# Patient Record
Sex: MALE | Race: WHITE | NOT HISPANIC OR LATINO | Employment: OTHER | URBAN - METROPOLITAN AREA
[De-identification: names, ages, dates, MRNs, and addresses within clinical notes are randomized per-mention and may not be internally consistent; named-entity substitution may affect disease eponyms.]

---

## 2017-01-03 ENCOUNTER — TELEPHONE (OUTPATIENT)
Dept: FAMILY MEDICINE | Facility: CLINIC | Age: 82
End: 2017-01-03

## 2017-01-03 NOTE — TELEPHONE ENCOUNTER
Reason for Call:  Other call back    Detailed comments: pt would like to know how much physical activity they can have after having a mini stroke on 12/22    Phone Number Patient can be reached at: Home number on file [phone] (home)    Best Time:     Can we leave a detailed message on this number? YES    Call taken on 1/3/2017 at 3:14 PM by Fly Chahal

## 2017-01-06 NOTE — TELEPHONE ENCOUNTER
Called and relayed the below message to the Pt. Pt agrees with recommendations below.     Chrissy Araujo RN

## 2017-01-06 NOTE — TELEPHONE ENCOUNTER
Ok to return to swimming, do routine chores, would not do heavy lifting, snow shoveling    Zurdo Kendrick M.D.

## 2017-01-06 NOTE — TELEPHONE ENCOUNTER
Routing to PCP. This was never routed to triage or PCP at start of the encounter on 1/3.     Chrissy Araujo RN

## 2017-01-14 DIAGNOSIS — E78.5 HYPERLIPIDEMIA LDL GOAL <100: Primary | ICD-10-CM

## 2017-01-16 RX ORDER — SIMVASTATIN 20 MG
TABLET ORAL
Qty: 90 TABLET | Refills: 3 | Status: SHIPPED | OUTPATIENT
Start: 2017-01-16 | End: 2017-12-31

## 2017-01-16 NOTE — TELEPHONE ENCOUNTER
simvastatin (ZOCOR) 20 MG tablet 90 tablet 3 12/4/2015  No      Sig: Take 1 tablet (20 mg) by mouth At Bedtime          Last Written Prescription Date: 12/04/2015  Last Fill Quantity: 90, # refills: 3  Last Office Visit with [facility], [facility] or [facility] prescribing provider: Dr. Rodriguez 12-   Next 5 appointments (look out 90 days)     Jan 19, 2017 11:00 AM   Pre-Op physical with Zurdo Kendrick MD   [facility]    [address] [facility] [address]   [phone]                   CHOL       93   12/23/2016  HDL       38   12/23/2016  LDL       41   12/23/2016  TRIG       72   12/23/2016  CHOLHDLRATIO      3.2   5/27/2015

## 2017-01-16 NOTE — PROGRESS NOTES
[facility]  [address] [facility] [address]  [phone]  Dept: [phone]    PRE-OP EVALUATION:  Today's date: 2017    Zack Santiago (: 1931) presents for pre-operative evaluation assessment as requested by Dr. Marc Jenkins  He requires evaluation and anesthesia risk assessment prior to undergoing surgery/procedure for treatment of left ing hernia .  Proposed procedure: hernia repair    Date of Surgery/ Procedure: 17  Time of Surgery/ Procedure:   Hospital/Surgical Facility: [facility]  Fax number for surgical facility: [phone]  Primary Physician: Zurdo Kendrick  Type of Anesthesia Anticipated:     Patient has a Health Care Directive or Living Will:      This is a complicated patient with ing hernia now scheduled for surgery.  I have been asked to clear him for the procedure.  He has mmp as noted and I reviewed his notes, office notes and hosp dc note.  As noted hosp recently for tia and changed from asa to plavix, since has been fine.  Before that had episode of amaurosis fugax and eval noted and ziopatch done with svt but no afib.  No recurrence of that.  He notes the hernia is not significantly painful or bothersome but noticeable.  Since the hosp dc he has slight hand writing diff but no speech diff or other neuro changes.  No new c/o.  No chest pain or shortness of breath, no fever.  No amaurosis.  He is taking his meds reg.                  Past Medical History:      Past Medical History   Diagnosis Date     GERD (gastroesophageal reflux disease)      OCD (obsessive compulsive disorder)      sees psyche     AAA (abdominal aortic aneurysm) without rupture (H)      Dr. Walters, endovascular repair done 5/15     Cough      pulm eval, felt due to reflux     ASCVD (arteriosclerotic cardiovascular disease) 2010     cabg, post op afib     A-fib (H) 2010     post op     Panic disorder      Dr. Luna     Hypothyroid      Ulcerative colitis (H)      not  active for years     Idiopathic neuropathy      HTN (hypertension) 2002     Dizzy 2001     mri small vessel dz     BPH (benign prostatic hyperplasia) 2003     turp     Hx of colonoscopy 2003     tics     Anemia 2004     Hypovitaminosis D      Spinal headache      CKD (chronic kidney disease) stage 3, GFR 30-59 ml/min      Sudden hearing loss 6/13     Dr. Garcia, mri brain no acoustic neuroma     Aortic insufficiency 6/14     1+ on echo     Thoracic ascending aortic aneurysm (H) 6/14     4.4cm on echo, aortic root 3.9, fu 5/15 4.8cm; fu done 12/15 and slightly enlarged, fu 6/16 no change     Bradycardia 2016     stopped toprol     Amaurosis fugax of right eye 10/16     carotid us nl stenosis, echo no change and no clots; ziopatch no afib, svt present     SVT (supraventricular tachycardia) (H) 11/16     seen on ziopatch done for amaurosis, longest 15 beats     Stroke (H) 12/16     expressive aphasia, hosp, seen by neuro, mri pos, carotids min dz, changed from asa to plavix             Past Surgical History:      Past Surgical History   Procedure Laterality Date     Cataract iol, rt/lt  2011     C total knee arthroplasty  2011     left     C total knee arthroplasty  2009     right     Coronary artery bypass  2010     Hernia repair  2005     Turp  2003     Knee surgery  1997     Back surgery  1998     Bladder surgery  1985     Hernia repair  1998     Repair hammer toe  12/28/2012     Procedure: REPAIR HAMMER TOE;  LEFT SECOND AND THIRD CLAW TOE RECONSTRUCTION;  Surgeon: Gray Haynes MD;  Location:  OR     Endovascular repair aneurysm abdominal aorta N/A 5/27/2015     Procedure: ENDOVASCULAR REPAIR ANEURYSM ABDOMINAL AORTA;  Surgeon: Darin Walters MD;  Location:  OR             Social History:     Social History   Substance Use Topics     Smoking status: Former Smoker -- 1.00 packs/day for 5 years     Types: Cigarettes     Quit date: 06/13/1965     Smokeless tobacco: Never Used     Alcohol Use: 0.0  "oz/week     0 Standard drinks or equivalent per week      Comment: maybe one glass of wine a week             Family History:   No family history of bleeding difficulty, anesthesia problems or blood clots.         Allergies:     Allergies   Allergen Reactions     No Known Allergies              Medications:     Current Outpatient Prescriptions   Medication Sig Dispense Refill     ASPIRIN NOT PRESCRIBED (INTENTIONAL) Please choose reason not prescribed, below 0 each 0     simvastatin (ZOCOR) 20 MG tablet TAKE ONE TABLET BY MOUTH AT BEDTIME 90 tablet 3     MIRTAZAPINE PO Take 30 mg by mouth At Bedtime       LORAZEPAM PO Take 0.5 mg by mouth daily as needed for anxiety       clopidogrel (PLAVIX) 75 MG tablet Take 1 tablet (75 mg) by mouth daily 30 tablet 3     levothyroxine (SYNTHROID/LEVOTHROID) 100 MCG tablet TAKE ONE TABLET BY MOUTH ONE TIME DAILY 90 tablet 2     ClomiPRAMINE HCl (ANAFRANIL PO) Take 25 mg by mouth daily       chlorthalidone (HYGROTON) 25 MG tablet Take 1 tablet (25 mg) by mouth daily 90 tablet 3     Multiple Vitamins-Minerals (ICAPS AREDS FORMULA) TABS Take 1 capsule by mouth 2 times daily.       gabapentin (NEURONTIN) 400 MG capsule Take 1 capsule by mouth 2 times daily        ARIPiprazole (ABILIFY) 2 MG tablet Take 1 tablet by mouth At Bedtime.                 Review of Systems:   No history of bleeding difficulty, anesthesia problems or blood clots.  The 10 point Review of Systems is negative other than noted in the HPI           Physical Exam:   Blood pressure 132/69, pulse 64, temperature 97.8  F (36.6  C), temperature source Oral, height 6' 1\" (1.854 m), weight 190 lb (86.183 kg), SpO2 96 %.    Constitutional: healthy appearing, alert and in no distress  Heent: Normocephalic. Head without obvious masses or lesions. PERRLDC, EOMI. Mouth exam within normal limits: tongue, mucous membranes, posterior pharynx all normal, no lesions or abnormalities seen.  Tm's and canals within normal limits " bilaterally. Neck supple, no nuchal rigidity or masses. No supraclavicular, or cervical adenopathy. Thyroid symmetric, no masses.  Cardiovascular: Regular rate and rhythm, no murmer, rub or gallops.  JVP not elevated, no edema.  Carotids within normal limits bilaterally, no bruits.  Respiratory: Normal respiratory effort.  Lungs clear, normal flow, no wheezing or crackles.  Gastrointestinal: Normal active bowel sounds.   Soft, not tender, no masses, guarding or rebound.  No hepatosplenomegaly.   Musculoskeletal: extremities normal, no gross deformities noted.  Skin: no suspicious lesions or rashes   Neurologic: Mental status within normal limits.  Speech fluent.  No gross motor abnormalities and gait intact.  Psychiatric: mentation appears normal and affect normal.         Data:   Labs noted        Assessment:   1. Normal pre op exam  2. Recent tia, changed from asa to plavix  3. Recent episode of amaurosis, stable  4. Recent ziopatch without afib but svt noted  5. Prior post op afib, no signs of recurrence  6. Recent change from asa to plavix due to tia  7. Ascvd, stable  8. aaa and thor aneurysm, stable  9. Ocd, stable         Plan:   I d/w patient that given this is elective and he would have to go off the plavix and given recent tia I would suggest he hold off on the procedure.  I believe if he can wait until at least 3 months out from the tia then going off the plavix would present less of a risk to the patient.  Given this is elective and low risk of strangulation I encouraged patient to cancel the procedure and he agrees.  If he were to have pain call me right away.  He is leaving in March for [place] and will re schedule it for after that.    Patient to follow up with me as noted, call if pain      Zurdo Kendrick M.D.

## 2017-01-19 ENCOUNTER — OFFICE VISIT (OUTPATIENT)
Dept: FAMILY MEDICINE | Facility: CLINIC | Age: 82
End: 2017-01-19
Payer: COMMERCIAL

## 2017-01-19 VITALS
HEIGHT: 73 IN | OXYGEN SATURATION: 96 % | WEIGHT: 190 LBS | SYSTOLIC BLOOD PRESSURE: 132 MMHG | TEMPERATURE: 97.8 F | HEART RATE: 64 BPM | BODY MASS INDEX: 25.18 KG/M2 | DIASTOLIC BLOOD PRESSURE: 69 MMHG

## 2017-01-19 DIAGNOSIS — I71.21 THORACIC ASCENDING AORTIC ANEURYSM (H): ICD-10-CM

## 2017-01-19 DIAGNOSIS — I47.10 SVT (SUPRAVENTRICULAR TACHYCARDIA) (H): ICD-10-CM

## 2017-01-19 DIAGNOSIS — N18.30 CKD (CHRONIC KIDNEY DISEASE) STAGE 3, GFR 30-59 ML/MIN (H): ICD-10-CM

## 2017-01-19 DIAGNOSIS — I63.9 CEREBROVASCULAR ACCIDENT (CVA), UNSPECIFIED MECHANISM (H): ICD-10-CM

## 2017-01-19 DIAGNOSIS — I48.91 ATRIAL FIBRILLATION, UNSPECIFIED TYPE (H): ICD-10-CM

## 2017-01-19 DIAGNOSIS — G45.3 AMAUROSIS FUGAX OF RIGHT EYE: ICD-10-CM

## 2017-01-19 DIAGNOSIS — I25.10 ASCVD (ARTERIOSCLEROTIC CARDIOVASCULAR DISEASE): ICD-10-CM

## 2017-01-19 DIAGNOSIS — Z01.818 PREOP GENERAL PHYSICAL EXAM: Primary | ICD-10-CM

## 2017-01-19 PROCEDURE — 99215 OFFICE O/P EST HI 40 MIN: CPT | Performed by: INTERNAL MEDICINE

## 2017-01-19 NOTE — NURSING NOTE
"Chief Complaint   Patient presents with     Pre-Op Exam       Initial /69 mmHg  Pulse 64  Temp(Src) 97.8  F (36.6  C) (Oral)  Ht 6' 1\" (1.854 m)  Wt 190 lb (86.183 kg)  BMI 25.07 kg/m2  SpO2 96% Estimated body mass index is 25.07 kg/(m^2) as calculated from the following:    Height as of this encounter: 6' 1\" (1.854 m).    Weight as of this encounter: 190 lb (86.183 kg).  BP completed using cuff size: misael MA CMA      "

## 2017-02-08 ENCOUNTER — TELEPHONE (OUTPATIENT)
Dept: FAMILY MEDICINE | Facility: CLINIC | Age: 82
End: 2017-02-08

## 2017-02-08 NOTE — TELEPHONE ENCOUNTER
That was from the June echo, but since he had another echo Nov so none needed for at least 6 to 12 months    Zurdo Kendrick M.D.

## 2017-02-08 NOTE — TELEPHONE ENCOUNTER
Reason for Call:  Other call back    Detailed comments: Pt states dr. brown wanted him to get a ECHO every 6 mos. No order in chart    Phone Number Patient can be reached at: Home number on file [phone] (home)    Best Time: anytime    Can we leave a detailed message on this number? YES    Call taken on 2/8/2017 at 12:55 PM by Devi Denis

## 2017-02-13 DIAGNOSIS — I10 ESSENTIAL HYPERTENSION: ICD-10-CM

## 2017-02-14 RX ORDER — CHLORTHALIDONE 25 MG/1
TABLET ORAL
Qty: 90 TABLET | Refills: 1 | Status: SHIPPED | OUTPATIENT
Start: 2017-02-14 | End: 2017-08-13

## 2017-02-14 NOTE — TELEPHONE ENCOUNTER
chlorthalidone (HYGROTON) 25 MG tablet 90 tablet 3 12/4/2015        Last Written Prescription Date: 12/04/2015  Last Fill Quantity: 90, # refills: 3  Last Office Visit with [facility], [facility] or [facility] prescribing provider: 01/19/2017  Next 5 appointments (look out 90 days)     Apr 07, 2017 11:00 AM CDT   [facility]    [address] [facility] [address] [address]   [phone]                   Potassium   Date Value Ref Range Status   12/22/2016 3.4 3.4 - 5.3 mmol/L Final     Creatinine   Date Value Ref Range Status   12/22/2016 1.26 (H) 0.66 - 1.25 mg/dL Final     BP Readings from Last 3 Encounters:   01/19/17 132/69   12/29/16 125/63   12/23/16 139/58

## 2017-02-14 NOTE — TELEPHONE ENCOUNTER
Pres[place] per [facility].  Ally Lima RN

## 2017-02-15 ENCOUNTER — TRANSFERRED RECORDS (OUTPATIENT)
Dept: HEALTH INFORMATION MANAGEMENT | Facility: CLINIC | Age: 82
End: 2017-02-15

## 2017-02-20 ENCOUNTER — OFFICE VISIT (OUTPATIENT)
Dept: FAMILY MEDICINE | Facility: CLINIC | Age: 82
End: 2017-02-20
Payer: COMMERCIAL

## 2017-02-20 VITALS
DIASTOLIC BLOOD PRESSURE: 50 MMHG | HEIGHT: 73 IN | TEMPERATURE: 96.8 F | OXYGEN SATURATION: 97 % | WEIGHT: 195.2 LBS | HEART RATE: 38 BPM | BODY MASS INDEX: 25.87 KG/M2 | SYSTOLIC BLOOD PRESSURE: 120 MMHG

## 2017-02-20 DIAGNOSIS — R35.1 NOCTURIA: Primary | ICD-10-CM

## 2017-02-20 LAB
ALBUMIN UR-MCNC: NEGATIVE MG/DL
APPEARANCE UR: CLEAR
BILIRUB UR QL STRIP: NEGATIVE
COLOR UR AUTO: YELLOW
GLUCOSE UR STRIP-MCNC: NEGATIVE MG/DL
HGB UR QL STRIP: NEGATIVE
KETONES UR STRIP-MCNC: NEGATIVE MG/DL
LEUKOCYTE ESTERASE UR QL STRIP: NEGATIVE
NITRATE UR QL: NEGATIVE
PH UR STRIP: 7 PH (ref 5–7)
SP GR UR STRIP: 1.01 (ref 1–1.03)
URN SPEC COLLECT METH UR: NORMAL
UROBILINOGEN UR STRIP-ACNC: 0.2 EU/DL (ref 0.2–1)

## 2017-02-20 PROCEDURE — 81003 URINALYSIS AUTO W/O SCOPE: CPT | Performed by: INTERNAL MEDICINE

## 2017-02-20 PROCEDURE — 99213 OFFICE O/P EST LOW 20 MIN: CPT | Performed by: INTERNAL MEDICINE

## 2017-02-20 PROCEDURE — 87086 URINE CULTURE/COLONY COUNT: CPT | Performed by: INTERNAL MEDICINE

## 2017-02-20 NOTE — PROGRESS NOTES
Zack Santiago is a 85 year old male who presents for urine issues, has been for 4 to 5 days.  He notes feeling irritation fairly constant in penis with noct more then l and diff with stream at night.  During the day mostly fine, no b/b dc, no abdomen pain or n/v, no bm changes, no f,c,s.  No recent urine issues.  NO med changes.  He feels fine, not ill, no n/v.    Past Medical History   Diagnosis Date     A-fib (H) 2010     post op     AAA (abdominal aortic aneurysm) without rupture (H)      Dr. 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/16     carotid us nl stenosis, echo no change and no clots; ziopatch no afib, svt present     Anemia 2004     Aortic insufficiency 6/14     1+ on echo     ASCVD (arteriosclerotic cardiovascular disease) 2010     cabg, post op afib     BPH (benign prostatic hyperplasia) 2003     turp     Bradycardia 2016     stopped toprol     CKD (chronic kidney disease) stage 3, GFR 30-59 ml/min      Cough 2010     pulm eval, felt due to reflux     Dizzy 2001     mri small vessel dz     GERD (gastroesophageal reflux disease)      HTN (hypertension) 2002     Hx of colonoscopy 2003     tics     Hypothyroid      Hypovitaminosis D      Idiopathic neuropathy      OCD (obsessive compulsive disorder)      sees psyche     Panic disorder      Dr. Luna     Spinal headache      Stroke (H) 12/16     expressive aphasia, hosp, seen by neuro, mri pos, carotids min dz, changed from asa to plavix     Sudden hearing loss 6/13     Dr. Garcia, mri brain no acoustic neuroma     SVT (supraventricular tachycardia) (H) 11/16     seen on ziopatch done for amaurosis, longest 15 beats     Thoracic ascending aortic aneurysm (H) 6/14     4.4cm on echo, aortic root 3.9, fu 5/15 4.8cm; fu done 12/15 and slightly enlarged, fu 6/16 no change     Ulcerative colitis (H)      not active for years     Past Surgical History   Procedure Laterality Date     Cataract iol, rt/lt  2011     C total knee  arthroplasty  2011     left     C total knee arthroplasty  2009     right     Coronary artery bypass  2010     Hernia repair  2005     Turp  2003     Knee surgery  1997     Back surgery  1998     Bladder surgery  1985     Hernia repair  1998     Repair hammer toe  12/28/2012     Procedure: REPAIR HAMMER TOE;  LEFT SECOND AND THIRD CLAW TOE RECONSTRUCTION;  Surgeon: Gray Haynes MD;  Location: [facility] OR     Endovascular repair aneurysm abdominal aorta N/A 5/27/2015     Procedure: ENDOVASCULAR REPAIR ANEURYSM ABDOMINAL AORTA;  Surgeon: Darin Walters MD;  Location:  OR     Social History     Social History     Marital status:      Spouse name: N/A     Number of children: 2     Years of education: N/A     Occupational History     retail Retired     Social History Main Topics     Smoking status: Former Smoker     Packs/day: 1.00     Years: 5.00     Types: Cigarettes     Quit date: 6/13/1965     Smokeless tobacco: Never Used     Alcohol use 0.0 oz/week     0 Standard drinks or equivalent per week      Comment: maybe one glass of wine a week     Drug use: No     Sexual activity: Not Currently     Other Topics Concern     Not on file     Social History Narrative     Current Outpatient Prescriptions   Medication Sig Dispense Refill     chlorthalidone (HYGROTON) 25 MG tablet TAKE ONE TABLET BY MOUTH ONE TIME DAILY 90 tablet 1     ASPIRIN NOT PRESCRIBED (INTENTIONAL) Please choose reason not prescribed, below 0 each 0     simvastatin (ZOCOR) 20 MG tablet TAKE ONE TABLET BY MOUTH AT BEDTIME 90 tablet 3     MIRTAZAPINE PO Take 30 mg by mouth At Bedtime       LORAZEPAM PO Take 0.5 mg by mouth daily as needed for anxiety       clopidogrel (PLAVIX) 75 MG tablet Take 1 tablet (75 mg) by mouth daily 30 tablet 3     levothyroxine (SYNTHROID/LEVOTHROID) 100 MCG tablet TAKE ONE TABLET BY MOUTH ONE TIME DAILY 90 tablet 2     ClomiPRAMINE HCl (ANAFRANIL PO) Take 25 mg by mouth daily       Multiple Vitamins-Minerals  "(ICAPS AREDS FORMULA) TABS Take 1 capsule by mouth 2 times daily.       gabapentin (NEURONTIN) 400 MG capsule Take 1 capsule by mouth 2 times daily        ARIPiprazole (ABILIFY) 2 MG tablet Take 1 tablet by mouth At Bedtime.       Allergies   Allergen Reactions     No Known Allergies      FAMILY HISTORY NOTED AND REVIEWED    REVIEW OF SYSTEMS: above    PHYSICAL EXAM    /50 (BP Location: Left arm, Patient Position: Chair, Cuff Size: Adult Regular)  Pulse (!) 38  Temp 96.8  F (36  C) (Oral)  Ht 6' 1\" (1.854 m)  Wt 195 lb 3.2 oz (88.5 kg)  SpO2 97%  BMI 25.75 kg/m2    Patient appears non toxic  Abdomen normal active bowel sounds, soft,  Non-tender, no mgr, no hepatosplenomegaly, I could not feel enlarged bladder  Penis within normal limits  Testicles within normal limits  Digital mod bph, no masses, not tender    ua neg as noted    ASSESSMENT:  Urine freq, irritation, ?cause, does not appear to be uti, ?rtention, ?stone, but no blood    PLAN:  To urol, tomorrow 3pm    Zurdo Kendrick M.D.        "

## 2017-02-20 NOTE — NURSING NOTE
"Chief Complaint   Patient presents with     Urinary Problem       Initial /50 (BP Location: Left arm, Patient Position: Chair, Cuff Size: Adult Regular)  Pulse (!) 38  Temp 96.8  F (36  C) (Oral)  Ht 6' 1\" (1.854 m)  Wt 195 lb 3.2 oz (88.5 kg)  SpO2 97%  BMI 25.75 kg/m2 Estimated body mass index is 25.75 kg/(m^2) as calculated from the following:    Height as of this encounter: 6' 1\" (1.854 m).    Weight as of this encounter: 195 lb 3.2 oz (88.5 kg).  Medication Reconciliation: complete.  Radha Love CMA    "

## 2017-02-20 NOTE — PROGRESS NOTES
Zack Santiago is a 85 year old male who presents for urin issues, 4 to 5 days.  He notes nocturia up to 4, an irritation in the penis area, dec stream.  During the day fine, but has the irritation, but urine fine in the day.  NO urine b/b dc, no fever, no abdomen pain or n/v, no bm changes.  ?prior uti years ago.  Has otherwise felt fine, noit ill.    Past Medical History   Diagnosis Date     A-fib (H) 2010     post op     AAA (abdominal aortic aneurysm) without rupture (H)      Dr. 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/16     carotid us nl stenosis, echo no change and no clots; ziopatch no afib, svt present     Anemia 2004     Aortic insufficiency 6/14     1+ on echo     ASCVD (arteriosclerotic cardiovascular disease) 2010     cabg, post op afib     BPH (benign prostatic hyperplasia) 2003     turp     Bradycardia 2016     stopped toprol     CKD (chronic kidney disease) stage 3, GFR 30-59 ml/min      Cough 2010     pulm eval, felt due to reflux     Dizzy 2001     mri small vessel dz     GERD (gastroesophageal reflux disease)      HTN (hypertension) 2002     Hx of colonoscopy 2003     tics     Hypothyroid      Hypovitaminosis D      Idiopathic neuropathy      OCD (obsessive compulsive disorder)      sees psyche     Panic disorder      Dr. Luna     Spinal headache      Stroke (H) 12/16     expressive aphasia, hosp, seen by neuro, mri pos, carotids min dz, changed from asa to plavix     Sudden hearing loss 6/13     Dr. Garcia, mri brain no acoustic neuroma     SVT (supraventricular tachycardia) (H) 11/16     seen on ziopatch done for amaurosis, longest 15 beats     Thoracic ascending aortic aneurysm (H) 6/14     4.4cm on echo, aortic root 3.9, fu 5/15 4.8cm; fu done 12/15 and slightly enlarged, fu 6/16 no change     Ulcerative colitis (H)      not active for years     Past Surgical History   Procedure Laterality Date     Cataract iol, rt/lt  2011     C total knee arthroplasty   2011     left     C total knee arthroplasty  2009     right     Coronary artery bypass  2010     Hernia repair  2005     Turp  2003     Knee surgery  1997     Back surgery  1998     Bladder surgery  1985     Hernia repair  1998     Repair hammer toe  12/28/2012     Procedure: REPAIR HAMMER TOE;  LEFT SECOND AND THIRD CLAW TOE RECONSTRUCTION;  Surgeon: Gray Haynes MD;  Location: [facility] OR     Endovascular repair aneurysm abdominal aorta N/A 5/27/2015     Procedure: ENDOVASCULAR REPAIR ANEURYSM ABDOMINAL AORTA;  Surgeon: Darin Walters MD;  Location:  OR     Social History     Social History     Marital status:      Spouse name: N/A     Number of children: 2     Years of education: N/A     Occupational History     retail Retired     Social History Main Topics     Smoking status: Former Smoker     Packs/day: 1.00     Years: 5.00     Types: Cigarettes     Quit date: 6/13/1965     Smokeless tobacco: Never Used     Alcohol use 0.0 oz/week     0 Standard drinks or equivalent per week      Comment: maybe one glass of wine a week     Drug use: No     Sexual activity: Not Currently     Other Topics Concern     Not on file     Social History Narrative     Current Outpatient Prescriptions   Medication Sig Dispense Refill     chlorthalidone (HYGROTON) 25 MG tablet TAKE ONE TABLET BY MOUTH ONE TIME DAILY 90 tablet 1     ASPIRIN NOT PRESCRIBED (INTENTIONAL) Please choose reason not prescribed, below 0 each 0     simvastatin (ZOCOR) 20 MG tablet TAKE ONE TABLET BY MOUTH AT BEDTIME 90 tablet 3     MIRTAZAPINE PO Take 30 mg by mouth At Bedtime       LORAZEPAM PO Take 0.5 mg by mouth daily as needed for anxiety       clopidogrel (PLAVIX) 75 MG tablet Take 1 tablet (75 mg) by mouth daily 30 tablet 3     levothyroxine (SYNTHROID/LEVOTHROID) 100 MCG tablet TAKE ONE TABLET BY MOUTH ONE TIME DAILY 90 tablet 2     ClomiPRAMINE HCl (ANAFRANIL PO) Take 25 mg by mouth daily       Multiple Vitamins-Minerals (ICAPS AREDS  "FORMULA) TABS Take 1 capsule by mouth 2 times daily.       gabapentin (NEURONTIN) 400 MG capsule Take 1 capsule by mouth 2 times daily        ARIPiprazole (ABILIFY) 2 MG tablet Take 1 tablet by mouth At Bedtime.       Allergies   Allergen Reactions     No Known Allergies      FAMILY HISTORY NOTED AND REVIEWED    REVIEW OF SYSTEMS: above    PHYSICAL EXAM    /50 (BP Location: Left arm, Patient Position: Chair, Cuff Size: Adult Regular)  Pulse (!) 38  Temp 96.8  F (36  C) (Oral)  Ht 6' 1\" (1.854 m)  Wt 195 lb 3.2 oz (88.5 kg)  SpO2 97%  BMI 25.75 kg/m2    Patient appears non toxic  Abdomen, normal active bowel sounds, soft, non-tender, no mgr, no hepatosplenomegaly, I did not feel enlarged bladder  Penis within normal limits  Testicles within normal limits bilat  Digital mod bph    ua noted    above  "

## 2017-02-20 NOTE — MR AVS SNAPSHOT
After Visit Summary   2/20/2017    Zack Santiago    MRN: [id number]           Patient Information     Date Of Birth          5/29/1931        Visit Information        Provider Department      2/20/2017 10:00 AM Zurdo Kendrick MD [facility]        Today's Diagnoses     Nocturia    -  1       Follow-ups after your visit        Additional Services     UROLOGY ADULT REFERRAL       Your provider has referred you to: [facility]: [facility] [phone]   [email or web address]    Please be aware that coverage of these services is subject to the terms and limitations of your health insurance plan.  Call member services at your health plan with any benefit or coverage questions.      Please bring the following with you to your appointment:    (1) Any X-Rays, CTs or MRIs which have been performed.  Contact the facility where they were done to arrange for  prior to your scheduled appointment.    (2) List of current medications  (3) This referral request   (4) Any documents/labs given to you for this referral                  Your next 10 appointments already scheduled     Feb 21, 2017  3:00 PM CST   New Patient Visit with Chrissy Avelar PA-C   [facility] [place] ([facility] [place])    [address]  [address]  [facility] [address] [phone]            Apr 07, 2017 11:00 AM CDT   Pre-Op physical with Zurdo Kendrick MD   [facility]    [address] [facility] [address] [phone]            Apr 11, 2017 10:15 AM CDT   [facility] Same Day Surgery with Marc Jenkins MD   [facility] ([facility])    [facility] ([facility])   [phone]            Apr 11, 2017   Procedure with Marc Jenkins MD   [facility] (--)    [address]  [place] [address] [phone]              Who to contact     If you have questions or need follow up information about today's clinic visit or your schedule please contact [facility] directly at [phone].  Normal or non-critical lab and imaging results will be communicated to you by MyChart, letter or phone within 4 business days after the clinic has received the results. If you do not hear from us within 7 days, please contact the clinic through MyChart or phone. If you have a critical or abnormal lab result, we will notify you by phone as soon as possible.  Submit refill requests through [employer] or call your pharmacy and they will forward the refill request to us. Please allow 3 business days for your refill to be completed.          Additional Information About Your Visit        Care EveryWhere ID     This is your Care EveryWhere ID. This could be used by other organizations to access your [place] medical records  [id number]        Your Vitals Were     Pulse Temperature Height Pulse Oximetry BMI (Body Mass Index)       38 96.8  F (36  C) (Oral) 6' 1\" (1.854 m) 97% 25.75 kg/m2        Blood Pressure from Last 3 Encounters:   02/20/17 120/50   01/19/17 132/69   12/29/16 125/63    Weight from Last 3 Encounters:   02/20/17 195 lb 3.2 oz (88.5 kg)   01/19/17 190 lb (86.2 kg)   12/29/16 190 lb (86.2 kg)              We Performed the Following     *UA reflex to Microscopic     Urine Culture Aerobic Bacterial     UROLOGY ADULT REFERRAL        Primary Care Provider Office Phone # Fax #    Zurdo Kendrick [phone]       [facility] [address]  [facility] [address]        Thank you!     Thank you for choosing [facility]  for your care. Our goal is always to provide you with excellent care. Hearing back from our patients is one way we can continue to improve our services. Please take a few minutes to complete the " written survey that you may receive in the mail after your visit with us. Thank you!             Your Updated Medication List - Protect others around you: Learn how to safely use, store and throw away your medicines at www.disposemymeds.org.          This list is accurate as of: 2/20/17 10:43 AM.  Always use your most recent med list.                   Brand Name Dispense Instructions for use    ANAFRANIL PO      Take 25 mg by mouth daily       ARIPiprazole 2 MG tablet    ABILIFY     Take 1 tablet by mouth At Bedtime.       ASPIRIN NOT PRESCRIBED    INTENTIONAL    0 each    Please choose reason not prescribed, below       chlorthalidone 25 MG tablet    HYGROTON    90 tablet    TAKE ONE TABLET BY MOUTH ONE TIME DAILY       clopidogrel 75 MG tablet    PLAVIX    30 tablet    Take 1 tablet (75 mg) by mouth daily       gabapentin 400 MG capsule    NEURONTIN     Take 1 capsule by mouth 2 times daily       ICAPS AREDS FORMULA Tabs      Take 1 capsule by mouth 2 times daily.       levothyroxine 100 MCG tablet    SYNTHROID/LEVOTHROID    90 tablet    TAKE ONE TABLET BY MOUTH ONE TIME DAILY       LORAZEPAM PO      Take 0.5 mg by mouth daily as needed for anxiety       MIRTAZAPINE PO      Take 30 mg by mouth At Bedtime       simvastatin 20 MG tablet    ZOCOR    90 tablet    TAKE ONE TABLET BY MOUTH AT BEDTIME

## 2017-02-21 ENCOUNTER — OFFICE VISIT (OUTPATIENT)
Dept: UROLOGY | Facility: CLINIC | Age: 82
End: 2017-02-21
Payer: COMMERCIAL

## 2017-02-21 VITALS
HEIGHT: 72 IN | BODY MASS INDEX: 25.73 KG/M2 | WEIGHT: 190 LBS | SYSTOLIC BLOOD PRESSURE: 124 MMHG | DIASTOLIC BLOOD PRESSURE: 70 MMHG | HEART RATE: 60 BPM

## 2017-02-21 DIAGNOSIS — R35.1 NOCTURIA: Primary | ICD-10-CM

## 2017-02-21 DIAGNOSIS — R39.11 URINARY HESITANCY: ICD-10-CM

## 2017-02-21 DIAGNOSIS — N40.1 BENIGN NON-NODULAR PROSTATIC HYPERPLASIA WITH LOWER URINARY TRACT SYMPTOMS: ICD-10-CM

## 2017-02-21 DIAGNOSIS — Z90.79 S/P TURP: ICD-10-CM

## 2017-02-21 LAB
ALBUMIN UR-MCNC: NEGATIVE MG/DL
APPEARANCE UR: CLEAR
BACTERIA SPEC CULT: NORMAL
BILIRUB UR QL STRIP: NEGATIVE
COLOR UR AUTO: YELLOW
GLUCOSE UR STRIP-MCNC: NEGATIVE MG/DL
HGB UR QL STRIP: NEGATIVE
KETONES UR STRIP-MCNC: NEGATIVE MG/DL
LEUKOCYTE ESTERASE UR QL STRIP: NEGATIVE
MICRO REPORT STATUS: NORMAL
NITRATE UR QL: NEGATIVE
PH UR STRIP: 6 PH (ref 5–7)
SP GR UR STRIP: 1.02 (ref 1–1.03)
SPECIMEN SOURCE: NORMAL
URN SPEC COLLECT METH UR: NORMAL
UROBILINOGEN UR STRIP-ACNC: 1 EU/DL (ref 0.2–1)

## 2017-02-21 PROCEDURE — 81003 URINALYSIS AUTO W/O SCOPE: CPT | Performed by: PHYSICIAN ASSISTANT

## 2017-02-21 PROCEDURE — 51798 US URINE CAPACITY MEASURE: CPT | Performed by: PHYSICIAN ASSISTANT

## 2017-02-21 PROCEDURE — 99203 OFFICE O/P NEW LOW 30 MIN: CPT | Mod: 25 | Performed by: PHYSICIAN ASSISTANT

## 2017-02-21 RX ORDER — TAMSULOSIN HYDROCHLORIDE 0.4 MG/1
0.4 CAPSULE ORAL DAILY
Qty: 90 CAPSULE | Refills: 1 | Status: SHIPPED | OUTPATIENT
Start: 2017-02-21 | End: 2017-04-07

## 2017-02-21 ASSESSMENT — PAIN SCALES - GENERAL: PAINLEVEL: NO PAIN (0)

## 2017-02-21 NOTE — PATIENT INSTRUCTIONS
- Elevate your legs for 1-2 hours before bed each night.  - Start taking tamsulosin (Flomax) once a night.     See me back in early April to see how things are going. Always feel free to call or return to clinic sooner with any questions or concerns.    Thank you,  Chrissy Avelar PA-C  Urology

## 2017-02-21 NOTE — PROGRESS NOTES
CC: nocturia    HPI: It is a pleasure to see Mr. Zack Santiago, a very pleasant 85 year old male with a history of BPH seen today in the urology clinic in consultation from Dr. Kendrick for evaluation of the above. Symptoms have been ongoing for a few weeks. The patient voids 3-4 times per night. Reports significant hesitancy, intermittency, and sensation of incomplete emptying. Interestingly, these symptoms are not present during the day. Voids q3-4 hours during the day. He denies dysuria, gross hematuria, pyuria, incontinence, needing to strain to void, history of UTI's or kidney stones.     He did have a TURP in 2003 - possibly with Dr. Larry Zelaya. Has not had voiding issues since this procedure until more recently. Drinks 2 cups of coffee in the morning, 4 glasses of water throughout the day. Does have some swelling in bilateral lower extremities. Denies issues with sleep apnea.    AUA Symptom Score: 21. Quality of Life Score: 5 (unhappy).    Past Medical History   Diagnosis Date     A-fib (H) 2010     post op     AAA (abdominal aortic aneurysm) without rupture (H)      Dr. 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/16     carotid us nl stenosis, echo no change and no clots; ziopatch no afib, svt present     Anemia 2004     Aortic insufficiency 6/14     1+ on echo     ASCVD (arteriosclerotic cardiovascular disease) 2010     cabg, post op afib     BPH (benign prostatic hyperplasia) 2003     turp     Bradycardia 2016     stopped toprol     CKD (chronic kidney disease) stage 3, GFR 30-59 ml/min      Cough 2010     pulm eval, felt due to reflux     Dizzy 2001     mri small vessel dz     GERD (gastroesophageal reflux disease)      HTN (hypertension) 2002     Hx of colonoscopy 2003     tics     Hypothyroid      Hypovitaminosis D      Idiopathic neuropathy      OCD (obsessive compulsive disorder)      sees psyche     Panic disorder      Dr. Luna     Spinal headache      Stroke (H)  12/16     expressive aphasia, hosp, seen by neuro, mri pos, carotids min dz, changed from asa to plavix     Sudden hearing loss 6/13     Dr. Garcia, mri brain no acoustic neuroma     SVT (supraventricular tachycardia) (H) 11/16     seen on ziopatch done for amaurosis, longest 15 beats     Thoracic ascending aortic aneurysm (H) 6/14     4.4cm on echo, aortic root 3.9, fu 5/15 4.8cm; fu done 12/15 and slightly enlarged, fu 6/16 no change     Ulcerative colitis (H)      not active for years     Past Surgical History   Procedure Laterality Date     Cataract iol, rt/lt  2011     C total knee arthroplasty  2011     left     C total knee arthroplasty  2009     right     Coronary artery bypass  2010     Hernia repair  2005     Turp  2003     Knee surgery  1997     Back surgery  1998     Bladder surgery  1985     Hernia repair  1998     Repair hammer toe  12/28/2012     Procedure: REPAIR HAMMER TOE;  LEFT SECOND AND THIRD CLAW TOE RECONSTRUCTION;  Surgeon: Gray Haynes MD;  Location:  OR     Endovascular repair aneurysm abdominal aorta N/A 5/27/2015     Procedure: ENDOVASCULAR REPAIR ANEURYSM ABDOMINAL AORTA;  Surgeon: Darin Walters MD;  Location:  OR     Current Outpatient Prescriptions   Medication Sig Dispense Refill     chlorthalidone (HYGROTON) 25 MG tablet TAKE ONE TABLET BY MOUTH ONE TIME DAILY 90 tablet 1     simvastatin (ZOCOR) 20 MG tablet TAKE ONE TABLET BY MOUTH AT BEDTIME 90 tablet 3     MIRTAZAPINE PO Take 30 mg by mouth At Bedtime       LORAZEPAM PO Take 0.5 mg by mouth daily as needed for anxiety       clopidogrel (PLAVIX) 75 MG tablet Take 1 tablet (75 mg) by mouth daily 30 tablet 3     levothyroxine (SYNTHROID/LEVOTHROID) 100 MCG tablet TAKE ONE TABLET BY MOUTH ONE TIME DAILY 90 tablet 2     ClomiPRAMINE HCl (ANAFRANIL PO) Take 25 mg by mouth daily       gabapentin (NEURONTIN) 400 MG capsule Take 1 capsule by mouth 2 times daily        ARIPiprazole (ABILIFY) 2 MG tablet Take 1 tablet by  mouth At Bedtime.       Allergies   Allergen Reactions     No Known Allergies      FAMILY HISTORY: The patient denies history of genitourinary carcinoma.  There is no history of urolithiasis.    SOCIAL HISTORY: The patient formerly smoked cigarettes, minimal EtOH and no illicit drug use.    ROS: A comprehensive 14 point ROS was obtained and was positive for HTN, CKD, h/o a. Fib, SVT, and CVA, GERD, AAA, UC and otherwise negative except for that outlined above in the HPI.    PHYSICAL EXAM:   Vitals:    02/21/17 1507   BP: 124/70   BP Location: Left arm   Patient Position: Chair   Cuff Size: Adult Regular   Pulse: 60   Weight: 86.2 kg (190 lb)   Height: 1.829 m (6')     GENERAL: Well groomed/well developed/well nourished male in NAD.  HEENT: EOMI, AT, NC.  SKIN: Warm to touch, dry.  No visible rashes or lesions.  RESP: No increased respiratory effort.  LYMPH: 1+ pitting LE edema.  MS: Full ROM in extremities.  : Deferred  BRIANA:     Good sphincter tone, smooth rectal mucosa. Moderate amount of stool in the rectal vault.     Mild to moderately enlarged prostate that is smooth to palpation without nodules, mass, asymmetry, tenderness or bogginess.  NEURO: Alert and oriented x 3.  PSYCH: Normal mood and affect, pleasant and agreeable during interview and exam.    Residual urine by ultrasound was 15 ml (although bladder scanner not fully charged so unsure if this reading is accurate)    Urine dip completely negative.    ASSESSMENT/PLAN:  Mr. Zack Santiago is a very pleasant 85 year old male with a h/o BPH s/p TURP in 2003 now with nocturia and obstructive voiding symptoms at night. BRIANA does reveal a mild to moderately enlarged prostate without evidence for acute prostatitis. Suspect he has some regrowth of obstructing prostate tissue.     Discussed potential management options with the patient including: double voiding, timed voiding, avoiding known bladder irritants, alpha blocker medication, 5 alpha reductase  inhibitor medication, and possible outlet reductive surgery, such as a repeat TURP, down the road.  The patient has elected to proceed with the following:    - Start tamsulosin 0.4 mg daily. Rx sent to patient's pharmacy. Side effects discussed.  - Elevate feet for 1-2 hours before bed to mobilize peripheral fluids  - Cut back/cut out bladder irritants such as coffee, caffeine, alcohol, spicy foods, etc.  - Work on timed voiding and double voiding to promote complete bladder emptying.    Follow up in 4-6 weeks for uroflowmetry studies, an AUA symptom score and reassessment.  Call or RTC sooner with any issues.     Video urodynamics and/or cystoscopic evaluation would be the next appropriate diagnostic steps should Mr. Santiago fail conservative therapy.      I have enjoyed participating in the medical care of this very pleasant patient.  Please don't hesitate to contact me with any questions or concerns.     Chrissy Avelar PA-C  Department of Urology

## 2017-02-21 NOTE — LETTER
2/21/2017       RE: Zack Santiago  [address]  [place] [address]     Dear Colleague,    Thank you for referring your patient, Zack Santiago, to the [facility] at [facility]. Please see a copy of my visit note below.    CC: nocturia    HPI: It is a pleasure to see . Zack Santiago, a very pleasant 85 year old male with a history of BPH seen today in the urology clinic in consultation from Dr. Kendrick for evaluation of the above. Symptoms have been ongoing for a few weeks. The patient voids 3-4 times per night. Reports significant hesitancy, intermittency, and sensation of incomplete emptying. Interestingly, these symptoms are not present during the day. Voids q3-4 hours during the day. He denies dysuria, gross hematuria, pyuria, incontinence, needing to strain to void, history of UTI's or kidney stones.     He did have a TURP in 2003 - possibly with Dr. Larry Zelaya. Has not had voiding issues since this procedure until more recently. Drinks 2 cups of coffee in the morning, 4 glasses of water throughout the day. Does have some swelling in bilateral lower extremities. Denies issues with sleep apnea.    AUA Symptom Score: 21. Quality of Life Score: 5 (unhappy).    Past Medical History   Diagnosis Date     A-fib (H) 2010     post op     AAA (abdominal aortic aneurysm) without rupture (H)      Dr. 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/16     carotid us nl stenosis, echo no change and no clots; ziopatch no afib, svt present     Anemia 2004     Aortic insufficiency 6/14     1+ on echo     ASCVD (arteriosclerotic cardiovascular disease) 2010     cabg, post op afib     BPH (benign prostatic hyperplasia) 2003     turp     Bradycardia 2016     stopped toprol     CKD (chronic kidney disease) stage 3, GFR 30-59 ml/min      Cough 2010     pulm eval, felt due to reflux     Dizzy 2001     mri  small vessel dz     GERD (gastroesophageal reflux disease)      HTN (hypertension) 2002     Hx of colonoscopy 2003     tics     Hypothyroid      Hypovitaminosis D      Idiopathic neuropathy      OCD (obsessive compulsive disorder)      sees psyche     Panic disorder      Dr. Luna     Spinal headache      Stroke (H) 12/16     expressive aphasia, hosp, seen by neuro, mri pos, carotids min dz, changed from asa to plavix     Sudden hearing loss 6/13     Dr. Garcia, mri brain no acoustic neuroma     SVT (supraventricular tachycardia) (H) 11/16     seen on ziopatch done for amaurosis, longest 15 beats     Thoracic ascending aortic aneurysm (H) 6/14     4.4cm on echo, aortic root 3.9, fu 5/15 4.8cm; fu done 12/15 and slightly enlarged, fu 6/16 no change     Ulcerative colitis (H)      not active for years     Past Surgical History   Procedure Laterality Date     Cataract iol, rt/lt  2011     C total knee arthroplasty  2011     left     C total knee arthroplasty  2009     right     Coronary artery bypass  2010     Hernia repair  2005     Turp  2003     Knee surgery  1997     Back surgery  1998     Bladder surgery  1985     Hernia repair  1998     Repair hammer toe  12/28/2012     Procedure: REPAIR HAMMER TOE;  LEFT SECOND AND THIRD CLAW TOE RECONSTRUCTION;  Surgeon: Gray Haynes MD;  Location:  OR     Endovascular repair aneurysm abdominal aorta N/A 5/27/2015     Procedure: ENDOVASCULAR REPAIR ANEURYSM ABDOMINAL AORTA;  Surgeon: Darin Walters MD;  Location:  OR     Current Outpatient Prescriptions   Medication Sig Dispense Refill     chlorthalidone (HYGROTON) 25 MG tablet TAKE ONE TABLET BY MOUTH ONE TIME DAILY 90 tablet 1     simvastatin (ZOCOR) 20 MG tablet TAKE ONE TABLET BY MOUTH AT BEDTIME 90 tablet 3     MIRTAZAPINE PO Take 30 mg by mouth At Bedtime       LORAZEPAM PO Take 0.5 mg by mouth daily as needed for anxiety       clopidogrel (PLAVIX) 75 MG tablet Take 1 tablet (75 mg) by mouth daily 30  tablet 3     levothyroxine (SYNTHROID/LEVOTHROID) 100 MCG tablet TAKE ONE TABLET BY MOUTH ONE TIME DAILY 90 tablet 2     ClomiPRAMINE HCl (ANAFRANIL PO) Take 25 mg by mouth daily       gabapentin (NEURONTIN) 400 MG capsule Take 1 capsule by mouth 2 times daily        ARIPiprazole (ABILIFY) 2 MG tablet Take 1 tablet by mouth At Bedtime.       Allergies   Allergen Reactions     No Known Allergies      FAMILY HISTORY: The patient denies history of genitourinary carcinoma.  There is no history of urolithiasis.    SOCIAL HISTORY: The patient formerly smoked cigarettes, minimal EtOH and no illicit drug use.    ROS: A comprehensive 14 point ROS was obtained and was positive for HTN, CKD, h/o a. Fib, SVT, and CVA, GERD, AAA, UC and otherwise negative except for that outlined above in the HPI.    PHYSICAL EXAM:   Vitals:    02/21/17 1507   BP: 124/70   BP Location: Left arm   Patient Position: Chair   Cuff Size: Adult Regular   Pulse: 60   Weight: 86.2 kg (190 lb)   Height: 1.829 m (6')     GENERAL: Well groomed/well developed/well nourished male in NAD.  HEENT: EOMI, AT, NC.  SKIN: Warm to touch, dry.  No visible rashes or lesions.  RESP: No increased respiratory effort.  LYMPH: 1+ pitting LE edema.  MS: Full ROM in extremities.  : Deferred  BRIANA:     Good sphincter tone, smooth rectal mucosa. Moderate amount of stool in the rectal vault.     Mild to moderately enlarged prostate that is smooth to palpation without nodules, mass, asymmetry, tenderness or bogginess.  NEURO: Alert and oriented x 3.  PSYCH: Normal mood and affect, pleasant and agreeable during interview and exam.    Residual urine by ultrasound was 15 ml (although bladder scanner not fully charged so unsure if this reading is accurate)    Urine dip completely negative.    ASSESSMENT/PLAN:  Mr. Zack Santiago is a very pleasant 85 year old male with a h/o BPH s/p TURP in 2003 now with nocturia and obstructive voiding symptoms at night. BRIANA does reveal a mild  to moderately enlarged prostate without evidence for acute prostatitis. Suspect he has some regrowth of obstructing prostate tissue.     Discussed potential management options with the patient including: double voiding, timed voiding, avoiding known bladder irritants, alpha blocker medication, 5 alpha reductase inhibitor medication, and possible outlet reductive surgery, such as a repeat TURP, down the road.  The patient has elected to proceed with the following:    - Start tamsulosin 0.4 mg daily. Rx sent to patient's pharmacy. Side effects discussed.  - Elevate feet for 1-2 hours before bed to mobilize peripheral fluids  - Cut back/cut out bladder irritants such as coffee, caffeine, alcohol, spicy foods, etc.  - Work on timed voiding and double voiding to promote complete bladder emptying.    Follow up in 4-6 weeks for uroflowmetry studies, an AUA symptom score and reassessment.  Call or RTC sooner with any issues.     Video urodynamics and/or cystoscopic evaluation would be the next appropriate diagnostic steps should Mr. Santiago fail conservative therapy.      I have enjoyed participating in the medical care of this very pleasant patient.  Please don't hesitate to contact me with any questions or concerns.     Chrissy Avelar PA-C  Department of Urology

## 2017-02-21 NOTE — MR AVS SNAPSHOT
After Visit Summary   2/21/2017    Zack Santiago    MRN: [id number]           Patient Information     Date Of Birth          5/29/1931        Visit Information        Provider Department      2/21/2017 3:00 PM Chrissy Avelar PA-C [facility] [place]        Today's Diagnoses     Benign non-nodular prostatic hyperplasia with lower urinary tract symptoms    -  1    Nocturia          Care Instructions    - Elevate your legs for 1-2 hours before bed each night.  - Start taking tamsulosin (Flomax) once a night.     See me back in early April to see how things are going. Always feel free to call or return to clinic sooner with any questions or concerns.    Thank you,  Chrissy Avelar PA-C  Urology        Follow-ups after your visit        Your next 10 appointments already scheduled     Apr 04, 2017 10:30 AM CDT   Return Visit with Chrissy Avelar PA-C   [facility] ([facility] [place])    [address]  [address]  [facility] [id number] [phone]            Apr 07, 2017 11:00 AM CDT   [facility] with [facility]    [address] [facility] [id number] [phone]            Apr 11, 2017 10:15 AM CDT   [facility] Same Day Surgery with Marc Jenkins MD   [facility] ([facility])    [facility] ([facility])   [phone]            Apr 11, 2017   Procedure with Marc Jenkins MD   [facility] (--)    [address]  [facility] [id number] [phone]              Who to contact     If you have questions or need follow up information about today's clinic visit or your schedule please contact [facility] directly at [phone].  Normal or non-critical lab and  imaging results will be communicated to you by MyChart, letter or phone within 4 business days after the clinic has received the results. If you do not hear from us within 7 days, please contact the clinic through MyChart or phone. If you have a critical or abnormal lab result, we will notify you by phone as soon as possible.  Submit refill requests through [employer] or call your pharmacy and they will forward the refill request to us. Please allow 3 business days for your refill to be completed.          Additional Information About Your Visit        Care EveryWhere ID     This is your Care EveryWhere ID. This could be used by other organizations to access your [place] medical records  [id number]        Your Vitals Were     Pulse Height BMI (Body Mass Index)             60 1.829 m (6') 25.77 kg/m2          Blood Pressure from Last 3 Encounters:   02/21/17 124/70   02/20/17 120/50   01/19/17 132/69    Weight from Last 3 Encounters:   02/21/17 86.2 kg (190 lb)   02/20/17 88.5 kg (195 lb 3.2 oz)   01/19/17 86.2 kg (190 lb)              We Performed the Following     MEASURE POST-VOID RESIDUAL URINE/BLADDER CAPACITY, US NON-IMAGING (44988)     UA without Microscopic [[id number]]          Today's Medication Changes          These changes are accurate as of: 2/21/17  4:06 PM.  If you have any questions, ask your nurse or doctor.               Start taking these medicines.        Dose/Directions    tamsulosin 0.4 MG capsule   Commonly known as:  FLOMAX   Used for:  Benign non-nodular prostatic hyperplasia with lower urinary tract symptoms   Started by:  Chrissy Avelar PA-C        Dose:  0.4 mg   Take 1 capsule (0.4 mg) by mouth daily   Quantity:  90 capsule   Refills:  1            Where to get your medicines      These medications were sent to [facility] [address] [facility] [employer] [facility] [place] - [address]  [address] [place] [address]     Phone:  [phone]     tamsulosin 0.4 MG capsule                 Primary Care Provider Office Phone # Fax #    Zurdo Kendrick [phone]       [facility] [address] [facility] [address]  [facility] [address]        Thank you!     Thank you for choosing [facility] [place]  for your care. Our goal is always to provide you with excellent care. Hearing back from our patients is one way we can continue to improve our services. Please take a few minutes to complete the written survey that you may receive in the mail after your visit with us. Thank you!             Your Updated Medication List - Protect others around you: Learn how to safely use, store and throw away your medicines at www.disposemymeds.org.          This list is accurate as of: 2/21/17  4:06 PM.  Always use your most recent med list.                   Brand Name Dispense Instructions for use    ANAFRANIL PO      Take 25 mg by mouth daily       ARIPiprazole 2 MG tablet    ABILIFY     Take 1 tablet by mouth At Bedtime.       chlorthalidone 25 MG tablet    HYGROTON    90 tablet    TAKE ONE TABLET BY MOUTH ONE TIME DAILY       clopidogrel 75 MG tablet    PLAVIX    30 tablet    Take 1 tablet (75 mg) by mouth daily       gabapentin 400 MG capsule    NEURONTIN     Take 1 capsule by mouth 2 times daily       levothyroxine 100 MCG tablet    SYNTHROID/LEVOTHROID    90 tablet    TAKE ONE TABLET BY MOUTH ONE TIME DAILY       LORAZEPAM PO      Take 0.5 mg by mouth daily as needed for anxiety       MIRTAZAPINE PO      Take 30 mg by mouth At Bedtime       simvastatin 20 MG tablet    ZOCOR    90 tablet    TAKE ONE TABLET BY MOUTH AT BEDTIME       tamsulosin 0.4 MG capsule    FLOMAX    90 capsule    Take 1 capsule (0.4 mg) by mouth daily

## 2017-02-24 ENCOUNTER — TELEPHONE (OUTPATIENT)
Dept: UROLOGY | Facility: CLINIC | Age: 82
End: 2017-02-24

## 2017-02-24 NOTE — TELEPHONE ENCOUNTER
Pt calling and asking when he needs to take his flomax.  RPA said take in the evening and the bottle said to take a half hour after dinner.  i told him to take the pill half hour after dinner.  Pt then said he bladder felt funny/irritated.  He was wondering if it was all made up in his head or if that is a common side effect.  i told him dizziness is the most common side effect but everyone is different.  i suggested he try one more pill tonight and if he gets that same feeling to stop taking the med and to call us Monday.  i told RPA all this and she agreed.  Pt will call us Monday if needed.  ROSALEE Downey CMA.

## 2017-03-02 ENCOUNTER — TELEPHONE (OUTPATIENT)
Dept: FAMILY MEDICINE | Facility: CLINIC | Age: 82
End: 2017-03-02

## 2017-03-02 NOTE — TELEPHONE ENCOUNTER
Reason for call:  Patient reporting a symptom    Symptom or request: Blood in underwear last night, in underwear thinks it came from penis denies blood in urine     Duration (how long have symptoms been present): 1 day     Have you been treated for this before? No    Additional comments: patient would like same day appointment if possible     Phone Number patient can be reached at:  Home number on file [phone] (home)    Best Time:  Anytime     Can we leave a detailed message on this number:  YES    Call taken on 3/2/2017 at 7:05 AM by Scott Nguyen

## 2017-03-02 NOTE — TELEPHONE ENCOUNTER
"States he started Flomax per urology, but stopped after a few days at direction of , because of increased \"pressure\" feeling in the low abdomen.  That subsided since stopping Flomax.    His only \"concern\" is that he noted 2 very small \"pink/andrey\" colored spots on his white underwear last night while getting ready for bed.  NO bright red blood.  Denies skin irritation, itching, scratching of skin, other. States he has absolutely NO other symptoms/concerns.     He had recent OV with PCP and consult with , urology. Booked with PCP at 9:30 am today at his request, but understands that this appt may not be needed.  Will call him asap with  advice.  Sofi Martin RN    "

## 2017-03-02 NOTE — TELEPHONE ENCOUNTER
Patient was instructed to call urology per PCP recommendation.  He agrees. Call back if further concerns.  Sofi Martin RN

## 2017-03-02 NOTE — TELEPHONE ENCOUNTER
No appt needed with me, but he should call urology and see what they think about this    Thanks    Zurdo Kendrick M.D.

## 2017-03-03 ENCOUNTER — TELEPHONE (OUTPATIENT)
Dept: UROLOGY | Facility: CLINIC | Age: 82
End: 2017-03-03

## 2017-03-03 NOTE — TELEPHONE ENCOUNTER
Called patient per PA, patient was concerned about an episode of blood in his underwear. Per PA patient should contact us if he has another episode, otherwise we we check a UA at his next appt. Kenzie Worthy LPN

## 2017-04-07 ENCOUNTER — OFFICE VISIT (OUTPATIENT)
Dept: FAMILY MEDICINE | Facility: CLINIC | Age: 82
End: 2017-04-07
Payer: COMMERCIAL

## 2017-04-07 VITALS
WEIGHT: 193.4 LBS | TEMPERATURE: 97.6 F | DIASTOLIC BLOOD PRESSURE: 80 MMHG | OXYGEN SATURATION: 100 % | HEIGHT: 72 IN | SYSTOLIC BLOOD PRESSURE: 138 MMHG | BODY MASS INDEX: 26.19 KG/M2 | HEART RATE: 51 BPM

## 2017-04-07 DIAGNOSIS — I47.10 SVT (SUPRAVENTRICULAR TACHYCARDIA) (H): ICD-10-CM

## 2017-04-07 DIAGNOSIS — N18.30 CKD (CHRONIC KIDNEY DISEASE) STAGE 3, GFR 30-59 ML/MIN (H): ICD-10-CM

## 2017-04-07 DIAGNOSIS — Z01.818 PREOP GENERAL PHYSICAL EXAM: Primary | ICD-10-CM

## 2017-04-07 DIAGNOSIS — I25.10 ASCVD (ARTERIOSCLEROTIC CARDIOVASCULAR DISEASE): ICD-10-CM

## 2017-04-07 DIAGNOSIS — I63.9 CEREBROVASCULAR ACCIDENT (CVA), UNSPECIFIED MECHANISM (H): ICD-10-CM

## 2017-04-07 DIAGNOSIS — I71.40 AAA (ABDOMINAL AORTIC ANEURYSM) WITHOUT RUPTURE (H): ICD-10-CM

## 2017-04-07 DIAGNOSIS — K40.90 LEFT INGUINAL HERNIA: ICD-10-CM

## 2017-04-07 DIAGNOSIS — I71.21 THORACIC ASCENDING AORTIC ANEURYSM (H): ICD-10-CM

## 2017-04-07 DIAGNOSIS — M21.611 BUNION, RIGHT: ICD-10-CM

## 2017-04-07 DIAGNOSIS — E03.9 ACQUIRED HYPOTHYROIDISM: ICD-10-CM

## 2017-04-07 DIAGNOSIS — G60.9 IDIOPATHIC NEUROPATHY: ICD-10-CM

## 2017-04-07 DIAGNOSIS — I10 BENIGN ESSENTIAL HYPERTENSION: ICD-10-CM

## 2017-04-07 DIAGNOSIS — E78.5 HYPERLIPIDEMIA LDL GOAL <100: ICD-10-CM

## 2017-04-07 LAB
ERYTHROCYTE [DISTWIDTH] IN BLOOD BY AUTOMATED COUNT: 14.9 % (ref 10–15)
HCT VFR BLD AUTO: 39 % (ref 40–53)
HGB BLD-MCNC: 12.7 G/DL (ref 13.3–17.7)
MCH RBC QN AUTO: 32 PG (ref 26.5–33)
MCHC RBC AUTO-ENTMCNC: 32.6 G/DL (ref 31.5–36.5)
MCV RBC AUTO: 98 FL (ref 78–100)
PLATELET # BLD AUTO: 139 10E9/L (ref 150–450)
RBC # BLD AUTO: 3.97 10E12/L (ref 4.4–5.9)
WBC # BLD AUTO: 4.5 10E9/L (ref 4–11)

## 2017-04-07 PROCEDURE — 80061 LIPID PANEL: CPT | Performed by: INTERNAL MEDICINE

## 2017-04-07 PROCEDURE — 85027 COMPLETE CBC AUTOMATED: CPT | Performed by: INTERNAL MEDICINE

## 2017-04-07 PROCEDURE — 36415 COLL VENOUS BLD VENIPUNCTURE: CPT | Performed by: INTERNAL MEDICINE

## 2017-04-07 PROCEDURE — 80048 BASIC METABOLIC PNL TOTAL CA: CPT | Performed by: INTERNAL MEDICINE

## 2017-04-07 PROCEDURE — 84443 ASSAY THYROID STIM HORMONE: CPT | Performed by: INTERNAL MEDICINE

## 2017-04-07 PROCEDURE — 99215 OFFICE O/P EST HI 40 MIN: CPT | Performed by: INTERNAL MEDICINE

## 2017-04-07 NOTE — MR AVS SNAPSHOT
After Visit Summary   4/7/2017    Zack Santiago    MRN: [id number]           Patient Information     Date Of Birth          5/29/1931        Visit Information        Provider Department      4/7/2017 11:00 AM Zurdo Kendrick MD [facility]        Today's Diagnoses     Preop general physical exam    -  1    Left inguinal hernia        Bunion, right        ASCVD (arteriosclerotic cardiovascular disease)        Idiopathic neuropathy        CKD (chronic kidney disease) stage 3, GFR 30-59 ml/min        Benign essential hypertension          Care Instructions    Stop the plavix 5 days prior to surgery    On the day of surgery do not take the chlorthalidone    Zurdo Kendrick M.D.          Before Your Surgery      Call your surgeon if there is any change in your health. This includes signs of a cold or flu (such as a sore throat, runny nose, cough, rash or fever).    Do not smoke, drink alcohol or take over the counter medicine (unless your surgeon or primary care doctor tells you to) for the 24 hours before and after surgery.    If you take prescribed drugs: Follow your doctor s orders about which medicines to take and which to stop until after surgery.    Eating and drinking prior to surgery: follow the instructions from your surgeon    Take a shower or bath the night before surgery. Use the soap your surgeon gave you to gently clean your skin. If you do not have soap from your surgeon, use your regular soap. Do not shave or scrub the surgery site.  Wear clean pajamas and have clean sheets on your bed.         Follow-ups after your visit        Your next 10 appointments already scheduled     Apr 11, 2017   Procedure with Marc Jenkins MD   [facility] (--)    [address]  [facility] [address]   [phone]            Apr 11, 2017 10:15 AM CDT   [facility] Same Day Surgery with Marc Jenkins MD   [facility]  "Surgery Scheduling ([facility])    [facility] Surgery [facility] ([facility])   [phone]              Who to contact     If you have questions or need follow up information about today's clinic visit or your schedule please contact [facility] directly at [phone].  Normal or non-critical lab and imaging results will be communicated to you by MyChart, letter or phone within 4 business days after the clinic has received the results. If you do not hear from us within 7 days, please contact the clinic through [employer] or phone. If you have a critical or abnormal lab result, we will notify you by phone as soon as possible.  Submit refill requests through [employer] or call your pharmacy and they will forward the refill request to us. Please allow 3 business days for your refill to be completed.          Additional Information About Your Visit        MyChart Information     [employer] lets you send messages to your doctor, view your test results, renew your prescriptions, schedule appointments and more. To sign up, go to [email or web address] . Click on \"Log in\" on the left side of the screen, which will take you to the Welcome page. Then click on \"Sign up Now\" on the right side of the page.     You will be asked to enter the access code listed below, as well as some personal information. Please follow the directions to create your username and password.     Your access code is: [email or web address]  Expires: 2017 11:23 AM     Your access code will  in 90 days. If you need help or a new code, please call your [place] clinic or [phone].        Care EveryWhere ID     This is your Care EveryWhere ID. This could be used by other organizations to access your [place] medical records  [id number]        Your Vitals Were     Pulse Temperature Height Pulse Oximetry BMI (Body Mass Index)       51 97.6  F (36.4  C) (Oral) 6' (1.829 m) 100% 26.23 kg/m2        Blood Pressure from " Last 3 Encounters:   04/07/17 138/80   02/21/17 124/70   02/20/17 120/50    Weight from Last 3 Encounters:   04/07/17 193 lb 6.4 oz (87.7 kg)   02/21/17 190 lb (86.2 kg)   02/20/17 195 lb 3.2 oz (88.5 kg)              We Performed the Following     Basic metabolic panel     CBC with platelets          Today's Medication Changes          These changes are accurate as of: 4/7/17 11:23 AM.  If you have any questions, ask your nurse or doctor.               Start taking these medicines.        Dose/Directions    ASPIRIN NOT PRESCRIBED   Commonly known as:  INTENTIONAL   Used for:  ASCVD (arteriosclerotic cardiovascular disease)   Started by:  Zurdo Kendrick MD        Please choose reason not prescribed, below   Quantity:  0 each   Refills:  0         Stop taking these medicines if you haven't already. Please contact your care team if you have questions.     tamsulosin 0.4 MG capsule   Commonly known as:  FLOMAX   Stopped by:  Zurdo Kendrick MD                Where to get your medicines      Some of these will need a paper prescription and others can be bought over the counter.  Ask your nurse if you have questions.     You don't need a prescription for these medications     ASPIRIN NOT PRESCRIBED                Primary Care Provider Office Phone # Fax #    Zurdo Kendrick [phone]       [facility] [address] [place] [address] [place] [address] [address]        Thank you!     Thank you for choosing [facility]  for your care. Our goal is always to provide you with excellent care. Hearing back from our patients is one way we can continue to improve our services. Please take a few minutes to complete the written survey that you may receive in the mail after your visit with us. Thank you!             Your Updated Medication List - Protect others around you: Learn how to safely use, store and throw away your medicines at www.disposemymeds.org.          This list is accurate  as of: 4/7/17 11:23 AM.  Always use your most recent med list.                   Brand Name Dispense Instructions for use    ANAFRANIL PO      Take 25 mg by mouth daily       ARIPiprazole 2 MG tablet    ABILIFY     Take 1 tablet by mouth At Bedtime.       ASPIRIN NOT PRESCRIBED    INTENTIONAL    0 each    Please choose reason not prescribed, below       chlorthalidone 25 MG tablet    HYGROTON    90 tablet    TAKE ONE TABLET BY MOUTH ONE TIME DAILY       clopidogrel 75 MG tablet    PLAVIX    30 tablet    Take 1 tablet (75 mg) by mouth daily       gabapentin 400 MG capsule    NEURONTIN     Take 1 capsule by mouth 2 times daily       levothyroxine 100 MCG tablet    SYNTHROID/LEVOTHROID    90 tablet    TAKE ONE TABLET BY MOUTH ONE TIME DAILY       LORAZEPAM PO      Take 0.5 mg by mouth daily as needed for anxiety       MIRTAZAPINE PO      Take 30 mg by mouth At Bedtime       simvastatin 20 MG tablet    ZOCOR    90 tablet    TAKE ONE TABLET BY MOUTH AT BEDTIME

## 2017-04-07 NOTE — PROGRESS NOTES
[address] [address]  [phone]  Dept: [phone]    PRE-OP EVALUATION:  Today's date: 2017    Zack Santiago (: 1931) presents for pre-operative evaluation assessment as requested by Ortho doctor at [facility]  He requires evaluation and anesthesia risk assessment prior to undergoing surgery/procedure for treatment of a hernia .  Proposed procedure:bunion repair  Date of Surgery/ Procedure: 17  Time of Surgery/ Procedure: 1015  Hospital/Surgical Facility: [facility] surgery center  Fax number for surgical facility:   Primary Physician: Zurdo Kendrick  Type of Anesthesia Anticipated: monitor anesthesia care    Patient has a Health Care Directive or Living Will:  YES       The patient is having surgery as noted. Was considering hernia repair, left inguinal, but not having pain or growing so wants to wait.  D/w patient risk of strangulation.    He otherwise feels fine.  Has significant pmh noted below.  NO recent issues, no chest pain or shortness of breath, no neuro c/o.  Can walks 20 min without diff.  He has not had recent cvi issues.  No chest pain or shortness of breath.  Min edema.                Past Medical History:      Past Medical History:   Diagnosis Date     A-fib (H)     post op     AAA (abdominal aortic aneurysm) without rupture (H)     Dr. 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/16    carotid us nl stenosis, echo no change and no clots; ziopatch no afib, svt present     Anemia      Aortic insufficiency     1+ on echo     Aortic insufficiency 2016    mild to mod     ASCVD (arteriosclerotic cardiovascular disease) 2010    cabg, post op afib     BPH (benign prostatic hyperplasia)     turp, flomax added by urol      Bradycardia     stopped toprol     CKD (chronic kidney disease) stage 3, GFR 30-59 ml/min      Cough 2010    pulm eval, felt due to reflux     Dizzy     mri small  vessel dz     GERD (gastroesophageal reflux disease)      HTN (hypertension) 2002     Hx of colonoscopy 2003    tics     Hypothyroid      Hypovitaminosis D      Idiopathic neuropathy      OCD (obsessive compulsive disorder)     sees psyche     Panic disorder     Dr. Luna     Spinal headache      Stroke (H) 12/16    expressive aphasia, hosp, seen by neuro, mri pos, carotids min dz, changed from asa to plavix     Sudden hearing loss 6/13    Dr. Garcia, mri brain no acoustic neuroma     SVT (supraventricular tachycardia) (H) 11/16    seen on ziopatch done for amaurosis, longest 15 beats     Thoracic ascending aortic aneurysm (H) 06/2014    4.4cm on echo, aortic root 3.9, fu 5/15 4.8cm; fu done 12/15 and slightly enlarged, fu 6/16 no change, fu 11/16 no change     Ulcerative colitis (H)     not active for years             Past Surgical History:      Past Surgical History:   Procedure Laterality Date     BACK SURGERY  1998     BLADDER SURGERY  1985     C TOTAL KNEE ARTHROPLASTY  2011    left     C TOTAL KNEE ARTHROPLASTY  2009    right     CATARACT IOL, RT/LT  2011     CORONARY ARTERY BYPASS  2010     ENDOVASCULAR REPAIR ANEURYSM ABDOMINAL AORTA N/A 5/27/2015    Procedure: ENDOVASCULAR REPAIR ANEURYSM ABDOMINAL AORTA;  Surgeon: Darin Walters MD;  Location:  OR     HERNIA REPAIR  2005     HERNIA REPAIR  1998     KNEE SURGERY  1997     REPAIR HAMMER TOE  12/28/2012    Procedure: REPAIR HAMMER TOE;  LEFT SECOND AND THIRD CLAW TOE RECONSTRUCTION;  Surgeon: Gray Haynes MD;  Location:  OR     TURP  2003             Social History:     Social History   Substance Use Topics     Smoking status: Former Smoker     Packs/day: 1.00     Years: 5.00     Types: Cigarettes     Quit date: 6/13/1965     Smokeless tobacco: Never Used     Alcohol use 0.0 oz/week     0 Standard drinks or equivalent per week      Comment: maybe one glass of wine a week             Family History:   No family history of bleeding  difficulty, anesthesia problems or blood clots.         Allergies:     Allergies   Allergen Reactions     No Known Allergies              Medications:     Current Outpatient Prescriptions   Medication Sig Dispense Refill     ASPIRIN NOT PRESCRIBED (INTENTIONAL) Please choose reason not prescribed, below 0 each 0     chlorthalidone (HYGROTON) 25 MG tablet TAKE ONE TABLET BY MOUTH ONE TIME DAILY 90 tablet 1     simvastatin (ZOCOR) 20 MG tablet TAKE ONE TABLET BY MOUTH AT BEDTIME 90 tablet 3     MIRTAZAPINE PO Take 30 mg by mouth At Bedtime       LORAZEPAM PO Take 0.5 mg by mouth daily as needed for anxiety       clopidogrel (PLAVIX) 75 MG tablet Take 1 tablet (75 mg) by mouth daily 30 tablet 3     levothyroxine (SYNTHROID/LEVOTHROID) 100 MCG tablet TAKE ONE TABLET BY MOUTH ONE TIME DAILY 90 tablet 2     ClomiPRAMINE HCl (ANAFRANIL PO) Take 25 mg by mouth daily       gabapentin (NEURONTIN) 400 MG capsule Take 1 capsule by mouth 2 times daily        ARIPiprazole (ABILIFY) 2 MG tablet Take 1 tablet by mouth At Bedtime.                 Review of Systems:   No history of bleeding difficulty, anesthesia problems or blood clots.  The 10 point Review of Systems is negative other than noted in the HPI           Physical Exam:   Blood pressure 138/80, pulse 51, temperature 97.6  F (36.4  C), temperature source Oral, height 6' (1.829 m), weight 193 lb 6.4 oz (87.7 kg), SpO2 100 %.    Constitutional: healthy appearing, alert and in no distress  Heent: Normocephalic. Head without obvious masses or lesions. PERRLDC, EOMI. Mouth exam within normal limits: tongue, mucous membranes, posterior pharynx all normal, no lesions or abnormalities seen.  Tm's and canals within normal limits bilaterally. Neck supple, no nuchal rigidity or masses. No supraclavicular, or cervical adenopathy. Thyroid symmetric, no masses.  Cardiovascular: Regular rate and rhythm, no murmer, rub or gallops.  JVP not elevated, no edema.  Carotids within normal  limits bilaterally, no bruits.  Respiratory: Normal respiratory effort.  Lungs clear, normal flow, no wheezing or crackles.  Gastrointestinal: Normal active bowel sounds.   Soft, not tender, no masses, guarding or rebound.  No hepatosplenomegaly. Does have very small left ing hernia, not tender, easily reducible  Musculoskeletal: extremities normal, no gross deformities noted.  Skin: no suspicious lesions or rashes   Neurologic: Mental status within normal limits.  Speech fluent.  No gross motor abnormalities and gait intact.  Psychiatric: mentation appears normal and affect normal.         Data:   Labs sent        Assessment:   1. Normal pre op exam for bunion, the patient should be low risk for this procedure, no recent cvi c/o, chest pain or shortness of breath  2. Cvi, stable, changed by neuro from plavix to asa  3. Ascvd, mod ai, stable  4. Prior svt, no recent issues  5. Aaa, repaired  6. Thor aneurysm, stable by echo done Nov  7. Hernia, he has decided to wait on repair, this is reasonable, understands risk of strangulation  8. Neuropathy  9. Elevated cholesterol, follow up labs  10. Hypertension, controlled  11. Hypothyroidism, follow up labs         Plan:   The patient is ok for the procedure  To stop plavix 5 days before, resume post op asap  Hold chlorthalidone on day of surgery  Labs today  Call if problems      Zurdo Kendrick M.D.

## 2017-04-07 NOTE — NURSING NOTE
Chief Complaint   Patient presents with     Pre-Op Exam       Initial /66 (BP Location: Right arm, Patient Position: Chair, Cuff Size: Adult Regular)  Pulse 51  Temp 97.6  F (36.4  C) (Oral)  Ht 6' (1.829 m)  Wt 193 lb 6.4 oz (87.7 kg)  SpO2 100%  BMI 26.23 kg/m2 Estimated body mass index is 26.23 kg/(m^2) as calculated from the following:    Height as of this encounter: 6' (1.829 m).    Weight as of this encounter: 193 lb 6.4 oz (87.7 kg).  Medication Reconciliation: complete.  Radha Love, CMA

## 2017-04-07 NOTE — PATIENT INSTRUCTIONS
Stop the plavix 5 days prior to surgery    On the day of surgery do not take the chlorthalidone    Zurdo Kendrick M.D.          Before Your Surgery      Call your surgeon if there is any change in your health. This includes signs of a cold or flu (such as a sore throat, runny nose, cough, rash or fever).    Do not smoke, drink alcohol or take over the counter medicine (unless your surgeon or primary care doctor tells you to) for the 24 hours before and after surgery.    If you take prescribed drugs: Follow your doctor s orders about which medicines to take and which to stop until after surgery.    Eating and drinking prior to surgery: follow the instructions from your surgeon    Take a shower or bath the night before surgery. Use the soap your surgeon gave you to gently clean your skin. If you do not have soap from your surgeon, use your regular soap. Do not shave or scrub the surgery site.  Wear clean pajamas and have clean sheets on your bed.

## 2017-04-07 NOTE — LETTER
[place] [id number] [facility] [address]  [place] [address]  [phone]                                                                                               Date: 4/10/2017    Zack Santiago                                                                               [address] [facility] [address]              Dear Zack,    Your labs look very good including your thyroid test, blood salts, kidney tests and test for diabetes.  Your cholesterol is also super.  Your hemoglobin and platelet count are both slightly low, not a worry but let's repeat them in 3 months.    Enclosed is a copy of your results.      It was a pleasure to see you at your last appointment. If you have any questions, please feel free to call myself or my nurse at [phone].          Sincerely,    Zurdo Kendrick MD/ Luci MA, CMA  Results for orders placed or performed in visit on 04/07/17   CBC with platelets   Result Value Ref Range    WBC 4.5 4.0 - 11.0 10e9/L    RBC Count 3.97 (L) 4.4 - 5.9 10e12/L    Hemoglobin 12.7 (L) 13.3 - 17.7 g/dL    Hematocrit 39.0 (L) 40.0 - 53.0 %    MCV 98 78 - 100 fl    MCH 32.0 26.5 - 33.0 pg    MCHC 32.6 31.5 - 36.5 g/dL    RDW 14.9 10.0 - 15.0 %    Platelet Count 139 (L) 150 - 450 10e9/L   Basic metabolic panel   Result Value Ref Range    Sodium 142 133 - 144 mmol/L    Potassium 3.9 3.4 - 5.3 mmol/L    Chloride 106 94 - 109 mmol/L    Carbon Dioxide 31 20 - 32 mmol/L    Anion Gap 5 3 - 14 mmol/L    Glucose 84 70 - 99 mg/dL    Urea Nitrogen 26 7 - 30 mg/dL    Creatinine 1.14 0.66 - 1.25 mg/dL    GFR Estimate 61 >60 mL/min/1.7m2    GFR Estimate If Black 74 >60 mL/min/1.7m2    Calcium 9.0 8.5 - 10.1 mg/dL   Lipid panel reflex to direct LDL   Result Value Ref Range    Cholesterol 92 <200 mg/dL    Triglycerides 63 <150 mg/dL    HDL Cholesterol 42 >39 mg/dL    LDL Cholesterol Calculated 37 <100 mg/dL    Non HDL Cholesterol 50 <130 mg/dL   TSH with free T4 reflex   Result Value Ref  Range    TSH 1.34 0.40 - 4.00 mU/L

## 2017-04-08 LAB
ANION GAP SERPL CALCULATED.3IONS-SCNC: 5 MMOL/L (ref 3–14)
BUN SERPL-MCNC: 26 MG/DL (ref 7–30)
CALCIUM SERPL-MCNC: 9 MG/DL (ref 8.5–10.1)
CHLORIDE SERPL-SCNC: 106 MMOL/L (ref 94–109)
CHOLEST SERPL-MCNC: 92 MG/DL
CO2 SERPL-SCNC: 31 MMOL/L (ref 20–32)
CREAT SERPL-MCNC: 1.14 MG/DL (ref 0.66–1.25)
GFR SERPL CREATININE-BSD FRML MDRD: 61 ML/MIN/1.7M2
GLUCOSE SERPL-MCNC: 84 MG/DL (ref 70–99)
HDLC SERPL-MCNC: 42 MG/DL
LDLC SERPL CALC-MCNC: 37 MG/DL
NONHDLC SERPL-MCNC: 50 MG/DL
POTASSIUM SERPL-SCNC: 3.9 MMOL/L (ref 3.4–5.3)
SODIUM SERPL-SCNC: 142 MMOL/L (ref 133–144)
TRIGL SERPL-MCNC: 63 MG/DL
TSH SERPL DL<=0.005 MIU/L-ACNC: 1.34 MU/L (ref 0.4–4)

## 2017-04-08 NOTE — PROGRESS NOTES
It was a pleasure seeing you.  I wanted to get back to you with your test results.  I have enclosed a copy for your records.    Your labs look very good including your thyroid test, blood salts, kidney tests and test for diabetes.  Your cholesterol is also super.  Your hemoglobin and platelet count are both slightly low, not a worry but let's repeat them in 3 months.    If you have any questions please call me.  Please come back in 3 months and let's repeat that test.

## 2017-04-14 DIAGNOSIS — I63.9 CEREBROVASCULAR ACCIDENT (CVA), UNSPECIFIED MECHANISM (H): ICD-10-CM

## 2017-04-14 RX ORDER — CLOPIDOGREL BISULFATE 75 MG/1
TABLET ORAL
Qty: 90 TABLET | Refills: 3 | Status: ON HOLD | OUTPATIENT
Start: 2017-04-14 | End: 2018-01-06

## 2017-04-14 NOTE — TELEPHONE ENCOUNTER
Prescription approved per [facility] Refill Protocol.  Dahiana Fuller, RN      clopidogrel (PLAVIX) 75 MG tablet           Last Written Prescription Date: 12/23/16  Last Fill Quantity: 30, # refills: 3    Last Office Visit with [facility], [facility] or [facility] prescribing provider:  4/7/17   Future Office Visit:       Lab Results   Component Value Date    WBC 4.5 04/07/2017     Lab Results   Component Value Date    RBC 3.97 04/07/2017     Lab Results   Component Value Date    HGB 12.7 04/07/2017     Lab Results   Component Value Date    HCT 39.0 04/07/2017     No components found for: MCT  Lab Results   Component Value Date    MCV 98 04/07/2017     Lab Results   Component Value Date    MCH 32.0 04/07/2017     Lab Results   Component Value Date    MCHC 32.6 04/07/2017     Lab Results   Component Value Date    RDW 14.9 04/07/2017     Lab Results   Component Value Date     04/07/2017     Lab Results   Component Value Date    AST 8 11/27/2015     Lab Results   Component Value Date    ALT 23 11/27/2015     Creatinine   Date Value Ref Range Status   04/07/2017 1.14 0.66 - 1.25 mg/dL Final   ]

## 2017-04-17 ENCOUNTER — TELEPHONE (OUTPATIENT)
Dept: NURSING | Facility: CLINIC | Age: 82
End: 2017-04-17

## 2017-04-18 NOTE — TELEPHONE ENCOUNTER
"Call Type: Triage Call    Presenting Problem: \"The past couple of days I have had loose fecal  incontinence. I have a history of incontinence but it usually is  pellet consistency.\"  He denies recent antibiotic use and a fever.  He thinks he lost control of his bowels, states he cannot tell when  he has to go.  Rectal Symptoms guideline used, patient advised to  see a provider within 24 hours, transferred to scheduling.  Triage Note:  Guideline Title: Rectal Symptoms  Recommended Disposition: See Provider within 24 hours  Original Inclination: Wanted to speak with a nurse  Override Disposition:  Intended Action: See /Sean Appt  Physician Contacted: No  New onset of uncontrolled leaking of bowel movement (loss of sphincter control)  not related to constipation ?  YES  Unable to remove rectal foreign body ? NO  Passing red, black or tarry material from rectum AND onset of new signs and  symptoms of hypovolemia ? NO  Primary concern is constipation ? NO  Reports physical or sexual abuse AND feels they are in immediate danger ? NO  Severe, unrelenting rectal pain ? NO  Constant pain in rectum or rectal area AND swelling, boil or abscess around  rectum, with or without foul-smelling, pus-like drainage ? NO  Constant pain in rectum or rectal area AND any temperature elevation in an  immunocompromised individual or frail elderly ? NO  Constant pain in rectum or rectal area AND any temperature elevation ? NO  Associated with abdominal pain or discomfort ? NO  Excessive or unusually odorous flatulence ? NO  Rectal bleeding (blood in or on the stool, more than scant; black tarry stools) ?  NO  Trauma within last 6 hours AND severe pain OR bright red rectal bleeding ? NO  Rectal tissue (more than hemorrhoids) protruding through anus ? NO  Physician Instructions:  Care Advice: Consider the use of nonprescription antidiarrheal medication  (Imodium or Lomotil) until evaluated by your healthcare provider.  "

## 2017-04-19 ENCOUNTER — OFFICE VISIT (OUTPATIENT)
Dept: FAMILY MEDICINE | Facility: CLINIC | Age: 82
End: 2017-04-19
Payer: COMMERCIAL

## 2017-04-19 VITALS
HEART RATE: 53 BPM | TEMPERATURE: 97.9 F | SYSTOLIC BLOOD PRESSURE: 126 MMHG | WEIGHT: 194 LBS | BODY MASS INDEX: 26.28 KG/M2 | HEIGHT: 72 IN | DIASTOLIC BLOOD PRESSURE: 57 MMHG | OXYGEN SATURATION: 98 %

## 2017-04-19 DIAGNOSIS — N18.30 CKD (CHRONIC KIDNEY DISEASE) STAGE 3, GFR 30-59 ML/MIN (H): ICD-10-CM

## 2017-04-19 DIAGNOSIS — I25.10 ASCVD (ARTERIOSCLEROTIC CARDIOVASCULAR DISEASE): ICD-10-CM

## 2017-04-19 DIAGNOSIS — G45.3 AMAUROSIS FUGAX OF RIGHT EYE: ICD-10-CM

## 2017-04-19 DIAGNOSIS — I71.21 THORACIC ASCENDING AORTIC ANEURYSM (H): ICD-10-CM

## 2017-04-19 DIAGNOSIS — E78.5 HYPERLIPIDEMIA LDL GOAL <100: ICD-10-CM

## 2017-04-19 DIAGNOSIS — I71.40 AAA (ABDOMINAL AORTIC ANEURYSM) WITHOUT RUPTURE (H): ICD-10-CM

## 2017-04-19 DIAGNOSIS — F42.9 OCD (OBSESSIVE COMPULSIVE DISORDER): Primary | Chronic | ICD-10-CM

## 2017-04-19 DIAGNOSIS — K51.90 ULCERATIVE COLITIS WITHOUT COMPLICATIONS, UNSPECIFIED LOCATION (H): ICD-10-CM

## 2017-04-19 DIAGNOSIS — R15.9 RECTAL INCONTINENCE: ICD-10-CM

## 2017-04-19 DIAGNOSIS — I10 BENIGN ESSENTIAL HYPERTENSION: ICD-10-CM

## 2017-04-19 DIAGNOSIS — K21.9 GASTROESOPHAGEAL REFLUX DISEASE WITHOUT ESOPHAGITIS: ICD-10-CM

## 2017-04-19 LAB
ERYTHROCYTE [DISTWIDTH] IN BLOOD BY AUTOMATED COUNT: 14.5 % (ref 10–15)
HCT VFR BLD AUTO: 38.9 % (ref 40–53)
HGB BLD-MCNC: 12.6 G/DL (ref 13.3–17.7)
MCH RBC QN AUTO: 31.7 PG (ref 26.5–33)
MCHC RBC AUTO-ENTMCNC: 32.4 G/DL (ref 31.5–36.5)
MCV RBC AUTO: 98 FL (ref 78–100)
PLATELET # BLD AUTO: 161 10E9/L (ref 150–450)
RBC # BLD AUTO: 3.97 10E12/L (ref 4.4–5.9)
WBC # BLD AUTO: 6.7 10E9/L (ref 4–11)

## 2017-04-19 PROCEDURE — 36415 COLL VENOUS BLD VENIPUNCTURE: CPT | Performed by: INTERNAL MEDICINE

## 2017-04-19 PROCEDURE — 85027 COMPLETE CBC AUTOMATED: CPT | Performed by: INTERNAL MEDICINE

## 2017-04-19 PROCEDURE — 99214 OFFICE O/P EST MOD 30 MIN: CPT | Performed by: INTERNAL MEDICINE

## 2017-04-19 NOTE — PROGRESS NOTES
SUBJECTIVE:                                                    Zack Santiago is a 85 year old male who presents to clinic today for the following health issues:    Constipation/Loose stools following      Duration: x 3-4 days    Description:       Frequency of bowel movements: 2-3 days       Consistency of stool:constipated then loose    Intensity:  mild, moderate    Accompanying signs and symptoms: none       Abdominal pain: no        Rectal pain: no        Blood in stool: no        Nausea/vomitting: no     History:        Similar problems in past: YES- HX of constipation.    Precipitating or alleviating factors: Metamucil        Medications worsening symptoms: no     Therapies tried and outcome: Metamucil- Resolved constipation SX's but now patient is stating he has loose stools for past 3-4 days.       Chronic laxative use: no ---only when gets constipated.PRN     Patient has a history of ulcerative colitis and presents to the clinic for evaluation of fecal incontinence. He notes having years fecal incontinence with hard, pellet-like stools. He notes stools have became more loose than baseline over the last 3-4 days. He reports this developed shortly after his return form a 3 week vacation to [place]. He makes the differentiation between this illness and what he considers diarrhea. Denies change in medication regimen or doses of medications other than starting to hold Plavix as of this morning, as advised with impending bunion surgery.    Problem list and histories reviewed & adjusted, as indicated.  Additional history: as documented    Current Outpatient Prescriptions   Medication Sig Dispense Refill     clopidogrel (PLAVIX) 75 MG tablet TAKE 1 TABLET BY MOUTH DAILY 90 tablet 3     ASPIRIN NOT PRESCRIBED (INTENTIONAL) Please choose reason not prescribed, below 0 each 0     chlorthalidone (HYGROTON) 25 MG tablet TAKE ONE TABLET BY MOUTH ONE TIME DAILY 90 tablet 1     simvastatin (ZOCOR) 20 MG tablet TAKE ONE  TABLET BY MOUTH AT BEDTIME 90 tablet 3     MIRTAZAPINE PO Take 30 mg by mouth At Bedtime       LORAZEPAM PO Take 0.5 mg by mouth daily as needed for anxiety       levothyroxine (SYNTHROID/LEVOTHROID) 100 MCG tablet TAKE ONE TABLET BY MOUTH ONE TIME DAILY 90 tablet 2     ClomiPRAMINE HCl (ANAFRANIL PO) Take 25 mg by mouth daily       gabapentin (NEURONTIN) 400 MG capsule Take 1 capsule by mouth 2 times daily        ARIPiprazole (ABILIFY) 2 MG tablet Take 1 tablet by mouth At Bedtime.       Allergies   Allergen Reactions     No Known Allergies        Reviewed and updated as needed this visit by clinical staff  Tobacco  Allergies  Meds  Soc Hx      Reviewed and updated as needed this visit by Provider       ROS:  Constitutional, HEENT, cardiovascular, pulmonary, gi and gu systems are negative, except as otherwise noted.    This document serves as a record of the services and decisions personally performed and made by Hayder Wyatt MD. It was created on his/her behalf by Celeste Camacho, a trained medical scribe. The creation of this document is based the provider's statements to the medical scribe.  Scribe Celeste Camacho 2:59 PM, April 19, 2017     OBJECTIVE:                                                    /57 (BP Location: Left arm, Patient Position: Chair, Cuff Size: Adult Large)  Pulse 53  Temp 97.9  F (36.6  C) (Tympanic)  Ht 1.829 m (6')  Wt 88 kg (194 lb)  SpO2 98%  BMI 26.31 kg/m2  Body mass index is 26.31 kg/(m^2).  Neck was supple without adenopathy or thyromegaly his carotids were normal without bruits  Chest clear to auscultation and percussion  Cardiovascular S1 and S2 are physiologic without murmurs or gallops  Abdomen bowel sounds were normal.  There is no palpable mass or organomegaly  Extremities nontender with trace pretibial edema, slight venous stasis changes of the lower legs.  Anus normal and his sphincter has some muscle activity, prostate 2+ and smooth without palpable  masses. Stool is brown and there is no palpable stool.     ASSESSMENT/PLAN:                                                    1. OCD (obsessive compulsive disorder)      2. Hyperlipidemia LDL goal <100      3. Ulcerative colitis without complications, unspecified location (H)  - CBC with platelets  Patient reports that this is currently inactive.    4. Gastroesophageal reflux disease without esophagitis      5. AAA (abdominal aortic aneurysm) without rupture (H)      6. Thoracic ascending aortic aneurysm (H)      7. ASCVD (arteriosclerotic cardiovascular disease)      8. Benign essential hypertension      9. CKD (chronic kidney disease) stage 3, GFR 30-59 ml/min      10. Amaurosis fugax of right eye      11. Rectal incontinence  Increase Metamucil with less liquid, or add Pepto-bismol. Discussed possible need to adjust with use of narcotic analgesics after impending surgery.    Hayder Wyatt MD  [facility]    The information in this document, created by the medical scribe for me, accurately reflects the services I personally performed and the decisions made by me. I have reviewed and approved this document for accuracy prior to leaving the patient care area.  Hayder Wyatt MD  2:38 PM, 04/19/17

## 2017-04-19 NOTE — NURSING NOTE
Chief Complaint   Patient presents with     Fecal Impaction       Initial /57 (BP Location: Left arm, Patient Position: Chair, Cuff Size: Adult Large)  Pulse 53  Temp 97.9  F (36.6  C) (Tympanic)  Ht 6' (1.829 m)  Wt 194 lb (88 kg)  SpO2 98%  BMI 26.31 kg/m2 Estimated body mass index is 26.31 kg/(m^2) as calculated from the following:    Height as of this encounter: 6' (1.829 m).    Weight as of this encounter: 194 lb (88 kg).  Medication Reconciliation: complete   Marielena Chicago- CMA

## 2017-04-19 NOTE — MR AVS SNAPSHOT
"              After Visit Summary   4/19/2017    Zack Santiago    MRN: [id number]           Patient Information     Date Of Birth          5/29/1931        Visit Information        Provider Department      4/19/2017 2:00 PM Hayder Wyatt MD [facility]        Today's Diagnoses     OCD (obsessive compulsive disorder)    -  1    Hyperlipidemia LDL goal <100        Ulcerative colitis without complications, unspecified location (H)        Gastroesophageal reflux disease without esophagitis        AAA (abdominal aortic aneurysm) without rupture (H)        Thoracic ascending aortic aneurysm (H)        ASCVD (arteriosclerotic cardiovascular disease)        Benign essential hypertension        CKD (chronic kidney disease) stage 3, GFR 30-59 ml/min        Amaurosis fugax of right eye        Rectal incontinence           Follow-ups after your visit        Who to contact     If you have questions or need follow up information about today's clinic visit or your schedule please contact [facility] directly at [phone].  Normal or non-critical lab and imaging results will be communicated to you by MyChart, letter or phone within 4 business days after the clinic has received the results. If you do not hear from us within 7 days, please contact the clinic through [employer] or phone. If you have a critical or abnormal lab result, we will notify you by phone as soon as possible.  Submit refill requests through [employer] or call your pharmacy and they will forward the refill request to us. Please allow 3 business days for your refill to be completed.          Additional Information About Your Visit        MyChart Information     [employer] lets you send messages to your doctor, view your test results, renew your prescriptions, schedule appointments and more. To sign up, go to [email or web address] . Click on \"Log in\" on the left side of the screen, which will take you to the Welcome page. Then click on \"Sign " "up Now\" on the right side of the page.     You will be asked to enter the access code listed below, as well as some personal information. Please follow the directions to create your username and password.     Your access code is: [email or web address]  Expires: 2017 11:23 AM     Your access code will  in 90 days. If you need help or a new code, please call your [facility] or [phone].        Care EveryWhere ID     This is your Care EveryWhere ID. This could be used by other organizations to access your [facility] medical records  [id number]        Your Vitals Were     Pulse Temperature Height Pulse Oximetry BMI (Body Mass Index)       53 97.9  F (36.6  C) (Tympanic) 6' (1.829 m) 98% 26.31 kg/m2        Blood Pressure from Last 3 Encounters:   17 126/57   17 138/80   17 124/70    Weight from Last 3 Encounters:   17 194 lb (88 kg)   17 193 lb 6.4 oz (87.7 kg)   17 190 lb (86.2 kg)              We Performed the Following     CBC with platelets        Primary Care Provider Office Phone # Fax #    Zurdo Kendrick [phone]       [facility] [address] [facility] [address] [facility] [address]  [facility] [address]        Thank you!     Thank you for choosing [facility]  for your care. Our goal is always to provide you with excellent care. Hearing back from our patients is one way we can continue to improve our services. Please take a few minutes to complete the written survey that you may receive in the mail after your visit with us. Thank you!             Your Updated Medication List - Protect others around you: Learn how to safely use, store and throw away your medicines at www.disposemymeds.org.          This list is accurate as of: 17 11:59 PM.  Always use your most recent med list.                   Brand Name Dispense Instructions for use    ANAFRANIL PO      Take 25 mg by mouth daily       ARIPiprazole 2 MG tablet    ABILIFY     Take 1 tablet by " mouth At Bedtime.       ASPIRIN NOT PRESCRIBED    INTENTIONAL    0 each    Please choose reason not prescribed, below       chlorthalidone 25 MG tablet    HYGROTON    90 tablet    TAKE ONE TABLET BY MOUTH ONE TIME DAILY       clopidogrel 75 MG tablet    PLAVIX    90 tablet    TAKE 1 TABLET BY MOUTH DAILY       gabapentin 400 MG capsule    NEURONTIN     Take 1 capsule by mouth 2 times daily       levothyroxine 100 MCG tablet    SYNTHROID/LEVOTHROID    90 tablet    TAKE ONE TABLET BY MOUTH ONE TIME DAILY       LORAZEPAM PO      Take 0.5 mg by mouth daily as needed for anxiety       MIRTAZAPINE PO      Take 30 mg by mouth At Bedtime       simvastatin 20 MG tablet    ZOCOR    90 tablet    TAKE ONE TABLET BY MOUTH AT BEDTIME

## 2017-04-20 ENCOUNTER — TELEPHONE (OUTPATIENT)
Dept: FAMILY MEDICINE | Facility: CLINIC | Age: 82
End: 2017-04-20

## 2017-04-20 NOTE — TELEPHONE ENCOUNTER
Reason for Call:  Pre op     Detailed comments: Pt stated that [facility] has not received a copy of the pre op  Pt has surgery on 4/24 please send paper work  Dr. Sotelo  Fax: [phone]    Phone Number Patient can be reached at: Home number on file [phone] (home)    Best Time: anytime    Can we leave a detailed message on this number? YES    Call taken on 4/20/2017 at 12:54 PM by Becky Ma

## 2017-05-09 ENCOUNTER — TRANSFERRED RECORDS (OUTPATIENT)
Dept: HEALTH INFORMATION MANAGEMENT | Facility: CLINIC | Age: 82
End: 2017-05-09

## 2017-05-31 DIAGNOSIS — F42.2 MIXED OBSESSIONAL THOUGHTS AND ACTS: Primary | ICD-10-CM

## 2017-05-31 PROCEDURE — 99000 SPECIMEN HANDLING OFFICE-LAB: CPT

## 2017-05-31 PROCEDURE — 80335 ANTIDEPRESSANT TRICYCLIC 1/2: CPT | Mod: 90

## 2017-05-31 PROCEDURE — 36415 COLL VENOUS BLD VENIPUNCTURE: CPT

## 2017-06-06 ENCOUNTER — TRANSFERRED RECORDS (OUTPATIENT)
Dept: HEALTH INFORMATION MANAGEMENT | Facility: CLINIC | Age: 82
End: 2017-06-06

## 2017-06-08 LAB
CLOMIPRAMINE SERPL-MCNC: 18 NG/ML
CLOMIPRAMINE+NOR SERPL-MCNC: 18 NG/ML
NORCLOMIPRAMINE SERPL-MCNC: ABNORMAL NG/ML

## 2017-06-15 ENCOUNTER — TELEPHONE (OUTPATIENT)
Dept: FAMILY MEDICINE | Facility: CLINIC | Age: 82
End: 2017-06-15

## 2017-06-15 NOTE — TELEPHONE ENCOUNTER
Reason for Call: Request for an order    Order or referral being requested: Patient calling saying   Dr Deng Ferro wants him to have an EKG be done here    Date needed: at your convenience    Has the patient been seen by the PCP for this problem? YES    Additional comments: He has completed without orders before he says    Phone number Patient can be reached at:  Home number on file [phone] (home)    Best Time:  anytime    Can we leave a detailed message on this number?  YES    Call taken on 6/15/2017 at 10:35 AM by Edel Wesley

## 2017-06-16 ENCOUNTER — ALLIED HEALTH/NURSE VISIT (OUTPATIENT)
Dept: NURSING | Facility: CLINIC | Age: 82
End: 2017-06-16
Payer: COMMERCIAL

## 2017-06-16 DIAGNOSIS — F42.2 MIXED OBSESSIONAL THOUGHTS AND ACTS: Primary | ICD-10-CM

## 2017-06-16 PROCEDURE — 93000 ELECTROCARDIOGRAM COMPLETE: CPT

## 2017-06-16 PROCEDURE — 99207 ZZC NO CHARGE NURSE ONLY: CPT

## 2017-06-16 NOTE — PROGRESS NOTES
EKG requested by patient's psych MD, Dr Deng Gaston. I completed this and showed Dr Kendrick results. Also faxed the EKG and lab result to Dr Gaston.    Herb Grigsby, [facility]

## 2017-06-16 NOTE — MR AVS SNAPSHOT
"              After Visit Summary   2017    Zack Santiago    MRN: [id number]           Patient Information     Date Of Birth          1931        Visit Information        Provider Department      2017 11:45 AM CS NURSE [facility]        Today's Diagnoses     Mixed obsessional thoughts and acts    -  1       Follow-ups after your visit        Who to contact     If you have questions or need follow up information about today's clinic visit or your schedule please contact [facility] directly at [phone].  Normal or non-critical lab and imaging results will be communicated to you by [employer], letter or phone within 4 business days after the clinic has received the results. If you do not hear from us within 7 days, please contact the clinic through [employer] or phone. If you have a critical or abnormal lab result, we will notify you by phone as soon as possible.  Submit refill requests through [employer] or call your pharmacy and they will forward the refill request to us. Please allow 3 business days for your refill to be completed.          Additional Information About Your Visit        MyChart Information     [employer] lets you send messages to your doctor, view your test results, renew your prescriptions, schedule appointments and more. To sign up, go to [email or web address] . Click on \"Log in\" on the left side of the screen, which will take you to the Welcome page. Then click on \"Sign up Now\" on the right side of the page.     You will be asked to enter the access code listed below, as well as some personal information. Please follow the directions to create your username and password.     Your access code is: [email or web address]  Expires: 2017 11:23 AM     Your access code will  in 90 days. If you need help or a new code, please call your [facility] or [phone].        Care EveryWhere ID     This is your Care EveryWhere ID. This could be used by other " organizations to access your [facility] medical records  [id number]         Blood Pressure from Last 3 Encounters:   04/19/17 126/57   04/07/17 138/80   02/21/17 124/70    Weight from Last 3 Encounters:   04/19/17 194 lb (88 kg)   04/07/17 193 lb 6.4 oz (87.7 kg)   02/21/17 190 lb (86.2 kg)              We Performed the Following     EKG 12-lead complete w/read - Clinics        Primary Care Provider Office Phone # Fax #    Zurdo Kendrick [phone]       [facility] [address] [facility] [address]  [facility] [address]        Thank you!     Thank you for choosing [facility]  for your care. Our goal is always to provide you with excellent care. Hearing back from our patients is one way we can continue to improve our services. Please take a few minutes to complete the written survey that you may receive in the mail after your visit with us. Thank you!             Your Updated Medication List - Protect others around you: Learn how to safely use, store and throw away your medicines at www.disposemymeds.org.          This list is accurate as of: 6/16/17 12:28 PM.  Always use your most recent med list.                   Brand Name Dispense Instructions for use    ANAFRANIL PO      Take 25 mg by mouth daily       ARIPiprazole 2 MG tablet    ABILIFY     Take 1 tablet by mouth At Bedtime.       ASPIRIN NOT PRESCRIBED    INTENTIONAL    0 each    Please choose reason not prescribed, below       chlorthalidone 25 MG tablet    HYGROTON    90 tablet    TAKE ONE TABLET BY MOUTH ONE TIME DAILY       clopidogrel 75 MG tablet    PLAVIX    90 tablet    TAKE 1 TABLET BY MOUTH DAILY       gabapentin 400 MG capsule    NEURONTIN     Take 1 capsule by mouth 2 times daily       levothyroxine 100 MCG tablet    SYNTHROID/LEVOTHROID    90 tablet    TAKE ONE TABLET BY MOUTH ONE TIME DAILY       LORAZEPAM PO      Take 0.5 mg by mouth daily as needed for anxiety       MIRTAZAPINE PO      Take 30 mg by mouth  At Bedtime       simvastatin 20 MG tablet    ZOCOR    90 tablet    TAKE ONE TABLET BY MOUTH AT BEDTIME

## 2017-06-21 ENCOUNTER — HOSPITAL ENCOUNTER (EMERGENCY)
Facility: CLINIC | Age: 82
Discharge: HOME | End: 2017-06-21
Attending: EMERGENCY MEDICINE | Admitting: EMERGENCY MEDICINE
Payer: MEDICARE

## 2017-06-21 VITALS
SYSTOLIC BLOOD PRESSURE: 122 MMHG | OXYGEN SATURATION: 98 % | DIASTOLIC BLOOD PRESSURE: 60 MMHG | WEIGHT: 187 LBS | HEIGHT: 73 IN | RESPIRATION RATE: 16 BRPM | BODY MASS INDEX: 24.78 KG/M2 | TEMPERATURE: 97.6 F

## 2017-06-21 DIAGNOSIS — Z51.89 VISIT FOR WOUND CHECK: ICD-10-CM

## 2017-06-21 PROCEDURE — 99283 EMERGENCY DEPT VISIT LOW MDM: CPT

## 2017-06-21 RX ORDER — DOCUSATE SODIUM 50MG AND SENNOSIDES 8.6MG 8.6; 5 MG/1; MG/1
8.6-5 TABLET, FILM COATED ORAL
Refills: 0 | COMMUNITY
Start: 2017-04-24 | End: 2017-10-26

## 2017-06-21 RX ORDER — TAMSULOSIN HYDROCHLORIDE 0.4 MG/1
0.4 CAPSULE ORAL
Refills: 1 | COMMUNITY
Start: 2017-02-21 | End: 2017-10-26

## 2017-06-21 ASSESSMENT — ENCOUNTER SYMPTOMS
WOUND: 1
DIZZINESS: 0
LIGHT-HEADEDNESS: 0

## 2017-06-21 NOTE — ED NOTES
Bed: ED04  Expected date: 6/21/17  Expected time:   Means of arrival:   Comments:  533 86 m/wound check

## 2017-06-21 NOTE — ED PROVIDER NOTES
History     Chief Complaint:  Wound Check     HPI:  Zack Santiago is a 86 year old male with an extensive past medical history and is on Plavix who presents for a wound check. 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 Tonight as he was about to fall asleep, he was feeling the wound to check how it was healing. Shortly after, the wound spontaneously began bleeding and was not controllable, prompting his ED visit. He denies lightheadedness, dizziness, or hearing problems. He is not on any anticoagulation.    Allergies:  No known drug allergies      Medications:    Fosamax  Plavix  Senexon  Aspirin   Chlorthalidone  Simvastatin  Mirtazapine  Lorazepam  Levothyroxine  Anafranil  Gabapentin  Abilify     Past Medical History:    Atrial fibrillation  Aortic insufficiency  Ascending aortic aneurysm  Chronic kidney disease  OCD  GERD  AAA   Hyperlipidemia  Panic disorder  Hypertension   Anemia  Arteriosclerotic cardiovascular disease  Benign prostatic hyperplasia  Bradycardia  Hypothyroidism  Idiopathic neuropathy  Spinal headache  Stroke  Ulcerative colitis    Past Surgical History:    Back surgery  Bladder surgery  Total knee arthroplasty, right  Total knee arthroplasty, left  Cataract surgery, bilateral  CABG  AAA repair  Hernia repair x2  Knee surgery  Hammertoe repair  TURP    Family History:    CAD-Father    Social History:  Smoking status: Former Smoker -- 1.0 ppd for 5 years  Alcohol use: No   Marital Status:      Presents with wife and family at bedside.     Review of Systems   HENT: Positive for ear pain. Negative for hearing loss.    Skin: Positive for wound.   Neurological: Negative for dizziness and light-headedness.   All other systems reviewed and are negative.      Physical Exam     Patient Vitals for the past 24 hrs:   BP Temp Temp src Heart Rate Resp SpO2 Height Weight   06/21/17 0032 - 97.6  F (36.4  C) Oral - - - - -   06/21/17 0019 142/68 - - 49  "16 96 % 1.854 m (6' 1\") 84.8 kg (187 lb)      Physical Exam:  Physical Exam   Constitutional:  Patient is oriented to person, place, and time. They appear well-developed and well-nourished. Mild distress secondary to ear pain and bleeding.    HENT:    Patient has a mohs site on the oracle of the right ear approximately nickel sized. There is a small amount of oozing at approximately the 1900 position, but no hematoma.   Mouth/Throat:   Oropharynx is clear and moist.   Eyes:    Conjunctivae normal and EOM are normal. Pupils are equal, round, and reactive to light.   Neck:    Normal range of motion.   Cardiovascular: Normal rate, regular rhythm and normal heart sounds.  Exam reveals no gallop and no friction rub.  No murmur heard.  Pulmonary/Chest:  Effort normal and breath sounds normal. Patient has no wheezes. Patient has no rales.   Musculoskeletal:  Normal range of motion. Patient exhibits no edema.   Neurological:   Patient is alert and oriented to person, place, and time. Patient has normal strength. No cranial nerve deficit or sensory deficit. GCS 15  Skin:   Skin is warm and dry. No rash noted. No erythema.   Psychiatric:   Patient has a normal mood and affect. Patient's behavior is normal. Judgment and thought content normal.     Emergency Department Course     Procedures:  Wound Care:  The patient came in with a gauze dressing on his right ear with a nasal clip holding it in place. I removed the dressing. This did not appear to be actively bleeding, but after this was irrigated with saline. There was then a pin-point area of oozing. I applied surgifoam to the mohs site and placed a nasal clamp over this. This was held for 10 minutes. On recheck, the bleeding was controlled and he was placed a bulky gauze dressing.     Emergency Department Course:  Past medical records, nursing notes, and vitals reviewed.  0039: I performed an exam of the patient and obtained history, as documented above. GCS 15.     A wound " care procedure was performed.     0105: I rechecked the patient. Findings and plan explained to the Patient and spouse. Patient discharged home with instructions regarding supportive care, medications, and reasons to return. The importance of close follow-up was reviewed.      Impression & Plan      Medical Decision Making:  Zack Santiago is an 86 year old male who presents to the emergency department for a wound check. He is currently on Plavix. I suspect that his bleeding was caused by laying on that ear as he was going to bed. After the procedure above, the bleeding was controlled. He has an appointment tomorrow with his dermatologist at 0730, so he is safe for discharge.     Diagnosis:    ICD-10-CM   1. Visit for wound check Z51.89     Disposition:  Discharged to home.    Julita Overton  6/21/2017    EMERGENCY DEPARTMENT    I, Julita Overton, am serving as a scribe at 12:39 AM on 6/21/2017 to document services personally performed by Catherine Jiménez MD based on my observations and the provider's statements to me.       Catherine Jiménez MD  06/21/17 0200

## 2017-06-21 NOTE — ED AVS SNAPSHOT
Emergency Department    [address] [place] [address]    Phone:  [phone]    Fax:  [phone]                                       Zack Santiago   MRN: [id number]    Department:   Emergency Department   Date of Visit:  6/21/2017           After Visit Summary Signature Page     I have received my discharge instructions, and my questions have been answered. I have discussed any challenges I see with this plan with the nurse or doctor.    ..........................................................................................................................................  Patient/Patient Representative Signature      ..........................................................................................................................................  Patient Representative Print Name and Relationship to Patient    ..................................................               ................................................  Date                                            Time    ..........................................................................................................................................  Reviewed by Signature/Title    ...................................................              ..............................................  Date                                                            Time

## 2017-06-21 NOTE — ED AVS SNAPSHOT
Emergency Department    [address] [place] [address]    Phone:  [phone]    Fax:  [phone]                                       Zack Santiago   MRN: [id number]    Department:   Emergency Department   Date of Visit:  6/21/2017           Patient Information     Date Of Birth          5/29/1931        Your diagnoses for this visit were:     Visit for wound check        You were seen by Catherine Jiménez MD.      Follow-up Information     Please follow up.    Why:  Follow up with your doctor as scheduled in the morning. Keep dressing on.      Discharge References/Attachments     WOUND CARE (ENGLISH)      24 Hour Appointment Hotline       To make an appointment at any [facility], call [phone] ([phone]). If you don't have a family doctor or clinic, we will help you find one. [facility] clinics are conveniently located to serve the needs of you and your family.             Review of your medicines      Our records show that you are taking the medicines listed below. If these are incorrect, please call your family doctor or clinic.        Dose / Directions Last dose taken    ANAFRANIL PO   Dose:  25 mg        Take 25 mg by mouth daily   Refills:  0        ARIPiprazole 2 MG tablet   Commonly known as:  ABILIFY   Dose:  2 mg        Take 1 tablet by mouth At Bedtime.   Refills:  0        ASPIRIN NOT PRESCRIBED   Commonly known as:  INTENTIONAL   Quantity:  0 each        Please choose reason not prescribed, below   Refills:  0        chlorthalidone 25 MG tablet   Commonly known as:  HYGROTON   Quantity:  90 tablet        TAKE ONE TABLET BY MOUTH ONE TIME DAILY   Refills:  1        clopidogrel 75 MG tablet   Commonly known as:  PLAVIX   Quantity:  90 tablet        TAKE 1 TABLET BY MOUTH DAILY   Refills:  3        gabapentin 400 MG capsule   Commonly known as:  NEURONTIN   Dose:  1 capsule        Take 1 capsule by mouth 2 times daily   Refills:  0        levothyroxine 100  MCG tablet   Commonly known as:  SYNTHROID/LEVOTHROID   Quantity:  90 tablet        TAKE ONE TABLET BY MOUTH ONE TIME DAILY   Refills:  2        LORAZEPAM PO   Dose:  0.5 mg        Take 0.5 mg by mouth daily as needed for anxiety   Refills:  0        MIRTAZAPINE PO   Dose:  30 mg        Take 30 mg by mouth At Bedtime   Refills:  0        SENEXON-S 8.6-50 MG per tablet   Dose:  8.6-50 mg   Generic drug:  senna-docusate        Take 8.6-50 mg by mouth   Refills:  0        simvastatin 20 MG tablet   Commonly known as:  ZOCOR   Quantity:  90 tablet        TAKE ONE TABLET BY MOUTH AT BEDTIME   Refills:  3        tamsulosin 0.4 MG capsule   Commonly known as:  FLOMAX   Dose:  0.4 mg        Take 0.4 mg by mouth   Refills:  1                Orders Needing Specimen Collection     None      Pending Results     No orders found from 6/19/2017 to 6/22/2017.            Pending Culture Results     No orders found from 6/19/2017 to 6/22/2017.            Pending Results Instructions     If you had any lab results that were not finalized at the time of your Discharge, you can call the ED Lab Result RN at [phone]. You will be contacted by this team for any positive Lab results or changes in treatment. The nurses are available 7 days a week from 10A to 6:30P.  You can leave a message 24 hours per day and they will return your call.        Test Results From Your Hospital Stay               Clinical Quality Measure: Blood Pressure Screening     Your blood pressure was checked while you were in the emergency department today. The last reading we obtained was  BP: 142/68 . Please read the guidelines below about what these numbers mean and what you should do about them.  If your systolic blood pressure (the top number) is less than 120 and your diastolic blood pressure (the bottom number) is less than 80, then your blood pressure is normal. There is nothing more that you need to do about it.  If your systolic blood pressure (the top  "number) is 120-139 or your diastolic blood pressure (the bottom number) is 80-89, your blood pressure may be higher than it should be. You should have your blood pressure rechecked within a year by a primary care provider.  If your systolic blood pressure (the top number) is 140 or greater or your diastolic blood pressure (the bottom number) is 90 or greater, you may have high blood pressure. High blood pressure is treatable, but if left untreated over time it can put you at risk for heart attack, stroke, or kidney failure. You should have your blood pressure rechecked by a primary care provider within the next 4 weeks.  If your provider in the emergency department today gave you specific instructions to follow-up with your doctor or provider even sooner than that, you should follow that instruction and not wait for up to 4 weeks for your follow-up visit.        Thank you for choosing [facility]       Thank you for choosing [facility] for your care. Our goal is always to provide you with excellent care. Hearing back from our patients is one way we can continue to improve our services. Please take a few minutes to complete the written survey that you may receive in the mail after you visit with us. Thank you!        [email or web address] Information     [employer] lets you send messages to your doctor, view your test results, renew your prescriptions, schedule appointments and more. To sign up, go to [email or web address] . Click on \"Log in\" on the left side of the screen, which will take you to the Welcome page. Then click on \"Sign up Now\" on the right side of the page.     You will be asked to enter the access code listed below, as well as some personal information. Please follow the directions to create your username and password.     Your access code is: [email or web address]  Expires: 2017 11:23 AM     Your access code will  in 90 days. If you need help or a new code, please call your [facility] clinic or [phone].        Care " EveryWhere ID     This is your Care EveryWhere ID. This could be used by other organizations to access your [facility] medical records  [id number]        After Visit Summary       This is your record. Keep this with you and show to your community pharmacist(s) and doctor(s) at your next visit.

## 2017-06-21 NOTE — ED NOTES
MD at bedside.  Herminia Watson RN,.......................................... 6/21/2017   12:43 AM

## 2017-08-01 ENCOUNTER — TELEPHONE (OUTPATIENT)
Dept: FAMILY MEDICINE | Facility: CLINIC | Age: 82
End: 2017-08-01

## 2017-08-01 DIAGNOSIS — R79.89 HIGH SERUM CREATININE: Primary | ICD-10-CM

## 2017-08-01 NOTE — TELEPHONE ENCOUNTER
Left message for pt to call back, please schedule him a lab appointment to retest creatinine.  Read him message from Dr. Kendrick below.   Becky Dee MA

## 2017-08-01 NOTE — TELEPHONE ENCOUNTER
Patient returned call and I read him Dr. Kendrick's message below and he was very relieved. Patient is also scheduled for a lab appt on 8/3 at 11 am.

## 2017-08-01 NOTE — TELEPHONE ENCOUNTER
Please see message below and advise.     Clinic Action Needed:Yes  Reason for Call: Pt had blood work for [facility] and his creatinine was 1.30. He wants to know what Dr Kendrick thinks about this. Please call patient.   Routed to: Dr Kendrick's care team     Sarah Bruno RN  [facility] RN  Lung Nodule and ED Lab Result F/u RN

## 2017-08-03 DIAGNOSIS — R79.89 HIGH SERUM CREATININE: ICD-10-CM

## 2017-08-03 PROCEDURE — 82565 ASSAY OF CREATININE: CPT | Performed by: INTERNAL MEDICINE

## 2017-08-03 PROCEDURE — 36415 COLL VENOUS BLD VENIPUNCTURE: CPT | Performed by: INTERNAL MEDICINE

## 2017-08-03 NOTE — LETTER
[place] [address] [facility]  [address]  [place]  [address]  Tel: [phone]    August 4, 2017    Zack MYNOR Santiago  [address] [facility] [address]        Dear Mr. Santiago,    Your creatine is normal at 1.13    If you have any further questions or problems, please contact our office.      Sincerely,    Zurdo Kendrick MD/ Luci MA CMA  Results for orders placed or performed in visit on 08/03/17   Creatinine   Result Value Ref Range    Creatinine 1.13 0.66 - 1.25 mg/dL    GFR Estimate 62 >60 mL/min/1.7m2    GFR Estimate If Black 74 >60 mL/min/1.7m2               Enclosure: Lab Results

## 2017-08-04 ENCOUNTER — TELEPHONE (OUTPATIENT)
Dept: FAMILY MEDICINE | Facility: CLINIC | Age: 82
End: 2017-08-04

## 2017-08-04 LAB
CREAT SERPL-MCNC: 1.13 MG/DL (ref 0.66–1.25)
GFR SERPL CREATININE-BSD FRML MDRD: 62 ML/MIN/1.7M2

## 2017-08-04 NOTE — TELEPHONE ENCOUNTER
"I called patient and left message to return our call at [phone]. I sent a letter to patient with results.  uLci Whaley CMA    Message to give to patient \"Your creatine is normal at 1.13\"      "

## 2017-08-13 DIAGNOSIS — I10 ESSENTIAL HYPERTENSION: ICD-10-CM

## 2017-08-14 NOTE — TELEPHONE ENCOUNTER
chlorthalidone (HYGROTON) 25 MG tablet        Last Written Prescription Date: 2/14/2017  Last Fill Quantity: 90, # refills: 1  Last Office Visit with G, [facility] or [facility] prescribing provider: 4/7/2017       Potassium   Date Value Ref Range Status   04/07/2017 3.9 3.4 - 5.3 mmol/L Final     Creatinine   Date Value Ref Range Status   08/03/2017 1.13 0.66 - 1.25 mg/dL Final     BP Readings from Last 3 Encounters:   06/21/17 122/60   04/19/17 126/57   04/07/17 138/80

## 2017-08-15 RX ORDER — CHLORTHALIDONE 25 MG/1
TABLET ORAL
Qty: 90 TABLET | Refills: 1 | Status: SHIPPED | OUTPATIENT
Start: 2017-08-15 | End: 2018-02-04

## 2017-08-15 NOTE — TELEPHONE ENCOUNTER
Prescription approved per [facility] Refill Protocol.    Chrissy Araujo RN

## 2017-08-29 ENCOUNTER — TRANSFERRED RECORDS (OUTPATIENT)
Dept: HEALTH INFORMATION MANAGEMENT | Facility: CLINIC | Age: 82
End: 2017-08-29

## 2017-09-06 ENCOUNTER — OFFICE VISIT (OUTPATIENT)
Dept: FAMILY MEDICINE | Facility: CLINIC | Age: 82
End: 2017-09-06
Payer: COMMERCIAL

## 2017-09-06 ENCOUNTER — APPOINTMENT (OUTPATIENT)
Dept: GENERAL RADIOLOGY | Facility: CLINIC | Age: 82
End: 2017-09-06
Attending: EMERGENCY MEDICINE
Payer: MEDICARE

## 2017-09-06 ENCOUNTER — HOSPITAL ENCOUNTER (EMERGENCY)
Facility: CLINIC | Age: 82
Discharge: HOME | End: 2017-09-06
Attending: EMERGENCY MEDICINE | Admitting: EMERGENCY MEDICINE
Payer: MEDICARE

## 2017-09-06 VITALS
HEIGHT: 73 IN | TEMPERATURE: 97.6 F | HEART RATE: 57 BPM | DIASTOLIC BLOOD PRESSURE: 57 MMHG | OXYGEN SATURATION: 97 % | WEIGHT: 193 LBS | BODY MASS INDEX: 25.58 KG/M2 | SYSTOLIC BLOOD PRESSURE: 126 MMHG

## 2017-09-06 VITALS
TEMPERATURE: 97.7 F | RESPIRATION RATE: 18 BRPM | OXYGEN SATURATION: 99 % | BODY MASS INDEX: 24.78 KG/M2 | HEART RATE: 50 BPM | WEIGHT: 187 LBS | HEIGHT: 73 IN | SYSTOLIC BLOOD PRESSURE: 138 MMHG | DIASTOLIC BLOOD PRESSURE: 62 MMHG

## 2017-09-06 DIAGNOSIS — L03.115 CELLULITIS OF RIGHT LOWER EXTREMITY: ICD-10-CM

## 2017-09-06 DIAGNOSIS — L03.119 CELLULITIS AND ABSCESS OF FOOT EXCLUDING TOE: Primary | ICD-10-CM

## 2017-09-06 DIAGNOSIS — L02.619 CELLULITIS AND ABSCESS OF FOOT EXCLUDING TOE: Primary | ICD-10-CM

## 2017-09-06 DIAGNOSIS — L97.511 ULCER OF FOOT, RIGHT, LIMITED TO BREAKDOWN OF SKIN (H): ICD-10-CM

## 2017-09-06 LAB
ANION GAP SERPL CALCULATED.3IONS-SCNC: 10 MMOL/L (ref 3–14)
BASOPHILS # BLD AUTO: 0 10E9/L (ref 0–0.2)
BASOPHILS NFR BLD AUTO: 0.4 %
BUN SERPL-MCNC: 27 MG/DL (ref 7–30)
CALCIUM SERPL-MCNC: 8.7 MG/DL (ref 8.5–10.1)
CHLORIDE SERPL-SCNC: 103 MMOL/L (ref 94–109)
CO2 SERPL-SCNC: 25 MMOL/L (ref 20–32)
CREAT SERPL-MCNC: 1.05 MG/DL (ref 0.66–1.25)
CRP SERPL-MCNC: 13.3 MG/L (ref 0–8)
DIFFERENTIAL METHOD BLD: ABNORMAL
EOSINOPHIL # BLD AUTO: 0.1 10E9/L (ref 0–0.7)
EOSINOPHIL NFR BLD AUTO: 2.1 %
ERYTHROCYTE [DISTWIDTH] IN BLOOD BY AUTOMATED COUNT: 14.7 % (ref 10–15)
GFR SERPL CREATININE-BSD FRML MDRD: 67 ML/MIN/1.7M2
GLUCOSE SERPL-MCNC: 93 MG/DL (ref 70–99)
HCT VFR BLD AUTO: 36.1 % (ref 40–53)
HGB BLD-MCNC: 12.3 G/DL (ref 13.3–17.7)
IMM GRANULOCYTES # BLD: 0 10E9/L (ref 0–0.4)
IMM GRANULOCYTES NFR BLD: 0.2 %
LYMPHOCYTES # BLD AUTO: 1 10E9/L (ref 0.8–5.3)
LYMPHOCYTES NFR BLD AUTO: 18.1 %
MCH RBC QN AUTO: 32.4 PG (ref 26.5–33)
MCHC RBC AUTO-ENTMCNC: 34.1 G/DL (ref 31.5–36.5)
MCV RBC AUTO: 95 FL (ref 78–100)
MONOCYTES # BLD AUTO: 0.6 10E9/L (ref 0–1.3)
MONOCYTES NFR BLD AUTO: 10.7 %
NEUTROPHILS # BLD AUTO: 3.9 10E9/L (ref 1.6–8.3)
NEUTROPHILS NFR BLD AUTO: 68.5 %
NRBC # BLD AUTO: 0 10*3/UL
NRBC BLD AUTO-RTO: 0 /100
PLATELET # BLD AUTO: 177 10E9/L (ref 150–450)
POTASSIUM SERPL-SCNC: 3.7 MMOL/L (ref 3.4–5.3)
RBC # BLD AUTO: 3.8 10E12/L (ref 4.4–5.9)
SODIUM SERPL-SCNC: 138 MMOL/L (ref 133–144)
WBC # BLD AUTO: 5.6 10E9/L (ref 4–11)

## 2017-09-06 PROCEDURE — 80048 BASIC METABOLIC PNL TOTAL CA: CPT | Performed by: EMERGENCY MEDICINE

## 2017-09-06 PROCEDURE — 73630 X-RAY EXAM OF FOOT: CPT | Mod: RT

## 2017-09-06 PROCEDURE — 99284 EMERGENCY DEPT VISIT MOD MDM: CPT | Mod: 25

## 2017-09-06 PROCEDURE — 86140 C-REACTIVE PROTEIN: CPT | Performed by: EMERGENCY MEDICINE

## 2017-09-06 PROCEDURE — A9270 NON-COVERED ITEM OR SERVICE: HCPCS | Mod: GY | Performed by: EMERGENCY MEDICINE

## 2017-09-06 PROCEDURE — 25000132 ZZH RX MED GY IP 250 OP 250 PS 637: Mod: GY | Performed by: EMERGENCY MEDICINE

## 2017-09-06 PROCEDURE — 85025 COMPLETE CBC W/AUTO DIFF WBC: CPT | Performed by: EMERGENCY MEDICINE

## 2017-09-06 PROCEDURE — 99213 OFFICE O/P EST LOW 20 MIN: CPT | Performed by: INTERNAL MEDICINE

## 2017-09-06 RX ORDER — CEPHALEXIN 500 MG/1
500 CAPSULE ORAL 3 TIMES DAILY
Qty: 27 CAPSULE | Refills: 0 | Status: SHIPPED | OUTPATIENT
Start: 2017-09-07 | End: 2017-09-16

## 2017-09-06 RX ORDER — SULFAMETHOXAZOLE/TRIMETHOPRIM 800-160 MG
1 TABLET ORAL 2 TIMES DAILY
Qty: 18 TABLET | Refills: 0 | Status: SHIPPED | OUTPATIENT
Start: 2017-09-07 | End: 2017-09-16

## 2017-09-06 RX ORDER — SULFAMETHOXAZOLE/TRIMETHOPRIM 800-160 MG
1 TABLET ORAL ONCE
Status: COMPLETED | OUTPATIENT
Start: 2017-09-06 | End: 2017-09-06

## 2017-09-06 RX ORDER — CEPHALEXIN 500 MG/1
500 CAPSULE ORAL ONCE
Status: COMPLETED | OUTPATIENT
Start: 2017-09-06 | End: 2017-09-06

## 2017-09-06 RX ADMIN — CEPHALEXIN 500 MG: 500 CAPSULE ORAL at 19:02

## 2017-09-06 RX ADMIN — SULFAMETHOXAZOLE AND TRIMETHOPRIM 1 TABLET: 800; 160 TABLET ORAL at 19:02

## 2017-09-06 ASSESSMENT — ENCOUNTER SYMPTOMS
WEAKNESS: 0
JOINT SWELLING: 1
SHORTNESS OF BREATH: 0
NUMBNESS: 0

## 2017-09-06 NOTE — ED AVS SNAPSHOT
Emergency Department    [address] [facility] [address]    Phone:  [phone]    Fax:  [phone]                                       Zack Santiago   MRN: [id number]    Department:   Emergency Department   Date of Visit:  9/6/2017           Patient Information     Date Of Birth          5/29/1931        Your diagnoses for this visit were:     Cellulitis of right lower extremity     Ulcer of foot, right, limited to breakdown of skin (H)        You were seen by Josiah Charles DO.      Follow-up Information     Follow up with Eamon Voss DPM In 2 days.    Specialty:  Podiatry    Why:  call tomorrow for appointment    Contact information:    [address]  [facility] [address]  [phone]          Follow up with  Emergency Department.    Specialty:  EMERGENCY MEDICINE    Why:  If symptoms worsen    Contact information:    [address] [facility] [address] [phone]        Discharge Instructions         Discharge Instructions for Cellulitis  You have been diagnosed with cellulitis. This is an infection in the deepest layer of the skin. In some cases, the infection also affects the muscle. Cellulitis is caused by bacteria. The bacteria can enter the body through broken skin. This can happen with a cut, scratch, animal bite, or an insect bite that has been scratched. You may have been treated in the hospital with antibiotics and fluids. You will likely be given a prescription for antibiotics to take at home. This sheet will help you take care of yourself at home.  Home care  When you are home:    Take the prescribed antibiotic medicine you are given as directed until it is gone. Take it even if you feel better. It treats the infection and stops it from returning. Not taking all the medicine can make future infections hard to treat.    Keep the infected area clean.    When possible, raise the infected area above the level of your heart. This  helps keep swelling down.    Talk with your healthcare provider if you are in pain. Ask what kind of over-the-counter medicine you can take for pain.    Apply clean bandages as advised.    Take your temperature once a day for a week.    Wash your hands often to prevent spreading the infection.  In the future, wash your hands before and after you touch cuts, scratches, or bandages. This will help prevent infection.   When to call your healthcare provider  Call your healthcare provider immediately if you have any of the following:    Difficulty or pain when moving the joints above or below the infected area    Discharge or pus draining from the area    Fever of 100.4 F (38 C) or higher, or as directed by your healthcare provider    Pain that gets worse in or around the infected     Redness that gets worse in or around the infected area, particularly if the area of redness expands to a wider area    Shaking chills    Swelling of the infected area    Vomiting   Date Last Reviewed: 8/1/2016 2000-2017 [employer]. [address] [place] [address]. All rights reserved. This information is not intended as a substitute for professional medical care. Always follow your healthcare professional's instructions.          Simple Skin Ulcer  A skin ulcer is a sore on the skin. Skin ulcers often form when blood circulation is impaired. Being bed- or wheelchair-bound can cause pressure that may lead to skin ulcers. Ulcers are generally round areas of red, swollen, thickened skin around a crater-like depression. They are often very slow to heal. If a skin ulcer isn't properly treated, it may become infected. If the infection spreads, it can cause serious health issues.    Symptoms of a skin ulcer include:    Reddish area on the skin    Skin color and texture changes    Swelling    Wound that isn't healing    Crater in the skin    Pain    Drainage or pus  Causes  There are many causes of skin ulcers. Some of  these include:    Decreased blood flow to a part of the skin, vascular insufficiency    Trauma    Lack of movement of a part of the body for long periods of time    Infection    Poor hygiene    Varicose veins    Vitamin deficiency  Pressure ulcers  Pressure ulcers are a type of ulcer most commonly seen in people who are confined to bed or a wheelchair. They are caused by prolonged pressure to a spot on the skin. Pressure ulcers usually occur on the back, buttocks, or backs or sides of the legs, arms, or feet (especially the heels).  Home care  You may be prescribed antibiotics to prevent infection. If this is the case, be sure to take all of the medicine, even if your symptoms get better. You may also be given medicines to help relieve pain. Follow the healthcare provider s instructions when using these medicines.  General care    Care for the skin ulcer as instructed. Always wash your hands with soap and warm water before and after caring for your wound.    Cover the ulcer with a clean, dry bandage. Remove and change the bandage as instructed. If the bandage becomes wet or dirty, change it as soon as possible.    Follow the doctor s instructions about washing. You can shower, but do not soak the healing ulcer until the doctor says it s OK.    Do not scratch, rub, or pick at the healing skin.    Check the area every day for signs of infection, such as increasing pain, redness, warmth, red streaking, swelling, or pus draining from the ulcer.    When resting, raise the area where the ulcer is above the level of the heart.    Avoid smoking or drinking alcohol, as these can delay wound healing.    If you are able, try to walk regularly. This can help with circulation.    Avoid prolonged standing or sitting in one position.  The following tips can help prevent future skin ulcers:    Know your risks for skin ulcers.    Keep the skin clean and dry.    Reposition frequently.    Use protective devices such as pillows, foam  wedges, and heel protectors for the knees, ankles, and heels.    Avoid immobilization.  Follow-up care  Follow up with your healthcare provider, or as advised.  When to seek medical advice  Call your healthcare provider right away if any of these occur:    Fever of 100.4 F (38 C) or higher, or as advised by your healthcare provider    Signs of infection. These include increasing pain, warmth, redness, or pus draining from the skin ulcer.    Bleeding from the skin ulcer    Pain in or around the ulcer that doesn't get better even with medicines    Increased swelling    Changes in skin color  Date Last Reviewed: 9/1/2016 2000-2017 [employer]. [address] [place] [address], [place] [id number]. All rights reserved. This information is not intended as a substitute for professional medical care. Always follow your healthcare professional's instructions.          24 Hour Appointment Hotline       To make an appointment at any [facility], call [phone] ([phone]). If you don't have a family doctor or clinic, we will help you find one. [facility] clinics are conveniently located to serve the needs of you and your family.             Review of your medicines      START taking        Dose / Directions Last dose taken    cephALEXin 500 MG capsule   Commonly known as:  KEFLEX   Dose:  500 mg   Quantity:  27 capsule   Start taking on:  9/7/2017        Take 1 capsule (500 mg) by mouth 3 times daily for 9 days   Refills:  0        sulfamethoxazole-trimethoprim 800-160 MG per tablet   Commonly known as:  BACTRIM DS   Dose:  1 tablet   Quantity:  18 tablet   Start taking on:  9/7/2017        Take 1 tablet by mouth 2 times daily for 9 days   Refills:  0          Our records show that you are taking the medicines listed below. If these are incorrect, please call your family doctor or clinic.        Dose / Directions Last dose taken    ANAFRANIL PO   Dose:  25 mg        Take 25 mg by mouth daily   Refills:  0         ARIPiprazole 2 MG tablet   Commonly known as:  ABILIFY   Dose:  2 mg        Take 1 tablet by mouth At Bedtime.   Refills:  0        ASPIRIN NOT PRESCRIBED   Commonly known as:  INTENTIONAL   Quantity:  0 each        Please choose reason not prescribed, below   Refills:  0        chlorthalidone 25 MG tablet   Commonly known as:  HYGROTON   Quantity:  90 tablet        TAKE ONE TABLET BY MOUTH ONE TIME DAILY   Refills:  1        clopidogrel 75 MG tablet   Commonly known as:  PLAVIX   Quantity:  90 tablet        TAKE 1 TABLET BY MOUTH DAILY   Refills:  3        gabapentin 400 MG capsule   Commonly known as:  NEURONTIN   Dose:  1 capsule        Take 1 capsule by mouth 2 times daily   Refills:  0        levothyroxine 100 MCG tablet   Commonly known as:  SYNTHROID/LEVOTHROID   Quantity:  90 tablet        TAKE ONE TABLET BY MOUTH ONE TIME DAILY   Refills:  2        LORAZEPAM PO   Dose:  0.5 mg        Take 0.5 mg by mouth daily as needed for anxiety   Refills:  0        MIRTAZAPINE PO   Dose:  30 mg        Take 30 mg by mouth At Bedtime   Refills:  0        SENEXON-S 8.6-50 MG per tablet   Dose:  8.6-50 mg   Generic drug:  senna-docusate        Take 8.6-50 mg by mouth   Refills:  0        simvastatin 20 MG tablet   Commonly known as:  ZOCOR   Quantity:  90 tablet        TAKE ONE TABLET BY MOUTH AT BEDTIME   Refills:  3        tamsulosin 0.4 MG capsule   Commonly known as:  FLOMAX   Dose:  0.4 mg        Take 0.4 mg by mouth   Refills:  1                Prescriptions were sent or printed at these locations (2 Prescriptions)                   Other Prescriptions                Printed at Department/Unit printer (2 of 2)         sulfamethoxazole-trimethoprim (BACTRIM DS) 800-160 MG per tablet               cephALEXin (KEFLEX) 500 MG capsule                Procedures and tests performed during your visit     Basic metabolic panel    CBC with platelets + differential    CRP inflammation    Foot  XR, G/E 3 views, right       Orders Needing Specimen Collection     None      Pending Results     Date and Time Order Name Status Description    9/6/2017 1615 Foot  XR, G/E 3 views, right Preliminary             Pending Culture Results     No orders found from 9/4/2017 to 9/7/2017.            Pending Results Instructions     If you had any lab results that were not finalized at the time of your Discharge, you can call the ED Lab Result RN at [phone]. You will be contacted by this team for any positive Lab results or changes in treatment. The nurses are available 7 days a week from 10A to 6:30P.  You can leave a message 24 hours per day and they will return your call.        Test Results From Your Hospital Stay        9/6/2017  5:17 PM      Component Results     Component Value Ref Range & Units Status    CRP Inflammation 13.3 (H) 0.0 - 8.0 mg/L Final         9/6/2017  5:05 PM      Component Results     Component Value Ref Range & Units Status    WBC 5.6 4.0 - 11.0 10e9/L Final    RBC Count 3.80 (L) 4.4 - 5.9 10e12/L Final    Hemoglobin 12.3 (L) 13.3 - 17.7 g/dL Final    Hematocrit 36.1 (L) 40.0 - 53.0 % Final    MCV 95 78 - 100 fl Final    MCH 32.4 26.5 - 33.0 pg Final    MCHC 34.1 31.5 - 36.5 g/dL Final    RDW 14.7 10.0 - 15.0 % Final    Platelet Count 177 150 - 450 10e9/L Final    Diff Method Automated Method  Final    % Neutrophils 68.5 % Final    % Lymphocytes 18.1 % Final    % Monocytes 10.7 % Final    % Eosinophils 2.1 % Final    % Basophils 0.4 % Final    % Immature Granulocytes 0.2 % Final    Nucleated RBCs 0 0 /100 Final    Absolute Neutrophil 3.9 1.6 - 8.3 10e9/L Final    Absolute Lymphocytes 1.0 0.8 - 5.3 10e9/L Final    Absolute Monocytes 0.6 0.0 - 1.3 10e9/L Final    Absolute Eosinophils 0.1 0.0 - 0.7 10e9/L Final    Absolute Basophils 0.0 0.0 - 0.2 10e9/L Final    Abs Immature Granulocytes 0.0 0 - 0.4 10e9/L Final    Absolute Nucleated RBC 0.0  Final         9/6/2017  5:17 PM      Component Results     Component Value Ref  Range & Units Status    Sodium 138 133 - 144 mmol/L Final    Potassium 3.7 3.4 - 5.3 mmol/L Final    Chloride 103 94 - 109 mmol/L Final    Carbon Dioxide 25 20 - 32 mmol/L Final    Anion Gap 10 3 - 14 mmol/L Final    Glucose 93 70 - 99 mg/dL Final    Urea Nitrogen 27 7 - 30 mg/dL Final    Creatinine 1.05 0.66 - 1.25 mg/dL Final    GFR Estimate 67 >60 mL/min/1.7m2 Final    Non  GFR Calc    GFR Estimate If Black 81 >60 mL/min/1.7m2 Final    African American GFR Calc    Calcium 8.7 8.5 - 10.1 mg/dL Final         9/6/2017  5:46 PM      Narrative     FOOT RIGHT THREE OR MORE VIEWS   9/6/2017 5:41 PM     HISTORY: Distal fifth metatarsal wound; rule out evidence of bone  involvement.    COMPARISON: None.    FINDINGS: Soft tissue swelling surrounding the right fifth  metatarsophalangeal joint. There is medial subluxation of the base of  the proximal phalanx of the right fifth toe at the MTP joint. There  has either been an osteotomy of the lateral aspect of the head of the  fifth metatarsal or there are erosive changes present that could  represent osteomyelitis. Remainder of the right foot is negative.        Impression     IMPRESSION:   1. Soft tissue swelling and subluxation at the right fifth  metatarsophalangeal joint as described.  2. If there has not been a prior osteotomy of the head of the fifth  metatarsal then there are some lytic-appearing changes that likely  represent osteomyelitis.                Clinical Quality Measure: Blood Pressure Screening     Your blood pressure was checked while you were in the emergency department today. The last reading we obtained was  BP: 138/62 . Please read the guidelines below about what these numbers mean and what you should do about them.  If your systolic blood pressure (the top number) is less than 120 and your diastolic blood pressure (the bottom number) is less than 80, then your blood pressure is normal. There is nothing more that you need to do about  "it.  If your systolic blood pressure (the top number) is 120-139 or your diastolic blood pressure (the bottom number) is 80-89, your blood pressure may be higher than it should be. You should have your blood pressure rechecked within a year by a primary care provider.  If your systolic blood pressure (the top number) is 140 or greater or your diastolic blood pressure (the bottom number) is 90 or greater, you may have high blood pressure. High blood pressure is treatable, but if left untreated over time it can put you at risk for heart attack, stroke, or kidney failure. You should have your blood pressure rechecked by a primary care provider within the next 4 weeks.  If your provider in the emergency department today gave you specific instructions to follow-up with your doctor or provider even sooner than that, you should follow that instruction and not wait for up to 4 weeks for your follow-up visit.        Thank you for choosing [facility]       Thank you for choosing [facility] for your care. Our goal is always to provide you with excellent care. Hearing back from our patients is one way we can continue to improve our services. Please take a few minutes to complete the written survey that you may receive in the mail after you visit with us. Thank you!        [employer] Information     [employer] lets you send messages to your doctor, view your test results, renew your prescriptions, schedule appointments and more. To sign up, go to [email or web address] . Click on \"Log in\" on the left side of the screen, which will take you to the Welcome page. Then click on \"Sign up Now\" on the right side of the page.     You will be asked to enter the access code listed below, as well as some personal information. Please follow the directions to create your username and password.     Your access code is: [email or web address]  Expires: 2017  3:52 PM     Your access code will  in 90 days. If you need help or a new code, please call " your [facility] clinic or [phone].        Care EveryWhere ID     This is your Care EveryWhere ID. This could be used by other organizations to access your [facility] medical records  [id number]        Equal Access to Services     JEREMIAH GROVE : Lulu Milian, waaxda luqadaha, qaybta kaalmada mukesh, sita franklin. So [facility] [phone].    ATENCIÓN: Si habla español, tiene a khan disposición servicios gratuitos de asistencia lingüística. Llame al [phone].    We comply with applicable federal civil rights laws and [place] laws. We do not discriminate on the basis of race, color, national origin, age, disability sex, sexual orientation or gender identity.            After Visit Summary       This is your record. Keep this with you and show to your community pharmacist(s) and doctor(s) at your next visit.

## 2017-09-06 NOTE — PROGRESS NOTES
Chief Complaint:     Worsening right foot infection for the past 1 week    HPI:   Patient Zack Santiago is a very pleasant 86 year old male with history of CAD, CVA, previous right foot surgery 4 months ago with a bunionectomy who presents to Internal Medicine clinic today for evaluation of worsening right foot infection symptoms. Regarding the patient's right foot infection, the patient has been walking in a ortho boot for several weeks which likely caused injury with skin breakdown of the right lateral edge of the foot close to the 5th toe. The patient reports that the foot infection started about 1 week ago and rapidly progressed over the past several days. The right foot is now red and swollen with purulent drainage from the wound on the right lateral edge of the right foot close to the 5th toe.         Current Medications:     Current Outpatient Prescriptions   Medication Sig Dispense Refill     chlorthalidone (HYGROTON) 25 MG tablet TAKE ONE TABLET BY MOUTH ONE TIME DAILY 90 tablet 1     tamsulosin (FLOMAX) 0.4 MG capsule Take 0.4 mg by mouth  1     SENEXON-S 8.6-50 MG per tablet Take 8.6-50 mg by mouth  0     clopidogrel (PLAVIX) 75 MG tablet TAKE 1 TABLET BY MOUTH DAILY 90 tablet 3     ASPIRIN NOT PRESCRIBED (INTENTIONAL) Please choose reason not prescribed, below 0 each 0     simvastatin (ZOCOR) 20 MG tablet TAKE ONE TABLET BY MOUTH AT BEDTIME 90 tablet 3     MIRTAZAPINE PO Take 30 mg by mouth At Bedtime       LORAZEPAM PO Take 0.5 mg by mouth daily as needed for anxiety       levothyroxine (SYNTHROID/LEVOTHROID) 100 MCG tablet TAKE ONE TABLET BY MOUTH ONE TIME DAILY 90 tablet 2     ClomiPRAMINE HCl (ANAFRANIL PO) Take 25 mg by mouth daily       gabapentin (NEURONTIN) 400 MG capsule Take 1 capsule by mouth 2 times daily        ARIPiprazole (ABILIFY) 2 MG tablet Take 1 tablet by mouth At Bedtime.           Allergies:      Allergies   Allergen Reactions     No Known Allergies             Past Medical  History:     Past Medical History:   Diagnosis Date     A-fib (H) 2010    post op     AAA (abdominal aortic aneurysm) without rupture (H)     Dr. 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/16    carotid us nl stenosis, echo no change and no clots; ziopatch no afib, svt present     Anemia 2004     Aortic insufficiency 6/14    1+ on echo     Aortic insufficiency 11/2016    mild to mod     ASCVD (arteriosclerotic cardiovascular disease) 2010    cabg, post op afib     BPH (benign prostatic hyperplasia) 2003    turp, flomax added by urol 2/17     Bradycardia 2016    stopped toprol     CKD (chronic kidney disease) stage 3, GFR 30-59 ml/min      Cough 2010    pulm eval, felt due to reflux     Dizzy 2001    mri small vessel dz     GERD (gastroesophageal reflux disease)      HTN (hypertension) 2002     Hx of colonoscopy 2003    tics     Hypothyroid      Hypovitaminosis D      Idiopathic neuropathy      OCD (obsessive compulsive disorder)     sees psyche     Panic disorder     Dr. Luna     Spinal headache      Stroke (H) 12/16    expressive aphasia, hosp, seen by neuro, mri pos, carotids min dz, changed from asa to plavix     Sudden hearing loss 6/13    Dr. Garcia, mri brain no acoustic neuroma     SVT (supraventricular tachycardia) (H) 11/16    seen on ziopatch done for amaurosis, longest 15 beats     Thoracic ascending aortic aneurysm (H) 06/2014    4.4cm on echo, aortic root 3.9, fu 5/15 4.8cm; fu done 12/15 and slightly enlarged, fu 6/16 no change, fu 11/16 no change     Ulcerative colitis (H)     not active for years         Past Surgical History:     Past Surgical History:   Procedure Laterality Date     BACK SURGERY  1998     BLADDER SURGERY  1985     C TOTAL KNEE ARTHROPLASTY  2011    left     C TOTAL KNEE ARTHROPLASTY  2009    right     CATARACT IOL, RT/LT  2011     CORONARY ARTERY BYPASS  2010     ENDOVASCULAR REPAIR ANEURYSM ABDOMINAL AORTA N/A 5/27/2015    Procedure: ENDOVASCULAR  "REPAIR ANEURYSM ABDOMINAL AORTA;  Surgeon: Darin Walters MD;  Location: [facility] OR     HERNIA REPAIR  2005     HERNIA REPAIR  1998     KNEE SURGERY  1997     REPAIR HAMMER TOE  12/28/2012    Procedure: REPAIR HAMMER TOE;  LEFT SECOND AND THIRD CLAW TOE RECONSTRUCTION;  Surgeon: Gray Haynes MD;  Location: [facility] OR     TURP  2003         Family Medical History:     Family History   Problem Relation Age of Onset     C.A.D. Father          Social History:     Social History     Social History     Marital status:      Spouse name: N/A     Number of children: 2     Years of education: N/A     Occupational History     retail Retired     Social History Main Topics     Smoking status: Former Smoker     Packs/day: 1.00     Years: 5.00     Types: Cigarettes     Quit date: 6/13/1965     Smokeless tobacco: Never Used     Alcohol use 0.0 oz/week     0 Standard drinks or equivalent per week      Comment: maybe one glass of wine a week     Drug use: No     Sexual activity: Not Currently     Other Topics Concern     Not on file     Social History Narrative           Review of System:     Constitutional: Negative for fever or chills  Skin: Positive for right foot skin breakdown with infection  Ears/Nose/Throat: Negative for nasal congestion, sore throat  Respiratory: No shortness of breath, dyspnea on exertion, cough, or hemoptysis  Cardiovascular: Negative for chest pain  Gastrointestinal: Negative for nausea, vomiting  Genitourinary: Negative for dysuria, hematuria  Musculoskeletal:Positive for right foot pain and swelling  Neurologic: Negative for headaches  Psychiatric: Negative for depression, anxiety  Hematologic/Lymphatic/Immunologic: Negative  Endocrine: Negative  Behavioral: Negative for tobacco use       Physical Exam:   /57 (BP Location: Left arm, Cuff Size: Adult Large)  Pulse 57  Temp 97.6  F (36.4  C) (Tympanic)  Ht 6' 1\" (1.854 m)  Wt 193 lb (87.5 kg)  SpO2 97%  BMI 25.46 kg/m2    GENERAL: " chronically-ill appearing elderly gentleman, alert and no acute distress  EYES: eyes grossly normal to inspection, and conjunctivae and sclerae normal  HENT: Normocephalic atraumatic. Nose and mouth without ulcers or lesions  NECK: supple  RESP: lungs clear to auscultation   CV: regular rate and rhythm, normal S1 S2  LYMPH: no peripheral edema   ABDOMEN: nondistended  MS: right foot pain with weight bearing on ambulation is present  SKIN: The right foot is diffusely red and swollen with purulent drainage present from the wound on the right lateral edge of the right foot close to the 5th toe.   NEURO: Alert & Oriented x 3.   PSYCH: mentation appears normal, affect normal        ASSESSMENT/PLAN:       (L03.119,  L02.619) Cellulitis and abscess of foot excluding toe  (primary encounter diagnosis)  Comment: Patient presents with cellulitis of the right foot   Plan: I have decided to transfer the patient to the [facility] ER for further evaluation and management of his foot infection symptoms today.        Follow Up Plan:     Patient is transferred to the [facility] ER by nursing staff from clinic for further evaluation and management of his foot infection symptoms today.        Lainna Nicole MD  Internal Medicine  [facility]

## 2017-09-06 NOTE — MR AVS SNAPSHOT
After Visit Summary   9/6/2017    Zack Santiago    MRN: [id number]           Patient Information     Date Of Birth          5/29/1931        Visit Information        Provider Department      9/6/2017 3:00 PM Lianna Nicole MD [facility]        Today's Diagnoses     Cellulitis and abscess of foot excluding toe    -  1      Care Instructions      Before Your Surgery      Call your surgeon if there is any change in your health. This includes signs of a cold or flu (such as a sore throat, runny nose, cough, rash or fever).    Do not smoke, drink alcohol or take over the counter medicine (unless your surgeon or primary care doctor tells you to) for the 24 hours before and after surgery.    If you take prescribed drugs: Follow your doctor s orders about which medicines to take and which to stop until after surgery.    Eating and drinking prior to surgery: follow the instructions from your surgeon    Take a shower or bath the night before surgery. Use the soap your surgeon gave you to gently clean your skin. If you do not have soap from your surgeon, use your regular soap. Do not shave or scrub the surgery site.  Wear clean pajamas and have clean sheets on your bed.           Follow-ups after your visit        Who to contact     If you have questions or need follow up information about today's clinic visit or your schedule please contact [facility] directly at [phone].  Normal or non-critical lab and imaging results will be communicated to you by MyChart, letter or phone within 4 business days after the clinic has received the results. If you do not hear from us within 7 days, please contact the clinic through MyChart or phone. If you have a critical or abnormal lab result, we will notify you by phone as soon as possible.  Submit refill requests through [employer] or call your pharmacy and they will forward the refill request to us. Please allow 3 business days for your refill  "to be completed.          Additional Information About Your Visit        [employer] Information     [employer] lets you send messages to your doctor, view your test results, renew your prescriptions, schedule appointments and more. To sign up, go to [email or web address] . Click on \"Log in\" on the left side of the screen, which will take you to the Welcome page. Then click on \"Sign up Now\" on the right side of the page.     You will be asked to enter the access code listed below, as well as some personal information. Please follow the directions to create your username and password.     Your access code is: [email or web address]  Expires: 2017  3:52 PM     Your access code will  in 90 days. If you need help or a new code, please call your [place] clinic or [phone].        Care EveryWhere ID     This is your Care EveryWhere ID. This could be used by other organizations to access your [place] medical records  [id number]        Your Vitals Were     Pulse Temperature Height Pulse Oximetry BMI (Body Mass Index)       57 97.6  F (36.4  C) (Tympanic) 6' 1\" (1.854 m) 97% 25.46 kg/m2        Blood Pressure from Last 3 Encounters:   17 144/55   17 126/57   17 122/60    Weight from Last 3 Encounters:   17 187 lb (84.8 kg)   17 193 lb (87.5 kg)   17 187 lb (84.8 kg)              Today, you had the following     No orders found for display       Primary Care Provider Office Phone # Fax #    Zurdo Kendrick [phone] [address]  [place] [address]        Equal Access to Services     JEREMIAH GROVE : Lulu Milian, elsa rosas, elizabeth mayorga, sit[employer]. So [facility] [phone].    ATENCIÓN: Si habla español, tiene a khan disposición servicios gratuitos de asistencia lingüística. Llame al [phone].    We comply with applicable federal civil rights laws and [place] laws. We do not " discriminate on the basis of race, color, national origin, age, disability sex, sexual orientation or gender identity.            Thank you!     Thank you for choosing [facility]  for your care. Our goal is always to provide you with excellent care. Hearing back from our patients is one way we can continue to improve our services. Please take a few minutes to complete the written survey that you may receive in the mail after your visit with us. Thank you!             Your Updated Medication List - Protect others around you: Learn how to safely use, store and throw away your medicines at www.disposemymeds.org.          This list is accurate as of: 9/6/17  3:52 PM.  Always use your most recent med list.                   Brand Name Dispense Instructions for use Diagnosis    ANAFRANIL PO      Take 25 mg by mouth daily    OCD (obsessive compulsive disorder)       ARIPiprazole 2 MG tablet    ABILIFY     Take 1 tablet by mouth At Bedtime.        ASPIRIN NOT PRESCRIBED    INTENTIONAL    0 each    Please choose reason not prescribed, below    ASCVD (arteriosclerotic cardiovascular disease)       chlorthalidone 25 MG tablet    HYGROTON    90 tablet    TAKE ONE TABLET BY MOUTH ONE TIME DAILY    Essential hypertension       clopidogrel 75 MG tablet    PLAVIX    90 tablet    TAKE 1 TABLET BY MOUTH DAILY    Cerebrovascular accident (CVA), unspecified mechanism (H)       gabapentin 400 MG capsule    NEURONTIN     Take 1 capsule by mouth 2 times daily        levothyroxine 100 MCG tablet    SYNTHROID/LEVOTHROID    90 tablet    TAKE ONE TABLET BY MOUTH ONE TIME DAILY    Acquired hypothyroidism       LORAZEPAM PO      Take 0.5 mg by mouth daily as needed for anxiety        MIRTAZAPINE PO      Take 30 mg by mouth At Bedtime        SENEXON-S 8.6-50 MG per tablet   Generic drug:  senna-docusate      Take 8.6-50 mg by mouth        simvastatin 20 MG tablet    ZOCOR    90 tablet    TAKE ONE TABLET BY MOUTH AT BEDTIME     Hyperlipidemia LDL goal <100       tamsulosin 0.4 MG capsule    FLOMAX     Take 0.4 mg by mouth

## 2017-09-06 NOTE — ED AVS SNAPSHOT
Emergency Department    [address] [place] [id number]    Phone:  [phone]    Fax:  [phone]                                       Zack Santiago   MRN: [id number]    Department:   Emergency Department   Date of Visit:  9/6/2017           After Visit Summary Signature Page     I have received my discharge instructions, and my questions have been answered. I have discussed any challenges I see with this plan with the nurse or doctor.    ..........................................................................................................................................  Patient/Patient Representative Signature      ..........................................................................................................................................  Patient Representative Print Name and Relationship to Patient    ..................................................               ................................................  Date                                            Time    ..........................................................................................................................................  Reviewed by Signature/Title    ...................................................              ..............................................  Date                                                            Time

## 2017-09-06 NOTE — DISCHARGE INSTRUCTIONS
Discharge Instructions for Cellulitis  You have been diagnosed with cellulitis. This is an infection in the deepest layer of the skin. In some cases, the infection also affects the muscle. Cellulitis is caused by bacteria. The bacteria can enter the body through broken skin. This can happen with a cut, scratch, animal bite, or an insect bite that has been scratched. You may have been treated in the hospital with antibiotics and fluids. You will likely be given a prescription for antibiotics to take at home. This sheet will help you take care of yourself at home.  Home care  When you are home:    Take the prescribed antibiotic medicine you are given as directed until it is gone. Take it even if you feel better. It treats the infection and stops it from returning. Not taking all the medicine can make future infections hard to treat.    Keep the infected area clean.    When possible, raise the infected area above the level of your heart. This helps keep swelling down.    Talk with your healthcare provider if you are in pain. Ask what kind of over-the-counter medicine you can take for pain.    Apply clean bandages as advised.    Take your temperature once a day for a week.    Wash your hands often to prevent spreading the infection.  In the future, wash your hands before and after you touch cuts, scratches, or bandages. This will help prevent infection.   When to call your healthcare provider  Call your healthcare provider immediately if you have any of the following:    Difficulty or pain when moving the joints above or below the infected area    Discharge or pus draining from the area    Fever of 100.4 F (38 C) or higher, or as directed by your healthcare provider    Pain that gets worse in or around the infected     Redness that gets worse in or around the infected area, particularly if the area of redness expands to a wider area    Shaking chills    Swelling of the infected area    Vomiting   Date Last Reviewed:  8/1/2016 2000-2017 [employer]. [address] [place] [address], [place] [address]. All rights reserved. This information is not intended as a substitute for professional medical care. Always follow your healthcare professional's instructions.          Simple Skin Ulcer  A skin ulcer is a sore on the skin. Skin ulcers often form when blood circulation is impaired. Being bed- or wheelchair-bound can cause pressure that may lead to skin ulcers. Ulcers are generally round areas of red, swollen, thickened skin around a crater-like depression. They are often very slow to heal. If a skin ulcer isn't properly treated, it may become infected. If the infection spreads, it can cause serious health issues.    Symptoms of a skin ulcer include:    Reddish area on the skin    Skin color and texture changes    Swelling    Wound that isn't healing    Crater in the skin    Pain    Drainage or pus  Causes  There are many causes of skin ulcers. Some of these include:    Decreased blood flow to a part of the skin, vascular insufficiency    Trauma    Lack of movement of a part of the body for long periods of time    Infection    Poor hygiene    Varicose veins    Vitamin deficiency  Pressure ulcers  Pressure ulcers are a type of ulcer most commonly seen in people who are confined to bed or a wheelchair. They are caused by prolonged pressure to a spot on the skin. Pressure ulcers usually occur on the back, buttocks, or backs or sides of the legs, arms, or feet (especially the heels).  Home care  You may be prescribed antibiotics to prevent infection. If this is the case, be sure to take all of the medicine, even if your symptoms get better. You may also be given medicines to help relieve pain. Follow the healthcare provider s instructions when using these medicines.  General care    Care for the skin ulcer as instructed. Always wash your hands with soap and warm water before and after caring for your wound.    Cover the ulcer with  a clean, dry bandage. Remove and change the bandage as instructed. If the bandage becomes wet or dirty, change it as soon as possible.    Follow the doctor s instructions about washing. You can shower, but do not soak the healing ulcer until the doctor says it s OK.    Do not scratch, rub, or pick at the healing skin.    Check the area every day for signs of infection, such as increasing pain, redness, warmth, red streaking, swelling, or pus draining from the ulcer.    When resting, raise the area where the ulcer is above the level of the heart.    Avoid smoking or drinking alcohol, as these can delay wound healing.    If you are able, try to walk regularly. This can help with circulation.    Avoid prolonged standing or sitting in one position.  The following tips can help prevent future skin ulcers:    Know your risks for skin ulcers.    Keep the skin clean and dry.    Reposition frequently.    Use protective devices such as pillows, foam wedges, and heel protectors for the knees, ankles, and heels.    Avoid immobilization.  Follow-up care  Follow up with your healthcare provider, or as advised.  When to seek medical advice  Call your healthcare provider right away if any of these occur:    Fever of 100.4 F (38 C) or higher, or as advised by your healthcare provider    Signs of infection. These include increasing pain, warmth, redness, or pus draining from the skin ulcer.    Bleeding from the skin ulcer    Pain in or around the ulcer that doesn't get better even with medicines    Increased swelling    Changes in skin color  Date Last Reviewed: 9/1/2016 2000-2017 [employer]. [address] [place] [address], [place] [address]. All rights reserved. This information is not intended as a substitute for professional medical care. Always follow your healthcare professional's instructions.

## 2017-09-06 NOTE — NURSING NOTE
"Chief Complaint   Patient presents with     Pre-Op Exam       Initial /57 (BP Location: Left arm, Cuff Size: Adult Large)  Pulse 57  Temp 97.6  F (36.4  C) (Tympanic)  Ht 6' 1\" (1.854 m)  Wt 193 lb (87.5 kg)  SpO2 97%  BMI 25.46 kg/m2 Estimated body mass index is 25.46 kg/(m^2) as calculated from the following:    Height as of this encounter: 6' 1\" (1.854 m).    Weight as of this encounter: 193 lb (87.5 kg).  Medication Reconciliation: complete     Becky Dee MA    "

## 2017-09-07 DIAGNOSIS — E03.9 ACQUIRED HYPOTHYROIDISM: ICD-10-CM

## 2017-09-07 RX ORDER — LEVOTHYROXINE SODIUM 100 UG/1
TABLET ORAL
Qty: 90 TABLET | Refills: 1 | Status: SHIPPED | OUTPATIENT
Start: 2017-09-07 | End: 2018-03-03

## 2017-09-07 NOTE — TELEPHONE ENCOUNTER
levothyroxine (SYNTHROID/LEVOTHROID) 100 MCG tablet     Last Written Prescription Date: 12/15/16  Last Quantity: 90, # refills: 2  Last Office Visit with [facility], [facility] or [facility] prescribing provider: 9/6/17        TSH   Date Value Ref Range Status   04/07/2017 1.34 0.40 - 4.00 mU/L Final           Francie DEL CID(R)

## 2017-10-03 ENCOUNTER — TRANSFERRED RECORDS (OUTPATIENT)
Dept: HEALTH INFORMATION MANAGEMENT | Facility: CLINIC | Age: 82
End: 2017-10-03

## 2017-10-03 LAB — PHQ9 SCORE: 7

## 2017-10-23 ENCOUNTER — TRANSFERRED RECORDS (OUTPATIENT)
Dept: HEALTH INFORMATION MANAGEMENT | Facility: CLINIC | Age: 82
End: 2017-10-23

## 2017-10-26 ENCOUNTER — OFFICE VISIT (OUTPATIENT)
Dept: FAMILY MEDICINE | Facility: CLINIC | Age: 82
End: 2017-10-26
Payer: COMMERCIAL

## 2017-10-26 VITALS
HEART RATE: 60 BPM | HEIGHT: 73 IN | WEIGHT: 188 LBS | BODY MASS INDEX: 24.92 KG/M2 | TEMPERATURE: 97.4 F | OXYGEN SATURATION: 96 % | SYSTOLIC BLOOD PRESSURE: 147 MMHG | DIASTOLIC BLOOD PRESSURE: 69 MMHG

## 2017-10-26 DIAGNOSIS — R35.0 URINARY FREQUENCY: Primary | ICD-10-CM

## 2017-10-26 LAB
ALBUMIN UR-MCNC: NEGATIVE MG/DL
APPEARANCE UR: CLEAR
BILIRUB UR QL STRIP: NEGATIVE
COLOR UR AUTO: YELLOW
GLUCOSE UR STRIP-MCNC: NEGATIVE MG/DL
HGB UR QL STRIP: NEGATIVE
KETONES UR STRIP-MCNC: NEGATIVE MG/DL
LEUKOCYTE ESTERASE UR QL STRIP: NEGATIVE
NITRATE UR QL: NEGATIVE
PH UR STRIP: 6.5 PH (ref 5–7)
SOURCE: ABNORMAL
SP GR UR STRIP: 1.01 (ref 1–1.03)
UROBILINOGEN UR STRIP-ACNC: 2 EU/DL (ref 0.2–1)

## 2017-10-26 PROCEDURE — 81003 URINALYSIS AUTO W/O SCOPE: CPT | Performed by: INTERNAL MEDICINE

## 2017-10-26 PROCEDURE — 99212 OFFICE O/P EST SF 10 MIN: CPT | Performed by: INTERNAL MEDICINE

## 2017-10-26 NOTE — PROGRESS NOTES
Zack Santiago is a 86 year old male who presents for urine issues.  Started last night with frequency and noct and soreness but no dysuria or pyuria or hematuria.  Today is much better.  Feels fine, no abdomen pain or nausea or vomiting, no bowel movement changes, no fevers, chills or night sweats.      He is otherwise stable, wife with alzheimers so some issues there.  They have 2 kids     Past Medical History:   Diagnosis Date     A-fib (H) 2010    post op     AAA (abdominal aortic aneurysm) without rupture (H)     Dr. 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/16    carotid us nl stenosis, echo no change and no clots; ziopatch no afib, svt present     Anemia 2004     Aortic insufficiency 6/14    1+ on echo     Aortic insufficiency 11/2016    mild to mod     ASCVD (arteriosclerotic cardiovascular disease) 2010    cabg, post op afib     BPH (benign prostatic hyperplasia) 2003    turp, flomax added by urol 2/17     Bradycardia 2016    stopped toprol     CKD (chronic kidney disease) stage 3, GFR 30-59 ml/min      Cough 2010    pulm eval, felt due to reflux     Dizzy 2001    mri small vessel dz     GERD (gastroesophageal reflux disease)      HTN (hypertension) 2002     Hx of colonoscopy 2003    tics     Hypothyroid      Hypovitaminosis D      Idiopathic neuropathy      OCD (obsessive compulsive disorder)     sees psyche     Panic disorder     Dr. Luna     Spinal headache      Stroke (H) 12/16    expressive aphasia, hosp, seen by neuro, mri pos, carotids min dz, changed from asa to plavix     Sudden hearing loss 6/13    Dr. Garcia, mri brain no acoustic neuroma     SVT (supraventricular tachycardia) (H) 11/16    seen on ziopatch done for amaurosis, longest 15 beats     Thoracic ascending aortic aneurysm (H) 06/2014    4.4cm on echo, aortic root 3.9, fu 5/15 4.8cm; fu done 12/15 and slightly enlarged, fu 6/16 no change, fu 11/16 no change     Ulcerative colitis (H)     not active for  years     Past Surgical History:   Procedure Laterality Date     BACK SURGERY  1998     BLADDER SURGERY  1985     C TOTAL KNEE ARTHROPLASTY  2011    left     C TOTAL KNEE ARTHROPLASTY  2009    right     CATARACT IOL, RT/LT  2011     CORONARY ARTERY BYPASS  2010     ENDOVASCULAR REPAIR ANEURYSM ABDOMINAL AORTA N/A 5/27/2015    Procedure: ENDOVASCULAR REPAIR ANEURYSM ABDOMINAL AORTA;  Surgeon: Darin Walters MD;  Location: [facility] OR     HERNIA REPAIR  2005     HERNIA REPAIR  1998     KNEE SURGERY  1997     REPAIR HAMMER TOE  12/28/2012    Procedure: REPAIR HAMMER TOE;  LEFT SECOND AND THIRD CLAW TOE RECONSTRUCTION;  Surgeon: Gray Haynes MD;  Location: [facility] OR     TURP  2003     Social History     Social History     Marital status:      Spouse name: N/A     Number of children: 2     Years of education: N/A     Occupational History     retail Retired     Social History Main Topics     Smoking status: Former Smoker     Packs/day: 1.00     Years: 5.00     Types: Cigarettes     Quit date: 6/13/1965     Smokeless tobacco: Never Used     Alcohol use 0.0 oz/week     0 Standard drinks or equivalent per week      Comment: maybe one glass of wine a week     Drug use: No     Sexual activity: Not Currently     Other Topics Concern     Not on file     Social History Narrative     Current Outpatient Prescriptions   Medication Sig Dispense Refill     levothyroxine (SYNTHROID/LEVOTHROID) 100 MCG tablet TAKE ONE TABLET BY MOUTH ONE TIME DAILY 90 tablet 1     chlorthalidone (HYGROTON) 25 MG tablet TAKE ONE TABLET BY MOUTH ONE TIME DAILY 90 tablet 1     clopidogrel (PLAVIX) 75 MG tablet TAKE 1 TABLET BY MOUTH DAILY 90 tablet 3     ASPIRIN NOT PRESCRIBED (INTENTIONAL) Please choose reason not prescribed, below 0 each 0     simvastatin (ZOCOR) 20 MG tablet TAKE ONE TABLET BY MOUTH AT BEDTIME 90 tablet 3     MIRTAZAPINE PO Take 30 mg by mouth At Bedtime       LORAZEPAM PO Take 0.5 mg by mouth daily as needed for anxiety    "    ClomiPRAMINE HCl (ANAFRANIL PO) Take 25 mg by mouth daily       gabapentin (NEURONTIN) 400 MG capsule Take 1 capsule by mouth 2 times daily        ARIPiprazole (ABILIFY) 2 MG tablet Take 1 tablet by mouth At Bedtime.       Allergies   Allergen Reactions     No Known Allergies      FAMILY HISTORY NOTED AND REVIEWED    REVIEW OF SYSTEMS: above    PHYSICAL EXAM    /69  Pulse 60  Temp 97.4  F (36.3  C) (Oral)  Ht 6' 1\" (1.854 m)  Wt 188 lb (85.3 kg)  SpO2 96%  BMI 24.8 kg/m2    Patient appears non toxic  Abdomen normal active bowel sounds, soft, non-tender, no mgr, no hepatosplenomegaly    Urine noted    ASSESSMENT:  Urine freq with neg ua, now improved, doubt uti, prostatitis, gi process    PLAN:  Call if ongoing issues    Zurdo Kendrick M.D.        "

## 2017-10-26 NOTE — MR AVS SNAPSHOT
"              After Visit Summary   10/26/2017    Zack Santiago    MRN: [id number]           Patient Information     Date Of Birth          1931        Visit Information        Provider Department      10/26/2017 2:30 PM Zurdo Kendrick MD [facility]        Today's Diagnoses     Urinary frequency    -  1       Follow-ups after your visit        Who to contact     If you have questions or need follow up information about today's clinic visit or your schedule please contact [facility] directly at [phone].  Normal or non-critical lab and imaging results will be communicated to you by Anne Fogartyhart, letter or phone within 4 business days after the clinic has received the results. If you do not hear from us within 7 days, please contact the clinic through Anne Fogartyhart or phone. If you have a critical or abnormal lab result, we will notify you by phone as soon as possible.  Submit refill requests through MobileSnack or call your pharmacy and they will forward the refill request to us. Please allow 3 business days for your refill to be completed.          Additional Information About Your Visit        MyChart Information     MobileSnack lets you send messages to your doctor, view your test results, renew your prescriptions, schedule appointments and more. To sign up, go to [email or web address] . Click on \"Log in\" on the left side of the screen, which will take you to the Welcome page. Then click on \"Sign up Now\" on the right side of the page.     You will be asked to enter the access code listed below, as well as some personal information. Please follow the directions to create your username and password.     Your access code is: [email or web address]  Expires: 2017  3:52 PM     Your access code will  in 90 days. If you need help or a new code, please call your [facility] or [phone].        Care EveryWhere ID     This is your Care EveryWhere ID. This could be used by other " "organizations to access your [place] medical records  [id number]        Your Vitals Were     Pulse Temperature Height Pulse Oximetry BMI (Body Mass Index)       60 97.4  F (36.3  C) (Oral) 6' 1\" (1.854 m) 96% 24.8 kg/m2        Blood Pressure from Last 3 Encounters:   10/26/17 147/69   09/06/17 138/62   09/06/17 126/57    Weight from Last 3 Encounters:   10/26/17 188 lb (85.3 kg)   09/06/17 187 lb (84.8 kg)   09/06/17 193 lb (87.5 kg)              We Performed the Following     *UA reflex to Microscopic and Culture ([place] and [facility] (except [place] and [place])          Today's Medication Changes          These changes are accurate as of: 10/26/17  2:57 PM.  If you have any questions, ask your nurse or doctor.               Stop taking these medicines if you haven't already. Please contact your care team if you have questions.     SENEXON-S 8.6-50 MG per tablet   Generic drug:  senna-docusate   Stopped by:  Zurdo Kendrick MD           tamsulosin 0.4 MG capsule   Commonly known as:  FLOMAX   Stopped by:  Zurdo Kendrick MD                    Primary Care Provider Office Phone # Fax #    Zurdo Kendrick [phone] [address] [facility] [address]  [facility] [address]        Equal Access to Services     [facility]: Hadii aad ku hadasho Soomaali, waaxda luqadaha, qaybta kaalmada adeegyada, waxay mariein hayashlyn ellis . So [facility] [phone].    ATENCIÓN: Si habla español, tiene a khan disposición servicios gratuitos de asistencia lingüística. Lldionisio al [phone].    We comply with applicable federal civil rights laws and [place] laws. We do not discriminate on the basis of race, color, national origin, age, disability, sex, sexual orientation, or gender identity.            Thank you!     Thank you for choosing [facility]  for your care. Our goal is always to provide you with excellent care. Hearing back from our patients is one way we can " continue to improve our services. Please take a few minutes to complete the written survey that you may receive in the mail after your visit with us. Thank you!             Your Updated Medication List - Protect others around you: Learn how to safely use, store and throw away your medicines at www.disposemymeds.org.          This list is accurate as of: 10/26/17  2:57 PM.  Always use your most recent med list.                   Brand Name Dispense Instructions for use Diagnosis    ANAFRANIL PO      Take 25 mg by mouth daily    OCD (obsessive compulsive disorder)       ARIPiprazole 2 MG tablet    ABILIFY     Take 1 tablet by mouth At Bedtime.        ASPIRIN NOT PRESCRIBED    INTENTIONAL    0 each    Please choose reason not prescribed, below    ASCVD (arteriosclerotic cardiovascular disease)       chlorthalidone 25 MG tablet    HYGROTON    90 tablet    TAKE ONE TABLET BY MOUTH ONE TIME DAILY    Essential hypertension       clopidogrel 75 MG tablet    PLAVIX    90 tablet    TAKE 1 TABLET BY MOUTH DAILY    Cerebrovascular accident (CVA), unspecified mechanism (H)       gabapentin 400 MG capsule    NEURONTIN     Take 1 capsule by mouth 2 times daily        levothyroxine 100 MCG tablet    SYNTHROID/LEVOTHROID    90 tablet    TAKE ONE TABLET BY MOUTH ONE TIME DAILY    Acquired hypothyroidism       LORAZEPAM PO      Take 0.5 mg by mouth daily as needed for anxiety        MIRTAZAPINE PO      Take 30 mg by mouth At Bedtime        simvastatin 20 MG tablet    ZOCOR    90 tablet    TAKE ONE TABLET BY MOUTH AT BEDTIME    Hyperlipidemia LDL goal <100

## 2017-10-26 NOTE — NURSING NOTE
"Chief Complaint   Patient presents with     UTI       Initial /69  Pulse 60  Temp 97.4  F (36.3  C) (Oral)  Ht 6' 1\" (1.854 m)  Wt 188 lb (85.3 kg)  SpO2 96%  BMI 24.8 kg/m2 Estimated body mass index is 24.8 kg/(m^2) as calculated from the following:    Height as of this encounter: 6' 1\" (1.854 m).    Weight as of this encounter: 188 lb (85.3 kg).  Medication Reconciliation: oleg MA CMA      "

## 2017-10-27 ENCOUNTER — TRANSFERRED RECORDS (OUTPATIENT)
Dept: HEALTH INFORMATION MANAGEMENT | Facility: CLINIC | Age: 82
End: 2017-10-27

## 2017-10-31 ENCOUNTER — TRANSFERRED RECORDS (OUTPATIENT)
Dept: HEALTH INFORMATION MANAGEMENT | Facility: CLINIC | Age: 82
End: 2017-10-31

## 2017-11-14 ENCOUNTER — TELEPHONE (OUTPATIENT)
Dept: FAMILY MEDICINE | Facility: CLINIC | Age: 82
End: 2017-11-14

## 2017-11-14 DIAGNOSIS — E03.9 ACQUIRED HYPOTHYROIDISM: ICD-10-CM

## 2017-11-14 DIAGNOSIS — N18.30 CKD (CHRONIC KIDNEY DISEASE) STAGE 3, GFR 30-59 ML/MIN (H): ICD-10-CM

## 2017-11-14 DIAGNOSIS — I25.10 ASCVD (ARTERIOSCLEROTIC CARDIOVASCULAR DISEASE): ICD-10-CM

## 2017-11-14 DIAGNOSIS — Z00.00 ROUTINE GENERAL MEDICAL EXAMINATION AT A HEALTH CARE FACILITY: Primary | ICD-10-CM

## 2017-11-14 DIAGNOSIS — E55.9 HYPOVITAMINOSIS D: ICD-10-CM

## 2017-11-14 DIAGNOSIS — E78.5 HYPERLIPIDEMIA LDL GOAL <100: ICD-10-CM

## 2017-11-14 NOTE — TELEPHONE ENCOUNTER
Reason for Call: Request for an order or referral:    Order or referral being requested: fasting labs prior to physical     Date needed: as soon as possible    Has the patient been seen by the PCP for this problem? YES    Additional comments:     Phone number Patient can be reached at:  Home number on file [phone] (home)    Best Time:  Anytime     Can we leave a detailed message on this number?  YES    Call taken on 11/14/2017 at 3:08 PM by Annemarie Horner

## 2017-11-15 ENCOUNTER — ALLIED HEALTH/NURSE VISIT (OUTPATIENT)
Dept: NURSING | Facility: CLINIC | Age: 82
End: 2017-11-15
Payer: COMMERCIAL

## 2017-11-15 DIAGNOSIS — I48.20 CHRONIC ATRIAL FIBRILLATION (H): Primary | ICD-10-CM

## 2017-11-15 PROCEDURE — 93000 ELECTROCARDIOGRAM COMPLETE: CPT

## 2017-11-15 NOTE — NURSING NOTE
Patient on nurse schedule for EKG ordered by Dr. Keshia Bowman, [facility]. Reviewed results with Kath Lloyd NP.  Results faxed to #[phone].    Jannette Zacarias MA

## 2017-11-15 NOTE — TELEPHONE ENCOUNTER
Spoke with patient:   Noted future labs have been ordered per below request from patient.   Scheduled visit for repeat EKG for 11/21/17     Pt requesting orders be placed for EKG recheck     Tanya MA RN

## 2017-11-15 NOTE — MR AVS SNAPSHOT
After Visit Summary   11/15/2017    Zack Santiago    MRN: [id number]           Patient Information     Date Of Birth          5/29/1931        Visit Information        Provider Department      11/15/2017 11:15 AM CS NURSE [facility]        Today's Diagnoses     Chronic atrial fibrillation (H)    -  1       Follow-ups after your visit        Your next 10 appointments already scheduled     Dec 13, 2017 11:30 AM CST   CTA ANGIOGRAM ABDOMEN PELVIS with SHCT1   [facility] ([facility])    [address] [place] [id number] [phone]           Please bring any scans or X-rays taken at other hospitals, if similar tests were done. Also bring a list of your medicines, including vitamins, minerals and over-the-counter drugs. It is safest to leave personal items at home.  Be sure to tell your doctor:   If you have any allergies.   If there s any chance you are pregnant.   If you are breastfeeding.   If you have any special needs.  You will have contrast for this exam. To prepare:   Do not eat or drink for 2 hours before your exam. If you need to take medicine, you may take it with small sips of water. (We may ask you to take liquid medicine as well.)   The day before your exam, drink extra fluids at least six 8-ounce glasses (unless your doctor tells you to restrict your fluids).  Patients over 70 or patients with diabetes or kidney problems:   If you haven t had a blood test (creatinine test) within the last 30 days, go to your clinic or Diagnostic Imaging Department for this test.  If you have diabetes:   If your kidney function is normal, continue taking your metformin (Avandamet, Glucophage, Glucovance, Metaglip) on the day of your exam.   If your kidney function is abnormal, wait 48 hours before restarting this medicine.  Please wear loose clothing, such as a sweat suit or jogging clothes. Avoid snaps, zippers and other metal. We may ask you to undress  "and put on a hospital gown.  If you have any questions, please call the Imaging Department where you will have your exam.            Jan 19, 2018 11:00 AM CST   PHYSICAL with Zurdo Kendrick MD   [facility] ([facility])    [address]  [facility] [address]   [phone]              Future tests that were ordered for you today     Open Future Orders        Priority Expected Expires Ordered    CBC with platelets Routine  5/13/2018 11/14/2017    Comprehensive metabolic panel Routine  5/13/2018 11/14/2017    Lipid panel reflex to direct LDL Non-fasting Routine  5/13/2018 11/14/2017    Vitamin D Deficiency Routine  2/21/2018 11/14/2017    TSH with free T4 reflex Routine  2/22/2018 11/14/2017            Who to contact     If you have questions or need follow up information about today's clinic visit or your schedule please contact [facility] directly at [phone].  Normal or non-critical lab and imaging results will be communicated to you by [employer], letter or phone within 4 business days after the clinic has received the results. If you do not hear from us within 7 days, please contact the clinic through Viewstert or phone. If you have a critical or abnormal lab result, we will notify you by phone as soon as possible.  Submit refill requests through [employer] or call your pharmacy and they will forward the refill request to us. Please allow 3 business days for your refill to be completed.          Additional Information About Your Visit        [employer] Information     [employer] lets you send messages to your doctor, view your test results, renew your prescriptions, schedule appointments and more. To sign up, go to [email or web address] . Click on \"Log in\" on the left side of the screen, which will take you to the Welcome page. Then click on \"Sign up Now\" on the right side of the page.     You will be asked to enter the access code listed below, as well as some personal " information. Please follow the directions to create your username and password.     Your access code is: [email or web address]  Expires: 2017  2:52 PM     Your access code will  in 90 days. If you need help or a new code, please call your [place] clinic or [phone].        Care EveryWhere ID     This is your Care EveryWhere ID. This could be used by other organizations to access your [place] medical records  [id number]         Blood Pressure from Last 3 Encounters:   10/26/17 147/69   17 138/62   17 126/57    Weight from Last 3 Encounters:   10/26/17 188 lb (85.3 kg)   17 187 lb (84.8 kg)   17 193 lb (87.5 kg)              We Performed the Following     EKG 12-lead complete w/read - Clinics        Primary Care Provider Office Phone # Fax #    Zurdo Raul Kendrick [phone] [address] [facility] [address]  [facility] [address]        Equal Access to Services     [facility]: Hadii aad ku hadasho Soomaali, waaxda luqadaha, qaybta kaalmada adeegyada, waxisabela ellis . So [facility] [phone].    ATENCIÓN: Si habla español, tiene a khan disposición servicios gratuitos de asistencia lingüística. Naeem al [phone].    We comply with applicable federal civil rights laws and [place] laws. We do not discriminate on the basis of race, color, national origin, age, disability, sex, sexual orientation, or gender identity.            Thank you!     Thank you for choosing [facility]  for your care. Our goal is always to provide you with excellent care. Hearing back from our patients is one way we can continue to improve our services. Please take a few minutes to complete the written survey that you may receive in the mail after your visit with us. Thank you!             Your Updated Medication List - Protect others around you: Learn how to safely use, store and throw away your medicines at www.disposemymeds.org.          This list is accurate as  of: 11/15/17 11:43 AM.  Always use your most recent med list.                   Brand Name Dispense Instructions for use Diagnosis    ANAFRANIL PO      Take 25 mg by mouth daily    OCD (obsessive compulsive disorder)       ARIPiprazole 2 MG tablet    ABILIFY     Take 1 tablet by mouth At Bedtime.        ASPIRIN NOT PRESCRIBED    INTENTIONAL    0 each    Please choose reason not prescribed, below    ASCVD (arteriosclerotic cardiovascular disease)       chlorthalidone 25 MG tablet    HYGROTON    90 tablet    TAKE ONE TABLET BY MOUTH ONE TIME DAILY    Essential hypertension       clopidogrel 75 MG tablet    PLAVIX    90 tablet    TAKE 1 TABLET BY MOUTH DAILY    Cerebrovascular accident (CVA), unspecified mechanism (H)       gabapentin 400 MG capsule    NEURONTIN     Take 1 capsule by mouth 2 times daily        levothyroxine 100 MCG tablet    SYNTHROID/LEVOTHROID    90 tablet    TAKE ONE TABLET BY MOUTH ONE TIME DAILY    Acquired hypothyroidism       LORAZEPAM PO      Take 0.5 mg by mouth daily as needed for anxiety        MIRTAZAPINE PO      Take 30 mg by mouth At Bedtime        simvastatin 20 MG tablet    ZOCOR    90 tablet    TAKE ONE TABLET BY MOUTH AT BEDTIME    Hyperlipidemia LDL goal <100

## 2017-11-15 NOTE — TELEPHONE ENCOUNTER
Please call patient re ekg done today.  Looks fine, qt interval just slightly longer so please have patient come back for repeat ekg in 1 week    Thanks    Zurdo Kendrick M.D.

## 2017-11-21 ENCOUNTER — ALLIED HEALTH/NURSE VISIT (OUTPATIENT)
Dept: NURSING | Facility: CLINIC | Age: 82
End: 2017-11-21
Payer: COMMERCIAL

## 2017-11-21 DIAGNOSIS — I25.10 ASCVD (ARTERIOSCLEROTIC CARDIOVASCULAR DISEASE): ICD-10-CM

## 2017-11-21 PROCEDURE — 93000 ELECTROCARDIOGRAM COMPLETE: CPT

## 2017-11-21 PROCEDURE — 99207 ZZC NO CHARGE NURSE ONLY: CPT

## 2017-11-21 NOTE — PROGRESS NOTES
Please call and tell patient his ekg looks fine, no issues.  Can repeat it in 2 to 3 months or if has med adjustments.    Thanks    Zurdo Kendrick M.D.

## 2017-11-21 NOTE — MR AVS SNAPSHOT
After Visit Summary   11/21/2017    Zack Santiago    MRN: [id number]           Patient Information     Date Of Birth          5/29/1931        Visit Information        Provider Department      11/21/2017 11:00 AM CS NURSE [facility]        Today's Diagnoses     ASCVD (arteriosclerotic cardiovascular disease)           Follow-ups after your visit        Your next 10 appointments already scheduled     Dec 13, 2017 11:30 AM CST   CTA ANGIOGRAM ABDOMEN PELVIS with SHCT1   [facility] CT ([facility])    [address] [place] [id number] [phone]           Please bring any scans or X-rays taken at other hospitals, if similar tests were done. Also bring a list of your medicines, including vitamins, minerals and over-the-counter drugs. It is safest to leave personal items at home.  Be sure to tell your doctor:   If you have any allergies.   If there s any chance you are pregnant.   If you are breastfeeding.   If you have any special needs.  You will have contrast for this exam. To prepare:   Do not eat or drink for 2 hours before your exam. If you need to take medicine, you may take it with small sips of water. (We may ask you to take liquid medicine as well.)   The day before your exam, drink extra fluids at least six 8-ounce glasses (unless your doctor tells you to restrict your fluids).  Patients over 70 or patients with diabetes or kidney problems:   If you haven t had a blood test (creatinine test) within the last 30 days, go to your clinic or Diagnostic Imaging Department for this test.  If you have diabetes:   If your kidney function is normal, continue taking your metformin (Avandamet, Glucophage, Glucovance, Metaglip) on the day of your exam.   If your kidney function is abnormal, wait 48 hours before restarting this medicine.  Please wear loose clothing, such as a sweat suit or jogging clothes. Avoid snaps, zippers and other metal. We may ask  "you to undress and put on a hospital gown.  If you have any questions, please call the Imaging Department where you will have your exam.            2018 11:00 AM CST   PHYSICAL with Zurdo Kendrick MD   [facility]    [address]  [facility] [id number] [phone]              Who to contact     If you have questions or need follow up information about today's clinic visit or your schedule please contact [facility] directly at [phone].  Normal or non-critical lab and imaging results will be communicated to you by DealCloudhart, letter or phone within 4 business days after the clinic has received the results. If you do not hear from us within 7 days, please contact the clinic through DealCloud[employer] or phone. If you have a critical or abnormal lab result, we will notify you by phone as soon as possible.  Submit refill requests through [employer] or call your pharmacy and they will forward the refill request to us. Please allow 3 business days for your refill to be completed.          Additional Information About Your Visit        MyChart Information     [employer] lets you send messages to your doctor, view your test results, renew your prescriptions, schedule appointments and more. To sign up, go to [email or web address] . Click on \"Log in\" on the left side of the screen, which will take you to the Welcome page. Then click on \"Sign up Now\" on the right side of the page.     You will be asked to enter the access code listed below, as well as some personal information. Please follow the directions to create your username and password.     Your access code is: [email or web address]  Expires: 2017  2:52 PM     Your access code will  in 90 days. If you need help or a new code, please call your [facility] or [phone].        Care EveryWhere ID     This is your Care EveryWhere ID. This could be used by other organizations to access your [place] medical " records  [id number]         Blood Pressure from Last 3 Encounters:   10/26/17 147/69   09/06/17 138/62   09/06/17 126/57    Weight from Last 3 Encounters:   10/26/17 188 lb (85.3 kg)   09/06/17 187 lb (84.8 kg)   09/06/17 193 lb (87.5 kg)              We Performed the Following     EKG 12-lead complete w/read - Clinics        Primary Care Provider Office Phone # Fax #    Zurdo Raul Kendrick [phone] [address] [facility] [address]  [place] [address]        Equal Access to Services     [facility]: Hadii aad ku hadasho Soyasminali, waaxda luqadaha, qaybta kaalmada adeezekielyada, sita ellis . So [facility] [phone].    ATENCIÓN: Si habla español, tiene a khan disposición servicios gratuitos de asistencia lingüística. [facility] [phone].    We comply with applicable federal civil rights laws and [place] laws. We do not discriminate on the basis of race, color, national origin, age, disability, sex, sexual orientation, or gender identity.            Thank you!     Thank you for choosing [facility]  for your care. Our goal is always to provide you with excellent care. Hearing back from our patients is one way we can continue to improve our services. Please take a few minutes to complete the written survey that you may receive in the mail after your visit with us. Thank you!             Your Updated Medication List - Protect others around you: Learn how to safely use, store and throw away your medicines at www.disposemymeds.org.          This list is accurate as of: 11/21/17 11:28 AM.  Always use your most recent med list.                   Brand Name Dispense Instructions for use Diagnosis    ANAFRANIL PO      Take 25 mg by mouth daily    OCD (obsessive compulsive disorder)       ARIPiprazole 2 MG tablet    ABILIFY     Take 1 tablet by mouth At Bedtime.        ASPIRIN NOT PRESCRIBED    INTENTIONAL    0 each    Please choose reason not prescribed, below    ASCVD  (arteriosclerotic cardiovascular disease)       chlorthalidone 25 MG tablet    HYGROTON    90 tablet    TAKE ONE TABLET BY MOUTH ONE TIME DAILY    Essential hypertension       clopidogrel 75 MG tablet    PLAVIX    90 tablet    TAKE 1 TABLET BY MOUTH DAILY    Cerebrovascular accident (CVA), unspecified mechanism (H)       gabapentin 400 MG capsule    NEURONTIN     Take 1 capsule by mouth 2 times daily        levothyroxine 100 MCG tablet    SYNTHROID/LEVOTHROID    90 tablet    TAKE ONE TABLET BY MOUTH ONE TIME DAILY    Acquired hypothyroidism       LORAZEPAM PO      Take 0.5 mg by mouth daily as needed for anxiety        MIRTAZAPINE PO      Take 30 mg by mouth At Bedtime        simvastatin 20 MG tablet    ZOCOR    90 tablet    TAKE ONE TABLET BY MOUTH AT BEDTIME    Hyperlipidemia LDL goal <100

## 2017-12-13 ENCOUNTER — HOSPITAL ENCOUNTER (OUTPATIENT)
Dept: CT IMAGING | Facility: CLINIC | Age: 82
Discharge: HOME | End: 2017-12-13
Attending: RADIOLOGY | Admitting: RADIOLOGY
Payer: MEDICARE

## 2017-12-13 DIAGNOSIS — I71.40 ABDOMINAL AORTIC ANEURYSM (H): ICD-10-CM

## 2017-12-13 LAB
CREAT BLD-MCNC: 1.2 MG/DL (ref 0.66–1.25)
GFR SERPL CREATININE-BSD FRML MDRD: 57 ML/MIN/1.7M2

## 2017-12-13 PROCEDURE — 82565 ASSAY OF CREATININE: CPT

## 2017-12-13 PROCEDURE — 25000128 H RX IP 250 OP 636: Performed by: RADIOLOGY

## 2017-12-13 PROCEDURE — 74174 CTA ABD&PLVS W/CONTRAST: CPT

## 2017-12-13 PROCEDURE — 25000125 ZZHC RX 250: Performed by: RADIOLOGY

## 2017-12-13 RX ORDER — IOPAMIDOL 755 MG/ML
80 INJECTION, SOLUTION INTRAVASCULAR ONCE
Status: COMPLETED | OUTPATIENT
Start: 2017-12-13 | End: 2017-12-13

## 2017-12-13 RX ADMIN — SODIUM CHLORIDE 80 ML: 9 INJECTION, SOLUTION INTRAVENOUS at 12:03

## 2017-12-13 RX ADMIN — IOPAMIDOL 80 ML: 755 INJECTION, SOLUTION INTRAVENOUS at 12:03

## 2017-12-15 ENCOUNTER — TELEPHONE (OUTPATIENT)
Dept: OTHER | Facility: CLINIC | Age: 82
End: 2017-12-15

## 2017-12-15 DIAGNOSIS — I71.40 ABDOMINAL AORTIC ANEURYSM (H): Primary | ICD-10-CM

## 2017-12-15 NOTE — TELEPHONE ENCOUNTER
Called patient with results.  CTA done on 12/13/2017 does show Type II endoleak, however aneurysm sac size measurements have not change. No intervention needed unless sac size increases  Recommend annual survelliance.  Pt denies questions or concerns. [facility] will contact patient to schedule.  Trini Gonzalez RN  IR nurse clinician  [phone]  Order   CTA Angiogram Abdomen/Pelvis [[id number]] (Order [id number])   Exam Information   Exam Date Exam Time Accession # Performing Department Results    12/13/17 12:19 PM [id number] [facility] CT    [employer] Interactive Report and InfoRx   View the interactive report   PACS Images   Show images for CTA Angiogram Abdomen/Pelvis   Study Result   CTA ANGIOGRAM ABDOMEN AND PELVIS 12/13/2017 12:19 PM     HISTORY: 86-year-old patient with endovascular repair of abdominal  aortic aneurysm performed May 27, 2015. Routine annual follow-up.     COMPARISON: December 6, 2016.     TECHNIQUE: Multiplanar and multiformatted CTA images obtained from the  lung bases through the abdomen and pelvis before and after the  uneventful administration of Isovue contrast given for a total of 80  mL. Radiation dose for this scan was reduced using automated exposure  control, adjustment of the mA and/or kV according to patient size, or  iterative reconstruction technique. 3-D reformatted images created at  a separate workstation.     FINDINGS: Minimal scattered bibasilar opacities indicative of  atelectasis. Heart size is normal. Hypodensity in the caudate lobe is  1.5 cm and unchanged, thought to be a cyst. The gallbladder, spleen,  adrenal glands, and pancreas are unremarkable. Both kidneys are  normally perfused. Exophytic hypodensity arises posteriorly from the  left kidney measuring 5 Hounsfield units and up to 1.8 cm, previously  1.7 cm. No hydronephrosis. No intraperitoneal fluid or air.  Fat-containing left inguinal hernia is unchanged. Bladder is mostly  decompressed. Sigmoid  diverticulosis. Moderate amount of stool noted  throughout the colon. No dilated loops of bowel. Moderate osteophyte  formation in the lower thoracic and lumbar portions of the spine.  Intervertebral disc space narrowing at L5-S1, as well as other lumbar  spine levels.     The celiac axis, SMA, and bilateral renal arteries are patent. Bilobed  infrarenal abdominal aortic aneurysm is again identified. Aortobiiliac  stent graft is well-positioned and patent. The abdominal aortic  aneurysm is 5.9 cm AP x 4.9 cm transverse, previously 6 cm AP x 4.8 cm  transverse. An endoleak is identified in the right posterolateral  distal aneurysm sac, likely contributed by lumbar arterial branches.  The more proximal of the bilobed aneurysm is approximately 4.3 cm AP x  3.9 cm transverse, unchanged. Also suspect an endoleak in this  aneurysmal sac on the left lateral aspect of the sac. These endoleaks  were not definitively identified on previous exam. Both internal  iliac, external iliac, and common femoral arteries are patent.         IMPRESSION:  1. Patent aortobiiliac stent graft.  2. Bilobed infrarenal abdominal aortic aneurysm with inferior lobe  measuring up to 5.9 cm AP x 4.9 cm transverse, unchanged. Endoleak  visible at both proximal and distal aneurysmal sac segments, likely  type II. Not definitively identified on previous exam, though likely  due to slight difference in phase of contrast.  3. Fat-containing left inguinal hernia.  4. Hepatic and left renal hypodensities again noted and thought to be  simple cysts.     BRITTANEY DACOSTA MD

## 2017-12-31 DIAGNOSIS — E78.5 HYPERLIPIDEMIA LDL GOAL <100: ICD-10-CM

## 2018-01-02 RX ORDER — SIMVASTATIN 20 MG
TABLET ORAL
Qty: 90 TABLET | Refills: 0 | Status: SHIPPED | OUTPATIENT
Start: 2018-01-02 | End: 2018-04-03

## 2018-01-02 NOTE — TELEPHONE ENCOUNTER
Prescription approved per [facility] Refill Protocol.    Brie SILVA RN    Requested Prescriptions   Signed Prescriptions Disp Refills     simvastatin (ZOCOR) 20 MG tablet 90 tablet 0     Sig: TAKE ONE TABLET BY MOUTH AT BEDTIME    Statins Protocol Passed    12/31/2017  1:40 AM       Passed - LDL on file in past 12 months    Recent Labs   Lab Test  04/07/17   1131   LDL  37            Passed - No abnormal creatine kinase in past 12 months    No lab results found.         Passed - Recent or future visit with authorizing provider    Patient had office visit in the last year or has a visit in the next 30 days with authorizing provider.  See chart review.              Passed - Patient is age 18 or older

## 2018-01-04 DIAGNOSIS — E78.5 HYPERLIPIDEMIA LDL GOAL <100: ICD-10-CM

## 2018-01-05 ENCOUNTER — APPOINTMENT (OUTPATIENT)
Dept: CT IMAGING | Facility: CLINIC | Age: 83
End: 2018-01-05
Attending: EMERGENCY MEDICINE
Payer: MEDICARE

## 2018-01-05 ENCOUNTER — ANESTHESIA (OUTPATIENT)
Dept: SURGERY | Facility: CLINIC | Age: 83
End: 2018-01-05
Payer: MEDICARE

## 2018-01-05 ENCOUNTER — ANESTHESIA EVENT (OUTPATIENT)
Dept: SURGERY | Facility: CLINIC | Age: 83
End: 2018-01-05
Payer: MEDICARE

## 2018-01-05 ENCOUNTER — HOSPITAL ENCOUNTER (OUTPATIENT)
Facility: CLINIC | Age: 83
Setting detail: OBSERVATION
Discharge: HOME | End: 2018-01-06
Attending: EMERGENCY MEDICINE | Admitting: SURGERY
Payer: MEDICARE

## 2018-01-05 ENCOUNTER — OFFICE VISIT (OUTPATIENT)
Dept: URGENT CARE | Facility: URGENT CARE | Age: 83
End: 2018-01-05
Payer: COMMERCIAL

## 2018-01-05 ENCOUNTER — NURSE TRIAGE (OUTPATIENT)
Dept: NURSING | Facility: CLINIC | Age: 83
End: 2018-01-05

## 2018-01-05 VITALS
HEART RATE: 56 BPM | SYSTOLIC BLOOD PRESSURE: 134 MMHG | OXYGEN SATURATION: 98 % | RESPIRATION RATE: 18 BRPM | WEIGHT: 196.2 LBS | TEMPERATURE: 96.8 F | BODY MASS INDEX: 25.89 KG/M2 | DIASTOLIC BLOOD PRESSURE: 65 MMHG

## 2018-01-05 DIAGNOSIS — I63.9 CEREBROVASCULAR ACCIDENT (CVA), UNSPECIFIED MECHANISM (H): ICD-10-CM

## 2018-01-05 DIAGNOSIS — R10.9 CONTINUOUS SEVERE ABDOMINAL PAIN: Primary | ICD-10-CM

## 2018-01-05 DIAGNOSIS — K40.30 INCARCERATED LEFT INGUINAL HERNIA: ICD-10-CM

## 2018-01-05 LAB
ABO + RH BLD: NORMAL
ABO + RH BLD: NORMAL
ANION GAP SERPL CALCULATED.3IONS-SCNC: 3 MMOL/L (ref 3–14)
BASOPHILS # BLD AUTO: 0 10E9/L (ref 0–0.2)
BASOPHILS NFR BLD AUTO: 0.1 %
BLD GP AB SCN SERPL QL: NORMAL
BLOOD BANK CMNT PATIENT-IMP: NORMAL
BUN SERPL-MCNC: 27 MG/DL (ref 7–30)
CALCIUM SERPL-MCNC: 9 MG/DL (ref 8.5–10.1)
CHLORIDE SERPL-SCNC: 103 MMOL/L (ref 94–109)
CO2 SERPL-SCNC: 33 MMOL/L (ref 20–32)
CREAT SERPL-MCNC: 1.14 MG/DL (ref 0.66–1.25)
DIFFERENTIAL METHOD BLD: ABNORMAL
EOSINOPHIL # BLD AUTO: 0.2 10E9/L (ref 0–0.7)
EOSINOPHIL NFR BLD AUTO: 1.9 %
ERYTHROCYTE [DISTWIDTH] IN BLOOD BY AUTOMATED COUNT: 15 % (ref 10–15)
GFR SERPL CREATININE-BSD FRML MDRD: 61 ML/MIN/1.7M2
GLUCOSE SERPL-MCNC: 91 MG/DL (ref 70–99)
HCT VFR BLD AUTO: 39.5 % (ref 40–53)
HGB BLD-MCNC: 13.1 G/DL (ref 13.3–17.7)
IMM GRANULOCYTES # BLD: 0 10E9/L (ref 0–0.4)
IMM GRANULOCYTES NFR BLD: 0.1 %
LACTATE BLD-SCNC: 0.9 MMOL/L (ref 0.7–2)
LYMPHOCYTES # BLD AUTO: 1.1 10E9/L (ref 0.8–5.3)
LYMPHOCYTES NFR BLD AUTO: 13.4 %
MCH RBC QN AUTO: 32.2 PG (ref 26.5–33)
MCHC RBC AUTO-ENTMCNC: 33.2 G/DL (ref 31.5–36.5)
MCV RBC AUTO: 97 FL (ref 78–100)
MONOCYTES # BLD AUTO: 0.6 10E9/L (ref 0–1.3)
MONOCYTES NFR BLD AUTO: 7.7 %
NEUTROPHILS # BLD AUTO: 6.4 10E9/L (ref 1.6–8.3)
NEUTROPHILS NFR BLD AUTO: 76.8 %
PLATELET # BLD AUTO: 161 10E9/L (ref 150–450)
POTASSIUM SERPL-SCNC: 3.5 MMOL/L (ref 3.4–5.3)
RBC # BLD AUTO: 4.07 10E12/L (ref 4.4–5.9)
SODIUM SERPL-SCNC: 139 MMOL/L (ref 133–144)
SPECIMEN EXP DATE BLD: NORMAL
WBC # BLD AUTO: 8.4 10E9/L (ref 4–11)

## 2018-01-05 PROCEDURE — 86900 BLOOD TYPING SEROLOGIC ABO: CPT | Performed by: EMERGENCY MEDICINE

## 2018-01-05 PROCEDURE — 25000128 H RX IP 250 OP 636: Performed by: NURSE ANESTHETIST, CERTIFIED REGISTERED

## 2018-01-05 PROCEDURE — 37000008 ZZH ANESTHESIA TECHNICAL FEE, 1ST 30 MIN: Performed by: SURGERY

## 2018-01-05 PROCEDURE — 99213 OFFICE O/P EST LOW 20 MIN: CPT | Performed by: FAMILY MEDICINE

## 2018-01-05 PROCEDURE — 96361 HYDRATE IV INFUSION ADD-ON: CPT

## 2018-01-05 PROCEDURE — 36000052 ZZH SURGERY LEVEL 2 EA 15 ADDTL MIN: Performed by: SURGERY

## 2018-01-05 PROCEDURE — 36000050 ZZH SURGERY LEVEL 2 1ST 30 MIN: Performed by: SURGERY

## 2018-01-05 PROCEDURE — 25000128 H RX IP 250 OP 636: Performed by: EMERGENCY MEDICINE

## 2018-01-05 PROCEDURE — 74177 CT ABD & PELVIS W/CONTRAST: CPT

## 2018-01-05 PROCEDURE — 86901 BLOOD TYPING SEROLOGIC RH(D): CPT | Performed by: EMERGENCY MEDICINE

## 2018-01-05 PROCEDURE — 40000169 ZZH STATISTIC PRE-PROCEDURE ASSESSMENT I: Performed by: SURGERY

## 2018-01-05 PROCEDURE — 27210995 ZZH RX 272: Performed by: SURGERY

## 2018-01-05 PROCEDURE — 25000125 ZZHC RX 250: Performed by: EMERGENCY MEDICINE

## 2018-01-05 PROCEDURE — 83605 ASSAY OF LACTIC ACID: CPT | Performed by: EMERGENCY MEDICINE

## 2018-01-05 PROCEDURE — 99285 EMERGENCY DEPT VISIT HI MDM: CPT | Mod: 25

## 2018-01-05 PROCEDURE — 86850 RBC ANTIBODY SCREEN: CPT | Performed by: EMERGENCY MEDICINE

## 2018-01-05 PROCEDURE — 80048 BASIC METABOLIC PNL TOTAL CA: CPT | Performed by: EMERGENCY MEDICINE

## 2018-01-05 PROCEDURE — 37000009 ZZH ANESTHESIA TECHNICAL FEE, EACH ADDTL 15 MIN: Performed by: SURGERY

## 2018-01-05 PROCEDURE — 85025 COMPLETE CBC W/AUTO DIFF WBC: CPT | Performed by: EMERGENCY MEDICINE

## 2018-01-05 PROCEDURE — 25000566 ZZH SEVOFLURANE, EA 15 MIN: Performed by: SURGERY

## 2018-01-05 PROCEDURE — 49521 REREPAIR ING HERNIA BLOCKED: CPT | Performed by: SURGERY

## 2018-01-05 PROCEDURE — 96374 THER/PROPH/DIAG INJ IV PUSH: CPT | Mod: 59

## 2018-01-05 PROCEDURE — 99204 OFFICE O/P NEW MOD 45 MIN: CPT | Mod: 57 | Performed by: SURGERY

## 2018-01-05 PROCEDURE — 27210794 ZZH OR GENERAL SUPPLY STERILE: Performed by: SURGERY

## 2018-01-05 PROCEDURE — 25000125 ZZHC RX 250: Performed by: NURSE ANESTHETIST, CERTIFIED REGISTERED

## 2018-01-05 PROCEDURE — 71000012 ZZH RECOVERY PHASE 1 LEVEL 1 FIRST HR: Performed by: SURGERY

## 2018-01-05 RX ORDER — LIDOCAINE HYDROCHLORIDE 20 MG/ML
INJECTION, SOLUTION INFILTRATION; PERINEURAL PRN
Status: DISCONTINUED | OUTPATIENT
Start: 2018-01-05 | End: 2018-01-06

## 2018-01-05 RX ORDER — CEFAZOLIN SODIUM 1 G
1 VIAL (EA) INJECTION SEE ADMIN INSTRUCTIONS
Status: DISCONTINUED | OUTPATIENT
Start: 2018-01-05 | End: 2018-01-06 | Stop reason: HOSPADM

## 2018-01-05 RX ORDER — GLYCOPYRROLATE 0.2 MG/ML
INJECTION, SOLUTION INTRAMUSCULAR; INTRAVENOUS PRN
Status: DISCONTINUED | OUTPATIENT
Start: 2018-01-05 | End: 2018-01-06

## 2018-01-05 RX ORDER — CEFAZOLIN SODIUM 2 G/100ML
2 INJECTION, SOLUTION INTRAVENOUS
Status: COMPLETED | OUTPATIENT
Start: 2018-01-05 | End: 2018-01-05

## 2018-01-05 RX ORDER — IOPAMIDOL 755 MG/ML
98 INJECTION, SOLUTION INTRAVASCULAR ONCE
Status: COMPLETED | OUTPATIENT
Start: 2018-01-05 | End: 2018-01-05

## 2018-01-05 RX ORDER — PROPOFOL 10 MG/ML
INJECTION, EMULSION INTRAVENOUS PRN
Status: DISCONTINUED | OUTPATIENT
Start: 2018-01-05 | End: 2018-01-06

## 2018-01-05 RX ORDER — MAGNESIUM HYDROXIDE 1200 MG/15ML
LIQUID ORAL PRN
Status: DISCONTINUED | OUTPATIENT
Start: 2018-01-05 | End: 2018-01-06 | Stop reason: HOSPADM

## 2018-01-05 RX ORDER — FENTANYL CITRATE 50 UG/ML
INJECTION, SOLUTION INTRAMUSCULAR; INTRAVENOUS PRN
Status: DISCONTINUED | OUTPATIENT
Start: 2018-01-05 | End: 2018-01-06

## 2018-01-05 RX ORDER — SODIUM CHLORIDE 9 MG/ML
1000 INJECTION, SOLUTION INTRAVENOUS CONTINUOUS
Status: DISCONTINUED | OUTPATIENT
Start: 2018-01-05 | End: 2018-01-06

## 2018-01-05 RX ORDER — ONDANSETRON 2 MG/ML
4 INJECTION INTRAMUSCULAR; INTRAVENOUS EVERY 30 MIN PRN
Status: DISCONTINUED | OUTPATIENT
Start: 2018-01-05 | End: 2018-01-06

## 2018-01-05 RX ORDER — KETOROLAC TROMETHAMINE 15 MG/ML
15 INJECTION, SOLUTION INTRAMUSCULAR; INTRAVENOUS ONCE
Status: COMPLETED | OUTPATIENT
Start: 2018-01-05 | End: 2018-01-05

## 2018-01-05 RX ORDER — EPHEDRINE SULFATE 50 MG/ML
INJECTION, SOLUTION INTRAMUSCULAR; INTRAVENOUS; SUBCUTANEOUS PRN
Status: DISCONTINUED | OUTPATIENT
Start: 2018-01-05 | End: 2018-01-06

## 2018-01-05 RX ORDER — MORPHINE SULFATE 4 MG/ML
4 INJECTION, SOLUTION INTRAMUSCULAR; INTRAVENOUS
Status: DISCONTINUED | OUTPATIENT
Start: 2018-01-05 | End: 2018-01-06

## 2018-01-05 RX ORDER — SIMVASTATIN 20 MG
TABLET ORAL
Start: 2018-01-05

## 2018-01-05 RX ORDER — SODIUM CHLORIDE, SODIUM LACTATE, POTASSIUM CHLORIDE, CALCIUM CHLORIDE 600; 310; 30; 20 MG/100ML; MG/100ML; MG/100ML; MG/100ML
INJECTION, SOLUTION INTRAVENOUS CONTINUOUS PRN
Status: DISCONTINUED | OUTPATIENT
Start: 2018-01-05 | End: 2018-01-06

## 2018-01-05 RX ADMIN — Medication 10 MG: at 23:58

## 2018-01-05 RX ADMIN — SODIUM CHLORIDE 1000 ML: 9 INJECTION, SOLUTION INTRAVENOUS at 21:16

## 2018-01-05 RX ADMIN — SUCCINYLCHOLINE CHLORIDE 80 MG: 20 INJECTION, SOLUTION INTRAMUSCULAR; INTRAVENOUS at 23:31

## 2018-01-05 RX ADMIN — FENTANYL CITRATE 50 MCG: 50 INJECTION, SOLUTION INTRAMUSCULAR; INTRAVENOUS at 23:38

## 2018-01-05 RX ADMIN — PROPOFOL 30 MG: 10 INJECTION, EMULSION INTRAVENOUS at 23:38

## 2018-01-05 RX ADMIN — GLYCOPYRROLATE 0.2 MG: 0.2 INJECTION, SOLUTION INTRAMUSCULAR; INTRAVENOUS at 23:30

## 2018-01-05 RX ADMIN — SODIUM CHLORIDE 70 ML: 9 INJECTION, SOLUTION INTRAVENOUS at 21:37

## 2018-01-05 RX ADMIN — ROCURONIUM BROMIDE 30 MG: 10 INJECTION INTRAVENOUS at 23:38

## 2018-01-05 RX ADMIN — PROPOFOL 150 MG: 10 INJECTION, EMULSION INTRAVENOUS at 23:31

## 2018-01-05 RX ADMIN — Medication 5 MG: at 23:31

## 2018-01-05 RX ADMIN — IOPAMIDOL 98 ML: 755 INJECTION, SOLUTION INTRAVENOUS at 21:36

## 2018-01-05 RX ADMIN — SODIUM CHLORIDE, POTASSIUM CHLORIDE, SODIUM LACTATE AND CALCIUM CHLORIDE: 600; 310; 30; 20 INJECTION, SOLUTION INTRAVENOUS at 23:25

## 2018-01-05 RX ADMIN — LIDOCAINE HYDROCHLORIDE 40 MG: 20 INJECTION, SOLUTION INFILTRATION; PERINEURAL at 23:31

## 2018-01-05 RX ADMIN — FENTANYL CITRATE 50 MCG: 50 INJECTION, SOLUTION INTRAMUSCULAR; INTRAVENOUS at 23:35

## 2018-01-05 RX ADMIN — PHENYLEPHRINE HYDROCHLORIDE 100 MCG: 10 INJECTION INTRAVENOUS at 23:55

## 2018-01-05 RX ADMIN — ONDANSETRON 4 MG: 2 INJECTION INTRAMUSCULAR; INTRAVENOUS at 23:56

## 2018-01-05 RX ADMIN — KETOROLAC TROMETHAMINE 15 MG: 15 INJECTION, SOLUTION INTRAMUSCULAR; INTRAVENOUS at 21:16

## 2018-01-05 RX ADMIN — CEFAZOLIN SODIUM 2 G: 2 INJECTION, SOLUTION INTRAVENOUS at 23:38

## 2018-01-05 RX ADMIN — Medication 5 MG: at 23:55

## 2018-01-05 ASSESSMENT — ENCOUNTER SYMPTOMS
DIARRHEA: 0
DYSRHYTHMIAS: 1
HEMATURIA: 0
FREQUENCY: 0
DYSURIA: 0
ABDOMINAL PAIN: 1
FEVER: 0
CONSTIPATION: 0

## 2018-01-05 NOTE — IP AVS SNAPSHOT
[facility]    [address] [place] [address]    Phone:  [phone]                                       After Visit Summary   1/5/2018    Zack Santiago    MRN: [id number]           After Visit Summary Signature Page     I have received my discharge instructions, and my questions have been answered. I have discussed any challenges I see with this plan with the nurse or doctor.    ..........................................................................................................................................  Patient/Patient Representative Signature      ..........................................................................................................................................  Patient Representative Print Name and Relationship to Patient    ..................................................               ................................................  Date                                            Time    ..........................................................................................................................................  Reviewed by Signature/Title    ...................................................              ..............................................  Date                                                            Time

## 2018-01-05 NOTE — TELEPHONE ENCOUNTER
Denied  Duplicate; Rx sent 1/2/2018  Elysia DURAN, RN    Requested Prescriptions   Pending Prescriptions Disp Refills     simvastatin (ZOCOR) 20 MG tablet [Pharmacy Med Name: SIMVASTATIN 20 MG TABLET] 90 tablet 0     Sig: TAKE ONE TABLET BY MOUTH AT BEDTIME    Statins Protocol Passed    1/4/2018 10:02 AM       Passed - LDL on file in past 12 months    Recent Labs   Lab Test  04/07/17   1131   LDL  37            Passed - No abnormal creatine kinase in past 12 months    No lab results found.         Passed - Recent or future visit with authorizing provider    Patient had office visit in the last year or has a visit in the next 30 days with authorizing provider.  See chart review.              Passed - Patient is age 18 or older        Next 5 appointments (look out 90 days)     Jan 09, 2018  9:30 AM CST   PHYSICAL with Zurdo Kendrick MD   [facility]    [address] [facility] [id number] [phone]

## 2018-01-05 NOTE — IP AVS SNAPSHOT
MRN:[id number]                      After Visit Summary   1/5/2018    Zack Santiago    MRN: [id number]           Thank you!     Thank you for choosing [facility] for your care. Our goal is always to provide you with excellent care. Hearing back from our patients is one way we can continue to improve our services. Please take a few minutes to complete the written survey that you may receive in the mail after you visit with us. Thank you!        Patient Information     Date Of Birth          5/29/1931        About your hospital stay     You were admitted on:  January 6, 2018 [facility]    You were discharged on:  January 6, 2018       Who to Call     For medical emergencies, please call 911.  For non-urgent questions about your medical care, please call your primary care provider or clinic, [phone]  For questions related to your surgery, please call your surgery clinic        Attending Provider     Provider Specialty    Marcelino Soler MD Emergency Medicine    Harsh Walker MD Surgery       Primary Care Provider Office Phone # Fax #    Zurdo Kendrick [phone]      After Care Instructions     Discharge Instructions       You can restart your Plavix 1/7/18, do not take it day of discharge.    If swelling/bruising to your groin becomes significantly larger you should be evaluated            Discharge Instructions - diet       Resume pre procedure diet.            Dressing       Keep dressing clean and dry.  Dressing / incision care as instructed by Provider and/or Nurse.            Ice to affected area       Ice to operative site, as needed.            NO driving or operating machinery        until the day after the procedure.                  Follow-up Appointments     Follow-up and recommended labs and tests        Follow up with your PCP on Tuesday 1/9/18 as planned                  Your next 10  appointments already scheduled     Jan 09, 2018  9:30 AM CST   [facility]    [address] [facility] [address] [phone]              Further instructions from your care team       [facility]  Discharge Instructions: Post-Operative Open Inguinal Hernia    ACTIVITY    Increase your activity gradually.  Avoid strenuous physical activity or heavy lifting greater than 15 lbs. for 3-4 weeks.  You may climb stairs.    You may drive without restrictions when you are not using any prescription pain medication and comfortable in a car.    You may return to work/school when you are comfortable without any prescription pain medication.    WOUND CARE    You may remove your bandage and shower 48 hours after the surgery.  Pat your incisions dry and leave open to air.  Re-apply dressing (Band-aid or gauze/tape) as needed for drainage.    You may have steri-strips (looks like tape) or Dermabond (looks like glue) on your incision.  Leave it alone, it will peel up and fall off on its own.     Do not soak your incisions in a tub or pool for 2 weeks.     DIET    Return to the diet you were on before surgery.    Pain medications can cause constipation.  Limit use when possible.  Take over the counter stool softener/stimulant, such as Colace or Senna, with plenty of water.      PAIN    Expect some tenderness and discomfort at the incision site(s).  Use the prescribed pain medication at your discretion.  Expect gradual resolution of your pain over several days.    You may take ibuprofen with food (unless you have been told not to) instead of or in addition to your prescribed pain medication.  If you are taking Norco or Percocet, do not take any additional acetaminophen/APAP/Tylenol.    Do not drink alcohol or drive while you are taking pain medications.    You may apply ice to your incisions in 20 minute  "intervals as needed for the next 48 hours.  After that time, consider switching to heat if you prefer.    EXPECTATIONS    A lump or ridge under the incision is normal and will gradually resolve.    ***You can expect some swelling, bruising possibly involving the testicles and penis, and/or some numbness.  These symptoms are expected.  Use ice to help with the swelling.  Try placing a rolled hand towel below your scrotum to help alleviate your scrotal discomfort.     RETURN APPOINTMENT    Follow up with your surgeon or a PA in 2 weeks.  Please call the office at [phone] to schedule your appointment.    CALL OUR OFFICE IF YOU HAVE:     Chills or fever above 101.5 F.    Increased redness or drainage at your incisions.    Significant bleeding.    Pain not relieved by your pain medication or rest.    Increasing pain after the first 48 hours.    Any other concerns or questions.    Revised August 2017    Pending Results     No orders found for last 3 day(s).            Admission Information     Date & Time Provider Department Dept. Phone    1/5/2018 Harsh Walker MD [facility] [phone]      Your Vitals Were     Blood Pressure Pulse Temperature Respirations Height Weight    122/55 (BP Location: Left arm) 51 95.7  F (35.4  C) (Oral) 16 1.854 m (6' 1\") 88.9 kg (196 lb)    Pulse Oximetry BMI (Body Mass Index)                97% 25.86 kg/m2          [employer] Information     [employer] lets you send messages to your doctor, view your test results, renew your prescriptions, schedule appointments and more. To sign up, go to [email or web address] . Click on \"Log in\" on the left side of the screen, which will take you to the Welcome page. Then click on \"Sign up Now\" on the right side of the page.     You will be asked to enter the access code listed below, as well as some personal information. Please follow the directions to create your username and password.     Your access code is: " [email or web address]  Expires: 2018  7:35 PM     Your access code will  in 90 days. If you need help or a new code, please call your [place] clinic or [phone].        Care EveryWhere ID     This is your Care EveryWhere ID. This could be used by other organizations to access your [place] medical records  [id number]        Equal Access to Services     [facility] : Hadii aad ku hadasho Soomaali, waaxda luqadaha, qaybta kaalmada adeegyada, waxay idiin hayaan adeezekiel raygoza laGianaashlyn . So [facility] [phone].    ATENCIÓN: Si habla espsergo, tiene a khan disposición servicios gratuitos de asistencia lingüística. Naeem al [phone].    We comply with applicable federal civil rights laws and [place] laws. We do not discriminate on the basis of race, color, national origin, age, disability, sex, sexual orientation, or gender identity.               Review of your medicines      CONTINUE these medicines which may have CHANGED, or have new prescriptions. If we are uncertain of the size of tablets/capsules you have at home, strength may be listed as something that might have changed.        Dose / Directions    clopidogrel 75 MG tablet   Commonly known as:  PLAVIX   This may have changed:  See the new instructions.   Used for:  Cerebrovascular accident (CVA), unspecified mechanism (H)        Dose:  75 mg   Start taking on:  2018   Take 1 tablet (75 mg) by mouth daily   Quantity:  90 tablet   Refills:  3         CONTINUE these medicines which have NOT CHANGED        Dose / Directions    ANAFRANIL PO   Used for:  OCD (obsessive compulsive disorder)        Dose:  25 mg   Take 25 mg by mouth every evening   Refills:  0       ARIPiprazole 2 MG tablet   Commonly known as:  ABILIFY        Dose:  2 mg   Take 1 tablet by mouth At Bedtime.   Refills:  0       ASPIRIN NOT PRESCRIBED   Commonly known as:  INTENTIONAL   Used for:  ASCVD (arteriosclerotic cardiovascular disease)        Please choose reason not prescribed, below    Quantity:  0 each   Refills:  0       chlorthalidone 25 MG tablet   Commonly known as:  HYGROTON   Used for:  Essential hypertension        TAKE ONE TABLET BY MOUTH ONE TIME DAILY   Quantity:  90 tablet   Refills:  1       gabapentin 400 MG capsule   Commonly known as:  NEURONTIN        Dose:  1 capsule   Take 1 capsule by mouth 2 times daily   Refills:  0       levothyroxine 100 MCG tablet   Commonly known as:  SYNTHROID/LEVOTHROID   Used for:  Acquired hypothyroidism        TAKE ONE TABLET BY MOUTH ONE TIME DAILY   Quantity:  90 tablet   Refills:  1       LORAZEPAM PO        Dose:  0.5 mg   Take 0.5 mg by mouth daily as needed for anxiety   Refills:  0       MIRTAZAPINE PO        Dose:  45 mg   Take 45 mg by mouth At Bedtime   Refills:  0       simvastatin 20 MG tablet   Commonly known as:  ZOCOR   Used for:  Hyperlipidemia LDL goal <100        TAKE ONE TABLET BY MOUTH AT BEDTIME   Quantity:  90 tablet   Refills:  0                Protect others around you: Learn how to safely use, store and throw away your medicines at www.disposemymeds.org.             Medication List: This is a list of all your medications and when to take them. Check marks below indicate your daily home schedule. Keep this list as a reference.      Medications           Morning Afternoon Evening Bedtime As Needed    ANAFRANIL PO   Take 25 mg by mouth every evening                                   ARIPiprazole 2 MG tablet   Commonly known as:  ABILIFY   Take 1 tablet by mouth At Bedtime.                                   ASPIRIN NOT PRESCRIBED   Commonly known as:  INTENTIONAL   Please choose reason not prescribed, below                                chlorthalidone 25 MG tablet   Commonly known as:  HYGROTON   TAKE ONE TABLET BY MOUTH ONE TIME DAILY                                   clopidogrel 75 MG tablet   Commonly known as:  PLAVIX   Take 1 tablet (75 mg) by mouth daily   Start taking on:  1/7/2018   Next Dose Due:  1/7/2018                                    gabapentin 400 MG capsule   Commonly known as:  NEURONTIN   Take 1 capsule by mouth 2 times daily                                      levothyroxine 100 MCG tablet   Commonly known as:  SYNTHROID/LEVOTHROID   TAKE ONE TABLET BY MOUTH ONE TIME DAILY                                   LORAZEPAM PO   Take 0.5 mg by mouth daily as needed for anxiety                                   MIRTAZAPINE PO   Take 45 mg by mouth At Bedtime                                   simvastatin 20 MG tablet   Commonly known as:  ZOCOR   TAKE ONE TABLET BY MOUTH AT BEDTIME

## 2018-01-05 NOTE — MR AVS SNAPSHOT
"              After Visit Summary   1/5/2018    Zack Santiago    MRN: [id number]           Patient Information     Date Of Birth          5/29/1931        Visit Information        Provider Department      1/5/2018 7:15 PM Pelon Quintanilla DO [facility]        Today's Diagnoses     Continuous severe abdominal pain    -  1       Follow-ups after your visit        Your next 10 appointments already scheduled     Jan 09, 2018  9:30 AM CST   PHYSICAL with Zurdo Kendrick MD   [facility]    [address] [facility] [address] [phone]              Who to contact     If you have questions or need follow up information about today's clinic visit or your schedule please contact [facility] directly at [phone].  Normal or non-critical lab and imaging results will be communicated to you by SprinkleBithart, letter or phone within 4 business days after the clinic has received the results. If you do not hear from us within 7 days, please contact the clinic through MyChart or phone. If you have a critical or abnormal lab result, we will notify you by phone as soon as possible.  Submit refill requests through [employer] or call your pharmacy and they will forward the refill request to us. Please allow 3 business days for your refill to be completed.          Additional Information About Your Visit        MyChart Information     [employer] lets you send messages to your doctor, view your test results, renew your prescriptions, schedule appointments and more. To sign up, go to [email or web address] . Click on \"Log in\" on the left side of the screen, which will take you to the Welcome page. Then click on \"Sign up Now\" on the right side of the page.     You will be asked to enter the access code listed below, as well as some personal information. Please follow the directions to create your username and password.     Your access " code is: [email or web address]  Expires: 2018  7:35 PM     Your access code will  in 90 days. If you need help or a new code, please call your [place] clinic or [phone].        Care EveryWhere ID     This is your Care EveryWhere ID. This could be used by other organizations to access your [place] medical records  [id number]        Your Vitals Were     Pulse Temperature Respirations Pulse Oximetry BMI (Body Mass Index)       56 96.8  F (36  C) (Oral) 18 98% 25.89 kg/m2        Blood Pressure from Last 3 Encounters:   18 134/65   10/26/17 147/69   17 138/62    Weight from Last 3 Encounters:   18 196 lb 3.2 oz (89 kg)   10/26/17 188 lb (85.3 kg)   17 187 lb (84.8 kg)              Today, you had the following     No orders found for display       Primary Care Provider Office Phone # Fax #    Zurdo Raul Kendrick [phone] [address] [facility] [address]  [place] [address]        Equal Access to Services     [facility]: Hadii aad ku hadasho Soomaali, waaxda luqadaha, qaybta kaalmada adeegyada, sita olsen haykeerthin lina ellis . So [facility] [phone].    ATENCIÓN: Si habla español, tiene a khan disposición servicios gratuitos de asistencia lingüística. Llame al [phone].    We comply with applicable federal civil rights laws and [place] laws. We do not discriminate on the basis of race, color, national origin, age, disability, sex, sexual orientation, or gender identity.            Thank you!     Thank you for choosing [facility]  for your care. Our goal is always to provide you with excellent care. Hearing back from our patients is one way we can continue to improve our services. Please take a few minutes to complete the written survey that you may receive in the mail after your visit with us. Thank you!             Your Updated Medication List - Protect others around you: Learn how to safely use, store and throw away your  medicines at www.disposemymeds.org.          This list is accurate as of: 1/5/18  7:35 PM.  Always use your most recent med list.                   Brand Name Dispense Instructions for use Diagnosis    ANAFRANIL PO      Take 25 mg by mouth daily    OCD (obsessive compulsive disorder)       ARIPiprazole 2 MG tablet    ABILIFY     Take 1 tablet by mouth At Bedtime.        ASPIRIN NOT PRESCRIBED    INTENTIONAL    0 each    Please choose reason not prescribed, below    ASCVD (arteriosclerotic cardiovascular disease)       chlorthalidone 25 MG tablet    HYGROTON    90 tablet    TAKE ONE TABLET BY MOUTH ONE TIME DAILY    Essential hypertension       clopidogrel 75 MG tablet    PLAVIX    90 tablet    TAKE 1 TABLET BY MOUTH DAILY    Cerebrovascular accident (CVA), unspecified mechanism (H)       gabapentin 400 MG capsule    NEURONTIN     Take 1 capsule by mouth 2 times daily        levothyroxine 100 MCG tablet    SYNTHROID/LEVOTHROID    90 tablet    TAKE ONE TABLET BY MOUTH ONE TIME DAILY    Acquired hypothyroidism       LORAZEPAM PO      Take 0.5 mg by mouth daily as needed for anxiety        MIRTAZAPINE PO      Take 30 mg by mouth At Bedtime        simvastatin 20 MG tablet    ZOCOR    90 tablet    TAKE ONE TABLET BY MOUTH AT BEDTIME    Hyperlipidemia LDL goal <100

## 2018-01-06 VITALS
RESPIRATION RATE: 16 BRPM | OXYGEN SATURATION: 97 % | TEMPERATURE: 95.7 F | SYSTOLIC BLOOD PRESSURE: 122 MMHG | BODY MASS INDEX: 25.98 KG/M2 | WEIGHT: 196 LBS | HEART RATE: 51 BPM | DIASTOLIC BLOOD PRESSURE: 55 MMHG | HEIGHT: 73 IN

## 2018-01-06 PROBLEM — K40.30 INCARCERATED INGUINAL HERNIA: Status: ACTIVE | Noted: 2018-01-06

## 2018-01-06 LAB
CREAT SERPL-MCNC: 1 MG/DL (ref 0.66–1.25)
GFR SERPL CREATININE-BSD FRML MDRD: 71 ML/MIN/1.7M2
GLUCOSE BLDC GLUCOMTR-MCNC: 113 MG/DL (ref 70–99)

## 2018-01-06 PROCEDURE — 25000128 H RX IP 250 OP 636: Performed by: ANESTHESIOLOGY

## 2018-01-06 PROCEDURE — 25000125 ZZHC RX 250: Performed by: SURGERY

## 2018-01-06 PROCEDURE — 25800025 ZZH RX 258: Performed by: SURGERY

## 2018-01-06 PROCEDURE — 36415 COLL VENOUS BLD VENIPUNCTURE: CPT | Performed by: PHYSICIAN ASSISTANT

## 2018-01-06 PROCEDURE — 99236 HOSP IP/OBS SAME DATE HI 85: CPT | Performed by: INTERNAL MEDICINE

## 2018-01-06 PROCEDURE — 25000125 ZZHC RX 250: Performed by: NURSE ANESTHETIST, CERTIFIED REGISTERED

## 2018-01-06 PROCEDURE — 25000128 H RX IP 250 OP 636: Performed by: NURSE ANESTHETIST, CERTIFIED REGISTERED

## 2018-01-06 PROCEDURE — 99207 ZZC CONSULT E&M CHANGED TO INITIAL LEVEL: CPT | Performed by: INTERNAL MEDICINE

## 2018-01-06 PROCEDURE — 00000146 ZZHCL STATISTIC GLUCOSE BY METER IP

## 2018-01-06 PROCEDURE — G0378 HOSPITAL OBSERVATION PER HR: HCPCS

## 2018-01-06 PROCEDURE — 99207 ZZC APP CREDIT; MD BILLING SHARED VISIT: CPT | Performed by: PHYSICIAN ASSISTANT

## 2018-01-06 PROCEDURE — 82565 ASSAY OF CREATININE: CPT | Performed by: PHYSICIAN ASSISTANT

## 2018-01-06 PROCEDURE — 25000132 ZZH RX MED GY IP 250 OP 250 PS 637: Mod: GY | Performed by: SURGERY

## 2018-01-06 PROCEDURE — 99207 ZZC NO CHARGE VISIT/PATIENT NOT SEEN: CPT | Performed by: PHYSICIAN ASSISTANT

## 2018-01-06 RX ORDER — ONDANSETRON 2 MG/ML
INJECTION INTRAMUSCULAR; INTRAVENOUS PRN
Status: DISCONTINUED | OUTPATIENT
Start: 2018-01-05 | End: 2018-01-06

## 2018-01-06 RX ORDER — HYDROCODONE BITARTRATE AND ACETAMINOPHEN 5; 325 MG/1; MG/1
1-2 TABLET ORAL EVERY 4 HOURS PRN
Status: DISCONTINUED | OUTPATIENT
Start: 2018-01-06 | End: 2018-01-06 | Stop reason: HOSPADM

## 2018-01-06 RX ORDER — LIDOCAINE 40 MG/G
CREAM TOPICAL
Status: DISCONTINUED | OUTPATIENT
Start: 2018-01-06 | End: 2018-01-06 | Stop reason: HOSPADM

## 2018-01-06 RX ORDER — NALOXONE HYDROCHLORIDE 0.4 MG/ML
.1-.4 INJECTION, SOLUTION INTRAMUSCULAR; INTRAVENOUS; SUBCUTANEOUS
Status: DISCONTINUED | OUTPATIENT
Start: 2018-01-06 | End: 2018-01-06 | Stop reason: HOSPADM

## 2018-01-06 RX ORDER — MEPERIDINE HYDROCHLORIDE 25 MG/ML
12.5 INJECTION INTRAMUSCULAR; INTRAVENOUS; SUBCUTANEOUS EVERY 5 MIN PRN
Status: DISCONTINUED | OUTPATIENT
Start: 2018-01-06 | End: 2018-01-06 | Stop reason: HOSPADM

## 2018-01-06 RX ORDER — HYDROMORPHONE HYDROCHLORIDE 1 MG/ML
0.2 INJECTION, SOLUTION INTRAMUSCULAR; INTRAVENOUS; SUBCUTANEOUS
Status: DISCONTINUED | OUTPATIENT
Start: 2018-01-06 | End: 2018-01-06 | Stop reason: HOSPADM

## 2018-01-06 RX ORDER — BUPIVACAINE HYDROCHLORIDE AND EPINEPHRINE 5; 5 MG/ML; UG/ML
INJECTION, SOLUTION PERINEURAL PRN
Status: DISCONTINUED | OUTPATIENT
Start: 2018-01-06 | End: 2018-01-06 | Stop reason: HOSPADM

## 2018-01-06 RX ORDER — ONDANSETRON 4 MG/1
4 TABLET, ORALLY DISINTEGRATING ORAL EVERY 6 HOURS PRN
Status: DISCONTINUED | OUTPATIENT
Start: 2018-01-06 | End: 2018-01-06 | Stop reason: HOSPADM

## 2018-01-06 RX ORDER — CLOPIDOGREL BISULFATE 75 MG/1
75 TABLET ORAL DAILY
Qty: 90 TABLET | Refills: 3 | COMMUNITY
Start: 2018-01-07 | End: 2018-04-03

## 2018-01-06 RX ORDER — DEXTROSE MONOHYDRATE, SODIUM CHLORIDE, AND POTASSIUM CHLORIDE 50; 1.49; 4.5 G/1000ML; G/1000ML; G/1000ML
INJECTION, SOLUTION INTRAVENOUS CONTINUOUS
Status: DISCONTINUED | OUTPATIENT
Start: 2018-01-06 | End: 2018-01-06 | Stop reason: HOSPADM

## 2018-01-06 RX ORDER — ONDANSETRON 2 MG/ML
4 INJECTION INTRAMUSCULAR; INTRAVENOUS EVERY 6 HOURS PRN
Status: DISCONTINUED | OUTPATIENT
Start: 2018-01-06 | End: 2018-01-06 | Stop reason: HOSPADM

## 2018-01-06 RX ORDER — NALOXONE HYDROCHLORIDE 0.4 MG/ML
.1-.4 INJECTION, SOLUTION INTRAMUSCULAR; INTRAVENOUS; SUBCUTANEOUS
Status: DISCONTINUED | OUTPATIENT
Start: 2018-01-06 | End: 2018-01-06

## 2018-01-06 RX ORDER — HYDROMORPHONE HYDROCHLORIDE 1 MG/ML
.3-.5 INJECTION, SOLUTION INTRAMUSCULAR; INTRAVENOUS; SUBCUTANEOUS EVERY 5 MIN PRN
Status: DISCONTINUED | OUTPATIENT
Start: 2018-01-06 | End: 2018-01-06 | Stop reason: HOSPADM

## 2018-01-06 RX ORDER — FENTANYL CITRATE 0.05 MG/ML
25-50 INJECTION, SOLUTION INTRAMUSCULAR; INTRAVENOUS
Status: DISCONTINUED | OUTPATIENT
Start: 2018-01-06 | End: 2018-01-06 | Stop reason: HOSPADM

## 2018-01-06 RX ORDER — NEOSTIGMINE METHYLSULFATE 1 MG/ML
VIAL (ML) INJECTION PRN
Status: DISCONTINUED | OUTPATIENT
Start: 2018-01-06 | End: 2018-01-06

## 2018-01-06 RX ORDER — ONDANSETRON 2 MG/ML
4 INJECTION INTRAMUSCULAR; INTRAVENOUS EVERY 30 MIN PRN
Status: DISCONTINUED | OUTPATIENT
Start: 2018-01-06 | End: 2018-01-06 | Stop reason: HOSPADM

## 2018-01-06 RX ORDER — ONDANSETRON 4 MG/1
4 TABLET, ORALLY DISINTEGRATING ORAL EVERY 30 MIN PRN
Status: DISCONTINUED | OUTPATIENT
Start: 2018-01-06 | End: 2018-01-06 | Stop reason: HOSPADM

## 2018-01-06 RX ADMIN — PHENYLEPHRINE HYDROCHLORIDE 100 MCG: 10 INJECTION INTRAVENOUS at 00:20

## 2018-01-06 RX ADMIN — GLYCOPYRROLATE 0.6 MG: 0.2 INJECTION, SOLUTION INTRAMUSCULAR; INTRAVENOUS at 00:34

## 2018-01-06 RX ADMIN — PHENYLEPHRINE HYDROCHLORIDE 100 MCG: 10 INJECTION INTRAVENOUS at 00:10

## 2018-01-06 RX ADMIN — Medication 1 LOZENGE: at 03:23

## 2018-01-06 RX ADMIN — PROPOFOL 40 MG: 10 INJECTION, EMULSION INTRAVENOUS at 00:29

## 2018-01-06 RX ADMIN — PHENYLEPHRINE HYDROCHLORIDE 100 MCG: 10 INJECTION INTRAVENOUS at 00:06

## 2018-01-06 RX ADMIN — PHENYLEPHRINE HYDROCHLORIDE 100 MCG: 10 INJECTION INTRAVENOUS at 00:14

## 2018-01-06 RX ADMIN — PHENYLEPHRINE HYDROCHLORIDE 100 MCG: 10 INJECTION INTRAVENOUS at 00:27

## 2018-01-06 RX ADMIN — PHENYLEPHRINE HYDROCHLORIDE 100 MCG: 10 INJECTION INTRAVENOUS at 00:30

## 2018-01-06 RX ADMIN — ONDANSETRON 4 MG: 2 INJECTION INTRAMUSCULAR; INTRAVENOUS at 01:12

## 2018-01-06 RX ADMIN — Medication 5 MG: at 00:09

## 2018-01-06 RX ADMIN — NEOSTIGMINE METHYLSULFATE 4.5 MG: 1 INJECTION INTRAMUSCULAR; INTRAVENOUS; SUBCUTANEOUS at 00:34

## 2018-01-06 RX ADMIN — POTASSIUM CHLORIDE, DEXTROSE MONOHYDRATE AND SODIUM CHLORIDE: 150; 5; 450 INJECTION, SOLUTION INTRAVENOUS at 02:49

## 2018-01-06 RX ADMIN — SODIUM CHLORIDE, POTASSIUM CHLORIDE, SODIUM LACTATE AND CALCIUM CHLORIDE: 600; 310; 30; 20 INJECTION, SOLUTION INTRAVENOUS at 00:35

## 2018-01-06 NOTE — TELEPHONE ENCOUNTER
"  Reason for Disposition    [1] MILD-MODERATE pain AND [2] constant AND [3] present > 2 hours    Additional Information    Negative: Shock suspected (e.g., cold/pale/clammy skin, too weak to stand, low BP, rapid pulse)    Negative: Difficult to awaken or acting confused  (e.g., disoriented, slurred speech)    Negative: Passed out (i.e., lost consciousness, collapsed and was not responding)    Negative: Sounds like a life-threatening emergency to the triager    Negative: Chest pain    Negative: Pain is mainly in upper abdomen  (if needed ask: \"is it mainly above the belly button?\")    Negative: Followed an abdomen (stomach) injury    Negative: [1] SEVERE pain (e.g., excruciating) AND [2] present > 1 hour     Pain at 1.5    Negative: [1] SEVERE pain AND [2] age > 60    Negative: [1] Vomiting AND [2] contains red blood or black (\"coffee ground\") material  (Exception: few red streaks in vomit that only happened once)    Negative: Blood in bowel movements  (Exception: Blood on surface of BM with constipation)    Negative: Black or tarry bowel movements  (Exception: chronic-unchanged  black-grey bowel movements AND is taking iron pills or Pepto-bismol)    Negative: [1] Unable to urinate (or only a few drops) > 4 hours AND     [2] bladder feels very full (e.g., palpable bladder or strong urge to urinate)    Negative: [1] Pain in the scrotum or testicle AND [2] present > 1 hour    Negative: Patient sounds very sick or weak to the triager    Protocols used: ABDOMINAL PAIN - MALE-ADULT-AH    Patient was given information for the [facility] urgent care for him to go there tonight, before they close. Otherwise, advised to go to the emergency room as the urgent cares will be closing.  Shaenlle Tijerina RN-[facility]    "

## 2018-01-06 NOTE — PROGRESS NOTES
Patient seen in consultation earlier this morning. Hospitalist asked to follow for post-op medical co-management in light of multiple medical problems.     Patient is s/p inguinal herniorrhaphy for incarcerated left inguinal hernia causing obstruction. Medical history includes UC, AAA, OCD, peripheral neuropathy, hypertension, hx CVA on plavix, CKD, GERD, hx SVT, CAD s/p bypass.     Patient is seen this afternoon in the Obs unit. He reports he is feeling well. He is passing flatus and urinating without difficulty. He denies any CP, SOB, dizziness, nausea, vomiting. He is tolerating liquids. He is ambulating independently.     Vital signs are stable, BP controlled. He has been weaned off oxygen. Creatinine obtained this am and stable.     Discussed with Dr. Walker, patient is to resume his Plavix 1/7/18. Discussed with patient and advised him to keep an eye on his incision area and be evaluated if bruising/swelling significantly worsens.  BP and Cr stable, he can resume diuretic 1/7/18. He has a follow up appointment with his PCP 1/8/18. He should follow up with General Surgery as an outpatient per their recommendations.     He is stable to discharge from medicine standpoint.     Exam:  Cardiovascular: RRR no murmur appreciated  Pulm: lungs are clear to auscultation bilaterally, no increased work of breathing  GI: hypoactive bowel sounds, abdomen in soft. Groin ncision is C/D/I, mild edema and bruising around incision, marked by nursing for monitoring   Neuro: CN 2-12 grossly intact. No focal deficits    Jo Dowling PA-C

## 2018-01-06 NOTE — ANESTHESIA CARE TRANSFER NOTE
Patient: Zack Santiago    Procedure(s):  Incarcerated Left Inguinal Hernia - Wound Class: I-Clean    Diagnosis: N/A  Diagnosis Additional Information: No value filed.    Anesthesia Type:   General, RSI, ETT     Note:  Airway :Face Mask  Patient transferred to:PACU  Comments: Awake, VSS, report to RN, monitors and alarms on, IV patent.  [id number] Report: Identifed the Patient, Identified the Reponsible Provider, Reviewed the pertinent medical history, Discussed the surgical course, Reviewed Intra-OP anesthesia mangement and issues during anesthesia, Set expectations for post-procedure period and Allowed opportunity for questions and acknowledgement of understanding      Vitals: (Last set prior to Anesthesia Care Transfer)    CRNA VITALS  1/6/2018 0022 - 1/6/2018 0058      1/6/2018             Pulse: 71    SpO2: 98 %    Resp Rate (observed): (!)  3    Resp Rate (set): 10                Electronically Signed By: DOMENIC Montoya CRNA  January 6, 2018  12:58 AM

## 2018-01-06 NOTE — OP NOTE
General Surgery Operative Note    PREOPERATIVE DIAGNOSIS: Incarcerated left inguinal hernia    POSTOPERATIVE DIAGNOSIS: Same    PROCEDURE:  HERNIORRHAPHY INGUINAL (Left)    ANESTHESIA: General    PREOPERATIVE MEDICATIONS:  Ancef IV.    SURGEON:  Harsh Walker MD, MD    ASSISTANT:  NONE    INDICATIONS:  Zack Santiago is a 86 year old male with a long-standing history of left inguinal hernia.  He has deferred repair of this for some time.  He presents tonight with increased pain nausea and bulging in the left groin.  He was sent to the emergency department from an urgent care and physical exam and CT scan confirmed incarcerated left inguinal hernia.  This was shown on the CT scan to have caused a bowel obstruction.  Patient now presents for open left inguinal hernia repair, possible bowel resection, possible laparotomy.    PROCEDURE:  The patient was anesthetized and the left groin was prepped and draped.  The wound was injected with local anesthetic in a field block position and then a transverse incision was made along the external oblique fibers.  We were immediately able to recognize a prominent hernia bulge in the direct position.  There were anatomic changes and scarring consistent with previous open hernia repair.  We were able to identify the spermatic cord and this was looped with a Penrose and controlled.  Medial to this we encountered some well incorporated hernia mesh and a very small hernia defect medial to this.  This contained primarily preperitoneal fat and omentum.  We were eventually able to get this completely reduced and I opened up the preperitoneal space with a sponge.  As the borders of this very small defect were comprised of viable scar tissue and mesh, I elected to repair it primarily.  This was done with several interrupted 0 Ethibond sutures.  No other hernia defects were appreciated and I elected not to place any additional mesh.  No indirect hernia sac was encountered.  The  wound was irrigated.  The wound was then closed, closing the external oblique with a running 2-0, the subq with 3-0 Vicryl and the skin with a 4-0 Vicryl subcuticular suture and Steri-Strips.  Additional Marcaine was instilled during closure for postop pain relief.  Dressings were applied and the patient went to recovery in good condition.  Sponge and needle counts were correct x2.    ESTIMATED BLOOD LOSS: 5 mL    INTRAOPERATIVE FINDINGS: Recurrent small direct hernia containing incarcerated fat.  Closed primarily.    Harsh Walker MD, MD

## 2018-01-06 NOTE — PLAN OF CARE
"Problem: Patient Care Overview  Goal: Plan of Care/Patient Progress Review  Outcome: Improving  PRIMARY DIAGNOSIS: \"GENERIC\" NURSING  OUTPATIENT/OBSERVATION GOALS TO BE MET BEFORE DISCHARGE:  1. ADLs back to baseline: Yes    2. Activity and level of assistance: Ambulating independently.    3. Pain status: Improved with use of alternative comfort measures i.e.: positioning    4. Return to near baseline physical activity: Yes    5.  Surgery: Yes     Discharge Planner Nurse   Safe discharge environment identified: Yes  Barriers to discharge: No       Entered by: Radha Garibay 01/06/2018 8:38 AM     Please review provider order for any additional goals.   Nurse to notify provider when observation goals have been met and patient is ready for discharge.      "

## 2018-01-06 NOTE — DISCHARGE INSTRUCTIONS
[facility] - [facility]  Discharge Instructions: Post-Operative Open Inguinal Hernia    ACTIVITY    Increase your activity gradually.  Avoid strenuous physical activity or heavy lifting greater than 15 lbs. for 3-4 weeks.  You may climb stairs.    You may drive without restrictions when you are not using any prescription pain medication and comfortable in a car.    You may return to work/school when you are comfortable without any prescription pain medication.    WOUND CARE    You may remove your bandage and shower 48 hours after the surgery.  Pat your incisions dry and leave open to air.  Re-apply dressing (Band-aid or gauze/tape) as needed for drainage.    You may have steri-strips (looks like tape) or Dermabond (looks like glue) on your incision.  Leave it alone, it will peel up and fall off on its own.     Do not soak your incisions in a tub or pool for 2 weeks.     DIET    Return to the diet you were on before surgery.    Pain medications can cause constipation.  Limit use when possible.  Take over the counter stool softener/stimulant, such as Colace or Senna, with plenty of water.      PAIN    Expect some tenderness and discomfort at the incision site(s).  Use the prescribed pain medication at your discretion.  Expect gradual resolution of your pain over several days.    You may take ibuprofen with food (unless you have been told not to) instead of or in addition to your prescribed pain medication.  If you are taking Norco or Percocet, do not take any additional acetaminophen/APAP/Tylenol.    Do not drink alcohol or drive while you are taking pain medications.    You may apply ice to your incisions in 20 minute intervals as needed for the next 48 hours.  After that time, consider switching to heat if you prefer.    EXPECTATIONS    A lump or ridge under the incision is normal and will gradually resolve.    ***You can expect some swelling, bruising possibly involving the testicles and  penis, and/or some numbness.  These symptoms are expected.  Use ice to help with the swelling.  Try placing a rolled hand towel below your scrotum to help alleviate your scrotal discomfort.     RETURN APPOINTMENT    Follow up with your surgeon or a PA in 2 weeks.  Please call the office at [phone] to schedule your appointment.    CALL OUR OFFICE IF YOU HAVE:     Chills or fever above 101.5 F.    Increased redness or drainage at your incisions.    Significant bleeding.    Pain not relieved by your pain medication or rest.    Increasing pain after the first 48 hours.    Any other concerns or questions.    Revised August 2017

## 2018-01-06 NOTE — ANESTHESIA PREPROCEDURE EVALUATION
Anesthesia Evaluation     . Pt has had prior anesthetic.     History of anesthetic complications   - spinal headache        ROS/MED HX    ENT/Pulmonary:      (-) sleep apnea   Neurologic:     (+)CVA TIA     Cardiovascular: Comment: 2016 ef 55-60%  Mild-mod AI  asc ao aneurysm 4.6 cm    (+) hypertension-Peripheral Vascular Disease-CAD, --. : . . . :. dysrhythmias a-fib, valvular problems/murmurs type: AI .       METS/Exercise Tolerance:     Hematologic:         Musculoskeletal:         GI/Hepatic:     (+) GERD Asymptomatic on medication,       Renal/Genitourinary:     (+) chronic renal disease, type: CRI, Pt does not require dialysis,       Endo:     (+) thyroid problem hypothyroidism, .      Psychiatric:     (+) psychiatric history anxiety (OCD)      Infectious Disease:         Malignancy:         Other:                     Physical Exam  Normal systems: dental    Airway   Mallampati: III  TM distance: >3 FB  Neck ROM: full    Dental     Cardiovascular   Rhythm and rate: regular and normal      Pulmonary    breath sounds clear to auscultation                    Anesthesia Plan      History & Physical Review  History and physical reviewed and following examination; no interval change.    ASA Status:  3 emergent.        Plan for General, RSI and ETT with Intravenous induction. Maintenance will be Balanced.    PONV prophylaxis:  Ondansetron (or other 5HT-3)  Additional equipment: Videolaryngoscope No versed  OGT      Postoperative Care      Consents  Anesthetic plan, risks, benefits and alternatives discussed with:  Patient..                          .

## 2018-01-06 NOTE — ED PROVIDER NOTES
History     Chief Complaint:  Abdominal Pain    HPI:   Zack Santiago is a 86 year old male with a history of ulcerative colitis, abdominal hernia, AAA, among others, who presents with abdominal pain. The patient reports that he woke up this morning feeling normal. However, around 1500, the patient developed a constant pain in his left lower quadrant, in the location of his known hernia. After being present for a couple of hours, which is atypical for his hernia, he decided to visit Urgent Care. He was subsequently referred to the ED for rule out of diverticulitis, which the patient has experienced a number of years ago. He denies diarrhea, constipation, fever, or urinary symptoms.     Allergies:  No known drug allergies      Medications:    Simvastatin  Levothyroxine  Chlorthalidone  Plavix  Aspirin  Mirtazapine  Lorazepam  Anafranil  Gabapentin  Abilify     Past Medical History:    Ulcerative colitis  AAA  SVT  Stroke  Spinal headache  Panic disorder  OCD  Idiopathic neuropathy  Vitamin D deficiency  GERD  Chronic kidney disease stage III  Arteriosclerotic cardiovascular disease  Aortic insufficiency  Anemia  Atrial fibrillation    Past Surgical History:    Back surgery  Bladder surgery  Arthroscopy knee, right  Arthroscopy knee, left  Hernia repair x 2  Hammer toe repair  TURP    Family History:    CAD- Father    Social History:  Smoking status: Former Smoker -- 1.0 ppd for 5 years, quit 1965  Alcohol use: Yes -- 1 drink per week   Marital Status:     Presents with wife at bedside.     Review of Systems   Constitutional: Negative for fever.   Gastrointestinal: Positive for abdominal pain. Negative for constipation and diarrhea.   Genitourinary: Negative for dysuria, frequency, hematuria and urgency.   All other systems reviewed and are negative.      Physical Exam     Patient Vitals for the past 24 hrs:   BP Temp Pulse Heart Rate Resp SpO2 Height Weight   01/05/18 2312 140/68 - 51 51 18 99 % - -  "  01/05/18 2236 - - - 49 - 100 % - -   01/05/18 2235 134/69 - - - - - - -   01/05/18 2032 139/59 97.3  F (36.3  C) - 62 16 97 % 1.854 m (6' 1\") 88.9 kg (196 lb)      Physical Exam   Constitutional: He is oriented to person, place, and time. He appears well-developed.   HENT:   Head: Normocephalic and atraumatic.   Right Ear: External ear normal.   Mouth/Throat: Oropharynx is clear and moist.   Eyes: Conjunctivae and EOM are normal. Pupils are equal, round, and reactive to light.   Neck: Normal range of motion. Neck supple. No JVD present.   Cardiovascular: Normal rate, regular rhythm and normal heart sounds.    Pulmonary/Chest: Effort normal and breath sounds normal.   Abdominal: Soft. Bowel sounds are normal. He exhibits no distension. There is no tenderness. There is no rebound.       Musculoskeletal: Normal range of motion.   Lymphadenopathy:     He has no cervical adenopathy.   Neurological: He is alert and oriented to person, place, and time. He displays normal reflexes. No cranial nerve deficit. He exhibits normal muscle tone. Coordination normal.   Skin: Skin is warm and dry.   Psychiatric: He has a normal mood and affect. His behavior is normal. Judgment normal.   Nursing note and vitals reviewed.      Emergency Department Course     Imaging:  Radiographic findings were communicated with the patient and family who voiced understanding of the findings.    CT-scan Abdomen/Pelvis w/ contrast:  IMPRESSION:  1. Small bowel obstruction secondary to a left inguinal hernia  containing a loop of the small bowel.   2. No change in an aortobiiliac stent graft or of the corresponding  infrarenal abdominal aortic aneurysm. A previously described endoleak  is likely unchanged.  Preliminary result per radiology.      Laboratory:  Lactic Acid: 0.9  CBC: RBC 4.07 (L), HGB 13.1 (L), HCT 39.5 (L), o/w WNL (WBC 8.4, )    Basic Metabolic Panel: Carbon Dioxode 33 (H), o/w WNL (Creatinine 1.14)     Interventions:  2116- " Toradol 15 mg IV  2116- NS 1L IV Bolus     Emergency Department Course:  Past medical records, nursing notes, and vitals reviewed.  2050: I performed an exam of the patient and obtained history, as documented above. GCS 15.     IV inserted and blood drawn.     The above interventions were administered.    The patient was sent for a CT of the abdomen and pelvis while in the emergency department, findings above.     2211: I rechecked the patient. Explained findings to the patient.     2223: I discussed the case with Dr. Walker of general surgery regarding the patient.     2246: I rechecked the patient. CT and laboratory findings and plan explained to the Patient and spouse who consents to admission.     Discussed the patient with Dr. Walker, who will admit the patient to the OR for hernia reduction and repair.    Impression & Plan      Medical Decision Making:  Zack Santiago is a 86 year old male who presents to the emergency department for evaluation of left lower quadrant abdominal pain. The patient was seen at Urgent Care for abdominal pain and was sent to the ED with question about diverticulitis. On examination, the patient has a clearly incarcerated left inguinal hernia. A CT was performed that confirms this. I was unable to reduce this manually and he was therefore recommended surgical repair. This patient was discussed with Dr. Walker of general surgery and will be admitted to the OR.     Diagnosis:    ICD-10-CM    1. Incarcerated left inguinal hernia K40.30 ABO/Rh type and screen     Disposition:  Admitted to OR under the care of Dr. Aaron Overton  1/5/2018    EMERGENCY DEPARTMENT    I, Julita Overton, am serving as a scribe at 8:50 PM on 1/5/2018 to document services personally performed by Marcelino Soler MD based on my observations and the provider's statements to me.         Marcelino Soler MD  01/07/18 3574

## 2018-01-06 NOTE — H&P
[facility]  History and Physical   General Surgery  Harsh Walker MD       Zack Santiago MRN# [id number]   YOB: 1931 Age: 86 year old      Date of Admission:  1/5/2018         Assessment and Plan:   Pleasant 86-year-old man with a long-standing left inguinal hernia now presents with small bowel obstruction related to incarcerated left inguinal hernia.  No evidence for bowel necrosis or perforation.  We will proceed to the operating room for emergent left groin exploration and hernia repair.  Patient and his family were informed of the possible risks of more extensive surgery including laparotomy and bowel resection.  Comorbidities include cardiovascular disease and ulcerative colitis.         Code Status:   Full Code         Primary Care Physician:   Zurdo Kendrick [phone]         Chief Complaint:   Abdominal and left groin pain    History is obtained from the patient         History of Present Illness:   Zack Santiago is a 86 year old male who presents with with left lower quadrant pain.  He was seen at an outside urgent care with complaints of left lower quadrant pain.  He has a history of diverticulosis and this clinical presentation was thought to be consistent with diverticulitis.  He was then referred on to the emergency department where physical exam revealed a probable incarcerated left inguinal hernia.  Labs were fairly unremarkable.  In particular, patient was not found to have a leukocytosis or lactic acidosis.  CT scan was performed which showed evidence of a low-grade or early small bowel obstruction with probable transition point in the left inguinal hernia.  No bowel wall thickening, free fluid, or free air.  We are asked to consult and provide surgical services.           Past Medical History:   Zack Santiago  has a past medical history of A-fib (H) (2010); AAA (abdominal aortic aneurysm) without rupture (H); Amaurosis fugax of right eye  (10/16); Anemia (2004); Aortic insufficiency (6/14); Aortic insufficiency (11/2016); ASCVD (arteriosclerotic cardiovascular disease) (2010); BPH (benign prostatic hyperplasia) (2003); Bradycardia (2016); CKD (chronic kidney disease) stage 3, GFR 30-59 ml/min; Cough (2010); Dizzy (2001); GERD (gastroesophageal reflux disease); HTN (hypertension) (2002); colonoscopy (2003); Hypothyroid; Hypovitaminosis D; Idiopathic neuropathy; OCD (obsessive compulsive disorder); Panic disorder; Spinal headache; Stroke (H) (12/16); Sudden hearing loss (6/13); SVT (supraventricular tachycardia) (H) (11/16); Thoracic ascending aortic aneurysm (H) (06/2014); and Ulcerative colitis (H).             Past Surgical History:   Zack Santiago  has a past surgical history that includes cataract iol, rt/lt (2011); TOTAL KNEE ARTHROPLASTY (2011); TOTAL KNEE ARTHROPLASTY (2009); coronary artery bypass (2010); hernia repair (2005); turp (2003); knee surgery (1997); back surgery (1998); Bladder surgery (1985); hernia repair (1998); Repair hammer toe (12/28/2012); and Endovascular repair aneurysm abdominal aorta (N/A, 5/27/2015).         Home Medications:     Prior to Admission medications    Medication Sig Last Dose Taking? Auth Provider   simvastatin (ZOCOR) 20 MG tablet TAKE ONE TABLET BY MOUTH AT BEDTIME   Zurdo Kendrick MD   levothyroxine (SYNTHROID/LEVOTHROID) 100 MCG tablet TAKE ONE TABLET BY MOUTH ONE TIME DAILY   Zurdo Kendrick MD   chlorthalidone (HYGROTON) 25 MG tablet TAKE ONE TABLET BY MOUTH ONE TIME DAILY   Zurdo Kendrick MD   clopidogrel (PLAVIX) 75 MG tablet TAKE 1 TABLET BY MOUTH DAILY   Zurdo Kendrick MD   ASPIRIN NOT PRESCRIBED (INTENTIONAL) Please choose reason not prescribed, below  Patient not taking: Reported on 1/5/2018   Zurdo Kendrick MD   MIRTAZAPINE PO Take 30 mg by mouth At Bedtime   Unknown, Entered By History   LORAZEPAM PO Take 0.5 mg by mouth daily as needed for anxiety   Unknown,  "Entered By History   ClomiPRAMINE HCl (ANAFRANIL PO) Take 25 mg by mouth daily   Reported, Patient   gabapentin (NEURONTIN) 400 MG capsule Take 1 capsule by mouth 2 times daily    Reported, Patient   ARIPiprazole (ABILIFY) 2 MG tablet Take 1 tablet by mouth At Bedtime.   Zurdo Kendrick MD            Allergies:     Allergies   Allergen Reactions     No Known Allergies             Social History:   Zack Santiago  reports that he quit smoking about 52 years ago. His smoking use included Cigarettes. He has a 5.00 pack-year smoking history. He has never used smokeless tobacco. He reports that he drinks alcohol. He reports that he does not use illicit drugs.            Family History:   Zack Santiago family history includes C.A.D. in his father.           Review of Systems:   The 10 point Review of Systems is negative other than noted in the HPI.           Physical Exam:   Blood pressure 139/59, temperature 97.3  F (36.3  C), resp. rate 16, height 1.854 m (6' 1\"), weight 88.9 kg (196 lb), SpO2 97 %.  196 lbs 0 oz    Pleasant elderly gentleman in no distress.  Patient has a pleasant affect and communicates well.   Pupils equal round and reactive to light.   No cervical lymphadenopathy or thyromegaly.   Lung fields clear, breathing comfortably.   Heart normal sinus rhythm.  No murmurs rubs or gallops.  Abdomen soft, mildly distended.  No peritoneal signs or rebound.  Prominent left inguinal bulge, tender to palpation and firm.  Not reducible.  Skin warm, dry.  No obvious rashes or lesions.           Data:   All new lab and imaging data was reviewed including emergency department notes, personal review of laboratory evaluation, and personal review of CT scan images.  Recent Labs   Lab Test  01/05/18   2112  09/06/17   1630   12/22/16   2328   09/22/11   1230   WBC  8.4  5.6   < >  6.6   < >   --    HGB  13.1*  12.3*   < >  12.2*   < >  13.4   MCV  97  95   < >  97   < >   --    PLT  161  177   < >  150   < >   --  "   INR   --    --    --   1.09   --   1.07    < > = values in this interval not displayed.      Recent Labs   Lab Test  01/05/18   2112  09/06/17   1630   NA  139  138   POTASSIUM  3.5  3.7   CHLORIDE  103  103   CO2  33*  25   BUN  27  27   CR  1.14  1.05   ANIONGAP  3  10   YOUNG  9.0  8.7   GLC  91  93

## 2018-01-06 NOTE — PROGRESS NOTES
"SUBJECTIVE  HPI: Zack Santiago is a 86 year old male  who presents with the CC of abdominal/pelvic pain.   Pain is located in the LLQ area, with radiation to None    The pain is characterized as \"pain\".    Pain has been present for 3pm and is slowly progressive.     EXACERBATING FACTORS: NEGATIVE.   RELIEVING FACTORS: NEGATIVE.    ASSOCIATED SX: none.     Past Medical History:   Diagnosis Date     A-fib (H) 2010    post op     AAA (abdominal aortic aneurysm) without rupture (H)     Dr. 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/16    carotid us nl stenosis, echo no change and no clots; ziopatch no afib, svt present     Anemia 2004     Aortic insufficiency 6/14    1+ on echo     Aortic insufficiency 11/2016    mild to mod     ASCVD (arteriosclerotic cardiovascular disease) 2010    cabg, post op afib     BPH (benign prostatic hyperplasia) 2003    turp, flomax added by urol 2/17     Bradycardia 2016    stopped toprol     CKD (chronic kidney disease) stage 3, GFR 30-59 ml/min      Cough 2010    pulm eval, felt due to reflux     Dizzy 2001    mri small vessel dz     GERD (gastroesophageal reflux disease)      HTN (hypertension) 2002     Hx of colonoscopy 2003    tics     Hypothyroid      Hypovitaminosis D      Idiopathic neuropathy      OCD (obsessive compulsive disorder)     sees psyche     Panic disorder     Dr. Luna     Spinal headache      Stroke (H) 12/16    expressive aphasia, hosp, seen by neuro, mri pos, carotids min dz, changed from asa to plavix     Sudden hearing loss 6/13    Dr. Garcia, mri brain no acoustic neuroma     SVT (supraventricular tachycardia) (H) 11/16    seen on ziopatch done for amaurosis, longest 15 beats     Thoracic ascending aortic aneurysm (H) 06/2014    4.4cm on echo, aortic root 3.9, fu 5/15 4.8cm; fu done 12/15 and slightly enlarged, fu 6/16 no change, fu 11/16 no change     Ulcerative colitis (H)     not active for years     Allergies   Allergen " Reactions     No Known Allergies      Social History   Substance Use Topics     Smoking status: Former Smoker     Packs/day: 1.00     Years: 5.00     Types: Cigarettes     Quit date: 6/13/1965     Smokeless tobacco: Never Used     Alcohol use 0.0 oz/week     0 Standard drinks or equivalent per week      Comment: maybe one glass of wine a week       ROS:CONSTITUTIONAL:NEGATIVE for fever, chills, change in weight    EXAMINATION:  /65  Pulse 56  Temp 96.8  F (36  C) (Oral)  Resp 18  Wt 196 lb 3.2 oz (89 kg)  SpO2 98%  BMI 25.89 kg/[id number] APPEARANCE: healthy, alert and no distress  ABDOMEN: tenderness moderate LLQ  GU_male: testicles normal without  masses or  tenderness, no hernias and penis normal without urethral discharge      ICD-10-CM    1. Continuous severe abdominal pain R10.9      Will go through ED for w/u to r/o Diverticulitis vs other

## 2018-01-06 NOTE — CONSULTS
HOSPITALIST CONSULT NOTE      PRIMARY CARE PROVIDER:  Zurdo Kendrick MD      CHIEF COMPLAINT:  Abdominal pain.      HISTORY OF PRESENT ILLNESS:  Zack Santiago is a pleasant 86-year-old male with past medical history of ulcerative colitis, abdominal hernia, AAA, status post endograft repair, coronary artery disease, status post 4-vessel bypass grafting in 2010, hypertension, history of CVA without residual deficits, peripheral arterial disease, who presents due to abdominal pain earlier this evening.  The patient states that he was fine early in the morning; however, he developed discomfort localized to his left lower quadrant where he does have a known hernia.  The pain persisted, and he visited an urgent care where at that point he was sent to the ER for further evaluation.  He had a CT scan showing small bowel obstruction secondary to a left inguinal hernia containing a loop of small bowel, and was thus taken emergently by Dr. Walker for surgery.  He underwent an uncomplicated herniorrhaphy of the left inguinal region without any immediate complications.  He is now in the observation unit, and the Hospitalist Service was consulted for medical co-management.      The patient denies any symptoms other than groin discomfort prior to surgery.  Right now he does have a little bit of discomfort in that area, but otherwise states he feels fine now.  He denies any recent fevers, chills, night sweats, nor any chest pain or shortness of breath.  He has been taking all of his medications as prescribed.  He notes that he is currently no longer on any cardiac medications for his history of bypass grafting.  He notes that in 12/2016, he was diagnosed with small strokes, and has been on Plavix since that time.      PAST MEDICAL HISTORY:   1.  Ulcerative colitis.   2.  Abdominal aortic aneurysm.   3.  Supraventricular tachycardia.   4.  Four-vessel bypass grafting.   5.  Obsessive compulsive disorder.   6.  Idiopathic peripheral  neuropathy.   7.  Gastroesophageal reflux disease.   8.  Hyperlipidemia.   9.  Chronic kidney disease, stage III.   10.  History of cerebrovascular accident.   11.  Benign essential hypertension.   12.  Atrial fibrillation.   13.  Hypothyroidism.      MEDICATIONS:    No prescriptions prior to admission.         ALLERGIES:  No known drug allergies.      SOCIAL HISTORY:  The patient denies tobacco use.  He states rare alcohol use on social occasions.  Presently lives in the [place] area.      FAMILY HISTORY:  Reviewed and noncontributory.  Father with coronary artery disease.      REVIEW OF SYSTEMS:  A 10-point review of systems was performed and negative except as per HPI.      PHYSICAL EXAMINATION:   VITAL SIGNS:  Temperature 95.5 degrees Fahrenheit, heart rate 54, blood pressure 109/48, respirations in mid-teens, with oxygen saturation of 94% on 1 liter nasal cannula.   GENERAL:  No acute distress, pleasant.  No family or friends present at bedside.  Appears stated age.  Comfortably lying flat in the observation unit bed.   HEENT:  Normocephalic, atraumatic.  Extraocular motions intact.  Moist mucous membranes.  Anicteric sclerae.  Uvula midline.   NECK:  No lymphadenopathy.  Trachea midline.   CARDIOVASCULAR:  Regular rate and rhythm.  No additional heart sounds.   PULMONARY:  Clear to auscultation bilaterally, with no wheezes, rhonchi or rales.   GASTROINTESTINAL:  Bowel sounds are absent.  Soft, nondistended, nontender.   GROIN:  Left groin dressing without drainage.   EXTREMITIES:  No edema noted.  Warm.  DP pulses equal bilaterally.   NEURO:  Moves all four extremities.  Cranial nerves II-XII grossly intact.   PSYCH:  Appropriate mood and affect, oriented x3.   SKIN:  No rashes or bruising noted.      LAB STUDIES:  BMP with bicarbonate of 33.  Lactic acid was normal.  Glucose 91.  CBC with a hemoglobin of 13.1, hematocrit of 39.5.      CT abdomen and pelvis, from prior surgery, shows a small bowel  obstruction secondary to a left inguinal hernia containing a loop of small bowel, along with no change in the aortoiliac stent graft in the infrarenal abdominal aortic aneurysm.      ASSESSMENT:  Zack Santiago is an 86-year-old male with past medical history notable for CVA, on Plavix, without any residual deficits, 4-vessel bypass grafting, CKD stage III, AAA, who presented for left lower quadrant abdominal pain, and found to have incarcerated small bowel in inguinal hernia, and was taken emergently to the OR for hernia repair.  Hospitalist Service was consulted for medical co-management post-surgery.      1.  Small bowel obstruction due to a left inguinal area hernia, status post herniorrhaphy repair by Dr. Walker on 01/05/2018.  No immediate postoperative complications were noted.   - Defer postoperative cares, including pain control, IV fluids, and DVT prophylaxis to Surgery.   - Lab studies including CBC and BMP were unremarkable prior to surgery.   - Diet per primary service.      2.  History of CVA in 12/2016, without any residual deficits.   - Presently holding prior to admission Plavix; defer to Surgery when okay to resume.      3.  History of hypothyroidism.   - Resume Synthroid once verified by pharmacy.      4.  History of hypertension, stable.   - Holding prior to admission chlorthalidone as the patient is on IV fluids, and pressures are controlled.      5.  History of idiopathic peripheral neuropathy.   - Resume prior to admission gabapentin once ambulating and tolerating adequate oral intake.      6.  History of OCD and panic disorder.   -  Resume prior to admission Abilify, clomipramine, Ativan, and mirtazapine, once verified by pharmacy.      7.  History of hyperlipidemia.   - Resume prior to admission Zocor once verified by pharmacy.      8.  History of coronary bypass grafting x4 vessels in 2010.  The patient denies any exertional symptoms such as chest pain or shortness of breath prior to surgery.   He tolerated the surgery without any immediate complications, and vital signs are presently stable.   - Continue to monitor.   - Pharmacy consult for medication reconciliation.      9.  DVT prophylaxis.   - Per primary service.      CODE STATUS:  The patient is a FULL CODE.         VALENTINE DUQUE MD             D: 2018 03:13   T: 2018 05:44   MT: [id number]      Name:     JOSSELINE PEARSON   MRN:      [id number]        Account:       [id number]   :      1931           Consult Date:  2018      Document: [id number]       cc: Harsh Kendrick MD

## 2018-01-06 NOTE — ANESTHESIA POSTPROCEDURE EVALUATION
Patient: Zack Santiago    Procedure(s):  Incarcerated Left Inguinal Hernia - Wound Class: I-Clean    Diagnosis:N/A  Diagnosis Additional Information: No value filed.    Anesthesia Type:  General, RSI, ETT    Note:  Anesthesia Post Evaluation    Patient location during evaluation: PACU  Patient participation: Able to fully participate in evaluation  Level of consciousness: awake  Pain management: adequate  Airway patency: patent  Cardiovascular status: acceptable  Respiratory status: acceptable  Hydration status: acceptable  PONV: none     Anesthetic complications: None          Last vitals:  Vitals:    01/05/18 2312 01/06/18 0053 01/06/18 0100   BP: 140/68 (!) 146/95 136/60   Pulse: 51     Resp: 18 18 18   Temp:  35.7  C (96.3  F) 35.8  C (96.4  F)   SpO2: 99%  100%         Electronically Signed By: Serina Rojas MD  January 6, 2018  1:17 AM

## 2018-01-06 NOTE — PLAN OF CARE
"Problem: Patient Care Overview  Goal: Plan of Care/Patient Progress Review  Outcome: Improving  PRIMARY DIAGNOSIS: \"GENERIC\" NURSING  OUTPATIENT/OBSERVATION GOALS TO BE MET BEFORE DISCHARGE:  1. ADLs back to baseline: Yes    2. Activity and level of assistance: Ambulating independently.    3. Pain status: Improved with use of alternative comfort measures i.e.: positioning    4. Return to near baseline physical activity: Yes    5.  Surgery: Yes     Discharge Planner Nurse   Safe discharge environment identified: Yes  Barriers to discharge: No       Entered by: Radha Garibay 01/06/2018 12:16 PM     MD at bedside      "

## 2018-01-06 NOTE — PLAN OF CARE
Problem: Patient Care Overview  Goal: Plan of Care/Patient Progress Review  Outcome: Improving  A&O. VsS,ex 2l nasal cannula. CPAP frequently beeping. Changed the tubing and got better. Denied pain. Not passing gas yet. Tolerating clear liquid. IV infusing. Hospitalist consult.Continue to monitor.

## 2018-01-06 NOTE — PHARMACY-ADMISSION MEDICATION HISTORY
Admission medication history interview status for the 1/5/2018  admission is complete. See EPIC admission navigator for prior to admission medications     Medication history source reliability:Good    Actions taken by pharmacist (provider contacted, etc):Verified all meds with patient's retail pharmacy records through [facility]     Additional medication history information not noted on PTA med list :None    Medication reconciliation/reorder completed by provider prior to medication history? No    Time spent in this activity: 15 minutes    Prior to Admission medications    Medication Sig Last Dose Taking? Auth Provider   simvastatin (ZOCOR) 20 MG tablet TAKE ONE TABLET BY MOUTH AT BEDTIME 1/4/2018 at pm Yes Zurdo Kendrick MD   levothyroxine (SYNTHROID/LEVOTHROID) 100 MCG tablet TAKE ONE TABLET BY MOUTH ONE TIME DAILY 1/5/2018 at am Yes Zurdo Kendrick MD   chlorthalidone (HYGROTON) 25 MG tablet TAKE ONE TABLET BY MOUTH ONE TIME DAILY 1/5/2018 at am Yes Zurdo Kendrick MD   clopidogrel (PLAVIX) 75 MG tablet TAKE 1 TABLET BY MOUTH DAILY 1/5/2018 at am Yes Zurdo Kendrick MD   MIRTAZAPINE PO Take 45 mg by mouth At Bedtime  1/4/2018 at pm Yes Unknown, Entered By History   LORAZEPAM PO Take 0.5 mg by mouth daily as needed for anxiety prn med Yes Unknown, Entered By History   ClomiPRAMINE HCl (ANAFRANIL PO) Take 25 mg by mouth every evening  1/4/2018 at pm Yes Reported, Patient   gabapentin (NEURONTIN) 400 MG capsule Take 1 capsule by mouth 2 times daily  1/5/2018 at Unknown time Yes Reported, Patient   ARIPiprazole (ABILIFY) 2 MG tablet Take 1 tablet by mouth At Bedtime. 1/4/2018 at pm Yes Zurdo Kendrick MD   ASPIRIN NOT PRESCRIBED (INTENTIONAL) Please choose reason not prescribed, below  Patient not taking: Reported on 1/5/2018   Zurdo Kendrick MD

## 2018-01-06 NOTE — NURSING NOTE
"Chief Complaint   Patient presents with     Urgent Care     Pt states pain in area of hernia which has not been operated on, 3x hrs        Initial /65  Pulse 56  Temp 96.8  F (36  C) (Oral)  Resp 18  Wt 196 lb 3.2 oz (89 kg)  SpO2 98%  BMI 25.89 kg/m2 Estimated body mass index is 25.89 kg/(m^2) as calculated from the following:    Height as of 10/26/17: 6' 1\" (1.854 m).    Weight as of this encounter: 196 lb 3.2 oz (89 kg).  Medication Reconciliation: complete      "

## 2018-01-06 NOTE — PLAN OF CARE
Problem: Patient Care Overview  Goal: Plan of Care/Patient Progress Review  Outcome: Adequate for Discharge Date Met: 01/06/18  VSS. A/O but forgetful at times. Bruising around incision (outlined). IS 2250. Dressing CDI. Tolerating diet. Up independently. Refusing pain medication. Instructed when to restart plavix and diuretic. All questions answered. Patient and family verbalize understanding of DC care.

## 2018-01-08 ENCOUNTER — TELEPHONE (OUTPATIENT)
Dept: FAMILY MEDICINE | Facility: CLINIC | Age: 83
End: 2018-01-08

## 2018-01-08 NOTE — PROGRESS NOTES
SUBJECTIVE:   Zack Santiago is a 86 year old male who presents for Preventive Visit.    The patient just had bowel obstruction surgery due to ing hernia and doing very well.  Minimal pain, has had one bowel movement, no n/v, no fevers, chills or night sweats.    He otherwise has been fine, works out reg, swims, depression under control with reg psyche follow up on that.  Has had aaa follow up and stable.  Needs echo for follow up thor aneurysm.  No chest pain or shortness of breath.  No neuro issues    Wife with progressive alzheimers so stress related to that.               Past Medical History:      Past Medical History:   Diagnosis Date     A-fib (H) 2010    post op     AAA (abdominal aortic aneurysm) without rupture (H)     Dr. 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    carotid us no stenosis, echo no change and no clots; ziopatch no afib, svt present     Anemia 2004     Aortic insufficiency 6/14    1+ on echo     Aortic insufficiency 11/2016    mild to mod     ASCVD (arteriosclerotic cardiovascular disease) 2010    cabg, post op afib     BPH (benign prostatic hyperplasia) 2003    turp, flomax added by urol 2/17     Bradycardia 2016    stopped toprol     CKD (chronic kidney disease) stage 3, GFR 30-59 ml/min      Cough 2010    pulm eval, felt due to reflux     Dizzy 2001    mri small vessel dz     GERD (gastroesophageal reflux disease)      HTN (hypertension) 2002     Hx of colonoscopy 2003    tics     Hypothyroid      Hypovitaminosis D      Idiopathic neuropathy      OCD (obsessive compulsive disorder)     sees psyche     Panic disorder     Dr. Luna     Spinal headache      Stroke (H) 12/16    expressive aphasia, hosp, seen by neuro, mri pos, carotids min dz, changed from asa to plavix     Sudden hearing loss 6/13    Dr. Garcia, mri brain no acoustic neuroma     SVT (supraventricular tachycardia) (H) 11/16    seen on ziopatch done for amaurosis, longest 15 beats      Thoracic ascending aortic aneurysm (H) 06/2014    4.4cm on echo, aortic root 3.9, fu 5/15 4.8cm; fu done 12/15 and slightly enlarged, fu 6/16 no change, fu 11/16 no change     Ulcerative colitis (H)     not active for years             Past Surgical History:      Past Surgical History:   Procedure Laterality Date     BACK SURGERY  1998     BLADDER SURGERY  1985     C TOTAL KNEE ARTHROPLASTY  2011    left     C TOTAL KNEE ARTHROPLASTY  2009    right     CATARACT IOL, RT/LT  2011     CORONARY ARTERY BYPASS  2010     ENDOVASCULAR REPAIR ANEURYSM ABDOMINAL AORTA N/A 5/27/2015    Procedure: ENDOVASCULAR REPAIR ANEURYSM ABDOMINAL AORTA;  Surgeon: Darin Walters MD;  Location: [facility] OR     HERNIA REPAIR  2005     HERNIA REPAIR  1998     HERNIORRHAPHY INGUINAL Left 1/5/2018    Procedure: HERNIORRHAPHY INGUINAL;  Incarcerated Left Inguinal Hernia;  Surgeon: Harsh Walker MD;  Location: [facility] OR     KNEE SURGERY  1997     REPAIR HAMMER TOE  12/28/2012    Procedure: REPAIR HAMMER TOE;  LEFT SECOND AND THIRD CLAW TOE RECONSTRUCTION;  Surgeon: Gray Haynes MD;  Location:  OR     TURP  2003             Social History:     Social History     Social History     Marital status:      Spouse name: N/A     Number of children: 2     Years of education: N/A     Occupational History     retail Retired     Social History Main Topics     Smoking status: Former Smoker     Packs/day: 1.00     Years: 5.00     Types: Cigarettes     Quit date: 6/13/1965     Smokeless tobacco: Never Used     Alcohol use 0.0 oz/week     0 Standard drinks or equivalent per week      Comment: maybe one glass of wine a week     Drug use: No     Sexual activity: Not Currently     Other Topics Concern     Not on file     Social History Narrative             Family History:   reviewed         Allergies:     Allergies   Allergen Reactions     No Known Allergies              Medications:     Current Outpatient Prescriptions   Medication Sig  "Dispense Refill     clopidogrel (PLAVIX) 75 MG tablet Take 1 tablet (75 mg) by mouth daily 90 tablet 3     simvastatin (ZOCOR) 20 MG tablet TAKE ONE TABLET BY MOUTH AT BEDTIME 90 tablet 0     levothyroxine (SYNTHROID/LEVOTHROID) 100 MCG tablet TAKE ONE TABLET BY MOUTH ONE TIME DAILY 90 tablet 1     chlorthalidone (HYGROTON) 25 MG tablet TAKE ONE TABLET BY MOUTH ONE TIME DAILY 90 tablet 1     MIRTAZAPINE PO Take 45 mg by mouth At Bedtime        LORAZEPAM PO Take 0.5 mg by mouth daily as needed for anxiety       ClomiPRAMINE HCl (ANAFRANIL PO) Take 25 mg by mouth every evening        gabapentin (NEURONTIN) 400 MG capsule Take 1 capsule by mouth 2 times daily        ARIPiprazole (ABILIFY) 2 MG tablet Take 1 tablet by mouth At Bedtime.       ASPIRIN NOT PRESCRIBED (INTENTIONAL) Please choose reason not prescribed, below (Patient not taking: Reported on 1/5/2018) 0 each 0               Review of Systems:   The 10 point Review of Systems is negative other than noted in the HPI           Physical Exam:   Blood pressure 128/66, pulse 58, temperature 98.4  F (36.9  C), temperature source Oral, height 6' 1\" (1.854 m), weight 194 lb (88 kg), SpO2 98 %.    Exam:  Constitutional: healthy appearing, alert and in no distress  Heent: Normocephalic. Head without obvious masses or lesions. PERRLDC, EOMI. Mouth exam within normal limits: tongue, mucous membranes, posterior pharynx all normal, no lesions or abnormalities seen.  Tm's and canals within normal limits bilaterally. Neck supple, no nuchal rigidity or masses. No supraclavicular, or cervical adenopathy. Thyroid symmetric, no masses.  Cardiovascular: Regular rate and rhythm, no murmer, rub or gallops.  JVP not elevated, no edema.  Carotids within normal limits bilaterally, no bruits.  Respiratory: Normal respiratory effort.  Lungs clear, normal flow, no wheezing or crackles.  Breasts: Normal bilaterally.  No masses or lesions.  Nipples within normal limites.  No axillary " lesions or nodes.  Gastrointestinal: Normal active bowel sounds.   Bruising over lower abdomen, soft mass llq, firm but not tender, no other lesions, no hepatosplenomegaly.  Genitourinary: Rectal not done  Musculoskeletal: extremities normal, no gross deformities noted.  Skin: no suspicious lesions or rashes   Neurologic: Mental status within normal limits.  Speech fluent.  No gross motor abnormalities and gait intact.  Psychiatric: mentation appears normal and affect normal.         Data:   Labs noted from hosp, others sent today; ekg - sinus lucio, rbbb, non specific t wave changes, qt about the same as old, similar to prior ekg        Assessment:   1. Normal complete physical exam  2. Post op sbo due to hernia, doing well, has small hematoma, no signs infection  3. Ascvd, stable  4. Aaa, stable  5. Thor aneurysm, ai, follow up echo  6. Ocd, depression, stable, has psyche follow up  7. Elevated cholesterol on statin  8. Hypertension, controlled  9. Gerd, no issues  10. Afib, no issues  11. Cvi, no issues, med mgmt  12. Svt, no issues  13. hcm         Plan:   Up to date immunizations  Letter with labs  Echo  Follow up psyche  Follow up surgery  Call if problems      Zurdo Kendrick M.D.            Are you in the first 12 months of your Medicare Part B coverage?  No    Healthy Habits:    Do you get at least three servings of calcium containing foods daily (dairy, green leafy vegetables, etc.)? yes    Amount of exercise or daily activities, outside of work: 3 day(s) per week    Problems taking medications regularly No    Medication side effects: No    Have you had an eye exam in the past two years? yes    Do you see a dentist twice per year? yes    Do you have sleep apnea, excessive snoring or daytime drowsiness?no      Ability to successfully perform activities of daily living: Yes, no assistance needed    Home safety:  none identified     Hearing impairment: No    Fall risk:             COGNITIVE SCREEN  1) Repeat 3  items (Banana, Sunrise, Chair)    2) Clock draw:   3) 3 item recall: Recalls 3 objects  Results: 3 items recalled: COGNITIVE IMPAIRMENT LESS LIKELY    Mini-CogTM Copyright S Gladys. Licensed by the author for use in [facility]; reprinted with permission (jg@[email or web address]). All rights reserved.                    Hospital Follow-up Visit:    Hospital/Nursing Home/IP Rehab Facility: [facility]  Date of Admission: 1/5/18  Date of Discharge: 1/6/18  Reason(s) for Admission: incarcerated inguinal hernia            Problems taking medications regularly:  None       Medication changes since discharge: None       Problems adhering to non-medication therapy:  None    Summary of hospitalization:  Discharge summary unavailable  Diagnostic Tests/Treatments reviewed.  Follow up needed:   Other Healthcare Providers Involved in Patient s Care:         Specialist appointment - see surgery 10 days  Update since discharge: improved.     Post Discharge Medication Reconciliation: discharge medications reconciled, continue medications without change.  Plan of care communicated with patient     Coding guidelines for this visit:  Type of Medical   Decision Making Face-to-Face Visit       within 7 Days of discharge Face-to-Face Visit        within 14 days of discharge   Moderate Complexity [id number]   High Complexity [id number]              Reviewed and updated as needed this visit by clinical staff         Reviewed and updated as needed this visit by Provider      Social History   Substance Use Topics     Smoking status: Former Smoker     Packs/day: 1.00     Years: 5.00     Types: Cigarettes     Quit date: 6/13/1965     Smokeless tobacco: Never Used     Alcohol use 0.0 oz/week     0 Standard drinks or equivalent per week      Comment: maybe one glass of wine a week       If you drink alcohol do you typically have >3 drinks per day or >7 drinks per week? No                        Today's PHQ-2 Score:   PHQ-2  "( 1999 Pfizer) 10/26/2017 9/6/2017   Q1: Little interest or pleasure in doing things 0 0   Q2: Feeling down, depressed or hopeless 0 0   PHQ-2 Score 0 0         Do you feel safe in your environment - Yes    Do you have a Health Care Directive?: Yes: Patient states has Advance Directive and will bring in a copy to clinic.    Current providers sharing in care for this patient include: Patient Care Team:  Zurdo Kendrick MD as PCP - General (Internal Medicine)    The following health maintenance items are reviewed in Epic and correct as of today:  Health Maintenance   Topic Date Due     ADVANCE DIRECTIVE PLANNING Q5 YRS  06/08/2017     FALL RISK ASSESSMENT  09/06/2018     TETANUS IMMUNIZATION (SYSTEM ASSIGNED)  06/15/2022     INFLUENZA VACCINE (SYSTEM ASSIGNED)  Completed     PNEUMOCOCCAL  Completed         End of Life Planning:  Patient currently has an advanced directive: yes    COUNSELING:  Reviewed preventive health counseling, as reflected in patient instructions       Regular exercise       Healthy diet/nutrition        Estimated body mass index is 25.86 kg/(m^2) as calculated from the following:    Height as of 1/5/18: 6' 1\" (1.854 m).    Weight as of 1/5/18: 196 lb (88.9 kg).       reports that he quit smoking about 52 years ago. His smoking use included Cigarettes. He has a 5.00 pack-year smoking history. He has never used smokeless tobacco.        Appropriate preventive services were discussed with this patient, including applicable screening as appropriate for cardiovascular disease, diabetes, osteopenia/osteoporosis, and glaucoma.  As appropriate for age/gender, discussed screening for colorectal cancer, prostate cancer, breast cancer, and cervical cancer. Checklist reviewing preventive services available has been given to the patient.    Reviewed patients plan of care and provided an AVS. The Basic Care Plan (routine screening as documented in Health Maintenance) for Zack meets the Care Plan " requirement. This Care Plan has been established and reviewed with the Patient.    Counseling Resources:  ATP IV Guidelines  Pooled Cohorts Equation Calculator  Breast Cancer Risk Calculator  FRAX Risk Assessment  ICSI Preventive Guidelines  Dietary Guidelines for Americans, 2010  USDA's MyPlate  ASA Prophylaxis  Lung CA Screening    Zurdo Kendrick MD  [facility]

## 2018-01-09 ENCOUNTER — OFFICE VISIT (OUTPATIENT)
Dept: FAMILY MEDICINE | Facility: CLINIC | Age: 83
End: 2018-01-09
Payer: COMMERCIAL

## 2018-01-09 VITALS
TEMPERATURE: 98.4 F | BODY MASS INDEX: 25.71 KG/M2 | HEART RATE: 58 BPM | SYSTOLIC BLOOD PRESSURE: 128 MMHG | DIASTOLIC BLOOD PRESSURE: 66 MMHG | HEIGHT: 73 IN | WEIGHT: 194 LBS | OXYGEN SATURATION: 98 %

## 2018-01-09 DIAGNOSIS — I47.10 SVT (SUPRAVENTRICULAR TACHYCARDIA) (H): ICD-10-CM

## 2018-01-09 DIAGNOSIS — I63.9 CEREBROVASCULAR ACCIDENT (CVA), UNSPECIFIED MECHANISM (H): ICD-10-CM

## 2018-01-09 DIAGNOSIS — N18.30 CKD (CHRONIC KIDNEY DISEASE) STAGE 3, GFR 30-59 ML/MIN (H): ICD-10-CM

## 2018-01-09 DIAGNOSIS — I10 BENIGN ESSENTIAL HYPERTENSION: ICD-10-CM

## 2018-01-09 DIAGNOSIS — Z00.00 ROUTINE GENERAL MEDICAL EXAMINATION AT A HEALTH CARE FACILITY: Primary | ICD-10-CM

## 2018-01-09 DIAGNOSIS — K21.9 GASTROESOPHAGEAL REFLUX DISEASE WITHOUT ESOPHAGITIS: ICD-10-CM

## 2018-01-09 DIAGNOSIS — I71.40 AAA (ABDOMINAL AORTIC ANEURYSM) WITHOUT RUPTURE (H): ICD-10-CM

## 2018-01-09 DIAGNOSIS — I48.91 ATRIAL FIBRILLATION, UNSPECIFIED TYPE (H): ICD-10-CM

## 2018-01-09 DIAGNOSIS — F42.9 OBSESSIVE-COMPULSIVE DISORDER, UNSPECIFIED TYPE: Chronic | ICD-10-CM

## 2018-01-09 DIAGNOSIS — I71.21 THORACIC ASCENDING AORTIC ANEURYSM (H): ICD-10-CM

## 2018-01-09 DIAGNOSIS — I25.10 ASCVD (ARTERIOSCLEROTIC CARDIOVASCULAR DISEASE): ICD-10-CM

## 2018-01-09 DIAGNOSIS — I35.1 AORTIC VALVE INSUFFICIENCY, ETIOLOGY OF CARDIAC VALVE DISEASE UNSPECIFIED: ICD-10-CM

## 2018-01-09 DIAGNOSIS — E03.9 ACQUIRED HYPOTHYROIDISM: ICD-10-CM

## 2018-01-09 DIAGNOSIS — E78.5 HYPERLIPIDEMIA LDL GOAL <100: ICD-10-CM

## 2018-01-09 PROCEDURE — 84443 ASSAY THYROID STIM HORMONE: CPT | Performed by: INTERNAL MEDICINE

## 2018-01-09 PROCEDURE — 93000 ELECTROCARDIOGRAM COMPLETE: CPT | Performed by: INTERNAL MEDICINE

## 2018-01-09 PROCEDURE — 36415 COLL VENOUS BLD VENIPUNCTURE: CPT | Performed by: INTERNAL MEDICINE

## 2018-01-09 PROCEDURE — 99397 PER PM REEVAL EST PAT 65+ YR: CPT | Performed by: INTERNAL MEDICINE

## 2018-01-09 PROCEDURE — 80061 LIPID PANEL: CPT | Performed by: INTERNAL MEDICINE

## 2018-01-09 NOTE — TELEPHONE ENCOUNTER
Hospital/ED for chronic condition Discharge Protocol    Patient has since been seen in clinic (01/09/2018). No need for f/u at this time.     Concha Dinh, DAISYN, RN, PHN  Clinic Care Coordinator  [facility]  [phone]

## 2018-01-09 NOTE — MR AVS SNAPSHOT
After Visit Summary   1/9/2018    Zack Santiago    MRN: [id number]           Patient Information     Date Of Birth          5/29/1931        Visit Information        Provider Department      1/9/2018 9:30 AM Zurdo Kendrick MD [facility]        Today's Diagnoses     Routine general medical examination at a health care facility    -  1    AAA (abdominal aortic aneurysm) without rupture (H)        Cerebrovascular accident (CVA), unspecified mechanism (H)        SVT (supraventricular tachycardia) (H)        Atrial fibrillation, unspecified type (H)        Thoracic ascending aortic aneurysm (H)        Aortic valve insufficiency, etiology of cardiac valve disease unspecified        CKD (chronic kidney disease) stage 3, GFR 30-59 ml/min        Benign essential hypertension        Acquired hypothyroidism        ASCVD (arteriosclerotic cardiovascular disease)        Gastroesophageal reflux disease without esophagitis        Hyperlipidemia LDL goal <100        Obsessive-compulsive disorder, unspecified type          Care Instructions      Preventive Health Recommendations:       Male Ages 65 and over    Yearly exam:             See your health care provider every year in order to  o   Review health changes.   o   Discuss preventive care.    o   Review your medicines if your doctor has prescribed any.    Talk with your health care provider about whether you should have a test to screen for prostate cancer (PSA).    Every 3 years, have a diabetes test (fasting glucose). If you are at risk for diabetes, you should have this test more often.    Every 5 years, have a cholesterol test. Have this test more often if you are at risk for high cholesterol or heart disease.     Every 10 years, have a colonoscopy. Or, have a yearly FIT test (stool test). These exams will check for colon cancer.    Talk to with your health care provider about screening for Abdominal Aortic Aneurysm if you have a family  history of AAA or have a history of smoking.  Shots:     Get a flu shot each year.     Get a tetanus shot every 10 years.     Talk to your doctor about your pneumonia vaccines. There are now two you should receive - Pneumovax (PPSV 23) and Prevnar (PCV 13).    Talk to your doctor about a shingles vaccine.     Talk to your doctor about the hepatitis B vaccine.  Nutrition:     Eat at least 5 servings of fruits and vegetables each day.     Eat whole-grain bread, whole-wheat pasta and brown rice instead of white grains and rice.     Talk to your doctor about Calcium and Vitamin D.   Lifestyle    Exercise for at least 150 minutes a week (30 minutes a day, 5 days a week). This will help you control your weight and prevent disease.     Limit alcohol to one drink per day.     No smoking.     Wear sunscreen to prevent skin cancer.     See your dentist every six months for an exam and cleaning.     See your eye doctor every 1 to 2 years to screen for conditions such as glaucoma, macular degeneration and cataracts.          Follow-ups after your visit        Future tests that were ordered for you today     Open Future Orders        Priority Expected Expires Ordered    Echocardiogram Complete Routine  1/9/2019 1/9/2018            Who to contact     If you have questions or need follow up information about today's clinic visit or your schedule please contact [facility] directly at [phone].  Normal or non-critical lab and imaging results will be communicated to you by MyChart, letter or phone within 4 business days after the clinic has received the results. If you do not hear from us within 7 days, please contact the clinic through MyChart or phone. If you have a critical or abnormal lab result, we will notify you by phone as soon as possible.  Submit refill requests through [address] or call your pharmacy and they will forward the refill request to us. Please allow 3 business days for your refill to be completed.  "         Additional Information About Your Visit        MyChart Information     [employer] lets you send messages to your doctor, view your test results, renew your prescriptions, schedule appointments and more. To sign up, go to [email or web address] . Click on \"Log in\" on the left side of the screen, which will take you to the Welcome page. Then click on \"Sign up Now\" on the right side of the page.     You will be asked to enter the access code listed below, as well as some personal information. Please follow the directions to create your username and password.     Your access code is: [email or web address]  Expires: 2018  7:35 PM     Your access code will  in 90 days. If you need help or a new code, please call your [facility] clinic or [phone].        Care EveryWhere ID     This is your [facility] EveryWhere ID. This could be used by other organizations to access your [facility] medical records  [id number]        Your Vitals Were     Pulse Temperature Height Pulse Oximetry BMI (Body Mass Index)       58 98.4  F (36.9  C) (Oral) 6' 1\" (1.854 m) 98% 25.6 kg/m2        Blood Pressure from Last 3 Encounters:   18 128/66   18 122/55   18 134/65    Weight from Last 3 Encounters:   18 194 lb (88 kg)   18 196 lb (88.9 kg)   18 196 lb 3.2 oz (89 kg)              We Performed the Following     EKG 12-lead complete w/read - Clinics        Primary Care Provider Office Phone # Fax #    Zurdo Kendrick [phone] [address]  [place] [address]        Equal Access to Services     ABAD [place] : Lulu Milian, elsa rosas, elizabeth mayorga, [employer]. So [facility] [phone].    ATENCIÓN: Si habla español, tiene a khan disposición servicios gratuitos de asistencia lingüística. Llame al [phone].    We comply with applicable federal civil rights laws and [place] laws. We do not discriminate on the " basis of race, color, national origin, age, disability, sex, sexual orientation, or gender identity.            Thank you!     Thank you for choosing [facility]  for your care. Our goal is always to provide you with excellent care. Hearing back from our patients is one way we can continue to improve our services. Please take a few minutes to complete the written survey that you may receive in the mail after your visit with us. Thank you!             Your Updated Medication List - Protect others around you: Learn how to safely use, store and throw away your medicines at www.disposemymeds.org.          This list is accurate as of: 1/9/18  9:40 AM.  Always use your most recent med list.                   Brand Name Dispense Instructions for use Diagnosis    ANAFRANIL PO      Take 25 mg by mouth every evening    OCD (obsessive compulsive disorder)       ARIPiprazole 2 MG tablet    ABILIFY     Take 1 tablet by mouth At Bedtime.        ASPIRIN NOT PRESCRIBED    INTENTIONAL    0 each    Please choose reason not prescribed, below    ASCVD (arteriosclerotic cardiovascular disease)       chlorthalidone 25 MG tablet    HYGROTON    90 tablet    TAKE ONE TABLET BY MOUTH ONE TIME DAILY    Essential hypertension       clopidogrel 75 MG tablet    PLAVIX    90 tablet    Take 1 tablet (75 mg) by mouth daily    Cerebrovascular accident (CVA), unspecified mechanism (H)       gabapentin 400 MG capsule    NEURONTIN     Take 1 capsule by mouth 2 times daily        levothyroxine 100 MCG tablet    SYNTHROID/LEVOTHROID    90 tablet    TAKE ONE TABLET BY MOUTH ONE TIME DAILY    Acquired hypothyroidism       LORAZEPAM PO      Take 0.5 mg by mouth daily as needed for anxiety        MIRTAZAPINE PO      Take 45 mg by mouth At Bedtime        simvastatin 20 MG tablet    ZOCOR    90 tablet    TAKE ONE TABLET BY MOUTH AT BEDTIME    Hyperlipidemia LDL goal <100

## 2018-01-09 NOTE — NURSING NOTE
"Chief Complaint   Patient presents with     Medicare Visit       Initial /66  Pulse 58  Temp 98.4  F (36.9  C) (Oral)  Ht 6' 1\" (1.854 m)  Wt 194 lb (88 kg)  SpO2 98%  BMI 25.6 kg/m2 Estimated body mass index is 25.6 kg/(m^2) as calculated from the following:    Height as of this encounter: 6' 1\" (1.854 m).    Weight as of this encounter: 194 lb (88 kg).  Medication Reconciliation: complete   Connie Black CMA      "

## 2018-01-09 NOTE — LETTER
[place] [address] [facility]  [address]  [place]  [address]  Tel: [phone]    January 10, 2018    Zack Santiago  [address] [facility] [address]        Dear Mr. Santiago,    It was a pleasure seeing you.  I wanted to get back to you with your test results.  I have enclosed a copy for your records.    Your labs look super including your thyroid test, ekg and cholesterol.  If you have any questions please call me.        Sincerely,    Hayder Wyatt MD / brendon          Enclosure: Lab Results    Resulted Orders   TSH with free T4 reflex   Result Value Ref Range    TSH 0.79 0.40 - 4.00 mU/L   Lipid panel reflex to direct LDL Non-fasting   Result Value Ref Range    Cholesterol 103 <200 mg/dL    Triglycerides 55 <150 mg/dL    HDL Cholesterol 52 >39 mg/dL    LDL Cholesterol Calculated 40 <100 mg/dL      Comment:      Desirable:       <100 mg/dl    Non HDL Cholesterol 51 <130 mg/dL

## 2018-01-10 LAB
CHOLEST SERPL-MCNC: 103 MG/DL
HDLC SERPL-MCNC: 52 MG/DL
LDLC SERPL CALC-MCNC: 40 MG/DL
NONHDLC SERPL-MCNC: 51 MG/DL
TRIGL SERPL-MCNC: 55 MG/DL
TSH SERPL DL<=0.005 MIU/L-ACNC: 0.79 MU/L (ref 0.4–4)

## 2018-01-10 NOTE — DISCHARGE SUMMARY
[facility] Discharge Summary    Zack Santiago MRN# [id number]   Age: 86 year old YOB: 1931     Date of Admission:  1/5/2018  Date of Discharge::  1/6/2018  2:16 PM  Admitting Physician:  Harsh Walker MD  Discharge Physician:  Harsh Walker MD, MD     Home clinic: [facility]          Admission Diagnoses:   Incarcerated left inguinal hernia [K40.30]         Discharge Diagnosis:   Inguinal hernia          Procedures:   Procedure(s): Inguinal hernia repair       No other procedures performed during this admission           Medications Prior to Admission:     No prescriptions prior to admission.             Discharge Medications:     Discharge Medication List as of 1/6/2018  1:26 PM      CONTINUE these medications which have CHANGED    Details   clopidogrel (PLAVIX) 75 MG tablet Take 1 tablet (75 mg) by mouth daily, Disp-90 tablet, R-3, Historical         CONTINUE these medications which have NOT CHANGED    Details   simvastatin (ZOCOR) 20 MG tablet TAKE ONE TABLET BY MOUTH AT BEDTIME, Disp-90 tablet, R-0, E-Prescribe      levothyroxine (SYNTHROID/LEVOTHROID) 100 MCG tablet TAKE ONE TABLET BY MOUTH ONE TIME DAILY, Disp-90 tablet, R-1, E-Prescribe      chlorthalidone (HYGROTON) 25 MG tablet TAKE ONE TABLET BY MOUTH ONE TIME DAILY, Disp-90 tablet, R-1, E-Prescribe      ASPIRIN NOT PRESCRIBED (INTENTIONAL) Reason ASA was Not Prescribed: Current anticoagulant therapy (warfarin/enoxaparin)      MIRTAZAPINE PO Take 45 mg by mouth At Bedtime , Historical      LORAZEPAM PO Take 0.5 mg by mouth daily as needed for anxiety, Historical      ClomiPRAMINE HCl (ANAFRANIL PO) Take 25 mg by mouth every evening , Historical      gabapentin (NEURONTIN) 400 MG capsule Take 1 capsule by mouth 2 times daily , Historical      ARIPiprazole (ABILIFY) 2 MG tablet Take 1 tablet by mouth At Bedtime., Historical                   Consultations:   Consultation during this admission received  from internal medicine          Brief History of Illness:   Patient presented with a incarcerated left inguinal hernia.  Underwent left inguinal hernia exploration and repair of incarcerated recurrent left inguinal hernia.           Hospital Course:   The patient's hospital course was unremarkable.  He recovered as anticipated and experienced no post-operative complications.  Was able to tolerate food postop day 1 and required very little for pain.  Was discharged to home.          Discharge Instructions and Follow-Up:   Discharge diet: Regular   Discharge activity: Activity as tolerated   Discharge follow-up: Follow up with me in 1-2 weeks   Wound care: Ice to area for comfort  Keep wound clean and dry           Discharge Disposition:   Discharged to home      Attestation:  I have reviewed today's vital signs, notes, medications, labs and imaging.    Harsh Walker MD, MD

## 2018-01-10 NOTE — PROGRESS NOTES
It was a pleasure seeing you.  I wanted to get back to you with your test results.  I have enclosed a copy for your records.    Your labs look super including your thyroid test, ekg and cholesterol.  If you have any questions please call me.

## 2018-01-16 ENCOUNTER — OFFICE VISIT (OUTPATIENT)
Dept: SURGERY | Facility: CLINIC | Age: 83
End: 2018-01-16
Payer: COMMERCIAL

## 2018-01-16 DIAGNOSIS — Z09 SURGERY FOLLOW-UP: Primary | ICD-10-CM

## 2018-01-16 PROCEDURE — 99024 POSTOP FOLLOW-UP VISIT: CPT | Performed by: SURGERY

## 2018-01-16 NOTE — LETTER
2018    Re: Zack Santiago, : 1931    Zack Santiago presents today for surgical followup.  he is doing well following open recurrent left inguinal hernia repair.  This was repaired primarily.  Incisions look fine with no signs of wound infection, although there is some bruising.  Hopefully, his likelihood of further recurrence is low.  I expect him to make a complete recovery.  Thank you for the opportunity to help in his care.     Johnathon Walker M.D.  [facility]  [phone]

## 2018-01-16 NOTE — MR AVS SNAPSHOT
"              After Visit Summary   2018    Zack Santiago    MRN: [id number]           Patient Information     Date Of Birth          1931        Visit Information        Provider Department      2018 10:00 AM Harsh Walker MD [facility] Hernia      Today's Diagnoses     Surgery follow-up    -  1       Follow-ups after your visit        Who to contact     If you have questions or need follow up information about today's clinic visit or your schedule please contact [facility] directly at [phone].  Normal or non-critical lab and imaging results will be communicated to you by [employer], letter or phone within 4 business days after the clinic has received the results. If you do not hear from us within 7 days, please contact the clinic through [employer] or phone. If you have a critical or abnormal lab result, we will notify you by phone as soon as possible.  Submit refill requests through [employer] or call your pharmacy and they will forward the refill request to us. Please allow 3 business days for your refill to be completed.          Additional Information About Your Visit        MyChart Information     [employer] lets you send messages to your doctor, view your test results, renew your prescriptions, schedule appointments and more. To sign up, go to [email or web address] . Click on \"Log in\" on the left side of the screen, which will take you to the Welcome page. Then click on \"Sign up Now\" on the right side of the page.     You will be asked to enter the access code listed below, as well as some personal information. Please follow the directions to create your username and password.     Your access code is: [email or web address]  Expires: 2018  7:35 PM     Your access code will  in 90 days. If you need help or a new code, please call your [place] clinic or [phone].        Care EveryWhere ID     This is your Care " EveryWhere ID. This could be used by other organizations to access your [place] medical records  [id number]         Blood Pressure from Last 3 Encounters:   01/09/18 128/66   01/06/18 122/55   01/05/18 134/65    Weight from Last 3 Encounters:   01/09/18 194 lb (88 kg)   01/05/18 196 lb (88.9 kg)   01/05/18 196 lb 3.2 oz (89 kg)              Today, you had the following     No orders found for display       Primary Care Provider Office Phone # Fax #    Zurdo Kendrick [phone] [address] [facility] [address]  [facility] [address]        Equal Access to Services     ABAD GROVE : Hadii gustavo moss Solino, waaxda carolinaadaha, qaybta kaalmada adeezekielyada, sita ellis . So [facility] [phone].    ATENCIÓN: Si habla español, tiene a khan disposición servicios gratuitos de asistencia lingüística. Llame al [phone].    We comply with applicable federal civil rights laws and [place] laws. We do not discriminate on the basis of race, color, national origin, age, disability, sex, sexual orientation, or gender identity.            Thank you!     Thank you for choosing [facility]  for your care. Our goal is always to provide you with excellent care. Hearing back from our patients is one way we can continue to improve our services. Please take a few minutes to complete the written survey that you may receive in the mail after your visit with us. Thank you!             Your Updated Medication List - Protect others around you: Learn how to safely use, store and throw away your medicines at www.disposemymeds.org.          This list is accurate as of: 1/16/18 10:16 AM.  Always use your most recent med list.                   Brand Name Dispense Instructions for use Diagnosis    ANAFRANIL PO      Take 25 mg by mouth every evening    OCD (obsessive compulsive disorder)       ARIPiprazole 2 MG tablet    ABILIFY     Take 1 tablet by mouth At Bedtime.        ASPIRIN NOT  PRESCRIBED    INTENTIONAL    0 each    Please choose reason not prescribed, below    ASCVD (arteriosclerotic cardiovascular disease)       chlorthalidone 25 MG tablet    HYGROTON    90 tablet    TAKE ONE TABLET BY MOUTH ONE TIME DAILY    Essential hypertension       clopidogrel 75 MG tablet    PLAVIX    90 tablet    Take 1 tablet (75 mg) by mouth daily    Cerebrovascular accident (CVA), unspecified mechanism (H)       gabapentin 400 MG capsule    NEURONTIN     Take 1 capsule by mouth 2 times daily        levothyroxine 100 MCG tablet    SYNTHROID/LEVOTHROID    90 tablet    TAKE ONE TABLET BY MOUTH ONE TIME DAILY    Acquired hypothyroidism       LORAZEPAM PO      Take 0.5 mg by mouth daily as needed for anxiety        MIRTAZAPINE PO      Take 45 mg by mouth At Bedtime        simvastatin 20 MG tablet    ZOCOR    90 tablet    TAKE ONE TABLET BY MOUTH AT BEDTIME    Hyperlipidemia LDL goal <100

## 2018-01-18 NOTE — PROGRESS NOTES
Surgery Postop Note    Zack Santiago presents today for surgical followup.  he is doing well following open recurrent left inguinal hernia repair.  This was repaired primarily.  Incisions look fine with no signs of wound infection, although there is some bruising.  Hopefully, his likelihood of further recurrence is low.  I expect him to make a complete recovery.  Thank you for the opportunity to help in his care.    Johnathon Walker M.D.  [facility]  [phone]    Please route or send letter to:  Primary Care Provider (PCP) and Referring Provider

## 2018-01-26 ENCOUNTER — HOSPITAL ENCOUNTER (OUTPATIENT)
Dept: CARDIOLOGY | Facility: CLINIC | Age: 83
Discharge: HOME | End: 2018-01-26
Attending: INTERNAL MEDICINE | Admitting: INTERNAL MEDICINE
Payer: MEDICARE

## 2018-01-26 DIAGNOSIS — I71.21 THORACIC ASCENDING AORTIC ANEURYSM (H): ICD-10-CM

## 2018-01-26 PROCEDURE — 93306 TTE W/DOPPLER COMPLETE: CPT | Mod: 26 | Performed by: INTERNAL MEDICINE

## 2018-01-26 PROCEDURE — 93306 TTE W/DOPPLER COMPLETE: CPT

## 2018-01-28 ENCOUNTER — TELEPHONE (OUTPATIENT)
Dept: FAMILY MEDICINE | Facility: CLINIC | Age: 83
End: 2018-01-28

## 2018-01-28 DIAGNOSIS — I71.21 THORACIC ASCENDING AORTIC ANEURYSM (H): Primary | ICD-10-CM

## 2018-01-29 ENCOUNTER — TELEPHONE (OUTPATIENT)
Dept: SURGERY | Facility: CLINIC | Age: 83
End: 2018-01-29

## 2018-01-29 NOTE — TELEPHONE ENCOUNTER
Patient had HERNIORRHAPHY INGUINAL (Left) on 01/05/2018 with Dr. Aaron Jacob message for triage wondering if it is ok to resume lifting more than 15 pounds at this point.    Informed him that it is ok to resume lifting greater than 15 pounds, but that he should ease back into it as not to get too sore.    Patient agreed and will call with any additional questions or concerns.    Renita Jean Baptiste RN

## 2018-02-02 ENCOUNTER — HOSPITAL ENCOUNTER (OUTPATIENT)
Dept: CARDIOLOGY | Facility: CLINIC | Age: 83
Discharge: HOME | End: 2018-02-02
Attending: INTERNAL MEDICINE | Admitting: INTERNAL MEDICINE
Payer: MEDICARE

## 2018-02-02 ENCOUNTER — TELEPHONE (OUTPATIENT)
Dept: FAMILY MEDICINE | Facility: CLINIC | Age: 83
End: 2018-02-02

## 2018-02-02 VITALS — HEART RATE: 56 BPM | SYSTOLIC BLOOD PRESSURE: 144 MMHG | DIASTOLIC BLOOD PRESSURE: 68 MMHG

## 2018-02-02 DIAGNOSIS — I71.21 THORACIC ASCENDING AORTIC ANEURYSM (H): ICD-10-CM

## 2018-02-02 PROBLEM — R91.1 LUNG NODULE: Status: ACTIVE | Noted: 2018-02-01

## 2018-02-02 PROCEDURE — 71275 CT ANGIOGRAPHY CHEST: CPT

## 2018-02-02 PROCEDURE — 25000128 H RX IP 250 OP 636: Performed by: INTERNAL MEDICINE

## 2018-02-02 RX ORDER — ACYCLOVIR 200 MG/1
0-1 CAPSULE ORAL
Status: DISCONTINUED | OUTPATIENT
Start: 2018-02-02 | End: 2018-02-03 | Stop reason: HOSPADM

## 2018-02-02 RX ORDER — IOPAMIDOL 755 MG/ML
50-150 INJECTION, SOLUTION INTRAVASCULAR ONCE
Status: COMPLETED | OUTPATIENT
Start: 2018-02-02 | End: 2018-02-02

## 2018-02-02 RX ADMIN — IOPAMIDOL 100 ML: 755 INJECTION, SOLUTION INTRAVENOUS at 10:08

## 2018-02-03 NOTE — TELEPHONE ENCOUNTER
I discussed with patient his cta, lung nodule but thor aorta not as large as on echo    Will get follow up echo and ct for nodule in 6 months, patient to call then    Zurdo Kendrick M.D.

## 2018-02-04 DIAGNOSIS — I10 ESSENTIAL HYPERTENSION: ICD-10-CM

## 2018-02-05 NOTE — TELEPHONE ENCOUNTER
"Last Written Prescription Date:  8/15/2017  Last Fill Quantity: 90,  # refills: 1   Last Office Visit with [facility] provider:  1/9/2018   Future Office Visit:       Requested Prescriptions   Pending Prescriptions Disp Refills     chlorthalidone (HYGROTON) 25 MG tablet [Pharmacy Med Name: CHLORTHALIDONE 25 MG TABLET] 90 tablet 1     Sig: TAKE ONE TABLET BY MOUTH ONE TIME DAILY    Diuretics (Including Combos) Protocol Failed    2/4/2018  1:09 AM       Failed - Blood pressure under 140/90    BP Readings from Last 3 Encounters:   02/02/18 144/68   01/09/18 128/66   01/06/18 122/55                Passed - Recent or future visit with authorizing provider's specialty    Patient had office visit in the last year or has a visit in the next 30 days with authorizing provider.  See \"Patient Info\" tab in inbasket, or \"Choose Columns\" in Meds & Orders section of the refill encounter.            Passed - Patient is age 18 or older       Passed - Normal serum creatinine on file in past 12 months    Recent Labs   Lab Test  01/06/18   1130   CR  1.00             Passed - Normal serum potassium on file in past 12 months    Recent Labs   Lab Test  01/05/18   2112   POTASSIUM  3.5                   Passed - Normal serum sodium on file in past 12 months    Recent Labs   Lab Test  01/05/18   2112   NA  139                "

## 2018-02-06 RX ORDER — CHLORTHALIDONE 25 MG/1
TABLET ORAL
Qty: 90 TABLET | Refills: 1 | Status: SHIPPED | OUTPATIENT
Start: 2018-02-06 | End: 2018-04-03

## 2018-02-06 NOTE — TELEPHONE ENCOUNTER
Prescription approved per [facility] Refill Protocol.  Irma Gloria RN

## 2018-03-03 DIAGNOSIS — E03.9 ACQUIRED HYPOTHYROIDISM: ICD-10-CM

## 2018-03-03 NOTE — TELEPHONE ENCOUNTER
"Last Written Prescription Date:  9/07/17  Last Fill Quantity: 90 tablet,  # refills: 1   Last office visit: 1/9/2018 with prescribing provider:  Aroldo   Future Office Visit:      Requested Prescriptions   Pending Prescriptions Disp Refills     levothyroxine (SYNTHROID/LEVOTHROID) 100 MCG tablet [Pharmacy Med Name: LEVOTHYROXINE 100 MCG TABLET] 90 tablet 1     Sig: TAKE ONE TABLET BY MOUTH ONE TIME DAILY    Thyroid Protocol Passed    3/3/2018  1:44 AM       Passed - Patient is 12 years or older       Passed - Recent (12 mo) or future (30 days) visit within the authorizing provider's specialty    Patient had office visit in the last year or has a visit in the next 30 days with authorizing provider.  See \"Patient Info\" tab in inbasket, or \"Choose Columns\" in Meds & Orders section of the refill encounter.            Passed - Normal TSH on file in past 12 months    Recent Labs   Lab Test  01/09/18   1005   TSH  0.79                "

## 2018-03-05 RX ORDER — LEVOTHYROXINE SODIUM 100 UG/1
TABLET ORAL
Qty: 90 TABLET | Refills: 1 | Status: SHIPPED | OUTPATIENT
Start: 2018-03-05 | End: 2018-04-03

## 2018-03-05 NOTE — TELEPHONE ENCOUNTER
Prescription approved per [facility] Refill Protocol.  Sofi Martin RN

## 2018-03-06 ENCOUNTER — RADIANT APPOINTMENT (OUTPATIENT)
Dept: GENERAL RADIOLOGY | Facility: CLINIC | Age: 83
End: 2018-03-06
Attending: PODIATRIST
Payer: COMMERCIAL

## 2018-03-06 ENCOUNTER — OFFICE VISIT (OUTPATIENT)
Dept: PODIATRY | Facility: CLINIC | Age: 83
End: 2018-03-06
Payer: COMMERCIAL

## 2018-03-06 VITALS
HEIGHT: 73 IN | HEART RATE: 60 BPM | WEIGHT: 193.7 LBS | SYSTOLIC BLOOD PRESSURE: 129 MMHG | BODY MASS INDEX: 25.67 KG/M2 | DIASTOLIC BLOOD PRESSURE: 64 MMHG

## 2018-03-06 DIAGNOSIS — L84 CALLUS: ICD-10-CM

## 2018-03-06 DIAGNOSIS — M20.42 HAMMER TOE OF LEFT FOOT: Primary | ICD-10-CM

## 2018-03-06 PROCEDURE — 73630 X-RAY EXAM OF FOOT: CPT | Mod: LT

## 2018-03-06 PROCEDURE — 11055 PARING/CUTG B9 HYPRKER LES 1: CPT | Performed by: PODIATRIST

## 2018-03-06 PROCEDURE — 99204 OFFICE O/P NEW MOD 45 MIN: CPT | Mod: 25 | Performed by: PODIATRIST

## 2018-03-06 NOTE — LETTER
3/6/2018         RE: Zack Santiago  [address] [facility] [id number]        Dear Colleague,    Thank you for referring your patient, Zack Santiago, to the [facility]. Please see a copy of my visit note below.    PATIENT HISTORY:  Zack Santiago is a 86 year old male who presents to clinic for left 2nd hammertoe pain.  6 month duration.  No injury.  Pt had surgery on 2nd and 3rd toes on the left for hammertoes 6 years ago.  5-8/10 pain.  Worse with walking.  He has tried pads.  Not helping.  Pt states he saw another podiatrist who recommended partial toe amputation.        Review of Systems:  Patient denies fever, chills, rash, wound, stiffness, numbness, weakness, heart burn, blood in stool, chest pain with activity, calf pain when walking, shortness of breath with activity, chronic cough, easy bleeding/bruising, swelling of ankles, excessive thirst, fatigue, depression.  Patient admits to limping, anxiety.     PAST MEDICAL HISTORY:   Past Medical History:   Diagnosis Date     A-fib (H) 2010    post op     AAA (abdominal aortic aneurysm) without rupture (H)     Dr. 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    carotid us no stenosis, echo no change and no clots; ziopatch no afib, svt present     Anemia 2004     Aortic insufficiency 6/14    1+ on echo     Aortic insufficiency 11/2016    mild to mod     ASCVD (arteriosclerotic cardiovascular disease) 2010    cabg, post op afib     BPH (benign prostatic hyperplasia) 2003    turp, flomax added by urol 2/17     Bradycardia 2016    stopped toprol     CKD (chronic kidney disease) stage 3, GFR 30-59 ml/min      Cough 2010    pulm eval, felt due to reflux     Dizzy 2001    mri small vessel dz     GERD (gastroesophageal reflux disease)      HTN (hypertension) 2002     Hx of colonoscopy 2003    tics     Hypothyroid      Hypovitaminosis D      Idiopathic neuropathy      Lung nodule 02/2018    6mm, found  during eval of ascending aorta     OCD (obsessive compulsive disorder)     sees psyche     Panic disorder     Dr. Luna     Spinal headache      Stroke (H) 12/16    expressive aphasia, hosp, seen by neuro, mri pos, carotids min dz, changed from asa to plavix     Sudden hearing loss 6/13    Dr. Garcia, mri brain no acoustic neuroma     SVT (supraventricular tachycardia) (H) 11/16    seen on ziopatch done for amaurosis, longest 15 beats     Thoracic ascending aortic aneurysm (H) 06/2014    4.4cm on echo, aortic root 3.9, fu 5/15 4.8cm; fu done 12/15 and slightly enlarged, fu 6/16 no change, fu 11/16 no change     Ulcerative colitis (H)     not active for years        PAST SURGICAL HISTORY:   Past Surgical History:   Procedure Laterality Date     BACK SURGERY  1998     BLADDER SURGERY  1985     C TOTAL KNEE ARTHROPLASTY  2011    left     C TOTAL KNEE ARTHROPLASTY  2009    right     CATARACT IOL, RT/LT  2011     CORONARY ARTERY BYPASS  2010     ENDOVASCULAR REPAIR ANEURYSM ABDOMINAL AORTA N/A 5/27/2015    Procedure: ENDOVASCULAR REPAIR ANEURYSM ABDOMINAL AORTA;  Surgeon: Darin Walters MD;  Location: [facility] OR     HERNIA REPAIR  2005     HERNIA REPAIR  1998     HERNIORRHAPHY INGUINAL Left 1/5/2018    Procedure: HERNIORRHAPHY INGUINAL;  Incarcerated Left Inguinal Hernia;  Surgeon: Harsh Walker MD;  Location: [facility] OR     KNEE SURGERY  1997     REPAIR HAMMER TOE  12/28/2012    Procedure: REPAIR HAMMER TOE;  LEFT SECOND AND THIRD CLAW TOE RECONSTRUCTION;  Surgeon: Gray Haynes MD;  Location:  OR     TURP  2003        MEDICATIONS:   Current Outpatient Prescriptions:      levothyroxine (SYNTHROID/LEVOTHROID) 100 MCG tablet, TAKE ONE TABLET BY MOUTH ONE TIME DAILY, Disp: 90 tablet, Rfl: 1     chlorthalidone (HYGROTON) 25 MG tablet, TAKE ONE TABLET BY MOUTH ONE TIME DAILY, Disp: 90 tablet, Rfl: 1     clopidogrel (PLAVIX) 75 MG tablet, Take 1 tablet (75 mg) by mouth daily, Disp: 90 tablet, Rfl: 3      "simvastatin (ZOCOR) 20 MG tablet, TAKE ONE TABLET BY MOUTH AT BEDTIME, Disp: 90 tablet, Rfl: 0     MIRTAZAPINE PO, Take 45 mg by mouth At Bedtime , Disp: , Rfl:      LORAZEPAM PO, Take 0.5 mg by mouth daily as needed for anxiety, Disp: , Rfl:      ClomiPRAMINE HCl (ANAFRANIL PO), Take 25 mg by mouth every evening , Disp: , Rfl:      gabapentin (NEURONTIN) 400 MG capsule, Take 1 capsule by mouth 2 times daily , Disp: , Rfl:      ARIPiprazole (ABILIFY) 2 MG tablet, Take 1 tablet by mouth At Bedtime., Disp: , Rfl:      ASPIRIN NOT PRESCRIBED (INTENTIONAL), Please choose reason not prescribed, below (Patient not taking: Reported on 3/6/2018), Disp: 0 each, Rfl: 0     ALLERGIES:    Allergies   Allergen Reactions     No Known Allergies         SOCIAL HISTORY:   Social History     Social History     Marital status:      Spouse name: N/A     Number of children: 2     Years of education: N/A     Occupational History     retail Retired     Social History Main Topics     Smoking status: Former Smoker     Packs/day: 1.00     Years: 5.00     Types: Cigarettes     Quit date: 6/13/1965     Smokeless tobacco: Never Used     Alcohol use 0.0 oz/week     0 Standard drinks or equivalent per week      Comment: maybe one glass of wine a week     Drug use: No     Sexual activity: Not Currently     Other Topics Concern     Not on file     Social History Narrative        FAMILY HISTORY:   Family History   Problem Relation Age of Onset     C.A.D. Father         EXAM:Vitals: /64 (BP Location: Left arm, Patient Position: Chair, Cuff Size: Adult Large)  Pulse 60  Ht 6' 1\" (1.854 m)  Wt 193 lb 11.2 oz (87.9 kg)  BMI 25.56 kg/m2  BMI= Body mass index is 25.56 kg/(m^2).    General appearance: Patient is alert and fully cooperative with history & exam.  No sign of distress is noted during the visit.     Psychiatric: Affect is pleasant & appropriate.  Patient appears motivated to improve health.     Respiratory: Breathing is regular " & unlabored while sitting.     HEENT: Hearing is intact to spoken word.  Speech is clear.  No gross evidence of visual impairment that would impact ambulation.     Dermatologic: Skin is intact to left foot.  Painful hyperkeratotic lesion to tip of left 2nd toe.  No paronychia or evidence of soft tissue infection is noted.     Vascular: DP & PT pulses are intact & regular on the left.  No significant edema or varicosities noted.  CFT and skin temperature are normal.     Neurologic: Lower extremity sensation is intact to light touch.  No evidence of weakness or contracture in the lower extremities.  No evidence of neuropathy.     Musculoskeletal: Left 2nd hammertoe noted with PIPJ fusion.  Not reducible.  PIPJ appears fused in a plantarflexed position.  Patient is ambulatory without assistive device or brace.  No gross ankle deformity noted.  No foot or ankle joint effusion is noted.    XRs of left foot reviewed with pt.  Prior 2nd and 3rd toe PIPJ fusions noted.  2nd toe with plantarflexed fusion.     ASSESSMENT: L 2nd hammertoe, callus, pain     PLAN:  Reviewed patient's chart in epic.  Discussed condition and treatment options including pros and cons.    Hammertoe treatment options were discussed.  This included both surgical and non-surgical treatment alternatives.  Non-surgical options include wide fitting shoes, deep toe box, crest pad, debridement of any callous as necessary.      Surgical treatments were explained in detail.  Given his prior fusion and current position of the bone, soft tissue procedure alone such as tenotomy would likely fail.  Partial amputation of the toe would likely be the best option if he wanted surgical intervention.  Discussed risk of transfer lesions, wound healing problems, infection.    Pt requested callus debridement.  ABN signed.  I debrided the callus with a 15 blade.  List of routine foot care resources given.  Crest pad dispensed.    All questions answered.  F/u  addie.      Antelmo Whitney DPM, FACFAS      BMI is normal.  FRANCESCA Briggs MA March 6, 2018 10:20 AM      Again, thank you for allowing me to participate in the care of your patient.        Sincerely,        Antelmo Whitney DPM

## 2018-03-06 NOTE — PATIENT INSTRUCTIONS
Calluses, Corns, IPKs, Porokeratosis    When there is excessive friction or pressure on the skin, the body responds by making the skin thicker to protect the deeper structures from becoming exposed.  While this works well to protect the deeper structures, the thickened skin can increase pressure and pain.    Flat, diffuse thickening are simple calluses and they are usually caused by friction.  Often these are the result of rubbing on a shoe or going barefoot.    Calluses with a central core between the toes are called corns.  These result from prominent joints on adjacent toes rubbing together.  Theses are a symptom of bone malalignment and will always recur unless the underlying bones are addressed surgically.    Calluses with a central core on the ball of the foot are usually IPKs (intractable plantar keratosis).  These are caused by excessive pressure from the metatarsals, the bones that make up the ball of the foot.  Often one of these bones is too long or too prominent.  Again, these will always recur unless the underlying bone issue is addressed.  There is no cure for these.  They will either go away by themselves, recur, or more could develop.    Regardless of the diagnosis, most of these lesions can be kept comfortable with routine maintenance.   1.This consists of filing them with a pumice stone or callus file a couple of times per week.    2. Lotion can be applied to soften the callus. A urea based cream such as Kerasal or Vanicream or thicker cream with shea butter are good options.  3. Toe spacers or toe covers can be used for corns, gel pads can be used for other lesions on the bottom of the foot.   If there is a surgical pathology noted, such as a prominent bone, often this needs to be addressed surgically to minimize recurrence. However, sometimes the lesion simply migrates to another spot after surgery, so it is not a guaranteed cure.     Please call with any additional questions.     FOOT CARE  NURSES  If you are interested in having a foot care nurse come out to your   home, please call one of these contacts for more information:  [facility]  [phone] [facility]  [phone]   [facility]  [phone]  [facility] [phone]  [place]   [facility]  [phone]  At [facility] [phone]     .Hammertoe           What Is Hammertoe?  Hammertoe is a contracture (bending) of one or both joints of the second, third, fourth, or fifth (little) toes. This abnormal bending can put pressure on the toe when wearing shoes, causing problems to develop.  Hammertoes usually start out as mild deformities and get progressively worse over time. In the earlier stages, hammertoes are flexible and the symptoms can often be managed with noninvasive measures. But if left untreated, hammertoes can become more rigid and will not respond to non-surgical treatment.  Because of the progressive nature of hammertoes, they should receive early attention. Hammertoes never get better without some kind of intervention.  Causes  The most common cause of hammertoe is a muscle/tendon imbalance. This imbalance, which leads to a bending of the toe, results from mechanical (structural) changes in the foot that occur over time in some people.  Hammertoes may be aggravated by shoes that don t fit properly. A hammertoe may result if a toe is too long and is forced into a cramped position when a tight shoe is worn.  Occasionally, hammertoe is the result of an earlier trauma to the toe. In some people, hammertoes are inherited.  Symptoms  Common symptoms of hammertoes include:  Pain or irritation of the affected toe when wearing shoes.   Corns and calluses (a buildup of skin) on the toe, between two toes, or on the ball of the foot. Corns are caused by constant friction against the shoe. They may be soft or hard, depending upon their location.    Inflammation, redness, or a burning sensation   Contracture of the toe   In more severe cases of hammertoe, open sores may form.   Diagnosis  Although hammertoes are readily apparent, to arrive at a diagnosis the foot and ankle surgeon will obtain a thorough history of your symptoms and examine your foot. During the physical examination, the doctor may attempt to reproduce your symptoms by manipulating your foot and will study the contractures of the toes. In addition, the foot and ankle surgeon may take x-rays to determine the degree of the deformities and assess any changes that may have occurred.   Hammertoes are progressive - they don t go away by themselves and usually they will get worse over time. However, not all cases are alike - some hammertoes progress more rapidly than others. Once your foot and ankle surgeon has evaluated your hammertoes, a treatment plan can be developed that is suited to your needs.  Non-surgical Treatment  There is a variety of treatment options for hammertoe. The treatment your foot and ankle surgeon selects will depend upon the severity of your hammertoe and other factors.  A number of non-surgical measures can be undertaken:  Padding corns and calluses. Your foot and ankle surgeon can provide or prescribe pads designed to shield corns from irritation. If you want to try over-the-counter pads, avoid the medicated types. Medicated pads are generally not recommended because they may contain a small amount of acid that can be harmful. Consult your surgeon about this option.   Changes in shoewear. Avoid shoes with pointed toes, shoes that are too short, or shoes with high heels - conditions that can force your toe against the front of the shoe. Instead, choose comfortable shoes with a deep, roomy toe box and heels no higher than two inches.   Orthotic devices. A custom orthotic device placed in your shoe may help control the muscle/tendon imbalance.   Injection therapy.  Corticosteroid injections are sometimes used to ease pain and inflammation caused by hammertoe.   Medications. Oral nonsteroidal anti-inflammatory drugs (NSAIDs), such as ibuprofen, may be recommended to reduce pain and inflammation.   Splinting/strapping. Splints or small straps may be applied by the surgeon to realign the bent toe.       HAMMERTOE SURGERY   Hammertoe surgery is complex. The surgical procedure is an attempt to help the toe lay in a better position. Nearly every structure in the toe will be cut including the tendons, ligaments, skin and bone. Hammertoes are a complex deformity and final toe position is difficult to predict. The only sure way to position a toe is to fuse all three digital joints. That will not happen as some degree of toe motion is needed for walking. The toe may not be completely reduced as the surrounding skin and other structures may not allow the toe to return to a normal position. The tendons on adjacent toes may need to be cut at the time of the original or subsequent surgeries, as interconnections exist between the toes. The toe may drift after surgery. Stiffness may develop leading to new areas of pressure.   Future shoe choices will be critical in allowing the surgery to provide comfort. The toes will still hurt if shoes rub. The original pain may also persist as other foot problems may be contributing to the current pain. The toe may or may not be pinned in place. External pins would require complete avoidance of water on the foot for six weeks. The pin would be removed about six weeks after the surgery. Strict attention to protection is critical. The pin could get bumped or loosen resulting in early removal. Removal might be necessary before the bone heals which would negatively affect the final surgical outcome and toe alignment.

## 2018-03-06 NOTE — PROGRESS NOTES
PATIENT HISTORY:  Zack Santiago is a 86 year old male who presents to clinic for left 2nd hammertoe pain.  6 month duration.  No injury.  Pt had surgery on 2nd and 3rd toes on the left for hammertoes 6 years ago.  5-8/10 pain.  Worse with walking.  He has tried pads.  Not helping.  Pt states he saw another podiatrist who recommended partial toe amputation.        Review of Systems:  Patient denies fever, chills, rash, wound, stiffness, numbness, weakness, heart burn, blood in stool, chest pain with activity, calf pain when walking, shortness of breath with activity, chronic cough, easy bleeding/bruising, swelling of ankles, excessive thirst, fatigue, depression.  Patient admits to limping, anxiety.     PAST MEDICAL HISTORY:   Past Medical History:   Diagnosis Date     A-fib (H) 2010    post op     AAA (abdominal aortic aneurysm) without rupture (H)     Dr. 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    carotid us no stenosis, echo no change and no clots; ziopatch no afib, svt present     Anemia 2004     Aortic insufficiency 6/14    1+ on echo     Aortic insufficiency 11/2016    mild to mod     ASCVD (arteriosclerotic cardiovascular disease) 2010    cabg, post op afib     BPH (benign prostatic hyperplasia) 2003    turp, flomax added by urol 2/17     Bradycardia 2016    stopped toprol     CKD (chronic kidney disease) stage 3, GFR 30-59 ml/min      Cough 2010    pulm eval, felt due to reflux     Dizzy 2001    mri small vessel dz     GERD (gastroesophageal reflux disease)      HTN (hypertension) 2002     Hx of colonoscopy 2003    tics     Hypothyroid      Hypovitaminosis D      Idiopathic neuropathy      Lung nodule 02/2018    6mm, found during eval of ascending aorta     OCD (obsessive compulsive disorder)     sees psyche     Panic disorder     Dr. Luna     Spinal headache      Stroke (H) 12/16    expressive aphasia, hosp, seen by neuro, mri pos, carotids min dz, changed from asa  to plavix     Sudden hearing loss 6/13    Dr. Garcia, mri brain no acoustic neuroma     SVT (supraventricular tachycardia) (H) 11/16    seen on ziopatch done for amaurosis, longest 15 beats     Thoracic ascending aortic aneurysm (H) 06/2014    4.4cm on echo, aortic root 3.9, fu 5/15 4.8cm; fu done 12/15 and slightly enlarged, fu 6/16 no change, fu 11/16 no change     Ulcerative colitis (H)     not active for years        PAST SURGICAL HISTORY:   Past Surgical History:   Procedure Laterality Date     BACK SURGERY  1998     BLADDER SURGERY  1985     C TOTAL KNEE ARTHROPLASTY  2011    left     C TOTAL KNEE ARTHROPLASTY  2009    right     CATARACT IOL, RT/LT  2011     CORONARY ARTERY BYPASS  2010     ENDOVASCULAR REPAIR ANEURYSM ABDOMINAL AORTA N/A 5/27/2015    Procedure: ENDOVASCULAR REPAIR ANEURYSM ABDOMINAL AORTA;  Surgeon: Darin Walters MD;  Location: [facility] OR     HERNIA REPAIR  2005     HERNIA REPAIR  1998     HERNIORRHAPHY INGUINAL Left 1/5/2018    Procedure: HERNIORRHAPHY INGUINAL;  Incarcerated Left Inguinal Hernia;  Surgeon: Harsh Walker MD;  Location: [facility] OR     KNEE SURGERY  1997     REPAIR HAMMER TOE  12/28/2012    Procedure: REPAIR HAMMER TOE;  LEFT SECOND AND THIRD CLAW TOE RECONSTRUCTION;  Surgeon: Gray Haynes MD;  Location: [facility] OR     TURP  2003        MEDICATIONS:   Current Outpatient Prescriptions:      levothyroxine (SYNTHROID/LEVOTHROID) 100 MCG tablet, TAKE ONE TABLET BY MOUTH ONE TIME DAILY, Disp: 90 tablet, Rfl: 1     chlorthalidone (HYGROTON) 25 MG tablet, TAKE ONE TABLET BY MOUTH ONE TIME DAILY, Disp: 90 tablet, Rfl: 1     clopidogrel (PLAVIX) 75 MG tablet, Take 1 tablet (75 mg) by mouth daily, Disp: 90 tablet, Rfl: 3     simvastatin (ZOCOR) 20 MG tablet, TAKE ONE TABLET BY MOUTH AT BEDTIME, Disp: 90 tablet, Rfl: 0     MIRTAZAPINE PO, Take 45 mg by mouth At Bedtime , Disp: , Rfl:      LORAZEPAM PO, Take 0.5 mg by mouth daily as needed for anxiety, Disp: , Rfl:       "ClomiPRAMINE HCl (ANAFRANIL PO), Take 25 mg by mouth every evening , Disp: , Rfl:      gabapentin (NEURONTIN) 400 MG capsule, Take 1 capsule by mouth 2 times daily , Disp: , Rfl:      ARIPiprazole (ABILIFY) 2 MG tablet, Take 1 tablet by mouth At Bedtime., Disp: , Rfl:      ASPIRIN NOT PRESCRIBED (INTENTIONAL), Please choose reason not prescribed, below (Patient not taking: Reported on 3/6/2018), Disp: 0 each, Rfl: 0     ALLERGIES:    Allergies   Allergen Reactions     No Known Allergies         SOCIAL HISTORY:   Social History     Social History     Marital status:      Spouse name: N/A     Number of children: 2     Years of education: N/A     Occupational History     retail Retired     Social History Main Topics     Smoking status: Former Smoker     Packs/day: 1.00     Years: 5.00     Types: Cigarettes     Quit date: 6/13/1965     Smokeless tobacco: Never Used     Alcohol use 0.0 oz/week     0 Standard drinks or equivalent per week      Comment: maybe one glass of wine a week     Drug use: No     Sexual activity: Not Currently     Other Topics Concern     Not on file     Social History Narrative        FAMILY HISTORY:   Family History   Problem Relation Age of Onset     C.A.D. Father         EXAM:Vitals: /64 (BP Location: Left arm, Patient Position: Chair, Cuff Size: Adult Large)  Pulse 60  Ht 6' 1\" (1.854 m)  Wt 193 lb 11.2 oz (87.9 kg)  BMI 25.56 kg/m2  BMI= Body mass index is 25.56 kg/(m^2).    General appearance: Patient is alert and fully cooperative with history & exam.  No sign of distress is noted during the visit.     Psychiatric: Affect is pleasant & appropriate.  Patient appears motivated to improve health.     Respiratory: Breathing is regular & unlabored while sitting.     HEENT: Hearing is intact to spoken word.  Speech is clear.  No gross evidence of visual impairment that would impact ambulation.     Dermatologic: Skin is intact to left foot.  Painful hyperkeratotic lesion to tip of " left 2nd toe.  No paronychia or evidence of soft tissue infection is noted.     Vascular: DP & PT pulses are intact & regular on the left.  No significant edema or varicosities noted.  CFT and skin temperature are normal.     Neurologic: Lower extremity sensation is intact to light touch.  No evidence of weakness or contracture in the lower extremities.  No evidence of neuropathy.     Musculoskeletal: Left 2nd hammertoe noted with PIPJ fusion.  Not reducible.  PIPJ appears fused in a plantarflexed position.  Patient is ambulatory without assistive device or brace.  No gross ankle deformity noted.  No foot or ankle joint effusion is noted.    XRs of left foot reviewed with pt.  Prior 2nd and 3rd toe PIPJ fusions noted.  2nd toe with plantarflexed fusion.     ASSESSMENT: L 2nd hammertoe, callus, pain     PLAN:  Reviewed patient's chart in epic.  Discussed condition and treatment options including pros and cons.    Hammertoe treatment options were discussed.  This included both surgical and non-surgical treatment alternatives.  Non-surgical options include wide fitting shoes, deep toe box, crest pad, debridement of any callous as necessary.      Surgical treatments were explained in detail.  Given his prior fusion and current position of the bone, soft tissue procedure alone such as tenotomy would likely fail.  Partial amputation of the toe would likely be the best option if he wanted surgical intervention.  Discussed risk of transfer lesions, wound healing problems, infection.    Pt requested callus debridement.  ABN signed.  I debrided the callus with a 15 blade.  List of routine foot care resources given.  Crest pad dispensed.    All questions answered.  F/u prn.      Antelmo Whitney, BRITTNEY, FACFAS

## 2018-03-06 NOTE — NURSING NOTE
"Chief Complaint   Patient presents with     Hammer Toe     L 2nd toe        Initial /64 (BP Location: Left arm, Patient Position: Chair, Cuff Size: Adult Large)  Pulse 60  Ht 6' 1\" (1.854 m)  Wt 193 lb 11.2 oz (87.9 kg)  BMI 25.56 kg/m2 Estimated body mass index is 25.56 kg/(m^2) as calculated from the following:    Height as of this encounter: 6' 1\" (1.854 m).    Weight as of this encounter: 193 lb 11.2 oz (87.9 kg).  Medication Reconciliation: oleg Briggs MA March 6, 2018 10:19 AM  "

## 2018-03-06 NOTE — MR AVS SNAPSHOT
After Visit Summary   3/6/2018    Zack Santiago    MRN: [id number]           Patient Information     Date Of Birth          5/29/1931        Visit Information        Provider Department      3/6/2018 10:30 AM Antelmo Whitney DPM [facility]        Today's Diagnoses     Hammer toe of left foot    -  1    Callus          Care Instructions    Calluses, Corns, IPKs, Porokeratosis    When there is excessive friction or pressure on the skin, the body responds by making the skin thicker to protect the deeper structures from becoming exposed.  While this works well to protect the deeper structures, the thickened skin can increase pressure and pain.    Flat, diffuse thickening are simple calluses and they are usually caused by friction.  Often these are the result of rubbing on a shoe or going barefoot.    Calluses with a central core between the toes are called corns.  These result from prominent joints on adjacent toes rubbing together.  Theses are a symptom of bone malalignment and will always recur unless the underlying bones are addressed surgically.    Calluses with a central core on the ball of the foot are usually IPKs (intractable plantar keratosis).  These are caused by excessive pressure from the metatarsals, the bones that make up the ball of the foot.  Often one of these bones is too long or too prominent.  Again, these will always recur unless the underlying bone issue is addressed.  There is no cure for these.  They will either go away by themselves, recur, or more could develop.    Regardless of the diagnosis, most of these lesions can be kept comfortable with routine maintenance.   1.This consists of filing them with a pumice stone or callus file a couple of times per week.    2. Lotion can be applied to soften the callus. A urea based cream such as Kerasal or Vanicream or thicker cream with shea butter are good options.  3. Toe spacers or toe covers can be used for corns, gel  pads can be used for other lesions on the bottom of the foot.   If there is a surgical pathology noted, such as a prominent bone, often this needs to be addressed surgically to minimize recurrence. However, sometimes the lesion simply migrates to another spot after surgery, so it is not a guaranteed cure.     Please call with any additional questions.     FOOT CARE NURSES  If you are interested in having a foot care nurse come out to your   home, please call one of these contacts for more information:  [facility]  [phone] [facility]  [phone]   [facility]  [phone]  [facility] [phone]  [facility]  [phone]  At [facility] [phone]     .Hammertoe           What Is Hammertoe?  Hammertoe is a contracture (bending) of one or both joints of the second, third, fourth, or fifth (little) toes. This abnormal bending can put pressure on the toe when wearing shoes, causing problems to develop.  Hammertoes usually start out as mild deformities and get progressively worse over time. In the earlier stages, hammertoes are flexible and the symptoms can often be managed with noninvasive measures. But if left untreated, hammertoes can become more rigid and will not respond to non-surgical treatment.  Because of the progressive nature of hammertoes, they should receive early attention. Hammertoes never get better without some kind of intervention.  Causes  The most common cause of hammertoe is a muscle/tendon imbalance. This imbalance, which leads to a bending of the toe, results from mechanical (structural) changes in the foot that occur over time in some people.  Hammertoes may be aggravated by shoes that don t fit properly. A hammertoe may result if a toe is too long and is forced into a cramped position when a tight shoe is worn.  Occasionally, hammertoe is the result of an earlier trauma to the toe. In  some people, hammertoes are inherited.  Symptoms  Common symptoms of hammertoes include:  Pain or irritation of the affected toe when wearing shoes.   Corns and calluses (a buildup of skin) on the toe, between two toes, or on the ball of the foot. Corns are caused by constant friction against the shoe. They may be soft or hard, depending upon their location.   Inflammation, redness, or a burning sensation   Contracture of the toe   In more severe cases of hammertoe, open sores may form.   Diagnosis  Although hammertoes are readily apparent, to arrive at a diagnosis the foot and ankle surgeon will obtain a thorough history of your symptoms and examine your foot. During the physical examination, the doctor may attempt to reproduce your symptoms by manipulating your foot and will study the contractures of the toes. In addition, the foot and ankle surgeon may take x-rays to determine the degree of the deformities and assess any changes that may have occurred.   Hammertoes are progressive - they don t go away by themselves and usually they will get worse over time. However, not all cases are alike - some hammertoes progress more rapidly than others. Once your foot and ankle surgeon has evaluated your hammertoes, a treatment plan can be developed that is suited to your needs.  Non-surgical Treatment  There is a variety of treatment options for hammertoe. The treatment your foot and ankle surgeon selects will depend upon the severity of your hammertoe and other factors.  A number of non-surgical measures can be undertaken:  Padding corns and calluses. Your foot and ankle surgeon can provide or prescribe pads designed to shield corns from irritation. If you want to try over-the-counter pads, avoid the medicated types. Medicated pads are generally not recommended because they may contain a small amount of acid that can be harmful. Consult your surgeon about this option.   Changes in shoewear. Avoid shoes with pointed toes,  shoes that are too short, or shoes with high heels - conditions that can force your toe against the front of the shoe. Instead, choose comfortable shoes with a deep, roomy toe box and heels no higher than two inches.   Orthotic devices. A custom orthotic device placed in your shoe may help control the muscle/tendon imbalance.   Injection therapy. Corticosteroid injections are sometimes used to ease pain and inflammation caused by hammertoe.   Medications. Oral nonsteroidal anti-inflammatory drugs (NSAIDs), such as ibuprofen, may be recommended to reduce pain and inflammation.   Splinting/strapping. Splints or small straps may be applied by the surgeon to realign the bent toe.       HAMMERTOE SURGERY   Hammertoe surgery is complex. The surgical procedure is an attempt to help the toe lay in a better position. Nearly every structure in the toe will be cut including the tendons, ligaments, skin and bone. Hammertoes are a complex deformity and final toe position is difficult to predict. The only sure way to position a toe is to fuse all three digital joints. That will not happen as some degree of toe motion is needed for walking. The toe may not be completely reduced as the surrounding skin and other structures may not allow the toe to return to a normal position. The tendons on adjacent toes may need to be cut at the time of the original or subsequent surgeries, as interconnections exist between the toes. The toe may drift after surgery. Stiffness may develop leading to new areas of pressure.   Future shoe choices will be critical in allowing the surgery to provide comfort. The toes will still hurt if shoes rub. The original pain may also persist as other foot problems may be contributing to the current pain. The toe may or may not be pinned in place. External pins would require complete avoidance of water on the foot for six weeks. The pin would be removed about six weeks after the surgery. Strict attention to  "protection is critical. The pin could get bumped or loosen resulting in early removal. Removal might be necessary before the bone heals which would negatively affect the final surgical outcome and toe alignment.                     Follow-ups after your visit        Follow-up notes from your care team     Return if symptoms worsen or fail to improve.      Who to contact     If you have questions or need follow up information about today's clinic visit or your schedule please contact [facility] directly at [phone].  Normal or non-critical lab and imaging results will be communicated to you by MyChart, letter or phone within 4 business days after the clinic has received the results. If you do not hear from us within 7 days, please contact the clinic through [facility] or phone. If you have a critical or abnormal lab result, we will notify you by phone as soon as possible.  Submit refill requests through [employer] or call your pharmacy and they will forward the refill request to us. Please allow 3 business days for your refill to be completed.          Additional Information About Your Visit        [facility] Information     [employer] lets you send messages to your doctor, view your test results, renew your prescriptions, schedule appointments and more. To sign up, go to [email or web address] . Click on \"Log in\" on the left side of the screen, which will take you to the Welcome page. Then click on \"Sign up Now\" on the right side of the page.     You will be asked to enter the access code listed below, as well as some personal information. Please follow the directions to create your username and password.     Your access code is: [email or web address]  Expires: 2018  7:35 PM     Your access code will  in 90 days. If you need help or a new code, please call your [facility] or [phone].        Care EveryWhere ID     This is your Care EveryWhere ID. This could be used by other organizations to access " "your [place] medical records  [id number]        Your Vitals Were     Pulse Height BMI (Body Mass Index)             60 6' 1\" (1.854 m) 25.56 kg/m2          Blood Pressure from Last 3 Encounters:   03/06/18 129/64   02/02/18 144/68   01/09/18 128/66    Weight from Last 3 Encounters:   03/06/18 193 lb 11.2 oz (87.9 kg)   01/09/18 194 lb (88 kg)   01/05/18 196 lb (88.9 kg)              We Performed the Following     XR Foot Left G/E 3 Views        Primary Care Provider Office Phone # Fax #    Zurdo Raul Kendrick [phone] [address] [facility] [address]  [facility] [address]        Equal Access to Services     [facility]: Hadii gustavo simon hadasho Solino, waaxda luqadaha, qaybta kaalmada adeegyada, sita olsen hayashlyn ellis . So [facility] [phone].    ATENCIÓN: Si habla español, tiene a khan disposición servicios gratuitos de asistencia lingüística. Llame al [phone].    We comply with applicable federal civil rights laws and [place] laws. We do not discriminate on the basis of race, color, national origin, age, disability, sex, sexual orientation, or gender identity.            Thank you!     Thank you for choosing [facility]  for your care. Our goal is always to provide you with excellent care. Hearing back from our patients is one way we can continue to improve our services. Please take a few minutes to complete the written survey that you may receive in the mail after your visit with us. Thank you!             Your Updated Medication List - Protect others around you: Learn how to safely use, store and throw away your medicines at www.disposemymeds.org.          This list is accurate as of 3/6/18 11:11 AM.  Always use your most recent med list.                   Brand Name Dispense Instructions for use Diagnosis    ANAFRANIL PO      Take 25 mg by mouth every evening    OCD (obsessive compulsive disorder)       ARIPiprazole 2 MG tablet    ABILIFY     Take 1 tablet by " mouth At Bedtime.        ASPIRIN NOT PRESCRIBED    INTENTIONAL    0 each    Please choose reason not prescribed, below    ASCVD (arteriosclerotic cardiovascular disease)       chlorthalidone 25 MG tablet    HYGROTON    90 tablet    TAKE ONE TABLET BY MOUTH ONE TIME DAILY    Essential hypertension       clopidogrel 75 MG tablet    PLAVIX    90 tablet    Take 1 tablet (75 mg) by mouth daily    Cerebrovascular accident (CVA), unspecified mechanism (H)       gabapentin 400 MG capsule    NEURONTIN     Take 1 capsule by mouth 2 times daily        levothyroxine 100 MCG tablet    SYNTHROID/LEVOTHROID    90 tablet    TAKE ONE TABLET BY MOUTH ONE TIME DAILY    Acquired hypothyroidism       LORAZEPAM PO      Take 0.5 mg by mouth daily as needed for anxiety        MIRTAZAPINE PO      Take 45 mg by mouth At Bedtime        simvastatin 20 MG tablet    ZOCOR    90 tablet    TAKE ONE TABLET BY MOUTH AT BEDTIME    Hyperlipidemia LDL goal <100

## 2018-03-14 ENCOUNTER — TRANSFERRED RECORDS (OUTPATIENT)
Dept: HEALTH INFORMATION MANAGEMENT | Facility: CLINIC | Age: 83
End: 2018-03-14

## 2018-04-02 NOTE — PATIENT INSTRUCTIONS
Before Your Surgery      Call your surgeon if there is any change in your health. This includes signs of a cold or flu (such as a sore throat, runny nose, cough, rash or fever).    Do not smoke, drink alcohol or take over the counter medicine (unless your surgeon or primary care doctor tells you to) for the 24 hours before and after surgery.    If you take prescribed drugs: Follow your doctor s orders about which medicines to take and which to stop until after surgery.    Eating and drinking prior to surgery: follow the instructions from your surgeon    Take a shower or bath the night before surgery. Use the soap your surgeon gave you to gently clean your skin. If you do not have soap from your surgeon, use your regular soap. Do not shave or scrub the surgery site.  Wear clean pajamas and have clean sheets on your bed.       See the colorectal office for the incontinence.

## 2018-04-02 NOTE — PROGRESS NOTES
[address] [address]  [phone]  Dept: [phone]    PRE-OP EVALUATION:  Today's date: 4/3/2018    Zack Santiago (: 1931) presents for pre-operative evaluation assessment as requested by Dr. Edouard.  He requires evaluation and anesthesia risk assessment prior to undergoing surgery/procedure for treatment of left hammer toe .    Proposed Surgery/ Procedure:   Date of Surgery/ Procedure:18  Time of Surgery/ Procedure: 9  Hospital/Surgical Facility: [facility]  [phone]  Primary Physician: Zurdo Kendrick  Type of Anesthesia Anticipated: to be determined    Patient has a Health Care Directive or Living Will:  YES    The patient is having hammer toe surgery as noted.  He has significant pmh noted below and reviewed.  Most recently he had an echo for follow up on his thor aneurysm which suggested enlargment of the aneurysm, then cta done as noted that showed it to be just 4.8cm, so no intervention needed and to follow up in 6 months for this.  Also found lung nodule then and to follow up in 6 months.    The patient is active, works out reg and no diff.  He is on plavix over asa due to cvi , no issues.  No chest pain or shortness of breath, no dizziness, can do 4 mets                Past Medical History:      Past Medical History:   Diagnosis Date     A-fib (H)     post op     AAA (abdominal aortic aneurysm) without rupture (H)     Dr. 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    carotid us no stenosis, echo no change and no clots; ziopatch no afib, svt present     Anemia      Aortic insufficiency     1+ on echo     Aortic insufficiency 2016    mild to mod     ASCVD (arteriosclerotic cardiovascular disease) 2010    cabg, post op afib     BPH (benign prostatic hyperplasia)     turp, flomax added by urol      Bradycardia 2016    stopped toprol     CKD (chronic kidney disease) stage 3, GFR  30-59 ml/min      Cough 2010    pulm eval, felt due to reflux     Dizzy 2001    mri small vessel dz     GERD (gastroesophageal reflux disease)      HTN (hypertension) 2002     Hx of colonoscopy 2003    tics     Hypothyroid      Hypovitaminosis D      Idiopathic neuropathy      Lung nodule 02/2018    6mm, found during eval of ascending aorta     OCD (obsessive compulsive disorder)     sees psyche     Panic disorder     Dr. Luna     Spinal headache      Stroke (H) 12/16    expressive aphasia, hosp, seen by neuro, mri pos, carotids min dz, changed from asa to plavix     Sudden hearing loss 6/13    Dr. Garcia, mri brain no acoustic neuroma     SVT (supraventricular tachycardia) (H) 11/16    seen on ziopatch done for amaurosis, longest 15 beats     Thoracic ascending aortic aneurysm (H) 06/2014    4.4cm on echo, aortic root 3.9, fu 5/15 4.8cm; fu done 12/15 and slightly enlarged, fu 6/16 no change, fu 11/16 no change; fu 1/18 echo 5.1-5.3, enlarged, then cta done and only 4.8cm, t fu 6 months, lvh, nl ef, mild ai     Ulcerative colitis (H)     not active for years             Past Surgical History:      Past Surgical History:   Procedure Laterality Date     BACK SURGERY  1998     BLADDER SURGERY  1985     C TOTAL KNEE ARTHROPLASTY  2011    left     C TOTAL KNEE ARTHROPLASTY  2009    right     CATARACT IOL, RT/LT  2011     CORONARY ARTERY BYPASS  2010     ENDOVASCULAR REPAIR ANEURYSM ABDOMINAL AORTA N/A 5/27/2015    Procedure: ENDOVASCULAR REPAIR ANEURYSM ABDOMINAL AORTA;  Surgeon: Darin Walters MD;  Location:  OR     HERNIA REPAIR  2005     HERNIA REPAIR  1998     HERNIORRHAPHY INGUINAL Left 1/5/2018    Procedure: HERNIORRHAPHY INGUINAL;  Incarcerated Left Inguinal Hernia;  Surgeon: Harsh Walker MD;  Location:  OR     KNEE SURGERY  1997     REPAIR HAMMER TOE  12/28/2012    Procedure: REPAIR HAMMER TOE;  LEFT SECOND AND THIRD CLAW TOE RECONSTRUCTION;  Surgeon: Gray Haynes MD;  Location:  [facility]     DHEERAJ  2003             Social History:     Social History   Substance Use Topics     Smoking status: Former Smoker     Packs/day: 1.00     Years: 5.00     Types: Cigarettes     Quit date: 6/13/1965     Smokeless tobacco: Never Used     Alcohol use 0.0 oz/week     0 Standard drinks or equivalent per week      Comment: maybe one glass of wine a week             Family History:   No family history of bleeding difficulty, anesthesia problems or blood clots.         Allergies:     Allergies   Allergen Reactions     No Known Allergies              Medications:     Current Outpatient Prescriptions   Medication Sig Dispense Refill     levothyroxine (SYNTHROID/LEVOTHROID) 100 MCG tablet Take 1 tablet (100 mcg) by mouth daily 90 tablet 3     chlorthalidone (HYGROTON) 25 MG tablet Take 1 tablet (25 mg) by mouth daily 90 tablet 3     clopidogrel (PLAVIX) 75 MG tablet Take 1 tablet (75 mg) by mouth daily 90 tablet 3     simvastatin (ZOCOR) 20 MG tablet TAKE ONE TABLET BY MOUTH AT BEDTIME 90 tablet 3     MIRTAZAPINE PO Take 45 mg by mouth At Bedtime        LORAZEPAM PO Take 0.5 mg by mouth daily as needed for anxiety       ClomiPRAMINE HCl (ANAFRANIL PO) Take 25 mg by mouth every evening        gabapentin (NEURONTIN) 400 MG capsule Take 1 capsule by mouth 2 times daily        ARIPiprazole (ABILIFY) 2 MG tablet Take 1 tablet by mouth At Bedtime.       [DISCONTINUED] levothyroxine (SYNTHROID/LEVOTHROID) 100 MCG tablet TAKE ONE TABLET BY MOUTH ONE TIME DAILY 90 tablet 1     [DISCONTINUED] chlorthalidone (HYGROTON) 25 MG tablet TAKE ONE TABLET BY MOUTH ONE TIME DAILY 90 tablet 1     [DISCONTINUED] clopidogrel (PLAVIX) 75 MG tablet Take 1 tablet (75 mg) by mouth daily 90 tablet 3     [DISCONTINUED] simvastatin (ZOCOR) 20 MG tablet TAKE ONE TABLET BY MOUTH AT BEDTIME 90 tablet 0     ASPIRIN NOT PRESCRIBED (INTENTIONAL) Please choose reason not prescribed, below (Patient not taking: Reported on 4/3/2018) 0 each 0                "Review of Systems:   No history of bleeding difficulty, anesthesia problems or blood clots.  The 10 point Review of Systems is negative other than noted in the HPI           Physical Exam:   Blood pressure 117/60, pulse 55, temperature 97.9  F (36.6  C), temperature source Oral, height 6' 1\" (1.854 m), weight 194 lb (88 kg), SpO2 98 %.    Constitutional: healthy appearing, alert and in no distress  Heent: Normocephalic. Head without obvious masses or lesions. PERRLDC, EOMI. Mouth exam within normal limits: tongue, mucous membranes, posterior pharynx all normal, no lesions or abnormalities seen.  Tm's and canals within normal limits bilaterally. Neck supple, no nuchal rigidity or masses. No supraclavicular, or cervical adenopathy. Thyroid symmetric, no masses.  Cardiovascular: Regular rate and rhythm, no murmer, rub or gallops.  JVP not elevated, no edema.  Carotids within normal limits bilaterally, no bruits.  Respiratory: Normal respiratory effort.  Lungs clear, normal flow, no wheezing or crackles.  Gastrointestinal: Normal active bowel sounds.   Soft, not tender, no masses, guarding or rebound.  No hepatosplenomegaly.   Musculoskeletal: extremities normal, no gross deformities noted.  Skin: no suspicious lesions or rashes   Neurologic: Mental status within normal limits.  Speech fluent.  No gross motor abnormalities and gait intact.  Psychiatric: mentation appears normal and affect normal.         Data:   Labs sent; ekg done Enoch and not repeated, sinus lucio, rbbb        Assessment:   1. Normal pre op exam, he has mmp but given the stability of them and low risk surgery he should be ok for the procedure.  Regional anesthesia is recommended  2. Thor aneurysm, stable  3. Lung nodule, follow up July  4. Ascvd, cvi, no issues  5. Ckd, stable  6. Elevated cholesterol, on statin  7. Hypertension, controlled  8. Rectal incont, for years, will refer to cr surgery         Plan:   The patient is ok for the procedure  Hold " plavix a week before, resume post op when ok with surgery  Labs today  Can take other meds day of surgery with sip of water  Referred to cr surgery      Zurdo Kendrick M.D.

## 2018-04-03 ENCOUNTER — OFFICE VISIT (OUTPATIENT)
Dept: FAMILY MEDICINE | Facility: CLINIC | Age: 83
End: 2018-04-03
Payer: COMMERCIAL

## 2018-04-03 VITALS
HEART RATE: 55 BPM | OXYGEN SATURATION: 98 % | DIASTOLIC BLOOD PRESSURE: 60 MMHG | BODY MASS INDEX: 25.71 KG/M2 | SYSTOLIC BLOOD PRESSURE: 117 MMHG | WEIGHT: 194 LBS | TEMPERATURE: 97.9 F | HEIGHT: 73 IN

## 2018-04-03 DIAGNOSIS — I10 ESSENTIAL HYPERTENSION: ICD-10-CM

## 2018-04-03 DIAGNOSIS — M20.42 HAMMER TOE OF LEFT FOOT: ICD-10-CM

## 2018-04-03 DIAGNOSIS — I71.21 THORACIC ASCENDING AORTIC ANEURYSM (H): ICD-10-CM

## 2018-04-03 DIAGNOSIS — E78.5 HYPERLIPIDEMIA LDL GOAL <100: ICD-10-CM

## 2018-04-03 DIAGNOSIS — I63.9 CEREBROVASCULAR ACCIDENT (CVA), UNSPECIFIED MECHANISM (H): ICD-10-CM

## 2018-04-03 DIAGNOSIS — Z01.818 PREOP GENERAL PHYSICAL EXAM: Primary | ICD-10-CM

## 2018-04-03 DIAGNOSIS — E03.9 ACQUIRED HYPOTHYROIDISM: ICD-10-CM

## 2018-04-03 DIAGNOSIS — R91.1 LUNG NODULE: ICD-10-CM

## 2018-04-03 DIAGNOSIS — I25.10 ASCVD (ARTERIOSCLEROTIC CARDIOVASCULAR DISEASE): ICD-10-CM

## 2018-04-03 DIAGNOSIS — N18.30 CKD (CHRONIC KIDNEY DISEASE) STAGE 3, GFR 30-59 ML/MIN (H): ICD-10-CM

## 2018-04-03 DIAGNOSIS — R15.9 INCONTINENCE OF FECES, UNSPECIFIED FECAL INCONTINENCE TYPE: ICD-10-CM

## 2018-04-03 LAB
ANION GAP SERPL CALCULATED.3IONS-SCNC: 8 MMOL/L (ref 3–14)
BUN SERPL-MCNC: 27 MG/DL (ref 7–30)
CALCIUM SERPL-MCNC: 8.9 MG/DL (ref 8.5–10.1)
CHLORIDE SERPL-SCNC: 105 MMOL/L (ref 94–109)
CO2 SERPL-SCNC: 27 MMOL/L (ref 20–32)
CREAT SERPL-MCNC: 1.14 MG/DL (ref 0.66–1.25)
ERYTHROCYTE [DISTWIDTH] IN BLOOD BY AUTOMATED COUNT: 14.9 % (ref 10–15)
GFR SERPL CREATININE-BSD FRML MDRD: 61 ML/MIN/1.7M2
GLUCOSE SERPL-MCNC: 84 MG/DL (ref 70–99)
HCT VFR BLD AUTO: 40.3 % (ref 40–53)
HGB BLD-MCNC: 12.6 G/DL (ref 13.3–17.7)
MCH RBC QN AUTO: 31.7 PG (ref 26.5–33)
MCHC RBC AUTO-ENTMCNC: 31.3 G/DL (ref 31.5–36.5)
MCV RBC AUTO: 101 FL (ref 78–100)
PLATELET # BLD AUTO: 164 10E9/L (ref 150–450)
POTASSIUM SERPL-SCNC: 3.8 MMOL/L (ref 3.4–5.3)
RBC # BLD AUTO: 3.98 10E12/L (ref 4.4–5.9)
SODIUM SERPL-SCNC: 140 MMOL/L (ref 133–144)
WBC # BLD AUTO: 5.7 10E9/L (ref 4–11)

## 2018-04-03 PROCEDURE — 85027 COMPLETE CBC AUTOMATED: CPT | Performed by: INTERNAL MEDICINE

## 2018-04-03 PROCEDURE — 80048 BASIC METABOLIC PNL TOTAL CA: CPT | Performed by: INTERNAL MEDICINE

## 2018-04-03 PROCEDURE — 36415 COLL VENOUS BLD VENIPUNCTURE: CPT | Performed by: INTERNAL MEDICINE

## 2018-04-03 PROCEDURE — 99215 OFFICE O/P EST HI 40 MIN: CPT | Performed by: INTERNAL MEDICINE

## 2018-04-03 RX ORDER — CHLORTHALIDONE 25 MG/1
25 TABLET ORAL DAILY
Qty: 90 TABLET | Refills: 3 | Status: SHIPPED | OUTPATIENT
Start: 2018-04-03 | End: 2019-01-25

## 2018-04-03 RX ORDER — LEVOTHYROXINE SODIUM 100 UG/1
100 TABLET ORAL DAILY
Qty: 90 TABLET | Refills: 3 | Status: SHIPPED | OUTPATIENT
Start: 2018-04-03 | End: 2019-01-25

## 2018-04-03 RX ORDER — SIMVASTATIN 20 MG
TABLET ORAL
Qty: 90 TABLET | Refills: 3 | Status: SHIPPED | OUTPATIENT
Start: 2018-04-03 | End: 2019-01-25

## 2018-04-03 RX ORDER — CLOPIDOGREL BISULFATE 75 MG/1
75 TABLET ORAL DAILY
Qty: 90 TABLET | Refills: 3 | Status: SHIPPED | OUTPATIENT
Start: 2018-04-03 | End: 2019-01-25

## 2018-04-03 NOTE — MR AVS SNAPSHOT
After Visit Summary   4/3/2018    Zack Santiago    MRN: [id number]           Patient Information     Date Of Birth          5/29/1931        Visit Information        Provider Department      4/3/2018 10:30 AM Zurdo Kendrick MD [facility]        Today's Diagnoses     Preop general physical exam    -  1    Hammer toe of left foot        CKD (chronic kidney disease) stage 3, GFR 30-59 ml/min        Thoracic ascending aortic aneurysm (H)        Incontinence of feces, unspecified fecal incontinence type        ASCVD (arteriosclerotic cardiovascular disease)        Acquired hypothyroidism        Essential hypertension        Cerebrovascular accident (CVA), unspecified mechanism (H)        Hyperlipidemia LDL goal <100          Care Instructions      Before Your Surgery      Call your surgeon if there is any change in your health. This includes signs of a cold or flu (such as a sore throat, runny nose, cough, rash or fever).    Do not smoke, drink alcohol or take over the counter medicine (unless your surgeon or primary care doctor tells you to) for the 24 hours before and after surgery.    If you take prescribed drugs: Follow your doctor s orders about which medicines to take and which to stop until after surgery.    Eating and drinking prior to surgery: follow the instructions from your surgeon    Take a shower or bath the night before surgery. Use the soap your surgeon gave you to gently clean your skin. If you do not have soap from your surgeon, use your regular soap. Do not shave or scrub the surgery site.  Wear clean pajamas and have clean sheets on your bed.       See the colorectal office for the incontinence.              Follow-ups after your visit        Additional Services     COLORECTAL SURGERY REFERRAL       Your provider has referred you to: [facility]: [facility] [place] [phone]   [email or web address]    Referral Reason(s): incont  Special  "Concerns: None  This referral is: Elective (week +)  It is OK to leave a message on patient's voicemail.    Please be aware that coverage of these services is subject to the terms and limitations of your health insurance plan.  Call member services at your health plan with any benefit or coverage questions.      Please bring the following with you to your appointment:    (1) Any X-Rays, CTs or MRIs which have been performed.  Contact the facility where they were done to arrange for  prior to your scheduled appointment.    (2) List of current medications  (3) This referral request   (4) Any documents/labs given to you for this referral                  Who to contact     If you have questions or need follow up information about today's clinic visit or your schedule please contact [facility] directly at [phone].  Normal or non-critical lab and imaging results will be communicated to you by MyChart, letter or phone within 4 business days after the clinic has received the results. If you do not hear from us within 7 days, please contact the clinic through MyChart or phone. If you have a critical or abnormal lab result, we will notify you by phone as soon as possible.  Submit refill requests through [employer] or call your pharmacy and they will forward the refill request to us. Please allow 3 business days for your refill to be completed.          Additional Information About Your Visit        GridPoint Information     [employer]Point lets you send messages to your doctor, view your test results, renew your prescriptions, schedule appointments and more. To sign up, go to [email or web address] . Click on \"Log in\" on the left side of the screen, which will take you to the Welcome page. Then click on \"Sign up Now\" on the right side of the page.     You will be asked to enter the access code listed below, as well as some personal information. Please follow the directions to create your username and password.   " "  Your access code is: [email or web address]  Expires: 2018  8:35 PM     Your access code will  in 90 days. If you need help or a new code, please call your [facility] clinic or [phone].        Care EveryWhere ID     This is your Care EveryWhere ID. This could be used by other organizations to access your [facility] medical records  [id number]        Your Vitals Were     Pulse Temperature Height Pulse Oximetry BMI (Body Mass Index)       55 97.9  F (36.6  C) (Oral) 6' 1\" (1.854 m) 98% 25.6 kg/m2        Blood Pressure from Last 3 Encounters:   18 117/60   18 129/64   18 144/68    Weight from Last 3 Encounters:   18 194 lb (88 kg)   18 193 lb 11.2 oz (87.9 kg)   18 194 lb (88 kg)              We Performed the Following     Basic metabolic panel     CBC with platelets     COLORECTAL SURGERY REFERRAL          Today's Medication Changes          These changes are accurate as of 4/3/18 10:40 AM.  If you have any questions, ask your nurse or doctor.               These medicines have changed or have updated prescriptions.        Dose/Directions    chlorthalidone 25 MG tablet   Commonly known as:  HYGROTON   This may have changed:  See the new instructions.   Used for:  Essential hypertension   Changed by:  Zurdo Kendrick MD        Dose:  25 mg   Take 1 tablet (25 mg) by mouth daily   Quantity:  90 tablet   Refills:  3       levothyroxine 100 MCG tablet   Commonly known as:  SYNTHROID/LEVOTHROID   This may have changed:  See the new instructions.   Used for:  Acquired hypothyroidism   Changed by:  Zurdo Kendrick MD        Dose:  100 mcg   Take 1 tablet (100 mcg) by mouth daily   Quantity:  90 tablet   Refills:  3       simvastatin 20 MG tablet   Commonly known as:  ZOCOR   This may have changed:  See the new instructions.   Used for:  Hyperlipidemia LDL goal <100   Changed by:  Zurdo Kendrick MD        TAKE ONE TABLET BY MOUTH AT BEDTIME   Quantity:  90 tablet "   Refills:  3            Where to get your medicines      These medications were sent to [facility] [address] [facility] - [place] - [address]  [address], [place] [address]     Phone:  [phone]     chlorthalidone 25 MG tablet    clopidogrel 75 MG tablet    levothyroxine 100 MCG tablet    simvastatin 20 MG tablet                Primary Care Provider Office Phone # Fax #    Zurdo Kendrick [phone] [address] [facility] [address]  [facility] [address]        Equal Access to Services     [facility]: Hadii aad ku hadasho Soomaali, waaxda luqadaha, qaybta kaalmada adeegyada, waxay idiin hayaan adeezekiel ellis . So [facility] [phone].    ATENCIÓN: Si habla español, tiene a khan disposición servicios gratuitos de asistencia lingüística. [facility] [phone].    We comply with applicable federal civil rights laws and [place] laws. We do not discriminate on the basis of race, color, national origin, age, disability, sex, sexual orientation, or gender identity.            Thank you!     Thank you for choosing [facility]  for your care. Our goal is always to provide you with excellent care. Hearing back from our patients is one way we can continue to improve our services. Please take a few minutes to complete the written survey that you may receive in the mail after your visit with us. Thank you!             Your Updated Medication List - Protect others around you: Learn how to safely use, store and throw away your medicines at www.disposemymeds.org.          This list is accurate as of 4/3/18 10:40 AM.  Always use your most recent med list.                   Brand Name Dispense Instructions for use Diagnosis    ANAFRANIL PO      Take 25 mg by mouth every evening    OCD (obsessive compulsive disorder)       ARIPiprazole 2 MG tablet    ABILIFY     Take 1 tablet by mouth At Bedtime.        ASPIRIN NOT PRESCRIBED    INTENTIONAL    0 each    Please choose  reason not prescribed, below    ASCVD (arteriosclerotic cardiovascular disease)       chlorthalidone 25 MG tablet    HYGROTON    90 tablet    Take 1 tablet (25 mg) by mouth daily    Essential hypertension       clopidogrel 75 MG tablet    PLAVIX    90 tablet    Take 1 tablet (75 mg) by mouth daily    Cerebrovascular accident (CVA), unspecified mechanism (H)       gabapentin 400 MG capsule    NEURONTIN     Take 1 capsule by mouth 2 times daily        levothyroxine 100 MCG tablet    SYNTHROID/LEVOTHROID    90 tablet    Take 1 tablet (100 mcg) by mouth daily    Acquired hypothyroidism       LORAZEPAM PO      Take 0.5 mg by mouth daily as needed for anxiety        MIRTAZAPINE PO      Take 45 mg by mouth At Bedtime        simvastatin 20 MG tablet    ZOCOR    90 tablet    TAKE ONE TABLET BY MOUTH AT BEDTIME    Hyperlipidemia LDL goal <100

## 2018-04-03 NOTE — NURSING NOTE
"Chief Complaint   Patient presents with     Pre-Op Exam       Initial /60  Pulse 55  Temp 97.9  F (36.6  C) (Oral)  Ht 6' 1\" (1.854 m)  Wt 194 lb (88 kg)  SpO2 98%  BMI 25.6 kg/m2 Estimated body mass index is 25.6 kg/(m^2) as calculated from the following:    Height as of this encounter: 6' 1\" (1.854 m).    Weight as of this encounter: 194 lb (88 kg).  Medication Reconciliation: complete   Connie Black CMA      "

## 2018-04-16 ENCOUNTER — TRANSFERRED RECORDS (OUTPATIENT)
Dept: HEALTH INFORMATION MANAGEMENT | Facility: CLINIC | Age: 83
End: 2018-04-16

## 2018-04-20 ENCOUNTER — TELEPHONE (OUTPATIENT)
Dept: FAMILY MEDICINE | Facility: CLINIC | Age: 83
End: 2018-04-20

## 2018-04-20 NOTE — TELEPHONE ENCOUNTER
Reason for Call:  Other call back    Detailed comments:   Pt said he had dark stools  Called colon/rectal and they said if no bright blood then bleeding may be in   Digestive track. They suggested he all his primary    Would like a call back from matthew brown on what to do.     Phone Number Patient can be reached at:     Home Phone [phone]   Mobile [phone]     Best Time: anytime    Can we leave a detailed message on this number? YES    Call taken on 4/20/2018 at 2:38 PM by Debbie Mcclain

## 2018-04-20 NOTE — TELEPHONE ENCOUNTER
YENNY Terrell:  Patient is seeing you Monday.    Patient states he called colorectal first but they deferred him to PCP since no red blood noted.    States he is having Dark stools, soft x few weeks.  [facility]  Intermittent Incontinent of stool. Anoscopy performed at that appt.  Dx for weak sphincter muscle and advised dietary/fiber changes.  He increased metamucil BID.    Denies abdominal pain, or nausea, red blood in stool or urine, overall diet changes, no new OTC's including Pepto or Iron, lightheadedness or dizziness, weight loss, fatigue.    Recommended office visit for further evaluation.    Patient agreed and appt scheduled for Monday.  Discussed signs and symptoms to watch for and if noted to be seen sooner than Monday.    Received Office visit notes from [facility] and will place in Inbasket for Dr. Terrell.  PCP is out of the office x 1 week.  Ally Lima RN

## 2018-04-22 ENCOUNTER — TRANSFERRED RECORDS (OUTPATIENT)
Dept: HEALTH INFORMATION MANAGEMENT | Facility: CLINIC | Age: 83
End: 2018-04-22

## 2018-04-23 ENCOUNTER — OFFICE VISIT (OUTPATIENT)
Dept: FAMILY MEDICINE | Facility: CLINIC | Age: 83
End: 2018-04-23
Payer: COMMERCIAL

## 2018-04-23 VITALS
HEIGHT: 73 IN | TEMPERATURE: 97.2 F | WEIGHT: 192 LBS | DIASTOLIC BLOOD PRESSURE: 59 MMHG | OXYGEN SATURATION: 95 % | BODY MASS INDEX: 25.45 KG/M2 | SYSTOLIC BLOOD PRESSURE: 123 MMHG | HEART RATE: 57 BPM

## 2018-04-23 DIAGNOSIS — D64.9 ANEMIA, UNSPECIFIED TYPE: ICD-10-CM

## 2018-04-23 DIAGNOSIS — K51.90 ULCERATIVE COLITIS WITHOUT COMPLICATIONS, UNSPECIFIED LOCATION (H): ICD-10-CM

## 2018-04-23 DIAGNOSIS — R19.5 DARK STOOLS: Primary | ICD-10-CM

## 2018-04-23 DIAGNOSIS — Z12.11 SCREEN FOR COLON CANCER: ICD-10-CM

## 2018-04-23 LAB
ERYTHROCYTE [DISTWIDTH] IN BLOOD BY AUTOMATED COUNT: 14.9 % (ref 10–15)
HCT VFR BLD AUTO: 38.5 % (ref 40–53)
HGB BLD-MCNC: 12.3 G/DL (ref 13.3–17.7)
MCH RBC QN AUTO: 31.9 PG (ref 26.5–33)
MCHC RBC AUTO-ENTMCNC: 31.9 G/DL (ref 31.5–36.5)
MCV RBC AUTO: 100 FL (ref 78–100)
PLATELET # BLD AUTO: 177 10E9/L (ref 150–450)
RBC # BLD AUTO: 3.85 10E12/L (ref 4.4–5.9)
WBC # BLD AUTO: 8.6 10E9/L (ref 4–11)

## 2018-04-23 PROCEDURE — 82607 VITAMIN B-12: CPT | Performed by: INTERNAL MEDICINE

## 2018-04-23 PROCEDURE — 99214 OFFICE O/P EST MOD 30 MIN: CPT | Performed by: INTERNAL MEDICINE

## 2018-04-23 PROCEDURE — 36415 COLL VENOUS BLD VENIPUNCTURE: CPT | Performed by: INTERNAL MEDICINE

## 2018-04-23 PROCEDURE — 82728 ASSAY OF FERRITIN: CPT | Performed by: INTERNAL MEDICINE

## 2018-04-23 PROCEDURE — 85027 COMPLETE CBC AUTOMATED: CPT | Performed by: INTERNAL MEDICINE

## 2018-04-23 NOTE — LETTER
[address] [facility]  [address]  [place]  [address]  Tel: [phone]    April 25, 2018    Zack Santiago  [address] [address]        Dear  Jack,    Candy Dixon,    This is to inform you regarding your test result.    Vitamin B 12 level is normal.  Ferritin which is iron stores in the body is normal.  Hemoglobin is slightly low but stable.    If you have any further questions or problems, please contact our office.      Sincerely,    Adilene Terrell MD/ Connie Black CMA  Results for orders placed or performed in visit on 04/23/18   CBC with platelets   Result Value Ref Range    WBC 8.6 4.0 - 11.0 10e9/L    RBC Count 3.85 (L) 4.4 - 5.9 10e12/L    Hemoglobin 12.3 (L) 13.3 - 17.7 g/dL    Hematocrit 38.5 (L) 40.0 - 53.0 %     78 - 100 fl    MCH 31.9 26.5 - 33.0 pg    MCHC 31.9 31.5 - 36.5 g/dL    RDW 14.9 10.0 - 15.0 %    Platelet Count 177 150 - 450 10e9/L   Ferritin   Result Value Ref Range    Ferritin 116 26 - 388 ng/mL   Vitamin B12   Result Value Ref Range    Vitamin B12 443 193 - 986 pg/mL               Enclosure: Lab Results

## 2018-04-23 NOTE — MR AVS SNAPSHOT
After Visit Summary   4/23/2018    Zack Santiago    MRN: [id number]           Patient Information     Date Of Birth          5/29/1931        Visit Information        Provider Department      4/23/2018 2:00 PM Adilene Terrell MD [facility]        Today's Diagnoses     Dark stools    -  1    Anemia, unspecified type        Ulcerative colitis without complications, unspecified location (H)        Screen for colon cancer          Care Instructions    Labs today  Schedule an appointment for upper GI endoscopy at your earliest convenience  Do FIT test at your earliest convenience  Follow up with PCP in one month  Seek sooner medical attention if there is any worsening of symptoms or problems          Follow-ups after your visit        Additional Services     GASTROENTEROLOGY ADULT REF PROCEDURE ONLY Deidre Ramon [phone]; [facility]                 Future tests that were ordered for you today     Open Future Orders        Priority Expected Expires Ordered    Fecal colorectal cancer screen (FIT) Routine 5/14/2018 7/16/2018 4/23/2018            Who to contact     If you have questions or need follow up information about today's clinic visit or your schedule please contact [facility] directly at [phone].  Normal or non-critical lab and imaging results will be communicated to you by [employer], letter or phone within 4 business days after the clinic has received the results. If you do not hear from us within 7 days, please contact the clinic through [employer] or phone. If you have a critical or abnormal lab result, we will notify you by phone as soon as possible.  Submit refill requests through [employer]rdPlace or call your pharmacy and they will forward the refill request to us. Please allow 3 business days for your refill to be completed.          Additional Information About Your Visit        [employer] Information     [employer]rdPlace lets you send messages to your doctor,  "view your test results, renew your prescriptions, schedule appointments and more. To sign up, go to [email or web address] . Click on \"Log in\" on the left side of the screen, which will take you to the Welcome page. Then click on \"Sign up Now\" on the right side of the page.     You will be asked to enter the access code listed below, as well as some personal information. Please follow the directions to create your username and password.     Your access code is: [email or web address]  Expires: 2018  2:29 PM     Your access code will  in 90 days. If you need help or a new code, please call your [facility] clinic or [phone].        Care EveryWhere ID     This is your Care EveryWhere ID. This could be used by other organizations to access your [facility] medical records  [id number]        Your Vitals Were     Pulse Temperature Height Pulse Oximetry BMI (Body Mass Index)       57 97.2  F (36.2  C) (Oral) 6' 1\" (1.854 m) 95% 25.33 kg/m2        Blood Pressure from Last 3 Encounters:   18 123/59   18 117/60   18 129/64    Weight from Last 3 Encounters:   18 192 lb (87.1 kg)   18 194 lb (88 kg)   18 193 lb 11.2 oz (87.9 kg)              We Performed the Following     CBC with platelets     Ferritin     GASTROENTEROLOGY ADULT REF PROCEDURE ONLY Deidre Ramon [phone]; [facility]     Vitamin B12        Primary Care Provider Office Phone # Fax #    Zurdo Kendrick [phone] [address]  [place] [address]        Equal Access to Services     JEREMIAH GROVE : Lulu Milian, elsa rosas, elizabeth juarezalabigail mayorga, sita franklin. So [facility] [phone].    ATENCIÓN: Si habla español, tiene a khan disposición servicios gratuitos de asistencia lingüística. Llame al [phone].    We comply with applicable federal civil rights laws and [place] laws. We do not discriminate on the " basis of race, color, national origin, age, disability, sex, sexual orientation, or gender identity.            Thank you!     Thank you for choosing [facility]  for your care. Our goal is always to provide you with excellent care. Hearing back from our patients is one way we can continue to improve our services. Please take a few minutes to complete the written survey that you may receive in the mail after your visit with us. Thank you!             Your Updated Medication List - Protect others around you: Learn how to safely use, store and throw away your medicines at www.disposemymeds.org.          This list is accurate as of 4/23/18  2:31 PM.  Always use your most recent med list.                   Brand Name Dispense Instructions for use Diagnosis    ANAFRANIL PO      Take 25 mg by mouth every evening    OCD (obsessive compulsive disorder)       ARIPiprazole 2 MG tablet    ABILIFY     Take 1 tablet by mouth At Bedtime.        ASPIRIN NOT PRESCRIBED    INTENTIONAL    0 each    Please choose reason not prescribed, below    ASCVD (arteriosclerotic cardiovascular disease)       chlorthalidone 25 MG tablet    HYGROTON    90 tablet    Take 1 tablet (25 mg) by mouth daily    Essential hypertension       clopidogrel 75 MG tablet    PLAVIX    90 tablet    Take 1 tablet (75 mg) by mouth daily    Cerebrovascular accident (CVA), unspecified mechanism (H)       gabapentin 400 MG capsule    NEURONTIN     Take 1 capsule by mouth 2 times daily        levothyroxine 100 MCG tablet    SYNTHROID/LEVOTHROID    90 tablet    Take 1 tablet (100 mcg) by mouth daily    Acquired hypothyroidism       LORAZEPAM PO      Take 0.5 mg by mouth daily as needed for anxiety        MIRTAZAPINE PO      Take 45 mg by mouth At Bedtime        simvastatin 20 MG tablet    ZOCOR    90 tablet    TAKE ONE TABLET BY MOUTH AT BEDTIME    Hyperlipidemia LDL goal <100

## 2018-04-23 NOTE — PROGRESS NOTES
"  SUBJECTIVE:   Zack Santiago is a 86 year old male who presents to clinic today for the following health issues:    Dark stools      Duration: a couple months    Accompanying signs and symptoms: irregular bowels    History (similar episodes/previous evaluation): None    Precipitating or alleviating factors: None    Therapies tried and outcome: Has seen colo/rectal specialist     PCP is Dr. Kendrick  Patient is reporting stool is darker than normal  Has irregular bowel problems  However, no diarrhea  Has history of UC in the past  Denies abdominal pain, nausea, fresh blood in stool  Reports he's tolerating food well  Patient is on Plavix  Isn't on iron supplements or pepto bismol    Problem list and histories reviewed & adjusted, as indicated.  Additional history: as documented    Medications and Labs reviewed in EPIC    Reviewed and updated as needed this visit by clinical staff  Tobacco  Allergies  Meds       Reviewed and updated as needed this visit by Provider       ROS:  Constitutional, HEENT, cardiovascular, pulmonary, GI, , musculoskeletal, neuro, skin, endocrine and psych systems are negative, except as otherwise noted.    POSITIVE for dark colored stool, bowel irregularities    This document serves as a record of the services and decisions personally performed and made by Adilene Terrell MD. It was created on her behalf by Mónica Mares, a trained medical scribe. The creation of this document is based on the provider's statements to the medical scribe.  Mónica Mares 2:13 PM April 23, 2018    OBJECTIVE:     /59 (BP Location: Right arm, Patient Position: Sitting, Cuff Size: Adult Regular)  Pulse 57  Temp 97.2  F (36.2  C) (Oral)  Ht 1.854 m (6' 1\")  Wt 87.1 kg (192 lb)  SpO2 95%  BMI 25.33 kg/m2  Body mass index is 25.33 kg/(m^2).    GENERAL: overweight, uses cane to walk, alert and no distress    ABDOMEN: soft, nontender, no hepatosplenomegaly, no masses and bowel sounds " normal  PSYCH: mentation appears normal, affect normal/bright    Diagnostic Test Results:  No results found for this or any previous visit (from the past 24 hour(s)).    Reviewed and discussed progress notes from [facility] and anoscopy from 04/16/18    ASSESSMENT/PLAN:     Zack was seen today for rectal problem.    Diagnoses and all orders for this visit:    Dark stools  -     GASTROENTEROLOGY ADULT REF PROCEDURE ONLY Deidre Ramon [phone]; [facility]  PCP is Dr. Kendrick  Patient is reporting stool is darker than normal  Has irregular bowel problems  Has history of UC in the past  Denies diarrhea abdominal pain, nausea, fresh blood in stool  Reports he's tolerating food well  Patient is on Plavix  Isn't on iron supplements or pepto bismol  He has seen colorectal surgery on 04/16/18 and anoscopy was performed  Colon was normal  Due to his symptoms, ordered anemia workup  Additionally, referred for upper GI endoscopy  He will make appointment for EGD  Note from [facility] is reviewed.  Past colonoscopy years ago was normal.  Anemia, unspecified type  -     CBC with platelets  -     Ferritin  -     Vitamin B12  -     GASTROENTEROLOGY ADULT REF PROCEDURE ONLY Jaenadia Ramon [phone]; [facility]  See above    Ulcerative colitis without complications, unspecified location (H)  See above and in remission.    Screen for colon cancer  -     Fecal colorectal cancer screen (FIT); Future  See above  Ordered FIT test to test occult blood in stool    Patient Instructions   Labs today  Schedule an appointment for upper GI endoscopy at your earliest convenience  Do FIT test at your earliest convenience  Follow up with PCP in one month  Seek sooner medical attention if there is any worsening of symptoms or problems    The information in this document, created by the medical scribe for me, accurately reflects the services I personally performed and the decisions  made by me. I have reviewed and approved this document for accuracy prior to leaving the patient care area.  April 23, 2018 2:31 PM    Adilene Terrell MD  [facility]

## 2018-04-23 NOTE — NURSING NOTE
"Chief Complaint   Patient presents with     Rectal Problem       Initial /59 (BP Location: Right arm, Patient Position: Sitting, Cuff Size: Adult Regular)  Pulse 57  Temp 97.2  F (36.2  C) (Oral)  Ht 6' 1\" (1.854 m)  Wt 192 lb (87.1 kg)  SpO2 95%  BMI 25.33 kg/m2 Estimated body mass index is 25.33 kg/(m^2) as calculated from the following:    Height as of this encounter: 6' 1\" (1.854 m).    Weight as of this encounter: 192 lb (87.1 kg).  Medication Reconciliation: complete    Onel Broussard CMA on 4/23/2018 at 2:09 PM    "

## 2018-04-24 LAB
FERRITIN SERPL-MCNC: 116 NG/ML (ref 26–388)
VIT B12 SERPL-MCNC: 443 PG/ML (ref 193–986)

## 2018-04-24 PROCEDURE — 82274 ASSAY TEST FOR BLOOD FECAL: CPT | Performed by: INTERNAL MEDICINE

## 2018-04-25 NOTE — PROGRESS NOTES
Please notify patient by sending following letter with copy of test results      Candy Dixon,    This is to inform you regarding your test result.    Vitamin B 12 level is normal.  Ferritin which is iron stores in the body is normal.  Hemoglobin is slightly low but stable.    Sincerely,      Dr.Nasima Xander MD,FACP

## 2018-04-28 LAB — HEMOCCULT STL QL IA: NEGATIVE

## 2018-04-29 NOTE — PROGRESS NOTES
Please notify patient by sending following letter with copy of test results      Candy Dixon,    This is to inform you regarding your test result.    Fecal colorectal cancer screen (FIT) is negative.      Sincerely,      Dr.Nasima Xander MD,FACP

## 2018-04-30 DIAGNOSIS — Z12.11 SCREEN FOR COLON CANCER: ICD-10-CM

## 2018-04-30 NOTE — LETTER
[place] [id number] [facility]  [address]  [place]  [address]  Tel: [phone]    April 30, 2018    Zack MYNOR Santiago  [address] [address]        Dear Mr. Santiago,    Your Fit test was negative.  This colorectal cancer screening test is an alternative but does not replace colonoscopy as the gold standard test for colorectal cancer prevention.  The FIT test must be performed annually.      If you have any further questions or problems, please contact our office.      Sincerely,    Adilene Terrell MD/MURPHY          Enclosure: Lab Results  Results for orders placed or performed in visit on 04/30/18   Fecal colorectal cancer screen (FIT)   Result Value Ref Range    Occult Blood Scn FIT Negative NEG^Negative

## 2018-05-01 ENCOUNTER — TRANSFERRED RECORDS (OUTPATIENT)
Dept: HEALTH INFORMATION MANAGEMENT | Facility: CLINIC | Age: 83
End: 2018-05-01

## 2018-05-21 ENCOUNTER — TELEPHONE (OUTPATIENT)
Dept: FAMILY MEDICINE | Facility: CLINIC | Age: 83
End: 2018-05-21

## 2018-05-21 NOTE — TELEPHONE ENCOUNTER
Detailed VM left to inform patient of below response from PCP - asked him to call back if any further questions    Tanya MA RN

## 2018-05-21 NOTE — TELEPHONE ENCOUNTER
Pt inquiring when his next echocardiogram is due.  He had one earlier this year and thought Dr Kendrick recommended a 6 month f/u.  Zack may be reached at [phone]. Ok to leave a detailed message.

## 2018-05-21 NOTE — TELEPHONE ENCOUNTER
To PCP:     Please advise on recommendations for f/u on Echo/repeat echo      Thank you,   Brie SILVA RN

## 2018-05-21 NOTE — TELEPHONE ENCOUNTER
Yes, due for echo and also ct lung for follow up nodule first part of August    Zurdo Kendrick M.D.

## 2018-06-22 ENCOUNTER — OFFICE VISIT (OUTPATIENT)
Dept: FAMILY MEDICINE | Facility: CLINIC | Age: 83
End: 2018-06-22
Payer: COMMERCIAL

## 2018-06-22 VITALS
TEMPERATURE: 97.5 F | OXYGEN SATURATION: 99 % | HEIGHT: 73 IN | WEIGHT: 188.3 LBS | BODY MASS INDEX: 24.96 KG/M2 | DIASTOLIC BLOOD PRESSURE: 64 MMHG | HEART RATE: 56 BPM | SYSTOLIC BLOOD PRESSURE: 136 MMHG

## 2018-06-22 DIAGNOSIS — G89.29 CHRONIC LEFT SHOULDER PAIN: Primary | ICD-10-CM

## 2018-06-22 DIAGNOSIS — M25.512 CHRONIC LEFT SHOULDER PAIN: Primary | ICD-10-CM

## 2018-06-22 DIAGNOSIS — R63.4 LOSS OF WEIGHT: ICD-10-CM

## 2018-06-22 DIAGNOSIS — E03.9 ACQUIRED HYPOTHYROIDISM: ICD-10-CM

## 2018-06-22 PROCEDURE — 84443 ASSAY THYROID STIM HORMONE: CPT | Performed by: INTERNAL MEDICINE

## 2018-06-22 PROCEDURE — 36415 COLL VENOUS BLD VENIPUNCTURE: CPT | Performed by: INTERNAL MEDICINE

## 2018-06-22 PROCEDURE — 99214 OFFICE O/P EST MOD 30 MIN: CPT | Performed by: INTERNAL MEDICINE

## 2018-06-22 NOTE — MR AVS SNAPSHOT
After Visit Summary   6/22/2018    Zack Santiago    MRN: [id number]           Patient Information     Date Of Birth          5/29/1931        Visit Information        Provider Department      6/22/2018 2:20 PM Lianna Nicole MD [facility]        Today's Diagnoses     Chronic left shoulder pain    -  1    Loss of weight        Acquired hypothyroidism           Follow-ups after your visit        Additional Services     PEDRO PT, HAND, AND CHIROPRACTIC REFERRAL       **This order will print in the [facility] Scheduling Office**    Physical Therapy, Hand Therapy and Chiropractic Care are available through:    *[facility]  *[facility]  *[facility]    Call one number to schedule at any of the above locations: [phone].    Your provider has referred you to: Physical Therapy at [facility] or [facility]    Indication/Reason for Referral: Shoulder Pain  Onset of Illness: chronic  Therapy Orders: Evaluate and Treat  Special Programs: None  Special Request: None    Sandra Flores      Additional Comments for the Therapist or Chiropractor: none    Please be aware that coverage of these services is subject to the terms and limitations of your health insurance plan.  Call member services at your health plan with any benefit or coverage questions.      Please bring the following to your appointment:    *Your personal calendar for scheduling future appointments  *Comfortable clothing                  Your next 10 appointments already scheduled     Jun 28, 2018  9:20 AM CDT   (Arrive by 9:05 AM)   PEDRO Extremity with Diana Allred PT   [facility] Physical Therapy ([facility]  )    [address] [place] [address] [facility]  [facility] [address] [phone]            Jul 17, 2018  3:00 PM CDT   Office Visit with Zurdo Kendrick MD   [facility] ([facility])    [address] [place] [id number]  "  [phone]           Bring a current list of meds and any records pertaining to this visit. For Physicals, please bring immunization records and any forms needing to be filled out. Please arrive 10 minutes early to complete paperwork.              Who to contact     If you have questions or need follow up information about today's clinic visit or your schedule please contact [facility] directly at [phone].  Normal or non-critical lab and imaging results will be communicated to you by MyChart, letter or phone within 4 business days after the clinic has received the results. If you do not hear from us within 7 days, please contact the clinic through MyChart or phone. If you have a critical or abnormal lab result, we will notify you by phone as soon as possible.  Submit refill requests through [employer] or call your pharmacy and they will forward the refill request to us. Please allow 3 business days for your refill to be completed.          Additional Information About Your Visit        Care EveryWhere ID     This is your Care EveryWhere ID. This could be used by other organizations to access your [facility] medical records  [id number]        Your Vitals Were     Pulse Temperature Height Pulse Oximetry BMI (Body Mass Index)       56 97.5  F (36.4  C) (Oral) 6' 1\" (1.854 m) 99% 24.84 kg/m2        Blood Pressure from Last 3 Encounters:   06/22/18 136/64   04/23/18 123/59   04/03/18 117/60    Weight from Last 3 Encounters:   06/22/18 188 lb 4.8 oz (85.4 kg)   04/23/18 192 lb (87.1 kg)   04/03/18 194 lb (88 kg)              We Performed the Following     PEDRO PT, HAND, AND CHIROPRACTIC REFERRAL     TSH with free T4 reflex        Primary Care Provider Office Phone # Fax #    Zurdo Kendrick [phone] [address]  [facility] [address]        Equal Access to Services     JEREMIAH GROVE AH: Hadii gustavo Milian, waaxda luqadaha, qaybta sita power " raúl gomesezekiel piyushnahomy la'aan ah. So [facility] [phone].    ATENCIÓN: Si miriamla temi, tiene a khan disposición servicios gratuitos de asistencia lingüística. Naeem al [phone].    We comply with applicable federal civil rights laws and [place] laws. We do not discriminate on the basis of race, color, national origin, age, disability, sex, sexual orientation, or gender identity.            Thank you!     Thank you for choosing [facility]  for your care. Our goal is always to provide you with excellent care. Hearing back from our patients is one way we can continue to improve our services. Please take a few minutes to complete the written survey that you may receive in the mail after your visit with us. Thank you!             Your Updated Medication List - Protect others around you: Learn how to safely use, store and throw away your medicines at www.disposemymeds.org.          This list is accurate as of 6/22/18  2:58 PM.  Always use your most recent med list.                   Brand Name Dispense Instructions for use Diagnosis    ANAFRANIL PO      Take 25 mg by mouth every evening    OCD (obsessive compulsive disorder)       ARIPiprazole 2 MG tablet    ABILIFY     Take 1 tablet by mouth At Bedtime.        ASPIRIN NOT PRESCRIBED    INTENTIONAL    0 each    Please choose reason not prescribed, below    ASCVD (arteriosclerotic cardiovascular disease)       chlorthalidone 25 MG tablet    HYGROTON    90 tablet    Take 1 tablet (25 mg) by mouth daily    Essential hypertension       clopidogrel 75 MG tablet    PLAVIX    90 tablet    Take 1 tablet (75 mg) by mouth daily    Cerebrovascular accident (CVA), unspecified mechanism (H)       gabapentin 400 MG capsule    NEURONTIN     Take 1 capsule by mouth 2 times daily        levothyroxine 100 MCG tablet    SYNTHROID/LEVOTHROID    90 tablet    Take 1 tablet (100 mcg) by mouth daily    Acquired hypothyroidism       LORAZEPAM PO      Take 0.5 mg by mouth daily  as needed for anxiety        MIRTAZAPINE PO      Take 45 mg by mouth At Bedtime        simvastatin 20 MG tablet    ZOCOR    90 tablet    TAKE ONE TABLET BY MOUTH AT BEDTIME    Hyperlipidemia LDL goal <100

## 2018-06-22 NOTE — LETTER
[place] [address] [facility]  [address]  [place]  [address]  Tel: [phone]    June 25, 2018    Zack Santiago  [address] [facility] [address]        Dear Mr. Santiago,    The results of your recent TSH were ok.      If you have any further questions or problems, please contact our office.      Sincerely,    Marc Nicole MD/shane    Results for orders placed or performed in visit on 06/22/18   TSH with free T4 reflex   Result Value Ref Range    TSH 1.07 0.40 - 4.00 mU/L           Enclosure: Lab Results

## 2018-06-22 NOTE — PROGRESS NOTES
SUBJECTIVE:   Zack Santiago is a 87 year old male who presents to clinic today for the following health issues:      Unexplained Weight Loss      Duration: 2-3 months    Loss of 4 lbs according to patient's weight measurements    Patient is currently on levothyroxine medication for hypothyroidism treatment       Chronic left shoulder Pains    Duration:     Since: many years ago           Specific cause: none    Description:      Location of pain: left shoulder     Character of pain: sharp     Pain radiation: none    Intensity: mild    History:      Pain interferes with job: yes     History of similar pain problems: yes     Any previous MRI or X-rays: no     Therapies tried without relief: none    Alleviating factors:      Improved by: Physical therapy in the past    Precipitating factors:    Worsened by: range of motion movements at the left shoulder    Accompanying Signs & Symptoms: none            Current Medications:     Current Outpatient Prescriptions   Medication Sig Dispense Refill     ARIPiprazole (ABILIFY) 2 MG tablet Take 1 tablet by mouth At Bedtime.       ASPIRIN NOT PRESCRIBED (INTENTIONAL) Please choose reason not prescribed, below 0 each 0     chlorthalidone (HYGROTON) 25 MG tablet Take 1 tablet (25 mg) by mouth daily 90 tablet 3     ClomiPRAMINE HCl (ANAFRANIL PO) Take 25 mg by mouth every evening        clopidogrel (PLAVIX) 75 MG tablet Take 1 tablet (75 mg) by mouth daily 90 tablet 3     gabapentin (NEURONTIN) 400 MG capsule Take 1 capsule by mouth 2 times daily        levothyroxine (SYNTHROID/LEVOTHROID) 100 MCG tablet Take 1 tablet (100 mcg) by mouth daily 90 tablet 3     LORAZEPAM PO Take 0.5 mg by mouth daily as needed for anxiety       MIRTAZAPINE PO Take 45 mg by mouth At Bedtime        simvastatin (ZOCOR) 20 MG tablet TAKE ONE TABLET BY MOUTH AT BEDTIME 90 tablet 3         Allergies:      Allergies   Allergen Reactions     No Known Allergies             Past Medical History:     Past  Medical History:   Diagnosis Date     A-fib (H) 2010    post op     AAA (abdominal aortic aneurysm) without rupture (H)     Dr. 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    carotid us no stenosis, echo no change and no clots; ziopatch no afib, svt present     Anemia 2004     Aortic insufficiency 6/14    1+ on echo     Aortic insufficiency 11/2016    mild to mod     ASCVD (arteriosclerotic cardiovascular disease) 2010    cabg, post op afib     BPH (benign prostatic hyperplasia) 2003    turp, flomax added by urol 2/17     Bradycardia 2016    stopped toprol     CKD (chronic kidney disease) stage 3, GFR 30-59 ml/min      Cough 2010    pulm eval, felt due to reflux     Dizzy 2001    mri small vessel dz     GERD (gastroesophageal reflux disease)      HTN (hypertension) 2002     Hx of colonoscopy 2003    tics     Hypothyroid      Hypovitaminosis D      Idiopathic neuropathy      Lung nodule 02/2018    6mm, found during eval of ascending aorta     OCD (obsessive compulsive disorder)     sees psyche     Panic disorder     Dr. Luna     Spinal headache      Stroke (H) 12/16    expressive aphasia, hosp, seen by neuro, mri pos, carotids min dz, changed from asa to plavix     Sudden hearing loss 6/13    Dr. Garcia, mri brain no acoustic neuroma     SVT (supraventricular tachycardia) (H) 11/16    seen on ziopatch done for amaurosis, longest 15 beats     Thoracic ascending aortic aneurysm (H) 06/2014    4.4cm on echo, aortic root 3.9, fu 5/15 4.8cm; fu done 12/15 and slightly enlarged, fu 6/16 no change, fu 11/16 no change; fu 1/18 echo 5.1-5.3, enlarged, then cta done and only 4.8cm, t fu 6 months, lvh, nl ef, mild ai     Ulcerative colitis (H)     not active for years         Past Surgical History:     Past Surgical History:   Procedure Laterality Date     BACK SURGERY  1998     BLADDER SURGERY  1985     C TOTAL KNEE ARTHROPLASTY  2011    left     C TOTAL KNEE ARTHROPLASTY  2009    right      CATARACT IOL, RT/LT  2011     CORONARY ARTERY BYPASS  2010     ENDOVASCULAR REPAIR ANEURYSM ABDOMINAL AORTA N/A 5/27/2015    Procedure: ENDOVASCULAR REPAIR ANEURYSM ABDOMINAL AORTA;  Surgeon: Darin Walters MD;  Location: [facility] OR     HERNIA REPAIR  2005     HERNIA REPAIR  1998     HERNIORRHAPHY INGUINAL Left 1/5/2018    Procedure: HERNIORRHAPHY INGUINAL;  Incarcerated Left Inguinal Hernia;  Surgeon: Harsh Walker MD;  Location: [facility] OR     KNEE SURGERY  1997     REPAIR HAMMER TOE  12/28/2012    Procedure: REPAIR HAMMER TOE;  LEFT SECOND AND THIRD CLAW TOE RECONSTRUCTION;  Surgeon: Gray aHynes MD;  Location:  OR     TURP  2003         Family Medical History:     Family History   Problem Relation Age of Onset     C.A.D. Father          Social History:     Social History     Social History     Marital status:      Spouse name: N/A     Number of children: 2     Years of education: N/A     Occupational History     retail Retired     Social History Main Topics     Smoking status: Former Smoker     Packs/day: 1.00     Years: 5.00     Types: Cigarettes     Quit date: 6/13/1965     Smokeless tobacco: Never Used     Alcohol use 0.0 oz/week     0 Standard drinks or equivalent per week      Comment: maybe one glass of wine a week     Drug use: No     Sexual activity: Not Currently     Other Topics Concern     Not on file     Social History Narrative           Review of System:     Constitutional: Negative for fever or chills, positive for weight loss of 4 lbs from 2 months ago  Skin: Negative for rashes  Ears/Nose/Throat: Negative for nasal congestion, sore throat  Respiratory: No shortness of breath, dyspnea on exertion, cough, or hemoptysis  Cardiovascular: Negative for chest pain  Gastrointestinal: Negative for nausea, vomiting  Genitourinary: Negative for dysuria, hematuria  Musculoskeletal: Positive for chronic left shoulder pains  Neurologic: Negative for headaches  Psychiatric: Negative for  "depression, anxiety  Hematologic/Lymphatic/Immunologic: Negative  Endocrine: Positive for chronic hypothyroidism on levothyroxine medication.  Behavioral: Negative for tobacco use       Physical Exam:   /64 (BP Location: Left arm, Patient Position: Sitting, Cuff Size: Adult Regular)  Pulse 56  Temp 97.5  F (36.4  C) (Oral)  Ht 6' 1\" (1.854 m)  Wt 188 lb 4.8 oz (85.4 kg)  SpO2 99%  BMI 24.84 kg/m2    GENERAL: chronically ill appearing elderly male, alert and no acute distress  EYES: eyes grossly normal to inspection, and conjunctivae and sclerae normal  HENT: Normocephalic atraumatic. Nose and mouth without ulcers or lesions  NECK: supple  RESP: lungs clear to auscultation   CV: regular rate and rhythm, normal S1 S2  LYMPH: no peripheral edema   ABDOMEN: nondistended  MS: pain with range of motion movements noted at the left shoulder joint  SKIN: no suspicious lesions or rashes  NEURO: Alert & Oriented x 3.   PSYCH: mentation appears normal, affect normal        Diagnostic Test Results:     I have ordered lab for TSH with free T4 reflex to evaluate the patient's current thyroid function.    ASSESSMENT/PLAN:       (M25.512,  G89.29) Chronic left shoulder pain  (primary encounter diagnosis)  Comment: chronic left shoulder pains for many years duration, previously treated and benefited from physical therapy. Patient is requesting another physical therpay referral at this time for shoulder pain treatment.  Plan: I have ordered PEDRO PT, HAND, AND CHIROPRACTIC REFERRAL for Physical Therapy for further evaluation and management of chronic left shoulder pains going forward.    (R63.4) Loss of weight  (E03.9) Acquired hypothyroidism  Comment: recent new mild weight loss of 4 lbs over 2 months concerning for possible hyperthyroid state. Patient is currently on levothyroxine medication for hypothyroidism treatment.  Plan: I have ordered lab for TSH with free T4 reflex to evaluate the patient's current thyroid " function.    Follow Up Plan:     Patient is instructed to return to Internal Medicine clinic for follow-up visit in 1 month.        Lianna Nicole MD  Internal Medicine  [facility]

## 2018-06-23 LAB — TSH SERPL DL<=0.005 MIU/L-ACNC: 1.07 MU/L (ref 0.4–4)

## 2018-06-28 ENCOUNTER — THERAPY VISIT (OUTPATIENT)
Dept: PHYSICAL THERAPY | Facility: CLINIC | Age: 83
End: 2018-06-28
Payer: COMMERCIAL

## 2018-06-28 DIAGNOSIS — M25.512 SHOULDER PAIN, LEFT: Primary | ICD-10-CM

## 2018-06-28 PROCEDURE — 97161 PT EVAL LOW COMPLEX 20 MIN: CPT | Mod: GP | Performed by: PHYSICAL THERAPIST

## 2018-06-28 PROCEDURE — 97110 THERAPEUTIC EXERCISES: CPT | Mod: GP | Performed by: PHYSICAL THERAPIST

## 2018-06-28 NOTE — MR AVS SNAPSHOT
After Visit Summary   6/28/2018    Zack Santiago    MRN: [id number]           Patient Information     Date Of Birth          5/29/1931        Visit Information        Provider Department      6/28/2018 9:20 AM Diana Allred, PT [facility] Physical Therapy        Today's Diagnoses     Shoulder pain, left    -  1       Follow-ups after your visit        Your next 10 appointments already scheduled     Jul 06, 2018 11:20 AM CDT   PEDRO Extremity with Danielle Falk [facility] Physical Therapy ([facility]  )    [address] [place], [place] [id number]  [facility] [address]   [phone]            Jul 12, 2018  9:20 AM CDT   PEDRO Extremity with Diana Allred PT   [facility] Physical Therapy ([facility]  )    [address] [place], [place] [address]  [facility] [address] [phone]            Jul 17, 2018  3:00 PM CDT   [facility]    [address] [place] [address] [phone]           Bring a current list of meds and any records pertaining to this visit. For Physicals, please bring immunization records and any forms needing to be filled out. Please arrive 10 minutes early to complete paperwork.            Jul 20, 2018 10:00 AM CDT   PEDRO Extremity [facility]red [facility]   [facility] ([facility]  )    [address] [place], [place] [address]  [facility] [address] [phone]              Who to contact     If you have questions or need follow up information about today's clinic visit or your schedule please contact [facility]YSICAL THERAPY directly at [phone].  Normal or non-critical lab and imaging results will be communicated to you by MyChart, letter or phone within 4 business days after the clinic has received the results. If you do not hear from us within 7  days, please contact the clinic through [employer] or phone. If you have a critical or abnormal lab result, we will notify you by phone as soon as possible.  Submit refill requests through [employer] or call your pharmacy and they will forward the refill request to us. Please allow 3 business days for your refill to be completed.          Additional Information About Your Visit        Care EveryWhere ID     This is your Care EveryWhere ID. This could be used by other organizations to access your [facility] [place] medical records  [id number]         Blood Pressure from Last 3 Encounters:   06/22/18 136/64   04/23/18 123/59   04/03/18 117/60    Weight from Last 3 Encounters:   06/22/18 85.4 kg (188 lb 4.8 oz)   04/23/18 87.1 kg (192 lb)   04/03/18 88 kg (194 lb)              We Performed the Following     HC PT EVAL, LOW COMPLEXITY     PEDRO INITIAL EVAL REPORT     THERAPEUTIC EXERCISES        Primary Care Provider Office Phone # Fax #    Zurdo Raul Kendrick [phone] [address] [address]        Equal Access to Services     [facility]: Hadii aad ku hadasho Solino, waaxda luomkaradaha, qaybta kaalabigail mayorga, sita ellis . So [facility] [phone].    ATENCIÓN: Si habla español, tiene a khan disposición servicios gratUniversity of New Mexico Hospitalsos de asistencia lingüística. [facility] [phone].    We comply with applicable federal civil rights laws and [place] laws. We do not discriminate on the basis of race, color, national origin, age, disability, sex, sexual orientation, or gender identity.            Thank you!     Thank you for choosing [facility] PHYSICAL THERAPY  for your care. Our goal is always to provide you with excellent care. Hearing back from our patients is one way we can continue to improve our services. Please take a few minutes to complete the written survey that you may receive in the mail after your visit with us. Thank you!              Your Updated Medication List - Protect others around you: Learn how to safely use, store and throw away your medicines at www.disposemymeds.org.          This list is accurate as of 6/28/18  1:01 PM.  Always use your most recent med list.                   Brand Name Dispense Instructions for use Diagnosis    ANAFRANIL PO      Take 25 mg by mouth every evening    OCD (obsessive compulsive disorder)       ARIPiprazole 2 MG tablet    ABILIFY     Take 1 tablet by mouth At Bedtime.        ASPIRIN NOT PRESCRIBED    INTENTIONAL    0 each    Please choose reason not prescribed, below    ASCVD (arteriosclerotic cardiovascular disease)       chlorthalidone 25 MG tablet    HYGROTON    90 tablet    Take 1 tablet (25 mg) by mouth daily    Essential hypertension       clopidogrel 75 MG tablet    PLAVIX    90 tablet    Take 1 tablet (75 mg) by mouth daily    Cerebrovascular accident (CVA), unspecified mechanism (H)       gabapentin 400 MG capsule    NEURONTIN     Take 1 capsule by mouth 2 times daily        levothyroxine 100 MCG tablet    SYNTHROID/LEVOTHROID    90 tablet    Take 1 tablet (100 mcg) by mouth daily    Acquired hypothyroidism       LORAZEPAM PO      Take 0.5 mg by mouth daily as needed for anxiety        MIRTAZAPINE PO      Take 45 mg by mouth At Bedtime        simvastatin 20 MG tablet    ZOCOR    90 tablet    TAKE ONE TABLET BY MOUTH AT BEDTIME    Hyperlipidemia LDL goal <100

## 2018-06-28 NOTE — PROGRESS NOTES
[facility] Initial Evaluation  Subjective:  Patient is a 87 year old male presenting with rehab left shoulder hpi.                                      Pertinent medical history includes:  High blood pressure, heart problems, stroke and depression (Unexplained weight loss).    Other surgeries include:  Heart surgery (bypass, knee).  Current medications:  Anti-depressants.  Current occupation is Retired  .                                    Objective:  System    Physical Exam    General     ROS    Assessment/Plan:

## 2018-06-28 NOTE — PROGRESS NOTES
[facility] Initial Evaluation  Subjective:  HPI                 Zack Santiago is a 87 year old male referred to PT for evaluation and treatment of left shoulder pain. Primary symptom location is anterior/middle deltoid without radiation. The pain is described as aching and is intermittent. Patient denies any red flags including painful cough/sneeze, saddle anaesthesia, severe night pain, peripheral motor deficit, recent bowel/bladder change, recent vision change, ringing in the ears or pain with swallowing. Patient states that symptoms began one month ago, insidious onset while swimming freestyle. Since onset, pain has been getting worse. Aggravating activities include reaching, swimming, with pain at worse getting to a 6/10. Relieving activities include rest, with pain at best a 0/10. Current pain rating is 2/10.    Past Medical History: bypass - 2010, stroke - 2017, HTN, depression, unexplained weight loss  Patient reports that general health is good   Regular exercise routine: swimming 3x/week  Current Occupation: retired  Previous Functional Status: history of similar pain 1 year ago  Pt goals for PT: get back to swimming - free style, back stroke    Objective:  System    Physical Exam    General     ROS  SHOULDER:    Cervical Screen: non symptom provoking with limited rotation and sidebend equal bilaterally    Posture: Forward head, rounded shoulders; fixed deformity    Shoulder Motion Screen:  Full Chain shoulder elevation: limited by 10 degrees with firm end feel on the L  Active Flexion: equal bilat, but pt reverts to scaption pattern  GH abduction: limited to 70 degrees on the L, 80 on the R  Passive ER: L 60 w/ pain, R 80  Active Ext/IR: L L5 w/ pain, R L1    Shoulder MMT  Flexion: L 5*   R 5  Abduction: L 4   R 4+  Adduction: L 5   R 5  ER:  L 4*   R 4  IR:  L 4*   R 4  Biceps: L 5  R 5  Triceps: L 5  R 5;    Special tests:  Cristina: +L  Speed's: +L    Assessment/Plan:     Patient is a 87 year old male with left side shoulder complaints.    Patient has the following significant findings with corresponding treatment plan.                Diagnosis 1:  Primary shoulder impingement  Pain -  hot/cold therapy, manual therapy, self management, education and home program  Decreased ROM/flexibility - manual therapy, therapeutic exercise, therapeutic activity and home program  Decreased strength - therapeutic exercise, therapeutic activities, home program and neuromuscular re-education    Therapy Evaluation Codes:   1) History comprised of:   Personal factors that impact the plan of care:      None.    Comorbidity factors that impact the plan of care are:      Depression, Heart problems and Stroke.     Medications impacting care: Anti-depressant.  2) Examination of Body Systems comprised of:   Body structures and functions that impact the plan of care:      Cervical spine, Shoulder and Thoracic Spine.   Activity limitations that impact the plan of care are:      Sports and reaching.  3) Clinical presentation characteristics are:   Stable/Uncomplicated.  4) Decision-Making    Low complexity using standardized patient assessment instrument and/or measureable assessment of functional outcome.  Cumulative Therapy Evaluation is: Low complexity.    Previous and current functional limitations:  (See Goal Flow Sheet for this information)    Short term and Long term goals: (See Goal Flow Sheet for this information)     Communication ability:  Patient appears to be able to clearly communicate and understand verbal and written communication and follow directions correctly.  Treatment Explanation - The following has been discussed with the patient:   RX ordered/plan of care  Anticipated outcomes  Possible risks and side effects  This patient would benefit from PT intervention to resume normal activities.   Rehab potential is good.    Frequency:  1 X week, once daily  Duration:  for 6 weeks  Discharge  Plan:  Achieve all LTG.  Independent in home treatment program.  Reach maximal therapeutic benefit.    Please refer to the daily flowsheet for treatment today, total treatment time and time spent performing 1:1 timed codes.

## 2018-07-02 ENCOUNTER — THERAPY VISIT (OUTPATIENT)
Dept: PHYSICAL THERAPY | Facility: CLINIC | Age: 83
End: 2018-07-02
Payer: COMMERCIAL

## 2018-07-02 DIAGNOSIS — M25.512 ACUTE PAIN OF LEFT SHOULDER: ICD-10-CM

## 2018-07-02 PROCEDURE — 97140 MANUAL THERAPY 1/> REGIONS: CPT | Mod: GP

## 2018-07-02 PROCEDURE — 97110 THERAPEUTIC EXERCISES: CPT | Mod: GP

## 2018-07-02 PROCEDURE — 97112 NEUROMUSCULAR REEDUCATION: CPT | Mod: GP

## 2018-07-06 ENCOUNTER — THERAPY VISIT (OUTPATIENT)
Dept: PHYSICAL THERAPY | Facility: CLINIC | Age: 83
End: 2018-07-06
Payer: COMMERCIAL

## 2018-07-06 DIAGNOSIS — M25.512 SHOULDER PAIN, LEFT: ICD-10-CM

## 2018-07-06 PROCEDURE — 97140 MANUAL THERAPY 1/> REGIONS: CPT | Mod: GP

## 2018-07-06 PROCEDURE — 97112 NEUROMUSCULAR REEDUCATION: CPT | Mod: GP

## 2018-07-06 PROCEDURE — 97110 THERAPEUTIC EXERCISES: CPT | Mod: GP

## 2018-07-12 ENCOUNTER — THERAPY VISIT (OUTPATIENT)
Dept: PHYSICAL THERAPY | Facility: CLINIC | Age: 83
End: 2018-07-12
Payer: COMMERCIAL

## 2018-07-12 DIAGNOSIS — M25.512 SHOULDER PAIN, LEFT: ICD-10-CM

## 2018-07-12 PROCEDURE — 97140 MANUAL THERAPY 1/> REGIONS: CPT | Mod: GP | Performed by: PHYSICAL THERAPIST

## 2018-07-12 PROCEDURE — 97110 THERAPEUTIC EXERCISES: CPT | Mod: GP | Performed by: PHYSICAL THERAPIST

## 2018-07-12 PROCEDURE — 97112 NEUROMUSCULAR REEDUCATION: CPT | Mod: GP | Performed by: PHYSICAL THERAPIST

## 2018-07-17 ENCOUNTER — OFFICE VISIT (OUTPATIENT)
Dept: FAMILY MEDICINE | Facility: CLINIC | Age: 83
End: 2018-07-17
Payer: COMMERCIAL

## 2018-07-17 VITALS
DIASTOLIC BLOOD PRESSURE: 65 MMHG | SYSTOLIC BLOOD PRESSURE: 135 MMHG | HEIGHT: 73 IN | TEMPERATURE: 97.8 F | HEART RATE: 55 BPM | OXYGEN SATURATION: 98 % | WEIGHT: 189 LBS | BODY MASS INDEX: 25.05 KG/M2

## 2018-07-17 DIAGNOSIS — R60.0 PEDAL EDEMA: ICD-10-CM

## 2018-07-17 DIAGNOSIS — R91.1 LUNG NODULE: ICD-10-CM

## 2018-07-17 DIAGNOSIS — M70.62 TROCHANTERIC BURSITIS OF LEFT HIP: ICD-10-CM

## 2018-07-17 DIAGNOSIS — R63.4 LOSS OF WEIGHT: ICD-10-CM

## 2018-07-17 DIAGNOSIS — I71.21 THORACIC ASCENDING AORTIC ANEURYSM (H): Primary | ICD-10-CM

## 2018-07-17 PROCEDURE — 99214 OFFICE O/P EST MOD 30 MIN: CPT | Performed by: INTERNAL MEDICINE

## 2018-07-17 NOTE — PATIENT INSTRUCTIONS
Do the ct scan to follow up on the lung nodule and the thoracic aorta, but do it early August    I will have therapy call you to work on the left hip    Call if you more weight loss or swelling    Zurdo Kendrick M.D.

## 2018-07-17 NOTE — MR AVS SNAPSHOT
After Visit Summary   7/17/2018    Zack Santiago    MRN: [id number]           Patient Information     Date Of Birth          5/29/1931        Visit Information        Provider Department      7/17/2018 3:00 PM Zurdo Kendrick MD [facility]        Today's Diagnoses     Thoracic ascending aortic aneurysm (H)    -  1    Lung nodule        Trochanteric bursitis of left hip          Care Instructions    Do the ct scan to follow up on the lung nodule and the thoracic aorta, but do it early August    I will have therapy call you to work on the left hip    Call if you more weight loss or swelling    Zurdo Kendrick M.D.            Follow-ups after your visit        Additional Services     [facility] PT, HAND, AND CHIROPRACTIC REFERRAL       **This order will print in the [facility] Scheduling Office**    Physical Therapy, Hand Therapy and Chiropractic Care are available through:    *[facility]  *[facility]  *[facility]    Call one number to schedule at any of the above locations: [phone].    Your provider has referred you to: Physical Therapy at [facility] or [facility]    Indication/Reason for Referral: troch bursitis  Onset of Illness: months  Therapy Orders: Evaluate and Treat  Special Programs: None  Special Request: None    Sandra Flores      Additional Comments for the Therapist or Chiropractor:     Please be aware that coverage of these services is subject to the terms and limitations of your health insurance plan.  Call member services at your health plan with any benefit or coverage questions.      Please bring the following to your appointment:    *Your personal calendar for scheduling future appointments  *Comfortable clothing                  Your next 10 appointments already scheduled     Jul 20, 2018 10:00 AM CDT   [facility] Extremity with Diana Allred PT   [facility] Physical Therapy ([facility]  )    [address]  "[facility] [address]  [place] [place] [facility] [address]   [phone]            Jul 27, 2018 11:20 AM CDT   [facility] Extremity with Diana Allred, [facility] ([facility]  )    [address] [place] [address]  [place] [place] [facility] [address]   [phone]            Aug 02, 2018 10:40 AM CDT   PEDRO Extremity with Danielle Falk, [facility] Physical Therapy ([facility]  )    [address] [place] [address]  [facility] [place] [facility] [address]   [phone]              Future tests that were ordered for you today     Open Future Orders        Priority Expected Expires Ordered    CT Chest Angio w/o & w Contrast Routine 8/1/2018 7/17/2019 7/17/2018            Who to contact     If you have questions or need follow up information about today's clinic visit or your schedule please contact [facility] directly at [phone].  Normal or non-critical lab and imaging results will be communicated to you by MyChart, letter or phone within 4 business days after the clinic has received the results. If you do not hear from us within 7 days, please contact the clinic through MyChart or phone. If you have a critical or abnormal lab result, we will notify you by phone as soon as possible.  Submit refill requests through [employer] or call your pharmacy and they will forward the refill request to us. Please allow 3 business days for your refill to be completed.          Additional Information About Your Visit        Care EveryWhere ID     This is your Care EveryWhere ID. This could be used by other organizations to access your [facility] medical records  [id number]        Your Vitals Were     Pulse Temperature Height Pulse Oximetry BMI (Body Mass Index)       55 97.8  F (36.6  C) (Oral) 6' 1\" (1.854 m) 98% 24.94 kg/m2        Blood Pressure from Last 3 Encounters:   07/17/18 135/65   06/22/18 136/64   04/23/18 123/59    Weight from Last 3 Encounters:   07/17/18 189 lb (85.7 kg) "   06/22/18 188 lb 4.8 oz (85.4 kg)   04/23/18 192 lb (87.1 kg)              We Performed the Following     [facility] PT, HAND, AND CHIROPRACTIC REFERRAL        Primary Care Provider Office Phone # Fax #    Zurdo Kendrick [phone] [address] [facility] [address]  [facility] [address]        Equal Access to Services     [facility]: Hadii aad ku hadasho Soomaali, waaxda luqadaha, qaybta kaalmada adeegyada, waxay idiin hayaan adeeg kharash la'aan . So [facility] [phone].    ATENCIÓN: Si ihsan membreno, tiene a khan disposición servicios gratuitos de asistencia lingüística. Llame al [phone].    We comply with applicable federal civil rights laws and [place] laws. We do not discriminate on the basis of race, color, national origin, age, disability, sex, sexual orientation, or gender identity.            Thank you!     Thank you for choosing [facility]  for your care. Our goal is always to provide you with excellent care. Hearing back from our patients is one way we can continue to improve our services. Please take a few minutes to complete the written survey that you may receive in the mail after your visit with us. Thank you!             Your Updated Medication List - Protect others around you: Learn how to safely use, store and throw away your medicines at www.disposemymeds.org.          This list is accurate as of 7/17/18  3:11 PM.  Always use your most recent med list.                   Brand Name Dispense Instructions for use Diagnosis    ANAFRANIL PO      Take 25 mg by mouth every evening    OCD (obsessive compulsive disorder)       ARIPiprazole 2 MG tablet    ABILIFY     Take 1 tablet by mouth At Bedtime.        ASPIRIN NOT PRESCRIBED    INTENTIONAL    0 each    Please choose reason not prescribed, below    ASCVD (arteriosclerotic cardiovascular disease)       chlorthalidone 25 MG tablet    HYGROTON    90 tablet    Take 1 tablet (25 mg) by mouth daily    Essential hypertension        clopidogrel 75 MG tablet    PLAVIX    90 tablet    Take 1 tablet (75 mg) by mouth daily    Cerebrovascular accident (CVA), unspecified mechanism (H)       gabapentin 400 MG capsule    NEURONTIN     Take 1 capsule by mouth 2 times daily        levothyroxine 100 MCG tablet    SYNTHROID/LEVOTHROID    90 tablet    Take 1 tablet (100 mcg) by mouth daily    Acquired hypothyroidism       LORAZEPAM PO      Take 0.5 mg by mouth daily as needed for anxiety        MIRTAZAPINE PO      Take 45 mg by mouth At Bedtime        simvastatin 20 MG tablet    ZOCOR    90 tablet    TAKE ONE TABLET BY MOUTH AT BEDTIME    Hyperlipidemia LDL goal <100

## 2018-07-17 NOTE — PROGRESS NOTES
The patient is here for a few issues    The patient has been having weight loss although now it has resolved.  He is concerned about this and saw my partner as noted.  The TSH was normal.    The patient has a history of a thoracic ascending aortic aneurysm.  An echo was done in January that suggested it was larger but then a CT was done in February it was not.  He is due for a follow-up exam in early August.    The patient was also found to have a lung nodule during that examination and will require follow-up in early August as well.    The patient has had discomfort in the left lateral hip area.  It can be tender to the touch.  He feels it mostly in bed.    Patient has pedal edema bilaterally.  This is not new.  Is not having chest pain or shortness of breath.  No PND.    He otherwise feels well.  No fevers or night sweats.  No other cardiovascular, respiratory, GI or  symptoms.    Past Medical History:   Diagnosis Date     A-fib (H) 2010    post op     AAA (abdominal aortic aneurysm) without rupture (H)     Dr. 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    carotid us no stenosis, echo no change and no clots; ziopatch no afib, svt present     Anemia 2004     Aortic insufficiency 6/14    1+ on echo     Aortic insufficiency 11/2016    mild to mod     ASCVD (arteriosclerotic cardiovascular disease) 2010    cabg, post op afib     BPH (benign prostatic hyperplasia) 2003    turp, flomax added by urol 2/17     Bradycardia 2016    stopped toprol     CKD (chronic kidney disease) stage 3, GFR 30-59 ml/min      Cough 2010    pulm eval, felt due to reflux     Dizzy 2001    mri small vessel dz     GERD (gastroesophageal reflux disease)      HTN (hypertension) 2002     Hx of colonoscopy 2003    tics     Hypothyroid      Hypovitaminosis D      Idiopathic neuropathy      Lung nodule 02/2018    6mm, found during eval of ascending aorta     OCD (obsessive compulsive disorder)     sees psyche      Panic disorder     Dr. Luna     Spinal headache      Stroke (H) 12/16    expressive aphasia, hosp, seen by neuro, mri pos, carotids min dz, changed from asa to plavix     Sudden hearing loss 6/13    Dr. Garcia, mri brain no acoustic neuroma     SVT (supraventricular tachycardia) (H) 11/16    seen on ziopatch done for amaurosis, longest 15 beats     Thoracic ascending aortic aneurysm (H) 06/2014    4.4cm on echo, aortic root 3.9, fu 5/15 4.8cm; fu done 12/15 and slightly enlarged, fu 6/16 no change, fu 11/16 no change; fu 1/18 echo 5.1-5.3, enlarged, then cta done and only 4.8cm, t fu 6 months, lvh, nl ef, mild ai     Ulcerative colitis (H)     not active for years     Past Surgical History:   Procedure Laterality Date     BACK SURGERY  1998     BLADDER SURGERY  1985     C TOTAL KNEE ARTHROPLASTY  2011    left     C TOTAL KNEE ARTHROPLASTY  2009    right     CATARACT IOL, RT/LT  2011     CORONARY ARTERY BYPASS  2010     ENDOVASCULAR REPAIR ANEURYSM ABDOMINAL AORTA N/A 5/27/2015    Procedure: ENDOVASCULAR REPAIR ANEURYSM ABDOMINAL AORTA;  Surgeon: Darin Walters MD;  Location:  OR     HERNIA REPAIR  2005     HERNIA REPAIR  1998     HERNIORRHAPHY INGUINAL Left 1/5/2018    Procedure: HERNIORRHAPHY INGUINAL;  Incarcerated Left Inguinal Hernia;  Surgeon: Harsh Walker MD;  Location:  OR     KNEE SURGERY  1997     REPAIR HAMMER TOE  12/28/2012    Procedure: REPAIR HAMMER TOE;  LEFT SECOND AND THIRD CLAW TOE RECONSTRUCTION;  Surgeon: Gray Haynes MD;  Location:  OR     REPAIR HAMMER TOE  04/2018    tridevin ESQUEDA  2003     Social History     Social History     Marital status:      Spouse name: N/A     Number of children: 2     Years of education: N/A     Occupational History     retail Retired     Social History Main Topics     Smoking status: Former Smoker     Packs/day: 1.00     Years: 5.00     Types: Cigarettes     Quit date: 6/13/1965     Smokeless tobacco: Never Used     Alcohol  "use 0.0 oz/week     0 Standard drinks or equivalent per week      Comment: maybe one glass of wine a week     Drug use: No     Sexual activity: Not Currently     Other Topics Concern     Not on file     Social History Narrative     Current Outpatient Prescriptions   Medication Sig Dispense Refill     ARIPiprazole (ABILIFY) 2 MG tablet Take 1 tablet by mouth At Bedtime.       ASPIRIN NOT PRESCRIBED (INTENTIONAL) Please choose reason not prescribed, below 0 each 0     chlorthalidone (HYGROTON) 25 MG tablet Take 1 tablet (25 mg) by mouth daily 90 tablet 3     ClomiPRAMINE HCl (ANAFRANIL PO) Take 25 mg by mouth every evening        clopidogrel (PLAVIX) 75 MG tablet Take 1 tablet (75 mg) by mouth daily 90 tablet 3     gabapentin (NEURONTIN) 400 MG capsule Take 1 capsule by mouth 2 times daily        levothyroxine (SYNTHROID/LEVOTHROID) 100 MCG tablet Take 1 tablet (100 mcg) by mouth daily 90 tablet 3     LORAZEPAM PO Take 0.5 mg by mouth daily as needed for anxiety       MIRTAZAPINE PO Take 45 mg by mouth At Bedtime        simvastatin (ZOCOR) 20 MG tablet TAKE ONE TABLET BY MOUTH AT BEDTIME 90 tablet 3     Allergies   Allergen Reactions     No Known Allergies      FAMILY HISTORY NOTED AND REVIEWED    REVIEW OF SYSTEMS: above    PHYSICAL EXAM    /65  Pulse 55  Temp 97.8  F (36.6  C) (Oral)  Ht 6' 1\" (1.854 m)  Wt 189 lb (85.7 kg)  SpO2 98%  BMI 24.94 kg/m2    Patient appears non toxic  Mouth and eyes within normal limits  Neck within normal limits  No supraclavicular, cervical or axillary lymphadenopathy  Lungs clear  cv reglar rate and rhythm, no murmer, rub or gallop, no jvp, trace to 1+ bilat pedal and ankle edema  Abdomen non-tender, no mgr, no hepatosplenomegaly  Left lat hip over greater troch tender to touch    ASSESSMENT:  1. Thor asc aortic aneurysm, follow up ct in Aug  2. Lung nodule, follow up ct Aug  3. Weight loss, no signs malig cause and stable now  4. Troch bursitis, to get physical " therapy  5. Pedal edema, minimal, call if changes, doubt chf, echo fine Enoch    PLAN:  Ct chest  Call if weight loss  shana ron Kendrick M.D.

## 2018-07-20 ENCOUNTER — THERAPY VISIT (OUTPATIENT)
Dept: PHYSICAL THERAPY | Facility: CLINIC | Age: 83
End: 2018-07-20
Payer: COMMERCIAL

## 2018-07-20 DIAGNOSIS — M25.552 HIP PAIN, LEFT: Primary | ICD-10-CM

## 2018-07-20 DIAGNOSIS — M25.512 SHOULDER PAIN, LEFT: ICD-10-CM

## 2018-07-20 PROCEDURE — 97530 THERAPEUTIC ACTIVITIES: CPT | Mod: GP | Performed by: PHYSICAL THERAPIST

## 2018-07-20 PROCEDURE — 97161 PT EVAL LOW COMPLEX 20 MIN: CPT | Mod: GP | Performed by: PHYSICAL THERAPIST

## 2018-07-20 PROCEDURE — 97110 THERAPEUTIC EXERCISES: CPT | Mod: GP | Performed by: PHYSICAL THERAPIST

## 2018-07-20 NOTE — PROGRESS NOTES
[facility] Initial Evaluation  Subjective:  HPI   Zack Santiago is a 87 year old male referred to PT for evaluation and treatment of L hip pain. Primary symptom location is L lateral hip. The pain is described as achy and is intermittent. Patient denies any red flags including painful cough/sneeze, saddle anaesthesia, severe night pain, peripheral motor deficit, recent bowel/bladder change, recent vision change, ringing in the ears or pain with swallowing. Patient states that symptoms began  About two months ago, insidious onset. Since onset, pain has been getting worse. Aggravating activities include walking and sleeping, with pain at worse getting to a 5/10. Relieving activities include sitting to rest, with pain at best a 0/10. Current pain rating is 0/10.    Past Medical History: bypass - 2010, stroke - 2017, HTN, depression, unexplained weight loss  Patient reports that general health is good   Regular exercise routine: swimming 3x/week  Current Occupation: retired  Previous Functional Status: no restrictions  Pt goals for PT: walk without pain                 Objective:  System    Physical Exam    General     ROS  HIP:    Lumbar Screen:  Flexion: hands to mid shins, no pain  Extension: limited to 25%, no pain  Side glide hips L: sx reproduction, no motion loss  Side glide hips R: no pain, no motion loss    PROM:   L  R   Flexion wnl wnl   Extension 10 10   Abduction 40 Not assessed   IR (prone) 10 10   ER (prone) 40 40     Strength:   L R   HIP     Flex 5 5   Ext 4 4   Abd 3+ 3+   KNEE     Flex 4 4   Ext 5 5     Special tests:  GEOVANNA: + L  FADIR: - bilat  Dequan's: - bilat  Jorge A: + bilat    Palpation: TTP left greater trochanter    Gait: shuffling gait with bilateral hip drop indicative of glute med weakness.    Assessment/Plan:    Patient is a 87 year old male with left side hip complaints.    Patient has the following significant findings with corresponding treatment plan.                 Diagnosis 1:  Left lateral hip pain  Pain -  hot/cold therapy, US, manual therapy and self management, HEP, therapeutic exercises, and therapeutic activities  Decreased strength - therapeutic exercise, therapeutic activities, home program and neuromuscular re-education    Therapy Evaluation Codes:   1) History comprised of:   Personal factors that impact the plan of care:      None.    Comorbidity factors that impact the plan of care are:      Heart problems and Pain at night/rest.     Medications impacting care: Anti-inflammatory.  2) Examination of Body Systems comprised of:   Body structures and functions that impact the plan of care:      Hip and Lumbar spine.   Activity limitations that impact the plan of care are:      Stairs, Standing and Walking.  3) Clinical presentation characteristics are:   Stable/Uncomplicated.  4) Decision-Making    Low complexity using standardized patient assessment instrument and/or measureable assessment of functional outcome.  Cumulative Therapy Evaluation is: Low complexity.    Previous and current functional limitations:  (See Goal Flow Sheet for this information)    Short term and Long term goals: (See Goal Flow Sheet for this information)     Communication ability:  Patient appears to be able to clearly communicate and understand verbal and written communication and follow directions correctly.  Treatment Explanation - The following has been discussed with the patient:   RX ordered/plan of care  Anticipated outcomes  Possible risks and side effects  This patient would benefit from PT intervention to resume normal activities.   Rehab potential is good.    Frequency:  1 X week, once daily  Duration:  for 6 weeks  Discharge Plan:  Achieve all LTG.  Independent in home treatment program.  Reach maximal therapeutic benefit.    Please refer to the daily flowsheet for treatment today, total treatment time and time spent performing 1:1 timed codes.

## 2018-07-20 NOTE — MR AVS SNAPSHOT
After Visit Summary   7/20/2018    Zack Santiago    MRN: [id number]           Patient Information     Date Of Birth          5/29/1931        Visit Information        Provider Department      7/20/2018 10:00 AM Diana Allred, PT [place] [facility]        Today's Diagnoses     Hip pain, left    -  1    Shoulder pain, left           Follow-ups after your visit        Your next 10 appointments already scheduled     Jul 27, 2018 11:20 AM CDT   PEDRO Extremity with Diana Allred, PT   [place] [facility] ([facility] [place]  )    [address]  [facility] [address] [phone]            Aug 02, 2018 10:40 AM CDT   PEDRO Extremity with Danielle Falk ATC   [place] [facility] ([facility]  )    [address] [place] [address] [address]  [facility] [id number] [phone]              Who to contact     If you have questions or need follow up information about today's clinic visit or your schedule please contact [place] [facility]ERAP[facility] directly at [phone].  Normal or non-critical lab and imaging results will be communicated to you by MyChart, letter or phone within 4 business days after the clinic has received the results. If you do not hear from us within 7 days, please contact the clinic through MyChart or phone. If you have a critical or abnormal lab result, we will notify you by phone as soon as possible.  Submit refill requests through [employer] or call your pharmacy and they will forward the refill request to us. Please allow 3 business days for your refill to be completed.          Additional Information About Your Visit        Care EveryWhere ID     This is your Care EveryWhere ID. This could be used by other organizations to access your [facility] medical records  [id number]         Blood Pressure from Last 3 Encounters:   07/17/18 135/65   06/22/18  136/64   04/23/18 123/59    Weight from Last 3 Encounters:   07/17/18 85.7 kg (189 lb)   06/22/18 85.4 kg (188 lb 4.8 oz)   04/23/18 87.1 kg (192 lb)              We Performed the Following     HC PT EVAL, LOW COMPLEXITY     Therapeutic Activities     THERAPEUTIC EXERCISES        Primary Care Provider Office Phone # Fax #    Zurdo Raul Kendrick [phone] [address] [address]        Equal Access to Services     [facility]: Hadii aad ku hadasho Soomaali, waaxda luqadaha, qaybta kaalmada adeegyada, waxay mariein haykeerthin lina ellis . So [facility] [phone].    ATENCIÓN: Si habla español, tiene a khan disposición servicios gratuitos de asistencia lingüística. Llame al [phone].    We comply with applicable federal civil rights laws and [place] laws. We do not discriminate on the basis of race, color, national origin, age, disability, sex, sexual orientation, or gender identity.            Thank you!     Thank you for choosing [place] [facility] PH[facility] THERAP[facility]  for your care. Our goal is always to provide you with excellent care. Hearing back from our patients is one way we can continue to improve our services. Please take a few minutes to complete the written survey that you may receive in the mail after your visit with us. Thank you!             Your Updated Medication List - Protect others around you: Learn how to safely use, store and throw away your medicines at www.disposemymeds.org.          This list is accurate as of 7/20/18 12:53 PM.  Always use your most recent med list.                   Brand Name Dispense Instructions for use Diagnosis    ANAFRANIL PO      Take 25 mg by mouth every evening    OCD (obsessive compulsive disorder)       ARIPiprazole 2 MG tablet    ABILIFY     Take 1 tablet by mouth At Bedtime.        ASPIRIN NOT PRESCRIBED    INTENTIONAL    0 each    Please choose reason not prescribed, below    ASCVD  (arteriosclerotic cardiovascular disease)       chlorthalidone 25 MG tablet    HYGROTON    90 tablet    Take 1 tablet (25 mg) by mouth daily    Essential hypertension       clopidogrel 75 MG tablet    PLAVIX    90 tablet    Take 1 tablet (75 mg) by mouth daily    Cerebrovascular accident (CVA), unspecified mechanism (H)       gabapentin 400 MG capsule    NEURONTIN     Take 1 capsule by mouth 2 times daily        levothyroxine 100 MCG tablet    SYNTHROID/LEVOTHROID    90 tablet    Take 1 tablet (100 mcg) by mouth daily    Acquired hypothyroidism       LORAZEPAM PO      Take 0.5 mg by mouth daily as needed for anxiety        MIRTAZAPINE PO      Take 45 mg by mouth At Bedtime        simvastatin 20 MG tablet    ZOCOR    90 tablet    TAKE ONE TABLET BY MOUTH AT BEDTIME    Hyperlipidemia LDL goal <100

## 2018-07-27 ENCOUNTER — THERAPY VISIT (OUTPATIENT)
Dept: PHYSICAL THERAPY | Facility: CLINIC | Age: 83
End: 2018-07-27
Payer: COMMERCIAL

## 2018-07-27 DIAGNOSIS — M25.552 HIP PAIN, LEFT: ICD-10-CM

## 2018-07-27 DIAGNOSIS — M25.512 SHOULDER PAIN, LEFT: ICD-10-CM

## 2018-07-27 PROCEDURE — 97140 MANUAL THERAPY 1/> REGIONS: CPT | Mod: GP | Performed by: PHYSICAL THERAPIST

## 2018-07-27 PROCEDURE — 97530 THERAPEUTIC ACTIVITIES: CPT | Mod: GP | Performed by: PHYSICAL THERAPIST

## 2018-07-27 PROCEDURE — 97110 THERAPEUTIC EXERCISES: CPT | Mod: GP | Performed by: PHYSICAL THERAPIST

## 2018-08-02 ENCOUNTER — THERAPY VISIT (OUTPATIENT)
Dept: PHYSICAL THERAPY | Facility: CLINIC | Age: 83
End: 2018-08-02
Payer: COMMERCIAL

## 2018-08-02 DIAGNOSIS — M25.512 SHOULDER PAIN, LEFT: Primary | ICD-10-CM

## 2018-08-02 DIAGNOSIS — M25.552 HIP PAIN, LEFT: ICD-10-CM

## 2018-08-02 PROCEDURE — 97530 THERAPEUTIC ACTIVITIES: CPT | Mod: GP

## 2018-08-02 PROCEDURE — 97110 THERAPEUTIC EXERCISES: CPT | Mod: GP

## 2018-08-02 PROCEDURE — 97140 MANUAL THERAPY 1/> REGIONS: CPT | Mod: GP

## 2018-08-07 ENCOUNTER — TELEPHONE (OUTPATIENT)
Dept: FAMILY MEDICINE | Facility: CLINIC | Age: 83
End: 2018-08-07

## 2018-08-07 NOTE — TELEPHONE ENCOUNTER
Huddled with DOD:     Advised that if episode returns and pt is concerned, he should seek care in ED. Again, ER if SOB, lightheaded/dizzy/nausea/radiation. Relayed and reiterated all to patient, who understands and agrees with plan.    Of note, when I called him back at 1835, he has had no further episodes.     Brie SILVA RN

## 2018-08-07 NOTE — TELEPHONE ENCOUNTER
"Reason for call:  Patient reporting a symptom    Symptom or request:   \"twinging\" pain right above heart, twinging comes and goes    Duration (how long have symptoms been present): about half an hour     Have you been treated for this before? No    Additional comments: no other symptoms    Phone Number patient can be reached at:  Home number on file [phone] (home)    Best Time:  asap-patient is concerned about heart.    Can we leave a detailed message on this number:  YES    Call taken on 8/7/2018 at 5:49 PM by Shahrzad Phoenix.  "

## 2018-08-07 NOTE — TELEPHONE ENCOUNTER
"PCP/DOD: Please review below. Would you advise this pt to seek care in ED, especially if \"twinge\" returns?     Please advise,   Thank you,   Brie SILVA RN      S: Returned pt's call immediately as able ()    \"Twinge\" has not reoccurred since he hung up the phone 15 minutes ago with Pt Rep. (Last Episode around 5:45pm)     A:     Pt describes sensation as \"very short lived, only 2-3 seconds\" 3-4 episodes about 10 minutes apart.     Onset: first episode about 40 minutes ago   Description: Describes sensation as a twinge, not a crushing pain   Pain Ratin-2/10 \"Very minor\"   Location: 4-5 inches below collar bone on the left side        Denies shortness of breath  Denies dizziness   Denies nausea   Denies radiation    Denies changes in diet, or burping/belching.     PT states he may have had something like this in the past, but does not remember when or what it was.     R: Will huddle with Provider. Did advise to call 911 if symptoms return/worsen, especially with SOB, nausea/dizziness, radiation. Pt agrees with plan. Will await return call.     Brie SILVA RN    "

## 2018-08-09 ENCOUNTER — THERAPY VISIT (OUTPATIENT)
Dept: PHYSICAL THERAPY | Facility: CLINIC | Age: 83
End: 2018-08-09
Payer: COMMERCIAL

## 2018-08-09 DIAGNOSIS — M25.552 HIP PAIN, LEFT: ICD-10-CM

## 2018-08-09 PROCEDURE — 97140 MANUAL THERAPY 1/> REGIONS: CPT | Mod: GP

## 2018-08-09 PROCEDURE — 97110 THERAPEUTIC EXERCISES: CPT | Mod: GP

## 2018-08-15 ENCOUNTER — THERAPY VISIT (OUTPATIENT)
Dept: PHYSICAL THERAPY | Facility: CLINIC | Age: 83
End: 2018-08-15
Payer: COMMERCIAL

## 2018-08-15 DIAGNOSIS — M25.512 SHOULDER PAIN, LEFT: ICD-10-CM

## 2018-08-15 DIAGNOSIS — M25.552 HIP PAIN, LEFT: ICD-10-CM

## 2018-08-15 PROCEDURE — 97110 THERAPEUTIC EXERCISES: CPT | Mod: GP

## 2018-08-15 PROCEDURE — 97140 MANUAL THERAPY 1/> REGIONS: CPT | Mod: GP

## 2018-08-16 ENCOUNTER — HOSPITAL ENCOUNTER (OUTPATIENT)
Dept: CT IMAGING | Facility: CLINIC | Age: 83
Discharge: HOME | End: 2018-08-16
Attending: INTERNAL MEDICINE | Admitting: INTERNAL MEDICINE
Payer: MEDICARE

## 2018-08-16 DIAGNOSIS — R91.1 LUNG NODULE: ICD-10-CM

## 2018-08-16 DIAGNOSIS — I71.21 THORACIC ASCENDING AORTIC ANEURYSM (H): ICD-10-CM

## 2018-08-16 LAB
CREAT BLD-MCNC: 1.3 MG/DL (ref 0.66–1.25)
GFR SERPL CREATININE-BSD FRML MDRD: 52 ML/MIN/1.7M2

## 2018-08-16 PROCEDURE — 25000128 H RX IP 250 OP 636: Performed by: INTERNAL MEDICINE

## 2018-08-16 PROCEDURE — 71275 CT ANGIOGRAPHY CHEST: CPT

## 2018-08-16 PROCEDURE — 82565 ASSAY OF CREATININE: CPT

## 2018-08-16 PROCEDURE — 25000125 ZZHC RX 250: Performed by: INTERNAL MEDICINE

## 2018-08-16 RX ORDER — IOPAMIDOL 755 MG/ML
80 INJECTION, SOLUTION INTRAVASCULAR ONCE
Status: COMPLETED | OUTPATIENT
Start: 2018-08-16 | End: 2018-08-16

## 2018-08-16 RX ADMIN — IOPAMIDOL 80 ML: 755 INJECTION, SOLUTION INTRAVENOUS at 11:54

## 2018-08-16 RX ADMIN — SODIUM CHLORIDE, PRESERVATIVE FREE 80 ML: 5 INJECTION INTRAVENOUS at 11:53

## 2018-08-23 ENCOUNTER — THERAPY VISIT (OUTPATIENT)
Dept: PHYSICAL THERAPY | Facility: CLINIC | Age: 83
End: 2018-08-23
Payer: COMMERCIAL

## 2018-08-23 DIAGNOSIS — M25.552 HIP PAIN, LEFT: ICD-10-CM

## 2018-08-23 DIAGNOSIS — M25.512 SHOULDER PAIN, LEFT: ICD-10-CM

## 2018-08-23 PROCEDURE — 97035 APP MDLTY 1+ULTRASOUND EA 15: CPT | Mod: GP | Performed by: PHYSICAL THERAPIST

## 2018-08-23 PROCEDURE — 97110 THERAPEUTIC EXERCISES: CPT | Mod: GP | Performed by: PHYSICAL THERAPIST

## 2018-08-23 PROCEDURE — 97140 MANUAL THERAPY 1/> REGIONS: CPT | Mod: GP | Performed by: PHYSICAL THERAPIST

## 2018-08-31 ENCOUNTER — THERAPY VISIT (OUTPATIENT)
Dept: PHYSICAL THERAPY | Facility: CLINIC | Age: 83
End: 2018-08-31
Payer: COMMERCIAL

## 2018-08-31 DIAGNOSIS — M25.552 HIP PAIN, LEFT: ICD-10-CM

## 2018-08-31 PROCEDURE — 97110 THERAPEUTIC EXERCISES: CPT | Mod: GP

## 2018-08-31 PROCEDURE — 97140 MANUAL THERAPY 1/> REGIONS: CPT | Mod: GP

## 2018-08-31 PROCEDURE — 97035 APP MDLTY 1+ULTRASOUND EA 15: CPT | Mod: GP

## 2018-09-07 ENCOUNTER — THERAPY VISIT (OUTPATIENT)
Dept: PHYSICAL THERAPY | Facility: CLINIC | Age: 83
End: 2018-09-07
Payer: COMMERCIAL

## 2018-09-07 DIAGNOSIS — M25.512 SHOULDER PAIN, LEFT: Primary | ICD-10-CM

## 2018-09-07 DIAGNOSIS — M25.552 HIP PAIN, LEFT: ICD-10-CM

## 2018-09-07 PROCEDURE — 97140 MANUAL THERAPY 1/> REGIONS: CPT | Mod: GP

## 2018-09-07 PROCEDURE — 97035 APP MDLTY 1+ULTRASOUND EA 15: CPT | Mod: GP

## 2018-09-07 PROCEDURE — 97110 THERAPEUTIC EXERCISES: CPT | Mod: GP

## 2018-09-10 ENCOUNTER — THERAPY VISIT (OUTPATIENT)
Dept: PHYSICAL THERAPY | Facility: CLINIC | Age: 83
End: 2018-09-10
Payer: COMMERCIAL

## 2018-09-10 DIAGNOSIS — M25.512 SHOULDER PAIN, LEFT: Primary | ICD-10-CM

## 2018-09-10 DIAGNOSIS — M25.552 HIP PAIN, LEFT: ICD-10-CM

## 2018-09-10 PROCEDURE — 97110 THERAPEUTIC EXERCISES: CPT | Mod: GP | Performed by: PHYSICAL THERAPIST

## 2018-09-10 PROCEDURE — 97140 MANUAL THERAPY 1/> REGIONS: CPT | Mod: GP | Performed by: PHYSICAL THERAPIST

## 2018-09-10 PROCEDURE — 97035 APP MDLTY 1+ULTRASOUND EA 15: CPT | Mod: GP | Performed by: PHYSICAL THERAPIST

## 2018-09-10 NOTE — PROGRESS NOTES
Subjective:  HPI                    Objective:  System    Physical Exam    General     ROS    Assessment/Plan:    PROGRESS  REPORT    Progress reporting period is from 6/28/2018 to 9/10/2018. Zack has been seen in PT 9 times for treatment of lateral left hip pain and 13 times for left shoulder pain.  Treatment has included manual therapy, home exercises and US to left hip.  Shoulder pain is decreasing; left hip pain continues to interfere with sleep.      SUBJECTIVE    Subjective: left shoulder is improving.  Mild pain with reaching out to side.  Patient is tolerating swimming well.  Patient is frustrated with ongoing lateral left buttock/posterior hip pain. This occurs when patient goes back to bed at night after getting up to go to the bathroom.  It is largely not as issue during daytime.     l Pain level:  (1-2/10 at shoulder, 5/10 at hip at night).   Changes in function:  Yes (See Goal flowsheet attached for changes in current functional level)  Adverse reaction to treatment or activity: None    OBJECTIVE  Changes noted in objective findings:    Objective: mild left lateral hip c/o with trunk SB bilat,  lumbar flexion and extension are negative.  Single leg stance on left is symptom free.  Resisted glut med testing is not painful.  Mild tenderness over post greater trochantor and glut med.  Left shoulder elevation is symptom free in flexion, mild pain with aduction.  Resisted abduction produces mild shoulder pain.     ASSESSMENT/PLAN  Updated problem list and treatment plan: Diagnosis 1:  Left shoulder pain   Pain -  manual therapy, self management, education and home program  Decreased strength - therapeutic exercise and therapeutic activities  Decreased function - therapeutic activities  Impaired posture - neuro re-education  Diagnosis 2:  Left lateral hip pain   Pain -  US, manual therapy, self management, education and home program  Decreased strength - therapeutic exercise and therapeutic  activities  Decreased function - therapeutic activities  STG/LTGs have been met or progress has been made towards goals:  Yes (See Goal flow sheet completed today.)  Assessment of Progress: The patient's progress has plateaued.  Self Management Plans:  Patient has been instructed in a home treatment program.  I have re-evaluated this patient and find that the nature, scope, duration and intensity of the therapy is appropriate for the medical condition of the patient.  Zack continues to require the following intervention to meet STG and LTG's:  Zack has one PT visit remaining.  He plans to return to his primary MD due to ongoing lateral hip pain.    Recommendations:  This patient would benefit from further evaluation.    Please refer to the daily flowsheet for treatment today, total treatment time and time spent performing 1:1 timed codes.

## 2018-09-17 ENCOUNTER — OFFICE VISIT (OUTPATIENT)
Dept: FAMILY MEDICINE | Facility: CLINIC | Age: 83
End: 2018-09-17
Payer: COMMERCIAL

## 2018-09-17 VITALS
SYSTOLIC BLOOD PRESSURE: 133 MMHG | DIASTOLIC BLOOD PRESSURE: 65 MMHG | TEMPERATURE: 96.8 F | WEIGHT: 191 LBS | BODY MASS INDEX: 25.31 KG/M2 | HEART RATE: 64 BPM | HEIGHT: 73 IN | OXYGEN SATURATION: 98 %

## 2018-09-17 DIAGNOSIS — Z23 NEED FOR VACCINATION: ICD-10-CM

## 2018-09-17 DIAGNOSIS — M25.552 HIP PAIN, LEFT: ICD-10-CM

## 2018-09-17 DIAGNOSIS — S61.211A LACERATION OF LEFT INDEX FINGER WITHOUT DAMAGE TO NAIL, FOREIGN BODY PRESENCE UNSPECIFIED, INITIAL ENCOUNTER: ICD-10-CM
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
## 2018-09-17 PROCEDURE — 99213 OFFICE O/P EST LOW 20 MIN: CPT | Mod: 25 | Performed by: INTERNAL MEDICINE

## 2018-09-17 PROCEDURE — G0008 ADMIN INFLUENZA VIRUS VAC: HCPCS | Performed by: INTERNAL MEDICINE

## 2018-09-17 PROCEDURE — 90662 IIV NO PRSV INCREASED AG IM: CPT | Performed by: INTERNAL MEDICINE

## 2018-09-17 NOTE — PROGRESS NOTES
The patient presents for feet for a few issues.  The patient had a simple fall when he slipped a week ago.  He landed on his right hip.  He did not hit his head.  He does not have any pain in the hip but has a large bruise.  He can move around without difficulty.    He also cut his left second finger.  This is not hurting however.    The patient has ongoing discomfort in the left lateral hip area for which I saw him on July 17.  He has been to many sessions of physical therapy but it is not improving.    The patient also has some pain in the left shoulder with movements.  Physical therapy has not resulted.    Patient would like a flu shot.    Past Medical History:   Diagnosis Date     A-fib (H) 2010    post op     AAA (abdominal aortic aneurysm) without rupture (H)     Dr. 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    carotid us no stenosis, echo no change and no clots; ziopatch no afib, svt present     Anemia 2004     Aortic insufficiency 6/14    1+ on echo     Aortic insufficiency 11/2016    mild to mod     ASCVD (arteriosclerotic cardiovascular disease) 2010    cabg, post op afib     BPH (benign prostatic hyperplasia) 2003    turp, flomax added by urol 2/17     Bradycardia 2016    stopped toprol     CKD (chronic kidney disease) stage 3, GFR 30-59 ml/min      Cough 2010    pulm eval, felt due to reflux     Dizzy 2001    mri small vessel dz     GERD (gastroesophageal reflux disease)      HTN (hypertension) 2002     Hx of colonoscopy 2003    tics     Hypothyroid      Hypovitaminosis D      Idiopathic neuropathy      Lung nodule 02/2018    6mm, found during eval of ascending aorta, fu 8/18 no change     OCD (obsessive compulsive disorder)     sees psyche     Panic disorder     Dr. Luna     Spinal headache      Stroke (H) 12/16    expressive aphasia, hosp, seen by neuro, mri pos, carotids min dz, changed from asa to plavix     Sudden hearing loss 6/13    Dr. Garcia, mri brain no  acoustic neuroma     SVT (supraventricular tachycardia) (H) 11/16    seen on ziopatch done for amaurosis, longest 15 beats     Thoracic ascending aortic aneurysm (H) 06/2014    4.4cm on echo, aortic root 3.9, fu 5/15 4.8cm; fu done 12/15 and slightly enlarged, fu 6/16 no change, fu 11/16 no change; fu 1/18 echo 5.1-5.3, enlarged, then cta done and only 4.8cm, t fu 6 months, lvh, nl ef, mild ai; fu 8/18 slightly larger     Ulcerative colitis (H)     not active for years     Past Surgical History:   Procedure Laterality Date     BACK SURGERY  1998     BLADDER SURGERY  1985     C TOTAL KNEE ARTHROPLASTY  2011    left     C TOTAL KNEE ARTHROPLASTY  2009    right     CATARACT IOL, RT/LT  2011     CORONARY ARTERY BYPASS  2010     ENDOVASCULAR REPAIR ANEURYSM ABDOMINAL AORTA N/A 5/27/2015    Procedure: ENDOVASCULAR REPAIR ANEURYSM ABDOMINAL AORTA;  Surgeon: Darin Walters MD;  Location: [facility] OR     HERNIA REPAIR  2005     HERNIA REPAIR  1998     HERNIORRHAPHY INGUINAL Left 1/5/2018    Procedure: HERNIORRHAPHY INGUINAL;  Incarcerated Left Inguinal Hernia;  Surgeon: Harsh Walker MD;  Location:  OR     KNEE SURGERY  1997     REPAIR HAMMER TOE  12/28/2012    Procedure: REPAIR HAMMER TOE;  LEFT SECOND AND THIRD CLAW TOE RECONSTRUCTION;  Surgeon: Gray Haynes MD;  Location: [facility] OR     REPAIR HAMMER TOE  04/2018    reece ESQUEDA  2003     Social History     Social History     Marital status:      Spouse name: N/A     Number of children: 2     Years of education: N/A     Occupational History     retail Retired     Social History Main Topics     Smoking status: Former Smoker     Packs/day: 1.00     Years: 5.00     Types: Cigarettes     Quit date: 6/13/1965     Smokeless tobacco: Never Used     Alcohol use 0.0 oz/week     0 Standard drinks or equivalent per week      Comment: maybe one glass of wine a week     Drug use: No     Sexual activity: Not Currently     Other Topics Concern     Not on file  "    Social History Narrative     Current Outpatient Prescriptions   Medication Sig Dispense Refill     ARIPiprazole (ABILIFY) 2 MG tablet Take 1 tablet by mouth At Bedtime.       ASPIRIN NOT PRESCRIBED (INTENTIONAL) Please choose reason not prescribed, below 0 each 0     chlorthalidone (HYGROTON) 25 MG tablet Take 1 tablet (25 mg) by mouth daily 90 tablet 3     ClomiPRAMINE HCl (ANAFRANIL PO) Take 25 mg by mouth every evening        clopidogrel (PLAVIX) 75 MG tablet Take 1 tablet (75 mg) by mouth daily 90 tablet 3     gabapentin (NEURONTIN) 400 MG capsule Take 1 capsule by mouth 2 times daily        levothyroxine (SYNTHROID/LEVOTHROID) 100 MCG tablet Take 1 tablet (100 mcg) by mouth daily 90 tablet 3     LORAZEPAM PO Take 0.5 mg by mouth daily as needed for anxiety       MIRTAZAPINE PO Take 45 mg by mouth At Bedtime        simvastatin (ZOCOR) 20 MG tablet TAKE ONE TABLET BY MOUTH AT BEDTIME 90 tablet 3     Allergies   Allergen Reactions     No Known Allergies      FAMILY HISTORY NOTED AND REVIEWED    REVIEW OF SYSTEMS: above    PHYSICAL EXAM    /65 (BP Location: Left arm, Cuff Size: Adult Regular)  Pulse 64  Temp 96.8  F (36  C) (Tympanic)  Ht 6' 1\" (1.854 m)  Wt 191 lb (86.6 kg)  SpO2 98%  BMI 25.2 kg/m2    Patient appears non toxic  Has large ecchymosis right hip but not tender, one area that is raised consistent with hematoma, no signs infection.  Slight bruising left lat hip area  Very small superficial laceration left 2nd finger near mcp, not red, no pus    ASSESSMENT:  1. Hip pain, left lat, suspect troch bursitis, not responding to physical therapy, will refer to sports med  2. Right hip ecchymosis, due to fall, no signs fx, other injury  3. Minor hand lac  4. Need for flu shot    PLAN:  Flu shot  Follow up sports med    Zurdo Kendrick M.D.            "

## 2018-09-17 NOTE — MR AVS SNAPSHOT
After Visit Summary   9/17/2018    Zack Santiago    MRN: [id number]           Patient Information     Date Of Birth          5/29/1931        Visit Information        Provider Department      9/17/2018 9:00 AM Zurdo Kendrick MD [facility]        Today's Diagnoses     Fall, initial encounter    -  1    Hip pain, left        Laceration of left index finger without damage to nail, foreign body presence unspecified, initial encounter        Need for vaccination          Care Instructions    Please see Dr. Alexander Guerra for the left hip pain    Zurdo Kendrick M.D.            Follow-ups after your visit        Your next 10 appointments already scheduled     Sep 17, 2018  1:20 PM CDT   PEDRO Extremity with Xiomara Mendez PT   [place] [facility] ([facility]  )    [address] [place] [address] [address]  [facility] [address] [phone]            Sep 24, 2018  3:10 PM CDT   PEDRO Extremity with Xiomara Mendez PT   [facility] ([facility] [place]  )    [address] [place] [address] [address]  [facility] [id number] [phone]              Who to contact     If you have questions or need follow up information about today's clinic visit or your schedule please contact [facility] directly at [phone].  Normal or non-critical lab and imaging results will be communicated to you by MyChart, letter or phone within 4 business days after the clinic has received the results. If you do not hear from us within 7 days, please contact the clinic through MyChart or phone. If you have a critical or abnormal lab result, we will notify you by phone as soon as possible.  Submit refill requests through [employer] or call your pharmacy and they will forward the refill request to us. Please allow 3 business days for your refill to be completed.          Additional Information About Your Visit        Care EveryWhere ID     This is your Care  "EveryWhere ID. This could be used by other organizations to access your [facility] medical records  [id number]        Your Vitals Were     Pulse Temperature Height Pulse Oximetry BMI (Body Mass Index)       64 96.8  F (36  C) (Tympanic) 6' 1\" (1.854 m) 98% 25.2 kg/m2        Blood Pressure from Last 3 Encounters:   09/17/18 133/65   07/17/18 135/65   06/22/18 136/64    Weight from Last 3 Encounters:   09/17/18 191 lb (86.6 kg)   07/17/18 189 lb (85.7 kg)   06/22/18 188 lb 4.8 oz (85.4 kg)              We Performed the Following     HC FLU VACCINE, INCREASED ANTIGEN, PRESV FREE        Primary Care Provider Office Phone # Fax #    Zurdo Kendrick [phone] [address] [facility] [address]  [facility] [address]        Equal Access to Services     [facility] : Hadii aad ku hadasho Soomaali, waaxda luqadaha, qaybta kaalmada adeegyada, waxay idiin hayaan lina ellis . So [facility] [phone].    ATENCIÓN: Si ihsan membreno, tiene a khan disposición servicios gratuitos de asistencia lingüística. Naeem al [phone].    We comply with applicable federal civil rights laws and [place] laws. We do not discriminate on the basis of race, color, national origin, age, disability, sex, sexual orientation, or gender identity.            Thank you!     Thank you for choosing [facility]  for your care. Our goal is always to provide you with excellent care. Hearing back from our patients is one way we can continue to improve our services. Please take a few minutes to complete the written survey that you may receive in the mail after your visit with us. Thank you!             Your Updated Medication List - Protect others around you: Learn how to safely use, store and throw away your medicines at www.disposemymeds.org.          This list is accurate as of 9/17/18  9:08 AM.  Always use your most recent med list.                   Brand Name Dispense Instructions for use Diagnosis    ANAFRANIL PO      " Take 25 mg by mouth every evening    OCD (obsessive compulsive disorder)       ARIPiprazole 2 MG tablet    ABILIFY     Take 1 tablet by mouth At Bedtime.        ASPIRIN NOT PRESCRIBED    INTENTIONAL    0 each    Please choose reason not prescribed, below    ASCVD (arteriosclerotic cardiovascular disease)       chlorthalidone 25 MG tablet    HYGROTON    90 tablet    Take 1 tablet (25 mg) by mouth daily    Essential hypertension       clopidogrel 75 MG tablet    PLAVIX    90 tablet    Take 1 tablet (75 mg) by mouth daily    Cerebrovascular accident (CVA), unspecified mechanism (H)       gabapentin 400 MG capsule    NEURONTIN     Take 1 capsule by mouth 2 times daily        levothyroxine 100 MCG tablet    SYNTHROID/LEVOTHROID    90 tablet    Take 1 tablet (100 mcg) by mouth daily    Acquired hypothyroidism       LORAZEPAM PO      Take 0.5 mg by mouth daily as needed for anxiety        MIRTAZAPINE PO      Take 45 mg by mouth At Bedtime        simvastatin 20 MG tablet    ZOCOR    90 tablet    TAKE ONE TABLET BY MOUTH AT BEDTIME    Hyperlipidemia LDL goal <100

## 2018-09-21 ENCOUNTER — RADIANT APPOINTMENT (OUTPATIENT)
Dept: GENERAL RADIOLOGY | Facility: CLINIC | Age: 83
End: 2018-09-21
Attending: FAMILY MEDICINE
Payer: COMMERCIAL

## 2018-09-21 ENCOUNTER — OFFICE VISIT (OUTPATIENT)
Dept: ORTHOPEDICS | Facility: CLINIC | Age: 83
End: 2018-09-21
Payer: COMMERCIAL

## 2018-09-21 VITALS
DIASTOLIC BLOOD PRESSURE: 64 MMHG | HEIGHT: 73 IN | BODY MASS INDEX: 25.31 KG/M2 | WEIGHT: 191 LBS | SYSTOLIC BLOOD PRESSURE: 118 MMHG

## 2018-09-21 DIAGNOSIS — M25.552 PAIN IN JOINT INVOLVING LEFT PELVIC REGION AND THIGH: ICD-10-CM

## 2018-09-21 DIAGNOSIS — M25.512 PAIN IN JOINT OF LEFT SHOULDER: Primary | ICD-10-CM

## 2018-09-21 DIAGNOSIS — M51.369 DDD (DEGENERATIVE DISC DISEASE), LUMBAR: ICD-10-CM

## 2018-09-21 DIAGNOSIS — M19.012 PRIMARY OSTEOARTHRITIS OF LEFT SHOULDER: ICD-10-CM

## 2018-09-21 DIAGNOSIS — M25.512 PAIN IN JOINT OF LEFT SHOULDER: ICD-10-CM

## 2018-09-21 DIAGNOSIS — M16.12 PRIMARY OSTEOARTHRITIS OF LEFT HIP: ICD-10-CM

## 2018-09-21 DIAGNOSIS — M75.82 ROTATOR CUFF TENDINITIS, LEFT: ICD-10-CM

## 2018-09-21 PROCEDURE — 73502 X-RAY EXAM HIP UNI 2-3 VIEWS: CPT | Mod: FY

## 2018-09-21 PROCEDURE — 73030 X-RAY EXAM OF SHOULDER: CPT | Mod: LT

## 2018-09-21 PROCEDURE — 20610 DRAIN/INJ JOINT/BURSA W/O US: CPT | Mod: 59 | Performed by: FAMILY MEDICINE

## 2018-09-21 PROCEDURE — 99203 OFFICE O/P NEW LOW 30 MIN: CPT | Mod: 25 | Performed by: FAMILY MEDICINE

## 2018-09-21 RX ADMIN — METHYLPREDNISOLONE ACETATE 40 MG: 40 INJECTION, SUSPENSION INTRA-ARTICULAR; INTRALESIONAL; INTRAMUSCULAR; SOFT TISSUE at 11:16

## 2018-09-21 RX ADMIN — LIDOCAINE HYDROCHLORIDE 5 ML: 10 INJECTION, SOLUTION INFILTRATION; PERINEURAL at 11:16

## 2018-09-21 RX ADMIN — LIDOCAINE HYDROCHLORIDE 1 ML: 10 INJECTION, SOLUTION INFILTRATION; PERINEURAL at 11:16

## 2018-09-21 RX ADMIN — METHYLPREDNISOLONE ACETATE 40 MG: 40 INJECTION, SUSPENSION INTRA-ARTICULAR; INTRALESIONAL; INTRAMUSCULAR; SOFT TISSUE at 11:15

## 2018-09-21 RX ADMIN — LIDOCAINE HYDROCHLORIDE 5 ML: 10 INJECTION, SOLUTION INFILTRATION; PERINEURAL at 11:15

## 2018-09-21 RX ADMIN — LIDOCAINE HYDROCHLORIDE 1 ML: 10 INJECTION, SOLUTION INFILTRATION; PERINEURAL at 11:15

## 2018-09-21 NOTE — PROGRESS NOTES
[facility]   Clinic Visit s Sep 21, 2018    PCP: Zurdo Kendrick Raul      Zack is a 87 year old male who is seen as self referral for   Chief Complaint   Patient presents with     Left Shoulder - Pain     Left Hip - Pain       Injury: Reports insidious onset without acute precipitating event.     Location of Pain: left hip lateral, nonradiating   Duration of Pain: 1 year(s)  Rating of Pain at worst: 4/10  Rating of Pain Currently: 4/10  Pain is better with: nothing   Pain is worse with:  sleeping  Treatment so far consists of: physical therapy, bursa injection once in April at [facility]  Associated symptoms: no distal numbness or tingling; denies swelling or warmth  Recent imaging completed: No recent imaging completed.  Prior History of related problems: none      Location of Pain: left shoulder anterior , radiating to elbow   Duration of Pain: 1 year(s)  Rating of Pain at worst: 3/10  Rating of Pain Currently: 3/10  Pain is better with: activity avoidance   Pain is worse with: swimming   Treatment so far consists of: physical therapy  Associated symptoms: no distal numbness or tingling; denies swelling or warmth  Recent imaging completed: No recent imaging completed.  Prior History of related problems: none    Social History: retired     Past Medical History:   Diagnosis Date     A-fib (H) 2010    post op     AAA (abdominal aortic aneurysm) without rupture (H)     Dr. 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    carotid us no stenosis, echo no change and no clots; ziopatch no afib, svt present     Anemia 2004     Aortic insufficiency 6/14    1+ on echo     Aortic insufficiency 11/2016    mild to mod     ASCVD (arteriosclerotic cardiovascular disease) 2010    cabg, post op afib     BPH (benign prostatic hyperplasia) 2003    turp, flomax added by urol 2/17     Bradycardia 2016    stopped toprol     CKD (chronic kidney disease) stage 3, GFR 30-59 ml/min       Cough 2010    pulm eval, felt due to reflux     Dizzy 2001    mri small vessel dz     GERD (gastroesophageal reflux disease)      HTN (hypertension) 2002     Hx of colonoscopy 2003    tics     Hypothyroid      Hypovitaminosis D      Idiopathic neuropathy      Lung nodule 02/2018    6mm, found during eval of ascending aorta, fu 8/18 no change     OCD (obsessive compulsive disorder)     sees psyche     Panic disorder     Dr. Luna     Spinal headache      Stroke (H) 12/16    expressive aphasia, hosp, seen by neuro, mri pos, carotids min dz, changed from asa to plavix     Sudden hearing loss 6/13    Dr. Garcia, mri brain no acoustic neuroma     SVT (supraventricular tachycardia) (H) 11/16    seen on ziopatch done for amaurosis, longest 15 beats     Thoracic ascending aortic aneurysm (H) 06/2014    4.4cm on echo, aortic root 3.9, fu 5/15 4.8cm; fu done 12/15 and slightly enlarged, fu 6/16 no change, fu 11/16 no change; fu 1/18 echo 5.1-5.3, enlarged, then cta done and only 4.8cm, t fu 6 months, lvh, nl ef, mild ai; fu 8/18 slightly larger     Ulcerative colitis (H)     not active for years       Patient Active Problem List    Diagnosis Date Noted     Hip pain, left 07/20/2018     Priority: Medium     Shoulder pain, left 06/28/2018     Priority: Medium     Lung nodule 02/01/2018     Priority: Medium     6mm, found during eval of ascending aorta       Incarcerated inguinal hernia 01/06/2018     Priority: Medium     Rectal incontinence 04/19/2017     Priority: Medium     Cerebrovascular accident (CVA), unspecified mechanism (H) 01/19/2017     Priority: Medium     Atrial fibrillation, unspecified type (H) 01/19/2017     Priority: Medium     SVT (supraventricular tachycardia) (H)      Priority: Medium     seen on ziopatch done for amaurosis, longest 15 beats       Amaurosis fugax of right eye      Priority: Medium     Bradycardia      Priority: Medium     stopped toprol       Aortic insufficiency      Priority: Medium      1+ on echo       Thoracic ascending aortic aneurysm (H)      Priority: Medium     4.4cm on echo, aortic root 3.9       Sudden hearing loss      Priority: Medium     Dr. Garcia, mri brain no acoustic neuroma       CKD (chronic kidney disease) stage 3, GFR 30-59 ml/min      Priority: Medium     Claw toe, acquired 12/28/2012     Priority: Medium     Problem list name updated by automated process. Provider to review       Hyperlipidemia LDL goal <100 06/13/2012     Priority: Medium     OCD (obsessive compulsive disorder)      Priority: Medium     GERD (gastroesophageal reflux disease)      Priority: Medium     AAA (abdominal aortic aneurysm) without rupture (H)      Priority: Medium     Hypovitaminosis D      Priority: Medium     ASCVD (arteriosclerotic cardiovascular disease)      Priority: Medium     cabg, post op afib       Panic disorder      Priority: Medium     Acquired hypothyroidism      Priority: Medium     Ulcerative colitis (H)      Priority: Medium     not active for years       Idiopathic neuropathy      Priority: Medium     Benign essential hypertension      Priority: Medium     Cough      Priority: Medium     pulm eval, felt due to reflux       Advanced directives, counseling/discussion 06/08/2012     Priority: Medium     Patient states has Advance Directive and will bring in a copy to clinic. 6/8/2012          Family History   Problem Relation Age of Onset     C.A.D. Father        Social History     Social History     Marital status:      Spouse name: N/A     Number of children: 2     Years of education: N/A     Occupational History     retail Retired     Social History Main Topics     Smoking status: Former Smoker     Packs/day: 1.00     Years: 5.00     Types: Cigarettes     Quit date: 6/13/1965     Smokeless tobacco: Never Used     Alcohol use 0.0 oz/week     0 Standard drinks or equivalent per week      Comment: maybe one glass of wine a week     Drug use: No     Sexual activity: Not  "Currently     Other Topics Concern     Not on file     Social History Narrative       Past Surgical History:   Procedure Laterality Date     BACK SURGERY  1998     BLADDER SURGERY  1985     C TOTAL KNEE ARTHROPLASTY  2011    left     C TOTAL KNEE ARTHROPLASTY  2009    right     CATARACT IOL, RT/LT  2011     CORONARY ARTERY BYPASS  2010     ENDOVASCULAR REPAIR ANEURYSM ABDOMINAL AORTA N/A 5/27/2015    Procedure: ENDOVASCULAR REPAIR ANEURYSM ABDOMINAL AORTA;  Surgeon: Darin Walters MD;  Location: [facility] OR     HERNIA REPAIR  2005     HERNIA REPAIR  1998     HERNIORRHAPHY INGUINAL Left 1/5/2018    Procedure: HERNIORRHAPHY INGUINAL;  Incarcerated Left Inguinal Hernia;  Surgeon: Harsh Walker MD;  Location: [facility] OR     KNEE SURGERY  1997     REPAIR HAMMER TOE  12/28/2012    Procedure: REPAIR HAMMER TOE;  LEFT SECOND AND THIRD CLAW TOE RECONSTRUCTION;  Surgeon: Gray Haynes MD;  Location: [facility] OR     REPAIR HAMMER TOE  04/2018    tria     DHEERAJ  2003       Review of Systems   Musculoskeletal: Positive for joint pain.         Physical Exam  /64  Ht 6' 1\" (1.854 m)  Wt 191 lb (86.6 kg)  BMI 25.2 kg/m2  Constitutional:well-developed, well-nourished, and in no distress. slightly hard of hearing.  Cardiovascular: Intact distal pulses.    Neurological: alert. Gait Abnormal:   Gait with shuffling steps  Station wide  Skin: Skin is warm and dry.   Psychiatric: Mood and affect normal.   Respiratory: unlabored, speaks in full sentences  Lymph: no LAD, no lymphangitis          Left Hip Exam   Gait: Abnormal.    Tenderness   The patient is experiencing tenderness in the greater trochanter, posterior.    Range of Motion   Extension:            Normal  Flexion:                 140  Internal Rotation:  20  External Rotation: 40  Abduction:            Abnormal  Adduction:            Normal    Muscle Strength   Abduction:  5/5  Adduction:  5/5  Flexion:      5/5    Tests   Kaleb: Positive  Dequan:  Negative    Right " Hip Exam   Gait: Abnormal.    Left Shoulder Exam     Range of Motion   Active Abduction:                       150  Passive Abduction:                    150  Extension:                                  N/t  Forward Flexion:                        150  External Rotation:                      70  Internal Rotation 0 degrees:      Sacrum  Internal Rotation 90 degrees:    N/t    Muscle Strength   Abduction:            4/5  Internal Rotation:  5/5  External Rotation: 4/5  Supraspinatus:     4/5  Subscapularis:     5/5  Biceps:                 5/5    Tests   Impingement:   Positive  Seo:          Positive  Cross Arm:      Negative  Drop Arm:        Positive  Apprehension: Negative  Sulcus:            Negative            X-ray images Ordered and independently reviewed by me in the office today with the patient. X-ray shows:   Recent Results (from the past 48 hour(s))   XR Pelvis and Hip Left 1 View    Narrative    XR PELVIS AND HIP LEFT 1 VIEW 9/21/2018 10:47 AM    COMPARISON: None.    HISTORY: Pain.      Impression    IMPRESSION: Mild degenerative changes in the hips with preserved joint  space. No fractures are seen.    JAI ALARCON MD   XR Shoulder Left G/E 3 Views    Narrative    XR SHOULDER LT G/E 3 VW 9/21/2018 10:48 AM    COMPARISON: None.    HISTORY: LEFT shoulder pain.      Impression    IMPRESSION: No fracture or dislocation seen in the LEFT shoulder. Mild  glenohumeral degenerative change. No significant soft tissue swelling.    JAI ALARCON MD       ASSESSMENT/PLAN    ICD-10-CM    1. Pain in joint of left shoulder M25.512 XR Shoulder Left G/E 3 Views   2. Pain in joint involving left pelvic region and thigh M25.552 XR Pelvis and Hip Left 1 View   3. Rotator cuff tendinitis, left M75.82    4. Primary osteoarthritis of left shoulder M19.012    5. Primary osteoarthritis of left hip M16.12    6. DDD (degenerative disc disease), lumbar M51.36      Patient had left trochanteric bursa injection last spring but  he does not recall whether that improved symptoms or not.  Has had extensive physical therapy for the hip bursitis and shoulder pain since then and feels like this is not helping.  Objectively he has evidence of both subacromial impingement and arthritis of the shoulder and trochanteric bursitis and arthritis of the hip as well.  Additionally he has substantial degenerative changes of lumbar spine seen on the pelvic images.    Discussed options, not likely to be a surgical candidate, therefore will attempt ever to pain control for quality of life management.  Suggested initial subacromial bursa injection of the left shoulder today and trochanteric bursa injection of the left hip as well.  Continued home exercises encouraged though he can back off to every other day.  If these fail to improve symptoms over 1 or 2 months, then intra-articular cortisone injections would be the next option both of the shoulder and of the hip.  Elaborate on these plans in great detail with the patient, he agreed with the plan and the procedures below.  Follow-up in 1 month's time if not improving.    Large Joint Injection/Arthocentesis  Date/Time: 9/21/2018 11:15 AM  Performed by: SOFIA MARIANO  Authorized by: SOFIA MARIANO     Indications:  Pain and osteoarthritis  Needle Size:  25 G  Guidance: landmark guided    Approach:  Posterolateral  Location:  Shoulder  Site:  L subacromial bursa  Medications:  40 mg methylPREDNISolone acetate 40 MG/ML; 5 mL lidocaine 1 %; 1 mL lidocaine 1 %  Outcome:  Tolerated well, no immediate complications  Procedure discussed: discussed risks, benefits, and alternatives    Consent Given by:  Patient  Timeout: timeout called immediately prior to procedure    Prep: patient was prepped and draped in usual sterile fashion     LOT [id number] exp 11/2019    Large Joint Injection/Arthocentesis  Date/Time: 9/21/2018 11:16 AM  Performed by: SOFIA MARIANO  Authorized by: SOFIA MARIANO  ADDISON     Indications:  Pain and osteoarthritis  Needle Size:  22 G  Guidance: landmark guided    Approach:  Lateral  Location:  Hip  Site:  L greater trochanteric bursa  Medications:  1 mL lidocaine 1 %; 5 mL lidocaine 1 %; 40 mg methylPREDNISolone acetate 40 MG/ML  Outcome:  Tolerated well, no immediate complications  Procedure discussed: discussed risks, benefits, and alternatives    Consent Given by:  Patient  Timeout: timeout called immediately prior to procedure    Prep: patient was prepped and draped in usual sterile fashion     LOT [id number] exp 11/2019        Time spent in one-on-one evalution and discussion with patient regarding nature of problem, course, prior treatments, and therapeutic options, at least 50% of which was spent in counseling and coordination of care: 45 minutes, over and above time spent on injection.

## 2018-09-21 NOTE — LETTER
9/21/2018         RE: Zack Santiago  [address] [address]        Dear Colleague,    Thank you for referring your patient, Zack Santiago, to the [facility] [place]. Please see a copy of my visit note below.    HPI     [facility]   Clinic Visit s Sep 21, 2018    PCP: Zurdo Kendrick Raul Dixon is a 87 year old male who is seen as self referral for   Chief Complaint   Patient presents with     Left Shoulder - Pain     Left Hip - Pain       Injury: Reports insidious onset without acute precipitating event.     Location of Pain: left hip lateral, nonradiating   Duration of Pain: 1 year(s)  Rating of Pain at worst: 4/10  Rating of Pain Currently: 4/10  Pain is better with: nothing   Pain is worse with:  sleeping  Treatment so far consists of: physical therapy, bursa injection once in April at [facility]  Associated symptoms: no distal numbness or tingling; denies swelling or warmth  Recent imaging completed: No recent imaging completed.  Prior History of related problems: none      Location of Pain: left shoulder anterior , radiating to elbow   Duration of Pain: 1 year(s)  Rating of Pain at worst: 3/10  Rating of Pain Currently: 3/10  Pain is better with: activity avoidance   Pain is worse with: swimming   Treatment so far consists of: physical therapy  Associated symptoms: no distal numbness or tingling; denies swelling or warmth  Recent imaging completed: No recent imaging completed.  Prior History of related problems: none    Social History: retired     Past Medical History:   Diagnosis Date     A-fib (H) 2010    post op     AAA (abdominal aortic aneurysm) without rupture (H)     Dr. 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    carotid us no stenosis, echo no change and no clots; ziopatch no afib, svt present     Anemia 2004     Aortic insufficiency 6/14    1+ on echo     Aortic insufficiency 11/2016     mild to mod     ASCVD (arteriosclerotic cardiovascular disease) 2010    cabg, post op afib     BPH (benign prostatic hyperplasia) 2003    turp, flomax added by urol 2/17     Bradycardia 2016    stopped toprol     CKD (chronic kidney disease) stage 3, GFR 30-59 ml/min      Cough 2010    pulm eval, felt due to reflux     Dizzy 2001    mri small vessel dz     GERD (gastroesophageal reflux disease)      HTN (hypertension) 2002     Hx of colonoscopy 2003    tics     Hypothyroid      Hypovitaminosis D      Idiopathic neuropathy      Lung nodule 02/2018    6mm, found during eval of ascending aorta, fu 8/18 no change     OCD (obsessive compulsive disorder)     sees psyche     Panic disorder     Dr. Luna     Spinal headache      Stroke (H) 12/16    expressive aphasia, hosp, seen by neuro, mri pos, carotids min dz, changed from asa to plavix     Sudden hearing loss 6/13    Dr. Garcia, mri brain no acoustic neuroma     SVT (supraventricular tachycardia) (H) 11/16    seen on ziopatch done for amaurosis, longest 15 beats     Thoracic ascending aortic aneurysm (H) 06/2014    4.4cm on echo, aortic root 3.9, fu 5/15 4.8cm; fu done 12/15 and slightly enlarged, fu 6/16 no change, fu 11/16 no change; fu 1/18 echo 5.1-5.3, enlarged, then cta done and only 4.8cm, t fu 6 months, lvh, nl ef, mild ai; fu 8/18 slightly larger     Ulcerative colitis (H)     not active for years       Patient Active Problem List    Diagnosis Date Noted     Hip pain, left 07/20/2018     Priority: Medium     Shoulder pain, left 06/28/2018     Priority: Medium     Lung nodule 02/01/2018     Priority: Medium     6mm, found during eval of ascending aorta       Incarcerated inguinal hernia 01/06/2018     Priority: Medium     Rectal incontinence 04/19/2017     Priority: Medium     Cerebrovascular accident (CVA), unspecified mechanism (H) 01/19/2017     Priority: Medium     Atrial fibrillation, unspecified type (H) 01/19/2017     Priority: Medium     SVT  (supraventricular tachycardia) (H)      Priority: Medium     seen on ziopatch done for amaurosis, longest 15 beats       Amaurosis fugax of right eye      Priority: Medium     Bradycardia      Priority: Medium     stopped toprol       Aortic insufficiency      Priority: Medium     1+ on echo       Thoracic ascending aortic aneurysm (H)      Priority: Medium     4.4cm on echo, aortic root 3.9       Sudden hearing loss      Priority: Medium     Dr. Garcia, mri brain no acoustic neuroma       CKD (chronic kidney disease) stage 3, GFR 30-59 ml/min      Priority: Medium     Claw toe, acquired 12/28/2012     Priority: Medium     Problem list name updated by automated process. Provider to review       Hyperlipidemia LDL goal <100 06/13/2012     Priority: Medium     OCD (obsessive compulsive disorder)      Priority: Medium     GERD (gastroesophageal reflux disease)      Priority: Medium     AAA (abdominal aortic aneurysm) without rupture (H)      Priority: Medium     Hypovitaminosis D      Priority: Medium     ASCVD (arteriosclerotic cardiovascular disease)      Priority: Medium     cabg, post op afib       Panic disorder      Priority: Medium     Acquired hypothyroidism      Priority: Medium     Ulcerative colitis (H)      Priority: Medium     not active for years       Idiopathic neuropathy      Priority: Medium     Benign essential hypertension      Priority: Medium     Cough      Priority: Medium     pulm eval, felt due to reflux       Advanced directives, counseling/discussion 06/08/2012     Priority: Medium     Patient states has Advance Directive and will bring in a copy to clinic. 6/8/2012          Family History   Problem Relation Age of Onset     C.A.D. Father        Social History     Social History     Marital status:      Spouse name: N/A     Number of children: 2     Years of education: N/A     Occupational History     retail Retired     Social History Main Topics     Smoking status: Former Smoker      "Packs/day: 1.00     Years: 5.00     Types: Cigarettes     Quit date: 6/13/1965     Smokeless tobacco: Never Used     Alcohol use 0.0 oz/week     0 Standard drinks or equivalent per week      Comment: maybe one glass of wine a week     Drug use: No     Sexual activity: Not Currently     Other Topics Concern     Not on file     Social History Narrative       Past Surgical History:   Procedure Laterality Date     BACK SURGERY  1998     BLADDER SURGERY  1985     C TOTAL KNEE ARTHROPLASTY  2011    left     C TOTAL KNEE ARTHROPLASTY  2009    right     CATARACT IOL, RT/LT  2011     CORONARY ARTERY BYPASS  2010     ENDOVASCULAR REPAIR ANEURYSM ABDOMINAL AORTA N/A 5/27/2015    Procedure: ENDOVASCULAR REPAIR ANEURYSM ABDOMINAL AORTA;  Surgeon: Darin Walters MD;  Location: [facility] OR     HERNIA REPAIR  2005     HERNIA REPAIR  1998     HERNIORRHAPHY INGUINAL Left 1/5/2018    Procedure: HERNIORRHAPHY INGUINAL;  Incarcerated Left Inguinal Hernia;  Surgeon: Harsh Walker MD;  Location: [facility] OR     KNEE SURGERY  1997     REPAIR HAMMER TOE  12/28/2012    Procedure: REPAIR HAMMER TOE;  LEFT SECOND AND THIRD CLAW TOE RECONSTRUCTION;  Surgeon: Gray Haynes MD;  Location: [facility] OR     REPAIR HAMMER TOE  04/2018    ree[place] ESQUEDA  2003       Review of Systems   Musculoskeletal: Positive for joint pain.         Physical Exam  /64  Ht 6' 1\" (1.854 m)  Wt 191 lb (86.6 kg)  BMI 25.2 kg/m2  Constitutional:well-developed, well-nourished, and in no distress. slightly hard of hearing.  Cardiovascular: Intact distal pulses.    Neurological: alert. Gait Abnormal:   Gait with shuffling steps  Station wide  Skin: Skin is warm and dry.   Psychiatric: Mood and affect normal.   Respiratory: unlabored, speaks in full sentences  Lymph: no LAD, no lymphangitis          Left Hip Exam   Gait: Abnormal.    Tenderness   The patient is experiencing tenderness in the greater trochanter, posterior.    Range of Motion   Extension:           "  Normal  Flexion:                 140  Internal Rotation:  20  External Rotation: 40  Abduction:            Abnormal  Adduction:            Normal    Muscle Strength   Abduction:  5/5  Adduction:  5/5  Flexion:      5/5    Tests   Kaleb: Positive  Dequan:  Negative    Right Hip Exam   Gait: Abnormal.    Left Shoulder Exam     Range of Motion   Active Abduction:                       150  Passive Abduction:                    150  Extension:                                  N/t  Forward Flexion:                        150  External Rotation:                      70  Internal Rotation 0 degrees:      Sacrum  Internal Rotation 90 degrees:    N/t    Muscle Strength   Abduction:            4/5  Internal Rotation:  5/5  External Rotation: 4/5  Supraspinatus:     4/5  Subscapularis:     5/5  Biceps:                 5/5    Tests   Impingement:   Positive  Seo:          Positive  Cross Arm:      Negative  Drop Arm:        Positive  Apprehension: Negative  Sulcus:            Negative            X-ray images Ordered and independently reviewed by me in the office today with the patient. X-ray shows:   Recent Results (from the past 48 hour(s))   XR Pelvis and Hip Left 1 View    Narrative    XR PELVIS AND HIP LEFT 1 VIEW 9/21/2018 10:47 AM    COMPARISON: None.    HISTORY: Pain.      Impression    IMPRESSION: Mild degenerative changes in the hips with preserved joint  space. No fractures are seen.    JAI ALARCON MD   XR Shoulder Left G/E 3 Views    Narrative    XR SHOULDER LT G/E 3 VW 9/21/2018 10:48 AM    COMPARISON: None.    HISTORY: LEFT shoulder pain.      Impression    IMPRESSION: No fracture or dislocation seen in the LEFT shoulder. Mild  glenohumeral degenerative change. No significant soft tissue swelling.    JAI ALARCON MD       ASSESSMENT/PLAN    ICD-10-CM    1. Pain in joint of left shoulder M25.512 XR Shoulder Left G/E 3 Views   2. Pain in joint involving left pelvic region and thigh M25.552 XR Pelvis and Hip  Left 1 View   3. Rotator cuff tendinitis, left M75.82    4. Primary osteoarthritis of left shoulder M19.012    5. Primary osteoarthritis of left hip M16.12    6. DDD (degenerative disc disease), lumbar M51.36      Patient had left trochanteric bursa injection last spring but he does not recall whether that improved symptoms or not.  Has had extensive physical therapy for the hip bursitis and shoulder pain since then and feels like this is not helping.  Objectively he has evidence of both subacromial impingement and arthritis of the shoulder and trochanteric bursitis and arthritis of the hip as well.  Additionally he has substantial degenerative changes of lumbar spine seen on the pelvic images.    Discussed options, not likely to be a surgical candidate, therefore will attempt ever to pain control for quality of life management.  Suggested initial subacromial bursa injection of the left shoulder today and trochanteric bursa injection of the left hip as well.  Continued home exercises encouraged though he can back off to every other day.  If these fail to improve symptoms over 1 or 2 months, then intra-articular cortisone injections would be the next option both of the shoulder and of the hip.  Elaborate on these plans in great detail with the patient, he agreed with the plan and the procedures below.  Follow-up in 1 month's time if not improving.    Large Joint Injection/Arthocentesis  Date/Time: 9/21/2018 11:15 AM  Performed by: SOFIA MARIANO  Authorized by: SOFIA MARIANO     Indications:  Pain and osteoarthritis  Needle Size:  25 G  Guidance: landmark guided    Approach:  Posterolateral  Location:  Shoulder  Site:  L subacromial bursa  Medications:  40 mg methylPREDNISolone acetate 40 MG/ML; 5 mL lidocaine 1 %; 1 mL lidocaine 1 %  Outcome:  Tolerated well, no immediate complications  Procedure discussed: discussed risks, benefits, and alternatives    Consent Given by:  Patient  Timeout: timeout  called immediately prior to procedure    Prep: patient was prepped and draped in usual sterile fashion     LOT [id number] exp 11/2019    Large Joint Injection/Arthocentesis  Date/Time: 9/21/2018 11:16 AM  Performed by: ALEXANDER GUERRA  Authorized by: ALEXANDER GUERRA     Indications:  Pain and osteoarthritis  Needle Size:  22 G  Guidance: landmark guided    Approach:  Lateral  Location:  Hip  Site:  L greater trochanteric bursa  Medications:  1 mL lidocaine 1 %; 5 mL lidocaine 1 %; 40 mg methylPREDNISolone acetate 40 MG/ML  Outcome:  Tolerated well, no immediate complications  Procedure discussed: discussed risks, benefits, and alternatives    Consent Given by:  Patient  Timeout: timeout called immediately prior to procedure    Prep: patient was prepped and draped in usual sterile fashion     LOT [id number] exp 11/2019        Time spent in one-on-one evalution and discussion with patient regarding nature of problem, course, prior treatments, and therapeutic options, at least 50% of which was spent in counseling and coordination of care: 45 minutes, over and above time spent on injection.      Again, thank you for allowing me to participate in the care of your patient.        Sincerely,        Alexander Guerra MD

## 2018-09-21 NOTE — MR AVS SNAPSHOT
"              After Visit Summary   9/21/2018    Zack Santiago    MRN: [id number]           Patient Information     Date Of Birth          5/29/1931        Visit Information        Provider Department      9/21/2018 10:20 AM Alexander Guerra MD [facility] [place]        Today's Diagnoses     Pain in joint of left shoulder    -  1    Pain in joint involving left pelvic region and thigh        Rotator cuff tendinitis, left        Primary osteoarthritis of left shoulder        Primary osteoarthritis of left hip        DDD (degenerative disc disease), lumbar           Follow-ups after your visit        Who to contact     If you have questions or need follow up information about today's clinic visit or your schedule please contact [facility] [place] directly at [phone].  Normal or non-critical lab and imaging results will be communicated to you by mascotsecrethart, letter or phone within 4 business days after the clinic has received the results. If you do not hear from us within 7 days, please contact the clinic through MyChart or phone. If you have a critical or abnormal lab result, we will notify you by phone as soon as possible.  Submit refill requests through [employer] or call your pharmacy and they will forward the refill request to us. Please allow 3 business days for your refill to be completed.          Additional Information About Your Visit        [employer] For Good Information     [employer] lets you send messages to your doctor, view your test results, renew your prescriptions, schedule appointments and more. To sign up, go to [email or web address] . Click on \"Log in\" on the left side of the screen, which will take you to the Welcome page. Then click on \"Sign up Now\" on the right side of the page.     You will be asked to enter the access code listed below, as well as some personal information. Please follow the directions to create your username and " "password.     Your access code is: [email or web address]  Expires: 2018 11:28 AM     Your access code will  in 90 days. If you need help or a new code, please call your [facility] clinic or [phone].        Care EveryWhere ID     This is your Care EveryWhere ID. This could be used by other organizations to access your [facility] medical records  [id number]        Your Vitals Were     Height BMI (Body Mass Index)                6' 1\" (1.854 m) 25.2 kg/m2           Blood Pressure from Last 3 Encounters:   18 118/64   18 133/65   18 135/65    Weight from Last 3 Encounters:   18 191 lb (86.6 kg)   18 191 lb (86.6 kg)   18 189 lb (85.7 kg)              We Performed the Following     Large Joint Injection/Arthocentesis     Large Joint Injection/Arthocentesis        Primary Care Provider Office Phone # Fax #    Zurdo Raul Kendrick [phone] [address]  [place] [address]        Equal Access to Services     [facility] AH: Hadii aad ku hadasho Soomaali, waaxda luqadaha, qaybta kaalmada adeegyada, waxay mariein haykeerthin lina ellis ah. So [facility] [phone].    ATENCIÓN: Si habla español, tiene a khan disposición servicios gratuitos de asistencia lingüística. Llame al [phone].    We comply with applicable federal civil rights laws and [place] laws. We do not discriminate on the basis of race, color, national origin, age, disability, sex, sexual orientation, or gender identity.            Thank you!     Thank you for choosing [facility] [place]  for your care. Our goal is always to provide you with excellent care. Hearing back from our patients is one way we can continue to improve our services. Please take a few minutes to complete the written survey that you may receive in the mail after your visit with us. Thank you!             Your Updated Medication List - Protect others around you: Learn how to safely use, " store and throw away your medicines at www.disposemymeds.org.          This list is accurate as of 9/21/18 11:59 PM.  Always use your most recent med list.                   Brand Name Dispense Instructions for use Diagnosis    ANAFRANIL PO      Take 25 mg by mouth every evening    OCD (obsessive compulsive disorder)       ARIPiprazole 2 MG tablet    ABILIFY     Take 1 tablet by mouth At Bedtime.        ASPIRIN NOT PRESCRIBED    INTENTIONAL    0 each    Please choose reason not prescribed, below    ASCVD (arteriosclerotic cardiovascular disease)       chlorthalidone 25 MG tablet    HYGROTON    90 tablet    Take 1 tablet (25 mg) by mouth daily    Essential hypertension       clopidogrel 75 MG tablet    PLAVIX    90 tablet    Take 1 tablet (75 mg) by mouth daily    Cerebrovascular accident (CVA), unspecified mechanism (H)       gabapentin 400 MG capsule    NEURONTIN     Take 1 capsule by mouth 2 times daily        levothyroxine 100 MCG tablet    SYNTHROID/LEVOTHROID    90 tablet    Take 1 tablet (100 mcg) by mouth daily    Acquired hypothyroidism       LORAZEPAM PO      Take 0.5 mg by mouth daily as needed for anxiety        MIRTAZAPINE PO      Take 45 mg by mouth At Bedtime        simvastatin 20 MG tablet    ZOCOR    90 tablet    TAKE ONE TABLET BY MOUTH AT BEDTIME    Hyperlipidemia LDL goal <100

## 2018-09-22 RX ORDER — METHYLPREDNISOLONE ACETATE 40 MG/ML
40 INJECTION, SUSPENSION INTRA-ARTICULAR; INTRALESIONAL; INTRAMUSCULAR; SOFT TISSUE
Status: DISCONTINUED | OUTPATIENT
Start: 2018-09-21 | End: 2019-01-25

## 2018-09-22 RX ORDER — LIDOCAINE HYDROCHLORIDE 10 MG/ML
1 INJECTION, SOLUTION INFILTRATION; PERINEURAL
Status: DISCONTINUED | OUTPATIENT
Start: 2018-09-21 | End: 2020-06-08

## 2018-09-22 RX ORDER — LIDOCAINE HYDROCHLORIDE 10 MG/ML
5 INJECTION, SOLUTION INFILTRATION; PERINEURAL
Status: DISCONTINUED | OUTPATIENT
Start: 2018-09-21 | End: 2020-06-08

## 2018-10-01 ENCOUNTER — OFFICE VISIT (OUTPATIENT)
Dept: FAMILY MEDICINE | Facility: CLINIC | Age: 83
End: 2018-10-01
Payer: COMMERCIAL

## 2018-10-01 VITALS
DIASTOLIC BLOOD PRESSURE: 65 MMHG | SYSTOLIC BLOOD PRESSURE: 115 MMHG | WEIGHT: 189 LBS | HEIGHT: 73 IN | BODY MASS INDEX: 25.05 KG/M2 | TEMPERATURE: 97.3 F | HEART RATE: 59 BPM | OXYGEN SATURATION: 95 %

## 2018-10-01 DIAGNOSIS — E03.9 ACQUIRED HYPOTHYROIDISM: ICD-10-CM

## 2018-10-01 DIAGNOSIS — I10 BENIGN ESSENTIAL HYPERTENSION: Primary | ICD-10-CM

## 2018-10-01 DIAGNOSIS — E78.5 HYPERLIPIDEMIA LDL GOAL <100: ICD-10-CM

## 2018-10-01 PROCEDURE — 99213 OFFICE O/P EST LOW 20 MIN: CPT | Performed by: INTERNAL MEDICINE

## 2018-10-01 NOTE — PROGRESS NOTES
Chief Complaint:     Follow up on multiple concerns    HPI:   Patient Zack Santiago is a very pleasant 87 year old male with history of hypertension, hyperlipidemia, hypothyroidism who presents to Internal Medicine clinic today for follow up of multiple concerns. Regarding the patient's hyperlipidemia, the patient reports that he is compliant with his Simvastatin cholesterol medication for hyperlipidemia treatment. Regarding the patient's hypertension, the patient's BP is currently well controlled on current BP medication regimen. Regarding his chronic hypothyroidism, the patient's hypothyroidism symptoms are well controlled with Levothyroxine medication. He denies any chest pain, headaches, fever or chills.             Current Medications:     Current Outpatient Prescriptions   Medication Sig Dispense Refill     ARIPiprazole (ABILIFY) 2 MG tablet Take 1 tablet by mouth At Bedtime.       ASPIRIN NOT PRESCRIBED (INTENTIONAL) Please choose reason not prescribed, below 0 each 0     chlorthalidone (HYGROTON) 25 MG tablet Take 1 tablet (25 mg) by mouth daily 90 tablet 3     ClomiPRAMINE HCl (ANAFRANIL PO) Take 25 mg by mouth every evening        clopidogrel (PLAVIX) 75 MG tablet Take 1 tablet (75 mg) by mouth daily 90 tablet 3     gabapentin (NEURONTIN) 400 MG capsule Take 1 capsule by mouth 2 times daily        levothyroxine (SYNTHROID/LEVOTHROID) 100 MCG tablet Take 1 tablet (100 mcg) by mouth daily 90 tablet 3     LORAZEPAM PO Take 0.5 mg by mouth daily as needed for anxiety       MIRTAZAPINE PO Take 45 mg by mouth At Bedtime        simvastatin (ZOCOR) 20 MG tablet TAKE ONE TABLET BY MOUTH AT BEDTIME 90 tablet 3         Allergies:      Allergies   Allergen Reactions     No Known Allergies             Past Medical History:     Past Medical History:   Diagnosis Date     A-fib (H) 2010    post op     AAA (abdominal aortic aneurysm) without rupture (H)     Dr. Walters, endovascular repair done 5/15     Amaurosis fugax of  right eye 10/2016    carotid us no stenosis, echo no change and no clots; ziopatch no afib, svt present     Anemia 2004     Aortic insufficiency 6/14    1+ on echo     Aortic insufficiency 11/2016    mild to mod     ASCVD (arteriosclerotic cardiovascular disease) 2010    cabg, post op afib     BPH (benign prostatic hyperplasia) 2003    turp, flomax added by urol 2/17     Bradycardia 2016    stopped toprol     CKD (chronic kidney disease) stage 3, GFR 30-59 ml/min (H)      Cough 2010    pulm eval, felt due to reflux     Dizzy 2001    mri small vessel dz     GERD (gastroesophageal reflux disease)      HTN (hypertension) 2002     Hx of colonoscopy 2003    tics     Hypothyroid      Hypovitaminosis D      Idiopathic neuropathy      Lung nodule 02/2018    6mm, found during eval of ascending aorta, fu 8/18 no change     OCD (obsessive compulsive disorder)     sees psyche     Panic disorder     Dr. Luna     Spinal headache      Stroke (H) 12/16    expressive aphasia, hosp, seen by neuro, mri pos, carotids min dz, changed from asa to plavix     Sudden hearing loss 6/13    Dr. Garcia, mri brain no acoustic neuroma     SVT (supraventricular tachycardia) (H) 11/16    seen on ziopatch done for amaurosis, longest 15 beats     Thoracic ascending aortic aneurysm (H) 06/2014    4.4cm on echo, aortic root 3.9, fu 5/15 4.8cm; fu done 12/15 and slightly enlarged, fu 6/16 no change, fu 11/16 no change; fu 1/18 echo 5.1-5.3, enlarged, then cta done and only 4.8cm, t fu 6 months, lvh, nl ef, mild ai; fu 8/18 slightly larger     Ulcerative colitis (H)     not active for years         Past Surgical History:     Past Surgical History:   Procedure Laterality Date     BACK SURGERY  1998     BLADDER SURGERY  1985     C TOTAL KNEE ARTHROPLASTY  2011    left     C TOTAL KNEE ARTHROPLASTY  2009    right     CATARACT IOL, RT/LT  2011     CORONARY ARTERY BYPASS  2010     ENDOVASCULAR REPAIR ANEURYSM ABDOMINAL AORTA N/A 5/27/2015    Procedure:  ENDOVASCULAR REPAIR ANEURYSM ABDOMINAL AORTA;  Surgeon: Darin Walters MD;  Location: [facility] OR     HERNIA REPAIR  2005     HERNIA REPAIR  1998     HERNIORRHAPHY INGUINAL Left 1/5/2018    Procedure: HERNIORRHAPHY INGUINAL;  Incarcerated Left Inguinal Hernia;  Surgeon: Harsh Walker MD;  Location: [facility] OR     KNEE SURGERY  1997     REPAIR HAMMER TOE  12/28/2012    Procedure: REPAIR HAMMER TOE;  LEFT SECOND AND THIRD CLAW TOE RECONSTRUCTION;  Surgeon: Gray Haynes MD;  Location: [facility] OR     REPAIR HAMMER TOE  04/2018    tria     TURMEAGHAN  2003         Family Medical History:     Family History   Problem Relation Age of Onset     C.A.D. Father          Social History:     Social History     Social History     Marital status:      Spouse name: N/A     Number of children: 2     Years of education: N/A     Occupational History     retail Retired     Social History Main Topics     Smoking status: Former Smoker     Packs/day: 1.00     Years: 5.00     Types: Cigarettes     Quit date: 6/13/1965     Smokeless tobacco: Never Used     Alcohol use 0.0 oz/week     0 Standard drinks or equivalent per week      Comment: maybe one glass of wine a week     Drug use: No     Sexual activity: Not Currently     Other Topics Concern     Not on file     Social History Narrative           Review of System:     Constitutional: Negative for fever or chills  Skin: Negative for rashes  Ears/Nose/Throat: Negative for nasal congestion, sore throat  Respiratory: No shortness of breath, dyspnea on exertion, cough, or hemoptysis  Cardiovascular: Negative for chest pain  Gastrointestinal: Negative for nausea, vomiting  Genitourinary: Negative for dysuria, hematuria  Musculoskeletal: Negative for myalgias  Neurologic: Negative for headaches  Psychiatric: Negative for depression, anxiety  Hematologic/Lymphatic/Immunologic: Negative  Endocrine: positive for hypothyroidism  Behavioral: Negative for tobacco use       Physical Exam:   BP  "115/65  Pulse 59  Temp 97.3  F (36.3  C) (Oral)  Ht 6' 1\" (1.854 m)  Wt 189 lb (85.7 kg)  SpO2 95%  BMI 24.94 kg/m2    GENERAL: alert and no distress  EYES: eyes grossly normal to inspection, and conjunctivae and sclerae normal  HENT: Normocephalic atraumatic. Nose and mouth without ulcers or lesions  NECK: supple  RESP: lungs clear to auscultation   CV: regular rate and rhythm, normal S1 S2  LYMPH: no peripheral edema   ABDOMEN: nondistended  MS: no gross musculoskeletal defects noted  SKIN: no suspicious lesions or rashes  NEURO: Alert & Oriented x 3.   PSYCH: mentation appears normal, affect normal        Diagnostic Test Results:     Diagnostic Test Results:  Results for orders placed or performed in visit on 09/21/18   XR Pelvis and Hip Left 1 View    Narrative    XR PELVIS AND HIP LEFT 1 VIEW 9/21/2018 10:47 AM    COMPARISON: None.    HISTORY: Pain.      Impression    IMPRESSION: Mild degenerative changes in the hips with preserved joint  space. No fractures are seen.    JAI ALARCON MD       ASSESSMENT/PLAN:       (I10) Benign essential hypertension  (primary encounter diagnosis)  Comment: BP is currently well controlled  Plan: continue current BP medication regimen going forward.    (E78.5) Hyperlipidemia LDL goal <100  Comment: stable on current Zocor medication  Plan: continue simvastatin cholesterol medication for hyperlipidemia treatment going forward.    (E03.9) Acquired hypothyroidism  Comment: stable on levothyroxine.  Plan: continue current levothyroxine medication going forward.        Follow Up Plan:     Patient is instructed to return to Internal Medicine clinic for follow-up visit in 1 month.        Lianna Nicole MD  Internal Medicine  [facility]      "

## 2018-10-01 NOTE — MR AVS SNAPSHOT
"              After Visit Summary   10/1/2018    Zack Santiago    MRN: [id number]           Patient Information     Date Of Birth          5/29/1931        Visit Information        Provider Department      10/1/2018 12:40 PM Lianna Nicole MD [facility]        Today's Diagnoses     Benign essential hypertension    -  1    Hyperlipidemia LDL goal <100        Acquired hypothyroidism           Follow-ups after your visit        Your next 10 appointments already scheduled     Oct 30, 2018  2:00 PM CDT   Office Visit with Zurdo Kendrick MD   [facility]    [address] [facility] [address] [phone]           Bring a current list of meds and any records pertaining to this visit. For Physicals, please bring immunization records and any forms needing to be filled out. Please arrive 10 minutes early to complete paperwork.              Who to contact     If you have questions or need follow up information about today's clinic visit or your schedule please contact [facility] directly at [phone].  Normal or non-critical lab and imaging results will be communicated to you by [facility], letter or phone within 4 business days after the clinic has received the results. If you do not hear from us within 7 days, please contact the clinic through [employer] or phone. If you have a critical or abnormal lab result, we will notify you by phone as soon as possible.  Submit refill requests through [employer] or call your pharmacy and they will forward the refill request to us. Please allow 3 business days for your refill to be completed.          Additional Information About Your Visit        [facility] Information     [employer] lets you send messages to your doctor, view your test results, renew your prescriptions, schedule appointments and more. To sign up, go to [email or web address] . Click on \"Log in\" on the left side of the screen, which will take you " "to the Welcome page. Then click on \"Sign up Now\" on the right side of the page.     You will be asked to enter the access code listed below, as well as some personal information. Please follow the directions to create your username and password.     Your access code is: [email or web address]  Expires: 2018 11:28 AM     Your access code will  in 90 days. If you need help or a new code, please call your [facility] clinic or [phone].        Care EveryWhere ID     This is your Care EveryWhere ID. This could be used by other organizations to access your [facility] medical records  [id number]        Your Vitals Were     Pulse Temperature Height Pulse Oximetry BMI (Body Mass Index)       59 97.3  F (36.3  C) (Oral) 6' 1\" (1.854 m) 95% 24.94 kg/m2        Blood Pressure from Last 3 Encounters:   10/01/18 115/65   18 118/64   18 133/65    Weight from Last 3 Encounters:   10/01/18 189 lb (85.7 kg)   18 191 lb (86.6 kg)   18 191 lb (86.6 kg)              Today, you had the following     No orders found for display       Primary Care Provider Office Phone # Fax #    Zurdo Kendrick [phone] [address] [facility] [address]  [facility] [address]        Equal Access to Services     [facility]: Hadii gustavo ku hadmarianao Solino, waaxda luqadaha, qaybta kaalmada mukesh, sita ellis . So [facility] [phone].    ATENCIÓN: Si habla español, tiene a khan disposición servicios gratuitos de asistencia lingüística. Llame al [phone].    We comply with applicable federal civil rights laws and [place] laws. We do not discriminate on the basis of race, color, national origin, age, disability, sex, sexual orientation, or gender identity.            Thank you!     Thank you for choosing [facility]  for your care. Our goal is always to provide you with excellent care. Hearing back from our patients is one way we can continue to improve our services. " Please take a few minutes to complete the written survey that you may receive in the mail after your visit with us. Thank you!             Your Updated Medication List - Protect others around you: Learn how to safely use, store and throw away your medicines at www.disposemymeds.org.          This list is accurate as of 10/1/18  1:35 PM.  Always use your most recent med list.                   Brand Name Dispense Instructions for use Diagnosis    ANAFRANIL PO      Take 25 mg by mouth every evening    OCD (obsessive compulsive disorder)       ARIPiprazole 2 MG tablet    ABILIFY     Take 1 tablet by mouth At Bedtime.        ASPIRIN NOT PRESCRIBED    INTENTIONAL    0 each    Please choose reason not prescribed, below    ASCVD (arteriosclerotic cardiovascular disease)       chlorthalidone 25 MG tablet    HYGROTON    90 tablet    Take 1 tablet (25 mg) by mouth daily    Essential hypertension       clopidogrel 75 MG tablet    PLAVIX    90 tablet    Take 1 tablet (75 mg) by mouth daily    Cerebrovascular accident (CVA), unspecified mechanism (H)       gabapentin 400 MG capsule    NEURONTIN     Take 1 capsule by mouth 2 times daily        levothyroxine 100 MCG tablet    SYNTHROID/LEVOTHROID    90 tablet    Take 1 tablet (100 mcg) by mouth daily    Acquired hypothyroidism       LORAZEPAM PO      Take 0.5 mg by mouth daily as needed for anxiety        MIRTAZAPINE PO      Take 45 mg by mouth At Bedtime        simvastatin 20 MG tablet    ZOCOR    90 tablet    TAKE ONE TABLET BY MOUTH AT BEDTIME    Hyperlipidemia LDL goal <100

## 2018-10-08 DIAGNOSIS — I71.40 ABDOMINAL AORTIC ANEURYSM (H): Primary | ICD-10-CM

## 2018-10-21 ENCOUNTER — TRANSFERRED RECORDS (OUTPATIENT)
Dept: HEALTH INFORMATION MANAGEMENT | Facility: CLINIC | Age: 83
End: 2018-10-21

## 2018-10-21 ENCOUNTER — NURSE TRIAGE (OUTPATIENT)
Dept: NURSING | Facility: CLINIC | Age: 83
End: 2018-10-21

## 2018-10-21 NOTE — TELEPHONE ENCOUNTER
Zack is calling and states that in the last month has fallen two times and right  hip is swollen and painful that could not sleep.  Last fall was one week ago.    Pain and swelling has been present for two weeks now and Zack would like to see what is causing pain.  Zack is able to walk without limping.  FNA advised  To go to ED and Zack agreed.

## 2018-10-21 NOTE — TELEPHONE ENCOUNTER
Reason for Disposition    [1] After 2 weeks AND [2] still painful or swollen    Additional Information    Negative: Serious injury with multiple fractures    Negative: [1] Major bleeding (e.g., actively dripping or spurting) AND [2] can't be stopped    Negative: Bullet wound, stabbed by knife, or other serious penetrating wound    Negative: Looks like a dislocated joint (crooked or deformed)    Negative: Can't stand (bear weight) or walk    Negative: Sounds like a life-threatening emergency to the triager    Negative: Skin is split open or gaping  (or length > 1/2 inch or 12 mm)    Negative: [1] Bleeding AND [2] won't stop after 10 minutes of direct pressure (using correct technique)    Negative: [1] Dirt in the wound AND [2] not removed with 15 minutes of scrubbing    Negative: Sounds like a serious injury to the triager    Negative: [1] SEVERE pain AND [2] not improved 2 hours after pain medicine/ice packs    Negative: Suspicious history for the injury    Negative: [1] Large swelling or bruise (> 2 inches or 5 cm) AND [2] able to bear weight    Negative: [1] High-risk adult (e.g., age > 60, osteoporosis, chronic steroid use) AND [2] limping    Protocols used: HIP INJURY-ADULT-

## 2018-10-30 ENCOUNTER — TELEPHONE (OUTPATIENT)
Dept: FAMILY MEDICINE | Facility: CLINIC | Age: 83
End: 2018-10-30

## 2018-10-30 ENCOUNTER — OFFICE VISIT (OUTPATIENT)
Dept: FAMILY MEDICINE | Facility: CLINIC | Age: 83
End: 2018-10-30
Payer: COMMERCIAL

## 2018-10-30 VITALS
SYSTOLIC BLOOD PRESSURE: 123 MMHG | BODY MASS INDEX: 24.78 KG/M2 | OXYGEN SATURATION: 99 % | HEIGHT: 73 IN | HEART RATE: 62 BPM | WEIGHT: 187 LBS | DIASTOLIC BLOOD PRESSURE: 66 MMHG | TEMPERATURE: 98.3 F

## 2018-10-30 DIAGNOSIS — I10 BENIGN ESSENTIAL HYPERTENSION: Primary | ICD-10-CM

## 2018-10-30 DIAGNOSIS — I25.10 ASCVD (ARTERIOSCLEROTIC CARDIOVASCULAR DISEASE): ICD-10-CM

## 2018-10-30 DIAGNOSIS — M25.551 HIP PAIN, RIGHT: Primary | ICD-10-CM

## 2018-10-30 PROCEDURE — 99213 OFFICE O/P EST LOW 20 MIN: CPT | Performed by: INTERNAL MEDICINE

## 2018-10-30 RX ORDER — HYDROCHLOROTHIAZIDE 12.5 MG/1
12.5 TABLET ORAL DAILY
Qty: 90 TABLET | Refills: 3 | Status: SHIPPED | OUTPATIENT
Start: 2018-10-30 | End: 2019-01-25

## 2018-10-30 NOTE — MR AVS SNAPSHOT
After Visit Summary   10/30/2018    Zack Santiago    MRN: [id number]           Patient Information     Date Of Birth          5/29/1931        Visit Information        Provider Department      10/30/2018 2:00 PM Zurdo Kendrick MD [facility]        Today's Diagnoses     Hip pain, right    -  1    ASCVD (arteriosclerotic cardiovascular disease)           Follow-ups after your visit        Follow-up notes from your care team     Return in about 3 months (around 1/30/2019) for Physical Exam.      Your next 10 appointments already scheduled     Jan 25, 2019 10:00 AM CST   Office Visit with Zurdo Kendrick MD   [facility]    [address] [facility] [address] [phone]           Bring a current list of meds and any records pertaining to this visit. For Physicals, please bring immunization records and any forms needing to be filled out. Please arrive 10 minutes early to complete paperwork.              Who to contact     If you have questions or need follow up information about today's clinic visit or your schedule please contact [facility] directly at [phone].  Normal or non-critical lab and imaging results will be communicated to you by MyChart, letter or phone within 4 business days after the clinic has received the results. If you do not hear from us within 7 days, please contact the clinic through [email or web address] or phone. If you have a critical or abnormal lab result, we will notify you by phone as soon as possible.  Submit refill requests through [employer] or call your pharmacy and they will forward the refill request to us. Please allow 3 business days for your refill to be completed.          Additional Information About Your Visit        MyChart Information     [employer] lets you send messages to your doctor, view your test results, renew your prescriptions, schedule appointments and more. To sign up, go to  [email or web address] . Click on \"Log in\" on the left side of the screen, which will take you to the Welcome page. Then click on \"Sign up Now\" on the right side of the page.     You will be asked to enter the access code listed below, as well as some personal information. Please follow the directions to create your username and password.     Your access code is: [email or web address]  Expires: 2018 11:28 AM     Your access code will  in 90 days. If you need help or a new code, please call your [facility] clinic or [phone].        Care EveryWhere ID     This is your Care EveryWhere ID. This could be used by other organizations to access your [facility] medical records  [id number]        Your Vitals Were     Pulse Temperature Height Pulse Oximetry BMI (Body Mass Index)       62 98.3  F (36.8  C) (Oral) 6' 1\" (1.854 m) 99% 24.67 kg/m2        Blood Pressure from Last 3 Encounters:   10/30/18 123/66   10/01/18 115/65   18 118/64    Weight from Last 3 Encounters:   10/30/18 187 lb (84.8 kg)   10/01/18 189 lb (85.7 kg)   18 191 lb (86.6 kg)              Today, you had the following     No orders found for display       Primary Care Provider Office Phone # Fax #    Zurdo Raul Kendrick [phone] [address]  [facility] [address]        Equal Access to Services     [facility]: Hadii gustavo simon hadasho Soyasminali, waaxda luqadaha, qaybta kaalmada mukesh, sita ellis . So [facility] [phone].    ATENCIÓN: Si habla español, tiene a khan disposición servicios gratuitos de asistencia lingüística. Llame al [phone].    We comply with applicable federal civil rights laws and [place] laws. We do not discriminate on the basis of race, color, national origin, age, disability, sex, sexual orientation, or gender identity.            Thank you!     Thank you for choosing [facility]  for your care. Our goal is always to provide you with " excellent care. Hearing back from our patients is one way we can continue to improve our services. Please take a few minutes to complete the written survey that you may receive in the mail after your visit with us. Thank you!             Your Updated Medication List - Protect others around you: Learn how to safely use, store and throw away your medicines at www.disposemymeds.org.          This list is accurate as of 10/30/18  3:15 PM.  Always use your most recent med list.                   Brand Name Dispense Instructions for use Diagnosis    ANAFRANIL PO      Take 25 mg by mouth every evening    OCD (obsessive compulsive disorder)       ARIPiprazole 2 MG tablet    ABILIFY     Take 1 tablet by mouth At Bedtime.        ASPIRIN NOT PRESCRIBED    INTENTIONAL    0 each    Please choose reason not prescribed, below    ASCVD (arteriosclerotic cardiovascular disease)       chlorthalidone 25 MG tablet    HYGROTON    90 tablet    Take 1 tablet (25 mg) by mouth daily    Essential hypertension       clopidogrel 75 MG tablet    PLAVIX    90 tablet    Take 1 tablet (75 mg) by mouth daily    Cerebrovascular accident (CVA), unspecified mechanism (H)       gabapentin 400 MG capsule    NEURONTIN     Take 1 capsule by mouth 2 times daily        levothyroxine 100 MCG tablet    SYNTHROID/LEVOTHROID    90 tablet    Take 1 tablet (100 mcg) by mouth daily    Acquired hypothyroidism       LORAZEPAM PO      Take 0.5 mg by mouth daily as needed for anxiety        MIRTAZAPINE PO      Take 45 mg by mouth At Bedtime        simvastatin 20 MG tablet    ZOCOR    90 tablet    TAKE ONE TABLET BY MOUTH AT BEDTIME    Hyperlipidemia LDL goal <100

## 2018-10-30 NOTE — PROGRESS NOTES
The patient had a fall when he slipped going down to 7 on a bench and landed on his right side.  He subsequently was seen at [facility] on an x-ray was negative.  He has a lump there but there is no pain.    He otherwise is doing fairly well.  He is not having chest pain or breathing trouble.  No GI symptoms.  He was having late weight loss but that seems to have resolved.  He takes his medicines regularly.    Past Medical History:   Diagnosis Date     A-fib (H) 2010    post op     AAA (abdominal aortic aneurysm) without rupture (H)     Dr. 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    carotid us no stenosis, echo no change and no clots; ziopatch no afib, svt present     Anemia 2004     Aortic insufficiency 6/14    1+ on echo     Aortic insufficiency 11/2016    mild to mod     ASCVD (arteriosclerotic cardiovascular disease) 2010    cabg, post op afib     BPH (benign prostatic hyperplasia) 2003    turp, flomax added by urol 2/17     Bradycardia 2016    stopped toprol     CKD (chronic kidney disease) stage 3, GFR 30-59 ml/min (H)      Cough 2010    pulm eval, felt due to reflux     Dizzy 2001    mri small vessel dz     GERD (gastroesophageal reflux disease)      HTN (hypertension) 2002     Hx of colonoscopy 2003    tics     Hypothyroid      Hypovitaminosis D      Idiopathic neuropathy      Lung nodule 02/2018    6mm, found during eval of ascending aorta, fu 8/18 no change     OCD (obsessive compulsive disorder)     sees psyche     Panic disorder     Dr. Luna     Spinal headache      Stroke (H) 12/16    expressive aphasia, hosp, seen by neuro, mri pos, carotids min dz, changed from asa to plavix     Sudden hearing loss 6/13    Dr. Garcia, mri brain no acoustic neuroma     SVT (supraventricular tachycardia) (H) 11/16    seen on ziopatch done for amaurosis, longest 15 beats     Thoracic ascending aortic aneurysm (H) 06/2014    4.4cm on echo, aortic root 3.9, fu 5/15 4.8cm; fu  done 12/15 and slightly enlarged, fu 6/16 no change, fu 11/16 no change; fu 1/18 echo 5.1-5.3, enlarged, then cta done and only 4.8cm, t fu 6 months, lvh, nl ef, mild ai; fu 8/18 slightly larger     Ulcerative colitis (H)     not active for years     Past Surgical History:   Procedure Laterality Date     BACK SURGERY  1998     BLADDER SURGERY  1985     C TOTAL KNEE ARTHROPLASTY  2011    left     C TOTAL KNEE ARTHROPLASTY  2009    right     CATARACT IOL, RT/LT  2011     CORONARY ARTERY BYPASS  2010     ENDOVASCULAR REPAIR ANEURYSM ABDOMINAL AORTA N/A 5/27/2015    Procedure: ENDOVASCULAR REPAIR ANEURYSM ABDOMINAL AORTA;  Surgeon: Darin Walters MD;  Location: [facility] OR     HERNIA REPAIR  2005     HERNIA REPAIR  1998     HERNIORRHAPHY INGUINAL Left 1/5/2018    Procedure: HERNIORRHAPHY INGUINAL;  Incarcerated Left Inguinal Hernia;  Surgeon: Harsh Walker MD;  Location: [facility] OR     KNEE SURGERY  1997     REPAIR HAMMER TOE  12/28/2012    Procedure: REPAIR HAMMER TOE;  LEFT SECOND AND THIRD CLAW TOE RECONSTRUCTION;  Surgeon: Gray Haynes MD;  Location: [facility] OR     REPAIR HAMMER TOE  04/2018    tria     TURP  2003     Social History     Social History     Marital status:      Spouse name: N/A     Number of children: 2     Years of education: N/A     Occupational History     retail Retired     Social History Main Topics     Smoking status: Former Smoker     Packs/day: 1.00     Years: 5.00     Types: Cigarettes     Quit date: 6/13/1965     Smokeless tobacco: Never Used     Alcohol use 0.0 oz/week     0 Standard drinks or equivalent per week      Comment: maybe one glass of wine a week     Drug use: No     Sexual activity: Not Currently     Other Topics Concern     Not on file     Social History Narrative     Current Outpatient Prescriptions   Medication Sig Dispense Refill     ARIPiprazole (ABILIFY) 2 MG tablet Take 1 tablet by mouth At Bedtime.       ASPIRIN NOT PRESCRIBED (INTENTIONAL) Please choose  "reason not prescribed, below 0 each 0     chlorthalidone (HYGROTON) 25 MG tablet Take 1 tablet (25 mg) by mouth daily 90 tablet 3     ClomiPRAMINE HCl (ANAFRANIL PO) Take 25 mg by mouth every evening        clopidogrel (PLAVIX) 75 MG tablet Take 1 tablet (75 mg) by mouth daily 90 tablet 3     gabapentin (NEURONTIN) 400 MG capsule Take 1 capsule by mouth 2 times daily        levothyroxine (SYNTHROID/LEVOTHROID) 100 MCG tablet Take 1 tablet (100 mcg) by mouth daily 90 tablet 3     LORAZEPAM PO Take 0.5 mg by mouth daily as needed for anxiety       MIRTAZAPINE PO Take 45 mg by mouth At Bedtime        simvastatin (ZOCOR) 20 MG tablet TAKE ONE TABLET BY MOUTH AT BEDTIME 90 tablet 3     Allergies   Allergen Reactions     No Known Allergies      FAMILY HISTORY NOTED AND REVIEWED    REVIEW OF SYSTEMS: above    PHYSICAL EXAM    /66 (BP Location: Right arm, Patient Position: Chair, Cuff Size: Adult Large)  Pulse 62  Temp 98.3  F (36.8  C) (Oral)  Ht 6' 1\" (1.854 m)  Wt 187 lb (84.8 kg)  SpO2 99%  BMI 24.67 kg/m2    Patient appears non toxic  Lungs - clear, normal flow  Cardiovascular - regular rate and rhythm, no murmer, rub or gallop, no jvp or edema, carotids within normal limits, no bruits.  Abdomen - normal active bowel sounds, soft, non tender, no masses, guarding or rebound, no hepatosplenomegaly  Has soft, non tender, raised mass, right post leg superior thigh area, not red, warm., some bruising right thigh    Labs none    ASSESSMENT:  1. Hip pain, resolved, has probable hematoma  2. Ascvd, stable    PLAN:  Call if hip pain or lump enlarges  Follow up complete physical exam Enoch Kendrick M.D.        Zurdo Kendrick M.D.          "

## 2018-11-29 ENCOUNTER — TELEPHONE (OUTPATIENT)
Dept: FAMILY MEDICINE | Facility: CLINIC | Age: 83
End: 2018-11-29

## 2018-11-29 DIAGNOSIS — I71.21 THORACIC ASCENDING AORTIC ANEURYSM (H): Primary | ICD-10-CM

## 2018-11-29 NOTE — TELEPHONE ENCOUNTER
I discussed with patient his thor aneurysm after message from radiology rn.  They had recommended the patient see cardiology.  I explained this to the patient since it has grown that would be reasonable versus doing a follow-up scan in 6 months.  He would much prefer to do the latter and will call us then.    Zurdo Kendrick M.D.

## 2019-01-25 ENCOUNTER — OFFICE VISIT (OUTPATIENT)
Dept: FAMILY MEDICINE | Facility: CLINIC | Age: 84
End: 2019-01-25
Payer: COMMERCIAL

## 2019-01-25 VITALS — WEIGHT: 187 LBS | HEIGHT: 73 IN | BODY MASS INDEX: 24.78 KG/M2

## 2019-01-25 DIAGNOSIS — K21.9 GASTROESOPHAGEAL REFLUX DISEASE WITHOUT ESOPHAGITIS: ICD-10-CM

## 2019-01-25 DIAGNOSIS — E78.5 HYPERLIPIDEMIA LDL GOAL <100: ICD-10-CM

## 2019-01-25 DIAGNOSIS — R15.9 INCONTINENCE OF FECES, UNSPECIFIED FECAL INCONTINENCE TYPE: ICD-10-CM

## 2019-01-25 DIAGNOSIS — I63.9 CEREBROVASCULAR ACCIDENT (CVA), UNSPECIFIED MECHANISM (H): ICD-10-CM

## 2019-01-25 DIAGNOSIS — I10 BENIGN ESSENTIAL HYPERTENSION: ICD-10-CM

## 2019-01-25 DIAGNOSIS — G60.9 IDIOPATHIC NEUROPATHY: ICD-10-CM

## 2019-01-25 DIAGNOSIS — N18.30 CKD (CHRONIC KIDNEY DISEASE) STAGE 3, GFR 30-59 ML/MIN (H): ICD-10-CM

## 2019-01-25 DIAGNOSIS — K51.90 ULCERATIVE COLITIS WITHOUT COMPLICATIONS, UNSPECIFIED LOCATION (H): ICD-10-CM

## 2019-01-25 DIAGNOSIS — I35.1 AORTIC VALVE INSUFFICIENCY, ETIOLOGY OF CARDIAC VALVE DISEASE UNSPECIFIED: ICD-10-CM

## 2019-01-25 DIAGNOSIS — I71.21 THORACIC ASCENDING AORTIC ANEURYSM (H): ICD-10-CM

## 2019-01-25 DIAGNOSIS — F42.9 OBSESSIVE-COMPULSIVE DISORDER, UNSPECIFIED TYPE: Chronic | ICD-10-CM

## 2019-01-25 DIAGNOSIS — I10 ESSENTIAL HYPERTENSION: ICD-10-CM

## 2019-01-25 DIAGNOSIS — I25.10 ASCVD (ARTERIOSCLEROTIC CARDIOVASCULAR DISEASE): ICD-10-CM

## 2019-01-25 DIAGNOSIS — I48.91 ATRIAL FIBRILLATION, UNSPECIFIED TYPE (H): ICD-10-CM

## 2019-01-25 DIAGNOSIS — I47.10 SVT (SUPRAVENTRICULAR TACHYCARDIA) (H): ICD-10-CM

## 2019-01-25 DIAGNOSIS — Z00.00 ROUTINE GENERAL MEDICAL EXAMINATION AT A HEALTH CARE FACILITY: Primary | ICD-10-CM

## 2019-01-25 DIAGNOSIS — I71.40 AAA (ABDOMINAL AORTIC ANEURYSM) WITHOUT RUPTURE (H): ICD-10-CM

## 2019-01-25 DIAGNOSIS — E55.9 HYPOVITAMINOSIS D: ICD-10-CM

## 2019-01-25 DIAGNOSIS — R91.1 LUNG NODULE: ICD-10-CM

## 2019-01-25 DIAGNOSIS — M25.522 LEFT ELBOW PAIN: ICD-10-CM

## 2019-01-25 DIAGNOSIS — E03.9 ACQUIRED HYPOTHYROIDISM: ICD-10-CM

## 2019-01-25 LAB
ALBUMIN SERPL-MCNC: 4.1 G/DL (ref 3.4–5)
ALP SERPL-CCNC: 85 U/L (ref 40–150)
ALT SERPL W P-5'-P-CCNC: 19 U/L (ref 0–70)
ANION GAP SERPL CALCULATED.3IONS-SCNC: 7 MMOL/L (ref 3–14)
AST SERPL W P-5'-P-CCNC: 13 U/L (ref 0–45)
BILIRUB SERPL-MCNC: 0.9 MG/DL (ref 0.2–1.3)
BUN SERPL-MCNC: 23 MG/DL (ref 7–30)
CALCIUM SERPL-MCNC: 9.2 MG/DL (ref 8.5–10.1)
CHLORIDE SERPL-SCNC: 106 MMOL/L (ref 94–109)
CHOLEST SERPL-MCNC: 105 MG/DL
CO2 SERPL-SCNC: 25 MMOL/L (ref 20–32)
CREAT SERPL-MCNC: 1.08 MG/DL (ref 0.66–1.25)
ERYTHROCYTE [DISTWIDTH] IN BLOOD BY AUTOMATED COUNT: 15.1 % (ref 10–15)
GFR SERPL CREATININE-BSD FRML MDRD: 61 ML/MIN/{1.73_M2}
GLUCOSE SERPL-MCNC: 91 MG/DL (ref 70–99)
HCT VFR BLD AUTO: 40 % (ref 40–53)
HDLC SERPL-MCNC: 42 MG/DL
HGB BLD-MCNC: 12.9 G/DL (ref 13.3–17.7)
LDLC SERPL CALC-MCNC: 48 MG/DL
MCH RBC QN AUTO: 31.8 PG (ref 26.5–33)
MCHC RBC AUTO-ENTMCNC: 32.3 G/DL (ref 31.5–36.5)
MCV RBC AUTO: 99 FL (ref 78–100)
NONHDLC SERPL-MCNC: 63 MG/DL
PLATELET # BLD AUTO: 168 10E9/L (ref 150–450)
POTASSIUM SERPL-SCNC: 3.8 MMOL/L (ref 3.4–5.3)
PROT SERPL-MCNC: 7 G/DL (ref 6.8–8.8)
RBC # BLD AUTO: 4.06 10E12/L (ref 4.4–5.9)
SODIUM SERPL-SCNC: 138 MMOL/L (ref 133–144)
T4 FREE SERPL-MCNC: 1.38 NG/DL (ref 0.76–1.46)
TRIGL SERPL-MCNC: 73 MG/DL
TSH SERPL DL<=0.005 MIU/L-ACNC: 0.39 MU/L (ref 0.4–4)
WBC # BLD AUTO: 5.8 10E9/L (ref 4–11)

## 2019-01-25 PROCEDURE — 84443 ASSAY THYROID STIM HORMONE: CPT | Performed by: INTERNAL MEDICINE

## 2019-01-25 PROCEDURE — 36415 COLL VENOUS BLD VENIPUNCTURE: CPT | Performed by: INTERNAL MEDICINE

## 2019-01-25 PROCEDURE — 99213 OFFICE O/P EST LOW 20 MIN: CPT | Mod: 25 | Performed by: INTERNAL MEDICINE

## 2019-01-25 PROCEDURE — 80053 COMPREHEN METABOLIC PANEL: CPT | Performed by: INTERNAL MEDICINE

## 2019-01-25 PROCEDURE — 99397 PER PM REEVAL EST PAT 65+ YR: CPT | Performed by: INTERNAL MEDICINE

## 2019-01-25 PROCEDURE — 80061 LIPID PANEL: CPT | Performed by: INTERNAL MEDICINE

## 2019-01-25 PROCEDURE — 84439 ASSAY OF FREE THYROXINE: CPT | Performed by: INTERNAL MEDICINE

## 2019-01-25 PROCEDURE — 85027 COMPLETE CBC AUTOMATED: CPT | Performed by: INTERNAL MEDICINE

## 2019-01-25 RX ORDER — SIMVASTATIN 20 MG
TABLET ORAL
Qty: 90 TABLET | Refills: 3 | Status: SHIPPED | OUTPATIENT
Start: 2019-01-25 | End: 2020-01-27

## 2019-01-25 RX ORDER — CLOPIDOGREL BISULFATE 75 MG/1
75 TABLET ORAL DAILY
Qty: 90 TABLET | Refills: 3 | Status: SHIPPED | OUTPATIENT
Start: 2019-01-25 | End: 2020-01-27

## 2019-01-25 RX ORDER — CHLORTHALIDONE 25 MG/1
25 TABLET ORAL DAILY
Qty: 90 TABLET | Refills: 3 | Status: SHIPPED | OUTPATIENT
Start: 2019-01-25 | End: 2020-01-27

## 2019-01-25 RX ORDER — LEVOTHYROXINE SODIUM 100 UG/1
100 TABLET ORAL DAILY
Qty: 90 TABLET | Refills: 3 | Status: SHIPPED | OUTPATIENT
Start: 2019-01-25 | End: 2020-01-27

## 2019-01-25 ASSESSMENT — MIFFLIN-ST. JEOR: SCORE: 1577.11

## 2019-01-25 NOTE — LETTER
[facility]  [address] [facility]  [address]  [place]  [address]  Tel: [phone]    January 28, 2019    Zack Santiago  [address] [id number]        Dear Mr. Santiago,    It was a pleasure seeing you for your physical examination.  I wanted to get back to you with your test results.  I have enclosed a copy for your review.       I am happy to report that your cbc or complete blood count is normal with no signs of anemia, leukemia or platelet abnormalities.  Your chemistry panel shows no signs of diabetes.  Your blood salts, kidney tests, liver tests, thyroid test, and proteins are all fine.     Your total cholesterol is 105 with the normal range being below 200.  Your HDL or good cholesterol is 42 with the normal range being above 40.  Your LDL or bad cholesterol is 48 with the normal range being below 130.  These numbers are super.     I am happy to bring you this overall excellent report.  I believe your health is very good.  If you have any questions please call me.     Sincerely,    Zurdo Kendrick MD / poornima       Results for orders placed or performed in visit on 01/25/19   CBC with platelets   Result Value Ref Range    WBC 5.8 4.0 - 11.0 10e9/L    RBC Count 4.06 (L) 4.4 - 5.9 10e12/L    Hemoglobin 12.9 (L) 13.3 - 17.7 g/dL    Hematocrit 40.0 40.0 - 53.0 %    MCV 99 78 - 100 fl    MCH 31.8 26.5 - 33.0 pg    MCHC 32.3 31.5 - 36.5 g/dL    RDW 15.1 (H) 10.0 - 15.0 %    Platelet Count 168 150 - 450 10e9/L   Comprehensive metabolic panel   Result Value Ref Range    Sodium 138 133 - 144 mmol/L    Potassium 3.8 3.4 - 5.3 mmol/L    Chloride 106 94 - 109 mmol/L    Carbon Dioxide 25 20 - 32 mmol/L    Anion Gap 7 3 - 14 mmol/L    Glucose 91 70 - 99 mg/dL    Urea Nitrogen 23 7 - 30 mg/dL    Creatinine 1.08 0.66 - 1.25 mg/dL    GFR Estimate 61 >60 mL/min/[1.73_m2]    GFR Estimate If Black 71 >60 mL/min/[1.73_m2]    Calcium 9.2 8.5 - 10.1 mg/dL    Bilirubin Total 0.9 0.2 - 1.3 mg/dL     Albumin 4.1 3.4 - 5.0 g/dL    Protein Total 7.0 6.8 - 8.8 g/dL    Alkaline Phosphatase 85 40 - 150 U/L    ALT 19 0 - 70 U/L    AST 13 0 - 45 U/L   Lipid panel reflex to direct LDL Non-fasting   Result Value Ref Range    Cholesterol 105 <200 mg/dL    Triglycerides 73 <150 mg/dL    HDL Cholesterol 42 >39 mg/dL    LDL Cholesterol Calculated 48 <100 mg/dL    Non HDL Cholesterol 63 <130 mg/dL   TSH with free T4 reflex   Result Value Ref Range    TSH 0.39 (L) 0.40 - 4.00 mU/L   T4 free   Result Value Ref Range    T4 Free 1.38 0.76 - 1.46 ng/dL     ]

## 2019-01-25 NOTE — PATIENT INSTRUCTIONS
Try taking one imodium daily for the next 2 days and let me know if this does not resolve the incontinence.    Zurdo Kendrick M.D.            Preventive Health Recommendations:     See your health care provider every year to    Review health changes.     Discuss preventive care.      Review your medicines if your doctor has prescribed any.    Talk with your health care provider about whether you should have a test to screen for prostate cancer (PSA).    Every 3 years, have a diabetes test (fasting glucose). If you are at risk for diabetes, you should have this test more often.    Every 5 years, have a cholesterol test. Have this test more often if you are at risk for high cholesterol or heart disease.     Every 10 years, have a colonoscopy. Or, have a yearly FIT test (stool test). These exams will check for colon cancer.    Talk to with your health care provider about screening for Abdominal Aortic Aneurysm if you have a family history of AAA or have a history of smoking.  Shots:     Get a flu shot each year.     Get a tetanus shot every 10 years.     Talk to your doctor about your pneumonia vaccines. There are now two you should receive - Pneumovax (PPSV 23) and Prevnar (PCV 13).    Talk to your pharmacist about a shingles vaccine.     Talk to your doctor about the hepatitis B vaccine.  Nutrition:     Eat at least 5 servings of fruits and vegetables each day.     Eat whole-grain bread, whole-wheat pasta and brown rice instead of white grains and rice.     Get adequate Calcium and Vitamin D.   Lifestyle    Exercise for at least 150 minutes a week (30 minutes a day, 5 days a week). This will help you control your weight and prevent disease.     Limit alcohol to one drink per day.     No smoking.     Wear sunscreen to prevent skin cancer.     See your dentist every six months for an exam and cleaning.     See your eye doctor every 1 to 2 years to screen for conditions such as glaucoma, macular degeneration and  cataracts.    Personalized Prevention Plan  You are due for the preventive services outlined below.  Your care team is available to assist you in scheduling these services.  If you have already completed any of these items, please share that information with your care team to update in your medical record.    Health Maintenance Due   Topic Date Due     Zoster (Chicken Pox) Vaccine (2 of 3) 10/15/2007     Discuss Advance Directive Planning  06/08/2017

## 2019-01-25 NOTE — PROGRESS NOTES
SUBJECTIVE:   Zack Santiago is a 87 year old male who presents for Preventive Visit.    The patient is here for a physical but does have a few issues to go over.  He has a history of a thoracic ascending aortic aneurysm.  He will be due for a follow-up CT on this in February.  He also has a history of a lung nodule and will get the CT for that as well.    The patient has had some bowel incontinence last few days.  His bowels are soft but formed.  No abdominal pain or nausea or vomiting.  No low back pain or leg tingling numbness or weakness.  He has had this before.  He does not have bloody or black stools.    He had a pain in the left elbow recently but it is not present today.    He otherwise feels fine but has some stress related to his wife who is moving to a care center because of Alzheimer's.  He does have regular psych follow-up and this is been stable.  He has a history of ulcerative colitis without recent issues.  He otherwise feels quite well.  He has had a prior stroke but is doing well with current medications without neurologic symptoms.  He was having weight loss but this is resolved.               Past Medical History:      Past Medical History:   Diagnosis Date     A-fib (H) 2010    post op     AAA (abdominal aortic aneurysm) without rupture (H)     Dr. 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    carotid us no stenosis, echo no change and no clots; ziopatch no afib, svt present     Anemia 2004     Aortic insufficiency 6/14    1+ on echo     Aortic insufficiency 11/2016    mild to mod     ASCVD (arteriosclerotic cardiovascular disease) 2010    cabg, post op afib     BPH (benign prostatic hyperplasia) 2003    turp, flomax added by urol 2/17     Bradycardia 2016    stopped toprol     CKD (chronic kidney disease) stage 3, GFR 30-59 ml/min (H)      Cough 2010    pulm eval, felt due to reflux     Dizzy 2001    mri small vessel dz     GERD (gastroesophageal reflux  disease)      HTN (hypertension) 2002     Hx of colonoscopy 2003    tics     Hypothyroid      Hypovitaminosis D      Idiopathic neuropathy      Lung nodule 02/2018    6mm, found during eval of ascending aorta, fu 8/18 no change     OCD (obsessive compulsive disorder)     sees psyche     Panic disorder     Dr. Luna     Spinal headache      Stroke (H) 12/16    expressive aphasia, hosp, seen by neuro, mri pos, carotids min dz, changed from asa to plavix     Sudden hearing loss 6/13    Dr. Garcia, mri brain no acoustic neuroma     SVT (supraventricular tachycardia) (H) 11/16    seen on ziopatch done for amaurosis, longest 15 beats     Thoracic ascending aortic aneurysm (H) 06/2014    4.4cm on echo, aortic root 3.9, fu 5/15 4.8cm; fu done 12/15 and slightly enlarged, fu 6/16 no change, fu 11/16 no change; fu 1/18 echo 5.1-5.3, enlarged, then cta done and only 4.8cm, t fu 6 months, lvh, nl ef, mild ai; fu 8/18 slightly larger     Ulcerative colitis (H)     not active for years             Past Surgical History:      Past Surgical History:   Procedure Laterality Date     BACK SURGERY  1998     BLADDER SURGERY  1985     C TOTAL KNEE ARTHROPLASTY  2011    left     C TOTAL KNEE ARTHROPLASTY  2009    right     CATARACT IOL, RT/LT  2011     CORONARY ARTERY BYPASS  2010     ENDOVASCULAR REPAIR ANEURYSM ABDOMINAL AORTA N/A 5/27/2015    Procedure: ENDOVASCULAR REPAIR ANEURYSM ABDOMINAL AORTA;  Surgeon: Darin Walters MD;  Location:  OR     HERNIA REPAIR  2005     HERNIA REPAIR  1998     HERNIORRHAPHY INGUINAL Left 1/5/2018    Procedure: HERNIORRHAPHY INGUINAL;  Incarcerated Left Inguinal Hernia;  Surgeon: Harsh Walker MD;  Location:  OR     KNEE SURGERY  1997     REPAIR HAMMER TOE  12/28/2012    Procedure: REPAIR HAMMER TOE;  LEFT SECOND AND THIRD CLAW TOE RECONSTRUCTION;  Surgeon: Gray Haynes MD;  Location:  OR     REPAIR HAMMER TOE  04/2018    tria     TURP  2003             Social History:      Social History     Socioeconomic History     Marital status:      Spouse name: Not on file     Number of children: 2     Years of education: Not on file     Highest education level: Not on file   Social Needs     Financial resource strain: Not on file     Food insecurity - worry: Not on file     Food insecurity - inability: Not on file     Transportation needs - medical: Not on file     Transportation needs - non-medical: Not on file   Occupational History     Occupation: retail     Employer: RETIRED   Tobacco Use     Smoking status: Former Smoker     Packs/day: 1.00     Years: 5.00     Pack years: 5.00     Types: Cigarettes     Last attempt to quit: 1965     Years since quittin.6     Smokeless tobacco: Never Used   Substance and Sexual Activity     Alcohol use: Yes     Alcohol/week: 0.0 oz     Comment: maybe one glass of wine a week     Drug use: No     Sexual activity: Not Currently   Other Topics Concern     Parent/sibling w/ CABG, MI or angioplasty before 65F 55M? Not Asked   Social History Narrative     Not on file             Family History:   reviewed         Allergies:     Allergies   Allergen Reactions     No Known Allergies              Medications:     Current Outpatient Medications   Medication Sig Dispense Refill     ARIPiprazole (ABILIFY) 2 MG tablet Take 1 tablet by mouth At Bedtime.       ASPIRIN NOT PRESCRIBED (INTENTIONAL) Please choose reason not prescribed, below 0 each 0     chlorthalidone (HYGROTON) 25 MG tablet Take 1 tablet (25 mg) by mouth daily 90 tablet 3     ClomiPRAMINE HCl (ANAFRANIL PO) Take 25 mg by mouth every evening        clopidogrel (PLAVIX) 75 MG tablet Take 1 tablet (75 mg) by mouth daily 90 tablet 3     gabapentin (NEURONTIN) 400 MG capsule Take 1 capsule by mouth 2 times daily        levothyroxine (SYNTHROID/LEVOTHROID) 100 MCG tablet Take 1 tablet (100 mcg) by mouth daily 90 tablet 3     LORAZEPAM PO Take 0.5 mg by mouth daily as needed for anxiety        "MIRTAZAPINE PO Take 45 mg by mouth At Bedtime        simvastatin (ZOCOR) 20 MG tablet TAKE ONE TABLET BY MOUTH AT BEDTIME 90 tablet 3               Review of Systems:   The 10 point Review of Systems is negative other than noted in the HPI           Physical Exam:   Blood pressure (P) 125/60, pulse (P) 52, temperature (P) 97  F (36.1  C), temperature source (P) Oral, height 1.854 m (6' 1\"), weight 84.8 kg (187 lb), SpO2 (P) 94 %.    Exam:  Constitutional: healthy appearing, alert and in no distress  Heent: Normocephalic. Head without obvious masses or lesions. PERRLDC, EOMI. Mouth exam within normal limits: tongue, mucous membranes, posterior pharynx all normal, no lesions or abnormalities seen.  Tm's and canals within normal limits bilaterally. Neck supple, no nuchal rigidity or masses. No supraclavicular, or cervical adenopathy. Thyroid symmetric, no masses.  Cardiovascular: Regular rate and rhythm, no murmer, rub or gallops.  JVP not elevated, no edema.  Carotids within normal limits bilaterally, no bruits.  Respiratory: Normal respiratory effort.  Lungs clear, normal flow, no wheezing or crackles.  Breasts: Normal bilaterally.  No masses or lesions.  Nipples within normal limites.  No axillary lesions or nodes.  Gastrointestinal: Normal active bowel sounds.   Soft, not tender, no masses, guarding or rebound.  No hepatosplenomegaly.   Genitourinary: Rectal brown soft stool present, dec tone but present  Musculoskeletal: extremities normal, no gross deformities noted.  Skin: no suspicious lesions or rashes   Neurologic: Mental status within normal limits.  Speech fluent.  No gross motor abnormalities and gait intact.  Left elbow not red, warm or swollen, slightly tender over lat epic area  Psychiatric: mentation appears normal and affect normal.         Data:   Labs sent, ct to be done        Assessment:   1. Normal complete physical exam  2. Thor aneurysm, lung nodule, to get follow up ct  3. Bowel incont, doubt " "tumor or cord syndrome, suspect due to softer stools, to try imodium and metamucil, call if not gone soon  4. Gerd, controlled  5. Ascvd, no issues  6. Elevated cholesterol, follow up labs  7. Tennis elbow, to try heat  8. Depression, ocd, stable  9. Vit d defic, no issues  10. Hypothyroidism, follow up tsh  11. Hypertension, controlled  12. Ulcerative colitis, no issues  13. Cvi, med mgmt  14. Svt, no issues  15. Afib, no issues  16. Aaa, follow up radiology           Plan:   Heat for tennis elbow  Ct chest  Labs and letter  For bowel movement try imodium and fiber  Exercise, diet  Letter with labs  shingrix at pharm  Call if changes      Zurdo Kendrick M.D.            Are you in the first 12 months of your Medicare Part B coverage?  No    Physical Health:    In general, how would you rate your overall physical health? good    Outside of work, how many days during the week do you exercise? 2-3 days/week    Outside of work, approximately how many minutes a day do you exercise?15-30 minutes    If you drink alcohol do you typically have >3 drinks per day or >7 drinks per week? No    Do you usually eat at least 4 servings of fruit and vegetables a day, include whole grains & fiber and avoid regularly eating high fat or \"junk\" foods? Yes    Do you have any problems taking medications regularly?  No    Do you have any side effects from medications? none    Needs assistance for the following daily activities: no assistance needed    Which of the following safety concerns are present in your home?  none identified     Hearing impairment: No    In the past 6 months, have you been bothered by leaking of urine? no    Mental Health:    In general, how would you rate your overall mental or emotional health? fair  PHQ-2 Score:      Do you feel safe in your environment? Yes    Do you have a Health Care Directive? Yes: Patient states has Advance Directive and will bring in a copy to clinic.    Additional concerns to address?  "     Fall risk:  Fallen 2 or more times in the past year?: No  Any fall with injury in the past year?: No    Cognitive Screenin) Repeat 3 items (Leader, Season, Table)    2) Clock draw: NORMAL  3) 3 item recall: Recalls 3 objects  Results: 3 items recalled: COGNITIVE IMPAIRMENT LESS LIKELY    Mini-CogTM Copyright LANRE Graham. Licensed by the author for use in [facility]; reprinted with permission (jg@[facility]). All rights reserved.      Do you have sleep apnea, excessive snoring or daytime drowsiness?: no            Reviewed and updated as needed this visit by clinical staff         Reviewed and updated as needed this visit by Provider        Social History     Tobacco Use     Smoking status: Former Smoker     Packs/day: 1.00     Years: 5.00     Pack years: 5.00     Types: Cigarettes     Last attempt to quit: 1965     Years since quittin.6     Smokeless tobacco: Never Used   Substance Use Topics     Alcohol use: Yes     Alcohol/week: 0.0 oz     Comment: maybe one glass of wine a week                           Current providers sharing in care for this patient include:   Patient Care Team:  Zurdo Kendrick MD as PCP - General (Internal Medicine)  Zurdo Kendrick MD as PCP - Assigned PCP    The following health maintenance items are reviewed in Epic and correct as of today:  Health Maintenance   Topic Date Due     ZOSTER IMMUNIZATION (2 of 3) 10/15/2007     ADVANCE DIRECTIVE PLANNING Q5 YRS  2017     FALL RISK ASSESSMENT  10/30/2019     DTAP/TDAP/TD IMMUNIZATION (2 - Td) 06/15/2022     INFLUENZA VACCINE  Completed     IPV IMMUNIZATION  Aged Out     MENINGITIS IMMUNIZATION  Aged Out       End of Life Planning:  Patient currently has an advanced directive: yes    COUNSELING:  Reviewed preventive health counseling, as reflected in patient instructions       Regular exercise       Healthy diet/nutrition    BP Readings from Last 1 Encounters:   10/30/18 123/66     Estimated body  "mass index is 24.67 kg/m  as calculated from the following:    Height as of 10/30/18: 1.854 m (6' 1\").    Weight as of 10/30/18: 84.8 kg (187 lb).           reports that he quit smoking about 53 years ago. His smoking use included cigarettes. He has a 5.00 pack-year smoking history. he has never used smokeless tobacco.      Appropriate preventive services were discussed with this patient, including applicable screening as appropriate for cardiovascular disease, diabetes, osteopenia/osteoporosis, and glaucoma.  As appropriate for age/gender, discussed screening for colorectal cancer, prostate cancer, breast cancer, and cervical cancer. Checklist reviewing preventive services available has been given to the patient.    Reviewed patients plan of care and provided an AVS. The Basic Care Plan (routine screening as documented in Health Maintenance) for Zack meets the Care Plan requirement. This Care Plan has been established and reviewed with the Patient.    Counseling Resources:  ATP IV Guidelines  Pooled Cohorts Equation Calculator  Breast Cancer Risk Calculator  FRAX Risk Assessment  ICSI Preventive Guidelines  Dietary Guidelines for Americans, 2010  USDA's MyPlate  ASA Prophylaxis  Lung CA Screening    Zurdo Kendrick MD  [facility]  "

## 2019-01-26 NOTE — RESULT ENCOUNTER NOTE
It was a pleasure seeing you for your physical examination.  I wanted to get back to you with your test results.  I have enclosed a copy for your review.      I am happy to report that your cbc or complete blood count is normal with no signs of anemia, leukemia or platelet abnormalities.  Your chemistry panel shows no signs of diabetes.  Your blood salts, kidney tests, liver tests, thyroid test, and proteins are all fine.    Your total cholesterol is 105 with the normal range being below 200.  Your HDL or good cholesterol is 42 with the normal range being above 40.  Your LDL or bad cholesterol is 48 with the normal range being below 130.  These numbers are super.    I am happy to bring you this overall excellent report.  I believe your health is very good.  If you have any questions please call me.

## 2019-02-13 ENCOUNTER — HOSPITAL ENCOUNTER (OUTPATIENT)
Dept: CARDIOLOGY | Facility: CLINIC | Age: 84
Discharge: HOME | End: 2019-02-13
Attending: INTERNAL MEDICINE | Admitting: INTERNAL MEDICINE
Payer: COMMERCIAL

## 2019-02-13 DIAGNOSIS — I71.21 THORACIC ASCENDING AORTIC ANEURYSM (H): ICD-10-CM

## 2019-02-13 PROCEDURE — 71275 CT ANGIOGRAPHY CHEST: CPT | Mod: 26 | Performed by: INTERNAL MEDICINE

## 2019-02-13 PROCEDURE — 71275 CT ANGIOGRAPHY CHEST: CPT

## 2019-02-13 PROCEDURE — 25000128 H RX IP 250 OP 636: Performed by: INTERNAL MEDICINE

## 2019-02-13 RX ORDER — DIPHENHYDRAMINE HCL 25 MG
25 CAPSULE ORAL
Status: DISCONTINUED | OUTPATIENT
Start: 2019-02-13 | End: 2019-02-14 | Stop reason: HOSPADM

## 2019-02-13 RX ORDER — ACYCLOVIR 200 MG/1
0-1 CAPSULE ORAL
Status: DISCONTINUED | OUTPATIENT
Start: 2019-02-13 | End: 2019-02-14 | Stop reason: HOSPADM

## 2019-02-13 RX ORDER — METHYLPREDNISOLONE SODIUM SUCCINATE 125 MG/2ML
125 INJECTION, POWDER, LYOPHILIZED, FOR SOLUTION INTRAMUSCULAR; INTRAVENOUS
Status: DISCONTINUED | OUTPATIENT
Start: 2019-02-13 | End: 2019-02-14 | Stop reason: HOSPADM

## 2019-02-13 RX ORDER — IOPAMIDOL 755 MG/ML
150 INJECTION, SOLUTION INTRAVASCULAR ONCE
Status: COMPLETED | OUTPATIENT
Start: 2019-02-13 | End: 2019-02-13

## 2019-02-13 RX ORDER — DIPHENHYDRAMINE HYDROCHLORIDE 50 MG/ML
25-50 INJECTION INTRAMUSCULAR; INTRAVENOUS
Status: DISCONTINUED | OUTPATIENT
Start: 2019-02-13 | End: 2019-02-14 | Stop reason: HOSPADM

## 2019-02-13 RX ORDER — ONDANSETRON 2 MG/ML
4 INJECTION INTRAMUSCULAR; INTRAVENOUS
Status: DISCONTINUED | OUTPATIENT
Start: 2019-02-13 | End: 2019-02-14 | Stop reason: HOSPADM

## 2019-02-13 RX ADMIN — IOPAMIDOL 90 ML: 755 INJECTION, SOLUTION INTRAVENOUS at 12:35

## 2019-02-15 ENCOUNTER — TELEPHONE (OUTPATIENT)
Dept: FAMILY MEDICINE | Facility: CLINIC | Age: 84
End: 2019-02-15

## 2019-02-15 NOTE — TELEPHONE ENCOUNTER
"TO PCP     Relayed below message to patient     He responded \"6 months? He told me if it wasn't any bigger I could wait and get it rechecked in a year. Please double check with him!\"    Please advise     Tanya MA RN    "

## 2019-02-15 NOTE — TELEPHONE ENCOUNTER
Please call patient and let him know ct shows stable or even smaller size of the thoracic aneurysm so no problems, repeat it 6 months    Zurdo Kendrick M.D.

## 2019-02-15 NOTE — TELEPHONE ENCOUNTER
Placed call.  Information given to patient from PCP.  States understood and agreed with advise.  Halle López RN

## 2019-04-02 ENCOUNTER — HOSPITAL ENCOUNTER (OUTPATIENT)
Dept: CT IMAGING | Facility: CLINIC | Age: 84
Discharge: HOME | End: 2019-04-02
Attending: RADIOLOGY | Admitting: RADIOLOGY
Payer: COMMERCIAL

## 2019-04-02 DIAGNOSIS — I71.40 ABDOMINAL AORTIC ANEURYSM (H): ICD-10-CM

## 2019-04-02 LAB
CREAT BLD-MCNC: 1.2 MG/DL (ref 0.66–1.25)
GFR SERPL CREATININE-BSD FRML MDRD: 57 ML/MIN/{1.73_M2}

## 2019-04-02 PROCEDURE — 25000125 ZZHC RX 250: Performed by: RADIOLOGY

## 2019-04-02 PROCEDURE — 74174 CTA ABD&PLVS W/CONTRAST: CPT

## 2019-04-02 PROCEDURE — 25000128 H RX IP 250 OP 636: Performed by: RADIOLOGY

## 2019-04-02 PROCEDURE — 82565 ASSAY OF CREATININE: CPT

## 2019-04-02 RX ORDER — IOPAMIDOL 755 MG/ML
80 INJECTION, SOLUTION INTRAVASCULAR ONCE
Status: COMPLETED | OUTPATIENT
Start: 2019-04-02 | End: 2019-04-02

## 2019-04-02 RX ADMIN — SODIUM CHLORIDE 80 ML: 9 INJECTION, SOLUTION INTRAVENOUS at 11:13

## 2019-04-02 RX ADMIN — IOPAMIDOL 80 ML: 755 INJECTION, SOLUTION INTRAVENOUS at 11:13

## 2019-04-03 ENCOUNTER — TELEPHONE (OUTPATIENT)
Dept: OTHER | Facility: CLINIC | Age: 84
End: 2019-04-03

## 2019-04-03 NOTE — TELEPHONE ENCOUNTER
Attempted to call patient to discuss CTA results.  No VM. No answer.  Trini Gonzalez RN  IR nurse clinician  [phone]

## 2019-04-04 DIAGNOSIS — I71.20 THORACIC AORTIC ANEURYSM WITHOUT RUPTURE (H): Primary | ICD-10-CM

## 2019-04-04 NOTE — TELEPHONE ENCOUNTER
Called patient with results,  Reviewed with Dr. Cervantes, Small increase in sac size, however recommend 1 year f/u.  Patient also has a thoracic aneurysm that is being monitored.  Patient states recommendation for TAA follow up is also 1 year.  Will coordinate for patient to have 1 scan that will include chest, abdomen, pelvis.  Patient agreed to plan.  Trini Gonzalez RN  IR nurse clinician  [phone]  CTA ABDOMEN AND PELVIS WITH CONTRAST 4/2/2019 11:25 AM     HISTORY: 87-year-old patient with history of abdominal aortic aneurysm  repair with endograft on May 27, 2015.     COMPARISON: January 5, 2018.     TECHNIQUE: Multiplanar multiformatted CTA images were obtained from  the lung bases through the abdomen and pelvis, before and after the  uneventful administration of Isovue 370 intravenous contrast given for  a total of 80 mL. Radiation dose for this scan was reduced using  automated exposure control, adjustment of the mA and/or kV according  to patient size, or iterative reconstruction technique.     FINDINGS: Minimal bibasilar atelectasis and/or scar formation. Heart  size is normal without pericardial effusion. Hypodensity in the  hepatic caudate lobe is unchanged. The gallbladder, spleen, adrenal  glands, and pancreas are unremarkable. Both kidneys are normally  perfused. Exophytic hypodensity arises from the posterior aspect of  the left kidney measuring 9 Hounsfield units and 2 x 1.7 cm,  previously 1.8 x 1.6 cm. Both kidneys are normally perfused. No  hydronephrosis or nephrolithiasis. No intraperitoneal fluid or air.  Bladder is mostly decompressed. Fluid or density is noted in the left  inguinal canal, though only partially visible. No dilated loops of  bowel.     The celiac axis, SMA, and bilateral renal arteries are patent.  Aortobiiliac stent graft is patent. Both internal iliac arteries are  patent with slight aneurysmal dilatation of the right internal iliac  artery measuring 1.4 cm, previously  measuring 1.4 cm. The external  iliac and common femoral arteries are widely patent. Bilobed  infrarenal abdominal aortic aneurysm is noted. The more  proximal/cranial of the aneurysmal segments measures approximately 4.8  cm AP by 4.5 cm transverse, previously 4.4 cm AP by 3.9 cm transverse.  The more distal of the aneurysmal segments is 5.8 cm AP by 5.2 cm  transverse, previously 5.7 cm AP by 5 cm transverse. Endoleak is again  partially visible, though more obvious on previous exam. Endoleak is  especially noted in the proximal/cranial aneurysmal segment. 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                                                                      IMPRESSION:  1. Patent aortobiiliac stent graft. Bilobed infrarenal abdominal  aortic aneurysm. Aneurysmal sac has increased since previous exam. The  proximal segment is 4.8 cm AP by 4.5 cm transverse, previously 4.4 x  3.9 cm respectively. The more distal of these segments is 5.8 cm AP by  5.2 cm transverse, previously 5.7 cm AP by 5 cm transverse.  2. Remainder of the exam is unremarkable.     BRITTANEY DACOSTA MD

## 2019-04-12 PROBLEM — M25.512 SHOULDER PAIN, LEFT: Status: RESOLVED | Noted: 2018-06-28 | Resolved: 2019-04-12

## 2019-04-12 PROBLEM — M25.552 HIP PAIN, LEFT: Status: RESOLVED | Noted: 2018-07-20 | Resolved: 2019-04-12

## 2019-05-07 ENCOUNTER — TRANSFERRED RECORDS (OUTPATIENT)
Dept: HEALTH INFORMATION MANAGEMENT | Facility: CLINIC | Age: 84
End: 2019-05-07

## 2019-05-30 ENCOUNTER — OFFICE VISIT (OUTPATIENT)
Dept: FAMILY MEDICINE | Facility: CLINIC | Age: 84
End: 2019-05-30
Payer: COMMERCIAL

## 2019-05-30 ENCOUNTER — ANCILLARY PROCEDURE (OUTPATIENT)
Dept: GENERAL RADIOLOGY | Facility: CLINIC | Age: 84
End: 2019-05-30
Attending: NURSE PRACTITIONER
Payer: COMMERCIAL

## 2019-05-30 VITALS
WEIGHT: 194 LBS | DIASTOLIC BLOOD PRESSURE: 68 MMHG | BODY MASS INDEX: 25.6 KG/M2 | OXYGEN SATURATION: 98 % | SYSTOLIC BLOOD PRESSURE: 138 MMHG | HEART RATE: 53 BPM

## 2019-05-30 DIAGNOSIS — R07.89 CHEST WALL PAIN: Primary | ICD-10-CM

## 2019-05-30 DIAGNOSIS — R07.89 CHEST WALL PAIN: ICD-10-CM

## 2019-05-30 PROCEDURE — 99214 OFFICE O/P EST MOD 30 MIN: CPT | Performed by: NURSE PRACTITIONER

## 2019-05-30 PROCEDURE — 71101 X-RAY EXAM UNILAT RIBS/CHEST: CPT | Mod: RT

## 2019-05-30 NOTE — PATIENT INSTRUCTIONS
You have a large bruise on your right side today     I don't see any large fractures of your ribs but there may be a tiny fracture we don't see     This could take a couple months to heal     You can take Tylenol for pain up to 1000mg 3 times a day     Make sure your family is checking on you

## 2019-05-30 NOTE — PROGRESS NOTES
Subjective     Zack Santiago is a 88 year old male who presents to clinic today for the following health issues:    HPI   Right side rib pain      Duration: 1 week    Description (location/character/radiation): Right sided rib pain    Intensity:  moderate    Accompanying signs and symptoms: none    History (similar episodes/previous evaluation): None    Precipitating or alleviating factors: None    Therapies tried and outcome: None     Pain of right lateral lower rib for 1 week   No injury   Can't sleep on that side d/t pain  Coughing is painful   Eating doesn't change pain   No recent illness   No fevers   No known rash   No SOB   No similar past       Past Medical History:   Diagnosis Date     A-fib (H) 2010    post op     AAA (abdominal aortic aneurysm) without rupture (H)     Dr. 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    carotid us no stenosis, echo no change and no clots; ziopatch no afib, svt present     Anemia 2004     Aortic insufficiency 6/14    1+ on echo     Aortic insufficiency 11/2016    mild to mod     ASCVD (arteriosclerotic cardiovascular disease) 2010    cabg, post op afib     BPH (benign prostatic hyperplasia) 2003    turp, flomax added by urol 2/17     Bradycardia 2016    stopped toprol     CKD (chronic kidney disease) stage 3, GFR 30-59 ml/min (H)      Cough 2010    pulm eval, felt due to reflux     Dizzy 2001    mri small vessel dz     GERD (gastroesophageal reflux disease)      HTN (hypertension) 2002     Hx of colonoscopy 2003    tics     Hypothyroid      Hypovitaminosis D      Idiopathic neuropathy      Lung nodule 02/2018    6mm, found during eval of ascending aorta, fu 8/18 no change     OCD (obsessive compulsive disorder)     sees psyche     Panic disorder     Dr. Luna     Spinal headache      Stroke (H) 12/16    expressive aphasia, hosp, seen by neuro, mri pos, carotids min dz, changed from asa to plavix     Sudden hearing loss 6/13      Jose, mri brain no acoustic neuroma     SVT (supraventricular tachycardia) (H)     seen on ziopatch done for amaurosis, longest 15 beats     Thoracic ascending aortic aneurysm (H) 2014    4.4cm on echo, aortic root 3.9, fu 5/15 4.8cm; fu done 12/15 and slightly enlarged, fu  no change, fu  no change; fu  echo 5.1-5.3, enlarged, then cta done and only 4.8cm, t fu 6 months, lvh, nl ef, mild ai; fu  slightly larger; fu  slightly smaller     Ulcerative colitis (H)     not active for years     Family History   Problem Relation Age of Onset     C.A.D. Father      Past Surgical History:   Procedure Laterality Date     BACK SURGERY       BLADDER SURGERY       C TOTAL KNEE ARTHROPLASTY      left     C TOTAL KNEE ARTHROPLASTY      right     CATARACT IOL, RT/LT       CORONARY ARTERY BYPASS       ENDOVASCULAR REPAIR ANEURYSM ABDOMINAL AORTA N/A 2015    Procedure: ENDOVASCULAR REPAIR ANEURYSM ABDOMINAL AORTA;  Surgeon: Darin Walters MD;  Location:  OR     HERNIA REPAIR  2005     HERNIA REPAIR       HERNIORRHAPHY INGUINAL Left 2018    Procedure: HERNIORRHAPHY INGUINAL;  Incarcerated Left Inguinal Hernia;  Surgeon: Harsh Walker MD;  Location: [facility] OR     KNEE SURGERY       REPAIR HAMMER TOE  2012    Procedure: REPAIR HAMMER TOE;  LEFT SECOND AND THIRD CLAW TOE RECONSTRUCTION;  Surgeon: Gray Haynes MD;  Location: [facility] OR     REPAIR HAMMER TOE  2018    tria     TURMEAGHAN       Social History     Tobacco Use     Smoking status: Former Smoker     Packs/day: 1.00     Years: 5.00     Pack years: 5.00     Types: Cigarettes     Last attempt to quit: 1965     Years since quittin.9     Smokeless tobacco: Never Used   Substance Use Topics     Alcohol use: Yes     Alcohol/week: 0.0 oz     Comment: maybe one glass of wine a week     Current Outpatient Medications   Medication Sig Dispense Refill     ARIPiprazole (ABILIFY) 2 MG  tablet Take 1 tablet by mouth At Bedtime.       ASPIRIN NOT PRESCRIBED (INTENTIONAL) Please choose reason not prescribed, below 0 each 0     chlorthalidone (HYGROTON) 25 MG tablet Take 1 tablet (25 mg) by mouth daily 90 tablet 3     ClomiPRAMINE HCl (ANAFRANIL PO) Take 25 mg by mouth every evening        clopidogrel (PLAVIX) 75 MG tablet Take 1 tablet (75 mg) by mouth daily 90 tablet 3     gabapentin (NEURONTIN) 400 MG capsule Take 1 capsule by mouth 2 times daily        levothyroxine (SYNTHROID/LEVOTHROID) 100 MCG tablet Take 1 tablet (100 mcg) by mouth daily 90 tablet 3     LORAZEPAM PO Take 0.5 mg by mouth daily as needed for anxiety       MIRTAZAPINE PO Take 45 mg by mouth At Bedtime        simvastatin (ZOCOR) 20 MG tablet TAKE ONE TABLET BY MOUTH AT BEDTIME 90 tablet 3     Allergies   Allergen Reactions     No Known Allergies        Reviewed and updated as needed this visit by clinical staff and provider     Review of Systems   Detailed as above        Objective    /68 (BP Location: Right arm, Patient Position: Chair, Cuff Size: Adult Regular)   Pulse 53   Wt 88 kg (194 lb)   SpO2 98%   BMI 25.60 kg/m    Body mass index is 25.6 kg/m .  Physical Exam   Constitutional: He appears well-developed.   HENT:   Head: Normocephalic.   Eyes: Conjunctivae are normal.   Cardiovascular: Normal rate, regular rhythm and normal heart sounds.   No murmur heard.  Pulmonary/Chest: Effort normal and breath sounds normal. No respiratory distress. He exhibits tenderness (right lower lateral).   Neurological: He is alert.   Skin: Skin is warm and dry.   Large bruise of right flank   Psychiatric: He has a normal mood and affect. Judgment normal.          Assessment and Plan:       ICD-10-CM    1. Chest wall pain R07.89 XR Ribs & Chest Right G/E 3 Views     Neg CXR, reviewed by me, awaiting rad read    Suspect injury considering large bruise in area of pain. Discussed current living situation. Patient will have family check  in on him more frequently. Currently family checks in with him at least 3 times a week. Patient has ordered a life link button to wear and has considered moving to an assisted living. Patient declined a nurse care coordinator consult for assistance. Discussed comfort measures for the bruising and  pain.  Follow-up with new or worsening symptoms.        DOMENIC López, CNP  [facility]

## 2019-06-04 ENCOUNTER — TELEPHONE (OUTPATIENT)
Dept: FAMILY MEDICINE | Facility: CLINIC | Age: 84
End: 2019-06-04

## 2019-06-04 DIAGNOSIS — R26.89 BALANCE PROBLEMS: Primary | ICD-10-CM

## 2019-06-04 NOTE — TELEPHONE ENCOUNTER
Dr Kendrick patient is calling to get a referral to the [facility]. This would be for his balance issues. Patient said you had discussed this at his OV. Pending referral. Thanks    Rosa Cuenca  Referral Coordinator

## 2019-06-11 ENCOUNTER — TRANSFERRED RECORDS (OUTPATIENT)
Dept: HEALTH INFORMATION MANAGEMENT | Facility: CLINIC | Age: 84
End: 2019-06-11

## 2019-06-21 ENCOUNTER — OFFICE VISIT (OUTPATIENT)
Dept: FAMILY MEDICINE | Facility: CLINIC | Age: 84
End: 2019-06-21
Payer: COMMERCIAL

## 2019-06-21 VITALS
DIASTOLIC BLOOD PRESSURE: 71 MMHG | HEIGHT: 73 IN | HEART RATE: 57 BPM | BODY MASS INDEX: 25.34 KG/M2 | SYSTOLIC BLOOD PRESSURE: 122 MMHG | TEMPERATURE: 97.6 F | WEIGHT: 191.2 LBS | OXYGEN SATURATION: 96 %

## 2019-06-21 DIAGNOSIS — H61.23 BILATERAL IMPACTED CERUMEN: Primary | ICD-10-CM

## 2019-06-21 PROCEDURE — 99212 OFFICE O/P EST SF 10 MIN: CPT | Mod: 25 | Performed by: NURSE PRACTITIONER

## 2019-06-21 PROCEDURE — 99207 C PAF COMPLETED  NO CHARGE: CPT | Performed by: NURSE PRACTITIONER

## 2019-06-21 PROCEDURE — 69209 REMOVE IMPACTED EAR WAX UNI: CPT | Mod: 50 | Performed by: NURSE PRACTITIONER

## 2019-06-21 ASSESSMENT — MIFFLIN-ST. JEOR: SCORE: 1591.16

## 2019-06-21 NOTE — PROGRESS NOTES
Subjective     Zack Santiago is a 88 year old male who presents to clinic today for the following health issues:    HPI   Ear Concerns   Patient believes that he has wax built up in both ears. Patient swims regularly. Patient mentions that his hearing feels blocked off .  He denies pain or dizziness    Patient Active Problem List   Diagnosis     Advanced directives, counseling/discussion     Hyperlipidemia LDL goal <100     OCD (obsessive compulsive disorder)     GERD (gastroesophageal reflux disease)     AAA (abdominal aortic aneurysm) without rupture (H)     Hypovitaminosis D     ASCVD (arteriosclerotic cardiovascular disease)     Panic disorder     Acquired hypothyroidism     Ulcerative colitis (H)     Idiopathic neuropathy     Benign essential hypertension     Cough     Claw toe, acquired     CKD (chronic kidney disease) stage 3, GFR 30-59 ml/min (H)     Sudden hearing loss     Thoracic ascending aortic aneurysm (H)     Aortic insufficiency     Bradycardia     Amaurosis fugax of right eye     SVT (supraventricular tachycardia) (H)     Cerebrovascular accident (CVA), unspecified mechanism (H)     Atrial fibrillation, unspecified type (H)     Rectal incontinence     Incarcerated inguinal hernia     Lung nodule     Past Surgical History:   Procedure Laterality Date     BACK SURGERY  1998     BLADDER SURGERY  1985     C TOTAL KNEE ARTHROPLASTY  2011    left     C TOTAL KNEE ARTHROPLASTY  2009    right     CATARACT IOL, RT/LT  2011     CORONARY ARTERY BYPASS  2010     ENDOVASCULAR REPAIR ANEURYSM ABDOMINAL AORTA N/A 5/27/2015    Procedure: ENDOVASCULAR REPAIR ANEURYSM ABDOMINAL AORTA;  Surgeon: Darin Walters MD;  Location: [facility] OR     HERNIA REPAIR  2005     HERNIA REPAIR  1998     HERNIORRHAPHY INGUINAL Left 1/5/2018    Procedure: HERNIORRHAPHY INGUINAL;  Incarcerated Left Inguinal Hernia;  Surgeon: Harsh Walker MD;  Location: [facility] OR     KNEE SURGERY  1997     REPAIR HAMMER TOE  12/28/2012     "Procedure: REPAIR HAMMER TOE;  LEFT SECOND AND THIRD CLAW TOE RECONSTRUCTION;  Surgeon: Gray Haynes MD;  Location:  OR     REPAIR HAMMER TOE  2018    reece ESQUEDA         Social History     Tobacco Use     Smoking status: Former Smoker     Packs/day: 1.00     Years: 5.00     Pack years: 5.00     Types: Cigarettes     Last attempt to quit: 1965     Years since quittin.0     Smokeless tobacco: Never Used   Substance Use Topics     Alcohol use: Yes     Alcohol/week: 0.0 oz     Comment: maybe one glass of wine a week     Family History   Problem Relation Age of Onset     C.A.D. Father          Current Outpatient Medications   Medication Sig Dispense Refill     ARIPiprazole (ABILIFY) 2 MG tablet Take 1 tablet by mouth At Bedtime.       ASPIRIN NOT PRESCRIBED (INTENTIONAL) Please choose reason not prescribed, below 0 each 0     chlorthalidone (HYGROTON) 25 MG tablet Take 1 tablet (25 mg) by mouth daily 90 tablet 3     ClomiPRAMINE HCl (ANAFRANIL PO) Take 25 mg by mouth every evening        clopidogrel (PLAVIX) 75 MG tablet Take 1 tablet (75 mg) by mouth daily 90 tablet 3     gabapentin (NEURONTIN) 400 MG capsule Take 1 capsule by mouth 2 times daily        levothyroxine (SYNTHROID/LEVOTHROID) 100 MCG tablet Take 1 tablet (100 mcg) by mouth daily 90 tablet 3     LORAZEPAM PO Take 0.5 mg by mouth daily as needed for anxiety       MIRTAZAPINE PO Take 45 mg by mouth At Bedtime        simvastatin (ZOCOR) 20 MG tablet TAKE ONE TABLET BY MOUTH AT BEDTIME 90 tablet 3     Allergies   Allergen Reactions     No Known Allergies        Reviewed and updated as needed this visit by Provider         Review of Systems   ROS COMP: Constitutional, HEENT, cardiovascular, pulmonary, gi and gu systems are negative, except as otherwise noted.      Objective    /71 (BP Location: Left arm, Patient Position: Sitting, Cuff Size: Adult Regular)   Pulse 57   Temp 97.6  F (36.4  C) (Oral)   Ht 1.854 m (6' 1\")   Wt " 86.7 kg (191 lb 3.2 oz)   SpO2 96%   BMI 25.23 kg/m    Body mass index is 25.23 kg/m .  Physical Exam   GENERAL: healthy, alert and no distress  EYES: Eyes grossly normal to inspection, PERRL and conjunctivae and sclerae normal  HENT: ear canals cerumen occluded, lavaged by MA and TM's normal, nose and mouth without ulcers or lesions  NECK: no adenopathy, no asymmetry    Diagnostic Test Results:  Labs reviewed in Epic        Assessment & Plan       ICD-10-CM    1. Bilateral impacted cerumen H61.23 REMOVE IMPACTED CERUMEN     DOMENIC Fernandez [facility]

## 2019-07-11 ENCOUNTER — HOSPITAL ENCOUNTER (EMERGENCY)
Facility: CLINIC | Age: 84
Discharge: HOME | End: 2019-07-11
Attending: EMERGENCY MEDICINE | Admitting: EMERGENCY MEDICINE
Payer: COMMERCIAL

## 2019-07-11 VITALS
HEIGHT: 73 IN | TEMPERATURE: 97.9 F | RESPIRATION RATE: 16 BRPM | BODY MASS INDEX: 24.52 KG/M2 | SYSTOLIC BLOOD PRESSURE: 139 MMHG | WEIGHT: 185 LBS | OXYGEN SATURATION: 95 % | DIASTOLIC BLOOD PRESSURE: 62 MMHG | HEART RATE: 55 BPM

## 2019-07-11 DIAGNOSIS — H92.22 BLEEDING FROM EAR, LEFT: ICD-10-CM

## 2019-07-11 PROCEDURE — 99283 EMERGENCY DEPT VISIT LOW MDM: CPT

## 2019-07-11 ASSESSMENT — MIFFLIN-ST. JEOR: SCORE: 1563.03

## 2019-07-11 NOTE — ED AVS SNAPSHOT
Emergency Department  [address] [place] [id number]  Phone:  [phone]  Fax:  [phone]                                    Zack Santiago   MRN: [id number]    Department:   Emergency Department   Date of Visit:  7/11/2019           After Visit Summary Signature Page    I have received my discharge instructions, and my questions have been answered. I have discussed any challenges I see with this plan with the nurse or doctor.    ..........................................................................................................................................  Patient/Patient Representative Signature      ..........................................................................................................................................  Patient Representative Print Name and Relationship to Patient    ..................................................               ................................................  Date                                   Time    ..........................................................................................................................................  Reviewed by Signature/Title    ...................................................              ..............................................  Date                                               Time          22EPIC Rev 08/18

## 2019-07-12 ASSESSMENT — ENCOUNTER SYMPTOMS: WOUND: 1

## 2019-07-12 NOTE — DISCHARGE INSTRUCTIONS
Please apply direct pressure if there is any further bleeding and return to your dermatologist during the day or the ER at night if you cannot stop the bleeding.

## 2019-07-12 NOTE — ED TRIAGE NOTES
Had biopsy taken on back of left ear pinnae, the area is oozing blood and patient was told if it continued to bleed he should come to the ED.

## 2019-07-12 NOTE — ED PROVIDER NOTES
"  History     Chief Complaint:  Ear bleeding    HPI   Zack Santiago is a 88 year old male with a medical history of Thoracic AAA, Stroke, Panic disorder, Ulcerative colitis, and Atrial fibrillation, anticoagulated on Plavix who presents with bleeding left ear in the setting of recent biopsy. The patient states he had a biopsy of his left ear at 1515 earlier today, which is now 8 hours ago, and since then had noted bleeding, so he called a dermatologist after hours, who instructed him to apply pressure. When pressure didn't work, he was instructed to apply more pressure, and then ice, which also didn't work, and then to present to the ER, which is the reason for his visit now.     Allergies:  NKDA    Medications:    Zocor  Mirtazapine  Lorazepam  Gabapentin  Levothyroxine  Anafranil  Plavix  Hygroton  Aspirin  Abilify     Past Medical History:    Ulcerative colitis  Thoracic AAA  SVT  Sudden hearing loss  Stroke  Panic disorder  Spinal headache  OCD  hernia  Vitamin D deficient  Neuropathy  HTN  GERD  CKD  CPH  ASCVD  Afib    Past Surgical History:    Turp  Repair hammer toe  Knee  Hernia  CABG  Repair AAA  TKA  Back surgery  Bladder surgery    Family History:    CAD    Social History:  Former smoker: 1 ppd, 5 years, quit 1965  Positive for alcohol use.        Review of Systems   Skin: Positive for wound.   All other systems reviewed and are negative.        Physical Exam     Patient Vitals for the past 24 hrs:   BP Temp Temp src Pulse Heart Rate Resp SpO2 Height Weight   07/11/19 2233 139/62 -- -- -- -- -- -- -- --   07/11/19 2231 -- 97.9  F (36.6  C) Oral 55 55 16 95 % 1.854 m (6' 1\") 83.9 kg (185 lb)       Physical Exam  General: Appears well-developed and well-nourished.   Head: Minor blood at biopsy site on lobe of left ear.    Mouth/Throat: Oropharynx is clear and moist.   CV: Normal rate and regular rhythm.    Resp: Effort normal and breath sounds normal. No respiratory distress.   MSK: Normal range of " motion.   Neuro: The patient is alert and oriented.  Speech normal.  GCS 15  Skin: Skin is warm and dry. No rash noted.   Psych: normal mood and affect. behavior is normal.       Emergency Department Course     Laboratory:        Narrative: Procedure: Laceration Repair        LACERATION:  A simple and superficial clean 0.3 cm laceration.      LOCATION:  Left ear lobe      PREPARATION:  Irrigation and Scrubbing with Normal Saline      DEBRIDEMENT:  no debridement and wound explored, no foreign body found      CLOSURE:  Wound was closed with Glue      Emergency Department Course:  Nursing notes and vitals reviewed. (1101) I performed an exam of the patient as documented above.     Closed wound as noted above.     Findings and plan explained to the Patient. Patient discharged home with instructions regarding supportive care, medications, and reasons to return. The importance of close follow-up was reviewed.     Impression & Plan      Medical Decision Making:  Zack Santiago is an 88-year-old gentleman who presents due to bleeding from the left earlobe.  He had a biopsy done with dermatology earlier in the day he had some continued oozing that he was unable to stop at home.  On my evaluation, there is no active bleeding at the time of my exam.  I did apply skin adhesive to seal the area and the patient was monitored with no further bleeding.  Dressing was applied, the patient was discharged with recommendation for supportive care at home.  Instructed to follow-up with a dermatologist for any further bleeding or return to the ER if it was after hours.      Diagnosis:    ICD-10-CM    1. Bleeding from ear, left H92.22        Disposition:  discharged to home    Scribe Disclosure:  I, Ricardo Nathan, am serving as a scribe on 7/12/2019 at 1:19 AM to personally document services performed by MD Yasir, found based on my observations and the provider's statements to me.     Ricardo Nathan  7/11/2019    EMERGENCY  [facility]       [facility] MD  07/17/19 0151

## 2019-07-22 ENCOUNTER — TELEPHONE (OUTPATIENT)
Dept: FAMILY MEDICINE | Facility: CLINIC | Age: 84
End: 2019-07-22

## 2019-07-22 NOTE — TELEPHONE ENCOUNTER
Reason for call:  Patient reporting a symptom    Symptom or request: pt has a root canal a week ago.  His jaw was numbed.  He bit into his cheek while his jaw was numb.  Swelling has gone down, but not as much as he though it would.  He would like to know if he should come in.  His mouth is sore, and he is taking antibiotics.  He is trying to avoid chewing on that side.     Duration (how long have symptoms been present): couple of days    Have you been treated for this before? No    Additional comments:     Phone Number patient can be reached at:  Home number on file [phone] (home)    Best Time:  After 4 pm    Can we leave a detailed message on this number:  YES    Call taken on 7/22/2019 at 1:42 PM by Valeria Strong

## 2019-07-22 NOTE — TELEPHONE ENCOUNTER
Left message on machine to call back.  Advised calling his dentist for eval./follow up if he hasn't already.    Also, warm salt water swish and spit prn, (1/4 tsp salt in 1 c.warm water); soft foods.    Monitor for fever, increased pain, any worsening or if dentist defers to PCP.    Sofi Martin RN on 7/22/2019 at 3:23 PM

## 2019-10-02 NOTE — PATIENT INSTRUCTIONS
Put heat on the area 4 to 5 x a day for 15 minutes    Take tylenol before bed, take 2 regular strength    If this does not fix the problem over the next 10 days I want you to see orthopedics    Zurdo Kendrick M.D.

## 2019-10-02 NOTE — PROGRESS NOTES
[address] [address]  [phone]  Dept: [phone]    PRE-OP EVALUATION:  Today's date: 10/3/2019    Zack Santiago (: 1931) presents for pre-operative evaluation assessment as requested by  ***.  He requires evaluation and anesthesia risk assessment prior to undergoing surgery/procedure for treatment of *** .    {PREOP QUESTIONNAIRE OPTIONS (by MA):238889}    HPI:     HPI related to upcoming procedure: ***      {Ch. Problems:330411}    MEDICAL HISTORY:     Patient Active Problem List    Diagnosis Date Noted     Lung nodule 2018     Priority: Medium     6mm, found during eval of ascending aorta       Incarcerated inguinal hernia 2018     Priority: Medium     Rectal incontinence 2017     Priority: Medium     Cerebrovascular accident (CVA), unspecified mechanism (H) 2017     Priority: Medium     Atrial fibrillation, unspecified type (H) 2017     Priority: Medium     SVT (supraventricular tachycardia) (H)      Priority: Medium     seen on ziopatch done for amaurosis, longest 15 beats       Amaurosis fugax of right eye      Priority: Medium     Bradycardia      Priority: Medium     stopped toprol       Aortic insufficiency      Priority: Medium     1+ on echo       Thoracic ascending aortic aneurysm (H)      Priority: Medium     4.4cm on echo, aortic root 3.9       Sudden hearing loss      Priority: Medium     Dr. Garcia, mri brain no acoustic neuroma       CKD (chronic kidney disease) stage 3, GFR 30-59 ml/min (H)      Priority: Medium     Claw toe, acquired 2012     Priority: Medium     Problem list name updated by automated process. Provider to review       Hyperlipidemia LDL goal <100 2012     Priority: Medium     OCD (obsessive compulsive disorder)      Priority: Medium     GERD (gastroesophageal reflux disease)      Priority: Medium     AAA (abdominal aortic aneurysm) without rupture (H)      Priority: Medium      Hypovitaminosis D      Priority: Medium     ASCVD (arteriosclerotic cardiovascular disease)      Priority: Medium     cabg, post op afib       Panic disorder      Priority: Medium     Acquired hypothyroidism      Priority: Medium     Ulcerative colitis (H)      Priority: Medium     not active for years       Idiopathic neuropathy      Priority: Medium     Benign essential hypertension      Priority: Medium     Cough      Priority: Medium     pulm eval, felt due to reflux       Advanced directives, counseling/discussion 06/08/2012     Priority: Medium     Patient states has Advance Directive and will bring in a copy to clinic. 6/8/2012         Past Medical History:   Diagnosis Date     A-fib (H) 2010    post op     AAA (abdominal aortic aneurysm) without rupture (H)     Dr. 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    carotid us no stenosis, echo no change and no clots; ziopatch no afib, svt present     Anemia 2004     Aortic insufficiency 6/14    1+ on echo     Aortic insufficiency 11/2016    mild to mod     ASCVD (arteriosclerotic cardiovascular disease) 2010    cabg, post op afib     BPH (benign prostatic hyperplasia) 2003    turp, flomax added by urol 2/17     Bradycardia 2016    stopped toprol     CKD (chronic kidney disease) stage 3, GFR 30-59 ml/min (H)      Cough 2010    pulm eval, felt due to reflux     Dizzy 2001    mri small vessel dz     GERD (gastroesophageal reflux disease)      HTN (hypertension) 2002     Hx of colonoscopy 2003    tics     Hypothyroid      Hypovitaminosis D      Idiopathic neuropathy      Lung nodule 02/2018    6mm, found during eval of ascending aorta, fu 8/18 no change     OCD (obsessive compulsive disorder)     sees psyche     Panic disorder     Dr. Luna     Spinal headache      Stroke (H) 12/16    expressive aphasia, hosp, seen by neuro, mri pos, carotids min dz, changed from asa to plavix     Sudden hearing loss 6/13    Dr. Garcia, mri  brain no acoustic neuroma     SVT (supraventricular tachycardia) (H) 11/16    seen on ziopatch done for amaurosis, longest 15 beats     Thoracic ascending aortic aneurysm (H) 06/2014    4.4cm on echo, aortic root 3.9, fu 5/15 4.8cm; fu done 12/15 and slightly enlarged, fu 6/16 no change, fu 11/16 no change; fu 1/18 echo 5.1-5.3, enlarged, then cta done and only 4.8cm, t fu 6 months, lvh, nl ef, mild ai; fu 8/18 slightly larger; fu 2/19 slightly smaller     Ulcerative colitis (H)     not active for years     Past Surgical History:   Procedure Laterality Date     BACK SURGERY  1998     BLADDER SURGERY  1985     C TOTAL KNEE ARTHROPLASTY  2011    left     C TOTAL KNEE ARTHROPLASTY  2009    right     CATARACT IOL, RT/LT  2011     CORONARY ARTERY BYPASS  2010     ENDOVASCULAR REPAIR ANEURYSM ABDOMINAL AORTA N/A 5/27/2015    Procedure: ENDOVASCULAR REPAIR ANEURYSM ABDOMINAL AORTA;  Surgeon: Darin Walters MD;  Location:  OR     HERNIA REPAIR  2005     HERNIA REPAIR  1998     HERNIORRHAPHY INGUINAL Left 1/5/2018    Procedure: HERNIORRHAPHY INGUINAL;  Incarcerated Left Inguinal Hernia;  Surgeon: Harsh Walker MD;  Location:  OR     KNEE SURGERY  1997     REPAIR HAMMER TOE  12/28/2012    Procedure: REPAIR HAMMER TOE;  LEFT SECOND AND THIRD CLAW TOE RECONSTRUCTION;  Surgeon: Gray Haynes MD;  Location:  OR     REPAIR HAMMER TOE  04/2018    tria     DHEERAJ  2003     Current Outpatient Medications   Medication Sig Dispense Refill     ARIPiprazole (ABILIFY) 2 MG tablet Take 1 tablet by mouth At Bedtime.       ASPIRIN NOT PRESCRIBED (INTENTIONAL) Please choose reason not prescribed, below 0 each 0     chlorthalidone (HYGROTON) 25 MG tablet Take 1 tablet (25 mg) by mouth daily 90 tablet 3     ClomiPRAMINE HCl (ANAFRANIL PO) Take 25 mg by mouth every evening        clopidogrel (PLAVIX) 75 MG tablet Take 1 tablet (75 mg) by mouth daily 90 tablet 3     gabapentin (NEURONTIN) 400 MG capsule Take 1 capsule  "by mouth 2 times daily        levothyroxine (SYNTHROID/LEVOTHROID) 100 MCG tablet Take 1 tablet (100 mcg) by mouth daily 90 tablet 3     LORAZEPAM PO Take 0.5 mg by mouth daily as needed for anxiety       MIRTAZAPINE PO Take 45 mg by mouth At Bedtime        simvastatin (ZOCOR) 20 MG tablet TAKE ONE TABLET BY MOUTH AT BEDTIME 90 tablet 3     OTC products: {OTC ANALGESICS:629350}    Allergies   Allergen Reactions     No Known Allergies       Latex Allergy: {YES/NO WITH DEFAULT:601496::\"NO\"}    Social History     Tobacco Use     Smoking status: Former Smoker     Packs/day: 1.00     Years: 5.00     Pack years: 5.00     Types: Cigarettes     Last attempt to quit: 1965     Years since quittin.3     Smokeless tobacco: Never Used   Substance Use Topics     Alcohol use: Yes     Alcohol/week: 0.0 standard drinks     Comment: maybe one glass of wine a week     History   Drug Use No       REVIEW OF SYSTEMS:   {ROS Preop Choices:486546}    EXAM:   There were no vitals taken for this visit.  {EXAM Preop Choices:900936}    DIAGNOSTICS:   {DIAGNOSTIC FOR PREOP:161342}    Recent Labs   Lab Test 19  1045 18  1443 18  1044  16  0730 16  2328  05/27/15  1040  11  1230   HGB 12.9* 12.3* 12.6*   < >  --  12.2*   < >  --    < > 13.4    177 164   < >  --  150   < >  --    < >  --    INR  --   --   --   --   --  1.09  --   --   --  1.07     --  140   < >  --  142   < >  --    < > 140   POTASSIUM 3.8  --  3.8   < >  --  3.4   < >  --    < > 4.2   CR 1.08  --  1.14   < >  --  1.26*   < >  --    < > 1.03   A1C  --   --   --   --  5.5  --   --  5.2  --   --     < > = values in this interval not displayed.        IMPRESSION:   {PREOP REASONS:743933::\"Reason for surgery/procedure: ***\",\"Diagnosis/reason for consult: ***\"}    The proposed surgical procedure is considered {HIGH=major cardiovascular or procedures requiring prolonged anesthesia >4 hours or large fluid shifts;    " "INTERMEDIATE=abdominal, most orthopedic and intrathoracic surgery; LOW= endoscopy, cataract and breast surgery:067348} risk.    REVISED CARDIAC RISK INDEX  The patient has the following serious cardiovascular risks for perioperative complications such as (MI, PE, VFib and 3  AV Block):  {PREOP REVISED CARDIAC INDEX (RCI):297914:p:\"No serious cardiac risks\"}  INTERPRETATION: {REVISED CARDIAC RISK INTERPRETATION:443194}    The patient has the following additional risks for perioperative complications:  {Additional perioperative risks:972334:p:\"No identified additional risks\"}      ICD-10-CM    1. Preop general physical exam Z01.818        RECOMMENDATIONS:     {IMPORTANT - Conditions - complete carefully!!:568430}    {IMPORTANT - Medications:899882::\"--Patient is to take all scheduled medications on the day of surgery EXCEPT for modifications listed below.\"}    {IMPORTANT - Approval:276682:p:\"APPROVAL GIVEN to proceed with proposed procedure, without further diagnostic evaluation\"}       Signed Electronically by: Zurdo Kendrick MD    Copy of this evaluation report is provided to requesting physician.    [facility] Preop Guidelines    Revised Cardiac Risk Index  "

## 2019-10-03 ENCOUNTER — TELEPHONE (OUTPATIENT)
Dept: FAMILY MEDICINE | Facility: CLINIC | Age: 84
End: 2019-10-03

## 2019-10-03 ENCOUNTER — OFFICE VISIT (OUTPATIENT)
Dept: FAMILY MEDICINE | Facility: CLINIC | Age: 84
End: 2019-10-03
Payer: COMMERCIAL

## 2019-10-03 VITALS
WEIGHT: 195 LBS | OXYGEN SATURATION: 98 % | HEIGHT: 73 IN | SYSTOLIC BLOOD PRESSURE: 135 MMHG | DIASTOLIC BLOOD PRESSURE: 63 MMHG | BODY MASS INDEX: 25.84 KG/M2 | HEART RATE: 54 BPM

## 2019-10-03 DIAGNOSIS — M25.551 BILATERAL HIP PAIN: Primary | ICD-10-CM

## 2019-10-03 DIAGNOSIS — M25.552 BILATERAL HIP PAIN: Primary | ICD-10-CM

## 2019-10-03 PROCEDURE — 99213 OFFICE O/P EST LOW 20 MIN: CPT | Performed by: INTERNAL MEDICINE

## 2019-10-03 ASSESSMENT — MIFFLIN-ST. JEOR: SCORE: 1608.39

## 2019-10-03 NOTE — PROGRESS NOTES
The patient is here for atraumatic hip pain.  It started about a week ago.  He notices it only in bed when he wakes up to go to the bathroom and not before.  It is hard to then go back to sleep.  It is on both sides.  There was no trauma or injury.  It does not radiate.  There is no leg symptoms.  No low back pain.  No fevers.    [facility]  His wife  in March.    Past Medical History:   Diagnosis Date     A-fib (H)     post op     AAA (abdominal aortic aneurysm) without rupture (H)     Dr. 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    carotid us no stenosis, echo no change and no clots; ziopatch no afib, svt present     Anemia      Aortic insufficiency     1+ on echo     Aortic insufficiency 2016    mild to mod     ASCVD (arteriosclerotic cardiovascular disease)     cabg, post op afib     BPH (benign prostatic hyperplasia)     turp, flomax added by urol      Bradycardia     stopped toprol     CKD (chronic kidney disease) stage 3, GFR 30-59 ml/min (H)      Cough     pulm eval, felt due to reflux     Dizzy     mri small vessel dz     GERD (gastroesophageal reflux disease)      HTN (hypertension)      Hx of colonoscopy     tics     Hypothyroid      Hypovitaminosis D      Idiopathic neuropathy      Lung nodule 2018    6mm, found during eval of ascending aorta, fu  no change     OCD (obsessive compulsive disorder)     sees psyche     Panic disorder     Dr. Luna     Spinal headache      Stroke (H)     expressive aphasia, hosp, seen by neuro, mri pos, carotids min dz, changed from asa to plavix     Sudden hearing loss     Dr. Garcia, mri brain no acoustic neuroma     SVT (supraventricular tachycardia) (H)     seen on ziopatch done for amaurosis, longest 15 beats     Thoracic ascending aortic aneurysm (H) 2014    4.4cm on echo, aortic root 3.9, fu 5/15 4.8cm; fu done 12/15  and slightly enlarged, fu  no change, fu  no change; fu  echo 5.1-5.3, enlarged, then cta done and only 4.8cm, t fu 6 months, lvh, nl ef, mild ai; fu  slightly larger; fu  slightly smaller     Ulcerative colitis (H)     not active for years     Past Surgical History:   Procedure Laterality Date     BACK SURGERY       BLADDER SURGERY       C TOTAL KNEE ARTHROPLASTY      left     C TOTAL KNEE ARTHROPLASTY      right     CATARACT IOL, RT/LT       CORONARY ARTERY BYPASS       ENDOVASCULAR REPAIR ANEURYSM ABDOMINAL AORTA N/A 2015    Procedure: ENDOVASCULAR REPAIR ANEURYSM ABDOMINAL AORTA;  Surgeon: Darin Walters MD;  Location: [facility] OR     HERNIA REPAIR       HERNIA REPAIR       HERNIORRHAPHY INGUINAL Left 2018    Procedure: HERNIORRHAPHY INGUINAL;  Incarcerated Left Inguinal Hernia;  Surgeon: Harsh Walker MD;  Location: [facility] OR     KNEE SURGERY       REPAIR HAMMER TOE  2012    Procedure: REPAIR HAMMER TOE;  LEFT SECOND AND THIRD CLAW TOE RECONSTRUCTION;  Surgeon: Gray Haynes MD;  Location: [facility] OR     REPAIR HAMMER TOE  2018    tria     TURP       Social History     Socioeconomic History     Marital status:      Spouse name: Not on file     Number of children: 2     Years of education: Not on file     Highest education level: Not on file   Occupational History     Occupation: retail     Employer: RETIRED   Social Needs     Financial resource strain: Not on file     Food insecurity:     Worry: Not on file     Inability: Not on file     Transportation needs:     Medical: Not on file     Non-medical: Not on file   Tobacco Use     Smoking status: Former Smoker     Packs/day: 1.00     Years: 5.00     Pack years: 5.00     Types: Cigarettes     Last attempt to quit: 1965     Years since quittin.3     Smokeless tobacco: Never Used   Substance and Sexual Activity     Alcohol use: Yes     Alcohol/week: 0.0 standard  drinks     Comment: maybe one glass of wine a week     Drug use: No     Sexual activity: Not Currently   Lifestyle     Physical activity:     Days per week: Not on file     Minutes per session: Not on file     Stress: Not on file   Relationships     Social connections:     Talks on phone: Not on file     Gets together: Not on file     Attends Yarsanism service: Not on file     Active member of club or organization: Not on file     Attends meetings of clubs or organizations: Not on file     Relationship status: Not on file     Intimate partner violence:     Fear of current or ex partner: Not on file     Emotionally abused: Not on file     Physically abused: Not on file     Forced sexual activity: Not on file   Other Topics Concern     Parent/sibling w/ CABG, MI or angioplasty before 65F 55M? Not Asked   Social History Narrative     Not on file     Current Outpatient Medications   Medication Sig Dispense Refill     ARIPiprazole (ABILIFY) 2 MG tablet Take 1 tablet by mouth At Bedtime.       ASPIRIN NOT PRESCRIBED (INTENTIONAL) Please choose reason not prescribed, below 0 each 0     chlorthalidone (HYGROTON) 25 MG tablet Take 1 tablet (25 mg) by mouth daily 90 tablet 3     ClomiPRAMINE HCl (ANAFRANIL PO) Take 25 mg by mouth every evening        clopidogrel (PLAVIX) 75 MG tablet Take 1 tablet (75 mg) by mouth daily 90 tablet 3     gabapentin (NEURONTIN) 400 MG capsule Take 1 capsule by mouth 2 times daily        levothyroxine (SYNTHROID/LEVOTHROID) 100 MCG tablet Take 1 tablet (100 mcg) by mouth daily 90 tablet 3     LORAZEPAM PO Take 0.5 mg by mouth daily as needed for anxiety       MIRTAZAPINE PO Take 45 mg by mouth At Bedtime        simvastatin (ZOCOR) 20 MG tablet TAKE ONE TABLET BY MOUTH AT BEDTIME 90 tablet 3     Allergies   Allergen Reactions     No Known Allergies      FAMILY HISTORY NOTED AND REVIEWED    REVIEW OF SYSTEMS: above    PHYSICAL EXAM    /63 (BP Location: Right arm, Patient Position: Chair, Cuff  "Size: Adult Regular)   Pulse 54   Ht 1.854 m (6' 1\")   Wt 88.5 kg (195 lb)   SpO2 98%   BMI 25.73 kg/m      Patient appears non toxic  He has easily reproducible tenderness over each trochanteric bursal area.  The area itself is not red, warm and his skin is intact.  He has normal hip range of motion bilaterally without pain.  His abdomen is nontender    ASSESSMENT:  bilat troch bursitis    PLAN:  Heat 4 to 5 x a day  Tylenol at hs  Call if wrosens or not gone soon    Zurdo Kendrick M.D.        "

## 2019-10-03 NOTE — TELEPHONE ENCOUNTER
Reason for Call:  Medication or medication refill:    Do you use a [facility]?  Name of the pharmacy and phone number for the current request:  NA    Name of the medication requested: Tylenol    Other request: Pt was told at his OV today to take regular strength Tylenol and he purchased extra strength (500 mg) Pt is wondering if he needs to return to purchase the regular strength    Can we leave a detailed message on this number? YES    Phone number patient can be reached at: Cell number on file:    Telephone Information:   Mobile [phone]     Best Time: any    Call taken on 10/3/2019 at 3:31 PM by Sneha Dennison

## 2019-10-03 NOTE — TELEPHONE ENCOUNTER
Spoke with pt- advised he start with 1 extra strength (500mg) instead of 2 regular (650mg)    Felix DEAN RN

## 2019-10-14 ENCOUNTER — TELEPHONE (OUTPATIENT)
Dept: FAMILY MEDICINE | Facility: CLINIC | Age: 84
End: 2019-10-14

## 2019-10-14 DIAGNOSIS — M25.551 BILATERAL HIP PAIN: Primary | ICD-10-CM

## 2019-10-14 DIAGNOSIS — M25.552 BILATERAL HIP PAIN: Primary | ICD-10-CM

## 2019-10-14 NOTE — TELEPHONE ENCOUNTER
Pended Ortho Referral     Referral Type: For injection     Time Frame: 1-2 days    Please review/sign, if appropriate.   Brie SILVA RN

## 2019-10-14 NOTE — TELEPHONE ENCOUNTER
"Left message for pt to return call to Triage at Clinic for more information.    Will route to Dr. Kendrick as well.  If sx not improved- do you want pt back in for OV, or referral for injections?    Message from   \"Pt was treated for bursitis in the hip a couple of weeks ago by Dr. Kendrick.  He was to go home and apply heat until pain is relieved.  Aroldo informed that if it didn't work, pt should call back so that he can arrange for the pt to get some shots across the street at the hospital\"      Nory Allan RN  [facility]  "

## 2019-10-14 NOTE — TELEPHONE ENCOUNTER
Reason for Call:  Other call back    Detailed comments:   Pt was treated for bursitis in the hip a couple of weeks ago by Dr. Kendrick.  He was to go home and apply heat until pain is relieved.  Aroldo informed that if it didn't work, pt should call back so that he can arrange for the pt to get some shots across the street at the hospital.      Phone Number Patient can be reached at: Home number on file [phone] (home)    Best Time: Any     Can we leave a detailed message on this number? YES    Call taken on 10/14/2019 at 9:48 AM by Jocelyn Stephens

## 2019-10-14 NOTE — TELEPHONE ENCOUNTER
To PCP:     Would you recommend referral to Pain?     Reason for Referral: Evaluation for procedure- clinic evaluation to determine if procedure is appropriate.  Procedure would be done at later date.     Pended referral (as advised for possible injection), if appropriate.     Please advise,     Thank you,   Brie SILVA RN

## 2019-10-14 NOTE — TELEPHONE ENCOUNTER
"Spoke with patient:     S: On-going left hip pain that wakens him from sleep    Denies numbness/tinling     Aggravating: nothing, wakes in the middle of the night with pain- cannot get back to sleep     During the day the pain is just fine     Applying heat about 3 times a day- not much relief noted.     Pt inquires about the injection \"across the street\"     To PCP: Can we order injection of the hip? Note: Only seems to be the left that bothers him.     Sorry- unsure what to pend.     Brie SILVA RN    "

## 2019-10-21 ENCOUNTER — OFFICE VISIT (OUTPATIENT)
Dept: ORTHOPEDICS | Facility: CLINIC | Age: 84
End: 2019-10-21
Payer: COMMERCIAL

## 2019-10-21 VITALS
WEIGHT: 195 LBS | SYSTOLIC BLOOD PRESSURE: 135 MMHG | HEIGHT: 73 IN | BODY MASS INDEX: 25.84 KG/M2 | DIASTOLIC BLOOD PRESSURE: 63 MMHG

## 2019-10-21 DIAGNOSIS — M16.12 PRIMARY OSTEOARTHRITIS OF LEFT HIP: Primary | ICD-10-CM

## 2019-10-21 DIAGNOSIS — M70.62 TROCHANTERIC BURSITIS OF LEFT HIP: ICD-10-CM

## 2019-10-21 PROCEDURE — 99213 OFFICE O/P EST LOW 20 MIN: CPT | Mod: 25 | Performed by: FAMILY MEDICINE

## 2019-10-21 PROCEDURE — 20610 DRAIN/INJ JOINT/BURSA W/O US: CPT | Mod: LT | Performed by: FAMILY MEDICINE

## 2019-10-21 RX ORDER — TRIAMCINOLONE ACETONIDE 40 MG/ML
40 INJECTION, SUSPENSION INTRA-ARTICULAR; INTRAMUSCULAR
Status: DISCONTINUED | OUTPATIENT
Start: 2019-10-21 | End: 2020-06-08

## 2019-10-21 RX ORDER — LIDOCAINE HYDROCHLORIDE 10 MG/ML
5 INJECTION, SOLUTION INFILTRATION; PERINEURAL
Status: DISCONTINUED | OUTPATIENT
Start: 2019-10-21 | End: 2020-06-08

## 2019-10-21 RX ORDER — LIDOCAINE HYDROCHLORIDE 10 MG/ML
4 INJECTION, SOLUTION INFILTRATION; PERINEURAL
Status: DISCONTINUED | OUTPATIENT
Start: 2019-10-21 | End: 2020-06-08

## 2019-10-21 RX ADMIN — LIDOCAINE HYDROCHLORIDE 5 ML: 10 INJECTION, SOLUTION INFILTRATION; PERINEURAL at 13:14

## 2019-10-21 RX ADMIN — TRIAMCINOLONE ACETONIDE 40 MG: 40 INJECTION, SUSPENSION INTRA-ARTICULAR; INTRAMUSCULAR at 13:14

## 2019-10-21 RX ADMIN — LIDOCAINE HYDROCHLORIDE 4 ML: 10 INJECTION, SOLUTION INFILTRATION; PERINEURAL at 13:14

## 2019-10-21 ASSESSMENT — MIFFLIN-ST. JEOR: SCORE: 1608.39

## 2019-10-21 NOTE — PROGRESS NOTES
Musculoskeletal Problem          [facility]   Follow-up Visit s Oct 21, 2019    PCP: Zurdo Kendrick      Subjective:  Zack is a 88 year old male who is seen in follow up for evaluation of   Chief Complaint   Patient presents with     Left Hip - Pain     His last visit was on 9/21/18.  Since that time, symptoms have returned. Zack Santiago is accompanied today by self.       Patient had a left hip:  bursa Cortisone injection on 9/21/18 that provided 100% relief, lasting for 4 month(s).   Patient has noticed improved symptoms with injection treatment.  Pain is located left lateral hip, waxing and waning. Pain when he sleeps on right side; does not bother him during the day.   Patient is using no aids.    Patient denies any new injuries.    Denies right-sided hip issues today.    Patient's past medical, surgical, social and family histories are reviewed today.    History from previous visit on 9/21/18  Injury: Reports insidious onset without acute precipitating event.     Location of Pain: left hip lateral, nonradiating   Duration of Pain: 1 year(s)  Rating of Pain at worst: 4/10  Rating of Pain Currently: 4/10  Pain is better with: nothing   Pain is worse with:  sleeping  Treatment so far consists of: physical therapy, bursa injection once in April at [facility]  Associated symptoms: no distal numbness or tingling; denies swelling or warmth  Recent imaging completed: No recent imaging completed.  Prior History of related problems: none    Social History: retired     Past Medical History:   Diagnosis Date     A-fib (H) 2010    post op     AAA (abdominal aortic aneurysm) without rupture (H)     Dr. 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    carotid us no stenosis, echo no change and no clots; ziopatch no afib, svt present     Anemia 2004     Aortic insufficiency 6/14    1+ on echo     Aortic insufficiency 11/2016    mild to mod     ASCVD (arteriosclerotic  cardiovascular disease) 2010    cabg, post op afib     BPH (benign prostatic hyperplasia) 2003    turp, flomax added by urol 2/17     Bradycardia 2016    stopped toprol     CKD (chronic kidney disease) stage 3, GFR 30-59 ml/min (H)      Cough 2010    pulm eval, felt due to reflux     Dizzy 2001    mri small vessel dz     GERD (gastroesophageal reflux disease)      HTN (hypertension) 2002     Hx of colonoscopy 2003    tics     Hypothyroid      Hypovitaminosis D      Idiopathic neuropathy      Lung nodule 02/2018    6mm, found during eval of ascending aorta, fu 8/18 no change     OCD (obsessive compulsive disorder)     sees psyche     Panic disorder     Dr. Luna     Spinal headache      Stroke (H) 12/16    expressive aphasia, hosp, seen by neuro, mri pos, carotids min dz, changed from asa to plavix     Sudden hearing loss 6/13    Dr. Garcia, mri brain no acoustic neuroma     SVT (supraventricular tachycardia) (H) 11/16    seen on ziopatch done for amaurosis, longest 15 beats     Thoracic ascending aortic aneurysm (H) 06/2014    4.4cm on echo, aortic root 3.9, fu 5/15 4.8cm; fu done 12/15 and slightly enlarged, fu 6/16 no change, fu 11/16 no change; fu 1/18 echo 5.1-5.3, enlarged, then cta done and only 4.8cm, t fu 6 months, lvh, nl ef, mild ai; fu 8/18 slightly larger; fu 2/19 slightly smaller     Ulcerative colitis (H)     not active for years       Patient Active Problem List    Diagnosis Date Noted     Lung nodule 02/01/2018     Priority: Medium     6mm, found during eval of ascending aorta       Incarcerated inguinal hernia 01/06/2018     Priority: Medium     Rectal incontinence 04/19/2017     Priority: Medium     Cerebrovascular accident (CVA), unspecified mechanism (H) 01/19/2017     Priority: Medium     Atrial fibrillation, unspecified type (H) 01/19/2017     Priority: Medium     SVT (supraventricular tachycardia) (H)      Priority: Medium     seen on ziopatch done for amaurosis, longest 15 beats        Amaurosis fugax of right eye      Priority: Medium     Bradycardia      Priority: Medium     stopped toprol       Aortic insufficiency      Priority: Medium     1+ on echo       Thoracic ascending aortic aneurysm (H)      Priority: Medium     4.4cm on echo, aortic root 3.9       Sudden hearing loss      Priority: Medium     Dr. Garcia, mri brain no acoustic neuroma       CKD (chronic kidney disease) stage 3, GFR 30-59 ml/min (H)      Priority: Medium     Claw toe, acquired 12/28/2012     Priority: Medium     Problem list name updated by automated process. Provider to review       Hyperlipidemia LDL goal <100 06/13/2012     Priority: Medium     OCD (obsessive compulsive disorder)      Priority: Medium     GERD (gastroesophageal reflux disease)      Priority: Medium     AAA (abdominal aortic aneurysm) without rupture (H)      Priority: Medium     Hypovitaminosis D      Priority: Medium     ASCVD (arteriosclerotic cardiovascular disease)      Priority: Medium     cabg, post op afib       Panic disorder      Priority: Medium     Acquired hypothyroidism      Priority: Medium     Ulcerative colitis (H)      Priority: Medium     not active for years       Idiopathic neuropathy      Priority: Medium     Benign essential hypertension      Priority: Medium     Cough      Priority: Medium     pulm eval, felt due to reflux       Advanced directives, counseling/discussion 06/08/2012     Priority: Medium     Patient states has Advance Directive and will bring in a copy to clinic. 6/8/2012          Family History   Problem Relation Age of Onset     C.A.D. Father        Social History     Social History     Marital status:      Spouse name: N/A     Number of children: 2     Years of education: N/A     Occupational History     retail Retired     Social History Main Topics     Smoking status: Former Smoker     Packs/day: 1.00     Years: 5.00     Types: Cigarettes     Quit date: 6/13/1965     Smokeless tobacco: Never Used      "Alcohol use 0.0 oz/week     0 Standard drinks or equivalent per week      Comment: maybe one glass of wine a week     Drug use: No     Sexual activity: Not Currently     Other Topics Concern     Not on file     Social History Narrative       Past Surgical History:   Procedure Laterality Date     BACK SURGERY  1998     BLADDER SURGERY  1985     C TOTAL KNEE ARTHROPLASTY  2011    left     C TOTAL KNEE ARTHROPLASTY  2009    right     CATARACT IOL, RT/LT  2011     CORONARY ARTERY BYPASS  2010     ENDOVASCULAR REPAIR ANEURYSM ABDOMINAL AORTA N/A 5/27/2015    Procedure: ENDOVASCULAR REPAIR ANEURYSM ABDOMINAL AORTA;  Surgeon: Darin Walters MD;  Location: [facility] OR     HERNIA REPAIR  2005     HERNIA REPAIR  1998     HERNIORRHAPHY INGUINAL Left 1/5/2018    Procedure: HERNIORRHAPHY INGUINAL;  Incarcerated Left Inguinal Hernia;  Surgeon: Harsh Walker MD;  Location: [facility] OR     KNEE SURGERY  1997     REPAIR HAMMER TOE  12/28/2012    Procedure: REPAIR HAMMER TOE;  LEFT SECOND AND THIRD CLAW TOE RECONSTRUCTION;  Surgeon: Gray Haynes MD;  Location: [facility] OR     REPAIR HAMMER TOE  04/2018    tria     DHEERAJ  2003       Review of Systems   Musculoskeletal: Positive for joint pain.         Physical Exam  /63   Ht 1.854 m (6' 1\")   Wt 88.5 kg (195 lb)   BMI 25.73 kg/m    Constitutional:well-developed, well-nourished, and in no distress. slightly hard of hearing.  Cardiovascular: Intact distal pulses.    Neurological: alert. Gait Abnormal:   Gait with shuffling steps  Station wide  Skin: Skin is warm and dry.   Psychiatric: Mood and affect normal.   Respiratory: unlabored, speaks in full sentences  Lymph: no LAD, no lymphangitis          Left Hip Exam   Gait: Abnormal.    Tenderness   The patient is experiencing tenderness in the greater trochanter, posterior.    Range of Motion   Extension:            Normal  Flexion:                 140  Internal Rotation:  20  External Rotation: 40  Abduction:            " Abnormal  Adduction:            Normal    Muscle Strength   Abduction:  5/5  Adduction:  5/5  Flexion:      5/5    Tests   Kaleb: Positive  Dequan:  Negative    Right Hip Exam   Gait: Abnormal.            X-ray images previously ordered and independently reviewed by me in the office today with the patient. X-ray shows:   XR PELVIS AND HIP LEFT 1 VIEW 9/21/2018 10:47 AM     COMPARISON: None.     HISTORY: Pain.                                                                      IMPRESSION: Mild degenerative changes in the hips with preserved joint  space. No fractures are seen.     JAI ALARCON MD      ASSESSMENT/PLAN    ICD-10-CM    1. Primary osteoarthritis of left hip M16.12 PHYSICAL THERAPY REFERRAL   2. Trochanteric bursitis of left hip M70.62 PHYSICAL THERAPY REFERRAL     Reviewed course, noting good result although temporary for his left hip.  Noted symptoms are more consistent with bursitis than arthritis.  Okay to repeat injection today, but recommended physical therapy to help prevent recurrences.  Patient eager to proceed with injection and agrees to looking to physical therapy, which is available for him at his living facility.  Referral written    Large Joint Injection/Arthocentesis: L greater trochanteric bursa  Date/Time: 10/21/2019 1:14 PM  Performed by: Alexander Guerra MD  Authorized by: Alexander Guerra MD     Indications:  Pain  Needle Size:  22 G  Guidance: landmark guided    Approach:  Lateral  Location:  Hip      Site:  L greater trochanteric bursa  Medications:  4 mL lidocaine 1 %; 5 mL lidocaine 1 %; 40 mg triamcinolone 40 MG/ML  Outcome:  Tolerated well, no immediate complications  Procedure discussed: discussed risks, benefits, and alternatives    Consent Given by:  Patient  Timeout: timeout called immediately prior to procedure    Prep: patient was prepped and draped in usual sterile fashion        Time spent in one-on-one evalution and discussion with patient regarding  nature of problem, course, prior treatments, and therapeutic options, at least 50% of which was spent in counseling and coordination of care: 15 minutes, over and above time spent on injection.

## 2019-10-21 NOTE — LETTER
10/21/2019         RE: Zack Santiago  [address]  [address]  [place] [address]        Dear Colleague,    Thank you for referring your patient, Zack Santiago, to the  [facility]TS [facility]. Please see a copy of my visit note below.    Musculoskeletal Problem          [facility]   Follow-up Visit s Oct 21, 2019    PCP: Zurdo Kendrick      Subjective:  Zack is a 88 year old male who is seen in follow up for evaluation of   Chief Complaint   Patient presents with     Left Hip - Pain     His last visit was on 9/21/18.  Since that time, symptoms have returned. Zack Santiago is accompanied today by self.       Patient had a left hip:  bursa Cortisone injection on 9/21/18 that provided 100% relief, lasting for 4 month(s).   Patient has noticed improved symptoms with injection treatment.  Pain is located left lateral hip, waxing and waning. Pain when he sleeps on right side; does not bother him during the day.   Patient is using no aids.    Patient denies any new injuries.    Denies right-sided hip issues today.    Patient's past medical, surgical, social and family histories are reviewed today.    History from previous visit on 9/21/18  Injury: Reports insidious onset without acute precipitating event.     Location of Pain: left hip lateral, nonradiating   Duration of Pain: 1 year(s)  Rating of Pain at worst: 4/10  Rating of Pain Currently: 4/10  Pain is better with: nothing   Pain is worse with:  sleeping  Treatment so far consists of: physical therapy, bursa injection once in April at [facility]  Associated symptoms: no distal numbness or tingling; denies swelling or warmth  Recent imaging completed: No recent imaging completed.  Prior History of related problems: none    Social History: retired     Past Medical History:   Diagnosis Date     A-fib (H) 2010    post op     AAA (abdominal aortic aneurysm) without rupture (H)     Dr. Walters, endovascular repair done 5/15     Amaurosis fugax  of right eye 10/2016    carotid us no stenosis, echo no change and no clots; ziopatch no afib, svt present     Anemia 2004     Aortic insufficiency 6/14    1+ on echo     Aortic insufficiency 11/2016    mild to mod     ASCVD (arteriosclerotic cardiovascular disease) 2010    cabg, post op afib     BPH (benign prostatic hyperplasia) 2003    turp, flomax added by urol 2/17     Bradycardia 2016    stopped toprol     CKD (chronic kidney disease) stage 3, GFR 30-59 ml/min (H)      Cough 2010    pulm eval, felt due to reflux     Dizzy 2001    mri small vessel dz     GERD (gastroesophageal reflux disease)      HTN (hypertension) 2002     Hx of colonoscopy 2003    tics     Hypothyroid      Hypovitaminosis D      Idiopathic neuropathy      Lung nodule 02/2018    6mm, found during eval of ascending aorta, fu 8/18 no change     OCD (obsessive compulsive disorder)     sees psyche     Panic disorder     Dr. Luna     Spinal headache      Stroke (H) 12/16    expressive aphasia, hosp, seen by neuro, mri pos, carotids min dz, changed from asa to plavix     Sudden hearing loss 6/13    Dr. Garcia, mri brain no acoustic neuroma     SVT (supraventricular tachycardia) (H) 11/16    seen on ziopatch done for amaurosis, longest 15 beats     Thoracic ascending aortic aneurysm (H) 06/2014    4.4cm on echo, aortic root 3.9, fu 5/15 4.8cm; fu done 12/15 and slightly enlarged, fu 6/16 no change, fu 11/16 no change; fu 1/18 echo 5.1-5.3, enlarged, then cta done and only 4.8cm, t fu 6 months, lvh, nl ef, mild ai; fu 8/18 slightly larger; fu 2/19 slightly smaller     Ulcerative colitis (H)     not active for years       Patient Active Problem List    Diagnosis Date Noted     Lung nodule 02/01/2018     Priority: Medium     6mm, found during eval of ascending aorta       Incarcerated inguinal hernia 01/06/2018     Priority: Medium     Rectal incontinence 04/19/2017     Priority: Medium     Cerebrovascular accident (CVA), unspecified mechanism (H)  01/19/2017     Priority: Medium     Atrial fibrillation, unspecified type (H) 01/19/2017     Priority: Medium     SVT (supraventricular tachycardia) (H)      Priority: Medium     seen on ziopatch done for amaurosis, longest 15 beats       Amaurosis fugax of right eye      Priority: Medium     Bradycardia      Priority: Medium     stopped toprol       Aortic insufficiency      Priority: Medium     1+ on echo       Thoracic ascending aortic aneurysm (H)      Priority: Medium     4.4cm on echo, aortic root 3.9       Sudden hearing loss      Priority: Medium     Dr. Garcia, mri brain no acoustic neuroma       CKD (chronic kidney disease) stage 3, GFR 30-59 ml/min (H)      Priority: Medium     Claw toe, acquired 12/28/2012     Priority: Medium     Problem list name updated by automated process. Provider to review       Hyperlipidemia LDL goal <100 06/13/2012     Priority: Medium     OCD (obsessive compulsive disorder)      Priority: Medium     GERD (gastroesophageal reflux disease)      Priority: Medium     AAA (abdominal aortic aneurysm) without rupture (H)      Priority: Medium     Hypovitaminosis D      Priority: Medium     ASCVD (arteriosclerotic cardiovascular disease)      Priority: Medium     cabg, post op afib       Panic disorder      Priority: Medium     Acquired hypothyroidism      Priority: Medium     Ulcerative colitis (H)      Priority: Medium     not active for years       Idiopathic neuropathy      Priority: Medium     Benign essential hypertension      Priority: Medium     Cough      Priority: Medium     pulm eval, felt due to reflux       Advanced directives, counseling/discussion 06/08/2012     Priority: Medium     Patient states has Advance Directive and will bring in a copy to clinic. 6/8/2012          Family History   Problem Relation Age of Onset     C.A.D. Father        Social History     Social History     Marital status:      Spouse name: N/A     Number of children: 2     Years of  "education: N/A     Occupational History     retail Retired     Social History Main Topics     Smoking status: Former Smoker     Packs/day: 1.00     Years: 5.00     Types: Cigarettes     Quit date: 6/13/1965     Smokeless tobacco: Never Used     Alcohol use 0.0 oz/week     0 Standard drinks or equivalent per week      Comment: maybe one glass of wine a week     Drug use: No     Sexual activity: Not Currently     Other Topics Concern     Not on file     Social History Narrative       Past Surgical History:   Procedure Laterality Date     BACK SURGERY  1998     BLADDER SURGERY  1985     C TOTAL KNEE ARTHROPLASTY  2011    left     C TOTAL KNEE ARTHROPLASTY  2009    right     CATARACT IOL, RT/LT  2011     CORONARY ARTERY BYPASS  2010     ENDOVASCULAR REPAIR ANEURYSM ABDOMINAL AORTA N/A 5/27/2015    Procedure: ENDOVASCULAR REPAIR ANEURYSM ABDOMINAL AORTA;  Surgeon: Darin Walters MD;  Location: [facility]     HERNIA REPAIR  2005     HERNIA REPAIR  1998     HERNIORRHAPHY INGUINAL Left 1/5/2018    Procedure: HERNIORRHAPHY INGUINAL;  Incarcerated Left Inguinal Hernia;  Surgeon: Harsh Walker MD;  Location: [facility]     KNEE SURGERY  1997     REPAIR HAMMER TOE  12/28/2012    Procedure: REPAIR HAMMER TOE;  LEFT SECOND AND THIRD CLAW TOE RECONSTRUCTION;  Surgeon: Gray Haynes MD;  Location: [facility] OR     REPAIR HAMMER TOE  04/2018    [place] ESQUEDA  2003       Review of Systems   Musculoskeletal: Positive for joint pain.         Physical Exam  /63   Ht 1.854 m (6' 1\")   Wt 88.5 kg (195 lb)   BMI 25.73 kg/m     Constitutional:well-developed, well-nourished, and in no distress. slightly hard of hearing.  Cardiovascular: Intact distal pulses.    Neurological: alert. Gait Abnormal:   Gait with shuffling steps  Station wide  Skin: Skin is warm and dry.   Psychiatric: Mood and affect normal.   Respiratory: unlabored, speaks in full sentences  Lymph: no LAD, no lymphangitis          Left Hip Exam   Gait: " Abnormal.    Tenderness   The patient is experiencing tenderness in the greater trochanter, posterior.    Range of Motion   Extension:            Normal  Flexion:                 140  Internal Rotation:  20  External Rotation: 40  Abduction:            Abnormal  Adduction:            Normal    Muscle Strength   Abduction:  5/5  Adduction:  5/5  Flexion:      5/5    Tests   Kaleb: Positive  Dequan:  Negative    Right Hip Exam   Gait: Abnormal.            X-ray images previously ordered and independently reviewed by me in the office today with the patient. X-ray shows:   XR PELVIS AND HIP LEFT 1 VIEW 9/21/2018 10:47 AM     COMPARISON: None.     HISTORY: Pain.                                                                      IMPRESSION: Mild degenerative changes in the hips with preserved joint  space. No fractures are seen.     JAI ALARCON MD      ASSESSMENT/PLAN    ICD-10-CM    1. Primary osteoarthritis of left hip M16.12 PHYSICAL THERAPY REFERRAL   2. Trochanteric bursitis of left hip M70.62 PHYSICAL THERAPY REFERRAL     Reviewed course, noting good result although temporary for his left hip.  Noted symptoms are more consistent with bursitis than arthritis.  Okay to repeat injection today, but recommended physical therapy to help prevent recurrences.  Patient eager to proceed with injection and agrees to looking to physical therapy, which is available for him at his living facility.  Referral written    Large Joint Injection/Arthocentesis: L greater trochanteric bursa  Date/Time: 10/21/2019 1:14 PM  Performed by: Alexander Guerra MD  Authorized by: Alexander Guerra MD     Indications:  Pain  Needle Size:  22 G  Guidance: landmark guided    Approach:  Lateral  Location:  Hip      Site:  L greater trochanteric bursa  Medications:  4 mL lidocaine 1 %; 5 mL lidocaine 1 %; 40 mg triamcinolone 40 MG/ML  Outcome:  Tolerated well, no immediate complications  Procedure discussed: discussed risks, benefits,  and alternatives    Consent Given by:  Patient  Timeout: timeout called immediately prior to procedure    Prep: patient was prepped and draped in usual sterile fashion        Time spent in one-on-one evalution and discussion with patient regarding nature of problem, course, prior treatments, and therapeutic options, at least 50% of which was spent in counseling and coordination of care: 15 minutes, over and above time spent on injection.      Again, thank you for allowing me to participate in the care of your patient.        Sincerely,        Alexander Guerra MD

## 2019-12-08 ENCOUNTER — NURSE TRIAGE (OUTPATIENT)
Dept: NURSING | Facility: CLINIC | Age: 84
End: 2019-12-08

## 2019-12-08 NOTE — TELEPHONE ENCOUNTER
Right fingernail fungus, and now saw bleeding from middle finger and ringer finger.  It has stopped bleeding, but patient wants to know if he should take his Plavix tonight.    Paged Dr. Peoples who instructed patient should take his dose tonight and call his PCP in the morning.  Updated patient.    Halle Matos RN  [facility]      Reason for Disposition    Caller has URGENT medication question about med that PCP prescribed and triager unable to answer question    Additional Information    Negative: [1] Major bleeding (e.g., actively dripping or spurting) AND [2] can't be stopped    Negative: Amputation    Negative: Shock suspected (e.g., cold/pale/clammy skin, too weak to stand, low BP, rapid pulse)    Negative: Sounds like a life-threatening emergency to the triager    Negative: [1] Bleeding AND [2] won't stop after 10 minutes of direct pressure (using correct technique)    Negative: Skin is split open or gaping  (or length > 1/2 inch or 12 mm on the skin, 1/4 inch or 6 mm on the face)    Negative: [1] Deep cut AND [2] can see bone or tendons    Negative: [1] Dirt in the wound AND [2] not removed with 15 minutes of scrubbing    Negative: Skin loss involves more than 10% of surface area (Note: the palm of the hand = 1%)    Negative: High pressure injection injury (e.g., from paint gun, usually work-related)    Negative: Wound causes numbness (i.e., loss of sensation)    Negative: Wound causes weakness (i.e., decreased ability to move hand, finger, toe)    Negative: Sounds like a serious injury to the triager    Negative: [1] SEVERE pain AND [2] not improved 2 hours after pain medicine/ice packs    Negative: [1] Looks infected AND [2] large red area or streak (>2 inches or 5 cm)    Negative: [1] Fever AND [2] bright red area or streak    Negative: Suspicious history for the injury    Negative: [1] Raised bruise AND [2] size > 2 inches (5 cm) AND [3] expanding    Negative: [1] Looks infected (spreading  redness, pus) AND [2] no fever    Negative: [1] Last tetanus shot > 5 years ago AND [2] DIRTY cut or scrape    Negative: [1] Last tetanus shot > 10 years ago AND [2] CLEAN cut or scrape    Negative: No prior tetanus shots    Negative: [1] After 14 days AND [2] wound isn't healed    Negative: [1] Has diabetes (diabetes mellitus) AND [2] wound on foot    Protocols used: MEDICATION QUESTION CALL-A-AH, SKIN INJURY-A-AH

## 2020-01-08 ENCOUNTER — TRANSFERRED RECORDS (OUTPATIENT)
Dept: HEALTH INFORMATION MANAGEMENT | Facility: CLINIC | Age: 85
End: 2020-01-08

## 2020-01-27 ENCOUNTER — OFFICE VISIT (OUTPATIENT)
Dept: FAMILY MEDICINE | Facility: CLINIC | Age: 85
End: 2020-01-27
Payer: COMMERCIAL

## 2020-01-27 VITALS
OXYGEN SATURATION: 100 % | TEMPERATURE: 97 F | WEIGHT: 194 LBS | DIASTOLIC BLOOD PRESSURE: 71 MMHG | HEART RATE: 50 BPM | HEIGHT: 70 IN | SYSTOLIC BLOOD PRESSURE: 137 MMHG | BODY MASS INDEX: 27.77 KG/M2

## 2020-01-27 DIAGNOSIS — I71.21 THORACIC ASCENDING AORTIC ANEURYSM (H): ICD-10-CM

## 2020-01-27 DIAGNOSIS — F41.0 PANIC DISORDER: ICD-10-CM

## 2020-01-27 DIAGNOSIS — Z00.00 ROUTINE GENERAL MEDICAL EXAMINATION AT A HEALTH CARE FACILITY: Primary | ICD-10-CM

## 2020-01-27 DIAGNOSIS — I35.1 AORTIC VALVE INSUFFICIENCY, ETIOLOGY OF CARDIAC VALVE DISEASE UNSPECIFIED: ICD-10-CM

## 2020-01-27 DIAGNOSIS — F42.9 OBSESSIVE-COMPULSIVE DISORDER, UNSPECIFIED TYPE: Chronic | ICD-10-CM

## 2020-01-27 DIAGNOSIS — E03.9 ACQUIRED HYPOTHYROIDISM: ICD-10-CM

## 2020-01-27 DIAGNOSIS — N18.30 CKD (CHRONIC KIDNEY DISEASE) STAGE 3, GFR 30-59 ML/MIN (H): ICD-10-CM

## 2020-01-27 DIAGNOSIS — I71.40 ABDOMINAL AORTIC ANEURYSM (AAA) WITHOUT RUPTURE (H): ICD-10-CM

## 2020-01-27 DIAGNOSIS — I48.91 ATRIAL FIBRILLATION, UNSPECIFIED TYPE (H): ICD-10-CM

## 2020-01-27 DIAGNOSIS — I63.9 CEREBROVASCULAR ACCIDENT (CVA), UNSPECIFIED MECHANISM (H): ICD-10-CM

## 2020-01-27 DIAGNOSIS — I10 ESSENTIAL HYPERTENSION: ICD-10-CM

## 2020-01-27 DIAGNOSIS — E78.5 HYPERLIPIDEMIA LDL GOAL <100: ICD-10-CM

## 2020-01-27 DIAGNOSIS — I47.10 SVT (SUPRAVENTRICULAR TACHYCARDIA) (H): ICD-10-CM

## 2020-01-27 DIAGNOSIS — K51.90 ULCERATIVE COLITIS WITHOUT COMPLICATIONS, UNSPECIFIED LOCATION (H): ICD-10-CM

## 2020-01-27 PROBLEM — R15.9 RECTAL INCONTINENCE: Status: RESOLVED | Noted: 2017-04-19 | Resolved: 2020-01-27

## 2020-01-27 PROBLEM — K40.30 INCARCERATED INGUINAL HERNIA: Status: RESOLVED | Noted: 2018-01-06 | Resolved: 2020-01-27

## 2020-01-27 LAB
ERYTHROCYTE [DISTWIDTH] IN BLOOD BY AUTOMATED COUNT: 15 % (ref 10–15)
HCT VFR BLD AUTO: 37.5 % (ref 40–53)
HGB BLD-MCNC: 12.7 G/DL (ref 13.3–17.7)
MCH RBC QN AUTO: 33.8 PG (ref 26.5–33)
MCHC RBC AUTO-ENTMCNC: 33.9 G/DL (ref 31.5–36.5)
MCV RBC AUTO: 100 FL (ref 78–100)
PLATELET # BLD AUTO: 163 10E9/L (ref 150–450)
RBC # BLD AUTO: 3.76 10E12/L (ref 4.4–5.9)
WBC # BLD AUTO: 6.5 10E9/L (ref 4–11)

## 2020-01-27 PROCEDURE — 80053 COMPREHEN METABOLIC PANEL: CPT | Performed by: INTERNAL MEDICINE

## 2020-01-27 PROCEDURE — 84443 ASSAY THYROID STIM HORMONE: CPT | Performed by: INTERNAL MEDICINE

## 2020-01-27 PROCEDURE — G0439 PPPS, SUBSEQ VISIT: HCPCS | Performed by: INTERNAL MEDICINE

## 2020-01-27 PROCEDURE — 80061 LIPID PANEL: CPT | Performed by: INTERNAL MEDICINE

## 2020-01-27 PROCEDURE — 85027 COMPLETE CBC AUTOMATED: CPT | Performed by: INTERNAL MEDICINE

## 2020-01-27 PROCEDURE — 36415 COLL VENOUS BLD VENIPUNCTURE: CPT | Performed by: INTERNAL MEDICINE

## 2020-01-27 PROCEDURE — 99213 OFFICE O/P EST LOW 20 MIN: CPT | Mod: 25 | Performed by: INTERNAL MEDICINE

## 2020-01-27 RX ORDER — LEVOTHYROXINE SODIUM 100 UG/1
100 TABLET ORAL DAILY
Qty: 90 TABLET | Refills: 3 | Status: SHIPPED | OUTPATIENT
Start: 2020-01-27 | End: 2021-01-08

## 2020-01-27 RX ORDER — CHLORTHALIDONE 25 MG/1
25 TABLET ORAL DAILY
Qty: 90 TABLET | Refills: 3 | Status: SHIPPED | OUTPATIENT
Start: 2020-01-27 | End: 2021-01-08

## 2020-01-27 RX ORDER — CLOPIDOGREL BISULFATE 75 MG/1
75 TABLET ORAL DAILY
Qty: 90 TABLET | Refills: 3 | Status: SHIPPED | OUTPATIENT
Start: 2020-01-27 | End: 2021-01-08

## 2020-01-27 RX ORDER — SIMVASTATIN 20 MG
TABLET ORAL
Qty: 90 TABLET | Refills: 3 | Status: SHIPPED | OUTPATIENT
Start: 2020-01-27 | End: 2021-01-08

## 2020-01-27 SDOH — HEALTH STABILITY: MENTAL HEALTH: HOW OFTEN DO YOU HAVE 6 OR MORE DRINKS ON ONE OCCASION?: NEVER

## 2020-01-27 SDOH — HEALTH STABILITY: MENTAL HEALTH: HOW OFTEN DO YOU HAVE A DRINK CONTAINING ALCOHOL?: 2-4 TIMES A MONTH

## 2020-01-27 SDOH — HEALTH STABILITY: MENTAL HEALTH: HOW MANY STANDARD DRINKS CONTAINING ALCOHOL DO YOU HAVE ON A TYPICAL DAY?: 1 OR 2

## 2020-01-27 ASSESSMENT — MIFFLIN-ST. JEOR: SCORE: 1556.23

## 2020-01-27 ASSESSMENT — ACTIVITIES OF DAILY LIVING (ADL): CURRENT_FUNCTION: LAUNDRY REQUIRES ASSISTANCE

## 2020-01-27 NOTE — LETTER
[facility]  [address] [facility]  [address]  [place]  [address]  Tel: [phone]    January 28, 2020    Zack Santiago  [address]    [facility] [address]        Dear Mr. Santiago,    It was a pleasure seeing you for your physical examination.  I wanted to get back to you with your test results.  I have enclosed a copy for your review.      I am happy to report that your cbc or complete blood count is normal with no signs of anemia, leukemia or platelet abnormalities.  Your chemistry panel shows no signs of diabetes.  Your blood salts, kidney tests, liver tests, thyroid test, and proteins are all fine.    Your total cholesterol is 117 with the normal range being below 200.  Your HDL or good cholesterol is 43 with the normal range being above 40.  Your LDL or bad cholesterol is 60 with the normal range being below 130.  These numbers are very good.    I am happy to bring you this overall excellent report.  If you have any questions please call me./MURPHY    Sincerely,    Zurdo Kendrick MD/MURPHY          Enclosure: Lab Results  Results for orders placed or performed in visit on 01/27/20   CBC with platelets     Status: Abnormal   Result Value Ref Range    WBC 6.5 4.0 - 11.0 10e9/L    RBC Count 3.76 (L) 4.4 - 5.9 10e12/L    Hemoglobin 12.7 (L) 13.3 - 17.7 g/dL    Hematocrit 37.5 (L) 40.0 - 53.0 %     78 - 100 fl    MCH 33.8 (H) 26.5 - 33.0 pg    MCHC 33.9 31.5 - 36.5 g/dL    RDW 15.0 10.0 - 15.0 %    Platelet Count 163 150 - 450 10e9/L   Comprehensive metabolic panel     Status: None   Result Value Ref Range    Sodium 139 133 - 144 mmol/L    Potassium 3.9 3.4 - 5.3 mmol/L    Chloride 106 94 - 109 mmol/L    Carbon Dioxide 29 20 - 32 mmol/L    Anion Gap 4 3 - 14 mmol/L    Glucose 86 70 - 99 mg/dL    Urea Nitrogen 25 7 - 30 mg/dL    Creatinine 1.08 0.66 - 1.25 mg/dL    GFR Estimate 61 >60 mL/min/[1.73_m2]    GFR Estimate If Black 70 >60 mL/min/[1.73_m2]    Calcium 8.5 8.5 - 10.1 mg/dL     Bilirubin Total 0.5 0.2 - 1.3 mg/dL    Albumin 3.7 3.4 - 5.0 g/dL    Protein Total 7.0 6.8 - 8.8 g/dL    Alkaline Phosphatase 84 40 - 150 U/L    ALT 19 0 - 70 U/L    AST 11 0 - 45 U/L   Lipid panel reflex to direct LDL Non-fasting     Status: None   Result Value Ref Range    Cholesterol 117 <200 mg/dL    Triglycerides 72 <150 mg/dL    HDL Cholesterol 43 >39 mg/dL    LDL Cholesterol Calculated 60 <100 mg/dL    Non HDL Cholesterol 74 <130 mg/dL   TSH with free T4 reflex     Status: None   Result Value Ref Range    TSH 0.58 0.40 - 4.00 mU/L

## 2020-01-27 NOTE — PATIENT INSTRUCTIONS
I would recommend getting the new shingles shot called shingrix, but I would do it at your pharmacy as they can check with the insurance company to see if it is paid for.

## 2020-01-28 LAB
ALBUMIN SERPL-MCNC: 3.7 G/DL (ref 3.4–5)
ALP SERPL-CCNC: 84 U/L (ref 40–150)
ALT SERPL W P-5'-P-CCNC: 19 U/L (ref 0–70)
ANION GAP SERPL CALCULATED.3IONS-SCNC: 4 MMOL/L (ref 3–14)
AST SERPL W P-5'-P-CCNC: 11 U/L (ref 0–45)
BILIRUB SERPL-MCNC: 0.5 MG/DL (ref 0.2–1.3)
BUN SERPL-MCNC: 25 MG/DL (ref 7–30)
CALCIUM SERPL-MCNC: 8.5 MG/DL (ref 8.5–10.1)
CHLORIDE SERPL-SCNC: 106 MMOL/L (ref 94–109)
CHOLEST SERPL-MCNC: 117 MG/DL
CO2 SERPL-SCNC: 29 MMOL/L (ref 20–32)
CREAT SERPL-MCNC: 1.08 MG/DL (ref 0.66–1.25)
GFR SERPL CREATININE-BSD FRML MDRD: 61 ML/MIN/{1.73_M2}
GLUCOSE SERPL-MCNC: 86 MG/DL (ref 70–99)
HDLC SERPL-MCNC: 43 MG/DL
LDLC SERPL CALC-MCNC: 60 MG/DL
NONHDLC SERPL-MCNC: 74 MG/DL
POTASSIUM SERPL-SCNC: 3.9 MMOL/L (ref 3.4–5.3)
PROT SERPL-MCNC: 7 G/DL (ref 6.8–8.8)
SODIUM SERPL-SCNC: 139 MMOL/L (ref 133–144)
TRIGL SERPL-MCNC: 72 MG/DL
TSH SERPL DL<=0.005 MIU/L-ACNC: 0.58 MU/L (ref 0.4–4)

## 2020-01-28 NOTE — RESULT ENCOUNTER NOTE
It was a pleasure seeing you for your physical examination.  I wanted to get back to you with your test results.  I have enclosed a copy for your review.      I am happy to report that your cbc or complete blood count is normal with no signs of anemia, leukemia or platelet abnormalities.  Your chemistry panel shows no signs of diabetes.  Your blood salts, kidney tests, liver tests, thyroid test, and proteins are all fine.    Your total cholesterol is 117 with the normal range being below 200.  Your HDL or good cholesterol is 43 with the normal range being above 40.  Your LDL or bad cholesterol is 60 with the normal range being below 130.  These numbers are very good.    I am happy to bring you this overall excellent report.  If you have any questions please call me.

## 2020-02-04 ENCOUNTER — MEDICAL CORRESPONDENCE (OUTPATIENT)
Dept: HEALTH INFORMATION MANAGEMENT | Facility: CLINIC | Age: 85
End: 2020-02-04

## 2020-02-05 ENCOUNTER — TELEPHONE (OUTPATIENT)
Dept: FAMILY MEDICINE | Facility: CLINIC | Age: 85
End: 2020-02-05

## 2020-02-05 ENCOUNTER — HOSPITAL ENCOUNTER (OUTPATIENT)
Dept: CT IMAGING | Facility: CLINIC | Age: 85
Discharge: HOME | End: 2020-02-05
Attending: RADIOLOGY | Admitting: RADIOLOGY
Payer: COMMERCIAL

## 2020-02-05 DIAGNOSIS — I71.20 THORACIC AORTIC ANEURYSM WITHOUT RUPTURE (H): ICD-10-CM

## 2020-02-05 PROCEDURE — 25000128 H RX IP 250 OP 636: Performed by: RADIOLOGY

## 2020-02-05 PROCEDURE — 71275 CT ANGIOGRAPHY CHEST: CPT

## 2020-02-05 PROCEDURE — 25000125 ZZHC RX 250: Performed by: RADIOLOGY

## 2020-02-05 RX ORDER — IOPAMIDOL 755 MG/ML
80 INJECTION, SOLUTION INTRAVASCULAR ONCE
Status: COMPLETED | OUTPATIENT
Start: 2020-02-05 | End: 2020-02-05

## 2020-02-05 RX ADMIN — IOPAMIDOL 80 ML: 755 INJECTION, SOLUTION INTRAVENOUS at 11:17

## 2020-02-05 RX ADMIN — SODIUM CHLORIDE 80 ML: 9 INJECTION, SOLUTION INTRAVENOUS at 11:17

## 2020-02-05 NOTE — TELEPHONE ENCOUNTER
Reason for Call:  Other results letter    Detailed comments: patient is anxious to get results letter from his labs of 1/27.  There is a letter dated 1/28 on file,  Please resend to patient, I verified address is correct.    Phone Number Patient can be reached at: Home number on file [phone] (home)    Best Time: any    Can we leave a detailed message on this number? YES if any questions    Call taken on 2/5/2020 at 4:25 PM by Lizy Villegas

## 2020-02-06 ENCOUNTER — TELEPHONE (OUTPATIENT)
Dept: OTHER | Facility: CLINIC | Age: 85
End: 2020-02-06

## 2020-02-06 DIAGNOSIS — I71.40 ABDOMINAL AORTIC ANEURYSM (AAA) WITHOUT RUPTURE (H): Primary | ICD-10-CM

## 2020-02-06 NOTE — TELEPHONE ENCOUNTER
Reviewed CTA with Dr. Gallego.  Contacted patient with results.  Recommend annual f/u.  Trini Gonzalez RN  IR nurse clinician  [phone]  CTA CHEST, ABDOMEN AND PELVIS WITH CONTRAST   2/5/2020 11:29 AM      HISTORY: History of TAA under surveillance; comparison study done  2/13/2019. History of EVAR done 5/2015. Thoracic aortic aneurysm  without rupture (H).     TECHNIQUE: CTA chest, abdomen and pelvis with 80 mL Isovue-370 IV.  Radiation dose for this scan was reduced using automated exposure  control, adjustment of the mA and/or kV according to patient size, or  iterative reconstruction technique. 3-D images were created at an  independent workstation under concurrent supervision at the request of  the ordering provider.     COMPARISON: 4/2/2019, 2/13/2019     FINDINGS:   Vasculature:  Sinus of Valsalva: 3.7 cm, unchanged.  Sinotubular junction: 3.5 cm, unchanged.  Maximum ascending thoracic aorta: 4.8 cm.  Aortic arch: 3.5 cm, unchanged.  Descending thoracic aorta diameter maximum: 3.3 cm, unchanged.     No evidence of thoracic aortic dissection or stenosis. Normal  branching pattern of the great vessels. Great vessels are patent.      Abdominal vasculature: The celiac axis, superior mesenteric artery and  bilateral renal arteries are patent. Changes of endovascular  aortobiiliac aneurysm repair. The stent graft is patent. Both common  iliac arteries and internal iliac arteries are patent. Bilobed  infrarenal aneurysm sac is again noted. The more proximal portion of  the aneurysm sac measures 4.8 x 4.7 cm, previously 4.8 x 4.5 cm. The  more distal aneurysm sac measures 5.8 x 5.2 cm, unchanged. The  previously seen in the endoleak is not well visualized on this exam.     Chest: Changes of median sternotomy. No axillary, hilar or mediastinal  lymphadenopathy. No pleural effusion, pericardial effusion or  pneumothorax. 8 mm nodule in the right lower lobe (series 7, image  34). 5 mm nodule in the right middle lobe,  unchanged. Left lung is  clear.     Abdomen: Evaluation of the solid organ parenchyma is limited secondary  to contrast bolus timing. Left renal cyst is again noted. Hepatic cyst  is again noted. The spleen, adrenal glands, gallbladder and pancreas  show no focal abnormality. No intrahepatic or extrahepatic biliary  dilatation. No intraperitoneal free air or free fluid. Ventral hernia  containing fat, unchanged. No dilated loops of small or large bowel.  Diverticulosis without evidence of diverticulitis. Bladder is normal.  No abdominal or pelvic lymphadenopathy.     Bones: No suspicious bony lesions.                                                                      IMPRESSION:  1. Aneurysmal dilatation of the ascending thoracic aorta measuring 4.8  cm, unchanged.  2. Patent aortobiiliac aneurysm repair. Bilobed infrarenal aneurysm  sac remains. The aneurysm sac has slightly increased when compared to  the prior study. The proximal sac measures 4.8 x 4.7 cm, previously  4.8 x 4.5 cm. The distal aneurysm sac measures 5.8 x 5.2 cm,  unchanged. Previously seen endoleak is not well visualized on today's  exam.  3. Right pulmonary nodules, unchanged.  4. Diverticulosis without evidence of diverticulitis.     THI GARCIA DO

## 2020-02-14 ENCOUNTER — OFFICE VISIT (OUTPATIENT)
Dept: FAMILY MEDICINE | Facility: CLINIC | Age: 85
End: 2020-02-14
Payer: COMMERCIAL

## 2020-02-14 VITALS
TEMPERATURE: 97.3 F | BODY MASS INDEX: 27.77 KG/M2 | OXYGEN SATURATION: 98 % | DIASTOLIC BLOOD PRESSURE: 66 MMHG | HEIGHT: 70 IN | HEART RATE: 61 BPM | SYSTOLIC BLOOD PRESSURE: 149 MMHG | WEIGHT: 194 LBS

## 2020-02-14 DIAGNOSIS — N39.0 URINARY TRACT INFECTION WITHOUT HEMATURIA, SITE UNSPECIFIED: Primary | ICD-10-CM

## 2020-02-14 LAB
ALBUMIN UR-MCNC: NEGATIVE MG/DL
APPEARANCE UR: CLEAR
BILIRUB UR QL STRIP: NEGATIVE
COLOR UR AUTO: YELLOW
GLUCOSE UR STRIP-MCNC: NEGATIVE MG/DL
HGB UR QL STRIP: NEGATIVE
KETONES UR STRIP-MCNC: ABNORMAL MG/DL
LEUKOCYTE ESTERASE UR QL STRIP: NEGATIVE
NITRATE UR QL: NEGATIVE
PH UR STRIP: 6 PH (ref 5–7)
SOURCE: ABNORMAL
SP GR UR STRIP: 1.02 (ref 1–1.03)
UROBILINOGEN UR STRIP-ACNC: 0.2 EU/DL (ref 0.2–1)

## 2020-02-14 PROCEDURE — 87086 URINE CULTURE/COLONY COUNT: CPT | Performed by: INTERNAL MEDICINE

## 2020-02-14 PROCEDURE — 99213 OFFICE O/P EST LOW 20 MIN: CPT | Performed by: INTERNAL MEDICINE

## 2020-02-14 PROCEDURE — 81003 URINALYSIS AUTO W/O SCOPE: CPT | Performed by: INTERNAL MEDICINE

## 2020-02-14 ASSESSMENT — MIFFLIN-ST. JEOR: SCORE: 1556.23

## 2020-02-14 NOTE — PROGRESS NOTES
The patient presents for discomfort in the suprapubic region for the last few days.  It comes and goes.  No nausea or vomiting and has had very normal bowel movements.  No fevers or night sweats and he does not feel ill.  His urine may be a bit more frequent but no dysuria pyuria or hematuria.  He has had a history of a distant urine infection.  He did have a ct of abdomen as noted 2/5/20    Past Medical History:   Diagnosis Date     A-fib (H) 2010    post op     AAA (abdominal aortic aneurysm) without rupture (H)     Dr. 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    carotid us no stenosis, echo no change and no clots; ziopatch no afib, svt present     Anemia 2004     Aortic insufficiency 6/14    1+ on echo     Aortic insufficiency 11/2016    mild to mod     ASCVD (arteriosclerotic cardiovascular disease) 2010    cabg, post op afib     BPH (benign prostatic hyperplasia) 2003    turp, flomax added by urol 2/17     Bradycardia 2016    stopped toprol     CKD (chronic kidney disease) stage 3, GFR 30-59 ml/min (H)      Cough 2010    pulm eval, felt due to reflux     Dizzy 2001    mri small vessel dz     GERD (gastroesophageal reflux disease)      HTN (hypertension) 2002     Hx of colonoscopy 2003    tics     Hypothyroid      Hypovitaminosis D      Idiopathic neuropathy      Lung nodule 02/2018    6mm, found during eval of ascending aorta, fu 8/18 no change     OCD (obsessive compulsive disorder)     sees psyche     Panic disorder     Dr. Luna     Spinal headache      Stroke (H) 12/16    expressive aphasia, hosp, seen by neuro, mri pos, carotids min dz, changed from asa to plavix     Sudden hearing loss 6/13    Dr. Garcia, mri brain no acoustic neuroma     SVT (supraventricular tachycardia) (H) 11/16    seen on ziopatch done for amaurosis, longest 15 beats     Thoracic ascending aortic aneurysm (H) 06/2014    4.4cm on echo, aortic root 3.9, fu 5/15 4.8cm; fu done 12/15 and slightly  enlarged, fu  no change, fu  no change; fu  echo 5.1-5.3, enlarged, then cta done and only 4.8cm, t fu 6 months, lvh, nl ef, mild ai; fu  slightly larger; fu  slightly smaller     Ulcerative colitis (H)     not active for years     Past Surgical History:   Procedure Laterality Date     BACK SURGERY       BLADDER SURGERY  1985     C TOTAL KNEE ARTHROPLASTY      left     C TOTAL KNEE ARTHROPLASTY      right     CATARACT IOL, RT/LT       CORONARY ARTERY BYPASS       ENDOVASCULAR REPAIR ANEURYSM ABDOMINAL AORTA N/A 2015    Procedure: ENDOVASCULAR REPAIR ANEURYSM ABDOMINAL AORTA;  Surgeon: Darin Walters MD;  Location: [facility] OR     HERNIA REPAIR       HERNIA REPAIR       HERNIORRHAPHY INGUINAL Left 2018    Procedure: HERNIORRHAPHY INGUINAL;  Incarcerated Left Inguinal Hernia;  Surgeon: Harsh Walker MD;  Location: [facility] OR     KNEE SURGERY       REPAIR HAMMER TOE  2012    Procedure: REPAIR HAMMER TOE;  LEFT SECOND AND THIRD CLAW TOE RECONSTRUCTION;  Surgeon: Gray Haynes MD;  Location: [facility] OR     REPAIR HAMMER TOE  2018    tria     TURP       Social History     Socioeconomic History     Marital status:      Spouse name: Not on file     Number of children: 2     Years of education: Not on file     Highest education level: Not on file   Occupational History     Occupation: retail     Employer: RETIRED   Social Needs     Financial resource strain: Not on file     Food insecurity:     Worry: Not on file     Inability: Not on file     Transportation needs:     Medical: Not on file     Non-medical: Not on file   Tobacco Use     Smoking status: Former Smoker     Packs/day: 1.00     Years: 5.00     Pack years: 5.00     Types: Cigarettes     Last attempt to quit: 1965     Years since quittin.7     Smokeless tobacco: Never Used   Substance and Sexual Activity     Alcohol use: Yes     Alcohol/week: 0.0 standard drinks      Frequency: 2-4 times a month     Drinks per session: 1 or 2     Binge frequency: Never     Comment: maybe one glass of wine a week     Drug use: No     Sexual activity: Not Currently   Lifestyle     Physical activity:     Days per week: Not on file     Minutes per session: Not on file     Stress: Not on file   Relationships     Social connections:     Talks on phone: Not on file     Gets together: Not on file     Attends Rastafari service: Not on file     Active member of club or organization: Not on file     Attends meetings of clubs or organizations: Not on file     Relationship status: Not on file     Intimate partner violence:     Fear of current or ex partner: Not on file     Emotionally abused: Not on file     Physically abused: Not on file     Forced sexual activity: Not on file   Other Topics Concern     Parent/sibling w/ CABG, MI or angioplasty before 65F 55M? Not Asked   Social History Narrative     Not on file     Current Outpatient Medications   Medication Sig Dispense Refill     ARIPiprazole (ABILIFY) 2 MG tablet Take 1 tablet by mouth At Bedtime.       ASPIRIN NOT PRESCRIBED (INTENTIONAL) Please choose reason not prescribed, below 0 each 0     chlorthalidone (HYGROTON) 25 MG tablet Take 1 tablet (25 mg) by mouth daily 90 tablet 3     ClomiPRAMINE HCl (ANAFRANIL PO) Take 25 mg by mouth every evening        clopidogrel (PLAVIX) 75 MG tablet Take 1 tablet (75 mg) by mouth daily 90 tablet 3     gabapentin (NEURONTIN) 400 MG capsule Take 1 capsule by mouth 2 times daily        levothyroxine (SYNTHROID/LEVOTHROID) 100 MCG tablet Take 1 tablet (100 mcg) by mouth daily 90 tablet 3     LORAZEPAM PO Take 0.5 mg by mouth daily as needed for anxiety       MIRTAZAPINE PO Take 45 mg by mouth At Bedtime        simvastatin (ZOCOR) 20 MG tablet TAKE ONE TABLET BY MOUTH AT BEDTIME 90 tablet 3     Allergies   Allergen Reactions     No Known Allergies      FAMILY HISTORY NOTED AND REVIEWED    REVIEW OF SYSTEMS:  "above    PHYSICAL EXAM    BP (!) 149/66 (BP Location: Left arm, Patient Position: Chair, Cuff Size: Adult Large)   Pulse 61   Temp 97.3  F (36.3  C) (Oral)   Ht 1.778 m (5' 10\")   Wt 88 kg (194 lb)   SpO2 98%   BMI 27.84 kg/m      Patient appears non toxic  Abdomen normal active bowel sounds, soft, min tender left lower abdomen, no mgr, no hepatosplenomegaly    ua neg    ASSESSMENT:  Suprapubic discomfort with neg ua, doubt diverticulitis, no signs acute abdomen, doubt colitis, perf, etc    PLAN:  Check uc  If worsens over weekend call or to emergency room    Zurdo Kendrick M.D.        "

## 2020-02-15 LAB
BACTERIA SPEC CULT: NORMAL
SPECIMEN SOURCE: NORMAL

## 2020-02-16 ENCOUNTER — TELEPHONE (OUTPATIENT)
Dept: FAMILY MEDICINE | Facility: CLINIC | Age: 85
End: 2020-02-16

## 2020-02-16 NOTE — TELEPHONE ENCOUNTER
Please call and let patient know uc is negative.  How is he feeling?    Thanks    Zurdo Kendrick M.D.

## 2020-02-24 ENCOUNTER — TELEPHONE (OUTPATIENT)
Dept: FAMILY MEDICINE | Facility: CLINIC | Age: 85
End: 2020-02-24

## 2020-02-24 DIAGNOSIS — R15.9 INCONTINENCE OF FECES, UNSPECIFIED FECAL INCONTINENCE TYPE: Primary | ICD-10-CM

## 2020-02-24 NOTE — TELEPHONE ENCOUNTER
Reason for call:  Symptom   Symptom or request: incontinent of bowels    Duration (how long have symptoms been present): 2 days  Have you been treated for this before? No    Additional comments: Inquiring what to do now.    Phone number to reach patient:  Cell number on file:    Telephone Information:   Mobile [phone]       Best Time:  asap    Can we leave a detailed message on this number?  YES

## 2020-02-24 NOTE — TELEPHONE ENCOUNTER
"NOT diarrhea. Not frequent, watery.  Looser than usual and \"just come from out of nowhere, resulting in an \"accident; has to throw his clothing away when this occurs\".    States he has taken Metamucil for YEARS.  States the Colorectal center saw him for this is spring 2018.  He was told to use a DAILY medication, but does not recall the name. (not Miralax)    Reviewed transferred records:  Found Imodium tablet once a day was recommended for this same issue.  He has decreased anal muscle/sphincter tone.    Advised that he is probably due for a follow up appt.with Colorectal.  He would like to try daily Immodium for a few days to see if it helps. (remembers that last time his symptoms resolved).    He will take one Imodium per day for no more than one week, but should call if symptoms worsen/change; especially if abdominal pain, blood in stool, fever, constipation.    Sofi Martin RN on 2/24/2020 at 5:02 PM        "

## 2020-03-09 NOTE — TELEPHONE ENCOUNTER
Pt called stating he is still having problems with this. Wondering what to do    Pt ph: [phone]

## 2020-03-10 NOTE — TELEPHONE ENCOUNTER
Negative for all symptoms listed below    [facility] needs a new referral     Pended per referral placed in 2018.     Please review and authorize if appropriate,     Thank you,   Brie BRADFORD RN

## 2020-03-10 NOTE — TELEPHONE ENCOUNTER
I agree.  Please let me know however if he is having incontinence of bladder as well as leg tingling, numbness or weakness or fevers.  Also, please let me know if he is having abdominal pains or nausea or vomiting.    Thank you    Zurdo Kendrick M.D.

## 2020-03-10 NOTE — TELEPHONE ENCOUNTER
Called Pt:      FYI to PCP: see TEOFILO RN note below-     Pt calls with ongoing symptoms    He tried Imodium for a few days,  But did not continue as the package recommends only taking for a few days     [facility]- Pt was recommended to add Imodium once daily. Also recommended was Biofeedback therapy to strengthen pelvic floor muscles- Pt declined at the time.    Recommendation: Contact [facility] for a f/u appt, consider biofeedback therapy to strengthen his muscles.  I have recommended OK to take imodium daily at least until he meets with Colorectal to come up with a plan     Provided Pt with number to to call [facility] Call if they need new referral     Pt verbalized agreement to plan     Brie SILVA RN

## 2020-03-16 ENCOUNTER — TRANSFERRED RECORDS (OUTPATIENT)
Dept: HEALTH INFORMATION MANAGEMENT | Facility: CLINIC | Age: 85
End: 2020-03-16

## 2020-03-17 ENCOUNTER — TELEPHONE (OUTPATIENT)
Dept: FAMILY MEDICINE | Facility: CLINIC | Age: 85
End: 2020-03-17

## 2020-03-17 DIAGNOSIS — R19.5 LOOSE STOOLS: Primary | ICD-10-CM

## 2020-03-17 NOTE — TELEPHONE ENCOUNTER
See encounter from 2/24/2020. Pt cannot get in for colorectal specialist until April 20th. Pt wants a different referral. Pt was informed that this may not happen due to corona virus. Pt wants a different referral anyway [phone]

## 2020-03-18 NOTE — TELEPHONE ENCOUNTER
[facility]: Colon and Rectal Surgery Clinic [facility] [phone]   [email or web address]

## 2020-04-08 ENCOUNTER — TRANSFERRED RECORDS (OUTPATIENT)
Dept: HEALTH INFORMATION MANAGEMENT | Facility: CLINIC | Age: 85
End: 2020-04-08

## 2020-04-09 ENCOUNTER — HOSPITAL ENCOUNTER (OUTPATIENT)
Dept: GENERAL RADIOLOGY | Facility: CLINIC | Age: 85
Discharge: HOME | End: 2020-04-09
Attending: PHYSICIAN ASSISTANT | Admitting: PHYSICIAN ASSISTANT
Payer: COMMERCIAL

## 2020-04-09 DIAGNOSIS — R15.9 FULL INCONTINENCE-FECES: ICD-10-CM

## 2020-04-09 PROCEDURE — 74019 RADEX ABDOMEN 2 VIEWS: CPT

## 2020-04-17 ENCOUNTER — HOSPITAL ENCOUNTER (OUTPATIENT)
Dept: GENERAL RADIOLOGY | Facility: CLINIC | Age: 85
Discharge: HOME | End: 2020-04-17
Attending: PHYSICIAN ASSISTANT | Admitting: PHYSICIAN ASSISTANT
Payer: COMMERCIAL

## 2020-04-17 DIAGNOSIS — R19.7 OVERFLOW DIARRHEA: ICD-10-CM

## 2020-04-17 PROCEDURE — 74283 THER NMA RDCTJ INTUS/OBSTRCJ: CPT

## 2020-04-17 RX ADMIN — DIATRIZOATE MEGLUMINE AND DIATRIZOATE SODIUM 480 ML: 660; 100 SOLUTION ORAL; RECTAL at 10:00

## 2020-04-24 ENCOUNTER — TELEPHONE (OUTPATIENT)
Dept: FAMILY MEDICINE | Facility: CLINIC | Age: 85
End: 2020-04-24

## 2020-04-24 NOTE — TELEPHONE ENCOUNTER
Reason for Call:  Other     Detailed comments: Pt is concerned about his condition and would like to speak with Dr. Kendrick about it    Phone Number Patient can be reached at: Home number on file [phone] (home)    Best Time: any    Can we leave a detailed message on this number? YES    Call taken on 4/24/2020 at 4:08 PM by Valeria Strong

## 2020-04-24 NOTE — TELEPHONE ENCOUNTER
Called patient to set up appointment. Appointment made for Monday April 27th at 1:30 pm.    Lydia Chacon MA on 4/24/2020 at 4:40 PM

## 2020-04-27 ENCOUNTER — VIRTUAL VISIT (OUTPATIENT)
Dept: FAMILY MEDICINE | Facility: CLINIC | Age: 85
End: 2020-04-27
Payer: COMMERCIAL

## 2020-04-27 DIAGNOSIS — K59.09 OTHER CONSTIPATION: Primary | ICD-10-CM

## 2020-04-27 PROCEDURE — 99213 OFFICE O/P EST LOW 20 MIN: CPT | Mod: 95 | Performed by: INTERNAL MEDICINE

## 2020-04-27 RX ORDER — POLYETHYLENE GLYCOL 3350 17 G/17G
1 POWDER, FOR SOLUTION ORAL DAILY
COMMUNITY
End: 2020-07-21

## 2020-04-27 NOTE — PROGRESS NOTES
"Zack Santiago is a 88 year old male who is being evaluated via a billable telephone visit.      The patient has been notified of following:     \"This telephone visit will be conducted via a call between you and your physician/provider. We have found that certain health care needs can be provided without the need for a physical exam.  This service lets us provide the care you need with a short phone conversation.  If a prescription is necessary we can send it directly to your pharmacy.  If lab work is needed we can place an order for that and you can then stop by our lab to have the test done at a later time.    Telephone visits are billed at different rates depending on your insurance coverage. During this emergency period, for some insurers they may be billed the same as an in-person visit.  Please reach out to your insurance provider with any questions.    If during the course of the call the physician/provider feels a telephone visit is not appropriate, you will not be charged for this service.\"    Patient has given verbal consent for Telephone visit?  Yes    How would you like to obtain your AVS? Mail a copy    Has been seeing cr surgery for 1 month, leaking stool but felt constipated without good bowel movement for 3 weeks.  Lots of gas and using miralax, not helping.   Prior xr colon done.  No blood in stool.  This started in early February.  Had been on metamucil for years but off that with miralax use.  Also drinks prune juice daily.  No abdomen pain or n/v.  No fever.  ?weight same.    As noted recent anoscopy done by cr surgery, no tumor.    I suspect he has constipation with overflow incont.  Will have patient stop the miralax, go back to metamucil daily and prunes.  For now try one bottle of mg citrate, report results to me end of week.    Zurdo Kendrick M.D.  Time: 11 minutes  Time   "

## 2020-04-28 ENCOUNTER — TELEPHONE (OUTPATIENT)
Dept: FAMILY MEDICINE | Facility: CLINIC | Age: 85
End: 2020-04-28

## 2020-04-28 NOTE — TELEPHONE ENCOUNTER
To PCP:     Pt reports no success from Mag Citrate taken this AM.     If not results, is there a second step advised?     Would Pt benefit from enema? OTC OK?     Thank you,   Brie SILVA RN

## 2020-04-28 NOTE — TELEPHONE ENCOUNTER
Reason for Call:  Other     Detailed comments: patient had phone appt yesterday with Dr Kendrick.  Today, this morning, he took a bottle of magnesium citrate, and still has not had a bowel movement.   Just some gas.  How long should it take?     Phone Number Patient can be reached at: Cell number on file:    Telephone Information:   Mobile [phone]       Best Time: any    Can we leave a detailed message on this number? YES    Call taken on 4/28/2020 at 1:54 PM by Lizy Villegas

## 2020-04-28 NOTE — TELEPHONE ENCOUNTER
Pt called back  Been constipated 3 weeks  Took mag citrate this morning- very small BM   Pt stopped Miralax and is still using metamucil   He did an enema a week and a half a go in the ED    He may try OTC suppository tonight- he doesn't want to do another enema    He would like further instructions    Felix DEAN RN

## 2020-04-29 DIAGNOSIS — Z20.822 ENCOUNTER FOR LABORATORY TESTING FOR COVID-19 VIRUS: Primary | ICD-10-CM

## 2020-04-29 NOTE — TELEPHONE ENCOUNTER
"Spoke with Pt:      Denies that he has had any more luck with the Mag Citrate     BM this AM: Soft stool, but not much, denies blood     Hasn't had a \"good bowel movement in 3 weeks. I've had leakage\"     Felt cleaned out by enema received at Xray appt 4/17-- but did not return to a regular patter after that.     Colonoscopy planned for 5/5/2020- they called and scheduled him this morning. The GI doctor states he will have a \"Double Prep\" prior to procedure (will start on Sunday). Pt was advised Pt to STOP Plavix 5 days in advance by GI    We discussed to take tonight's dose and then STOP tomorrow-Monday (5 full days OFF medication before procedure day on Tuesday)- will likely be able to resume Tuesday evening after procedure.     Pt agreed with plan     To PCP: Should Pt take suppository in the meantime?     Thank you,   Brie SILVA RN      "

## 2020-04-29 NOTE — TELEPHONE ENCOUNTER
Spoke with Pt:     He has no abdominal pain or fever to date.     He agrees with carolina SILVA RN

## 2020-04-29 NOTE — TELEPHONE ENCOUNTER
Pt called and said it still hasnt worked. Is still blocked up. Pt is scheduled for colonoscopy next Tuesday, may 5th. Pt wants to know if he should keep that appt and what he should do in the meantime. Pt is taking a suppository today

## 2020-04-29 NOTE — TELEPHONE ENCOUNTER
He can use the suppository but also can continue the miralax once daily in addition to the metamucil    Call if abdomen pain or fever    Zurdo Kendrick M.D.

## 2020-05-05 ENCOUNTER — HOSPITAL ENCOUNTER (OUTPATIENT)
Facility: CLINIC | Age: 85
Discharge: HOME | End: 2020-05-05
Attending: COLON & RECTAL SURGERY | Admitting: COLON & RECTAL SURGERY
Payer: COMMERCIAL

## 2020-05-05 VITALS
OXYGEN SATURATION: 98 % | HEART RATE: 47 BPM | DIASTOLIC BLOOD PRESSURE: 61 MMHG | SYSTOLIC BLOOD PRESSURE: 132 MMHG | BODY MASS INDEX: 25.18 KG/M2 | HEIGHT: 73 IN | WEIGHT: 190 LBS

## 2020-05-05 LAB — COLONOSCOPY: NORMAL

## 2020-05-05 PROCEDURE — 25800030 ZZH RX IP 258 OP 636: Performed by: COLON & RECTAL SURGERY

## 2020-05-05 PROCEDURE — 99153 MOD SED SAME PHYS/QHP EA: CPT | Performed by: COLON & RECTAL SURGERY

## 2020-05-05 PROCEDURE — 25000128 H RX IP 250 OP 636: Performed by: COLON & RECTAL SURGERY

## 2020-05-05 PROCEDURE — 45378 DIAGNOSTIC COLONOSCOPY: CPT | Performed by: COLON & RECTAL SURGERY

## 2020-05-05 PROCEDURE — G0500 MOD SEDAT ENDO SERVICE >5YRS: HCPCS | Performed by: COLON & RECTAL SURGERY

## 2020-05-05 RX ORDER — ONDANSETRON 2 MG/ML
4 INJECTION INTRAMUSCULAR; INTRAVENOUS
Status: DISCONTINUED | OUTPATIENT
Start: 2020-05-05 | End: 2020-05-05 | Stop reason: HOSPADM

## 2020-05-05 RX ORDER — NALOXONE HYDROCHLORIDE 0.4 MG/ML
.1-.4 INJECTION, SOLUTION INTRAMUSCULAR; INTRAVENOUS; SUBCUTANEOUS
Status: DISCONTINUED | OUTPATIENT
Start: 2020-05-05 | End: 2020-05-05 | Stop reason: HOSPADM

## 2020-05-05 RX ORDER — DIPHENHYDRAMINE HCL 25 MG
25 CAPSULE ORAL EVERY 4 HOURS PRN
Status: DISCONTINUED | OUTPATIENT
Start: 2020-05-05 | End: 2020-05-05 | Stop reason: HOSPADM

## 2020-05-05 RX ORDER — LIDOCAINE 40 MG/G
CREAM TOPICAL
Status: DISCONTINUED | OUTPATIENT
Start: 2020-05-05 | End: 2020-05-05 | Stop reason: HOSPADM

## 2020-05-05 RX ORDER — FENTANYL CITRATE 50 UG/ML
INJECTION, SOLUTION INTRAMUSCULAR; INTRAVENOUS PRN
Status: DISCONTINUED | OUTPATIENT
Start: 2020-05-05 | End: 2020-05-05 | Stop reason: HOSPADM

## 2020-05-05 RX ORDER — DIPHENHYDRAMINE HYDROCHLORIDE 50 MG/ML
25 INJECTION INTRAMUSCULAR; INTRAVENOUS EVERY 4 HOURS PRN
Status: DISCONTINUED | OUTPATIENT
Start: 2020-05-05 | End: 2020-05-05 | Stop reason: HOSPADM

## 2020-05-05 RX ORDER — ONDANSETRON 2 MG/ML
4 INJECTION INTRAMUSCULAR; INTRAVENOUS EVERY 6 HOURS PRN
Status: DISCONTINUED | OUTPATIENT
Start: 2020-05-05 | End: 2020-05-05 | Stop reason: HOSPADM

## 2020-05-05 RX ORDER — FLUMAZENIL 0.1 MG/ML
0.2 INJECTION, SOLUTION INTRAVENOUS
Status: DISCONTINUED | OUTPATIENT
Start: 2020-05-05 | End: 2020-05-05 | Stop reason: HOSPADM

## 2020-05-05 RX ORDER — ONDANSETRON 4 MG/1
4 TABLET, ORALLY DISINTEGRATING ORAL EVERY 6 HOURS PRN
Status: DISCONTINUED | OUTPATIENT
Start: 2020-05-05 | End: 2020-05-05 | Stop reason: HOSPADM

## 2020-05-05 ASSESSMENT — MIFFLIN-ST. JEOR: SCORE: 1585.71

## 2020-05-05 NOTE — OP NOTE
See Provation Note In Chart    Kenya Loco MD  [facility]  Office Phone # [phone]

## 2020-05-05 NOTE — H&P
Pre-Endoscopy History and Physical     Zack Santiago MRN# [id number]   YOB: 1931 Age: 88 year old     Date of Procedure: 05/05/20  Primary care provider: Zurdo Kendrick  Type of Endoscopy: Colonoscopy  Reason for Procedure: change in bowel habits  Type of Anesthesia Anticipated: Moderate Sedation    HPI:    Zack is a 88 year old male who will be undergoing the above procedure.      A history and physical has been performed. The patient's medications and allergies have been reviewed. The risks and benefits of the procedure and the sedation options and risks were discussed with the patient.  All questions were answered and informed consent was obtained.      He denies a personal or family history of anesthesia complications or bleeding disorders.     Allergies   Allergen Reactions     No Known Allergies         No current facility-administered medications on file prior to encounter.   ARIPiprazole (ABILIFY) 2 MG tablet, Take 1 tablet by mouth At Bedtime.  chlorthalidone (HYGROTON) 25 MG tablet, Take 1 tablet (25 mg) by mouth daily  ClomiPRAMINE HCl (ANAFRANIL PO), Take 25 mg by mouth every evening   gabapentin (NEURONTIN) 400 MG capsule, Take 1 capsule by mouth 2 times daily   levothyroxine (SYNTHROID/LEVOTHROID) 100 MCG tablet, Take 1 tablet (100 mcg) by mouth daily  MIRTAZAPINE PO, Take 45 mg by mouth At Bedtime   polyethylene glycol (MIRALAX) 17 g packet, Take 1 packet by mouth daily  simvastatin (ZOCOR) 20 MG tablet, TAKE ONE TABLET BY MOUTH AT BEDTIME  ASPIRIN NOT PRESCRIBED (INTENTIONAL), Please choose reason not prescribed, below  clopidogrel (PLAVIX) 75 MG tablet, Take 1 tablet (75 mg) by mouth daily  LORAZEPAM PO, Take 0.5 mg by mouth daily as needed for anxiety        Patient Active Problem List   Diagnosis     Advanced directives, counseling/discussion     Hyperlipidemia LDL goal <100     OCD (obsessive compulsive disorder)     GERD (gastroesophageal reflux disease)     AAA (abdominal  aortic aneurysm) without rupture (H)     Hypovitaminosis D     ASCVD (arteriosclerotic cardiovascular disease)     Acquired hypothyroidism     Ulcerative colitis (H)     Idiopathic neuropathy     Benign essential hypertension     Cough     Claw toe, acquired     CKD (chronic kidney disease) stage 3, GFR 30-59 ml/min (H)     Sudden hearing loss     Thoracic ascending aortic aneurysm (H)     Aortic insufficiency     Bradycardia     Amaurosis fugax of right eye     SVT (supraventricular tachycardia) (H)     Cerebrovascular accident (CVA), unspecified mechanism (H)     Atrial fibrillation, unspecified type (H)     Lung nodule        Past Medical History:   Diagnosis Date     A-fib (H) 2010    post op     AAA (abdominal aortic aneurysm) without rupture (H)     Dr. 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    carotid us no stenosis, echo no change and no clots; ziopatch no afib, svt present     Anemia 2004     Aortic insufficiency 6/14    1+ on echo     Aortic insufficiency 11/2016    mild to mod     ASCVD (arteriosclerotic cardiovascular disease) 2010    cabg, post op afib     BPH (benign prostatic hyperplasia) 2003    turp, flomax added by urol 2/17     Bradycardia 2016    stopped toprol     CKD (chronic kidney disease) stage 3, GFR 30-59 ml/min (H)      Cough 2010    pulm eval, felt due to reflux     Dizzy 2001    mri small vessel dz     GERD (gastroesophageal reflux disease)      HTN (hypertension) 2002     Hx of colonoscopy 2003    tics     Hypothyroid      Hypovitaminosis D      Idiopathic neuropathy      Lung nodule 02/2018    6mm, found during eval of ascending aorta, fu 8/18 no change     OCD (obsessive compulsive disorder)     sees psyche     Panic disorder     Dr. Luna     Spinal headache      Stroke (H) 12/16    expressive aphasia, hosp, seen by neuro, mri pos, carotids min dz, changed from asa to plavix     Sudden hearing loss 6/13    Dr. Garcia, mri brain no acoustic  neuroma     SVT (supraventricular tachycardia) (H)     seen on ziopatch done for amaurosis, longest 15 beats     Thoracic ascending aortic aneurysm (H) 2014    4.4cm on echo, aortic root 3.9, fu 5/15 4.8cm; fu done 12/15 and slightly enlarged, fu  no change, fu  no change; fu  echo 5.1-5.3, enlarged, then cta done and only 4.8cm, t fu 6 months, lvh, nl ef, mild ai; fu  slightly larger; fu  slightly smaller     Ulcerative colitis (H)     not active for years        Past Surgical History:   Procedure Laterality Date     BACK SURGERY       BLADDER SURGERY       C TOTAL KNEE ARTHROPLASTY      left     C TOTAL KNEE ARTHROPLASTY      right     CATARACT IOL, RT/LT       CORONARY ARTERY BYPASS       ENDOVASCULAR REPAIR ANEURYSM ABDOMINAL AORTA N/A 2015    Procedure: ENDOVASCULAR REPAIR ANEURYSM ABDOMINAL AORTA;  Surgeon: Darin Walters MD;  Location:  OR     HERNIA REPAIR       HERNIA REPAIR       HERNIORRHAPHY INGUINAL Left 2018    Procedure: HERNIORRHAPHY INGUINAL;  Incarcerated Left Inguinal Hernia;  Surgeon: Harsh Walker MD;  Location:  OR     KNEE SURGERY       REPAIR HAMMER TOE  2012    Procedure: REPAIR HAMMER TOE;  LEFT SECOND AND THIRD CLAW TOE RECONSTRUCTION;  Surgeon: Gray Haynes MD;  Location:  OR     REPAIR HAMMER TOE  2018    tridevin ESQUEDA         Social History     Tobacco Use     Smoking status: Former Smoker     Packs/day: 1.00     Years: 5.00     Pack years: 5.00     Types: Cigarettes     Last attempt to quit: 1965     Years since quittin.9     Smokeless tobacco: Never Used   Substance Use Topics     Alcohol use: Yes     Alcohol/week: 0.0 standard drinks     Frequency: 2-4 times a month     Drinks per session: 1 or 2     Binge frequency: Never     Comment: maybe one glass of wine a week       Family History   Problem Relation Age of Onset     C.A.D. Father      Lymphoma Sister   "      REVIEW OF SYSTEMS:     5 point ROS negative except as noted above in HPI, including Gen., Resp., CV, GI &  system review.      PHYSICAL EXAM:   /56   Pulse (!) 46   Resp 11   Ht 1.854 m (6' 1\")   Wt 86.2 kg (190 lb)   SpO2 100%   BMI 25.07 kg/m   Estimated body mass index is 25.07 kg/m  as calculated from the following:    Height as of this encounter: 1.854 m (6' 1\").    Weight as of this encounter: 86.2 kg (190 lb).   GENERAL APPEARANCE: healthy and alert  MENTAL STATUS: alert  AIRWAY EXAM: Mallampatti Class II (visualization of the soft palate, fauces, and uvula)  RESP: lungs clear to auscultation - no rales, rhonchi or wheezes  CV: bradycardia and regular rhythm      IMPRESSION   ASA Class 3 - Severe systemic disease, but not incapacitating        PLAN:     Plan for colonoscopy. We discussed the risks, benefits and alternatives and the patient wished to proceed.    The above has been forwarded to the consulting provider.      Kenya Loco MD  [facility]  Phone: [phone]  Fax: [phone]  May 5, 2020    "

## 2020-05-21 ENCOUNTER — VIRTUAL VISIT (OUTPATIENT)
Dept: FAMILY MEDICINE | Facility: CLINIC | Age: 85
End: 2020-05-21
Payer: COMMERCIAL

## 2020-05-21 DIAGNOSIS — K59.00 CONSTIPATION, UNSPECIFIED CONSTIPATION TYPE: Primary | ICD-10-CM

## 2020-05-21 PROCEDURE — 99213 OFFICE O/P EST LOW 20 MIN: CPT | Mod: TEL | Performed by: INTERNAL MEDICINE

## 2020-05-21 NOTE — PROGRESS NOTES
"Zack Santiago is a 88 year old male who is being evaluated via a billable telephone visit.      The patient has been notified of following:     \"This telephone visit will be conducted via a call between you and your physician/provider. We have found that certain health care needs can be provided without the need for a physical exam.  This service lets us provide the care you need with a short phone conversation.  If a prescription is necessary we can send it directly to your pharmacy.  If lab work is needed we can place an order for that and you can then stop by our lab to have the test done at a later time.    Telephone visits are billed at different rates depending on your insurance coverage. During this emergency period, for some insurers they may be billed the same as an in-person visit.  Please reach out to your insurance provider with any questions.    If during the course of the call the physician/provider feels a telephone visit is not appropriate, you will not be charged for this service.\"    Patient has given verbal consent for Telephone visit?  Yes    What phone number would you like to be contacted at? [phone]    How would you like to obtain your AVS? Mail a copy    Subjective     Zack Santiago is a 88 year old male who presents via phone visit today for the following health issues:    He has had constipation for about 6 weeks.  He is having leaking requiring him to wear diapers.  He has very little stool and goes small amounts 2x a day or so.  He is not having abdomen pain or n/v.  No b/b stools.  He feels fine.  His diet has not changed.  No medication changes.  He has tried mg citrate, miralax, prune juice, dulcolox supp, metamucil daily and no relief. He had a colonoscopy as noted 5/20 and no tumor or obstruction found.    Past Medical History:   Diagnosis Date     A-fib (H) 2010    post op     AAA (abdominal aortic aneurysm) without rupture (H)     Dr. Walters, endovascular repair done 5/15 "     Amaurosis fugax of right eye 10/2016    carotid us no stenosis, echo no change and no clots; ziopatch no afib, svt present     Anemia 2004     Aortic insufficiency 6/14    1+ on echo     Aortic insufficiency 11/2016    mild to mod     ASCVD (arteriosclerotic cardiovascular disease) 2010    cabg, post op afib     BPH (benign prostatic hyperplasia) 2003    turp, flomax added by urol 2/17     Bradycardia 2016    stopped toprol     CKD (chronic kidney disease) stage 3, GFR 30-59 ml/min (H)      Constipation 05/2020    colonoscopy nl     Cough 2010    pulm eval, felt due to reflux     Dizzy 2001    mri small vessel dz     GERD (gastroesophageal reflux disease)      HTN (hypertension) 2002     Hx of colonoscopy 2003    tics     Hypothyroid      Hypovitaminosis D      Idiopathic neuropathy      Lung nodule 02/2018    6mm, found during eval of ascending aorta, fu 8/18 no change     OCD (obsessive compulsive disorder)     sees psyche     Panic disorder     Dr. Luna     Spinal headache      Stroke (H) 12/16    expressive aphasia, hosp, seen by neuro, mri pos, carotids min dz, changed from asa to plavix     Sudden hearing loss 6/13    Dr. Garcia, mri brain no acoustic neuroma     SVT (supraventricular tachycardia) (H) 11/16    seen on ziopatch done for amaurosis, longest 15 beats     Thoracic ascending aortic aneurysm (H) 06/2014    4.4cm on echo, aortic root 3.9, fu 5/15 4.8cm; fu done 12/15 and slightly enlarged, fu 6/16 no change, fu 11/16 no change; fu 1/18 echo 5.1-5.3, enlarged, then cta done and only 4.8cm, t fu 6 months, lvh, nl ef, mild ai; fu 8/18 slightly larger; fu 2/19 slightly smaller     Ulcerative colitis (H)     not active for years     Past Surgical History:   Procedure Laterality Date     BACK SURGERY  1998     BLADDER SURGERY  1985     C TOTAL KNEE ARTHROPLASTY  2011    left     C TOTAL KNEE ARTHROPLASTY  2009    right     CATARACT IOL, RT/LT  2011     COLONOSCOPY N/A 5/5/2020    Procedure:  COLONOSCOPY;  Surgeon: Kenya Loco MD;  Location:  GI     CORONARY ARTERY BYPASS       ENDOVASCULAR REPAIR ANEURYSM ABDOMINAL AORTA N/A 2015    Procedure: ENDOVASCULAR REPAIR ANEURYSM ABDOMINAL AORTA;  Surgeon: Darin Walters MD;  Location:  OR     HERNIA REPAIR  2005     HERNIA REPAIR  1998     HERNIORRHAPHY INGUINAL Left 2018    Procedure: HERNIORRHAPHY INGUINAL;  Incarcerated Left Inguinal Hernia;  Surgeon: Harsh Walker MD;  Location:  OR     KNEE SURGERY       REPAIR HAMMER TOE  2012    Procedure: REPAIR HAMMER TOE;  LEFT SECOND AND THIRD CLAW TOE RECONSTRUCTION;  Surgeon: Gray Haynes MD;  Location:  OR     REPAIR HAMMER TOE  2018    tria     TURP       Social History     Socioeconomic History     Marital status:      Spouse name: Not on file     Number of children: 2     Years of education: Not on file     Highest education level: Not on file   Occupational History     Occupation: retail     Employer: RETIRED   Social Needs     Financial resource strain: Not on file     Food insecurity     Worry: Not on file     Inability: Not on file     Transportation needs     Medical: Not on file     Non-medical: Not on file   Tobacco Use     Smoking status: Former Smoker     Packs/day: 1.00     Years: 5.00     Pack years: 5.00     Types: Cigarettes     Last attempt to quit: 1965     Years since quittin.9     Smokeless tobacco: Never Used   Substance and Sexual Activity     Alcohol use: Yes     Alcohol/week: 0.0 standard drinks     Frequency: 2-4 times a month     Drinks per session: 1 or 2     Binge frequency: Never     Comment: maybe one glass of wine a week     Drug use: No     Sexual activity: Not Currently   Lifestyle     Physical activity     Days per week: Not on file     Minutes per session: Not on file     Stress: Not on file   Relationships     Social connections     Talks on phone: Not on file     Gets together: Not on  file     Attends Islam service: Not on file     Active member of club or organization: Not on file     Attends meetings of clubs or organizations: Not on file     Relationship status: Not on file     Intimate partner violence     Fear of current or ex partner: Not on file     Emotionally abused: Not on file     Physically abused: Not on file     Forced sexual activity: Not on file   Other Topics Concern     Parent/sibling w/ CABG, MI or angioplasty before 65F 55M? Not Asked   Social History Narrative     Not on file     Current Outpatient Medications   Medication Sig Dispense Refill     ARIPiprazole (ABILIFY) 2 MG tablet Take 1 tablet by mouth At Bedtime.       ASPIRIN NOT PRESCRIBED (INTENTIONAL) Please choose reason not prescribed, below 0 each 0     chlorthalidone (HYGROTON) 25 MG tablet Take 1 tablet (25 mg) by mouth daily 90 tablet 3     ClomiPRAMINE HCl (ANAFRANIL PO) Take 25 mg by mouth every evening        clopidogrel (PLAVIX) 75 MG tablet Take 1 tablet (75 mg) by mouth daily 90 tablet 3     gabapentin (NEURONTIN) 400 MG capsule Take 1 capsule by mouth 2 times daily        levothyroxine (SYNTHROID/LEVOTHROID) 100 MCG tablet Take 1 tablet (100 mcg) by mouth daily 90 tablet 3     LORAZEPAM PO Take 0.5 mg by mouth daily as needed for anxiety       MIRTAZAPINE PO Take 45 mg by mouth At Bedtime        polyethylene glycol (MIRALAX) 17 g packet Take 1 packet by mouth daily       simvastatin (ZOCOR) 20 MG tablet TAKE ONE TABLET BY MOUTH AT BEDTIME 90 tablet 3     Allergies   Allergen Reactions     No Known Allergies      FAMILY HISTORY NOTED AND REVIEWED    REVIEW OF SYSTEMS: above    PHYSICAL EXAM    There were no vitals taken for this visit.    ASSESSMENT:  Constipation, no signs malig cause/obstruction.    PLAN:  Try metamucil bid  Call me in a week with update  Call sooner if fever, abdomen pain, n/v.    Zurdo Kendrick M.D.    Time:10:10

## 2020-05-24 ENCOUNTER — MEDICAL CORRESPONDENCE (OUTPATIENT)
Dept: HEALTH INFORMATION MANAGEMENT | Facility: CLINIC | Age: 85
End: 2020-05-24

## 2020-05-28 ENCOUNTER — TELEPHONE (OUTPATIENT)
Dept: FAMILY MEDICINE | Facility: CLINIC | Age: 85
End: 2020-05-28

## 2020-05-28 DIAGNOSIS — R19.5 LOOSE STOOLS: Primary | ICD-10-CM

## 2020-05-28 DIAGNOSIS — R15.9 INCONTINENCE OF FECES, UNSPECIFIED FECAL INCONTINENCE TYPE: ICD-10-CM

## 2020-05-28 NOTE — TELEPHONE ENCOUNTER
Pt will be calling triage for Check in next week    Spoke to pt who reports No changes for pt in the past week of metamucil twice a day, but does note that leakage slowed.      Pt reports he has been more sedintary in the past couple of months (since this started) due to activities being shut down for pandemic, and also could increase fluids.    Pt will work on:  Increasing fluids, increasing exercise, continue metamucil twice a day, and take the probiotic his son bought him he's been taking 5 days, and will check in with Triage next Monday or Tuesday.    Nory Allan RN

## 2020-05-28 NOTE — TELEPHONE ENCOUNTER
Reason for Call:  Other FYI    Detailed comments: Pt called to let Dr Kendrick know that his condition has not changed since taking metamucil. He is still leaking and no urge to defecate. Will like a call    Phone Number Patient can be reached at: Home number on file [phone] (home)    Best Time: any    Can we leave a detailed message on this number? YES    Call taken on 5/28/2020 at 1:45 PM by Wendy Velasco

## 2020-05-28 NOTE — ED PROVIDER NOTES
History     Chief Complaint:  Post-op problem    HPI   Zack Santiago is a 86 year old male with a medical history of hyperlipidemia, hypothyroid, and CVA (no residual deficits, on Plavix) who presents with post-op problem. The patient had surgery on his right foot 4 months ago. However, in the last 5-6 days his foot has been inflamed over the distal lateral fifth metatarsal. The patient also notes scant yellowish, clear discharge in his foot that started 3-4 days ago. He went to the clinic today for a pre-op physical with a primary care physician which recommended him to come to the ED today for evaluation due to concern for foot infection. The patient denies fevers, numbness, tingling, weakness, and difficulty breathing. He does note increased redness and erythema to the dorsal aspect of the right foot.    Allergies:  No known drug allergies    Medications:    chlorthalidone (HYGROTON) 25 MG tablet  tamsulosin (FLOMAX) 0.4 MG capsule  SENEXON-S 8.6-50 MG per tablet  clopidogrel (PLAVIX) 75 MG tablet  simvastatin (ZOCOR) 20 MG tablet  MIRTAZAPINE PO  LORAZEPAM PO  levothyroxine (SYNTHROID/LEVOTHROID) 100 MCG tablet  ClomiPRAMINE HCl (ANAFRANIL PO)  gabapentin (NEURONTIN) 400 MG capsule  ARIPiprazole (ABILIFY) 2 MG tablet    Past Medical History:    AAA without rupture  Amaurosis fugax  Anemia  Aortic insufficiency  ASCVD  BPH  BCKD  CVA  GERD  Hypertension  Hypothyroid  Hypovitaminosis D  Idiopathic neuropathy  OCD  Panic disorder  Rectal incontinence  Stroke  Sudden hearing loss  SVT  Thoracic ascending aortic aneurysm  Ulcerative colitis    Past Surgical History:    Back surgery  Bladder surgery  Arthroplasty  Cataract IOL  Coronary artery bypass  Endovascular repair aneurysm abdominal aorta  Hernia repair 2x  Knee surgery  Repair hammer toe  TURP    Family History:    CAD    Social History:  Smoking status: Former smoker  Alcohol use: None  Marital Status:   [2]     Review of Systems   Respiratory:  "Negative for shortness of breath.    Musculoskeletal: Positive for joint swelling (Foot).   Neurological: Negative for weakness and numbness.   All other systems reviewed and are negative.    Physical Exam     Patient Vitals for the past 24 hrs:   BP Temp Temp src Pulse Resp SpO2 Height Weight   09/06/17 1812 138/62 - - 50 18 99 % - -   09/06/17 1547 144/55 97.7  F (36.5  C) Temporal 56 18 98 % - -   09/06/17 1546 - - - - 16 - 1.854 m (6' 1\") 84.8 kg (187 lb)     Physical Exam  General: Patient in mild distress.  Alert and cooperative with exam. Normal mentation  HEENT: NC/AT. Conjunctiva without injection or scleral icterus. External ears normal.  Respiratory: Breathing comfortably on room air  CV: Normal rate, all extremities well perfused  GI:  Non-distended abdomen  Skin: Warm, dry, no rashes/open wounds on exposed skin  Musculoskeletal: RLE: Erythema and warmth to anterior lateral foot consistent with cellulitis. CMS intact. Shallow ulceration overlying distal lateral 5th metatarsal. No purulent drainage. Mild tenderness to palpation.   Neuro: Alert, answers questions appropriately. No gross motor deficits    Emergency Department Course   Imaging:  Radiographic findings were communicated with the patient who voiced understanding of the findings.    Foot XR, G/E 3 views, right  1. Soft tissue swelling and subluxation at the right fifth  metatarsophalangeal joint as described.  2. If there has not been a prior osteotomy of the head of the fifth  metatarsal then there are some lytic-appearing changes that likely  represent osteomyelitis.  As read by Radiology.    Laboratory:  CBC: RBC 3.80 (L) WNL (WBC 5.6, )   CMP: WNL (Creatinine 1.05)  CRP: 13.3 (H)    Interventions:  1902: Keflex 600 mg Oral  1902: Bactrim DS/SEPTRA DS 1 tablet Oral    Emergency Department Course:  Past medical records, nursing notes, and vitals reviewed.  1603: I performed an exam of the patient and obtained history, as documented " above.  IV inserted and blood drawn.  The patient was sent for a foot XR while in the emergency department, findings above.  1637: I rechecked the patient. Explained findings to the patient.  1753: I rechecked the patient. Explained findings to the patient.  1853: I rechecked the patient. Findings and plan explained to the Patient. Patient discharged home with instructions regarding supportive care, medications, and reasons to return. The importance of close follow-up was reviewed.     Impression & Plan    Medical Decision Making:  Zack Santiago is a 86 year old male who presents with right foot pain and ulceration as well as overlying erythema and warmth to his dorsal foot. Patient's medical history and records were reviewed. Initial consideration for, but not limited to, cellulitis, osteomyelitis, infected ulcer, inflammatory process, among others. Labs and imaging were obtained. Labs demonstrate mildly elevated CRP without other significant findings. Foot X-ray showed subluxation of the right fifth MTP without significantly evidence of osteomyelitis; patient is status post-osteotomy. The subluxation was reduced in the ED. His physical exam is consistent with cellulitis with associated shallow ulcer. The ulcer was debrided and dressing applied and patient was placed in post-op shoe for additional protection. Started on bactrim and keflex for cellulitis. Case was discussed with orthopedic resident Dr. Nash who works with Dr. Voss; patient will be seen in clinic in the next couple days for wound check. Return precautions discussed. Discharged to home in good/stable conditions. At the time of discharge the patient was hemodynamically stable, neurologically intact, and afebrile.    Diagnosis:    ICD-10-CM   1. Cellulitis of right lower extremity L03.115   2. Ulcer of foot, right, limited to breakdown of skin (H) L97.511     Disposition:  discharged to home    Discharge Medications:   Details    sulfamethoxazole-trimethoprim (BACTRIM DS) 800-160 MG per tablet Take 1 tablet by mouth 2 times daily for 9 days, Disp-18 tablet, R-0, Local Print      cephALEXin (KEFLEX) 500 MG capsule Take 1 capsule (500 mg) by mouth 3 times daily for 9 days, Disp-27 capsule, R-0, Local Print     Gloria Moore  9/6/2017    EMERGENCY DEPARTMENT  I, Gloria Moore, am serving as a scribe at 4:03 PM on 9/6/2017 to document services personally performed by Josiah Charles DO based on my observations and the provider's statements to me.        Josiah Charles DO  09/07/17 0105     present x 4 quadrants

## 2020-06-02 NOTE — TELEPHONE ENCOUNTER
Pt called in about constipation.  Requesting to speak with a nurse.  He may be reached [phone].

## 2020-06-02 NOTE — TELEPHONE ENCOUNTER
Spoke with Pt:     S: Symptoms have still not changed, he is having very small BM's and Pt has no urge/signal that he needs to go     B: Normal Colonoscopy in May     A:     He does continue to leak stool, but has improved some.     Basically, situation has not changed    Denies stomach pain   Denies pain   Denies bloating   Denies N/V  Denies feeling constipation- does not feel backed up   Denies bleeding     Denies that leaking is constant- Noticing a few episodes of incontinence/leakage per day      Pt has been passing gas  Staying hydrated   Urinating well   Otherwise feeling well       Taking double dose of metamucil x10 days- some improvement noted, but resolved.     Has not changed his diet     To PCP: Pt is at a loss with the incontinence/leaking-     Any further advise?     Thank you,   Brie SILVA RN

## 2020-06-02 NOTE — TELEPHONE ENCOUNTER
"Please see Pended referral     Pended referral as \"urgent\" and with note to \"please call to schedule Pt ASAP.    Pended for [facility] DX: Loose stools, fecal incontinence     Thank you,  Brie SILVA RN      "

## 2020-06-02 NOTE — TELEPHONE ENCOUNTER
Faxed referral to [facility]    Called Pt to update/provide referral info:     If you have not heard from the scheduling office within 2 business days, please call [phone]  FPA: [facility] - Various Locations     FPA: [facility] - [place]    No answer, left VM with details   Brie SILVA RN

## 2020-06-03 ENCOUNTER — TRANSFERRED RECORDS (OUTPATIENT)
Dept: HEALTH INFORMATION MANAGEMENT | Facility: CLINIC | Age: 85
End: 2020-06-03

## 2020-06-03 NOTE — TELEPHONE ENCOUNTER
RECORDS RECEIVED FROM: Internal    DATE RECEIVED: 7/10/20 9:45AM   NOTES STATUS DETAILS   OFFICE NOTE from referring provider  Internal Virtual OV 5/21/20   DIAGNOSTIC PROCEDURES     COLONOSCOPY Internal 5/5/20   IMAGING (DISC & REPORT)      CT  Internal CT ABD CHEST PELVIS 2/5/20 4/2/19   XRAY Internal XR Colon Water 4/17/20  XR ABD 4/9/20

## 2020-06-05 ENCOUNTER — TELEPHONE (OUTPATIENT)
Dept: ORTHOPEDICS | Facility: CLINIC | Age: 85
End: 2020-06-05

## 2020-06-05 NOTE — TELEPHONE ENCOUNTER
Called and spoke with patient.  He denies any new fall, injury or accident.  Pain starting to increase at night while sleeping.  Would like another hip injection. He was scheduled with Dr. Guerra on 6/8/20 in [place].    Dahiana Almaraz ATC

## 2020-06-08 ENCOUNTER — TRANSFERRED RECORDS (OUTPATIENT)
Dept: HEALTH INFORMATION MANAGEMENT | Facility: CLINIC | Age: 85
End: 2020-06-08

## 2020-06-08 ENCOUNTER — OFFICE VISIT (OUTPATIENT)
Dept: ORTHOPEDICS | Facility: CLINIC | Age: 85
End: 2020-06-08
Payer: COMMERCIAL

## 2020-06-08 VITALS
WEIGHT: 190 LBS | DIASTOLIC BLOOD PRESSURE: 84 MMHG | HEIGHT: 73 IN | SYSTOLIC BLOOD PRESSURE: 134 MMHG | BODY MASS INDEX: 25.18 KG/M2

## 2020-06-08 DIAGNOSIS — M70.62 TROCHANTERIC BURSITIS OF LEFT HIP: Primary | ICD-10-CM

## 2020-06-08 PROCEDURE — 20610 DRAIN/INJ JOINT/BURSA W/O US: CPT | Mod: LT | Performed by: FAMILY MEDICINE

## 2020-06-08 PROCEDURE — 99213 OFFICE O/P EST LOW 20 MIN: CPT | Mod: 25 | Performed by: FAMILY MEDICINE

## 2020-06-08 RX ADMIN — TRIAMCINOLONE ACETONIDE 40 MG: 40 INJECTION, SUSPENSION INTRA-ARTICULAR; INTRAMUSCULAR at 15:46

## 2020-06-08 RX ADMIN — ROPIVACAINE HYDROCHLORIDE 3 ML: 5 INJECTION, SOLUTION EPIDURAL; INFILTRATION; PERINEURAL at 15:46

## 2020-06-08 ASSESSMENT — MIFFLIN-ST. JEOR: SCORE: 1580.71

## 2020-06-08 NOTE — LETTER
6/8/2020         RE: Zack Santiago  [address]  [address]  [facility] [address]        Dear Colleague,    Thank you for referring your patient, Zack Santiago, to the  [facility]. Please see a copy of my visit note below.     [facility]   Follow-up Visit s Jun 8, 2020    PCP: Zurdo Kendrick      Subjective:  Zack is a 89 year old male who is seen in follow up for evaluation of   Chief Complaint   Patient presents with     Left Hip - RECHECK     His last visit was on 6/5/2020.  Since that time, symptoms have been worse than before. Zack Santiago is accompanied today by self.     Patient had a left hip:  bursa Cortisone injection on 6/5/2020 that provided 100  % relief, lasting for 2 month(s).    Patient has noticed improved symptoms with steroid injection treatment.  Patient has no swelling  Pain is located left lateral hip, intermittent. Pain only at night  Patient is using no aids.    Patient denies any new injuries.    Patient's past medical, surgical, social and family histories are reviewed today.    History from previous visit on 6/5/2020      [facility]   Follow-up Visit s Oct 21, 2019   PCP: Zurdo Kendrick   Subjective:   Zack is a 88 year old male who is seen in follow up for evaluation of       Chief Complaint   Patient presents with     Left Hip - Pain   His last visit was on 9/21/18. Since that time, symptoms have returned. Zack Santiago is accompanied today by self.   Patient had a left hip: bursa Cortisone injection on 9/21/18 that provided 100% relief, lasting for 4 month(s).   Patient has noticed improved symptoms with injection treatment.   Pain is located left lateral hip, waxing and waning. Pain when he sleeps on right side; does not bother him during the day.   Patient is using no aids.   Patient denies any new injuries.   Denies right-sided hip issues today.   Patient's past medical, surgical, social and family  histories are reviewed today.   History from previous visit on 9/21/18   Injury: Reports insidious onset without acute precipitating event.   Location of Pain: left hip lateral, nonradiating   Duration of Pain: 1 year(s)   Rating of Pain at worst: 4/10   Rating of Pain Currently: 4/10   Pain is better with: nothing   Pain is worse with: sleeping   Treatment so far consists of: physical therapy, bursa injection once in April at [facility]   Associated symptoms: no distal numbness or tingling; denies swelling or warmth   Recent imaging completed: No recent imaging completed.   Prior History of related problems: none   Social History: retired   Past Medical History                                                                                                                                                                                                                                                                                           Patient Active Problem List    Diagnosis Date Noted     Lung nodule 02/01/2018     Priority: Medium     6mm, found during eval of ascending aorta      Incarcerated inguinal hernia 01/06/2018     Priority: Medium     Rectal incontinence 04/19/2017     Priority: Medium     Cerebrovascular accident (CVA), unspecified mechanism (H) 01/19/2017     Priority: Medium     Atrial fibrillation, unspecified type (H) 01/19/2017     Priority: Medium     SVT (supraventricular tachycardia) (H)      Priority: Medium     seen on ziopatch done for amaurosis, longest 15 beats      Amaurosis fugax of right eye      Priority: Medium     Bradycardia      Priority: Medium     stopped toprol      Aortic insufficiency      Priority: Medium     1+ on echo      Thoracic ascending aortic aneurysm (H)      Priority: Medium     4.4cm on echo, aortic root 3.9      Sudden hearing loss      Priority: Medium     Dr. Garcia, mri brain no acoustic neuroma      CKD (chronic kidney disease) stage 3, GFR 30-59 ml/min (H)       Priority: Medium     Claw toe, acquired 12/28/2012     Priority: Medium     Problem list name updated by automated process. Provider to review      Hyperlipidemia LDL goal <100 06/13/2012     Priority: Medium     OCD (obsessive compulsive disorder)      Priority: Medium     GERD (gastroesophageal reflux disease)      Priority: Medium     AAA (abdominal aortic aneurysm) without rupture (H)      Priority: Medium     Hypovitaminosis D      Priority: Medium     ASCVD (arteriosclerotic cardiovascular disease)      Priority: Medium     cabg, post op afib      Panic disorder      Priority: Medium     Acquired hypothyroidism      Priority: Medium     Ulcerative colitis (H)      Priority: Medium     not active for years      Idiopathic neuropathy      Priority: Medium     Benign essential hypertension      Priority: Medium     Cough      Priority: Medium     pulm eval, felt due to reflux      Advanced directives, counseling/discussion 06/08/2012     Priority: Medium     Patient states has Advance Directive and will bring in a copy to clinic. 6/8/2012      Family History                             Social History           Social History     Marital status:      Spouse name: N/A     Number of children: 2     Years of education: N/A          Occupational History     retail Retired             Social History Main Topics     Smoking status: Former Smoker     Packs/day: 1.00     Years: 5.00     Types: Cigarettes     Quit date: 6/13/1965     Smokeless tobacco: Never Used     Alcohol use 0.0 oz/week      0 Standard drinks or equivalent per week       Comment: maybe one glass of wine a week     Drug use: No     Sexual activity: Not Currently          Other Topics Concern     Not on file     Social History Narrative     Past Surgical History                                                                                                                                                    Review of Systems   Musculoskeletal:  "Positive for joint pain.     Physical Exam   /63  Ht 1.854 m (6' 1\")  Wt 88.5 kg (195 lb)  BMI 25.73 kg/m    Constitutional:well-developed, well-nourished, and in no distress. slightly hard of hearing.   Cardiovascular: Intact distal pulses.   Neurological: alert. Gait Abnormal: Gait with shuffling steps   Station wide   Skin: Skin is warm and dry.   Psychiatric: Mood and affect normal.   Respiratory: unlabored, speaks in full sentences   Lymph: no LAD, no lymphangitis   Left Hip Exam   Gait: Abnormal.   Tenderness   The patient is experiencing tenderness in the greater trochanter, posterior.   Range of Motion   Extension: Normal   Flexion: 140   Internal Rotation: 20   External Rotation: 40   Abduction: Abnormal   Adduction: Normal   Muscle Strength   Abduction: 5-/5   Adduction: 5/5   Flexion: 5/5   Tests   Kaleb: Positive   Dequan: Negative   Right Hip Exam   Gait: Abnormal.     X-ray images previously ordered and independently reviewed by me in the office today with the patient. X-ray shows:   XR PELVIS AND HIP LEFT 1 VIEW 9/21/2018 10:47 AM   COMPARISON: None.   HISTORY: Pain.   IMPRESSION: Mild degenerative changes in the hips with preserved joint   space. No fractures are seen.   JAI ALARCON MD       ASSESSMENT/PLAN    ICD-10-CM    1. Trochanteric bursitis of left hip  M70.62 PHYSICAL THERAPY REFERRAL     Large Joint Injection/Arthocentesis: L greater trochanteric bursa     Okay to repeat bursa injection today.  He has had mixed results with this but does feel like it is helpful.  Reemphasized prior recommendations for physical therapy due to benefits in reducing relapses and recurrences with hip strengthening.  Referral placed and given to patient to take to his assisted living facility.    Time spent in one-on-one evalution and discussion with patient regarding nature of problem, course, prior treatments, and therapeutic options, at least 50% of which was spent in counseling and coordination of " care: 15 minutes, over and above time spent on injection.     Large Joint Injection/Arthocentesis: L greater trochanteric bursa    Date/Time: 6/8/2020 3:46 PM  Performed by: Alexander Guerra MD  Authorized by: Alexander Guerra MD     Indications:  Pain  Needle Size:  22 G  Guidance: landmark guided    Approach:  Posterolateral  Location:  Hip      Site:  L greater trochanteric bursa  Medications:  40 mg triamcinolone 40 MG/ML; 3 mL ropivacaine 5 MG/ML  Medications comment:  9mL of the Ropivacaine were used  Outcome:  Tolerated well, no immediate complications  Procedure discussed: discussed risks, benefits, and alternatives    Consent Given by:  Patient  Timeout: timeout called immediately prior to procedure    Prep: patient was prepped and draped in usual sterile fashion              Again, thank you for allowing me to participate in the care of your patient.        Sincerely,        Alexander Guerra MD

## 2020-06-08 NOTE — PROGRESS NOTES
[facility]   Follow-up Visit s Jun 8, 2020    PCP: Zurdo Kendrick      Subjective:  Zack is a 89 year old male who is seen in follow up for evaluation of   Chief Complaint   Patient presents with     Left Hip - RECHECK     His last visit was on 6/5/2020.  Since that time, symptoms have been worse than before. Zack Santiago is accompanied today by self.     Patient had a left hip:  bursa Cortisone injection on 6/5/2020 that provided 100  % relief, lasting for 2 month(s).    Patient has noticed improved symptoms with steroid injection treatment.  Patient has no swelling  Pain is located left lateral hip, intermittent. Pain only at night  Patient is using no aids.    Patient denies any new injuries.    Patient's past medical, surgical, social and family histories are reviewed today.    History from previous visit on 6/5/2020      [facility]   Follow-up Visit s Oct 21, 2019   PCP: Zurdo Kendrick   Subjective:   Zack is a 88 year old male who is seen in follow up for evaluation of       Chief Complaint   Patient presents with     Left Hip - Pain   His last visit was on 9/21/18. Since that time, symptoms have returned. Zack Santiago is accompanied today by self.   Patient had a left hip: bursa Cortisone injection on 9/21/18 that provided 100% relief, lasting for 4 month(s).   Patient has noticed improved symptoms with injection treatment.   Pain is located left lateral hip, waxing and waning. Pain when he sleeps on right side; does not bother him during the day.   Patient is using no aids.   Patient denies any new injuries.   Denies right-sided hip issues today.   Patient's past medical, surgical, social and family histories are reviewed today.   History from previous visit on 9/21/18   Injury: Reports insidious onset without acute precipitating event.   Location of Pain: left hip lateral, nonradiating   Duration of Pain: 1 year(s)   Rating of Pain at  worst: 4/10   Rating of Pain Currently: 4/10   Pain is better with: nothing   Pain is worse with: sleeping   Treatment so far consists of: physical therapy, bursa injection once in April at [facility]   Associated symptoms: no distal numbness or tingling; denies swelling or warmth   Recent imaging completed: No recent imaging completed.   Prior History of related problems: none   Social History: retired   Past Medical History                                                                                                                                                                                                                                                                                           Patient Active Problem List    Diagnosis Date Noted     Lung nodule 02/01/2018     Priority: Medium     6mm, found during eval of ascending aorta      Incarcerated inguinal hernia 01/06/2018     Priority: Medium     Rectal incontinence 04/19/2017     Priority: Medium     Cerebrovascular accident (CVA), unspecified mechanism (H) 01/19/2017     Priority: Medium     Atrial fibrillation, unspecified type (H) 01/19/2017     Priority: Medium     SVT (supraventricular tachycardia) (H)      Priority: Medium     seen on ziopatch done for amaurosis, longest 15 beats      Amaurosis fugax of right eye      Priority: Medium     Bradycardia      Priority: Medium     stopped toprol      Aortic insufficiency      Priority: Medium     1+ on echo      Thoracic ascending aortic aneurysm (H)      Priority: Medium     4.4cm on echo, aortic root 3.9      Sudden hearing loss      Priority: Medium     Dr. Garcia, mri brain no acoustic neuroma      CKD (chronic kidney disease) stage 3, GFR 30-59 ml/min (H)      Priority: Medium     Claw toe, acquired 12/28/2012     Priority: Medium     Problem list name updated by automated process. Provider to review      Hyperlipidemia LDL goal <100 06/13/2012     Priority: Medium     OCD (obsessive compulsive  "disorder)      Priority: Medium     GERD (gastroesophageal reflux disease)      Priority: Medium     AAA (abdominal aortic aneurysm) without rupture (H)      Priority: Medium     Hypovitaminosis D      Priority: Medium     ASCVD (arteriosclerotic cardiovascular disease)      Priority: Medium     cabg, post op afib      Panic disorder      Priority: Medium     Acquired hypothyroidism      Priority: Medium     Ulcerative colitis (H)      Priority: Medium     not active for years      Idiopathic neuropathy      Priority: Medium     Benign essential hypertension      Priority: Medium     Cough      Priority: Medium     pulm eval, felt due to reflux      Advanced directives, counseling/discussion 06/08/2012     Priority: Medium     Patient states has Advance Directive and will bring in a copy to clinic. 6/8/2012      Family History                             Social History           Social History     Marital status:      Spouse name: N/A     Number of children: 2     Years of education: N/A          Occupational History     retail Retired             Social History Main Topics     Smoking status: Former Smoker     Packs/day: 1.00     Years: 5.00     Types: Cigarettes     Quit date: 6/13/1965     Smokeless tobacco: Never Used     Alcohol use 0.0 oz/week      0 Standard drinks or equivalent per week       Comment: maybe one glass of wine a week     Drug use: No     Sexual activity: Not Currently          Other Topics Concern     Not on file     Social History Narrative     Past Surgical History                                                                                                                                                    Review of Systems   Musculoskeletal: Positive for joint pain.     Physical Exam   /63  Ht 1.854 m (6' 1\")  Wt 88.5 kg (195 lb)  BMI 25.73 kg/m    Constitutional:well-developed, well-nourished, and in no distress. slightly hard of hearing.   Cardiovascular: Intact distal " pulses.   Neurological: alert. Gait Abnormal: Gait with shuffling steps   Station wide   Skin: Skin is warm and dry.   Psychiatric: Mood and affect normal.   Respiratory: unlabored, speaks in full sentences   Lymph: no LAD, no lymphangitis   Left Hip Exam   Gait: Abnormal.   Tenderness   The patient is experiencing tenderness in the greater trochanter, posterior.   Range of Motion   Extension: Normal   Flexion: 140   Internal Rotation: 20   External Rotation: 40   Abduction: Abnormal   Adduction: Normal   Muscle Strength   Abduction: 5-/5   Adduction: 5/5   Flexion: 5/5   Tests   Kaleb: Positive   Dequan: Negative   Right Hip Exam   Gait: Abnormal.     X-ray images previously ordered and independently reviewed by me in the office today with the patient. X-ray shows:   XR PELVIS AND HIP LEFT 1 VIEW 9/21/2018 10:47 AM   COMPARISON: None.   HISTORY: Pain.   IMPRESSION: Mild degenerative changes in the hips with preserved joint   space. No fractures are seen.   JAI ALARCON MD       ASSESSMENT/PLAN    ICD-10-CM    1. Trochanteric bursitis of left hip  M70.62 PHYSICAL THERAPY REFERRAL     Large Joint Injection/Arthocentesis: L greater trochanteric bursa     Okay to repeat bursa injection today.  He has had mixed results with this but does feel like it is helpful.  Reemphasized prior recommendations for physical therapy due to benefits in reducing relapses and recurrences with hip strengthening.  Referral placed and given to patient to take to his assisted living facility.    Time spent in one-on-one evalution and discussion with patient regarding nature of problem, course, prior treatments, and therapeutic options, at least 50% of which was spent in counseling and coordination of care: 15 minutes, over and above time spent on injection.     Large Joint Injection/Arthocentesis: L greater trochanteric bursa    Date/Time: 6/8/2020 3:46 PM  Performed by: Alexander Guerra MD  Authorized by: Alexander Guerra MD      Indications:  Pain  Needle Size:  22 G  Guidance: landmark guided    Approach:  Posterolateral  Location:  Hip      Site:  L greater trochanteric bursa  Medications:  40 mg triamcinolone 40 MG/ML; 3 mL ropivacaine 5 MG/ML  Medications comment:  9mL of the Ropivacaine were used  Outcome:  Tolerated well, no immediate complications  Procedure discussed: discussed risks, benefits, and alternatives    Consent Given by:  Patient  Timeout: timeout called immediately prior to procedure    Prep: patient was prepped and draped in usual sterile fashion

## 2020-06-10 RX ORDER — TRIAMCINOLONE ACETONIDE 40 MG/ML
40 INJECTION, SUSPENSION INTRA-ARTICULAR; INTRAMUSCULAR
Status: DISCONTINUED | OUTPATIENT
Start: 2020-06-08 | End: 2020-10-13

## 2020-06-10 RX ORDER — ROPIVACAINE HYDROCHLORIDE 5 MG/ML
3 INJECTION, SOLUTION EPIDURAL; INFILTRATION; PERINEURAL
Status: DISCONTINUED | OUTPATIENT
Start: 2020-06-08 | End: 2020-10-13

## 2020-06-11 ENCOUNTER — THERAPY VISIT (OUTPATIENT)
Dept: PHYSICAL THERAPY | Facility: CLINIC | Age: 85
End: 2020-06-11
Payer: COMMERCIAL

## 2020-06-11 DIAGNOSIS — M25.552 HIP PAIN, LEFT: ICD-10-CM

## 2020-06-11 DIAGNOSIS — M70.62 TROCHANTERIC BURSITIS OF LEFT HIP: ICD-10-CM

## 2020-06-11 PROCEDURE — 97161 PT EVAL LOW COMPLEX 20 MIN: CPT | Mod: GP

## 2020-06-11 PROCEDURE — 97110 THERAPEUTIC EXERCISES: CPT | Mod: GP

## 2020-06-11 NOTE — PROGRESS NOTES
[facility] Initial Evaluation  Subjective:    Therapist Generated HPI Evaluation  Problem details: Onset: 6/8/20; insidious onset; no pain during the day; only pain at night; had injection and since then no pain at night;  Last 3 nights have been fine; noted pain located lateral hip - denied hip joint pain.         Type of problem:  Left hip.    This is a new condition.  Condition occurred with:  Insidious onset.    Patient reports pain:  Lateral.    Pain radiates to:  No radiation.     Associated symptoms:  Loss of motion/stiffness and loss of strength. Symptoms are exacerbated by lying on extremity  and relieved by other.          Patient Health History             Pertinent medical history includes: depression, heart problems, high blood pressure, incontinence and stroke.                                                       Objective:    Gait:  Forward flexed trunk, slight shuffle gait;   Weight Bearing Status:  WBAT                                                      Hip Evaluation  HIP AROM:    Flexion: Left: 110    Right:  110          Internal Rotation: Left: 10    Right: 10  External Rotation: Left: 40    Right: 40        Hip Strength:  : Hip strength grossly 4/5 ext, abd.                            Hip Palpation:    Left hip tenderness present at:   Bursa               General     ROS    Assessment/Plan:    Patient is a 89 year old male with left side hip complaints.    Patient has the following significant findings with corresponding treatment plan.                Diagnosis 1:  Trochanteric bursitis  Pain -  self management, education and home program  Decreased strength - therapeutic exercise and therapeutic activities  Impaired muscle performance - neuro re-education  Decreased function - therapeutic activities    Therapy Evaluation Codes:   1) History comprised of:   Personal factors that impact the plan of care:      Age.    Comorbidity factors that impact the plan of care are:       High blood pressure.     Medications impacting care: Anti-depressant.  2) Examination of Body Systems comprised of:   Body structures and functions that impact the plan of care:      Hip.   Activity limitations that impact the plan of care are:      Walking.  3) Clinical presentation characteristics are:   Stable/Uncomplicated.  4) Decision-Making    Low complexity using standardized patient assessment instrument and/or measureable assessment of functional outcome.  Cumulative Therapy Evaluation is: Low complexity.    Previous and current functional limitations:  (See Goal Flow Sheet for this information)    Short term and Long term goals: (See Goal Flow Sheet for this information)     Communication ability:  Patient appears to be able to clearly communicate and understand verbal and written communication and follow directions correctly.  Treatment Explanation - The following has been discussed with the patient:   RX ordered/plan of care  Anticipated outcomes  Possible risks and side effects  This patient would benefit from PT intervention to resume normal activities.   Rehab potential is good.    Frequency:  1 X week, once daily  Duration:  for 6 weeks  Discharge Plan:  Achieve all LTG.  Independent in home treatment program.  Reach maximal therapeutic benefit.    Please refer to the daily flowsheet for treatment today, total treatment time and time spent performing 1:1 timed codes.

## 2020-06-19 ENCOUNTER — THERAPY VISIT (OUTPATIENT)
Dept: PHYSICAL THERAPY | Facility: CLINIC | Age: 85
End: 2020-06-19
Payer: COMMERCIAL

## 2020-06-19 DIAGNOSIS — K59.00 CONSTIPATION: ICD-10-CM

## 2020-06-19 DIAGNOSIS — R15.9 FECAL INCONTINENCE: Primary | ICD-10-CM

## 2020-06-19 PROCEDURE — 97110 THERAPEUTIC EXERCISES: CPT | Mod: GP | Performed by: PHYSICAL THERAPIST

## 2020-06-19 PROCEDURE — 97535 SELF CARE MNGMENT TRAINING: CPT | Mod: GP | Performed by: PHYSICAL THERAPIST

## 2020-06-19 PROCEDURE — 97162 PT EVAL MOD COMPLEX 30 MIN: CPT | Mod: GP | Performed by: PHYSICAL THERAPIST

## 2020-06-19 NOTE — PROGRESS NOTES
[facility] Initial Evaluation  Subjective:  Onset of fecal incontinence/constipation over the past 3-4 months. Started taking lactalose which is moderately effective. Doesn't feel much urge to have BM. Took metamucil for years but now just taking lactalose for the past month and not leaking nearly as much. About 2 BM's a week, never feels empty. Able to hold back gas.Goal is to have normal stool again and feel regular urge.      The history is provided by the patient. No  was used.   Patient Health History           General health as reported by patient is good.  Pertinent medical history includes: high blood pressure, heart problems, depression, incontinence and stroke (hx abdominal aorta aneursym ~2018, mini stroke ~ 4 years ago. ).     Medical allergies: none.   Surgeries include:  Heart surgery (heart bypass, abdominal aneurysm).    Current medications:  Anti-depressants, high blood pressure medication, cardiac medication and thyroid medication.                                           Objective:  System                                 Pelvic Dysfunction Evaluation:    Bladder/Pelvic Problems:    Storage Problem:  Fecal incontinence        Diagnostic Tests:                    Colonoscopy:  Yes                    SEMG Biofeedback:    Equipment:  Incujector electrode placement--Perianal:  Yes  Baseline EMG PM:  1.5mV    Peak pelvic muscle contraction:  9.0mv, needs cues to avoid valsalva      Position:  Supine                     General     ROS    Assessment/Plan:    Patient is a 89 year old male with pelvic complaints.    Patient has the following significant findings with corresponding treatment plan.                Diagnosis 1:  Constipation/fecal incontinence  Decreased ROM/flexibility - therapeutic exercise  Decreased strength - therapeutic exercise and therapeutic activities  Decreased proprioception - neuro re-education and therapeutic activities  Impaired  muscle performance - biofeedback and neuro re-education  Decreased function - therapeutic activities    Therapy Evaluation Codes:   1) History comprised of:   Personal factors that impact the plan of care:      Age.    Comorbidity factors that impact the plan of care are:      Heart problems and High blood pressure.     Medications impacting care: Anti-depressant, Cardiac and High blood pressure.  2) Examination of Body Systems comprised of:   Body structures and functions that impact the plan of care:      Pelvis.   Activity limitations that impact the plan of care are:      Fecal incontinence and constipation.  3) Clinical presentation characteristics are:   Evolving/Changing.  4) Decision-Making    Moderate complexity using standardized patient assessment instrument and/or measureable assessment of functional outcome.  Cumulative Therapy Evaluation is: Moderate complexity.    Previous and current functional limitations:  (See Goal Flow Sheet for this information)    Short term and Long term goals: (See Goal Flow Sheet for this information)     Communication ability:  Patient appears to be able to clearly communicate and understand verbal and written communication and follow directions correctly.  Treatment Explanation - The following has been discussed with the patient:   RX ordered/plan of care  Anticipated outcomes  Possible risks and side effects  This patient would benefit from PT intervention to resume normal activities.   Rehab potential is good.    Frequency:  2 X a month, once daily  Duration:  for 2 months  Discharge Plan:  Achieve all LTG.  Independent in home treatment program.  Reach maximal therapeutic benefit.    Please refer to the daily flowsheet for treatment today, total treatment time and time spent performing 1:1 timed codes.

## 2020-06-19 NOTE — LETTER
[facility]  [address]  [address]  [place] [address]  [phone]    2020    Re: Zack Santiago   :   1931  MRN:  [id number]   REFERRING PHYSICIAN:   [facility]  Date of Initial Evaluation:  20  Visits:  Rxs Used: 1  Reason for Referral:     Fecal incontinence  Constipation    EVALUATION SUMMARY    [facility] Initial Evaluation  Subjective:  Onset of fecal incontinence/constipation over the past 3-4 months. Started taking lactalose which is moderately effective. Doesn't feel much urge to have BM. Took metamucil for years but now just taking lactalose for the past month and not leaking nearly as much. About 2 BM's a week, never feels empty. Able to hold back gas.Goal is to have normal stool again and feel regular urge.      The history is provided by the patient. No  was used.   Patient Health History    General health as reported by patient is good.  Pertinent medical history includes: high blood pressure, heart problems, depression, incontinence and stroke (hx abdominal aorta aneursym ~2018, mini stroke ~ 4 years ago. ).     Medical allergies: none.   Surgeries include:  Heart surgery (heart bypass, abdominal aneurysm).    Current medications:  Anti-depressants, high blood pressure medication, cardiac medication and thyroid medication.      Objective:  Pelvic Dysfunction Evaluation:    Bladder/Pelvic Problems:    Storage Problem:  Fecal incontinence    Diagnostic Tests:    Colonoscopy:  Yes      SEMG Biofeedback:    Equipment:  Sighter electrode placement--Perianal:  Yes  Baseline EMG PM:  1.5mV  Peak pelvic muscle contraction:  9.0mv, needs cues to avoid valsalva  Position:  Supine               Re: Zack Santiago   :   1931      Assessment/Plan:    Patient is a 89 year old male with pelvic complaints.    Patient has the following significant findings with corresponding  treatment plan.                Diagnosis 1:  Constipation/fecal incontinence  Decreased ROM/flexibility - therapeutic exercise  Decreased strength - therapeutic exercise and therapeutic activities  Decreased proprioception - neuro re-education and therapeutic activities  Impaired muscle performance - biofeedback and neuro re-education  Decreased function - therapeutic activities    Therapy Evaluation Codes:   1) History comprised of:   Personal factors that impact the plan of care:      Age.    Comorbidity factors that impact the plan of care are:      Heart problems and High blood pressure.     Medications impacting care: Anti-depressant, Cardiac and High blood pressure.  2) Examination of Body Systems comprised of:   Body structures and functions that impact the plan of care:      Pelvis.   Activity limitations that impact the plan of care are:      Fecal incontinence and constipation.  3) Clinical presentation characteristics are:   Evolving/Changing.  4) Decision-Making    Moderate complexity using standardized patient assessment instrument and/or measureable assessment of functional outcome.  Cumulative Therapy Evaluation is: Moderate complexity.    Previous and current functional limitations:  (See Goal Flow Sheet for this information)    Short term and Long term goals: (See Goal Flow Sheet for this information)     Communication ability:  Patient appears to be able to clearly communicate and understand verbal and written communication and follow directions correctly.  Treatment Explanation - The following has been discussed with the patient:   RX ordered/plan of care  Anticipated outcomes  Possible risks and side effects  This patient would benefit from PT intervention to resume normal activities.   Rehab potential is good.    Frequency:  2 X a month, once daily  Duration:  for 2 months  Discharge Plan:  Achieve all LTG.  Independent in home treatment program.  Reach maximal therapeutic  benefit.                      Re: Zack Santiago   :   1931      Please refer to the daily flowsheet for treatment today, total treatment time and time spent performing 1:1 timed codes.       Thank you for your referral.    INQUIRIES  Therapist: Celeste Ortiz, PT  [facility]  [address] [address] [address]  Phone: [phone]  Fax: [phone]

## 2020-06-24 ENCOUNTER — TRANSFERRED RECORDS (OUTPATIENT)
Dept: HEALTH INFORMATION MANAGEMENT | Facility: CLINIC | Age: 85
End: 2020-06-24

## 2020-07-02 ENCOUNTER — THERAPY VISIT (OUTPATIENT)
Dept: PHYSICAL THERAPY | Facility: CLINIC | Age: 85
End: 2020-07-02
Payer: COMMERCIAL

## 2020-07-02 DIAGNOSIS — R15.9 FECAL INCONTINENCE: ICD-10-CM

## 2020-07-02 DIAGNOSIS — K59.00 CONSTIPATION: ICD-10-CM

## 2020-07-02 PROCEDURE — 97112 NEUROMUSCULAR REEDUCATION: CPT | Mod: GP | Performed by: PHYSICAL THERAPIST

## 2020-07-02 PROCEDURE — 97110 THERAPEUTIC EXERCISES: CPT | Mod: GP | Performed by: PHYSICAL THERAPIST

## 2020-07-08 ENCOUNTER — TRANSFERRED RECORDS (OUTPATIENT)
Dept: HEALTH INFORMATION MANAGEMENT | Facility: CLINIC | Age: 85
End: 2020-07-08

## 2020-07-10 ENCOUNTER — PRE VISIT (OUTPATIENT)
Dept: SURGERY | Facility: CLINIC | Age: 85
End: 2020-07-10

## 2020-07-15 ENCOUNTER — THERAPY VISIT (OUTPATIENT)
Dept: PHYSICAL THERAPY | Facility: CLINIC | Age: 85
End: 2020-07-15
Payer: COMMERCIAL

## 2020-07-15 DIAGNOSIS — R15.9 FECAL INCONTINENCE: ICD-10-CM

## 2020-07-15 DIAGNOSIS — K59.00 CONSTIPATION: ICD-10-CM

## 2020-07-15 PROCEDURE — 97535 SELF CARE MNGMENT TRAINING: CPT | Mod: GP | Performed by: PHYSICAL THERAPIST

## 2020-07-21 ENCOUNTER — OFFICE VISIT (OUTPATIENT)
Dept: FAMILY MEDICINE | Facility: CLINIC | Age: 85
End: 2020-07-21
Payer: COMMERCIAL

## 2020-07-21 VITALS
DIASTOLIC BLOOD PRESSURE: 68 MMHG | HEART RATE: 56 BPM | WEIGHT: 190.6 LBS | BODY MASS INDEX: 25.26 KG/M2 | OXYGEN SATURATION: 95 % | HEIGHT: 73 IN | SYSTOLIC BLOOD PRESSURE: 136 MMHG

## 2020-07-21 DIAGNOSIS — R15.9 INCONTINENCE OF FECES, UNSPECIFIED FECAL INCONTINENCE TYPE: ICD-10-CM

## 2020-07-21 DIAGNOSIS — N18.30 CKD (CHRONIC KIDNEY DISEASE) STAGE 3, GFR 30-59 ML/MIN (H): ICD-10-CM

## 2020-07-21 DIAGNOSIS — R19.8 IRREGULAR BOWEL HABITS: Primary | ICD-10-CM

## 2020-07-21 DIAGNOSIS — R53.83 OTHER FATIGUE: ICD-10-CM

## 2020-07-21 LAB
BASOPHILS # BLD AUTO: 0 10E9/L (ref 0–0.2)
BASOPHILS NFR BLD AUTO: 0.5 %
DIFFERENTIAL METHOD BLD: ABNORMAL
EOSINOPHIL # BLD AUTO: 0.2 10E9/L (ref 0–0.7)
EOSINOPHIL NFR BLD AUTO: 3 %
ERYTHROCYTE [DISTWIDTH] IN BLOOD BY AUTOMATED COUNT: 15 % (ref 10–15)
HCT VFR BLD AUTO: 38.9 % (ref 40–53)
HGB BLD-MCNC: 12.6 G/DL (ref 13.3–17.7)
LYMPHOCYTES # BLD AUTO: 1.2 10E9/L (ref 0.8–5.3)
LYMPHOCYTES NFR BLD AUTO: 19.8 %
MCH RBC QN AUTO: 32.4 PG (ref 26.5–33)
MCHC RBC AUTO-ENTMCNC: 32.4 G/DL (ref 31.5–36.5)
MCV RBC AUTO: 100 FL (ref 78–100)
MONOCYTES # BLD AUTO: 0.6 10E9/L (ref 0–1.3)
MONOCYTES NFR BLD AUTO: 10.1 %
NEUTROPHILS # BLD AUTO: 4 10E9/L (ref 1.6–8.3)
NEUTROPHILS NFR BLD AUTO: 66.6 %
PLATELET # BLD AUTO: 157 10E9/L (ref 150–450)
RBC # BLD AUTO: 3.89 10E12/L (ref 4.4–5.9)
WBC # BLD AUTO: 6 10E9/L (ref 4–11)

## 2020-07-21 PROCEDURE — 85025 COMPLETE CBC W/AUTO DIFF WBC: CPT | Performed by: INTERNAL MEDICINE

## 2020-07-21 PROCEDURE — 84443 ASSAY THYROID STIM HORMONE: CPT | Performed by: INTERNAL MEDICINE

## 2020-07-21 PROCEDURE — 36415 COLL VENOUS BLD VENIPUNCTURE: CPT | Performed by: INTERNAL MEDICINE

## 2020-07-21 PROCEDURE — 80053 COMPREHEN METABOLIC PANEL: CPT | Performed by: INTERNAL MEDICINE

## 2020-07-21 PROCEDURE — 99214 OFFICE O/P EST MOD 30 MIN: CPT | Performed by: INTERNAL MEDICINE

## 2020-07-21 RX ORDER — LACTULOSE 10 G/10G
20 SOLUTION ORAL 2 TIMES DAILY
Start: 2020-07-21 | End: 2022-05-10

## 2020-07-21 ASSESSMENT — MIFFLIN-ST. JEOR: SCORE: 1583.44

## 2020-07-21 NOTE — PROGRESS NOTES
The patient is having ongoing issues with fatigue.  Is been going on for several months now.  He really has no other significant complaints.  Specifically no fevers or night sweats or weight loss.  No headaches.  No dizziness.  No chest pain or shortness of breath.  Some edema but this is unchanged.  No abdominal pain or nausea or vomiting.  He was having quite a bit of bowel irregularity with constipation and loose incontinent stools but that has resolved.  No urinary changes.  He sleeps fine.  He does not feel like his depression or anxiety under significant issue.  No recent medication changes or other changes.    Past Medical History:   Diagnosis Date     A-fib (H) 2010    post op     AAA (abdominal aortic aneurysm) without rupture (H)     Dr. 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    carotid us no stenosis, echo no change and no clots; ziopatch no afib, svt present     Anemia 2004     Aortic insufficiency 6/14    1+ on echo     Aortic insufficiency 11/2016    mild to mod     ASCVD (arteriosclerotic cardiovascular disease) 2010    cabg, post op afib     BPH (benign prostatic hyperplasia) 2003    turp, flomax added by urol 2/17     Bradycardia 2016    stopped toprol     CKD (chronic kidney disease) stage 3, GFR 30-59 ml/min (H)      Constipation 05/2020    colonoscopy nl     Cough 2010    pulm eval, felt due to reflux     Dizzy 2001    mri small vessel dz     GERD (gastroesophageal reflux disease)      HTN (hypertension) 2002     Hx of colonoscopy 2003    tics     Hypothyroid      Hypovitaminosis D      Idiopathic neuropathy      Lung nodule 02/2018    6mm, found during eval of ascending aorta, fu 8/18 no change     OCD (obsessive compulsive disorder)     sees psyche     Panic disorder     Dr. Luna     Spinal headache      Stroke (H) 12/16    expressive aphasia, hosp, seen by neuro, mri pos, carotids min dz, changed from asa to plavix     Sudden hearing loss 6/13      Jose, mri brain no acoustic neuroma     SVT (supraventricular tachycardia) (H) 11/16    seen on ziopatch done for amaurosis, longest 15 beats     Thoracic ascending aortic aneurysm (H) 06/2014    4.4cm on echo, aortic root 3.9, fu 5/15 4.8cm; fu done 12/15 and slightly enlarged, fu 6/16 no change, fu 11/16 no change; fu 1/18 echo 5.1-5.3, enlarged, then cta done and only 4.8cm, t fu 6 months, lvh, nl ef, mild ai; fu 8/18 slightly larger; fu 2/19 slightly smaller     Ulcerative colitis (H)     not active for years     Past Surgical History:   Procedure Laterality Date     BACK SURGERY  1998     BLADDER SURGERY  1985     C TOTAL KNEE ARTHROPLASTY  2011    left     C TOTAL KNEE ARTHROPLASTY  2009    right     CATARACT IOL, RT/LT  2011     COLONOSCOPY N/A 5/5/2020    Procedure: COLONOSCOPY;  Surgeon: Kenya Loco MD;  Location:  GI     CORONARY ARTERY BYPASS  2010     ENDOVASCULAR REPAIR ANEURYSM ABDOMINAL AORTA N/A 5/27/2015    Procedure: ENDOVASCULAR REPAIR ANEURYSM ABDOMINAL AORTA;  Surgeon: Darin Walters MD;  Location:  OR     HERNIA REPAIR  2005     HERNIA REPAIR  1998     HERNIORRHAPHY INGUINAL Left 1/5/2018    Procedure: HERNIORRHAPHY INGUINAL;  Incarcerated Left Inguinal Hernia;  Surgeon: Harsh Walker MD;  Location:  OR     KNEE SURGERY  1997     REPAIR HAMMER TOE  12/28/2012    Procedure: REPAIR HAMMER TOE;  LEFT SECOND AND THIRD CLAW TOE RECONSTRUCTION;  Surgeon: Gray Haynes MD;  Location:  OR     REPAIR HAMMER TOE  04/2018    tridevin ESQUEDA  2003     Social History     Socioeconomic History     Marital status:      Spouse name: Not on file     Number of children: 2     Years of education: Not on file     Highest education level: Not on file   Occupational History     Occupation: retail     Employer: RETIRED   Social Needs     Financial resource strain: Not on file     Food insecurity     Worry: Not on file     Inability: Not on file     Transportation needs      Medical: Not on file     Non-medical: Not on file   Tobacco Use     Smoking status: Former Smoker     Packs/day: 1.00     Years: 5.00     Pack years: 5.00     Types: Cigarettes     Last attempt to quit: 1965     Years since quittin.1     Smokeless tobacco: Never Used   Substance and Sexual Activity     Alcohol use: Yes     Alcohol/week: 0.0 standard drinks     Frequency: 2-4 times a month     Drinks per session: 1 or 2     Binge frequency: Never     Comment: maybe one glass of wine a week     Drug use: No     Sexual activity: Not Currently   Lifestyle     Physical activity     Days per week: Not on file     Minutes per session: Not on file     Stress: Not on file   Relationships     Social connections     Talks on phone: Not on file     Gets together: Not on file     Attends Christianity service: Not on file     Active member of club or organization: Not on file     Attends meetings of clubs or organizations: Not on file     Relationship status: Not on file     Intimate partner violence     Fear of current or ex partner: Not on file     Emotionally abused: Not on file     Physically abused: Not on file     Forced sexual activity: Not on file   Other Topics Concern     Parent/sibling w/ CABG, MI or angioplasty before 65F 55M? Not Asked   Social History Narrative     Not on file     Current Outpatient Medications   Medication Sig Dispense Refill     ARIPiprazole (ABILIFY) 2 MG tablet Take 1 tablet by mouth At Bedtime.       ASPIRIN NOT PRESCRIBED (INTENTIONAL) Please choose reason not prescribed, below 0 each 0     chlorthalidone (HYGROTON) 25 MG tablet Take 1 tablet (25 mg) by mouth daily 90 tablet 3     ClomiPRAMINE HCl (ANAFRANIL PO) Take 25 mg by mouth every evening        clopidogrel (PLAVIX) 75 MG tablet Take 1 tablet (75 mg) by mouth daily 90 tablet 3     gabapentin (NEURONTIN) 400 MG capsule Take 1 capsule by mouth 2 times daily        lactulose (CEPHULAC) 10 GM packet Take 2 packets (20 g) by mouth  "2 times daily       levothyroxine (SYNTHROID/LEVOTHROID) 100 MCG tablet Take 1 tablet (100 mcg) by mouth daily 90 tablet 3     LORAZEPAM PO Take 0.5 mg by mouth daily as needed for anxiety       MIRTAZAPINE PO Take 45 mg by mouth At Bedtime        simvastatin (ZOCOR) 20 MG tablet TAKE ONE TABLET BY MOUTH AT BEDTIME 90 tablet 3     Allergies   Allergen Reactions     No Known Allergies      FAMILY HISTORY NOTED AND REVIEWED    REVIEW OF SYSTEMS: above    PHYSICAL EXAM    /68 (BP Location: Right arm, Patient Position: Sitting, Cuff Size: Adult Regular)   Pulse 56   Ht 1.854 m (6' 1\")   Wt 86.5 kg (190 lb 9.6 oz)   SpO2 95%   BMI 25.15 kg/m      Patient appears non toxic  Mouth and eyes within normal limits  Neck within normal limits  Thyroid within normal limits  No supraclavicular, cervical or axillary lymphadenopathy  Lungs clear, normal flow  cv reglar rate and rhythm, no murmer, rub or gallop, no  Jvp, trace to 1+ lower leg edema  Abdomen normal active bowel sounds, soft non-tender, no mgr, no hepatosplenomegaly    Labs sent    ASSESSMENT:  1. Fatigue, may be hard to find cause, doubt cv, anemia, thyroid, ?depression, ?age, doubt meds  2. Bowel irreg, resolved  3. Afib, no issues  4. Depression, doing well  5. Ckd, follow up labs    PLAN:  Labs now    PLAN:        "

## 2020-07-21 NOTE — LETTER
July 23, 2020      Zack Santiago  [address]    [place] [facility] [address]        Dear ,    It was a pleasure seeing you.  I wanted to get back to you with your test results.  I have enclosed a copy for your records.     Your blood tests look very good.  Your complete blood count, thyroid test, blood salts, kidney tests, liver tests, proteins, and sugar are all fine.  There is nothing to explain the fatigue.  I wish I had a good answer for this but other than getting a bit older and the recent stresses I cannot find any cause.  Let's give it a bit more time and see if things improve.  Please try to stay active.  If you feel like you are worsening let me know.     Resulted Orders   CBC with platelets differential   Result Value Ref Range    WBC 6.0 4.0 - 11.0 10e9/L    RBC Count 3.89 (L) 4.4 - 5.9 10e12/L    Hemoglobin 12.6 (L) 13.3 - 17.7 g/dL    Hematocrit 38.9 (L) 40.0 - 53.0 %     78 - 100 fl    MCH 32.4 26.5 - 33.0 pg    MCHC 32.4 31.5 - 36.5 g/dL    RDW 15.0 10.0 - 15.0 %    Platelet Count 157 150 - 450 10e9/L    % Neutrophils 66.6 %    % Lymphocytes 19.8 %    % Monocytes 10.1 %    % Eosinophils 3.0 %    % Basophils 0.5 %    Absolute Neutrophil 4.0 1.6 - 8.3 10e9/L    Absolute Lymphocytes 1.2 0.8 - 5.3 10e9/L    Absolute Monocytes 0.6 0.0 - 1.3 10e9/L    Absolute Eosinophils 0.2 0.0 - 0.7 10e9/L    Absolute Basophils 0.0 0.0 - 0.2 10e9/L    Diff Method Automated Method    Comprehensive metabolic panel   Result Value Ref Range    Sodium 140 133 - 144 mmol/L    Potassium 3.7 3.4 - 5.3 mmol/L    Chloride 106 94 - 109 mmol/L    Carbon Dioxide 27 20 - 32 mmol/L    Anion Gap 7 3 - 14 mmol/L    Glucose 65 (L) 70 - 99 mg/dL    Urea Nitrogen 25 7 - 30 mg/dL    Creatinine 1.16 0.66 - 1.25 mg/dL    GFR Estimate 55 (L) >60 mL/min/[1.73_m2]      Comment:      Non  GFR Calc  Starting 12/18/2018, serum creatinine based estimated GFR (eGFR) will be   calculated using the Chronic Kidney  Disease Epidemiology Collaboration   (CKD-EPI) equation.      GFR Estimate If Black 64 >60 mL/min/[1.73_m2]      Comment:       GFR Calc  Starting 12/18/2018, serum creatinine based estimated GFR (eGFR) will be   calculated using the Chronic Kidney Disease Epidemiology Collaboration   (CKD-EPI) equation.      Calcium 8.5 8.5 - 10.1 mg/dL    Bilirubin Total 0.6 0.2 - 1.3 mg/dL    Albumin 3.6 3.4 - 5.0 g/dL    Protein Total 6.8 6.8 - 8.8 g/dL    Alkaline Phosphatase 80 40 - 150 U/L    ALT 25 0 - 70 U/L    AST 9 0 - 45 U/L   TSH with free T4 reflex   Result Value Ref Range    TSH 0.46 0.40 - 4.00 mU/L       If you have any questions or concerns, please call the clinic at the number listed above.       Sincerely,        Zurdo Kendrick MD

## 2020-07-22 LAB
ALBUMIN SERPL-MCNC: 3.6 G/DL (ref 3.4–5)
ALP SERPL-CCNC: 80 U/L (ref 40–150)
ALT SERPL W P-5'-P-CCNC: 25 U/L (ref 0–70)
ANION GAP SERPL CALCULATED.3IONS-SCNC: 7 MMOL/L (ref 3–14)
AST SERPL W P-5'-P-CCNC: 9 U/L (ref 0–45)
BILIRUB SERPL-MCNC: 0.6 MG/DL (ref 0.2–1.3)
BUN SERPL-MCNC: 25 MG/DL (ref 7–30)
CALCIUM SERPL-MCNC: 8.5 MG/DL (ref 8.5–10.1)
CHLORIDE SERPL-SCNC: 106 MMOL/L (ref 94–109)
CO2 SERPL-SCNC: 27 MMOL/L (ref 20–32)
CREAT SERPL-MCNC: 1.16 MG/DL (ref 0.66–1.25)
GFR SERPL CREATININE-BSD FRML MDRD: 55 ML/MIN/{1.73_M2}
GLUCOSE SERPL-MCNC: 65 MG/DL (ref 70–99)
POTASSIUM SERPL-SCNC: 3.7 MMOL/L (ref 3.4–5.3)
PROT SERPL-MCNC: 6.8 G/DL (ref 6.8–8.8)
SODIUM SERPL-SCNC: 140 MMOL/L (ref 133–144)
TSH SERPL DL<=0.005 MIU/L-ACNC: 0.46 MU/L (ref 0.4–4)

## 2020-07-23 NOTE — RESULT ENCOUNTER NOTE
It was a pleasure seeing you.  I wanted to get back to you with your test results.  I have enclosed a copy for your records.    Your blood tests look very good.  Your complete blood count, thyroid test, blood salts, kidney tests, liver tests, proteins, and sugar are all fine.  There is nothing to explain the fatigue.  I wish I had a good answer for this but other than getting a bit older and the recent stresses I cannot find any cause.  Let's give it a bit more time and see if things improve.  Please try to stay active.  If you feel like you are worsening let me know.

## 2020-07-28 ENCOUNTER — TELEPHONE (OUTPATIENT)
Dept: FAMILY MEDICINE | Facility: CLINIC | Age: 85
End: 2020-07-28

## 2020-07-28 NOTE — TELEPHONE ENCOUNTER
Reason for call:  Results   Name of test or procedure: bloodwork  Date of test or procedure: 728  Location of test or procedure: [place]    Additional comments:     Phone number to reach patient:  Cell number on file:    Telephone Information:   Mobile [phone]       Best Time:  anytime    Can we leave a detailed message on this number?  YES

## 2020-08-03 ENCOUNTER — TELEPHONE (OUTPATIENT)
Dept: FAMILY MEDICINE | Facility: CLINIC | Age: 85
End: 2020-08-03

## 2020-08-03 NOTE — TELEPHONE ENCOUNTER
TC's.    Pt was seen 7/21 for acute concerns but needs new appt for pre-op. Please call pt and help schedule pre-op.    Thank you,  Felix DEAN RN

## 2020-08-03 NOTE — TELEPHONE ENCOUNTER
Reason for Call:  Other appointment    Detailed comments: Patient is scheduled for surgery on 8/13 with Dr Sorto at [facility], was seen on 7/21/2020 wondering if that could be used for pre-op information (within 30 days)    Phone Number Patient can be reached at: Home number on file [phone] (home)    Best Time: any    Can we leave a detailed message on this number? YES    Call taken on 8/3/2020 at 4:08 PM by Delisa Soto

## 2020-08-05 NOTE — TELEPHONE ENCOUNTER
Pt is calling because he is having the surgery on 08/13.  He was in on 07/21 and noted that it was a physical.  He is hoping that he can be cleared for surgery, but needs to know if [facility] is willing to sign off on it.  If not, he needs to be seen asap for preop.  Please advise and pt is ok for message to be left on VM.

## 2020-08-06 ENCOUNTER — TRANSFERRED RECORDS (OUTPATIENT)
Dept: HEALTH INFORMATION MANAGEMENT | Facility: CLINIC | Age: 85
End: 2020-08-06

## 2020-08-06 NOTE — TELEPHONE ENCOUNTER
Called patient and left message confirming tomorrow's Telephone Visit for preop.     Halle CYR RN,BSN

## 2020-08-06 NOTE — TELEPHONE ENCOUNTER
PCP - patient scheduled a phone visit for 8/7 with you - appt notes indicate he does not ability to do video visit, is this okay since he had a recent visit? Or, will he need to reschedule?     Please advise  Tanya MA RN

## 2020-08-06 NOTE — TELEPHONE ENCOUNTER
Sounds good, yes, can do it that way.  Please make sure the visit is with me    Thanks again    Zurdo Kendrick M.D.

## 2020-08-07 ENCOUNTER — VIRTUAL VISIT (OUTPATIENT)
Dept: FAMILY MEDICINE | Facility: CLINIC | Age: 85
End: 2020-08-07
Payer: COMMERCIAL

## 2020-08-07 DIAGNOSIS — M79.676 PAIN OF TOE, UNSPECIFIED LATERALITY: ICD-10-CM

## 2020-08-07 DIAGNOSIS — N18.30 CKD (CHRONIC KIDNEY DISEASE) STAGE 3, GFR 30-59 ML/MIN (H): ICD-10-CM

## 2020-08-07 DIAGNOSIS — I71.21 THORACIC ASCENDING AORTIC ANEURYSM (H): ICD-10-CM

## 2020-08-07 DIAGNOSIS — I71.40 AAA (ABDOMINAL AORTIC ANEURYSM) WITHOUT RUPTURE (H): ICD-10-CM

## 2020-08-07 DIAGNOSIS — I10 BENIGN ESSENTIAL HYPERTENSION: ICD-10-CM

## 2020-08-07 DIAGNOSIS — Z01.818 PREOP GENERAL PHYSICAL EXAM: Primary | ICD-10-CM

## 2020-08-07 PROCEDURE — 99215 OFFICE O/P EST HI 40 MIN: CPT | Mod: 95 | Performed by: INTERNAL MEDICINE

## 2020-08-07 NOTE — PROGRESS NOTES
"Zack Santiago is a 89 year old male who is being evaluated via a billable telephone visit.      The patient has been notified of following:     \"This telephone visit will be conducted via a call between you and your physician/provider. We have found that certain health care needs can be provided without the need for a physical exam.  This service lets us provide the care you need with a short phone conversation.  If a prescription is necessary we can send it directly to your pharmacy.  If lab work is needed we can place an order for that and you can then stop by our lab to have the test done at a later time.    Telephone visits are billed at different rates depending on your insurance coverage. During this emergency period, for some insurers they may be billed the same as an in-person visit.  Please reach out to your insurance provider with any questions.    If during the course of the call the physician/provider feels a telephone visit is not appropriate, you will not be charged for this service.\"    Patient has given verbal consent for Telephone visit?  Yes    What phone number would you like to be contacted at? [phone]    How would you like to obtain your AVS? Mail a copy           PRE-OP EVALUATION:  Today's date: 2020    Zack Santiago (: 1931) presents for pre-operative evaluation assessment as requested by Dr. Eamon Voss.  He requires evaluation and anesthesia risk assessment prior to undergoing surgery/procedure for treatment of toe pain .    Proposed Surgery/ Procedure: RT 5th Amputation   Date of Surgery/ Procedure: 2020  Time of Surgery/ Procedure: 9:30AM  Hospital/Surgical Facility: [facility], [place]   Surgery Fax Number: [phone]    Primary Physician: Zurdo Kendrick  Type of Anesthesia Anticipated: to be determined    Preoperative Questionnaire:   YES - HAVE YOU EVER HAD A HEART ATTACK OR STROKE? Both ( heart attack 15 years ago and stroke 3 years ago)  YES - Have " you ever had surgery on your heart or blood vessels, such as a stent, coronary (heart) bypass, or surgery on an artery in the head, neck, heart, or legs? Heart Bypass ( 15 years ago)  No - Do you have chest pain when you are physically active?  No - Do you have a history of heart failure?  No - Do you currently have a cold, bronchitis, or symptoms of other respiratory (head and chest) infections?  No - Do you have a cough, shortness of breath, or wheezing?  No - Do you or anyone in your family have a history of blood clots?  No - Do you or anyone in your family have a serious bleeding problem, such as long-lasting bleeding after surgeries or cuts?  YES - Have you ever had anemia or been told to take iron pills? In  The past had anemia/took iron pills  No - Have you had any abnormal blood loss such as black, tarry or bloody stools, or abnormal vaginal bleeding?  No - Have you ever had a blood transfusion?  Yes - Are you willing to have a blood transfusion if it is medically needed before, during, or after your surgery?  No - Have you or anyone in your family ever had problems with anesthesia (sedation for surgery)?  No - Do you have sleep apnea, excessive snoring, or daytime drowsiness?   No - Do you have any artifical heart valves or other implanted medical devices, such as a pacemaker, defibrillator, or continuous glucose monitor?  No - Do you have any artifical joints? Bilateral knee joint  No - Are you allergic to latex?  No - Is there any chance that you may be pregnant?    Patient has a Health Care Directive or Living Will:  YES        The patient is having surgery as noted above due to toe discomfort when he walks.  He otherwise feels fine.  No chest pain or shortness of breath.  No pnd or change in edema.  No recent coughs or colds, no fever.  He has not been exercising regularly.                  Past Medical History:      Past Medical History:   Diagnosis Date     A-fib (H) 2010    post op     AAA  (abdominal aortic aneurysm) without rupture (H)     Dr. 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    carotid us no stenosis, echo no change and no clots; ziopatch no afib, svt present     Anemia 2004     Aortic insufficiency 6/14    1+ on echo     Aortic insufficiency 11/2016    mild to mod     ASCVD (arteriosclerotic cardiovascular disease) 2010    cabg, post op afib     BPH (benign prostatic hyperplasia) 2003    turp, flomax added by urol 2/17     Bradycardia 2016    stopped toprol     CKD (chronic kidney disease) stage 3, GFR 30-59 ml/min (H)      Constipation 05/2020    colonoscopy nl     Cough 2010    pulm eval, felt due to reflux     Dizzy 2001    mri small vessel dz     GERD (gastroesophageal reflux disease)      HTN (hypertension) 2002     Hx of colonoscopy 2003    tics     Hypothyroid      Hypovitaminosis D      Idiopathic neuropathy      Lung nodule 02/2018    6mm, found during eval of ascending aorta, fu 8/18 no change     OCD (obsessive compulsive disorder)     sees psyche     Panic disorder     Dr. Luna     Spinal headache      Stroke (H) 12/16    expressive aphasia, hosp, seen by neuro, mri pos, carotids min dz, changed from asa to plavix     Sudden hearing loss 6/13    Dr. Garcia, mri brain no acoustic neuroma     SVT (supraventricular tachycardia) (H) 11/16    seen on ziopatch done for amaurosis, longest 15 beats     Thoracic ascending aortic aneurysm (H) 06/2014    4.4cm on echo, aortic root 3.9, fu 5/15 4.8cm; fu done 12/15 and slightly enlarged, fu 6/16 no change, fu 11/16 no change; fu 1/18 echo 5.1-5.3, enlarged, then cta done and only 4.8cm, t fu 6 months, lvh, nl ef, mild ai; fu 8/18 slightly larger; fu 2/19 slightly smaller     Ulcerative colitis (H)     not active for years             Past Surgical History:      Past Surgical History:   Procedure Laterality Date     BACK SURGERY  1998     BLADDER SURGERY  1985     C TOTAL KNEE ARTHROPLASTY  2011     left     C TOTAL KNEE ARTHROPLASTY      right     CATARACT IOL, RT/LT       COLONOSCOPY N/A 2020    Procedure: COLONOSCOPY;  Surgeon: Kenya Loco MD;  Location:  GI     CORONARY ARTERY BYPASS       ENDOVASCULAR REPAIR ANEURYSM ABDOMINAL AORTA N/A 2015    Procedure: ENDOVASCULAR REPAIR ANEURYSM ABDOMINAL AORTA;  Surgeon: Darin Walters MD;  Location:  OR     HERNIA REPAIR       HERNIA REPAIR       HERNIORRHAPHY INGUINAL Left 2018    Procedure: HERNIORRHAPHY INGUINAL;  Incarcerated Left Inguinal Hernia;  Surgeon: Harsh Walker MD;  Location:  OR     KNEE SURGERY       REPAIR HAMMER TOE  2012    Procedure: REPAIR HAMMER TOE;  LEFT SECOND AND THIRD CLAW TOE RECONSTRUCTION;  Surgeon: Gray Haynes MD;  Location:  OR     REPAIR HAMMER TOE  2018    tria     TURP               Social History:     Social History     Tobacco Use     Smoking status: Former Smoker     Packs/day: 1.00     Years: 5.00     Pack years: 5.00     Types: Cigarettes     Last attempt to quit: 1965     Years since quittin.1     Smokeless tobacco: Never Used   Substance Use Topics     Alcohol use: Yes     Alcohol/week: 0.0 standard drinks     Frequency: 2-4 times a month     Drinks per session: 1 or 2     Binge frequency: Never     Comment: maybe one glass of wine a week             Family History:   No family history of bleeding difficulty, anesthesia problems or blood clots.         Allergies:     Allergies   Allergen Reactions     No Known Allergies              Medications:     Current Outpatient Medications   Medication Sig Dispense Refill     ARIPiprazole (ABILIFY) 2 MG tablet Take 1 tablet by mouth At Bedtime.       chlorthalidone (HYGROTON) 25 MG tablet Take 1 tablet (25 mg) by mouth daily 90 tablet 3     ClomiPRAMINE HCl (ANAFRANIL PO) Take 25 mg by mouth every evening        clopidogrel (PLAVIX) 75 MG tablet Take 1 tablet (75 mg) by mouth daily  90 tablet 3     gabapentin (NEURONTIN) 400 MG capsule Take 1 capsule by mouth 2 times daily        lactulose (CEPHULAC) 10 GM packet Take 2 packets (20 g) by mouth 2 times daily (Patient taking differently: Take 20 g by mouth daily )       levothyroxine (SYNTHROID/LEVOTHROID) 100 MCG tablet Take 1 tablet (100 mcg) by mouth daily 90 tablet 3     LORAZEPAM PO Take 0.5 mg by mouth daily as needed for anxiety       MIRTAZAPINE PO Take 45 mg by mouth At Bedtime        simvastatin (ZOCOR) 20 MG tablet TAKE ONE TABLET BY MOUTH AT BEDTIME 90 tablet 3     ASPIRIN NOT PRESCRIBED (INTENTIONAL) Please choose reason not prescribed, below 0 each 0               Review of Systems:   No history of bleeding difficulty, anesthesia problems or blood clots.  The 10 point Review of Systems is negative other than noted in the HPI      Prior labs noted.    ASSESSMENT:  1. Toe pain  2. Aaa, thor aneurysm, stable and should not affect surgery  3. Hypertension, has been fine  4. Cad, no c/o, med mgmt  5. Ckd, stable    PLAN:  The patient should be low risk for the procedure, no other testing needed  He can take his usual medicines day of surgery x hold the chlorthalidone  He will hold the plavix until post op    Zurdo Kendrick M.D.  Time 11 minutes

## 2020-08-14 ENCOUNTER — TELEPHONE (OUTPATIENT)
Dept: FAMILY MEDICINE | Facility: CLINIC | Age: 85
End: 2020-08-14

## 2020-08-14 NOTE — TELEPHONE ENCOUNTER
Reason for Call:  Home Health Care    [facility] called regarding (reason for call):     Orders are needed for this patient.     Pt Provider: discharge home after 24 hour observation at TCU. Was in hospital after foot surgery. Requesting orders    Fax: [phone]    Phone Number Homecare Nurse can be reached at: [phone]    Can we leave a detailed message on this number? YES    Phone number patient can be reached at: [phone]    Best Time: any    Call taken on 8/14/2020 at 9:50 AM by Sneha Dennison

## 2020-08-19 PROBLEM — M25.552 HIP PAIN, LEFT: Status: RESOLVED | Noted: 2020-06-11 | Resolved: 2020-08-19

## 2020-08-19 PROBLEM — M70.62 TROCHANTERIC BURSITIS OF LEFT HIP: Status: RESOLVED | Noted: 2020-06-11 | Resolved: 2020-08-19

## 2020-10-07 ENCOUNTER — TRANSFERRED RECORDS (OUTPATIENT)
Dept: HEALTH INFORMATION MANAGEMENT | Facility: CLINIC | Age: 85
End: 2020-10-07

## 2020-10-13 ENCOUNTER — ANCILLARY PROCEDURE (OUTPATIENT)
Dept: GENERAL RADIOLOGY | Facility: CLINIC | Age: 85
End: 2020-10-13
Attending: FAMILY MEDICINE
Payer: COMMERCIAL

## 2020-10-13 ENCOUNTER — OFFICE VISIT (OUTPATIENT)
Dept: ORTHOPEDICS | Facility: CLINIC | Age: 85
End: 2020-10-13
Payer: COMMERCIAL

## 2020-10-13 VITALS
HEIGHT: 73 IN | DIASTOLIC BLOOD PRESSURE: 78 MMHG | SYSTOLIC BLOOD PRESSURE: 132 MMHG | BODY MASS INDEX: 25.18 KG/M2 | WEIGHT: 190 LBS

## 2020-10-13 DIAGNOSIS — M51.369 DDD (DEGENERATIVE DISC DISEASE), LUMBAR: Primary | ICD-10-CM

## 2020-10-13 DIAGNOSIS — M54.50 ACUTE LEFT-SIDED LOW BACK PAIN WITHOUT SCIATICA: ICD-10-CM

## 2020-10-13 DIAGNOSIS — M51.369 DDD (DEGENERATIVE DISC DISEASE), LUMBAR: ICD-10-CM

## 2020-10-13 PROCEDURE — 99214 OFFICE O/P EST MOD 30 MIN: CPT | Performed by: FAMILY MEDICINE

## 2020-10-13 PROCEDURE — 72100 X-RAY EXAM L-S SPINE 2/3 VWS: CPT | Performed by: RADIOLOGY

## 2020-10-13 ASSESSMENT — MIFFLIN-ST. JEOR: SCORE: 1580.71

## 2020-10-13 ASSESSMENT — ENCOUNTER SYMPTOMS: BACK PAIN: 1

## 2020-10-13 NOTE — PROGRESS NOTES
[facility]   Clinic Visit s Oct 13, 2020    PCP: Zurdo Kendrick    ASSESSMENT/PLAN    ICD-10-CM    1. DDD (degenerative disc disease), lumbar  M51.36 XR Lumbar Spine 2/3 Views     [facility] PT, [facility], AND CHIROPRACTIC REFERRAL   2. Acute left-sided low back pain without sciatica  M54.5 XR Lumbar Spine 2/3 Views     PEDRO PT, HAND, AND CHIROPRACTIC REFERRAL     Acute left-sided low back pain, without overt radiculopathy today, reassurance, no sign of compression fracture.  We discussed treatment options and he is comfortable increasing use of Tylenol, and also trying Lidoderm patches or topical Voltaren gel.  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          Today's Visit:  Zack is a 89 year old male who is seen as self referral for   Chief Complaint   Patient presents with     Lower Back - Pain       Injury: Patient reports insidious onset without acute precipitating event.     Right hand dominant    Location of Pain: left low back , radiating to left buttock    Duration of Pain: intermittent, 3 week(s),   Rating of Pain at worst: 5/10  Rating of Pain Currently: 3/10  Pain is better with: sitting   Pain is worse with: walking   Treatment so far consists of: tylenol  Associated symptoms: pain   Recent imaging completed: No recent imaging completed.  Prior History of related problems: Self limiting low back pain     Pain awakens him at night    Social History: retired-likes to read      Past Medical History:   Diagnosis Date     A-fib (H) 2010    post op     AAA (abdominal aortic aneurysm) without rupture (H)     Dr. 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    carotid us no stenosis, echo no change and no clots; ziopatch no afib, svt present     Anemia 2004     Aortic insufficiency 6/14    1+ on echo     Aortic  insufficiency 11/2016    mild to mod     ASCVD (arteriosclerotic cardiovascular disease) 2010    cabg, post op afib     BPH (benign prostatic hyperplasia) 2003    turp, flomax added by urol 2/17     Bradycardia 2016    stopped toprol     CKD (chronic kidney disease) stage 3, GFR 30-59 ml/min      Constipation 05/2020    colonoscopy nl     Cough 2010    pulm eval, felt due to reflux     Dizzy 2001    mri small vessel dz     GERD (gastroesophageal reflux disease)      HTN (hypertension) 2002     Hx of colonoscopy 2003    tics     Hypothyroid      Hypovitaminosis D      Idiopathic neuropathy      Lung nodule 02/2018    6mm, found during eval of ascending aorta, fu 8/18 no change     OCD (obsessive compulsive disorder)     sees psyche     Panic disorder     Dr. Luna     Spinal headache      Stroke (H) 12/16    expressive aphasia, hosp, seen by neuro, mri pos, carotids min dz, changed from asa to plavix     Sudden hearing loss 6/13    Dr. Garcia, mri brain no acoustic neuroma     SVT (supraventricular tachycardia) (H) 11/16    seen on ziopatch done for amaurosis, longest 15 beats     Thoracic ascending aortic aneurysm (H) 06/2014    4.4cm on echo, aortic root 3.9, fu 5/15 4.8cm; fu done 12/15 and slightly enlarged, fu 6/16 no change, fu 11/16 no change; fu 1/18 echo 5.1-5.3, enlarged, then cta done and only 4.8cm, t fu 6 months, lvh, nl ef, mild ai; fu 8/18 slightly larger; fu 2/19 slightly smaller     Ulcerative colitis (H)     not active for years       Patient Active Problem List    Diagnosis Date Noted     Fecal incontinence 06/19/2020     Priority: Medium     Constipation 06/19/2020     Priority: Medium     Lung nodule 02/01/2018     Priority: Medium     6mm, found during eval of ascending aorta       Cerebrovascular accident (CVA), unspecified mechanism (H) 01/19/2017     Priority: Medium     Atrial fibrillation, unspecified type (H) 01/19/2017     Priority: Medium     SVT (supraventricular tachycardia) (H)       Priority: Medium     seen on ziopatch done for amaurosis, longest 15 beats       Amaurosis fugax of right eye      Priority: Medium     Bradycardia      Priority: Medium     stopped toprol       Aortic insufficiency      Priority: Medium     1+ on echo       Thoracic ascending aortic aneurysm (H)      Priority: Medium     4.4cm on echo, aortic root 3.9       Sudden hearing loss      Priority: Medium     Dr. Garcia, mri brain no acoustic neuroma       CKD (chronic kidney disease) stage 3, GFR 30-59 ml/min      Priority: Medium     Claw toe, acquired 12/28/2012     Priority: Medium     Problem list name updated by automated process. Provider to review       Hyperlipidemia LDL goal <100 06/13/2012     Priority: Medium     OCD (obsessive compulsive disorder)      Priority: Medium     GERD (gastroesophageal reflux disease)      Priority: Medium     AAA (abdominal aortic aneurysm) without rupture (H)      Priority: Medium     Hypovitaminosis D      Priority: Medium     ASCVD (arteriosclerotic cardiovascular disease)      Priority: Medium     cabg, post op afib       Acquired hypothyroidism      Priority: Medium     Ulcerative colitis (H)      Priority: Medium     not active for years       Idiopathic neuropathy      Priority: Medium     Benign essential hypertension      Priority: Medium     Cough      Priority: Medium     pulm eval, felt due to reflux       Advanced directives, counseling/discussion 06/08/2012     Priority: Medium     Patient states has Advance Directive and will bring in a copy to clinic. 6/8/2012          Family History   Problem Relation Age of Onset     C.A.D. Father      Lymphoma Sister        Social History     Socioeconomic History     Marital status:      Spouse name: Not on file     Number of children: 2     Years of education: Not on file     Highest education level: Not on file   Occupational History     Occupation: retail     Employer: RETIRED   Social Needs     Financial resource  "strain: Not on file     Food insecurity     Worry: Not on file     Inability: Not on file     Transportation needs     Medical: Not on file     Non-medical: Not on file   Tobacco Use     Smoking status: Former Smoker     Packs/day: 1.00     Years: 5.00     Pack years: 5.00     Types: Cigarettes     Quit date: 1965     Years since quittin.3     Smokeless tobacco: Never Used   Substance and Sexual Activity     Alcohol use: Yes     Alcohol/week: 0.0 standard drinks     Frequency: 2-4 times a month     Drinks per session: 1 or 2     Binge frequency: Never     Comment: maybe one glass of wine a week     Drug use: No     Past Surgical History:   Procedure Laterality Date     BACK SURGERY       BLADDER SURGERY  1985     C TOTAL KNEE ARTHROPLASTY  2011    left     C TOTAL KNEE ARTHROPLASTY  2009    right     CATARACT IOL, RT/LT       COLONOSCOPY N/A 2020    Procedure: COLONOSCOPY;  Surgeon: Kenya Loco MD;  Location:  GI     CORONARY ARTERY BYPASS       ENDOVASCULAR REPAIR ANEURYSM ABDOMINAL AORTA N/A 2015    Procedure: ENDOVASCULAR REPAIR ANEURYSM ABDOMINAL AORTA;  Surgeon: Darin Walters MD;  Location:  OR     HERNIA REPAIR  2005     HERNIA REPAIR  1998     HERNIORRHAPHY INGUINAL Left 2018    Procedure: HERNIORRHAPHY INGUINAL;  Incarcerated Left Inguinal Hernia;  Surgeon: Harsh Walker MD;  Location:  OR     KNEE SURGERY       REPAIR HAMMER TOE  2012    Procedure: REPAIR HAMMER TOE;  LEFT SECOND AND THIRD CLAW TOE RECONSTRUCTION;  Surgeon: Gray Haynes MD;  Location:  OR     REPAIR HAMMER TOE  2018    tridevin ESQUEDA               Review of Systems   Musculoskeletal: Positive for back pain.   All other systems reviewed and are negative.        Physical Exam    /78   Ht 1.854 m (6' 1\")   Wt 86.2 kg (190 lb)   BMI 25.07 kg/m    Constitutional:well-developed, well-nourished, and in no distress.   Cardiovascular: Intact " distal pulses.    Neurological: alert. Gait Abnormal:   Gait with shuffling steps  Skin: Skin is warm and dry.   Psychiatric: Mood and affect normal.   Respiratory: unlabored, speaks in full sentences  Lymph: no LAD, no lymphangitis      Ortho Exam           X-ray images Ordered and independently reviewed by me in the office today with the patient. X-ray shows:   Recent Results (from the past 744 hour(s))   XR Lumbar Spine 2/3 Views    Narrative    LUMBAR SPINE TWO - THREE VIEWS   10/13/2020 3:13 PM     HISTORY: Left low back pain near SI joint, known DJD, nocturnal pain.  DDD (degenerative disc disease), lumbar. Acute left-sided low back  pain without sciatica.    COMPARISON: None.      Impression    IMPRESSION: Aortoiliac stent graft noted. Vertebral body heights are  maintained. Posterior alignment is normal. Bridging syndesmophytes are  seen at multiple levels. Mild to moderate multilevel degenerative disc  and facet disease also noted. Vascular calcifications also noted.    PORTER NIÑO MD

## 2020-10-13 NOTE — LETTER
10/13/2020         RE: Zack Santiago  [address]  [address]  [place] [address]        Dear Colleague,    Thank you for referring your patient, Zack Santiago, to the [facility] [place]. Please see a copy of my visit note below.            [facility]   Clinic Visit s Oct 13, 2020    PCP: Zurdo Kendrick    ASSESSMENT/PLAN    ICD-10-CM    1. DDD (degenerative disc disease), lumbar  M51.36 XR Lumbar Spine 2/3 Views     [facility] PT, HAND, AND CHIROPRACTIC REFERRAL   2. Acute left-sided low back pain without sciatica  M54.5 XR Lumbar Spine 2/3 Views     [facility]RO PT, HAND, AND CHIROPRACTIC REFERRAL     Acute left-sided low back pain, without overt radiculopathy today, reassurance, no sign of compression fracture.  We discussed treatment options and he is comfortable increasing use of Tylenol, and also trying Lidoderm patches or topical Voltaren gel.  He did well seeing physical therapy for his hips as well as pelvic floor issues, and is eager to return to work with [employer] at [employer] for [employer] in [place].          Today's Visit:  Zack is a 89 year old male who is seen as self referral for   Chief Complaint   Patient presents with     Lower Back - Pain       Injury: Patient reports insidious onset without acute precipitating event.     Right hand dominant    Location of Pain: left low back , radiating to left buttock    Duration of Pain: intermittent, 3 week(s),   Rating of Pain at worst: 5/10  Rating of Pain Currently: 3/10  Pain is better with: sitting   Pain is worse with: walking   Treatment so far consists of: tylenol  Associated symptoms: pain   Recent imaging completed: No recent imaging completed.  Prior History of related problems: Self limiting low back pain     Pain awakens him at night    Social History: retired-likes to read      Past Medical History:   Diagnosis Date     A-fib (H) 2010    post op     AAA (abdominal aortic  aneurysm) without rupture (H)     Dr. 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    carotid us no stenosis, echo no change and no clots; ziopatch no afib, svt present     Anemia 2004     Aortic insufficiency 6/14    1+ on echo     Aortic insufficiency 11/2016    mild to mod     ASCVD (arteriosclerotic cardiovascular disease) 2010    cabg, post op afib     BPH (benign prostatic hyperplasia) 2003    turp, flomax added by urol 2/17     Bradycardia 2016    stopped toprol     CKD (chronic kidney disease) stage 3, GFR 30-59 ml/min      Constipation 05/2020    colonoscopy nl     Cough 2010    pulm eval, felt due to reflux     Dizzy 2001    mri small vessel dz     GERD (gastroesophageal reflux disease)      HTN (hypertension) 2002     Hx of colonoscopy 2003    tics     Hypothyroid      Hypovitaminosis D      Idiopathic neuropathy      Lung nodule 02/2018    6mm, found during eval of ascending aorta, fu 8/18 no change     OCD (obsessive compulsive disorder)     sees psyche     Panic disorder     Dr. Luna     Spinal headache      Stroke (H) 12/16    expressive aphasia, hosp, seen by neuro, mri pos, carotids min dz, changed from asa to plavix     Sudden hearing loss 6/13    Dr. Garcia, mri brain no acoustic neuroma     SVT (supraventricular tachycardia) (H) 11/16    seen on ziopatch done for amaurosis, longest 15 beats     Thoracic ascending aortic aneurysm (H) 06/2014    4.4cm on echo, aortic root 3.9, fu 5/15 4.8cm; fu done 12/15 and slightly enlarged, fu 6/16 no change, fu 11/16 no change; fu 1/18 echo 5.1-5.3, enlarged, then cta done and only 4.8cm, t fu 6 months, lvh, nl ef, mild ai; fu 8/18 slightly larger; fu 2/19 slightly smaller     Ulcerative colitis (H)     not active for years       Patient Active Problem List    Diagnosis Date Noted     Fecal incontinence 06/19/2020     Priority: Medium     Constipation 06/19/2020     Priority: Medium     Lung nodule 02/01/2018     Priority:  Medium     6mm, found during eval of ascending aorta       Cerebrovascular accident (CVA), unspecified mechanism (H) 01/19/2017     Priority: Medium     Atrial fibrillation, unspecified type (H) 01/19/2017     Priority: Medium     SVT (supraventricular tachycardia) (H)      Priority: Medium     seen on ziopatch done for amaurosis, longest 15 beats       Amaurosis fugax of right eye      Priority: Medium     Bradycardia      Priority: Medium     stopped toprol       Aortic insufficiency      Priority: Medium     1+ on echo       Thoracic ascending aortic aneurysm (H)      Priority: Medium     4.4cm on echo, aortic root 3.9       Sudden hearing loss      Priority: Medium     Dr. Garcia, mri brain no acoustic neuroma       CKD (chronic kidney disease) stage 3, GFR 30-59 ml/min      Priority: Medium     Claw toe, acquired 12/28/2012     Priority: Medium     Problem list name updated by automated process. Provider to review       Hyperlipidemia LDL goal <100 06/13/2012     Priority: Medium     OCD (obsessive compulsive disorder)      Priority: Medium     GERD (gastroesophageal reflux disease)      Priority: Medium     AAA (abdominal aortic aneurysm) without rupture (H)      Priority: Medium     Hypovitaminosis D      Priority: Medium     ASCVD (arteriosclerotic cardiovascular disease)      Priority: Medium     cabg, post op afib       Acquired hypothyroidism      Priority: Medium     Ulcerative colitis (H)      Priority: Medium     not active for years       Idiopathic neuropathy      Priority: Medium     Benign essential hypertension      Priority: Medium     Cough      Priority: Medium     pulm eval, felt due to reflux       Advanced directives, counseling/discussion 06/08/2012     Priority: Medium     Patient states has Advance Directive and will bring in a copy to clinic. 6/8/2012          Family History   Problem Relation Age of Onset     C.A.D. Father      Lymphoma Sister        Social History     Socioeconomic  History     Marital status:      Spouse name: Not on file     Number of children: 2     Years of education: Not on file     Highest education level: Not on file   Occupational History     Occupation: retail     Employer: RETIRED   Social Needs     Financial resource strain: Not on file     Food insecurity     Worry: Not on file     Inability: Not on file     Transportation needs     Medical: Not on file     Non-medical: Not on file   Tobacco Use     Smoking status: Former Smoker     Packs/day: 1.00     Years: 5.00     Pack years: 5.00     Types: Cigarettes     Quit date: 1965     Years since quittin.3     Smokeless tobacco: Never Used   Substance and Sexual Activity     Alcohol use: Yes     Alcohol/week: 0.0 standard drinks     Frequency: 2-4 times a month     Drinks per session: 1 or 2     Binge frequency: Never     Comment: maybe one glass of wine a week     Drug use: No     Past Surgical History:   Procedure Laterality Date     BACK SURGERY       BLADDER SURGERY  1985     C TOTAL KNEE ARTHROPLASTY      left     C TOTAL KNEE ARTHROPLASTY      right     CATARACT IOL, RT/LT       COLONOSCOPY N/A 2020    Procedure: COLONOSCOPY;  Surgeon: Kenya Loco MD;  Location:  GI     CORONARY ARTERY BYPASS       ENDOVASCULAR REPAIR ANEURYSM ABDOMINAL AORTA N/A 2015    Procedure: ENDOVASCULAR REPAIR ANEURYSM ABDOMINAL AORTA;  Surgeon: aDrin Walters MD;  Location:  OR     HERNIA REPAIR  2005     HERNIA REPAIR       HERNIORRHAPHY INGUINAL Left 2018    Procedure: HERNIORRHAPHY INGUINAL;  Incarcerated Left Inguinal Hernia;  Surgeon: Harsh Walker MD;  Location:  OR     KNEE SURGERY       REPAIR HAMMER TOE  2012    Procedure: REPAIR HAMMER TOE;  LEFT SECOND AND THIRD CLAW TOE RECONSTRUCTION;  Surgeon: Gray Haynes MD;  Location:  OR     REPAIR HAMMER TOE  2018    reece ESQUEDA  2003             Review of Systems  "  Musculoskeletal: Positive for back pain.   All other systems reviewed and are negative.        Physical Exam    /78   Ht 1.854 m (6' 1\")   Wt 86.2 kg (190 lb)   BMI 25.07 kg/m    Constitutional:well-developed, well-nourished, and in no distress.   Cardiovascular: Intact distal pulses.    Neurological: alert. Gait Abnormal:   Gait with shuffling steps  Skin: Skin is warm and dry.   Psychiatric: Mood and affect normal.   Respiratory: unlabored, speaks in full sentences  Lymph: no LAD, no lymphangitis      Ortho Exam           X-ray images Ordered and independently reviewed by me in the office today with the patient. X-ray shows:   Recent Results (from the past 744 hour(s))   XR Lumbar Spine 2/3 Views    Narrative    LUMBAR SPINE TWO - THREE VIEWS   10/13/2020 3:13 PM     HISTORY: Left low back pain near SI joint, known DJD, nocturnal pain.  DDD (degenerative disc disease), lumbar. Acute left-sided low back  pain without sciatica.    COMPARISON: None.      Impression    IMPRESSION: Aortoiliac stent graft noted. Vertebral body heights are  maintained. Posterior alignment is normal. Bridging syndesmophytes are  seen at multiple levels. Mild to moderate multilevel degenerative disc  and facet disease also noted. Vascular calcifications also noted.    PORTER NIÑO MD             Again, thank you for allowing me to participate in the care of your patient.        Sincerely,        Alexander Guerra MD    "

## 2020-11-30 ENCOUNTER — TELEPHONE (OUTPATIENT)
Dept: FAMILY MEDICINE | Facility: CLINIC | Age: 85
End: 2020-11-30

## 2020-12-01 ENCOUNTER — VIRTUAL VISIT (OUTPATIENT)
Dept: FAMILY MEDICINE | Facility: CLINIC | Age: 85
End: 2020-12-01
Payer: COMMERCIAL

## 2020-12-01 DIAGNOSIS — R10.32 INGUINAL PAIN, LEFT: Primary | ICD-10-CM

## 2020-12-01 PROCEDURE — 99213 OFFICE O/P EST LOW 20 MIN: CPT | Mod: 95 | Performed by: INTERNAL MEDICINE

## 2020-12-01 NOTE — PROGRESS NOTES
"Zack Santiago is a 89 year old male who is being evaluated via a billable telephone visit.      The patient has been notified of following:     \"This telephone visit will be conducted via a call between you and your physician/provider. We have found that certain health care needs can be provided without the need for a physical exam.  This service lets us provide the care you need with a short phone conversation.  If a prescription is necessary we can send it directly to your pharmacy.  If lab work is needed we can place an order for that and you can then stop by our lab to have the test done at a later time.    Telephone visits are billed at different rates depending on your insurance coverage. During this emergency period, for some insurers they may be billed the same as an in-person visit.  Please reach out to your insurance provider with any questions.    If during the course of the call the physician/provider feels a telephone visit is not appropriate, you will not be charged for this service.\"    Patient has given verbal consent for Telephone visit?  Yes    What phone number would you like to be contacted at? [phone]    How would you like to obtain your AVS? Mail a copy    Subjective     Zack Santiago is a 89 year old male who presents via phone visit today for the following health issues:    HPI this patient complains of sharp testicular discomfort.  States that the pain lasts only seconds and is very sharp.  He denies any trauma.  He does admit that recently he has been straining a fair amount going to the bathroom.  He has some issues with constipation which are longstanding.    Otherwise no falls no new exercises.  No infectious symptoms.  No testicular swelling or tenderness in between the episodes of sharp discomfort.      Chief Complaint   Patient presents with     Testicle Pain     left side for 2 days         New Patient/Transfer of Care       New Patient/Transfer of Care    Review of Systems " "  Constitutional, HEENT, cardiovascular, pulmonary, gi and gu systems are negative, except as otherwise noted.       Objective          Vitals:  No vitals were obtained today due to virtual visit.    healthy, alert and no distress  PSYCH: Alert and oriented times 3; coherent speech, normal   rate and volume, able to articulate logical thoughts, able   to abstract reason, no tangential thoughts, no hallucinations   or delusions  His affect is normal  RESP: No cough, no audible wheezing, able to talk in full sentences  Remainder of exam unable to be completed due to telephone visits    No results found for any visits on 12/01/20.        Assessment/Plan:    Assessment & Plan     Zack was seen today for testicle pain.    Diagnoses and all orders for this visit:    Inguinal pain, left    Is a patient with sharp inguinal pain without bulging mass or symptoms consistent with underlying infection.  I suspect that he has strained the musculature likely related to chronic constipation.  He will follow this carefully.  If he develops any infectious symptoms, swelling, or bulging he will contact us immediately.    Again the patient's discomfort regarding the left testicle is only present for fleeting second periodically and actually has improved over the course the last day.  I suspect this relates to strain.     BMI:   Estimated body mass index is 25.07 kg/m  as calculated from the following:    Height as of 10/13/20: 1.854 m (6' 1\").    Weight as of 10/13/20: 86.2 kg (190 lb).            See Patient Instructions    No follow-ups on file.    Pelon Rowley MD  [facility]    Phone call duration:  7 minutes                "

## 2020-12-17 ENCOUNTER — OFFICE VISIT (OUTPATIENT)
Dept: FAMILY MEDICINE | Facility: CLINIC | Age: 85
End: 2020-12-17
Payer: COMMERCIAL

## 2020-12-17 ENCOUNTER — NURSE TRIAGE (OUTPATIENT)
Dept: FAMILY MEDICINE | Facility: CLINIC | Age: 85
End: 2020-12-17

## 2020-12-17 VITALS
TEMPERATURE: 97.3 F | DIASTOLIC BLOOD PRESSURE: 75 MMHG | WEIGHT: 193 LBS | OXYGEN SATURATION: 97 % | HEIGHT: 73 IN | SYSTOLIC BLOOD PRESSURE: 138 MMHG | BODY MASS INDEX: 25.58 KG/M2 | HEART RATE: 57 BPM

## 2020-12-17 DIAGNOSIS — R58 BLEEDING: ICD-10-CM

## 2020-12-17 DIAGNOSIS — H61.23 BILATERAL IMPACTED CERUMEN: Primary | ICD-10-CM

## 2020-12-17 PROCEDURE — 69210 REMOVE IMPACTED EAR WAX UNI: CPT | Mod: 50 | Performed by: NURSE PRACTITIONER

## 2020-12-17 PROCEDURE — 99212 OFFICE O/P EST SF 10 MIN: CPT | Mod: 25 | Performed by: NURSE PRACTITIONER

## 2020-12-17 ASSESSMENT — MIFFLIN-ST. JEOR: SCORE: 1594.32

## 2020-12-17 NOTE — PROGRESS NOTES
"Subjective     Zack Santiago is a 89 year old male who presents to clinic today for the following health issues:    HPI          Chief Complaint   Patient presents with     Mouth Problem      \"bleeding from mouth\" Noticed bleeding quite a bit from last night - saw on pillow. Dry blood was on pillow and corner of mouth this morning         Pt woke up with about 3 tablespoons of blood on his pillow  Pt does not know source of blood  Pt did not taste blood and no blood on face  Reports it appears blood was coming from his mouth  Pt could not see any areas of active bleeding inside of mouth  Denies pain in mouth   Pt reports back of throat is slightly sore 2/10  Reports he notices post nasal drip that he consistently has to clear, and he believes clearing is causing irritation  Denies any sores, scratching, or known open areas  No fevers, aches, chills    Past Medical History:   Diagnosis Date     A-fib (H) 2010    post op     AAA (abdominal aortic aneurysm) without rupture (H)     Dr. 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    carotid us no stenosis, echo no change and no clots; ziopatch no afib, svt present     Anemia 2004     Aortic insufficiency 6/14    1+ on echo     Aortic insufficiency 11/2016    mild to mod     ASCVD (arteriosclerotic cardiovascular disease) 2010    cabg, post op afib     BPH (benign prostatic hyperplasia) 2003    turp, flomax added by urol 2/17     Bradycardia 2016    stopped toprol     CKD (chronic kidney disease) stage 3, GFR 30-59 ml/min      Constipation 05/2020    colonoscopy nl     Cough 2010    pulm eval, felt due to reflux     Dizzy 2001    mri small vessel dz     GERD (gastroesophageal reflux disease)      HTN (hypertension) 2002     Hx of colonoscopy 2003    tics     Hypothyroid      Hypovitaminosis D      Idiopathic neuropathy      Lung nodule 02/2018    6mm, found during eval of ascending aorta, fu 8/18 no change     OCD (obsessive compulsive " disorder)     sees psyche     Panic disorder     Dr. Luna     Spinal headache      Stroke (H) 12/16    expressive aphasia, hosp, seen by neuro, mri pos, carotids min dz, changed from asa to plavix     Sudden hearing loss 6/13    Dr. Garcia, mri brain no acoustic neuroma     SVT (supraventricular tachycardia) (H) 11/16    seen on ziopatch done for amaurosis, longest 15 beats     Thoracic ascending aortic aneurysm (H) 06/2014    4.4cm on echo, aortic root 3.9, fu 5/15 4.8cm; fu done 12/15 and slightly enlarged, fu 6/16 no change, fu 11/16 no change; fu 1/18 echo 5.1-5.3, enlarged, then cta done and only 4.8cm, t fu 6 months, lvh, nl ef, mild ai; fu 8/18 slightly larger; fu 2/19 slightly smaller     Ulcerative colitis (H)     not active for years     Family History   Problem Relation Age of Onset     C.A.D. Father      Lymphoma Sister      Past Surgical History:   Procedure Laterality Date     BACK SURGERY  1998     BLADDER SURGERY  1985     C TOTAL KNEE ARTHROPLASTY  2011    left     C TOTAL KNEE ARTHROPLASTY  2009    right     CATARACT IOL, RT/LT  2011     COLONOSCOPY N/A 5/5/2020    Procedure: COLONOSCOPY;  Surgeon: Kenya Loco MD;  Location:  GI     CORONARY ARTERY BYPASS  2010     ENDOVASCULAR REPAIR ANEURYSM ABDOMINAL AORTA N/A 5/27/2015    Procedure: ENDOVASCULAR REPAIR ANEURYSM ABDOMINAL AORTA;  Surgeon: Darin Walters MD;  Location:  OR     HERNIA REPAIR  2005     HERNIA REPAIR  1998     HERNIORRHAPHY INGUINAL Left 1/5/2018    Procedure: HERNIORRHAPHY INGUINAL;  Incarcerated Left Inguinal Hernia;  Surgeon: Harsh Walker MD;  Location:  OR     KNEE SURGERY  1997     REPAIR HAMMER TOE  12/28/2012    Procedure: REPAIR HAMMER TOE;  LEFT SECOND AND THIRD CLAW TOE RECONSTRUCTION;  Surgeon: Gray Haynes MD;  Location:  OR     REPAIR HAMMER TOE  04/2018    reece ESQUEDA  2003     Social History     Tobacco Use     Smoking status: Former Smoker     Packs/day: 1.00      "Years: 5.00     Pack years: 5.00     Types: Cigarettes     Quit date: 1965     Years since quittin.5     Smokeless tobacco: Never Used   Substance Use Topics     Alcohol use: Yes     Alcohol/week: 0.0 standard drinks     Frequency: 2-4 times a month     Drinks per session: 1 or 2     Binge frequency: Never     Comment: maybe one glass of wine a week     Current Outpatient Medications   Medication Sig Dispense Refill     ARIPiprazole (ABILIFY) 2 MG tablet Take 1 tablet by mouth At Bedtime.       ASPIRIN NOT PRESCRIBED (INTENTIONAL) Please choose reason not prescribed, below 0 each 0     chlorthalidone (HYGROTON) 25 MG tablet Take 1 tablet (25 mg) by mouth daily 90 tablet 3     ClomiPRAMINE HCl (ANAFRANIL PO) Take 25 mg by mouth every evening        clopidogrel (PLAVIX) 75 MG tablet Take 1 tablet (75 mg) by mouth daily 90 tablet 3     gabapentin (NEURONTIN) 400 MG capsule Take 1 capsule by mouth 2 times daily        lactulose (CEPHULAC) 10 GM packet Take 2 packets (20 g) by mouth 2 times daily (Patient taking differently: Take 20 g by mouth daily )       levothyroxine (SYNTHROID/LEVOTHROID) 100 MCG tablet Take 1 tablet (100 mcg) by mouth daily 90 tablet 3     LORAZEPAM PO Take 0.5 mg by mouth daily as needed for anxiety       MIRTAZAPINE PO Take 45 mg by mouth At Bedtime        simvastatin (ZOCOR) 20 MG tablet TAKE ONE TABLET BY MOUTH AT BEDTIME 90 tablet 3     Allergies   Allergen Reactions     No Known Allergies        Reviewed and updated as needed this visit by clinical staff and provider     Review of Systems   Detailed as above         Objective    /75 (BP Location: Right arm, Patient Position: Sitting, Cuff Size: Adult Regular)   Pulse 57   Temp 97.3  F (36.3  C) (Temporal)   Ht 1.854 m (6' 1\")   Wt 87.5 kg (193 lb)   SpO2 97%   BMI 25.46 kg/m    There is no height or weight on file to calculate BMI.  Physical Exam  Constitutional:       Appearance: Normal appearance.   HENT:      Head: " Normocephalic.      Right Ear: Tympanic membrane normal. There is impacted cerumen.      Left Ear: Tympanic membrane normal. There is impacted cerumen.      Nose: Nose normal.      Mouth/Throat:      Mouth: Mucous membranes are moist.      Pharynx: Oropharynx is clear. No oropharyngeal exudate or posterior oropharyngeal erythema.   Eyes:      General:         Right eye: No discharge.         Left eye: No discharge.      Pupils: Pupils are equal, round, and reactive to light.   Musculoskeletal: Normal range of motion.   Skin:     General: Skin is warm and dry.   Neurological:      General: No focal deficit present.      Mental Status: He is alert and oriented to person, place, and time. Mental status is at baseline.   Psychiatric:         Mood and Affect: Mood normal.            Assessment & Plan         ICD-10-CM    1. Bilateral impacted cerumen  H61.23    2. Bleeding  R58      No source of bleeding identified. Curette used to remove cerumen in bilateral ears incidentally found on exam. Pt told to follow up if source of bleeding identified and bleeding persists.       Pt seen in conjunction with Zeyad Tee NP Student     DOMENIC López, CNP  [facility]

## 2020-12-17 NOTE — TELEPHONE ENCOUNTER
"On Kath's schedule for \"bleeding from mouth\"     Noticed bleeding quite a bit from last night - saw on pillow     Dry blood was on pillow and corner of mouth this morning      No recent dental work - going in about an hour to see the dentist for a routine cleaning this AM. Advised he have the dentist examine his mouth.     No dry blood in mouth taste this AM, no idea where the bleeding came from.     No dizziness or lightheadedness and he feels fine today     No sores in mouth     Not a heavy amount of bleeding last night but \"more than a drop or two\"     He agreed to notify the dentist today but requests to keep his appt with team. He wants someone to take a look at his mouth and throat even though he is not having any bleeding currently.     Tanya MA RN    "

## 2020-12-23 ENCOUNTER — TRANSFERRED RECORDS (OUTPATIENT)
Dept: HEALTH INFORMATION MANAGEMENT | Facility: CLINIC | Age: 85
End: 2020-12-23

## 2021-01-06 ENCOUNTER — TRANSFERRED RECORDS (OUTPATIENT)
Dept: HEALTH INFORMATION MANAGEMENT | Facility: CLINIC | Age: 86
End: 2021-01-06

## 2021-01-07 DIAGNOSIS — E78.5 HYPERLIPIDEMIA LDL GOAL <100: ICD-10-CM

## 2021-01-07 DIAGNOSIS — I10 ESSENTIAL HYPERTENSION: ICD-10-CM

## 2021-01-07 DIAGNOSIS — I63.9 CEREBROVASCULAR ACCIDENT (CVA), UNSPECIFIED MECHANISM (H): ICD-10-CM

## 2021-01-07 DIAGNOSIS — E03.9 ACQUIRED HYPOTHYROIDISM: ICD-10-CM

## 2021-01-08 ENCOUNTER — TELEPHONE (OUTPATIENT)
Dept: FAMILY MEDICINE | Facility: CLINIC | Age: 86
End: 2021-01-08

## 2021-01-08 RX ORDER — CHLORTHALIDONE 25 MG/1
25 TABLET ORAL DAILY
Qty: 90 TABLET | Refills: 1 | Status: SHIPPED | OUTPATIENT
Start: 2021-01-08 | End: 2021-03-11

## 2021-01-08 RX ORDER — LEVOTHYROXINE SODIUM 100 UG/1
100 TABLET ORAL DAILY
Qty: 90 TABLET | Refills: 1 | Status: SHIPPED | OUTPATIENT
Start: 2021-01-08 | End: 2021-04-19

## 2021-01-08 RX ORDER — SIMVASTATIN 20 MG
TABLET ORAL
Qty: 90 TABLET | Refills: 1 | Status: SHIPPED | OUTPATIENT
Start: 2021-01-08 | End: 2021-07-20

## 2021-01-08 RX ORDER — CLOPIDOGREL BISULFATE 75 MG/1
75 TABLET ORAL DAILY
Qty: 90 TABLET | Refills: 3 | Status: SHIPPED | OUTPATIENT
Start: 2021-01-08 | End: 2022-01-19

## 2021-01-08 NOTE — TELEPHONE ENCOUNTER
Routing refill request to provider for review/approval because:  Labs out of range:  Hgb    DENIS Morris, RN  [employer] [facility] [facility]

## 2021-01-08 NOTE — TELEPHONE ENCOUNTER
Reason for Call:  Question    Detailed comments: Patient wants to discuss if he needs to have a vascular exam or not? Please call.    Phone Number Patient can be reached at: Home number on file [phone] (home)    Best Time: after 9 am    Can we leave a detailed message on this number? YES    Call taken on 1/8/2021 at 3:43 PM by Cindy Cade

## 2021-01-11 NOTE — TELEPHONE ENCOUNTER
Called and spoke with patient.     Patient had received a letter from Dr Gallego, Vascular, advising patient to schedule his annual appointment to monitor Thoracic Aortic Aneurysm.      Patient had forgotten that he is to have an annual check up with vascular.      Patient denies any current problems and will call to schedule that appointment.     Halle CYR RN,BSN

## 2021-01-29 ENCOUNTER — HOSPITAL ENCOUNTER (OUTPATIENT)
Dept: CT IMAGING | Facility: CLINIC | Age: 86
Discharge: HOME | End: 2021-01-29
Attending: RADIOLOGY | Admitting: RADIOLOGY
Payer: COMMERCIAL

## 2021-01-29 DIAGNOSIS — I71.40 ABDOMINAL AORTIC ANEURYSM (AAA) WITHOUT RUPTURE (H): ICD-10-CM

## 2021-01-29 LAB
CREAT BLD-MCNC: 1.2 MG/DL (ref 0.66–1.25)
GFR SERPL CREATININE-BSD FRML MDRD: 57 ML/MIN/{1.73_M2}

## 2021-01-29 PROCEDURE — 82565 ASSAY OF CREATININE: CPT

## 2021-01-29 PROCEDURE — 71275 CT ANGIOGRAPHY CHEST: CPT

## 2021-01-29 PROCEDURE — 250N000009 HC RX 250: Performed by: RADIOLOGY

## 2021-01-29 PROCEDURE — 250N000011 HC RX IP 250 OP 636: Performed by: RADIOLOGY

## 2021-01-29 RX ORDER — IOPAMIDOL 755 MG/ML
80 INJECTION, SOLUTION INTRAVASCULAR ONCE
Status: COMPLETED | OUTPATIENT
Start: 2021-01-29 | End: 2021-01-29

## 2021-01-29 RX ADMIN — IOPAMIDOL 80 ML: 755 INJECTION, SOLUTION INTRAVENOUS at 11:26

## 2021-01-29 RX ADMIN — SODIUM CHLORIDE 80 ML: 9 INJECTION, SOLUTION INTRAVENOUS at 11:27

## 2021-02-01 ENCOUNTER — NURSE TRIAGE (OUTPATIENT)
Dept: FAMILY MEDICINE | Facility: CLINIC | Age: 86
End: 2021-02-01

## 2021-02-01 ENCOUNTER — OFFICE VISIT (OUTPATIENT)
Dept: FAMILY MEDICINE | Facility: CLINIC | Age: 86
End: 2021-02-01
Payer: COMMERCIAL

## 2021-02-01 VITALS
BODY MASS INDEX: 26.57 KG/M2 | HEART RATE: 60 BPM | HEIGHT: 73 IN | WEIGHT: 200.5 LBS | TEMPERATURE: 97.1 F | DIASTOLIC BLOOD PRESSURE: 66 MMHG | SYSTOLIC BLOOD PRESSURE: 131 MMHG | OXYGEN SATURATION: 98 %

## 2021-02-01 DIAGNOSIS — H02.842 SWELLING OF RIGHT LOWER EYELID: Primary | ICD-10-CM

## 2021-02-01 PROCEDURE — 99213 OFFICE O/P EST LOW 20 MIN: CPT | Performed by: NURSE PRACTITIONER

## 2021-02-01 RX ORDER — ERYTHROMYCIN 5 MG/G
0.5 OINTMENT OPHTHALMIC 3 TIMES DAILY
Qty: 1 G | Refills: 0 | Status: SHIPPED | OUTPATIENT
Start: 2021-02-01 | End: 2022-03-18

## 2021-02-01 ASSESSMENT — MIFFLIN-ST. JEOR: SCORE: 1628.34

## 2021-02-01 NOTE — TELEPHONE ENCOUNTER
"    Additional Information    Negative: Unresponsive, passed out or very weak    Negative: Difficulty breathing or wheezing    Negative: [1] Difficulty swallowing or slurred speech AND [2] sudden onset    Negative: Sounds like a life-threatening emergency to the triager    Negative: Recent injury to the eye    Negative: Entire face is swollen    Negative: Sacs of clear fluid (blisters) on whites of eyes (allergic cysts)    Negative: Contact with pollen, other allergic substance or eyedrops    Negative: [1] Bee sting AND [2] within last 24 hours    Negative: Insect bite suspected    Negative: Sty suspected (small, painful red lump present on lid margin    Negative: Yellow or green discharge (pus) in the eye    Negative: Redness of white area (sclera) of eye(s)    Negative: [1] SEVERE eyelid swelling (i.e., shut or almost) AND [2] fever    Negative: [1] Eyelid (outer) is very red AND [2] fever    Negative: Patient sounds very sick or weak to the triager    Negative: [1] Pregnant > 20 weeks AND [2] sudden weight gain (i.e., more than 3 lbs or 1.4 kg in one week)    Negative: [1] SEVERE eyelid swelling (i.e., shut or almost) AND [2] involves both eyes      (Exception: itchy eyes, which  are probably an allergic reaction)    Negative: [1] SEVERE eyelid swelling on one side AND [2] red and painful (or tender to touch)    Negative: [1] SEVERE eyelid swelling on one side AND [2] sinus pain or pressure    Negative: [1] MILD swelling AND [2] fever    Negative: [1] Painful rash AND [2] multiple small blisters grouped together (i.e., dermatomal distribution or \"band\" or \"stripe\")    MODERATE-SEVERE eyelid swelling on one side  (Exception: due to a mosquito bite)    Negative: [1] SEVERE eyelid swelling (i.e., shut or almost) AND [2] involves both eyes AND [3] itchy    Negative: [1] MILD eyelid swelling (puffiness) AND [2] sinus pain or pressure    Negative: Eyelid is red and painful (or tender to touch)    Negative: Swelling of " "both lower legs (i.e., bilateral pedal edema)    Answer Assessment - Initial Assessment Questions  1. ONSET: \"When did the swelling start?\" (e.g., minutes, hours, days)      This morning  2. LOCATION: \"What part of the eyelids is swollen?\"      Under eye  3. SEVERITY: \"How swollen is it?\"      puffy  4. ITCHING: \"Is there any itching?\" If so, ask: \"How much?\"   (Scale 1-10; mild, moderate or severe)      no  5. PAIN: \"Is the swelling painful to touch?\" If so, ask: \"How painful is it?\"   (Scale 1-10; mild, moderate or severe)      no  6. FEVER: \"Do you have a fever?\" If so, ask: \"What is it, how was it measured, and when did it start?\"       no  7. CAUSE: \"What do you think is causing the swelling?\"      unsure  8. RECURRENT SYMPTOM: \"Have you had eyelid swelling before?\" If so, ask: \"When was the last time?\" \"What happened that time?\"      no  9. OTHER SYMPTOMS: \"Do you have any other symptoms?\" (e.g., blurred vision, eye discharge, rash, runny nose)      no  10. PREGNANCY: \"Is there any chance you are pregnant?\" \"When was your last menstrual period?\"        no    Protocols used: EYE - SWELLING-A-AH      "

## 2021-02-01 NOTE — PATIENT INSTRUCTIONS
Please follow up with the [facility]   [phone]     Tomorrow at 2:00   Dr Tineo       Start the eye ointment right away just along the lower eyelash line. You may be started on antibiotic pills tomorrow depending on how it is looking

## 2021-02-01 NOTE — PROGRESS NOTES
Assessment & Plan   Problem List Items Addressed This Visit     None      Visit Diagnoses     Swelling of right lower eyelid    -  Primary    Relevant Medications    erythromycin (ROMYCIN) 5 MG/GM ophthalmic ointment         Unknown etiology of R lower eyelid swelling. Concern for periorbital cellulitis vs localized infection. Will start with topical ointment first for 24 hours. I was able to get him scheduled with ophthalmology tomorrow for recheck.       Patient Instructions   Please follow up with the [facility]   [phone]     Tomorrow at 2:00   Dr Tineo       Start the eye ointment right away just along the lower eyelash line. You may be started on antibiotic pills tomorrow depending on how it is looking                 No follow-ups on file.    DOMENIC Sauceda CNP  [facility] SHALOM Dixon is a 89 year old who presents to clinic today for the following health issues     HPI       Concern - Right Eye Red   Onset: 2 days  Description: Red and swollen  Intensity: mild  Progression of Symptoms:  same  Accompanying Signs & Symptoms: none  Previous history of similar problem: none  Precipitating factors:        Worsened by: nothing  Alleviating factors:        Improved by: nothing  Therapies tried and outcome:  none     Swelling is getting worse of the right lower eye  It hasn't worsened from last night   No pain, itching   No mattering or watering   Normal vision   No fevers   Dr fitch ophthalmology. Last visit 1 year ago     Past Medical History:   Diagnosis Date     A-fib (H) 2010    post op     AAA (abdominal aortic aneurysm) without rupture (H)     Dr. 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    carotid us no stenosis, echo no change and no clots; ziopatch no afib, svt present     Anemia 2004     Aortic insufficiency 6/14    1+ on echo     Aortic insufficiency 11/2016    mild to mod     ASCVD (arteriosclerotic  "cardiovascular disease) 2010    cabg, post op afib     BPH (benign prostatic hyperplasia) 2003    turp, flomax added by urol 2/17     Bradycardia 2016    stopped toprol     CKD (chronic kidney disease) stage 3, GFR 30-59 ml/min      Constipation 05/2020    colonoscopy nl     Cough 2010    pulm eval, felt due to reflux     Dizzy 2001    mri small vessel dz     GERD (gastroesophageal reflux disease)      HTN (hypertension) 2002     Hx of colonoscopy 2003    tics     Hypothyroid      Hypovitaminosis D      Idiopathic neuropathy      Lung nodule 02/2018    6mm, found during eval of ascending aorta, fu 8/18 no change     OCD (obsessive compulsive disorder)     sees psyche     Panic disorder     Dr. Luna     Spinal headache      Stroke (H) 12/16    expressive aphasia, hosp, seen by neuro, mri pos, carotids min dz, changed from asa to plavix     Sudden hearing loss 6/13    Dr. Garcia, mri brain no acoustic neuroma     SVT (supraventricular tachycardia) (H) 11/16    seen on ziopatch done for amaurosis, longest 15 beats     Thoracic ascending aortic aneurysm (H) 06/2014    4.4cm on echo, aortic root 3.9, fu 5/15 4.8cm; fu done 12/15 and slightly enlarged, fu 6/16 no change, fu 11/16 no change; fu 1/18 echo 5.1-5.3, enlarged, then cta done and only 4.8cm, t fu 6 months, lvh, nl ef, mild ai; fu 8/18 slightly larger; fu 2/19 slightly smaller     Ulcerative colitis (H)     not active for years        Review of Systems   Detailed as above       Objective    /66 (BP Location: Right arm, Patient Position: Chair, Cuff Size: Adult Regular)   Pulse 60   Temp 97.1  F (36.2  C) (Temporal)   Ht 1.854 m (6' 1\")   Wt 90.9 kg (200 lb 8 oz)   SpO2 98%   BMI 26.45 kg/m    There is no height or weight on file to calculate BMI.  Physical Exam  Constitutional:       Appearance: Normal appearance.   Eyes:      Extraocular Movements: Extraocular movements intact.      Conjunctiva/sclera: Conjunctivae normal.      Comments: Right " lower eyelid swelling and redness  No conjunctival injection   Pulmonary:      Effort: Pulmonary effort is normal.   Neurological:      Mental Status: He is alert.   Psychiatric:         Mood and Affect: Mood normal.

## 2021-02-02 ENCOUNTER — TELEPHONE (OUTPATIENT)
Dept: OTHER | Facility: CLINIC | Age: 86
End: 2021-02-02

## 2021-02-02 DIAGNOSIS — I71.40 ABDOMINAL AORTIC ANEURYSM (AAA) WITHOUT RUPTURE (H): Primary | ICD-10-CM

## 2021-02-02 NOTE — TELEPHONE ENCOUNTER
Reviewed CTA with Dr. Gallego.  Recommend 6 month follow up CTA with delayed images.  Notified patient of the results and plan.  Trini Gonzalez RN  IR nurse clinician  [phone]  EXAM: CTA CHEST ABDOMEN PELVIS W CONTRAST     DATE/TIME: 1/29/2021 12:00 PM     INDICATION: Known thoracoabdominal aneurysm, history of stent graft  reconstruction done 5/2015. Annual follow-up.  COMPARISON: 2/5/2020     TECHNIQUE: Helical acquisition through the chest, abdomen, pelvis was  performed during the without, and in the arterial phase phase of  contrast enhancement. 2D and 3D reconstructions performed by the CT  technologist. Dose reduction techniques were used.  CONTRAST: 80mL [id number] from a single use vial     FINDINGS:   ARTERIAL FINDINGS: Postoperative changes of CABG. Aneurysmal dilation  of the ascending thoracic aorta measuring up to 4.7 cm in maximal  diameter, previously 4.6 cm. Normal great vessel branching pattern.  The visualized great vessels are widely patent without significant  focal vascular abnormality. Physiologic kinking of the subclavian  arteries bilaterally due to arm positioning. The remainder of the  thoracic aorta demonstrates a normal caliber.     Postsurgical changes of aortoiliac stent graft reconstruction of the  infrarenal abdominal aortic aneurysm. The aneurysm sac is bilobed  measuring 4.6 x 4.7 cm proximally and 5.9 x 5.4 cm in maximal  diameter. The proximal margin is not significantly changed. The distal  margin is slightly increased in size, previously measuring 5.8 x 5.2  cm. Stable bilateral common iliac artery aneurysms measuring 2.0 cm on  the left and 2.3 cm on the right. Iliac arteries and visualized lower  portion of the arteries are widely patent. The stent graft is in  appropriate position and is likewise widely patent. Occlusion of the  MEKA origin with proximal reconstitution. This arteries are patent  without significant focal vascular abnormality.     NONVASCULAR  FINDINGS:  CHEST: Stable right upper lobe lung nodules. Dependent atelectasis. No  pleural effusion or pneumothorax. Cardiomegaly. No central pulmonary  embolism. Significant native coronary artery calcifications. Patent  CABG. Patent LIMA. No bulky lymphadenopathy.     ABDOMEN: Suboptimal evaluation of the solid organs due to the  noncontrast and arterial phase of contrast enhancement. Simple cyst in  the caudate lobe, unchanged. Simple cyst in the lower pole of the left  kidney. The right kidney, adrenal glands, spleen, gallbladder, and  pancreas are grossly unremarkable.     PELVIS: No abnormally dilated loops of bowel. No free fluid or free  air. Diverticulosis. Unchanged fat-containing hernia.     MUSCULOSKELETAL: Degenerative disease of the lumbar spine.                                                                      IMPRESSION:  1.  Continued aneurysmal dilation of the ascending thoracic aorta  measuring up to 4.7 cm in maximal diameter, this is not significantly  changed from prior studies.  2.  Postsurgical changes of aortobiiliac stent graft reconstruction of  the bilobed infrarenal abdominal aortic aneurysm. The proximal lobe is  unchanged with the distal lobe measuring slightly increased in size  from 2/5/2020 now measuring 5.9 x 5.4 cm in maximal diameter. No  endoleak is seen on the current study although no delayed images were  obtained.  3.  Stable right pulmonary nodules.  4.  Diverticulosis.     FELECIA PYLE MD

## 2021-02-05 ENCOUNTER — TRANSFERRED RECORDS (OUTPATIENT)
Dept: HEALTH INFORMATION MANAGEMENT | Facility: CLINIC | Age: 86
End: 2021-02-05

## 2021-02-08 ENCOUNTER — OFFICE VISIT (OUTPATIENT)
Dept: FAMILY MEDICINE | Facility: CLINIC | Age: 86
End: 2021-02-08
Payer: COMMERCIAL

## 2021-02-08 VITALS
HEART RATE: 55 BPM | WEIGHT: 197.6 LBS | BODY MASS INDEX: 26.19 KG/M2 | OXYGEN SATURATION: 98 % | HEIGHT: 73 IN | DIASTOLIC BLOOD PRESSURE: 60 MMHG | SYSTOLIC BLOOD PRESSURE: 132 MMHG | TEMPERATURE: 97.6 F

## 2021-02-08 DIAGNOSIS — I63.9 CEREBROVASCULAR ACCIDENT (CVA), UNSPECIFIED MECHANISM (H): ICD-10-CM

## 2021-02-08 DIAGNOSIS — I25.10 ASCVD (ARTERIOSCLEROTIC CARDIOVASCULAR DISEASE): ICD-10-CM

## 2021-02-08 DIAGNOSIS — Z01.818 PRE-OP EXAM: Primary | ICD-10-CM

## 2021-02-08 DIAGNOSIS — I71.40 AAA (ABDOMINAL AORTIC ANEURYSM) WITHOUT RUPTURE (H): ICD-10-CM

## 2021-02-08 DIAGNOSIS — M20.5X1 ACQUIRED CLAW TOE OF RIGHT FOOT: ICD-10-CM

## 2021-02-08 LAB
BASOPHILS # BLD AUTO: 0 10E9/L (ref 0–0.2)
BASOPHILS NFR BLD AUTO: 0.7 %
DIFFERENTIAL METHOD BLD: ABNORMAL
EOSINOPHIL # BLD AUTO: 0.2 10E9/L (ref 0–0.7)
EOSINOPHIL NFR BLD AUTO: 4 %
ERYTHROCYTE [DISTWIDTH] IN BLOOD BY AUTOMATED COUNT: 15.1 % (ref 10–15)
HCT VFR BLD AUTO: 37.4 % (ref 40–53)
HGB BLD-MCNC: 12 G/DL (ref 13.3–17.7)
LYMPHOCYTES # BLD AUTO: 1.2 10E9/L (ref 0.8–5.3)
LYMPHOCYTES NFR BLD AUTO: 20.3 %
MCH RBC QN AUTO: 32.7 PG (ref 26.5–33)
MCHC RBC AUTO-ENTMCNC: 32.1 G/DL (ref 31.5–36.5)
MCV RBC AUTO: 102 FL (ref 78–100)
MONOCYTES # BLD AUTO: 0.6 10E9/L (ref 0–1.3)
MONOCYTES NFR BLD AUTO: 10.5 %
NEUTROPHILS # BLD AUTO: 3.8 10E9/L (ref 1.6–8.3)
NEUTROPHILS NFR BLD AUTO: 64.5 %
PLATELET # BLD AUTO: 155 10E9/L (ref 150–450)
RBC # BLD AUTO: 3.67 10E12/L (ref 4.4–5.9)
WBC # BLD AUTO: 5.8 10E9/L (ref 4–11)

## 2021-02-08 PROCEDURE — 80048 BASIC METABOLIC PNL TOTAL CA: CPT | Performed by: NURSE PRACTITIONER

## 2021-02-08 PROCEDURE — 36415 COLL VENOUS BLD VENIPUNCTURE: CPT | Performed by: NURSE PRACTITIONER

## 2021-02-08 PROCEDURE — 99215 OFFICE O/P EST HI 40 MIN: CPT | Performed by: NURSE PRACTITIONER

## 2021-02-08 PROCEDURE — 85025 COMPLETE CBC W/AUTO DIFF WBC: CPT | Performed by: NURSE PRACTITIONER

## 2021-02-08 PROCEDURE — 93000 ELECTROCARDIOGRAM COMPLETE: CPT | Performed by: NURSE PRACTITIONER

## 2021-02-08 ASSESSMENT — MIFFLIN-ST. JEOR: SCORE: 1615.19

## 2021-02-08 NOTE — PROGRESS NOTES
[address] [address]  Phone: [phone]  Primary Provider: Zurdo Kendrick  Pre-op Performing Provider: THU MAURER      PREOPERATIVE EVALUATION:  Today's date: 2/8/2021    Zack Santiago is a 89 year old male who presents for a preoperative evaluation.    Surgical Information:  Surgery/Procedure: Amputation of 5th toe on right foot  Surgery Location: [facility]   Surgeon:   Surgery Date: 2/16/21  Time of Surgery: 1:00 pm  Where patient plans to recover: At home alone  Fax number for surgical facility: [phone]    Type of Anesthesia Anticipated: Unknown     Assessment & Plan     The proposed surgical procedure is considered INTERMEDIATE risk.    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   - Will continue clopidogrel without interruption     RECOMMENDATION:  APPROVAL GIVEN to proceed with proposed procedure, without further diagnostic evaluation.    Subjective     HPI related to upcoming procedure: deformity of the right 5th toe causing mal fitting shoes and pain    Preop Questions 2/8/2021   1. Have you ever had a heart attack or stroke? YES - MI 2010; CVA 2016   2. Have you ever had surgery on your heart or blood  vessels, such as a stent placement, a coronary artery bypass, or surgery on an artery in your head, neck, heart, or legs? YES - CABG 2010; endovascular repair of AAA 2015    3. Do you have chest pain with activity? No   4. Do you have a history of  heart failure? No   5. Do you currently have a cold, bronchitis or symptoms of other infection? No   6. Do you have a cough, shortness of breath, or wheezing? No   7. Do you or anyone in your family have previous history of blood clots? No   8. Do you or does anyone in your family have a serious bleeding problem such as prolonged bleeding following surgeries or cuts? No   9. Have you ever had problems with anemia or been told to take iron pills? No   10. Have you had any abnormal blood loss such as black, tarry or bloody stools? No   11. Have you ever had a blood transfusion? YES     11a. Have you ever had a transfusion reaction? No   12. Are you willing to have a blood transfusion if it is medically needed before, during, or after your surgery? Yes   13. Have you or any of your relatives ever had problems with anesthesia? No   14. Do you have sleep apnea, excessive snoring or daytime drowsiness? No   15. Do you have any artifical heart valves or other implanted medical devices like a pacemaker, defibrillator, or continuous glucose monitor? No   16. Do you have artificial joints? YES  - bilateral knees 2009 and 2011   17. Are you allergic to latex? No       Health Care Directive:  Patient does not have a Health Care Directive or Living Will: Discussed advance care planning with patient; however, patient declined at this time.      Status of Chronic Conditions:  A-FIB - Patient has a history of paroxysmal A-fib currently controlled with out medications.  Current treatment regimen includes clopidogrel 75 mg for stroke prevention and denies significant symptoms of lightheadedness, palpitations or dyspnea.     CAD - Patient has a longstanding history of moderate-severe CAD.  Patient denies recent chest pain or NTG use, denies exercise induced dyspnea or PND.  EKG 1/2018.     DEPRESSION - Patient has a long history of Depression of moderate severity requiring medication for control with recent symptoms being stable..Current symptoms of depression include none. Reports well controlled with current medication regimen    HYPERTENSION - Patient has longstanding history of HTN , currently denies any symptoms referable to elevated blood pressure. Specifically denies chest pain, palpitations, dyspnea, orthopnea, PND or peripheral edema. Blood pressure readings have been in normal range. Current medication regimen is as listed below. Patient denies any side effects of medication.     HYPOTHYROIDISM - Patient has a longstanding history of chronic Hypothyroidism. Patient has been doing well, noting no tremor, insomnia, hair loss or changes in skin texture. Continues to take medications as directed, without adverse reactions or side effects. Last TSH   Lab Results   Component Value Date    TSH 0.46 07/21/2020   .    Long term anticoagulation with clopidogrel       Review of Systems  CONSTITUTIONAL: NEGATIVE for fever, chills, change in weight  INTEGUMENTARY/SKIN: NEGATIVE for worrisome rashes, moles or lesions  EYES: NEGATIVE for vision changes or irritation  ENT/MOUTH: NEGATIVE for ear, mouth and throat problems  RESP: NEGATIVE for significant cough or SOB  CV: NEGATIVE for chest pain, palpitations or peripheral edema  GI: NEGATIVE for nausea, abdominal pain, heartburn, or change in bowel habits  : NEGATIVE for frequency, dysuria, or hematuria  MUSCULOSKELETAL: NEGATIVE for significant arthralgias or myalgia  NEURO:POSITIVE for generalized weakness, NEGATIVE for dizziness or paresthesias  ENDOCRINE: NEGATIVE for temperature intolerance, skin/hair changes  HEME: NEGATIVE for bleeding problems  PSYCHIATRIC: NEGATIVE for changes in mood or affect    Patient Active Problem List    Diagnosis Date  Noted     Fecal incontinence 06/19/2020     Priority: Medium     Constipation 06/19/2020     Priority: Medium     Lung nodule 02/01/2018     Priority: Medium     6mm, found during eval of ascending aorta       Cerebrovascular accident (CVA), unspecified mechanism (H) 01/19/2017     Priority: Medium     Atrial fibrillation, unspecified type (H) 01/19/2017     Priority: Medium     SVT (supraventricular tachycardia) (H)      Priority: Medium     seen on ziopatch done for amaurosis, longest 15 beats       Amaurosis fugax of right eye      Priority: Medium     Bradycardia      Priority: Medium     stopped toprol       Aortic insufficiency      Priority: Medium     1+ on echo       Thoracic ascending aortic aneurysm (H)      Priority: Medium     4.4cm on echo, aortic root 3.9       Sudden hearing loss      Priority: Medium     Dr. Garcia, mri brain no acoustic neuroma       CKD (chronic kidney disease) stage 3, GFR 30-59 ml/min      Priority: Medium     Claw toe, acquired 12/28/2012     Priority: Medium     Problem list name updated by automated process. Provider to review       Hyperlipidemia LDL goal <100 06/13/2012     Priority: Medium     OCD (obsessive compulsive disorder)      Priority: Medium     GERD (gastroesophageal reflux disease)      Priority: Medium     AAA (abdominal aortic aneurysm) without rupture (H)      Priority: Medium     Hypovitaminosis D      Priority: Medium     ASCVD (arteriosclerotic cardiovascular disease)      Priority: Medium     cabg, post op afib       Acquired hypothyroidism      Priority: Medium     Ulcerative colitis (H)      Priority: Medium     not active for years       Idiopathic neuropathy      Priority: Medium     Benign essential hypertension      Priority: Medium     Cough      Priority: Medium     pulm eval, felt due to reflux       Advanced directives, counseling/discussion 06/08/2012     Priority: Medium     Patient states has Advance Directive and will bring in a copy to  clinic. 6/8/2012         Past Medical History:   Diagnosis Date     A-fib (H) 2010    post op     AAA (abdominal aortic aneurysm) without rupture (H)     Dr. 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    carotid us no stenosis, echo no change and no clots; ziopatch no afib, svt present     Anemia 2004     Aortic insufficiency 6/14    1+ on echo     Aortic insufficiency 11/2016    mild to mod     ASCVD (arteriosclerotic cardiovascular disease) 2010    cabg, post op afib     BPH (benign prostatic hyperplasia) 2003    turp, flomax added by urol 2/17     Bradycardia 2016    stopped toprol     CKD (chronic kidney disease) stage 3, GFR 30-59 ml/min      Constipation 05/2020    colonoscopy nl     Cough 2010    pulm eval, felt due to reflux     Dizzy 2001    mri small vessel dz     GERD (gastroesophageal reflux disease)      HTN (hypertension) 2002     Hx of colonoscopy 2003    tics     Hypothyroid      Hypovitaminosis D      Idiopathic neuropathy      Lung nodule 02/2018    6mm, found during eval of ascending aorta, fu 8/18 no change     OCD (obsessive compulsive disorder)     sees psyche     Panic disorder     Dr. Luna     Spinal headache      Stroke (H) 12/16    expressive aphasia, hosp, seen by neuro, mri pos, carotids min dz, changed from asa to plavix     Sudden hearing loss 6/13    Dr. Garcia, mri brain no acoustic neuroma     SVT (supraventricular tachycardia) (H) 11/16    seen on ziopatch done for amaurosis, longest 15 beats     Thoracic ascending aortic aneurysm (H) 06/2014    4.4cm on echo, aortic root 3.9, fu 5/15 4.8cm; fu done 12/15 and slightly enlarged, fu 6/16 no change, fu 11/16 no change; fu 1/18 echo 5.1-5.3, enlarged, then cta done and only 4.8cm, t fu 6 months, lvh, nl ef, mild ai; fu 8/18 slightly larger; fu 2/19 slightly smaller     Ulcerative colitis (H)     not active for years     Past Surgical History:   Procedure Laterality Date     BACK SURGERY  1998      BLADDER SURGERY  1985     C TOTAL KNEE ARTHROPLASTY  2011    left     C TOTAL KNEE ARTHROPLASTY  2009    right     CATARACT IOL, RT/LT  2011     COLONOSCOPY N/A 5/5/2020    Procedure: COLONOSCOPY;  Surgeon: Kenya Loco MD;  Location:  GI     CORONARY ARTERY BYPASS  2010     ENDOVASCULAR REPAIR ANEURYSM ABDOMINAL AORTA N/A 5/27/2015    Procedure: ENDOVASCULAR REPAIR ANEURYSM ABDOMINAL AORTA;  Surgeon: Darin Walters MD;  Location:  OR     HERNIA REPAIR  2005     HERNIA REPAIR  1998     HERNIORRHAPHY INGUINAL Left 1/5/2018    Procedure: HERNIORRHAPHY INGUINAL;  Incarcerated Left Inguinal Hernia;  Surgeon: Harsh Walker MD;  Location:  OR     KNEE SURGERY  1997     REPAIR HAMMER TOE  12/28/2012    Procedure: REPAIR HAMMER TOE;  LEFT SECOND AND THIRD CLAW TOE RECONSTRUCTION;  Surgeon: Gray Haynes MD;  Location:  OR     REPAIR HAMMER TOE  04/2018    tria     TURP  2003     Current Outpatient Medications   Medication Sig Dispense Refill     ARIPiprazole (ABILIFY) 2 MG tablet Take 1 tablet by mouth At Bedtime.       ASPIRIN NOT PRESCRIBED (INTENTIONAL) Please choose reason not prescribed, below 0 each 0     chlorthalidone (HYGROTON) 25 MG tablet Take 1 tablet (25 mg) by mouth daily 90 tablet 1     ClomiPRAMINE HCl (ANAFRANIL PO) Take 25 mg by mouth every evening        clopidogrel (PLAVIX) 75 MG tablet Take 1 tablet (75 mg) by mouth daily 90 tablet 3     erythromycin (ROMYCIN) 5 MG/GM ophthalmic ointment Place 0.5 inches into the right eye 3 times daily 1 g 0     gabapentin (NEURONTIN) 400 MG capsule Take 1 capsule by mouth 2 times daily        lactulose (CEPHULAC) 10 GM packet Take 2 packets (20 g) by mouth 2 times daily (Patient taking differently: Take 20 g by mouth daily )       levothyroxine (SYNTHROID/LEVOTHROID) 100 MCG tablet Take 1 tablet (100 mcg) by mouth daily 90 tablet 1     LORAZEPAM PO Take 0.5 mg by mouth daily as needed for anxiety       MIRTAZAPINE PO Take 45 mg  "by mouth At Bedtime        simvastatin (ZOCOR) 20 MG tablet TAKE ONE TABLET BY MOUTH AT BEDTIME 90 tablet 1       Allergies   Allergen Reactions     No Known Allergies         Social History     Tobacco Use     Smoking status: Former Smoker     Packs/day: 1.00     Years: 5.00     Pack years: 5.00     Types: Cigarettes     Quit date: 1965     Years since quittin.6     Smokeless tobacco: Never Used   Substance Use Topics     Alcohol use: Yes     Alcohol/week: 0.0 standard drinks     Frequency: 2-4 times a month     Drinks per session: 1 or 2     Binge frequency: Never     Comment: maybe one glass of wine a week       History   Drug Use No         Objective     /60 Pulse 55   Temp 97.6  F (36.4  C) (Temporal)   Ht 1.854 m (6' 1\")   Wt 89.6 kg (197 lb 9.6 oz)   SpO2 98%   BMI 26.07 kg/m      Physical Exam    GENERAL APPEARANCE: healthy, alert and no distress     EYES: EOMI,  PERRL     HENT: ear canals and TM's normal and nose and mouth without ulcers or lesions     NECK: no adenopathy, no asymmetry, masses, or scars and thyroid normal to palpation     RESP: lungs clear to auscultation - no rales, rhonchi or wheezes     CV: regular rates and rhythm, normal S1 S2, no S3 or S4 and no murmur, click or rub     ABDOMEN:  soft, nontender, no HSM or masses and bowel sounds normal     MS: extremities normal- no gross deformities noted, no evidence of inflammation in joints, FROM in all extremities.     SKIN: no suspicious lesions or rashes     NEURO: Normal strength and tone, sensory exam grossly normal, mentation intact and speech normal     PSYCH: mentation appears normal. and affect normal/bright     LYMPHATICS: No cervical adenopathy    Recent Labs   Lab Test 20  1405 20  1131   HGB 12.6* 12.7*    163    139   POTASSIUM 3.7 3.9   CR 1.16 1.08        Diagnostics:  Results for orders placed or performed in visit on 21   Basic metabolic panel  (Ca, Cl, CO2, Creat, Gluc, K, Na, " BUN)     Status: Abnormal   Result Value Ref Range    Sodium 140 133 - 144 mmol/L    Potassium 4.0 3.4 - 5.3 mmol/L    Chloride 108 94 - 109 mmol/L    Carbon Dioxide 25 20 - 32 mmol/L    Anion Gap 7 3 - 14 mmol/L    Glucose 75 70 - 99 mg/dL    Urea Nitrogen 33 (H) 7 - 30 mg/dL    Creatinine 1.29 (H) 0.66 - 1.25 mg/dL    GFR Estimate 49 (L) >60 mL/min/[1.73_m2]    GFR Estimate If Black 56 (L) >60 mL/min/[1.73_m2]    Calcium 9.3 8.5 - 10.1 mg/dL   CBC with platelets and differential     Status: Abnormal   Result Value Ref Range    WBC 5.8 4.0 - 11.0 10e9/L    RBC Count 3.67 (L) 4.4 - 5.9 10e12/L    Hemoglobin 12.0 (L) 13.3 - 17.7 g/dL    Hematocrit 37.4 (L) 40.0 - 53.0 %     (H) 78 - 100 fl    MCH 32.7 26.5 - 33.0 pg    MCHC 32.1 31.5 - 36.5 g/dL    RDW 15.1 (H) 10.0 - 15.0 %    Platelet Count 155 150 - 450 10e9/L    % Neutrophils 64.5 %    % Lymphocytes 20.3 %    % Monocytes 10.5 %    % Eosinophils 4.0 %    % Basophils 0.7 %    Absolute Neutrophil 3.8 1.6 - 8.3 10e9/L    Absolute Lymphocytes 1.2 0.8 - 5.3 10e9/L    Absolute Monocytes 0.6 0.0 - 1.3 10e9/L    Absolute Eosinophils 0.2 0.0 - 0.7 10e9/L    Absolute Basophils 0.0 0.0 - 0.2 10e9/L    Diff Method Automated Method      EKG: Sinus  Bradycardia   -Right bundle branch block with left axis -bifascicular block.   Unchanged since 2017    Revised Cardiac Risk Index (RCRI):  The patient has the following serious cardiovascular risks for perioperative complications:   - Coronary Artery Disease (MI, positive stress test, angina, Qs on EKG) = 1 point   - Cerebrovascular Disease (TIA or CVA) = 1 point     RCRI Interpretation: 2 points: Class III (moderate risk - 6.6% complication rate)     Estimated Functional Capacity: Performs 4 METS exercise without symptoms (e.g., light housework)        Signed Electronically by: DOMENIC Fernandez CNP  Copy of this evaluation report is provided to requesting physician.    Preop [facility] Preop  Guidelines    Revised Cardiac Risk Index

## 2021-02-08 NOTE — LETTER
February 25, 2021      Zack Santiago  [address]    [place] [facility] [place] [address]        Dear ,    The labs were released to My Chart.  I'm sorry that you had not seen them.   The results of the chemistry show that your kidney function had declined from a GFR of 57 to 49 and is still considered Stage 3 chronic kidney disease. The electrolytes and glucose is in normal range.  The CBC is essentially stable.  These results were sent with the pre op to your surgery center.   I see that you contacted Dr Kendrick about the blood thinner and that he did think it would be okay to stop it.  I hope all went very well for you, Mr Santiago.     Resulted Orders   Basic metabolic panel  (Ca, Cl, CO2, Creat, Gluc, K, Na, BUN)   Result Value Ref Range    Sodium 140 133 - 144 mmol/L    Potassium 4.0 3.4 - 5.3 mmol/L    Chloride 108 94 - 109 mmol/L    Carbon Dioxide 25 20 - 32 mmol/L    Anion Gap 7 3 - 14 mmol/L    Glucose 75 70 - 99 mg/dL      Comment:      Non Fasting    Urea Nitrogen 33 (H) 7 - 30 mg/dL    Creatinine 1.29 (H) 0.66 - 1.25 mg/dL    GFR Estimate 49 (L) >60 mL/min/[1.73_m2]      Comment:      Non  GFR Calc  Starting 12/18/2018, serum creatinine based estimated GFR (eGFR) will be   calculated using the Chronic Kidney Disease Epidemiology Collaboration   (CKD-EPI) equation.      GFR Estimate If Black 56 (L) >60 mL/min/[1.73_m2]      Comment:       GFR Calc  Starting 12/18/2018, serum creatinine based estimated GFR (eGFR) will be   calculated using the Chronic Kidney Disease Epidemiology Collaboration   (CKD-EPI) equation.      Calcium 9.3 8.5 - 10.1 mg/dL   CBC with platelets and differential   Result Value Ref Range    WBC 5.8 4.0 - 11.0 10e9/L    RBC Count 3.67 (L) 4.4 - 5.9 10e12/L    Hemoglobin 12.0 (L) 13.3 - 17.7 g/dL    Hematocrit 37.4 (L) 40.0 - 53.0 %     (H) 78 - 100 fl    MCH 32.7 26.5 - 33.0 pg    MCHC 32.1 31.5 - 36.5 g/dL    RDW 15.1 (H) 10.0 - 15.0 %     Platelet Count 155 150 - 450 10e9/L    % Neutrophils 64.5 %    % Lymphocytes 20.3 %    % Monocytes 10.5 %    % Eosinophils 4.0 %    % Basophils 0.7 %    Absolute Neutrophil 3.8 1.6 - 8.3 10e9/L    Absolute Lymphocytes 1.2 0.8 - 5.3 10e9/L    Absolute Monocytes 0.6 0.0 - 1.3 10e9/L    Absolute Eosinophils 0.2 0.0 - 0.7 10e9/L    Absolute Basophils 0.0 0.0 - 0.2 10e9/L    Diff Method Automated Method        If you have any questions or concerns, please call the clinic at the number listed above.       Sincerely,      DOMENIC Fernandez CNP        brendon

## 2021-02-09 LAB
ANION GAP SERPL CALCULATED.3IONS-SCNC: 7 MMOL/L (ref 3–14)
BUN SERPL-MCNC: 33 MG/DL (ref 7–30)
CALCIUM SERPL-MCNC: 9.3 MG/DL (ref 8.5–10.1)
CHLORIDE SERPL-SCNC: 108 MMOL/L (ref 94–109)
CO2 SERPL-SCNC: 25 MMOL/L (ref 20–32)
CREAT SERPL-MCNC: 1.29 MG/DL (ref 0.66–1.25)
GFR SERPL CREATININE-BSD FRML MDRD: 49 ML/MIN/{1.73_M2}
GLUCOSE SERPL-MCNC: 75 MG/DL (ref 70–99)
POTASSIUM SERPL-SCNC: 4 MMOL/L (ref 3.4–5.3)
SODIUM SERPL-SCNC: 140 MMOL/L (ref 133–144)

## 2021-02-11 ENCOUNTER — TELEPHONE (OUTPATIENT)
Dept: FAMILY MEDICINE | Facility: CLINIC | Age: 86
End: 2021-02-11

## 2021-02-11 NOTE — TELEPHONE ENCOUNTER
Pt was transferred to ACC line with questions about upcoming toe amputation. In pre-op from 02/09, pt was told that he did not have to hold his plavix prior to procedure. He feels uncomfortable with this and is worried about bleeding. This writer informed the patient that the ACC deals mainly with anticoagulants, not antiplatelets, and that we defer to the primary care provider for holding antiplatelets. Pt requested that Dr. Kendrick follow up with him to confirm that he does not need to hold plavix prior to procedure.

## 2021-02-24 ENCOUNTER — TELEPHONE (OUTPATIENT)
Dept: FAMILY MEDICINE | Facility: CLINIC | Age: 86
End: 2021-02-24

## 2021-02-24 NOTE — TELEPHONE ENCOUNTER
Pt requesting lab results from labs on 2/8/21  Do not see a note, but may have been wrapped into pre op, as approval as given for preop.    Please review labs and advise anything we should inform pt of.      Nory Allan RN  [facility] RN Triage Team

## 2021-02-27 PROBLEM — R15.9 FECAL INCONTINENCE: Status: RESOLVED | Noted: 2020-06-19 | Resolved: 2021-02-27

## 2021-02-27 PROBLEM — K59.00 CONSTIPATION: Status: RESOLVED | Noted: 2020-06-19 | Resolved: 2021-02-27

## 2021-02-27 NOTE — PROGRESS NOTES
Subjective:  HPI  Physical Exam                    Objective:  System    Physical Exam    General     ROS    Assessment/Plan:    DISCHARGE REPORT    Progress reporting period is from 07/15/20.       SUBJECTIVE  Subjective changes noted by patient:   Subjective: Patient almost cancelled today as he is doing very well now. No leakage since last visit. BM's feels urge and able to have BM ~ 3 x week with full emptying, [place] 3. Not wearing any pads. Also switched from Metamucil to citracel about 10 days ago. Continues to take lactalose 2 Tbsp 2x day.    Current pain level is NA  .     Previous pain level was  NA  .   Changes in function:  Yes (See Goal flowsheet attached for changes in current functional level)  Adverse reaction to treatment or activity: None    OBJECTIVE  Changes noted in objective findings:  Patient has failed to return to therapy so current objective findings are unknown.  Objective: No need for BFB today as patient is doing well. Discussed to continue with HEP and meds per MD. F/u in 3-4 weeks if needed.      ASSESSMENT/PLAN  Updated problem list and treatment plan:   STG/LTGs have been met or progress has been made towards goals:  Yes (See Goal flow sheet completed today.)  Assessment of Progress: The patient has not returned to therapy. Current status is unknown.  Self Management Plans:  Patient has been instructed in a home treatment program.  Patient  has been instructed in self management of symptoms.    Zack continues to require the following intervention to meet STG and LTG's:  PT intervention is no longer required to meet STG/LTG.    Recommendations:  This patient is ready to be discharged from therapy and continue their home treatment program.    Please refer to the daily flowsheet for treatment today, total treatment time and time spent performing 1:1 timed codes.

## 2021-03-01 ENCOUNTER — TRANSFERRED RECORDS (OUTPATIENT)
Dept: HEALTH INFORMATION MANAGEMENT | Facility: CLINIC | Age: 86
End: 2021-03-01

## 2021-03-02 ENCOUNTER — TELEPHONE (OUTPATIENT)
Dept: FAMILY MEDICINE | Facility: CLINIC | Age: 86
End: 2021-03-02

## 2021-03-02 NOTE — TELEPHONE ENCOUNTER
Make sure he is drinking enough water.  Dr. Kendrick will continue to monitor this.   CKD is often stable in this range for a long time.    Alyssa Cox PA-C  Covering for Dr. Kendrick

## 2021-03-02 NOTE — TELEPHONE ENCOUNTER
"Pt called back     Said \"stage 3 alarms me\"     Discussed that at his age that is not uncommon, discussed ways to help preserve kidney function (BP control, hydration, avoid NSAIDs)     Pt grateful for discussion     Tanya MA RN      "

## 2021-03-02 NOTE — TELEPHONE ENCOUNTER
Reason for Call:  Other     Detailed comments: Per the patients lab results where show that your kidney function had declined from a GFR of 57 to 49 and is still considered Stage 3 chronic kidney disease.  The patient wants to know what he can do to improve this if anything    Phone Number Patient can be reached at: Home number on file [phone] (home)    Best Time: anytime    Can we leave a detailed message on this number? YES    Call taken on 3/2/2021 at 2:38 PM by Haylee Vogt

## 2021-03-02 NOTE — TELEPHONE ENCOUNTER
Cecilia Torres APRN CNP 2 hours ago (9:22 AM)           I have asked that his labs from the pre op with note be sent to patient.  Did he receive them?  Any additional questions?           Documentation      Results have been mailed by [facility] - below message was from 2/24

## 2021-03-02 NOTE — CONFIDENTIAL NOTE
I have asked that his labs from the pre op with note be sent to patient.  Did he receive them?  Any additional questions?

## 2021-03-09 DIAGNOSIS — I10 ESSENTIAL HYPERTENSION: ICD-10-CM

## 2021-03-09 NOTE — TELEPHONE ENCOUNTER
Chlorthalidone 25 mg tablet    Summary: Take 1 tablet (25 mg) by mouth daily, Disp-90 tablet, R-1, E-Prescribe   Dose, Route, Frequency: 25 mg, Oral, DAILY  Start: 1/8/2021  Ord/Sold: 1/8/2021

## 2021-03-11 RX ORDER — CHLORTHALIDONE 25 MG/1
25 TABLET ORAL DAILY
Qty: 90 TABLET | Refills: 1 | Status: SHIPPED | OUTPATIENT
Start: 2021-03-11 | End: 2021-09-07

## 2021-03-11 NOTE — TELEPHONE ENCOUNTER
Routing refill request to provider for review/approval because:  Creatinine   Date Value Ref Range Status   02/08/2021 1.29 (H) 0.66 - 1.25 mg/dL Final       Halle CYR RN,BSN

## 2021-03-27 ENCOUNTER — HEALTH MAINTENANCE LETTER (OUTPATIENT)
Age: 86
End: 2021-03-27

## 2021-03-29 ENCOUNTER — TRANSFERRED RECORDS (OUTPATIENT)
Dept: HEALTH INFORMATION MANAGEMENT | Facility: CLINIC | Age: 86
End: 2021-03-29

## 2021-04-07 ENCOUNTER — OFFICE VISIT (OUTPATIENT)
Dept: URGENT CARE | Facility: URGENT CARE | Age: 86
End: 2021-04-07
Payer: COMMERCIAL

## 2021-04-07 ENCOUNTER — HOSPITAL ENCOUNTER (EMERGENCY)
Facility: CLINIC | Age: 86
Discharge: HOME | End: 2021-04-08
Attending: EMERGENCY MEDICINE | Admitting: EMERGENCY MEDICINE
Payer: COMMERCIAL

## 2021-04-07 ENCOUNTER — TRANSFERRED RECORDS (OUTPATIENT)
Dept: HEALTH INFORMATION MANAGEMENT | Facility: CLINIC | Age: 86
End: 2021-04-07

## 2021-04-07 VITALS
DIASTOLIC BLOOD PRESSURE: 62 MMHG | RESPIRATION RATE: 16 BRPM | SYSTOLIC BLOOD PRESSURE: 148 MMHG | WEIGHT: 199.8 LBS | OXYGEN SATURATION: 96 % | HEART RATE: 57 BPM | TEMPERATURE: 97.8 F | BODY MASS INDEX: 26.36 KG/M2

## 2021-04-07 DIAGNOSIS — R94.31 ACUTE ELECTROCARDIOGRAM CHANGES: ICD-10-CM

## 2021-04-07 DIAGNOSIS — R07.9 CHEST PAIN, UNSPECIFIED TYPE: Primary | ICD-10-CM

## 2021-04-07 DIAGNOSIS — R07.9 CHEST PAIN, UNSPECIFIED TYPE: ICD-10-CM

## 2021-04-07 LAB — INTERPRETATION ECG - MUSE: NORMAL

## 2021-04-07 PROCEDURE — 93005 ELECTROCARDIOGRAM TRACING: CPT

## 2021-04-07 PROCEDURE — 99285 EMERGENCY DEPT VISIT HI MDM: CPT | Mod: 25

## 2021-04-07 PROCEDURE — 93000 ELECTROCARDIOGRAM COMPLETE: CPT | Performed by: PHYSICIAN ASSISTANT

## 2021-04-07 PROCEDURE — 99215 OFFICE O/P EST HI 40 MIN: CPT | Performed by: PHYSICIAN ASSISTANT

## 2021-04-07 ASSESSMENT — MIFFLIN-ST. JEOR: SCORE: 1603.39

## 2021-04-07 NOTE — TELEPHONE ENCOUNTER
clopidogrel (PLAVIX) 75 MG tablet 90 tablet 3 1/8/2021  No   Sig - Route: Take 1 tablet (75 mg) by mouth daily - Oral   Sent to pharmacy as: Clopidogrel Bisulfate 75 MG Oral Tablet (PLAVIX)   Class: E-Prescribe   Order: [id number]   E-Prescribing Status: Receipt confirmed by pharmacy (1/8/2021 10:55 AM CST)   Printout Tracking    External Result Report   Pharmacy    [facility] - [address]     Local pharmacy seeking refill, patient switched January 2021 to mail order.  Too soon to renew, patient using alternate pharmacy.    Fax sent back to local pharmacy    RT Tiffanie (R)

## 2021-04-08 ENCOUNTER — APPOINTMENT (OUTPATIENT)
Dept: GENERAL RADIOLOGY | Facility: CLINIC | Age: 86
End: 2021-04-08
Attending: EMERGENCY MEDICINE
Payer: COMMERCIAL

## 2021-04-08 VITALS
HEIGHT: 73 IN | SYSTOLIC BLOOD PRESSURE: 177 MMHG | WEIGHT: 195 LBS | BODY MASS INDEX: 25.84 KG/M2 | DIASTOLIC BLOOD PRESSURE: 75 MMHG | HEART RATE: 51 BPM | TEMPERATURE: 97.8 F | OXYGEN SATURATION: 95 % | RESPIRATION RATE: 14 BRPM

## 2021-04-08 LAB
ALBUMIN SERPL-MCNC: 3.6 G/DL (ref 3.4–5)
ALP SERPL-CCNC: 72 U/L (ref 40–150)
ALT SERPL W P-5'-P-CCNC: 22 U/L (ref 0–70)
ANION GAP SERPL CALCULATED.3IONS-SCNC: 3 MMOL/L (ref 3–14)
AST SERPL W P-5'-P-CCNC: 9 U/L (ref 0–45)
BASOPHILS # BLD AUTO: 0 10E9/L (ref 0–0.2)
BASOPHILS NFR BLD AUTO: 0.4 %
BILIRUB SERPL-MCNC: 0.6 MG/DL (ref 0.2–1.3)
BUN SERPL-MCNC: 37 MG/DL (ref 7–30)
CALCIUM SERPL-MCNC: 8.6 MG/DL (ref 8.5–10.1)
CHLORIDE SERPL-SCNC: 107 MMOL/L (ref 94–109)
CO2 SERPL-SCNC: 29 MMOL/L (ref 20–32)
CREAT SERPL-MCNC: 1.61 MG/DL (ref 0.66–1.25)
DIFFERENTIAL METHOD BLD: ABNORMAL
EOSINOPHIL # BLD AUTO: 0.2 10E9/L (ref 0–0.7)
EOSINOPHIL NFR BLD AUTO: 3.3 %
ERYTHROCYTE [DISTWIDTH] IN BLOOD BY AUTOMATED COUNT: 14.5 % (ref 10–15)
GFR SERPL CREATININE-BSD FRML MDRD: 37 ML/MIN/{1.73_M2}
GLUCOSE SERPL-MCNC: 92 MG/DL (ref 70–99)
HCT VFR BLD AUTO: 37.4 % (ref 40–53)
HGB BLD-MCNC: 11.8 G/DL (ref 13.3–17.7)
IMM GRANULOCYTES # BLD: 0 10E9/L (ref 0–0.4)
IMM GRANULOCYTES NFR BLD: 0.3 %
LYMPHOCYTES # BLD AUTO: 1.2 10E9/L (ref 0.8–5.3)
LYMPHOCYTES NFR BLD AUTO: 17.8 %
MCH RBC QN AUTO: 31.3 PG (ref 26.5–33)
MCHC RBC AUTO-ENTMCNC: 31.6 G/DL (ref 31.5–36.5)
MCV RBC AUTO: 99 FL (ref 78–100)
MONOCYTES # BLD AUTO: 0.7 10E9/L (ref 0–1.3)
MONOCYTES NFR BLD AUTO: 9.5 %
NEUTROPHILS # BLD AUTO: 4.8 10E9/L (ref 1.6–8.3)
NEUTROPHILS NFR BLD AUTO: 68.7 %
NRBC # BLD AUTO: 0 10*3/UL
NRBC BLD AUTO-RTO: 0 /100
PLATELET # BLD AUTO: 165 10E9/L (ref 150–450)
POTASSIUM SERPL-SCNC: 4 MMOL/L (ref 3.4–5.3)
PROT SERPL-MCNC: 6.7 G/DL (ref 6.8–8.8)
RBC # BLD AUTO: 3.77 10E12/L (ref 4.4–5.9)
SODIUM SERPL-SCNC: 139 MMOL/L (ref 133–144)
TROPONIN I SERPL-MCNC: <0.015 UG/L (ref 0–0.04)
TROPONIN I SERPL-MCNC: <0.015 UG/L (ref 0–0.04)
WBC # BLD AUTO: 7 10E9/L (ref 4–11)

## 2021-04-08 PROCEDURE — 250N000013 HC RX MED GY IP 250 OP 250 PS 637: Performed by: EMERGENCY MEDICINE

## 2021-04-08 PROCEDURE — 80053 COMPREHEN METABOLIC PANEL: CPT | Performed by: EMERGENCY MEDICINE

## 2021-04-08 PROCEDURE — 250N000009 HC RX 250: Performed by: EMERGENCY MEDICINE

## 2021-04-08 PROCEDURE — 84484 ASSAY OF TROPONIN QUANT: CPT | Performed by: EMERGENCY MEDICINE

## 2021-04-08 PROCEDURE — 71046 X-RAY EXAM CHEST 2 VIEWS: CPT

## 2021-04-08 PROCEDURE — 85025 COMPLETE CBC W/AUTO DIFF WBC: CPT | Performed by: EMERGENCY MEDICINE

## 2021-04-08 RX ORDER — ASPIRIN 81 MG/1
324 TABLET, CHEWABLE ORAL ONCE
Status: COMPLETED | OUTPATIENT
Start: 2021-04-08 | End: 2021-04-08

## 2021-04-08 RX ORDER — ACETAMINOPHEN 500 MG
1000 TABLET ORAL ONCE
Status: COMPLETED | OUTPATIENT
Start: 2021-04-08 | End: 2021-04-08

## 2021-04-08 RX ADMIN — ACETAMINOPHEN 1000 MG: 500 TABLET, FILM COATED ORAL at 01:33

## 2021-04-08 RX ADMIN — LIDOCAINE HYDROCHLORIDE 30 ML: 20 SOLUTION ORAL; TOPICAL at 01:35

## 2021-04-08 RX ADMIN — ASPIRIN 81 MG CHEWABLE TABLET 324 MG: 81 TABLET CHEWABLE at 01:31

## 2021-04-08 ASSESSMENT — ENCOUNTER SYMPTOMS
DIAPHORESIS: 0
SHORTNESS OF BREATH: 0
COUGH: 0
NAUSEA: 0
FEVER: 0
VOMITING: 0

## 2021-04-08 NOTE — ED NOTES
"Patient assisted with bedside urinal. Patient is tremulous, and states he feels a bit \"off-balance\".  "

## 2021-04-08 NOTE — ED PROVIDER NOTES
History   Chief Complaint:  Chest pain     HPI   Zack Santiago is a 89 year old male anticoagulated with Plavix, with history of aortic insufficiency, BPH, hypertension, stroke, A fib, CKD and coronary artery bypass who presents with chest pain.  The patient states that today at around 1400, he began experiencing 3-4 second episodes of left sided chest pain.  These repeatedly occurred every half hour, but resolved on their own in the waiting room here in the ED.  He denies any current chest pain and states that it began while he was sitting.  He also denies any shortness of breath and diaphoresis as well as no cough, fever, nausea and vomiting.  He endorses feeling normal between episodes as well as leg swelling at baseline.  He notes that he presented because he wanted to play it safe.    Review of Systems   Constitutional: Negative for diaphoresis and fever.   Respiratory: Negative for cough and shortness of breath.    Cardiovascular: Positive for chest pain and leg swelling (normal).   Gastrointestinal: Negative for nausea and vomiting.   All other systems reviewed and are negative.      Allergies:  No Known Drug Allergies    Medications:  abilify  Chlorthalidone  Clomipramine  Plavix  Gabapentin  Hydroxyzine  Lactulose  Levothyroxine  Lorazepam  Mirtazapine  Oxycodone  Simvastatin    Past Medical History:    A fib  AAA  Anemia  Aortic insufficiency  ASCVD  BPH  CKD  GERD  Hypertension  Hypothyroid  Hypovitaminosis D  Idiopathic neuropathy  Lung nodule  OCD  Panic disorder  Spinal headache  Stroke  SVT  Ulcerative colitis  thoracic ascending aortic aneurysm    Past Surgical History:    Back surgery  Bladder surgery  Total knee arthroplasty, bilateral  Cataract IOL  Coronary artery bypass  Endovascular repair aneurysm abdominal aorta  Hernia repair, x2  herniorrhaphy inguinal  Repair hammer toe  TURP    Family History:    Father: CAD    Social History:  Patient presents to ED via his daughter.  He is in ED  "alone.  He lives in senior living facility.  He denies tobacco use.    Physical Exam     Patient Vitals for the past 24 hrs:   BP Temp Temp src Pulse Resp SpO2 Height Weight   04/08/21 0330 (!) 177/75 -- -- 51 14 -- -- --   04/08/21 0300 131/63 -- -- (!) 45 12 95 % -- --   04/08/21 0200 (!) 146/62 -- -- 51 -- 97 % -- --   04/08/21 0100 133/64 -- -- -- -- 97 % -- --   04/08/21 0000 -- -- -- 50 11 95 % -- --   04/07/21 2355 -- -- -- (!) 49 11 97 % -- --   04/07/21 2351 -- -- -- 56 -- 96 % -- --   04/07/21 2350 -- -- -- 56 -- 98 % -- --   04/07/21 2349 (!) 172/68 -- -- 55 -- -- -- --   04/07/21 2117 (!) 169/55 97.8  F (36.6  C) Oral 56 16 98 % 1.854 m (6' 1\") 88.5 kg (195 lb)       Physical Exam  General: Appears well-developed and well-nourished.   Head: No signs of trauma.   CV: Normal rate and regular rhythm.    Resp: Effort normal and breath sounds normal. No respiratory distress.   GI: Soft. There is no tenderness.  No rebound or guarding.  Normal bowel sounds.    MSK: Normal range of motion. 1+ lower extremity edema. No Calf tenderness.  Neuro: The patient is alert and oriented.  Speech normal.  Skin: Skin is warm and dry. No rash noted.   Psych: normal mood and affect. behavior is normal.       Emergency Department Course   ECG  ECG taken at 2109, ECG read at 2350  Sinus bradycardia  Right bundle branch block  Left anterior fascicular block  Bifascicular block  Left ventricular hypertrophy with repolarization abnormality  Cannot rule out Septal infarct, age undetermined  Abnormal ECG  No significant changes as compared to prior, dated 12/22/2016.  Rate 56 bpm. MN interval 136 ms. QRS duration 148 ms. QT/QTc 474/457 ms. P-R-T axes 61 -48 72.     Imaging:  Chest XR,  PA & LAT  Final Result  IMPRESSION: Mild left basilar scarring. No acute abnormality.    As read by radiology.      Laboratory:  Troponin (Collected 0013): <0.015  Troponin (Collected 0220): <0.015  CMP: urea nitrogen: 37(H), creatinine: 1.61(H), GFR: " 37(L), Protein total: 6.7(L) o/w WNL  CBC: WBC 7.0, HGB 11.8(L),       Emergency Department Course:    Reviewed:  I reviewed nursing notes, vitals, past medical history and care everywhere    Assessments:  2349 I obtained history and examined the patient as noted above.   0255 I rechecked the patient and explained findings.       Interventions:  0131 Aspirin 324 mg PO  0133 Tylenol 1000 mg PO  0135 GI cocktail 30 mL Oral    Disposition:  The patient was discharged to home.       Impression & Plan       CMS Diagnoses: None    Medical Decision Making:  Zack Santiago is an 89-year-old gentleman who presents due to chest pain.  States that this afternoon has had some intermittent episodes of having chest pain that lasts a couple of seconds at a time.  He has not had any other associated symptoms and at the time of my evaluation he was symptom-free.  He does have a cardiac history so an EKG and blood work were obtained.  These were reassuring.  I did obtain a repeat troponin which continue to be undetectable and given his first developed symptoms in the early afternoon and sound atypical I felt that he is appropriate for discharge home and outpatient management which the patient much preferred as well.  I did discuss return for worsening symptoms and he was instructed to follow with his primary care doctor.      Diagnosis:    ICD-10-CM    1. Chest pain, unspecified type  R07.9          Scribe Disclosure:  I, Alycia Mena, am serving as a scribe at 11:57 PM on 4/7/2021 to document services personally performed by Zack Cooley MD based on my observations and the provider's statements to me.     [facility]       Zack Cooley MD  04/09/21 0147

## 2021-04-08 NOTE — PATIENT INSTRUCTIONS
Patient Education     Noncardiac Chest Pain    Based on your visit today, the healthcare provider doesn t know what is causing your chest pain. In most cases, people who come to the emergency room with chest pain don t have a problem with their heart. Instead, the pain is caused by other conditions. It's important for the healthcare team to be sure you are not having a life-threatening cause for chest pain such as:     Heart attack    Blood clot in the lungs    Collapsed lung    Ruptured esophagus    Tearing of the aorta  Once these major causes have been ruled out, you may have further evaluation for nonheart causes of chest pain. These may be problems with the lungs, muscles, bones, digestive tract, nerves, or mental health. They include:     Inflammation around the lungs (pleurisy)    Collapsed lung (pneumothorax)    Fluid around the lungs (pleural effusion)    Lung cancer (a rare cause of chest pain)    Inflamed cartilage between the ribs (costochondritis)    Fibromyalgia    Rheumatoid arthritis    Chest wall strain    Reflux    Stomach ulcer    Spasms of the esophagus    Gall stones    Gallbladder inflammation    Panic or anxiety attacks    Emotional distress  Your condition doesn t seem serious. And your pain doesn t seem to be coming from your heart. But sometimes the signs of a serious problem take more time to appear. Watch for the warning signs listed below.   Home care  Follow these guidelines when caring for yourself at home:     Rest today and don't do any strenuous activity.    Take any prescribed medicine as directed.  Follow-up care  Follow up with your healthcare provider, or as advised, if you don t start to feel better in 24 hours.   Call 911  Call 911 if any of these occur:     A change in the type of pain: if it feels different, becomes more severe, lasts longer, or begins to spread into your shoulder, arm, neck, jaw or back    Shortness of breath or increased pain with breathing    Weakness,  dizziness, or fainting    Rapid heart beat    Crushing sensation in your chest  When to seek medical advice  Call your healthcare provider right away if any of these occur:     Cough with dark colored sputum (phlegm) or blood    Fever of 100.4 F (38 C) or higher, or as directed by your healthcare provider    Swelling, pain or redness in one leg  Link last reviewed this educational content on 6/1/2019 2000-2020 [employer]. All rights reserved. This information is not intended as a substitute for professional medical care. Always follow your healthcare professional's instructions.

## 2021-04-08 NOTE — PROGRESS NOTES
SUBJECTIVE:  Zack Santiago is a 89 year old male who presents to the office with the CC of chest pain.  Patient complains of chest pressure/discomfort.  The pain is characterized as 3 out of 10 and mild tightness and intermittent located left chest with radiation to none. Symptoms began today, still present and intermittent episodes lasting  5  seconds  Pain is associated with none.  Pain is exacerbated by no known provoking events.  Pain is relieved by none.      Past Medical History:   Diagnosis Date     A-fib (H) 2010    post op     AAA (abdominal aortic aneurysm) without rupture (H)     Dr. 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    carotid us no stenosis, echo no change and no clots; ziopatch no afib, svt present     Anemia 2004     Aortic insufficiency 6/14    1+ on echo     Aortic insufficiency 11/2016    mild to mod     ASCVD (arteriosclerotic cardiovascular disease) 2010    cabg, post op afib     BPH (benign prostatic hyperplasia) 2003    turp, flomax added by urol 2/17     Bradycardia 2016    stopped toprol     CKD (chronic kidney disease) stage 3, GFR 30-59 ml/min      Constipation 05/2020    colonoscopy nl     Cough 2010    pulm eval, felt due to reflux     Dizzy 2001    mri small vessel dz     GERD (gastroesophageal reflux disease)      HTN (hypertension) 2002     Hx of colonoscopy 2003    tics     Hypothyroid      Hypovitaminosis D      Idiopathic neuropathy      Lung nodule 02/2018    6mm, found during eval of ascending aorta, fu 8/18 no change     OCD (obsessive compulsive disorder)     sees psyche     Panic disorder     Dr. Luna     Spinal headache      Stroke (H) 12/16    expressive aphasia, hosp, seen by neuro, mri pos, carotids min dz, changed from asa to plavix     Sudden hearing loss 6/13    Dr. Garcia, mri brain no acoustic neuroma     SVT (supraventricular tachycardia) (H) 11/16    seen on ziopatch done for amaurosis, longest 15 beats     Thoracic  ascending aortic aneurysm (H) 06/2014    4.4cm on echo, aortic root 3.9, fu 5/15 4.8cm; fu done 12/15 and slightly enlarged, fu 6/16 no change, fu 11/16 no change; fu 1/18 echo 5.1-5.3, enlarged, then cta done and only 4.8cm, t fu 6 months, lvh, nl ef, mild ai; fu 8/18 slightly larger; fu 2/19 slightly smaller     Ulcerative colitis (H)     not active for years         Current Outpatient Medications:      acetaminophen (TYLENOL) 500 MG tablet, Take 1,000 mg by mouth every 6 hours as needed for mild pain PAIN, Disp: , Rfl:      ARIPiprazole (ABILIFY) 2 MG tablet, Take 1 tablet by mouth At Bedtime., Disp: , Rfl:      ASPIRIN NOT PRESCRIBED (INTENTIONAL), Please choose reason not prescribed, below, Disp: 0 each, Rfl: 0     chlorthalidone (HYGROTON) 25 MG tablet, Take 1 tablet (25 mg) by mouth daily, Disp: 90 tablet, Rfl: 1     ClomiPRAMINE HCl (ANAFRANIL PO), Take 25 mg by mouth every evening , Disp: , Rfl:      clopidogrel (PLAVIX) 75 MG tablet, Take 1 tablet (75 mg) by mouth daily, Disp: 90 tablet, Rfl: 3     erythromycin (ROMYCIN) 5 MG/GM ophthalmic ointment, Place 0.5 inches into the right eye 3 times daily, Disp: 1 g, Rfl: 0     gabapentin (NEURONTIN) 400 MG capsule, Take 1 capsule by mouth 2 times daily , Disp: , Rfl:      HYDROXYZINE HCL PO, Take 10 mg by mouth 4 times daily as needed for itching for muscle spasms, adjuvant pain, itching and nausea QID prn, Disp: , Rfl:      lactulose (CEPHULAC) 10 GM packet, Take 2 packets (20 g) by mouth 2 times daily (Patient taking differently: Take 20 g by mouth daily ), Disp: , Rfl:      levothyroxine (SYNTHROID/LEVOTHROID) 100 MCG tablet, Take 1 tablet (100 mcg) by mouth daily, Disp: 90 tablet, Rfl: 1     LORAZEPAM PO, Take 0.5 mg by mouth daily as needed for anxiety, Disp: , Rfl:      MIRTAZAPINE PO, Take 45 mg by mouth At Bedtime , Disp: , Rfl:      oxyCODONE (ROXICODONE) 5 MG tablet, Take 5 mg by mouth every 4 hours as needed for severe pain, Disp: , Rfl:       simvastatin (ZOCOR) 20 MG tablet, TAKE ONE TABLET BY MOUTH AT BEDTIME, Disp: 90 tablet, Rfl: 1    Social History     Tobacco Use     Smoking status: Former Smoker     Packs/day: 1.00     Years: 5.00     Pack years: 5.00     Types: Cigarettes     Quit date: 1965     Years since quittin.8     Smokeless tobacco: Never Used   Substance Use Topics     Alcohol use: Yes     Alcohol/week: 0.0 standard drinks     Frequency: 2-4 times a month     Drinks per session: 1 or 2     Binge frequency: Never     Comment: maybe one glass of wine a week       ROS:  Review of systems negative except as stated above.    EXAM:  BP (!) 148/62   Pulse 57   Temp 97.8  F (36.6  C)   Resp 16   Wt 90.6 kg (199 lb 12.8 oz)   SpO2 96%   BMI 26.36 kg/m    GENERAL APPEARANCE: healthy, alert and no distress  EYES: EOMI,  PERRL, conjunctiva clear  RESP: lungs clear to auscultation - no rales, rhonchi or wheezes  CV: regular rates and rhythm, normal S1 S2, no murmur noted     Office EKG demonstrates: Ashley 98, changes in anterior leads     Assessment  / IMPRESSION  (R07.9) Chest pain, unspecified type  (primary encounter diagnosis)  (R94.31) Acute electrocardiogram changes  Comment: acute changes on EKG, cannot rule out cardiac etiology  Plan: EKG 12-lead complete w/read - Clinics          TO ER by private vehicle driven by daughter

## 2021-04-09 ENCOUNTER — VIRTUAL VISIT (OUTPATIENT)
Dept: FAMILY MEDICINE | Facility: CLINIC | Age: 86
End: 2021-04-09
Payer: COMMERCIAL

## 2021-04-09 DIAGNOSIS — R07.89 ATYPICAL CHEST PAIN: Primary | ICD-10-CM

## 2021-04-09 PROCEDURE — 99441 PR PHYSICIAN TELEPHONE EVALUATION 5-10 MIN: CPT | Performed by: INTERNAL MEDICINE

## 2021-04-09 NOTE — PROGRESS NOTES
This is a phone visit with the patient for follow up to his emergency room visit 4/7/21.  He developed off and on jabs in his chest area.  He went to emergency room for eval, that note was reviewed and xrays and labs noted.  I also reviewed the ekg from urgent care and that note.      The patient notes there has been no pattern to his chest discomfort and has had no shortness of breath, coughs or fevers.  No acid reflux.  No GI symptoms.  The discomfort is not affected by anything.  It last seconds at most.  It will come and go every hour or so.    Past Medical History:   Diagnosis Date     A-fib (H) 2010    post op     AAA (abdominal aortic aneurysm) without rupture (H)     Dr. 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    carotid us no stenosis, echo no change and no clots; ziopatch no afib, svt present     Anemia 2004     Aortic insufficiency 6/14    1+ on echo     Aortic insufficiency 11/2016    mild to mod     ASCVD (arteriosclerotic cardiovascular disease) 2010    cabg, post op afib     BPH (benign prostatic hyperplasia) 2003    turp, flomax added by urol 2/17     Bradycardia 2016    stopped toprol     CKD (chronic kidney disease) stage 3, GFR 30-59 ml/min      Constipation 05/2020    colonoscopy nl     Cough 2010    pulm eval, felt due to reflux     Dizzy 2001    mri small vessel dz     GERD (gastroesophageal reflux disease)      HTN (hypertension) 2002     Hx of colonoscopy 2003    tics     Hypothyroid      Hypovitaminosis D      Idiopathic neuropathy      Lung nodule 02/2018    6mm, found during eval of ascending aorta, fu 8/18 no change     OCD (obsessive compulsive disorder)     sees psyche     Panic disorder     Dr. Luna     Spinal headache      Stroke (H) 12/16    expressive aphasia, hosp, seen by neuro, mri pos, carotids min dz, changed from asa to plavix     Sudden hearing loss 6/13    Dr. Garcia, mri brain no acoustic neuroma     SVT (supraventricular  tachycardia) (H) 11/16    seen on ziopatch done for amaurosis, longest 15 beats     Thoracic ascending aortic aneurysm (H) 06/2014    4.4cm on echo, aortic root 3.9, fu 5/15 4.8cm; fu done 12/15 and slightly enlarged, fu 6/16 no change, fu 11/16 no change; fu 1/18 echo 5.1-5.3, enlarged, then cta done and only 4.8cm, t fu 6 months, lvh, nl ef, mild ai; fu 8/18 slightly larger; fu 2/19 slightly smaller     Ulcerative colitis (H)     not active for years     Past Surgical History:   Procedure Laterality Date     BACK SURGERY  1998     BLADDER SURGERY  1985     C TOTAL KNEE ARTHROPLASTY  2011    left     C TOTAL KNEE ARTHROPLASTY  2009    right     CATARACT IOL, RT/LT  2011     COLONOSCOPY N/A 05/05/2020    Procedure: COLONOSCOPY;  Surgeon: Kenya Loco MD;  Location:  GI     CORONARY ARTERY BYPASS  2010     ENDOVASCULAR REPAIR ANEURYSM ABDOMINAL AORTA N/A 05/27/2015    Procedure: ENDOVASCULAR REPAIR ANEURYSM ABDOMINAL AORTA;  Surgeon: Darin Walters MD;  Location:  OR     HERNIA REPAIR  2005     HERNIA REPAIR  1998     HERNIORRHAPHY INGUINAL Left 01/05/2018    Procedure: HERNIORRHAPHY INGUINAL;  Incarcerated Left Inguinal Hernia;  Surgeon: Harsh Walker MD;  Location:  OR     KNEE SURGERY  1997     REPAIR HAMMER TOE  12/28/2012    Procedure: REPAIR HAMMER TOE;  LEFT SECOND AND THIRD CLAW TOE RECONSTRUCTION;  Surgeon: Gray Haynes MD;  Location:  OR     REPAIR HAMMER TOE  04/2018    tria     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     TUR  2003     Social History     Socioeconomic History     Marital status:      Spouse name: Not on file     Number of children: 2     Years of education: Not on file     Highest education level: Not on file   Occupational History     Occupation: retail     Employer: RETIRED   Social Needs     Financial resource strain: Not on file     Food insecurity     Worry: Not on file     Inability: Not on file     Transportation needs     Medical: Not on  file     Non-medical: Not on file   Tobacco Use     Smoking status: Former Smoker     Packs/day: 1.00     Years: 5.00     Pack years: 5.00     Types: Cigarettes     Quit date: 1965     Years since quittin.8     Smokeless tobacco: Never Used   Substance and Sexual Activity     Alcohol use: Yes     Alcohol/week: 0.0 standard drinks     Frequency: 2-4 times a month     Drinks per session: 1 or 2     Binge frequency: Never     Comment: maybe one glass of wine a week     Drug use: No     Sexual activity: Not Currently     Partners: Female   Lifestyle     Physical activity     Days per week: Not on file     Minutes per session: Not on file     Stress: Not on file   Relationships     Social connections     Talks on phone: Not on file     Gets together: Not on file     Attends Yazidism service: Not on file     Active member of club or organization: Not on file     Attends meetings of clubs or organizations: Not on file     Relationship status: Not on file     Intimate partner violence     Fear of current or ex partner: Not on file     Emotionally abused: Not on file     Physically abused: Not on file     Forced sexual activity: Not on file   Other Topics Concern     Parent/sibling w/ CABG, MI or angioplasty before 65F 55M? Not Asked   Social History Narrative     Not on file     Current Outpatient Medications   Medication Sig Dispense Refill     acetaminophen (TYLENOL) 500 MG tablet Take 1,000 mg by mouth every 6 hours as needed for mild pain PAIN       ARIPiprazole (ABILIFY) 2 MG tablet Take 1 tablet by mouth At Bedtime.       ASPIRIN NOT PRESCRIBED (INTENTIONAL) Please choose reason not prescribed, below 0 each 0     chlorthalidone (HYGROTON) 25 MG tablet Take 1 tablet (25 mg) by mouth daily 90 tablet 1     ClomiPRAMINE HCl (ANAFRANIL PO) Take 25 mg by mouth every evening        clopidogrel (PLAVIX) 75 MG tablet Take 1 tablet (75 mg) by mouth daily 90 tablet 3     erythromycin (ROMYCIN) 5 MG/GM ophthalmic  ointment Place 0.5 inches into the right eye 3 times daily 1 g 0     gabapentin (NEURONTIN) 400 MG capsule Take 1 capsule by mouth 2 times daily        HYDROXYZINE HCL PO Take 10 mg by mouth 4 times daily as needed for itching for muscle spasms, adjuvant pain, itching and nausea QID prn       lactulose (CEPHULAC) 10 GM packet Take 2 packets (20 g) by mouth 2 times daily (Patient taking differently: Take 20 g by mouth daily )       levothyroxine (SYNTHROID/LEVOTHROID) 100 MCG tablet Take 1 tablet (100 mcg) by mouth daily 90 tablet 1     LORAZEPAM PO Take 0.5 mg by mouth daily as needed for anxiety       MIRTAZAPINE PO Take 45 mg by mouth At Bedtime        oxyCODONE (ROXICODONE) 5 MG tablet Take 5 mg by mouth every 4 hours as needed for severe pain       simvastatin (ZOCOR) 20 MG tablet TAKE ONE TABLET BY MOUTH AT BEDTIME 90 tablet 1     Allergies   Allergen Reactions     No Known Allergies      FAMILY HISTORY NOTED AND REVIEWED    REVIEW OF SYSTEMS: above    PHYSICAL EXAM    There were no vitals taken for this visit.    ASSESSMENT:  Chest discomfort,  I very much doubt this is cardiac based on his history and the medical records.  I think pulmonary embolism is unlikely.  I doubt pneumothorax, acute GI process, tumor, or acid reflux.  I doubt dissection.  This could be msk    PLAN:  If worsens to emergency room  If not resolved by Monday call    Zurdo Kendrick M.D.  Time 8 minutes

## 2021-04-17 DIAGNOSIS — E03.9 ACQUIRED HYPOTHYROIDISM: ICD-10-CM

## 2021-04-17 NOTE — TELEPHONE ENCOUNTER
Levothyroxine 100 mcg tablet    Summary: Take 1 tablet (100 mcg) by mouth daily, Disp-90 tablet, R-1, E-Prescribe   Dose, Route, Frequency: 100 mcg, Oral, DAILY  Start: 1/8/2021  Ord/Sold: 1/8/2021

## 2021-04-19 RX ORDER — LEVOTHYROXINE SODIUM 100 UG/1
100 TABLET ORAL DAILY
Qty: 90 TABLET | Refills: 1 | Status: SHIPPED | OUTPATIENT
Start: 2021-04-19 | End: 2021-11-05

## 2021-04-29 ENCOUNTER — TRANSFERRED RECORDS (OUTPATIENT)
Dept: HEALTH INFORMATION MANAGEMENT | Facility: CLINIC | Age: 86
End: 2021-04-29

## 2021-04-30 ENCOUNTER — TRANSCRIBE ORDERS (OUTPATIENT)
Dept: OTHER | Age: 86
End: 2021-04-30

## 2021-04-30 DIAGNOSIS — H55.00 NYSTAGMUS: Primary | ICD-10-CM

## 2021-05-26 ENCOUNTER — OFFICE VISIT (OUTPATIENT)
Dept: OPHTHALMOLOGY | Facility: CLINIC | Age: 86
End: 2021-05-26
Attending: OPHTHALMOLOGY
Payer: COMMERCIAL

## 2021-05-26 DIAGNOSIS — H53.10 SUBJECTIVE VISUAL DISTURBANCE: ICD-10-CM

## 2021-05-26 DIAGNOSIS — H55.00 NYSTAGMUS: ICD-10-CM

## 2021-05-26 DIAGNOSIS — H53.2 DOUBLE VISION: ICD-10-CM

## 2021-05-26 DIAGNOSIS — H51.9: Primary | ICD-10-CM

## 2021-05-26 PROCEDURE — G0463 HOSPITAL OUTPT CLINIC VISIT: HCPCS

## 2021-05-26 PROCEDURE — 92060 SENSORIMOTOR EXAMINATION: CPT | Performed by: OPHTHALMOLOGY

## 2021-05-26 PROCEDURE — 99204 OFFICE O/P NEW MOD 45 MIN: CPT | Mod: GC | Performed by: OPHTHALMOLOGY

## 2021-05-26 ASSESSMENT — VISUAL ACUITY
CORRECTION_TYPE: GLASSES
METHOD: SNELLEN - LINEAR
OS_CC: 20/30
OS_CC+: -1
OD_CC: 20/30
OD_CC+: -2
OD_PH_CC: 20/25
OD_PH_CC+: -1+2

## 2021-05-26 ASSESSMENT — REFRACTION_WEARINGRX
OS_AXIS: 011
OD_ADD: +3.00
SPECS_TYPE: BIFOCAL
OS_CYLINDER: +0.75
OS_SPHERE: -1.00
OD_AXIS: 043
OS_ADD: +3.00
OD_SPHERE: -1.00
OD_CYLINDER: +1.00

## 2021-05-26 ASSESSMENT — CUP TO DISC RATIO
OD_RATIO: 0.1
OS_RATIO: 0.1

## 2021-05-26 ASSESSMENT — TONOMETRY
OD_IOP_MMHG: 8
IOP_METHOD: ICARE
OS_IOP_MMHG: 10

## 2021-05-26 ASSESSMENT — CONF VISUAL FIELD
METHOD: COUNTING FINGERS
OS_NORMAL: 1
OD_NORMAL: 1

## 2021-05-26 NOTE — NURSING NOTE
"Chief Complaints and History of Present Illnesses   Patient presents with     Nystagmus Evaluation     Chief Complaint(s) and History of Present Illness(es)     Nystagmus Evaluation     Laterality: both eyes    Onset: chronic    Timing: all the time and when tired    Course: stable    Associated symptoms: droopy eyelid.  Negative for blurred vision and eye pain    Pain scale: 0/10              Comments     Pt notes BE have been \"shaking\" for years- worse when tired.  Notes \"my eyes dance\"  Notes CHLOE has been droopy for years.  Denies any blurry vision, diplopia, or photophobia.  Ocular meds: None    Rafia Chisholm OT 2:03 PM May 26, 2021                   "

## 2021-05-26 NOTE — LETTER
2021    RE: Zack Santiago  : 1931  MRN: [id number]    Dear Dr. Javed    Thank you for referring your patient, Zack Santiago, to my neuro-ophthalmology clinic recently.  After a thorough neuro-ophthalmic history and examination, I came to the following conclusions:     1. Square wave jerks with worsening of balance and handwriting. This patient exhibits parkinsonian features raising concern for a neurodegenerative central nervous system condition. Will refer him to neurology-  movement disorder clinic.  I believe his oscillopsia is attributable to his macrosaccadic square wave jerks.    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 This has been happening for years and it has been stable since onset. He denies headaches, diplopia, numbness or weakness in his extremities but he does report progressive worsening of his balance for 1 year. Oscillopsia preceded the imbalance. He reports that he has a well balanced diet ( 3 meals a day), denies diarrhea or vomiting. He drinks one glass of wine occasionally . No history of falls. He has been having trouble writing and the letters are smaller now    Worse balance and walking in last 1 year. Worse handwriting in last 1 year.    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 He has ptosis in both eyes worse in left eye. 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  Structural eye examination was remarkable for a posterior chamber intraocular lens in good location status post YAG capsulotomy in both eyes.  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    Sensorimotor exam revealed normal alignment and full motility in both eyes however the patient exhibited significant saccadic smooth pursuit as well as accurate saccadic square wave jerks that were frequent intrusions to fixation.    Tests ordered and interpreted today:  Sensorimotor exam      Again, thank you for trusting me with the care of your patient.  For further exam details, please feel free to contact our office for additional records.  If you wish to contact me regarding this patient please email me at [email or web address] or give my clinic a call to arrange a phone conversation.    Sincerely,    Raphael Barrios MD  , Neuro-Ophthalmology and Adult Strabismus Surgery  The Julia Velasquez Chair in Neuro-Ophthalmology  Department of Ophthalmology and Visual Neurosciences  [facility]    DX: macrosaccadic square wave jerks, parkinsonian features

## 2021-05-26 NOTE — PROGRESS NOTES
1. Square wave jerks with worsening of balance and handwriting. This patient exhibits parkinsonian features raising concern for a neurodegenerative central nervous system condition. Will refer him to neurology-  movement disorder clinic.  I believe his oscillopsia is attributable to his macrosaccadic square wave jerks.    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 This has been happening for years and it has been stable since onset. He denies headaches, diplopia, numbness or weakness in his extremities but he does report progressive worsening of his balance for 1 year. Oscillopsia preceded the imbalance. He reports that he has a well balanced diet ( 3 meals a day), denies diarrhea or vomiting. He drinks one glass of wine occasionally . No history of falls. He has been having trouble writing and the letters are smaller now    Worse balance and walking in last 1 year. Worse handwriting in last 1 year.    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 He has ptosis in both eyes worse in left eye. 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  Structural eye examination was remarkable for a posterior chamber intraocular lens in good location status post YAG capsulotomy in both eyes.  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    Sensorimotor exam revealed normal alignment and full motility in both eyes however the patient exhibited significant saccadic smooth pursuit as well as accurate saccadic square wave jerks that were frequent intrusions to fixation.    Tests ordered and interpreted today:  Sensorimotor exam         45 minutes were spent on the date of the encounter by me doing chart review, history and exam, documentation, and further activities as noted above    Complete documentation of historical and exam elements from today's encounter can be found in the full encounter summary report (not reduplicated in this progress note).  I personally obtained the chief complaint(s) and history of present illness.  I confirmed and edited as necessary the review of systems, past medical/surgical history, family history, social history, and examination findings as documented by others; and I examined the patient myself.  I personally reviewed the relevant tests, images, and reports as documented above.  I formulated and edited as necessary the assessment and plan and discussed the findings and management plan with the patient and family.  I personally reviewed the ophthalmic test(s) associated with this encounter, agree with the interpretation(s) as documented by the resident/fellow, and have edited the corresponding report(s) as necessary.     MD Ilsa Goldsmith MD  Neuro-ophthalmology fellow  [facility]

## 2021-06-30 ENCOUNTER — TELEPHONE (OUTPATIENT)
Dept: FAMILY MEDICINE | Facility: CLINIC | Age: 86
End: 2021-06-30

## 2021-06-30 NOTE — TELEPHONE ENCOUNTER
[facility]     Who is the name of the provider? Dr Gallego     What is the location you see this provider at?  [place]     Reason for call:  Pt stated that Dr Kendrick's office told him he needed the CTA Chest with the CTA Abd/Pelvis?    : Zack    Phone number to call: [phone]    Additional Notes:

## 2021-06-30 NOTE — TELEPHONE ENCOUNTER
Patient called stating that  vascular called for patient to have abdomen CT done to check on AAA. Patient reports they had CT of chest and abdomen back in January/Feburary. Patient unsure if they need to have another test done. Writer was able to find note on 2/221, states that Dr. Gallego recommended patient have a 6 month follow up CTA with delayed images. Order was placed, and that vascular would follow up . Patient states they will call vascular back to schedule.      Bobbi Sandhu RN  [facility]

## 2021-07-01 ENCOUNTER — TELEPHONE (OUTPATIENT)
Dept: FAMILY MEDICINE | Facility: CLINIC | Age: 86
End: 2021-07-01

## 2021-07-01 ENCOUNTER — TELEPHONE (OUTPATIENT)
Dept: OTHER | Facility: CLINIC | Age: 86
End: 2021-07-01

## 2021-07-01 NOTE — TELEPHONE ENCOUNTER
Patient called to say he called [facility]ology to schedule a follow up CT for AAA that he thought he needed to have done twice yearly.     No order.     Not sure when this was last discussed and reviewed with PCP.     Scheduled Telephone Visit tomorrow to review with Dr Kendrick.     Halle BARRIOS, RN      July 1, 2021  10:09 AM

## 2021-07-01 NOTE — TELEPHONE ENCOUNTER
[facility]     Who is the name of the provider?  Dr Gold     What is the location you see this provider at?  [facility]     Reason for call:  To schedule his CTA     :  Zack    Phone number to call:  [phone]    Additional Notes:  While looking at Zack's labs it would appear he will need IV Hydration. Zack stated he never had to have this before and would like to discuss prior to scheduling.

## 2021-07-01 NOTE — TELEPHONE ENCOUNTER
Patient due for CTA abdomen/pelvis due to Dr. Vasquez recommendations.  Patient states, Dr. Kendrick also wants the chest to be included.  Changed order to reflect.  Patient will also need short term IV hydration due to creatinine 1.6   Patient has not had issues in the past with abnormal kidney function.  Patient has scheduled appt with Dr. Kendrick on 7/2.  Will discuss with him new findings.  Will reach out to patient next week to schedule.    Trini Gonzalez RN  IR nurse clinician  [phone]

## 2021-07-02 ENCOUNTER — TELEPHONE (OUTPATIENT)
Dept: FAMILY MEDICINE | Facility: CLINIC | Age: 86
End: 2021-07-02

## 2021-07-02 ENCOUNTER — VIRTUAL VISIT (OUTPATIENT)
Dept: FAMILY MEDICINE | Facility: CLINIC | Age: 86
End: 2021-07-02
Payer: COMMERCIAL

## 2021-07-02 DIAGNOSIS — I71.40 AAA (ABDOMINAL AORTIC ANEURYSM) WITHOUT RUPTURE (H): Primary | ICD-10-CM

## 2021-07-02 DIAGNOSIS — I71.21 THORACIC ASCENDING AORTIC ANEURYSM (H): ICD-10-CM

## 2021-07-02 DIAGNOSIS — N18.30 STAGE 3 CHRONIC KIDNEY DISEASE, UNSPECIFIED WHETHER STAGE 3A OR 3B CKD (H): ICD-10-CM

## 2021-07-02 PROCEDURE — 99442 PR PHYSICIAN TELEPHONE EVALUATION 11-20 MIN: CPT | Mod: 95 | Performed by: INTERNAL MEDICINE

## 2021-07-02 NOTE — PROGRESS NOTES
Zack is a 89 year old who is being evaluated via a billable telephone visit.      What phone number would you like to be contacted at? [phone]  How would you like to obtain your AVS? Phil Lambert   Zack is a 89 year old who presents for the following health issues    This was a discussion with the patient regarding the need for a follow-up CT of his thoracic chest at this time. He has a known thoracic aneurysm as well as a AAA. To follow-up on the AAA he is having a CTA done soon. As noted and reviewed he did have a CT of his thoracic aneurysm in January and it was unchanged for a year. He does have chronic kidney disease as noted.    At this time I discussed with the patient and recommended he not do the CT of his chest given the increased burden of potential IV dye as well as radiation. The plan is to hydrate him before his CT scan either way which is a good idea and I sent a note to them regarding checking a creatinine. The patient is in agreement with this.    Zurdo Kendrick M.D.  12 minutes on the day of the encounter doing chart review, history and exam, documentation and further activities as noted above.

## 2021-07-05 NOTE — TELEPHONE ENCOUNTER
Patient called back,  Writer set up an appt for 1100 on 1/3/21.     Bobbi Sandhu RN  [facility]

## 2021-07-07 ENCOUNTER — TRANSFERRED RECORDS (OUTPATIENT)
Dept: HEALTH INFORMATION MANAGEMENT | Facility: CLINIC | Age: 86
End: 2021-07-07

## 2021-07-08 NOTE — TELEPHONE ENCOUNTER
RECORDS RECEIVED FROM: Internal   Date of Appt: 7/27/21   NOTES (FOR ALL VISITS) STATUS DETAILS   OFFICE NOTE from referring provider Internal Dr Raphael Barrios @ [facility]:  5/26/21   OFFICE NOTE from other specialist N/A    DISCHARGE SUMMARY from hospital Internal [facility]:  12/22/16-12/23/16   DISCHARGE REPORT from the ER N/A    OPERATIVE REPORT N/A    MEDICATION LIST Internal    IMAGING  (FOR ALL VISITS)     EMG N/A    EEG N/A    LUMBAR PUNCTURE N/A    AMIE SCAN N/A    ULTRASOUND (CAROTID BILAT) *VASCULAR* N/A    MRI (HEAD, NECK, SPINE) [facility]:  MRI Brain 12/23/16   CT (HEAD, NECK, SPINE) [facility]:  CT Head 12/22/16  CTA Head 12/22/16  CT Head 12/22/16

## 2021-07-19 DIAGNOSIS — E78.5 HYPERLIPIDEMIA LDL GOAL <100: ICD-10-CM

## 2021-07-20 RX ORDER — SIMVASTATIN 20 MG
TABLET ORAL
Qty: 90 TABLET | Refills: 3 | Status: SHIPPED | OUTPATIENT
Start: 2021-07-20 | End: 2022-01-11

## 2021-07-20 NOTE — TELEPHONE ENCOUNTER
Seen 7/2/21.    LDL Cholesterol Calculated   Date Value Ref Range Status   01/27/2020 60 <100 mg/dL Final     Comment:     Desirable:       <100 mg/dl     Wants full year refills.    Please refill as appropriate.    Anila Rocha RN  [facility]

## 2021-07-27 ENCOUNTER — PRE VISIT (OUTPATIENT)
Dept: NEUROLOGY | Facility: CLINIC | Age: 86
End: 2021-07-27

## 2021-07-28 NOTE — TELEPHONE ENCOUNTER
RECORDS RECEIVED FROM: Neuroloical Disorder of eye movements, internal   Date of Appt: 10/1/2021   NOTES (FOR ALL VISITS) STATUS DETAILS   OFFICE NOTE from referring provider Internal [facility]alth:  5/26/21 - EYE OV with Dr. Barrios   OFFICE NOTE from other specialist N/A    DISCHARGE SUMMARY from [facility]:  12/22/16 - Admission with Dr. Sams   DISCHARGE REPORT from the ER N/A    OPERATIVE REPORT N/A    MEDICATION LIST Internal    IMAGING  (FOR ALL VISITS)     EMG N/A    EEG N/A    LUMBAR PUNCTURE N/A    AMIE SCAN N/A    ULTRASOUND (CAROTID BILAT) *VASCULAR* N/A    MRI (HEAD, NECK, SPINE) Internal [facility]:  12/23/16 - MRI Brain   CT (HEAD, NECK, SPINE) Internal [facility]:  12/22/16 - CT Head  12/22/16 - CTA Head  12/22/16 - CT Head

## 2021-07-30 ENCOUNTER — NURSE TRIAGE (OUTPATIENT)
Dept: FAMILY MEDICINE | Facility: CLINIC | Age: 86
End: 2021-07-30

## 2021-07-30 NOTE — TELEPHONE ENCOUNTER
Zurdo Kendrick MD  [facility] Triage; Halle López RN 2 minutes ago (2:16 PM)   PG  He should be seen in urgent care asap     Zurdo Kendrick M.D.    Message text     Halle López RN Gotlieb, Paul Steven, MD 8 minutes ago (2:10 PM)   SN  Work in today?  Or other advise?     Routing comment

## 2021-08-02 ENCOUNTER — HOSPITAL ENCOUNTER (OUTPATIENT)
Dept: CT IMAGING | Facility: CLINIC | Age: 86
End: 2021-08-02
Attending: RADIOLOGY | Admitting: RADIOLOGY
Payer: COMMERCIAL

## 2021-08-02 ENCOUNTER — HOSPITAL ENCOUNTER (OUTPATIENT)
Facility: CLINIC | Age: 86
Discharge: HOME | End: 2021-08-02
Attending: RADIOLOGY | Admitting: RADIOLOGY
Payer: COMMERCIAL

## 2021-08-02 VITALS
OXYGEN SATURATION: 95 % | TEMPERATURE: 97.6 F | RESPIRATION RATE: 16 BRPM | DIASTOLIC BLOOD PRESSURE: 71 MMHG | HEART RATE: 59 BPM | SYSTOLIC BLOOD PRESSURE: 141 MMHG

## 2021-08-02 DIAGNOSIS — N18.30 CKD (CHRONIC KIDNEY DISEASE) STAGE 3, GFR 30-59 ML/MIN (H): Primary | ICD-10-CM

## 2021-08-02 DIAGNOSIS — I71.40 ABDOMINAL AORTIC ANEURYSM (AAA) WITHOUT RUPTURE (H): ICD-10-CM

## 2021-08-02 PROCEDURE — 258N000003 HC RX IP 258 OP 636: Performed by: RADIOLOGY

## 2021-08-02 PROCEDURE — 250N000011 HC RX IP 250 OP 636: Performed by: RADIOLOGY

## 2021-08-02 PROCEDURE — 250N000009 HC RX 250: Performed by: RADIOLOGY

## 2021-08-02 PROCEDURE — 999N000154 HC STATISTIC RADIOLOGY XRAY, US, CT, MAR, NM

## 2021-08-02 PROCEDURE — 74174 CTA ABD&PLVS W/CONTRAST: CPT

## 2021-08-02 RX ORDER — SODIUM CHLORIDE 9 MG/ML
INJECTION, SOLUTION INTRAVENOUS CONTINUOUS
Status: DISCONTINUED | OUTPATIENT
Start: 2021-08-02 | End: 2021-08-02 | Stop reason: HOSPADM

## 2021-08-02 RX ORDER — IOPAMIDOL 755 MG/ML
80 INJECTION, SOLUTION INTRAVASCULAR ONCE
Status: COMPLETED | OUTPATIENT
Start: 2021-08-02 | End: 2021-08-02

## 2021-08-02 RX ADMIN — SODIUM CHLORIDE 80 ML: 9 INJECTION, SOLUTION INTRAVENOUS at 11:23

## 2021-08-02 RX ADMIN — IOPAMIDOL 80 ML: 755 INJECTION, SOLUTION INTRAVENOUS at 11:19

## 2021-08-02 RX ADMIN — SODIUM CHLORIDE: 9 INJECTION, SOLUTION INTRAVENOUS at 10:15

## 2021-08-02 NOTE — PROGRESS NOTES
PATIENT/VISITOR WELLNESS SCREENING    Step 1 Patient Screening    1. In the last month, have you been in contact with someone who was confirmed or suspected to have Coronavirus/COVID-19? No    2. Do you have the following symptoms?  Fever/Chills? No   Cough? No   Shortness of breath? No   New loss of taste or smell? No  Sore throat? No  Muscle or body aches? No  Headaches? No  Fatigue? No  Vomiting or diarrhea? No    Step 2 Visitor Screening    1. Name of Visitor (1 visitor per patient): none    2. In the last month, have you been in contact with someone who was confirmed or suspected to have Coronavirus/COVID-19? No    3. Do you have the following symptoms?  Fever/Chills? No   Cough? No   Shortness of breath? No   Skin rash? No   Loss of taste or smell? No  Sore throat? No  Runny or stuffy nose? No  Muscle or body aches? No  Headaches? No  Fatigue? No  Vomiting or diarrhea? No      If the visitor has positive symptoms, notify supervisor/manger  Per policy, the visitor will need to leave the facility     Step 3 Refer to logic grid below for actions    NO SYMPTOM(S)    ACTIONS:  1. Standard rooming process  2. Provider to assess per normal protocol  3. Implement precautions as needed and per guidelines     POSITIVE SYMPTOM(S)  If positive for ANY of the following symptoms: fever, cough, shortness of breath, rash    ACTION:  1. Continue to have the patient wear a mask   2. Room patient as soon as possible  3. Don appropriate PPE when entering room  4. Provider evaluation

## 2021-08-02 NOTE — PROGRESS NOTES
Care Suites Discharge Nursing Note    Patient Information  Name: Zack Santiago  Age: 89 year old    Discharge Education:  Discharge instructions reviewed: N/A  Additional education/resources provided:   Patient/patient representative verbalizes understanding: N/A  Patient discharging on new medications: No  Medication education completed: N/A    Discharge Plans:   Discharge location: home  Discharge ride contacted: Yes  Approximate discharge time: 1445    Discharge Criteria:  Discharge criteria met and vital signs stable: Yes    Patient Belongs:  Patient belongings returned to patient: Yes    Yasmin Wheeler RN

## 2021-08-02 NOTE — PROGRESS NOTES
Care Suites Admission Nursing Note    Patient Information  Name: Zack Santiago  Age: 89 year old  Reason for admission: IV hydration before CT  Care Suites arrival time: 0945    Visitor Information  Name: none  Informed of visitor restrictions: N/A  1 visitor allowed per patient   Visitor must screen negative for COVID symptoms   Visitor must wear a mask  Waiting rooms closed to visitors    Patient Admission/Assessment   Pre-procedure assessment complete: Yes  If abnormal assessment/labs, provider notified: N/A  NPO: N/A  Medications held per instructions/orders: N/A  Consent: deferred  If applicable, pregnancy test status: deferred  Patient oriented to room: Yes  Education/questions answered: Yes  Plan/other:     Discharge Planning  Discharge name/phone number: Jayashree Meza [phone] or cell [phone]  Overnight post sedation caregiver: unknown  Discharge location: home    Yasmin Wheeler RN

## 2021-08-03 ENCOUNTER — TELEPHONE (OUTPATIENT)
Dept: FAMILY MEDICINE | Facility: CLINIC | Age: 86
End: 2021-08-03

## 2021-08-03 NOTE — TELEPHONE ENCOUNTER
Reason for Call:  Request for results:    Name of test or procedure: CT scan (ordered yesterday by hospital provider) but pt would like provider here to review and advise on this     Date of test of procedure: 8/2/21     Location of the test or procedure: [facility] to leave the result message on voice mail? YES    Phone number Patient can be reached at:  Home number on file [phone] (home)    Call taken on 8/3/2021 at 4:43 PM by Tanya Faustin RN

## 2021-08-04 ENCOUNTER — NURSE TRIAGE (OUTPATIENT)
Dept: FAMILY MEDICINE | Facility: CLINIC | Age: 86
End: 2021-08-04

## 2021-08-04 DIAGNOSIS — I71.40 ABDOMINAL AORTIC ANEURYSM (AAA) WITHOUT RUPTURE (H): Primary | ICD-10-CM

## 2021-08-04 NOTE — TELEPHONE ENCOUNTER
Advise follow up with vascular surgery for interpretation of CTA , will leave to PCP to address with patient.

## 2021-08-04 NOTE — RESULT ENCOUNTER NOTE
Reviewed with Dr. Gallego.  Recommend annual follow up.  Called patient with the results.  Trini Gonzalez RN  IR nurse clinician  [phone]

## 2021-08-04 NOTE — TELEPHONE ENCOUNTER
See CT scan results     Mara Gonzalez RN   8/4/2021  9:05 AM CDT       Reviewed with Dr. Gallego.  Recommend annual follow up.  Called patient with the results.  Trini Gonzalez RN  IR nurse clinician  [phone]

## 2021-08-04 NOTE — TELEPHONE ENCOUNTER
Pt called     Having balance problems ongoing a few months, gradually worsening. Didn't need a walker until about 6 weeks ago     Denies dizziness or vertigo problems, more related to weakness     Fell about a week ago     Able to walk with walker okay    Not SOB, no HR or chest problems     Pt requesting PT for rehab at the facility he lives at      Pt never went to  as previously advised     Triaged per Epic Triage Protocol, gave care advice (be seen TODAY). Pt declines to go to  as advised. Prefers to wait until tomorrow, scheduled. Discussed if ANY new or worsening go to ED.       Next 5 appointments (look out 90 days)    Aug 05, 2021  4:00 PM  Office Visit with Mira Keyes PA-C  [facility] ([facility] [address], [address]  [facility] [place] [address] [phone]            Reason for Disposition    MODERATE weakness (i.e., interferes with work, school, normal activities) and cause unknown (Exceptions: weakness with acute minor illness, or weakness from poor fluid intake)    Additional Information    Negative: Severe difficulty breathing (e.g., struggling for each breath, speaks in single words)    Negative: Shock suspected (e.g., cold/pale/clammy skin, too weak to stand, low BP, rapid pulse)    Negative: Difficult to awaken or acting confused (e.g., disoriented, slurred speech)    Negative: Fainted > 15 minutes ago and still feels too weak or dizzy to stand    Negative: SEVERE weakness (i.e., unable to walk or barely able to walk, requires support) and new onset or worsening    Negative: Sounds like a life-threatening emergency to the triager    Negative: Weakness of the face, arm or leg on one side of the body    Negative: Has diabetes and weakness from low blood sugar (i.e., < 60 mg/dL or 3.5 mmol/L)    Negative: Recent heat exposure, suspected cause of weakness    Negative: Vomiting is the main symptom    Negative: Diarrhea is the main  symptom    Negative: Difficulty breathing    Negative: Heart beating < 50 beats per minute OR > 140 beats per minute    Negative: Extra heartbeats OR irregular heart beating (i.e., 'palpitations')    Negative: Follows bleeding (e.g., from vomiting, rectum, vagina) (Exception: small transient weakness from sight of a small amount blood)    Negative: Bloody, black, or tarry bowel movements (Exception: chronic-unchanged  black-grey bowel movements and is taking iron pills or Pepto-bismol)    Negative: MODERATE weakness from poor fluid intake with no improvement after 2 hours of rest and fluids    Negative: Drinking very little and dehydration suspected (e.g., no urine > 12 hours, very dry mouth, very lightheaded)    Negative: Patient sounds very sick or weak to the triager    Protocols used: WEAKNESS (GENERALIZED) AND FATIGUE-A-OH

## 2021-08-05 ENCOUNTER — OFFICE VISIT (OUTPATIENT)
Dept: FAMILY MEDICINE | Facility: CLINIC | Age: 86
End: 2021-08-05
Payer: COMMERCIAL

## 2021-08-05 VITALS
HEART RATE: 53 BPM | OXYGEN SATURATION: 97 % | DIASTOLIC BLOOD PRESSURE: 65 MMHG | SYSTOLIC BLOOD PRESSURE: 154 MMHG | TEMPERATURE: 97.7 F | WEIGHT: 197 LBS | BODY MASS INDEX: 25.99 KG/M2

## 2021-08-05 DIAGNOSIS — N18.30 STAGE 3 CHRONIC KIDNEY DISEASE, UNSPECIFIED WHETHER STAGE 3A OR 3B CKD (H): ICD-10-CM

## 2021-08-05 DIAGNOSIS — I10 ESSENTIAL HYPERTENSION: ICD-10-CM

## 2021-08-05 DIAGNOSIS — R26.89 BALANCE PROBLEMS: Primary | ICD-10-CM

## 2021-08-05 DIAGNOSIS — R53.83 FATIGUE, UNSPECIFIED TYPE: ICD-10-CM

## 2021-08-05 LAB
ERYTHROCYTE [DISTWIDTH] IN BLOOD BY AUTOMATED COUNT: 14.9 % (ref 10–15)
HCT VFR BLD AUTO: 38.6 % (ref 40–53)
HGB BLD-MCNC: 12.9 G/DL (ref 13.3–17.7)
MCH RBC QN AUTO: 33 PG (ref 26.5–33)
MCHC RBC AUTO-ENTMCNC: 33.4 G/DL (ref 31.5–36.5)
MCV RBC AUTO: 99 FL (ref 78–100)
PLATELET # BLD AUTO: 171 10E3/UL (ref 150–450)
RBC # BLD AUTO: 3.91 10E6/UL (ref 4.4–5.9)
WBC # BLD AUTO: 7 10E3/UL (ref 4–11)

## 2021-08-05 PROCEDURE — 85027 COMPLETE CBC AUTOMATED: CPT | Performed by: PHYSICIAN ASSISTANT

## 2021-08-05 PROCEDURE — 36415 COLL VENOUS BLD VENIPUNCTURE: CPT | Performed by: PHYSICIAN ASSISTANT

## 2021-08-05 PROCEDURE — 80053 COMPREHEN METABOLIC PANEL: CPT | Performed by: PHYSICIAN ASSISTANT

## 2021-08-05 PROCEDURE — 84443 ASSAY THYROID STIM HORMONE: CPT | Performed by: PHYSICIAN ASSISTANT

## 2021-08-05 PROCEDURE — 99215 OFFICE O/P EST HI 40 MIN: CPT | Performed by: PHYSICIAN ASSISTANT

## 2021-08-05 NOTE — LETTER
[place] [address] [facility]  [address]  [place]  [address]  Tel: [phone]    August 9, 2021    Zack Santiago  [address]    [place] [place] [place] [facility] [address]        Dear Mr. Santiago,    The results of your recent labs look fine.  We'll see you in September for your appointment with Dr. Kendrick.     If you have any further questions or problems, please contact our office.      Sincerely,    Mira Arceo NP/SML          Enclosure: Lab Results  Results for orders placed or performed in visit on 08/05/21   Comprehensive metabolic panel (BMP + Alb, Alk Phos, ALT, AST, Total. Bili, TP)     Status: Abnormal   Result Value Ref Range    Sodium 139 133 - 144 mmol/L    Potassium 4.4 3.4 - 5.3 mmol/L    Chloride 107 94 - 109 mmol/L    Carbon Dioxide (CO2) 25 20 - 32 mmol/L    Anion Gap 7 3 - 14 mmol/L    Urea Nitrogen 28 7 - 30 mg/dL    Creatinine 1.19 0.66 - 1.25 mg/dL    Calcium 9.1 8.5 - 10.1 mg/dL    Glucose 93 70 - 99 mg/dL    Alkaline Phosphatase 75 40 - 150 U/L    AST 18 0 - 45 U/L    ALT 25 0 - 70 U/L    Protein Total 7.1 6.8 - 8.8 g/dL    Albumin 4.1 3.4 - 5.0 g/dL    Bilirubin Total 0.6 0.2 - 1.3 mg/dL    GFR Estimate 53 (L) >60 mL/min/1.73m2   CBC with platelets     Status: Abnormal   Result Value Ref Range    WBC Count 7.0 4.0 - 11.0 10e3/uL    RBC Count 3.91 (L) 4.40 - 5.90 10e6/uL    Hemoglobin 12.9 (L) 13.3 - 17.7 g/dL    Hematocrit 38.6 (L) 40.0 - 53.0 %    MCV 99 78 - 100 fL    MCH 33.0 26.5 - 33.0 pg    MCHC 33.4 31.5 - 36.5 g/dL    RDW 14.9 10.0 - 15.0 %    Platelet Count 171 150 - 450 10e3/uL   TSH with free T4 reflex     Status: Normal   Result Value Ref Range    TSH 1.24 0.40 - 4.00 mU/L

## 2021-08-05 NOTE — PROGRESS NOTES
"Assessment and Plan:     (R26.89) Balance problems  (primary encounter diagnosis)  Comment: he states he has had before, wants PT, denies vertiginous symptoms, uses cane or walker  Plan: Physical Therapy Referral, Care Coordination         Referral    (R53.83) Fatigue, unspecified type  Comment: ongoing x months, no CP, no SOB, no bloody stool  Plan: Comprehensive metabolic panel (BMP + Alb, Alk         Phos, ALT, AST, Total. Bili, TP), CBC with         platelets, TSH with free T4 reflex, Care         Coordination Referral    (N18.30) Stage 3 chronic kidney disease, unspecified whether stage 3a or 3b CKD  Comment: last creatinine 1.6  Plan: check creatinine today    (I10) Essential hypertension  Comment: pressure a little high today, on chlorthalidone, not interested in monitoring at home  Plan: recheck at next visit    I've asked Zack to bring a son or daughter to next visit with Dr. Kendrick in about a month, sooner if symptoms worsen    45 minutes on the day of the encounter doing chart review, history and exam, documentation and further activities as noted above.    Mira Keyes PA-C        Subjective   Zack is a 89 year old who presents for the following health issues     HPI     Chief Complaint   Patient presents with     Fatigue     He states he is here for fatigue and issues with balance  The fatigue started months ago, he fell about 10 days ago, he was in the parking lot of a restaurant and tripped on uneven pavement, did not hit his head, no significant injuries  He lives independently in [facility], he has dinners in the cafeteria, he makes his own breakfast and lunch, he would like to look into getting more help at home  He is still driving   He also feels \"tippy\" walks very carefully, using a walker x last month, he unsure what caused the change  He denies dizzine/vertigo symptoms, he denies congestion/ear pain  He still feels safe at home, does not feel he needs to go to hospital   He " drinks very little water  He denies focal weakness, chest pain, sob, dizziness, abdominal pain, cough/congestion, fever/chills, dysuria, bloody/black stool      Review of Systems   See above      Objective    BP (!) 154/65 (BP Location: Right arm, Cuff Size: Adult Regular)   Pulse 53   Temp 97.7  F (36.5  C) (Temporal)   Wt 89.4 kg (197 lb)   SpO2 97%   BMI 25.99 kg/m    Body mass index is 25.99 kg/m .     Physical Exam     EXAM:  GENERAL APPEARANCE: healthy, alert and no distress  EYES: Eyes grossly normal to inspection  HENT: ear canals and TM's normal and nose and mouth without ulcers or lesions  RESP: lungs clear to auscultation - no rales, rhonchi or wheezes  CV: regular rates and rhythm, normal S1 S2, no S3 or S4 and no murmur, click or rub  ABDOMEN: soft, nontender, without hepatosplenomegaly or masses and bowel sounds normal  MS: extremities normal- trace pitting edema bilat lower extremities  PSYCH: mentation appears normal and affect normal/bright  NEUROLOGICAL EXAM:  face symmetrical with raised eyebrows, smile, normal speech, tongue is midline,    strength equal/intact  Elbow flexion and extension is equal/intact  Gait is shuffling but steady, no ataxia

## 2021-08-05 NOTE — PATIENT INSTRUCTIONS
Drink 6-8 glasses of water per day or until your urine is clear    Return in one month for recheck with son or daughter, make an appointment with Dr. Kendrick

## 2021-08-06 ENCOUNTER — PATIENT OUTREACH (OUTPATIENT)
Dept: CARE COORDINATION | Facility: CLINIC | Age: 86
End: 2021-08-06

## 2021-08-06 LAB
ALBUMIN SERPL-MCNC: 4.1 G/DL (ref 3.4–5)
ALP SERPL-CCNC: 75 U/L (ref 40–150)
ALT SERPL W P-5'-P-CCNC: 25 U/L (ref 0–70)
ANION GAP SERPL CALCULATED.3IONS-SCNC: 7 MMOL/L (ref 3–14)
AST SERPL W P-5'-P-CCNC: 18 U/L (ref 0–45)
BILIRUB SERPL-MCNC: 0.6 MG/DL (ref 0.2–1.3)
BUN SERPL-MCNC: 28 MG/DL (ref 7–30)
CALCIUM SERPL-MCNC: 9.1 MG/DL (ref 8.5–10.1)
CHLORIDE BLD-SCNC: 107 MMOL/L (ref 94–109)
CO2 SERPL-SCNC: 25 MMOL/L (ref 20–32)
CREAT SERPL-MCNC: 1.19 MG/DL (ref 0.66–1.25)
GFR SERPL CREATININE-BSD FRML MDRD: 53 ML/MIN/1.73M2
GLUCOSE BLD-MCNC: 93 MG/DL (ref 70–99)
POTASSIUM BLD-SCNC: 4.4 MMOL/L (ref 3.4–5.3)
PROT SERPL-MCNC: 7.1 G/DL (ref 6.8–8.8)
SODIUM SERPL-SCNC: 139 MMOL/L (ref 133–144)
TSH SERPL DL<=0.005 MIU/L-ACNC: 1.24 MU/L (ref 0.4–4)

## 2021-08-06 NOTE — PROGRESS NOTES
Clinic Care Coordination Contact  [facility]/Voicemail    Referral Source: PCP  Clinical Data: Care Coordinator Outreach    Reason for Referral: independent living now, in [facility], may need more services     Outreach attempted x 1.  Left message on patient's voicemail with call back information and requested return call.    Plan: Care Coordinator will try to reach patient again in 1-2 business days.    NABIL Avila  Clinic Care Coordination  [facility]: [facility] [place], [place], [place], and [facility]  Phone: [phone]

## 2021-08-09 ENCOUNTER — PATIENT OUTREACH (OUTPATIENT)
Dept: NURSING | Facility: CLINIC | Age: 86
End: 2021-08-09
Payer: COMMERCIAL

## 2021-08-09 NOTE — RESULT ENCOUNTER NOTE
Please send the following in a letter to the patient and let him know that his recent labs look fine.  We'll see him in September for his appointment with Dr. Kendrick.    Thanks,  Carli

## 2021-08-09 NOTE — LETTER
[facility] CARE COORDINATION  [address]  [place] [address]      August 9, 2021      Zack Santiago  [address]    [place] [address]      Dear Zack,    I am a clinic community health worker who works with Zurdo Kendrick MD at [facility]. I wanted to thank you for spending the time to talk with me.  Below is a description of clinic care coordination and how I can further assist you.      The clinic care coordination team is made up of a registered nurse,  and community health worker who understand the health care system. The goal of clinic care coordination is to help you manage your health and improve access to the health care system in the most efficient manner. The team can assist you in meeting your health care goals by providing education, coordinating services, strengthening the communication among your providers and supporting you with any resource needs.    Please feel free to contact me at [phone] with any questions or concerns. We are focused on providing you with the highest-quality healthcare experience possible and that all starts with you.     Sincerely,     NABIL Avila  Clinic Care Coordination  [facility]: [place] [place], [place], [facility], and [facility]  Phone: [phone]

## 2021-08-09 NOTE — PROGRESS NOTES
Clinic Care Coordination Contact  Community Health Worker Initial Outreach    CHW Initial Information Gathering:  Referral Source: PCP    Reason for Referral: independent living now, in [facility], may need more services      Patient accepts CC: No, Not at this time. Patient will be sent Care Coordination introduction letter for future reference.     Plan: Care Coordinator will send care coordination introduction letter with care coordinator contact information and explanation of care coordination services via mail.    Care Coordinator will do no further outreaches at this time.    NABIL Avila  Clinic Care Coordination  [facility]: [place], and [facility]  Phone: [phone]

## 2021-09-04 DIAGNOSIS — I10 ESSENTIAL HYPERTENSION: ICD-10-CM

## 2021-09-05 ENCOUNTER — HEALTH MAINTENANCE LETTER (OUTPATIENT)
Age: 86
End: 2021-09-05

## 2021-09-07 NOTE — TELEPHONE ENCOUNTER
Routing refill request to provider for review/approval because:  Drug not on the [facility] refill protocol ,blood pressure under 140/90 in past 12 months.    Priscilla Marroquin RN  [facility] Triage

## 2021-09-08 RX ORDER — CHLORTHALIDONE 25 MG/1
TABLET ORAL
Qty: 90 TABLET | Refills: 3 | Status: SHIPPED | OUTPATIENT
Start: 2021-09-08 | End: 2022-09-19

## 2021-09-20 ENCOUNTER — OFFICE VISIT (OUTPATIENT)
Dept: FAMILY MEDICINE | Facility: CLINIC | Age: 86
End: 2021-09-20
Payer: COMMERCIAL

## 2021-09-20 VITALS
HEART RATE: 55 BPM | TEMPERATURE: 97.1 F | BODY MASS INDEX: 25.58 KG/M2 | HEIGHT: 73 IN | DIASTOLIC BLOOD PRESSURE: 62 MMHG | WEIGHT: 193 LBS | SYSTOLIC BLOOD PRESSURE: 138 MMHG | OXYGEN SATURATION: 96 %

## 2021-09-20 DIAGNOSIS — R53.83 OTHER FATIGUE: Primary | ICD-10-CM

## 2021-09-20 DIAGNOSIS — Z23 NEED FOR PROPHYLACTIC VACCINATION AND INOCULATION AGAINST INFLUENZA: ICD-10-CM

## 2021-09-20 DIAGNOSIS — R26.89 BALANCE PROBLEMS: ICD-10-CM

## 2021-09-20 PROBLEM — I48.91 ATRIAL FIBRILLATION, UNSPECIFIED TYPE (H): Status: RESOLVED | Noted: 2017-01-19 | Resolved: 2021-09-20

## 2021-09-20 LAB
FOLATE SERPL-MCNC: 32.3 NG/ML
VIT B12 SERPL-MCNC: 427 PG/ML (ref 193–986)

## 2021-09-20 PROCEDURE — G0008 ADMIN INFLUENZA VIRUS VAC: HCPCS | Performed by: INTERNAL MEDICINE

## 2021-09-20 PROCEDURE — 82607 VITAMIN B-12: CPT | Performed by: INTERNAL MEDICINE

## 2021-09-20 PROCEDURE — 90662 IIV NO PRSV INCREASED AG IM: CPT | Performed by: INTERNAL MEDICINE

## 2021-09-20 PROCEDURE — 99213 OFFICE O/P EST LOW 20 MIN: CPT | Mod: 25 | Performed by: INTERNAL MEDICINE

## 2021-09-20 PROCEDURE — 82746 ASSAY OF FOLIC ACID SERUM: CPT | Performed by: INTERNAL MEDICINE

## 2021-09-20 PROCEDURE — 36415 COLL VENOUS BLD VENIPUNCTURE: CPT | Performed by: INTERNAL MEDICINE

## 2021-09-20 ASSESSMENT — MIFFLIN-ST. JEOR: SCORE: 1589.32

## 2021-09-20 NOTE — PROGRESS NOTES
This is a follow-up to his August visit.  The patient notes ongoing issues with fatigue and some balance issues.  They have been both going on for quite some time.  He is not having chest pain, shortness of breath or GI issues.  No fevers.  His medications are unchanged.  He had labs done recently as noted and reviewed with the patient.    [facility].  He is not staying very active or exercising a lot.    Past Medical History:   Diagnosis Date     A-fib (H) 2010    post op     AAA (abdominal aortic aneurysm) without rupture (H)     Dr. 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    carotid us no stenosis, echo no change and no clots; ziopatch no afib, svt present     Anemia 2004     Aortic insufficiency 06/2014    1+ on echo     Aortic insufficiency 11/2016    mild to mod     ASCVD (arteriosclerotic cardiovascular disease) 2010    cabg, post op afib     BPH (benign prostatic hyperplasia) 2003    turp, flomax added by urol 2/17     Bradycardia 2016    stopped toprol     CKD (chronic kidney disease) stage 3, GFR 30-59 ml/min      Constipation 05/2020    colonoscopy nl     Cough 2010    pulm eval, felt due to reflux     Dizzy 2001    mri small vessel dz     GERD (gastroesophageal reflux disease)      HTN (hypertension) 2002     Hx of colonoscopy 2003    tics     Hypothyroid      Hypovitaminosis D      Idiopathic neuropathy      Lung nodule 02/2018    6mm, found during eval of ascending aorta, fu 8/18 no change     OCD (obsessive compulsive disorder)     sees psyche     Panic disorder     Dr. Luna     Spinal headache      Stroke (H) 12/2016    expressive aphasia, hosp, seen by neuro, mri pos, carotids min dz, changed from asa to plavix     Sudden hearing loss 06/2013    Dr. Garcia, mri brain no acoustic neuroma     SVT (supraventricular tachycardia) (H) 11/2016    seen on ziopatch done for amaurosis, longest 15 beats     Thoracic ascending aortic  aneurysm (H) 2014    4.4cm on echo, aortic root 3.9, fu 5/15 4.8cm; fu done 12/15 and slightly enlarged, fu  no change, fu  no change; fu  echo 5.1-5.3, enlarged, then cta done and only 4.8cm, t fu 6 months, lvh, nl ef, mild ai; fu  slightly larger; fu  slightly smaller; fu  and then  and stable     Ulcerative colitis (H)     not active for years     Past Surgical History:   Procedure Laterality Date     BACK SURGERY       BLADDER SURGERY       C TOTAL KNEE ARTHROPLASTY      left     C TOTAL KNEE ARTHROPLASTY      right     CATARACT IOL, RT/LT       COLONOSCOPY N/A 2020    Procedure: COLONOSCOPY;  Surgeon: Kenya Loco MD;  Location:  GI     CORONARY ARTERY BYPASS       ENDOVASCULAR REPAIR ANEURYSM ABDOMINAL AORTA N/A 2015    Procedure: ENDOVASCULAR REPAIR ANEURYSM ABDOMINAL AORTA;  Surgeon: Darin Walters MD;  Location:  OR     HERNIA REPAIR       HERNIA REPAIR       HERNIORRHAPHY INGUINAL Left 2018    Procedure: HERNIORRHAPHY INGUINAL;  Incarcerated Left Inguinal Hernia;  Surgeon: Harsh Walker MD;  Location:  OR     KNEE SURGERY       REPAIR HAMMER TOE  2012    Procedure: REPAIR HAMMER TOE;  LEFT SECOND AND THIRD CLAW TOE RECONSTRUCTION;  Surgeon: Gray Haynes MD;  Location:  OR     REPAIR HAMMER TOE  2018    tria     toe amputation right foot  2021     TURP       Social History     Socioeconomic History     Marital status:      Spouse name: Not on file     Number of children: 2     Years of education: Not on file     Highest education level: Not on file   Occupational History     Occupation: retail     Employer: RETIRED   Tobacco Use     Smoking status: Former Smoker     Packs/day: 1.00     Years: 5.00     Pack years: 5.00     Types: Cigarettes     Quit date: 1965     Years since quittin.3     Smokeless tobacco: Never Used   Substance and Sexual Activity      Alcohol use: Yes     Alcohol/week: 0.0 standard drinks     Comment: maybe one glass of wine a week     Drug use: No     Sexual activity: Not Currently     Partners: Female   Other Topics Concern     Parent/sibling w/ CABG, MI or angioplasty before 65F 55M? Not Asked   Social History Narrative     Not on file     Social Determinants of Health     Financial Resource Strain:      Difficulty of Paying Living Expenses:    Food Insecurity:      Worried About Running Out of Food in the Last Year:      Ran Out of Food in the Last Year:    Transportation Needs:      Lack of Transportation (Medical):      Lack of Transportation (Non-Medical):    Physical Activity:      Days of Exercise per Week:      Minutes of Exercise per Session:    Stress:      Feeling of Stress :    Social Connections:      Frequency of Communication with Friends and Family:      Frequency of Social Gatherings with Friends and Family:      Attends Confucianist Services:      Active Member of Clubs or Organizations:      Attends Club or Organization Meetings:      Marital Status:    Intimate Partner Violence:      Fear of Current or Ex-Partner:      Emotionally Abused:      Physically Abused:      Sexually Abused:      Current Outpatient Medications   Medication Sig Dispense Refill     acetaminophen (TYLENOL) 500 MG tablet Take 1,000 mg by mouth every 6 hours as needed for mild pain PAIN       ARIPiprazole (ABILIFY) 2 MG tablet Take 1 tablet by mouth At Bedtime.       chlorthalidone (HYGROTON) 25 MG tablet TAKE 1 TABLET BY MOUTH  DAILY 90 tablet 3     ClomiPRAMINE HCl (ANAFRANIL PO) Take 25 mg by mouth every evening        clopidogrel (PLAVIX) 75 MG tablet Take 1 tablet (75 mg) by mouth daily 90 tablet 3     erythromycin (ROMYCIN) 5 MG/GM ophthalmic ointment Place 0.5 inches into the right eye 3 times daily 1 g 0     gabapentin (NEURONTIN) 400 MG capsule Take 1 capsule by mouth 2 times daily        lactulose (CEPHULAC) 10 GM packet Take 2 packets (20 g)  "by mouth 2 times daily (Patient taking differently: Take 20 g by mouth daily )       levothyroxine (SYNTHROID/LEVOTHROID) 100 MCG tablet Take 1 tablet (100 mcg) by mouth daily 90 tablet 1     LORAZEPAM PO Take 0.5 mg by mouth daily as needed for anxiety       MIRTAZAPINE PO Take 45 mg by mouth At Bedtime        oxyCODONE (ROXICODONE) 5 MG tablet Take 5 mg by mouth every 4 hours as needed for severe pain       simvastatin (ZOCOR) 20 MG tablet TAKE 1 TABLET BY MOUTH AT  BEDTIME 90 tablet 3     No Known Allergies  FAMILY HISTORY NOTED AND REVIEWED    REVIEW OF SYSTEMS: above    PHYSICAL EXAM    /62   Pulse 55   Temp 97.1  F (36.2  C) (Tympanic)   Ht 1.854 m (6' 1\")   Wt 87.5 kg (193 lb)   SpO2 96%   BMI 25.46 kg/m      Patient appears non toxic  Lungs clear  cv reglar rate and rhythm, no jvp    Labs sent    ASSESSMENT:  1. Fatigue, doubt cv, not anemic, thyroid fine, suspect age and decond.  Sleeps fine  2. Balance issues, chronic, suspect age, neurop    PLAN:  Balance therapy  Labs  Try to be more active  Follow up complete physical exam Enoch Kendrick M.D.        "

## 2021-09-20 NOTE — PROGRESS NOTES
Prior to immunization administration, verified patients identity using patient s name and date of birth. Please see Immunization Activity for additional information.     Screening Questionnaire for Adult Immunization    Are you sick today?   No   Do you have allergies to medications, food, a vaccine component or latex?   No   Have you ever had a serious reaction after receiving a vaccination?   No   Do you have a long-term health problem with heart, lung, kidney, or metabolic disease (e.g., diabetes), asthma, a blood disorder, no spleen, complement component deficiency, a cochlear implant, or a spinal fluid leak?  Are you on long-term aspirin therapy?   No   Do you have cancer, leukemia, HIV/AIDS, or any other immune system problem?   No   Do you have a parent, brother, or sister with an immune system problem?   No   In the past 3 months, have you taken medications that affect  your immune system, such as prednisone, other steroids, or anticancer drugs; drugs for the treatment of rheumatoid arthritis, Crohn s disease, or psoriasis; or have you had radiation treatments?   No   Have you had a seizure, or a brain or other nervous system problem?   No   During the past year, have you received a transfusion of blood or blood    products, or been given immune (gamma) globulin or antiviral drug?   No   For women: Are you pregnant or is there a chance you could become       pregnant during the next month?   No   Have you received any vaccinations in the past 4 weeks?   No     Immunization questionnaire answers were all negative.        Per orders of Dr. Kendrick, injection of flu given by Connie Whaley CMA. Patient instructed to remain in clinic for 15 minutes afterwards, and to report any adverse reaction to me immediately.       Screening performed by Connie Whaley CMA on 9/20/2021 at 3:01 PM.

## 2021-09-21 NOTE — RESULT ENCOUNTER NOTE
It was a please seeing you.  You should be able to view your labs.    Your labs are normal Zack.  Let me know if you have questions.    Zurdo

## 2021-10-01 ENCOUNTER — PRE VISIT (OUTPATIENT)
Dept: NEUROLOGY | Facility: CLINIC | Age: 86
End: 2021-10-01

## 2021-10-14 ENCOUNTER — TRANSFERRED RECORDS (OUTPATIENT)
Dept: HEALTH INFORMATION MANAGEMENT | Facility: CLINIC | Age: 86
End: 2021-10-14
Payer: COMMERCIAL

## 2021-10-18 ENCOUNTER — MEDICAL CORRESPONDENCE (OUTPATIENT)
Dept: HEALTH INFORMATION MANAGEMENT | Facility: CLINIC | Age: 86
End: 2021-10-18
Payer: COMMERCIAL

## 2021-10-29 ENCOUNTER — MEDICAL CORRESPONDENCE (OUTPATIENT)
Dept: HEALTH INFORMATION MANAGEMENT | Facility: CLINIC | Age: 86
End: 2021-10-29
Payer: COMMERCIAL

## 2021-11-04 DIAGNOSIS — E03.9 ACQUIRED HYPOTHYROIDISM: ICD-10-CM

## 2021-11-05 RX ORDER — LEVOTHYROXINE SODIUM 100 UG/1
TABLET ORAL
Qty: 90 TABLET | Refills: 2 | Status: SHIPPED | OUTPATIENT
Start: 2021-11-05 | End: 2022-01-11

## 2021-11-11 NOTE — PROGRESS NOTES
SUBJECTIVE:   Zack Santiago is a 88 year old male who presents for Preventive Visit.    Are you in the first 12 months of your Medicare coverage?  No    He is , has 2 kids, 55 and 57.  He now lives in the [facility] and happy there.  He feels fine, up to date with psyche, no c/o on review of systems.               Past Medical History:      Past Medical History:   Diagnosis Date     A-fib (H) 2010    post op     AAA (abdominal aortic aneurysm) without rupture (H)     Dr. 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    carotid us no stenosis, echo no change and no clots; ziopatch no afib, svt present     Anemia 2004     Aortic insufficiency 6/14    1+ on echo     Aortic insufficiency 11/2016    mild to mod     ASCVD (arteriosclerotic cardiovascular disease) 2010    cabg, post op afib     BPH (benign prostatic hyperplasia) 2003    turp, flomax added by urol 2/17     Bradycardia 2016    stopped toprol     CKD (chronic kidney disease) stage 3, GFR 30-59 ml/min (H)      Cough 2010    pulm eval, felt due to reflux     Dizzy 2001    mri small vessel dz     GERD (gastroesophageal reflux disease)      HTN (hypertension) 2002     Hx of colonoscopy 2003    tics     Hypothyroid      Hypovitaminosis D      Idiopathic neuropathy      Lung nodule 02/2018    6mm, found during eval of ascending aorta, fu 8/18 no change     OCD (obsessive compulsive disorder)     sees psyche     Panic disorder     Dr. Luna     Spinal headache      Stroke (H) 12/16    expressive aphasia, hosp, seen by neuro, mri pos, carotids min dz, changed from asa to plavix     Sudden hearing loss 6/13    Dr. Garcia, mri brain no acoustic neuroma     SVT (supraventricular tachycardia) (H) 11/16    seen on ziopatch done for amaurosis, longest 15 beats     Thoracic ascending aortic aneurysm (H) 06/2014    4.4cm on echo, aortic root 3.9, fu 5/15 4.8cm; fu done 12/15 and slightly enlarged, fu 6/16 no change, fu 11/16  no change; fu  echo 5.1-5.3, enlarged, then cta done and only 4.8cm, t fu 6 months, lvh, nl ef, mild ai; fu  slightly larger; fu  slightly smaller     Ulcerative colitis (H)     not active for years             Past Surgical History:      Past Surgical History:   Procedure Laterality Date     BACK SURGERY       BLADDER SURGERY       C TOTAL KNEE ARTHROPLASTY      left     C TOTAL KNEE ARTHROPLASTY      right     CATARACT IOL, RT/LT       CORONARY ARTERY BYPASS       ENDOVASCULAR REPAIR ANEURYSM ABDOMINAL AORTA N/A 2015    Procedure: ENDOVASCULAR REPAIR ANEURYSM ABDOMINAL AORTA;  Surgeon: Darin Walters MD;  Location: [facility]     HERNIA REPAIR       HERNIA REPAIR       HERNIORRHAPHY INGUINAL Left 2018    Procedure: HERNIORRHAPHY INGUINAL;  Incarcerated Left Inguinal Hernia;  Surgeon: Harsh Walker MD;  Location: [facility] OR     KNEE SURGERY       REPAIR HAMMER TOE  2012    Procedure: REPAIR HAMMER TOE;  LEFT SECOND AND THIRD CLAW TOE RECONSTRUCTION;  Surgeon: Gray Haynes MD;  Location: [facility] OR     REPAIR HAMMER TOE  2018    tria     TURP               Social History:     Social History     Socioeconomic History     Marital status:      Spouse name: Not on file     Number of children: 2     Years of education: Not on file     Highest education level: Not on file   Occupational History     Occupation: retail     Employer: RETIRED   Social Needs     Financial resource strain: Not on file     Food insecurity:     Worry: Not on file     Inability: Not on file     Transportation needs:     Medical: Not on file     Non-medical: Not on file   Tobacco Use     Smoking status: Former Smoker     Packs/day: 1.00     Years: 5.00     Pack years: 5.00     Types: Cigarettes     Last attempt to quit: 1965     Years since quittin.6     Smokeless tobacco: Never Used   Substance and Sexual Activity     Alcohol use: Yes     Alcohol/week:  0.0 standard drinks     Frequency: 2-4 times a month     Drinks per session: 1 or 2     Binge frequency: Never     Comment: maybe one glass of wine a week     Drug use: No     Sexual activity: Not Currently   Lifestyle     Physical activity:     Days per week: Not on file     Minutes per session: Not on file     Stress: Not on file   Relationships     Social connections:     Talks on phone: Not on file     Gets together: Not on file     Attends Taoist service: Not on file     Active member of club or organization: Not on file     Attends meetings of clubs or organizations: Not on file     Relationship status: Not on file     Intimate partner violence:     Fear of current or ex partner: Not on file     Emotionally abused: Not on file     Physically abused: Not on file     Forced sexual activity: Not on file   Other Topics Concern     Parent/sibling w/ CABG, MI or angioplasty before 65F 55M? Not Asked   Social History Narrative     Not on file             Family History:   reviewed         Allergies:     Allergies   Allergen Reactions     No Known Allergies              Medications:     Current Outpatient Medications   Medication Sig Dispense Refill     ARIPiprazole (ABILIFY) 2 MG tablet Take 1 tablet by mouth At Bedtime.       ASPIRIN NOT PRESCRIBED (INTENTIONAL) Please choose reason not prescribed, below 0 each 0     chlorthalidone (HYGROTON) 25 MG tablet Take 1 tablet (25 mg) by mouth daily 90 tablet 3     ClomiPRAMINE HCl (ANAFRANIL PO) Take 25 mg by mouth every evening        clopidogrel (PLAVIX) 75 MG tablet Take 1 tablet (75 mg) by mouth daily 90 tablet 3     gabapentin (NEURONTIN) 400 MG capsule Take 1 capsule by mouth 2 times daily        levothyroxine (SYNTHROID/LEVOTHROID) 100 MCG tablet Take 1 tablet (100 mcg) by mouth daily 90 tablet 3     LORAZEPAM PO Take 0.5 mg by mouth daily as needed for anxiety       MIRTAZAPINE PO Take 45 mg by mouth At Bedtime        simvastatin (ZOCOR) 20 MG tablet TAKE ONE  "TABLET BY MOUTH AT BEDTIME 90 tablet 3               Review of Systems:   The 10 point Review of Systems is negative other than noted in the HPI           Physical Exam:   Blood pressure 137/71, pulse 50, temperature 97  F (36.1  C), temperature source Oral, height 1.778 m (5' 10\"), weight 88 kg (194 lb), SpO2 100 %.    Exam:  Constitutional: healthy appearing, alert and in no distress  Heent: Normocephalic. Head without obvious masses or lesions. PERRLDC, EOMI. Mouth exam within normal limits: tongue, mucous membranes, posterior pharynx all normal, no lesions or abnormalities seen.  Tm's and canals within normal limits bilaterally. Neck supple, no nuchal rigidity or masses. No supraclavicular, or cervical adenopathy. Thyroid symmetric, no masses.  Cardiovascular: Regular rate and rhythm, no murmer, rub or gallops.  JVP not elevated, no edema.  Carotids within normal limits bilaterally, no bruits.  Respiratory: Normal respiratory effort.  Lungs clear, normal flow, no wheezing or crackles.  Breasts: Normal bilaterally.  No masses or lesions.  Nipples within normal limites.  No axillary lesions or nodes.  Gastrointestinal: Normal active bowel sounds.   Soft, not tender, no masses, guarding or rebound.  No hepatosplenomegaly.   Genitourinary: Rectal not done  Musculoskeletal: extremities normal, no gross deformities noted.  Skin: no suspicious lesions or rashes   Neurologic: Mental status within normal limits.  Speech fluent.  No gross motor abnormalities and gait intact.  Psychiatric: mentation appears normal and affect normal.         Data:   Labs sent        Assessment:   1. Normal complete physical exam  2. Cva, no issues  3. Ul. Colitis, no issues  4. Aaa, has follow up  5. Svt, no issues  6. Ckd, follow up labs  7. Hypertension, controlled  8. Hypothyroidism, follow up tsh  9. Elevated cholesterol, follow up labs  10. Panic, ocd, follow up psyche  11. Afib, no issues  12. A.i, no issues  13. hcm         Plan: " "  shingrix at pharm  Letter with labs  Exercise, diet  Call if problems      Zurdo Kendrick M.D.          Healthy Habits:     In general, how would you rate your overall health?  Good    Frequency of exercise:  1 day/week    Duration of exercise:: 30mins.    Do you usually eat at least 4 servings of fruit and vegetables a day, include whole grains    & fiber and avoid regularly eating high fat or \"junk\" foods?  No (2-3)    Taking medications regularly:  Yes    Barriers to taking medications:  None    Medication side effects:  None    Ability to successfully perform activities of daily living:  Laundry requires assistance    Home Safety:  No safety concerns identified    Hearing Impairment:  No hearing concerns    In the past 6 months, have you been bothered by leaking of urine?  No    In general, how would you rate your overall mental or emotional health?  Good      PHQ-2 Total Score: 0    Additional concerns today:  No    Do you feel safe in your environment? YES    Have you ever done Advance Care Planning? (For example, a Health Directive, POLST, or a discussion with a medical provider or your loved ones about your wishes): No, advance care planning information given to patient to review.  Patient declined advance care planning discussion at this time.      Fall risk  Fallen 2 or more times in the past year?: No  Any fall with injury in the past year?: No    Cognitive Screening   1) Repeat 3 items (Leader, Season, Table)    2) Clock draw: ABNORMAL numbers are all skewed.   3) 3 item recall: Recalls 3 objects  Results: ABNORMAL clock, 3 items recalled: PROBABLE COGNITIVE IMPAIRMENT, **INFORM PROVIDER**    Mini-CogTM Copyright LANRE Graham. Licensed by the author for use in [facility]; reprinted with permission (jg@.[email or web address]). All rights reserved.      Do you have sleep apnea, excessive snoring or daytime drowsiness?: no    Reviewed and updated as needed this visit by clinical staff  Allergies  Meds     " "    Reviewed and updated as needed this visit by Provider        Social History     Tobacco Use     Smoking status: Former Smoker     Packs/day: 1.00     Years: 5.00     Pack years: 5.00     Types: Cigarettes     Last attempt to quit: 1965     Years since quittin.6     Smokeless tobacco: Never Used   Substance Use Topics     Alcohol use: Yes     Alcohol/week: 0.0 standard drinks     Comment: maybe one glass of wine a week     If you drink alcohol do you typically have >3 drinks per day or >7 drinks per week? No    Alcohol Use 2015   Prescreen: >3 drinks/day or >7 drinks/week? The patient does not drink >3 drinks per day nor >7 drinks per week.               Current providers sharing in care for this patient include:   Patient Care Team:  Zurdo Kendrick MD as PCP - General (Internal Medicine)  Zurdo Kendrick MD as Assigned PCP    The following health maintenance items are reviewed in Epic and correct as of today:  Health Maintenance   Topic Date Due     ZOSTER IMMUNIZATION (2 of 3) 10/15/2007     ADVANCE CARE PLANNING  2017     PHQ-2  2020     MEDICARE ANNUAL WELLNESS VISIT  2020     FALL RISK ASSESSMENT  2020     DTAP/TDAP/TD IMMUNIZATION (2 - Td) 06/15/2022     INFLUENZA VACCINE  Completed     PNEUMOCOCCAL IMMUNIZATION 65+ LOW/MEDIUM RISK  Completed     IPV IMMUNIZATION  Aged Out     MENINGITIS IMMUNIZATION  Aged Out           Review of Systems      OBJECTIVE:   There were no vitals taken for this visit. Estimated body mass index is 25.73 kg/m  as calculated from the following:    Height as of 10/21/19: 1.854 m (6' 1\").    Weight as of 10/21/19: 88.5 kg (195 lb).  Physical Exam          ASSESSMENT / PLAN:       COUNSELING:  Reviewed preventive health counseling, as reflected in patient instructions       Regular exercise       Healthy diet/nutrition    Estimated body mass index is 25.73 kg/m  as calculated from the following:    Height as of 10/21/19: 1.854 m (6' " "1\").    Weight as of 10/21/19: 88.5 kg (195 lb).         reports that he quit smoking about 54 years ago. His smoking use included cigarettes. He has a 5.00 pack-year smoking history. He has never used smokeless tobacco.      Appropriate preventive services were discussed with this patient, including applicable screening as appropriate for cardiovascular disease, diabetes, osteopenia/osteoporosis, and glaucoma.  As appropriate for age/gender, discussed screening for colorectal cancer, prostate cancer, breast cancer, and cervical cancer. Checklist reviewing preventive services available has been given to the patient.    Reviewed patients plan of care and provided an AVS. The Basic Care Plan (routine screening as documented in Health Maintenance) for Zack meets the Care Plan requirement. This Care Plan has been established and reviewed with the Patient.    Counseling Resources:  ATP IV Guidelines  Pooled Cohorts Equation Calculator  Breast Cancer Risk Calculator  FRAX Risk Assessment  ICSI Preventive Guidelines  Dietary Guidelines for Americans, 2010  [employer]'s MyPlate  ASA Prophylaxis  Lung CA Screening    Zurdo Kendrick MD  [employer]    Identified Health Risks:  " room air

## 2021-12-17 ENCOUNTER — HOSPITAL ENCOUNTER (EMERGENCY)
Facility: CLINIC | Age: 86
Discharge: HOME | End: 2021-12-17
Attending: EMERGENCY MEDICINE | Admitting: EMERGENCY MEDICINE
Payer: COMMERCIAL

## 2021-12-17 ENCOUNTER — NURSE TRIAGE (OUTPATIENT)
Dept: NURSING | Facility: CLINIC | Age: 86
End: 2021-12-17
Payer: COMMERCIAL

## 2021-12-17 VITALS
RESPIRATION RATE: 16 BRPM | DIASTOLIC BLOOD PRESSURE: 61 MMHG | BODY MASS INDEX: 25.84 KG/M2 | HEIGHT: 73 IN | HEART RATE: 51 BPM | SYSTOLIC BLOOD PRESSURE: 129 MMHG | TEMPERATURE: 97.7 F | OXYGEN SATURATION: 99 % | WEIGHT: 195 LBS

## 2021-12-17 DIAGNOSIS — S81.811A SKIN TEAR OF RIGHT LOWER LEG WITHOUT COMPLICATION, INITIAL ENCOUNTER: ICD-10-CM

## 2021-12-17 PROCEDURE — 99283 EMERGENCY DEPT VISIT LOW MDM: CPT

## 2021-12-17 ASSESSMENT — ENCOUNTER SYMPTOMS
ARTHRALGIAS: 0
WOUND: 1

## 2021-12-17 ASSESSMENT — MIFFLIN-ST. JEOR: SCORE: 1598.39

## 2021-12-17 NOTE — ED TRIAGE NOTES
EMS arrival:    Walker caught on table leg.  He went forward, fell.  Laceration on right shin.  Denies hitting head, no LOC.  On Plavix.   Denies pain.  L shape lac, 2 inches.  Able to stand with assistance.  Did not try to walk otherwise.     Lives in independent living.  [facility], [place].

## 2021-12-17 NOTE — ED PROVIDER NOTES
History   Chief Complaint:  Fall        The history is provided by the patient.      Zack Santiago is a 89 year old male, on Plavix, with history of atrial fibrillation and stroke who presents for evaluation after a fall. He reports that his walker fell forwards and he slipped and fell after this. The patient has a skin tear to the right shin and he notes swelling of the leg. He denies hitting his head and loss of consciousness. The patient also denies any pain aside from his shin. He did not hit his head. Denies other trauma.    Review of Systems   Cardiovascular: Positive for leg swelling.   Musculoskeletal: Negative for arthralgias.   Skin: Positive for wound.   Neurological:        (-) loss of consciousness    All other systems reviewed and are negative.      Allergies:  The patient has no known allergies.     Medications:  Abilify  Chlorthalidone  Anafranil  Plavix  Romycin  Neurontin  Cephulac  Levothyroxine  Lorazepam   Mirtazapine  Simvastatin     Past Medical History:     Atrial fibrillation  AAA  Anemia  Aortic insufficiency  ASCVD  BPH  Bradycardia  CKD stage 3  GERD  Hypertension  Hypothyroid  Idiopathic neuropathy  Lung nodule  OCD  Spinal headache  Stroke  Sudden hearing loss  SVT  Thoracic ascending aortic aneurysm  Ulcerative colitis  Hypovitaminosis D    Past Surgical History:    Back surgery  Bladder surgery  Total knee arthroplasty, bilateral  Cataract IOL  Colonoscopy  Coronary artery bypass  Endovascular repair aneurysm abdominal aorta  Hernia repair x2  Herniorrhaphy inguinal  Knee surgery  Repair hammer toe  Toe amputation right foot  TURP     Family History:    CAD  Lymphoma  Retinal detachment    Social History:  The patient presents alone.   He resides in a senior living home.      Physical Exam     Patient Vitals for the past 24 hrs:   BP Temp Temp src Pulse Resp SpO2 Height Weight   12/17/21 1657 -- -- -- -- -- 99 % -- --   12/17/21 1637 129/61 97.7  F (36.5  C) Oral 51 16 -- 1.854 m  "(6' 1\") 88.5 kg (195 lb)       Physical Exam  General/Appearance: appears stated age, well-groomed, appears comfortable  Eyes: EOMI, no scleral injection, no icterus  ENT: MMM  Neck: supple, nl ROM, no stiffness  Cardiovascular: RRR, nl S1S2, no m/r/g, 2+ pulses in all 4 extremities, cap refill <2sec  Respiratory: CTAB, good air movement throughout, no wheezes/rhonchi/rales, no increased WOB, no retractions  MSK: PISANO, good tone, no bony abnormality  Skin: warm and well-perfused, no rash, no edema, no ecchymosis, nl turgor, 2\" skin tear to R shin   Neuro: GCS 15, alert and oriented, no gross focal neuro deficits  Psych: interacts appropriately  Heme: no petechia, no purpura, no active bleeding      Emergency Department Course     Emergency Department Course:  Reviewed:  I reviewed nursing notes, vitals, past medical history and Care Everywhere    Assessments:  1704 I obtained history and examined the patient as noted above.   1727 I rechecked the patient and applied stereo strips to the wound at this time.   1753 At this point I feel that the patient is safe for discharge, and the patient agrees.     Disposition:  The patient was discharged to home.     Impression & Plan     Medical Decision Making:  This patient is a pleasant 89-year-old male, on Plavix for stroke, who presents after mechanical fall.  His walker got caught, lunging him forwards.  He cut his shin.  At this point in time he has had a compressive bandage on the site and it has completely stopped bleeding.  We cleaned it.  It is a skin avulsion not amenable to repair with sutures.  After cleaning it Steri-Strips were placed to try to help approximate edges.  At this point in time bandage was replaced.  We discussed not taking a shower for the next day and then after that leaving the Steri-Strips on until they naturally start to peel off.  Otherwise he can go about his activity as usual.  He feels comfortable with this plan.    Diagnosis:    ICD-10-CM  "   1. Skin tear of right lower leg without complication, initial encounter  S81.243C        Discharge Medications:  New Prescriptions    No medications on file       Scribe Disclosure:  I, Brittany Baldwin, am serving as a scribe at 5:03 PM on 12/17/2021 to document services personally performed by Comfort Norton MD based on my observations and the provider's statements to me.        Comfort Norton MD  12/17/21 1827

## 2021-12-17 NOTE — ED NOTES
Bed: ED23  Expected date: 12/17/21  Expected time: 3:54 PM  Means of arrival: Ambulance  Comments:  531 89m fall, leg lac, thinners ETA 1554

## 2021-12-18 NOTE — TELEPHONE ENCOUNTER
Patient calling with complaints of:    Reports that he had a fall today where he lives at [facility]  He went by ambulance to [facility] for evaluation.    Patient states that he did not feel any other pain at that time.  Since, he has noticed some pain in  Mid right side back pain that he rates 3/10 and would like to know how much tylenol he could take so he can sleep well.    Patient Denies:  Severe pain  Numbness  Radiating pain    According to the protocol, patient should be able to care for this at home.  Care advice given. Patient verbalizes understanding and agrees with plan of care.     Kira Santacruz RN, Nurse Advisor 9:47 PM 12/17/2021    COVID 19 Nurse Triage Plan/Patient Instructions    Please be aware that novel coronavirus (COVID-19) may be circulating in the community. If you develop symptoms such as fever, cough, or SOB or if you have concerns about the presence of another infection including coronavirus (COVID-19), please contact your health care provider or visit [email or web address].     Disposition/Instructions    Home care recommended. Follow home care protocol based instructions.    Thank you for taking steps to prevent the spread of this virus.  o Limit your contact with others.  o Wear a simple mask to cover your cough.  o Wash your hands well and often.    Resources    [facility]: About COVID-19: [email or web address]    CDC: What to Do If You're Sick: www.cdc.gov/coronavirus/2019-ncov/about/steps-when-sick.html    CDC: Ending Home Isolation: www.cdc.gov/coronavirus/2019-ncov/hcp/disposition-in-home-patients.html     CDC: Caring for Someone: www.cdc.gov/coronavirus/2019-ncov/if-you-are-sick/care-for-someone.html     [facility]: Interim Guidance for Hospital Discharge to Home: www.health.[facility].[place]/diseases/coronavirus/hcp/hospdischarge.pdf    [facility] clinical trials (COVID-19 research studies): [email or web address]      Below are the [employer] hotlines at the [place] Department of Health ([facility]). Interpreters are available.   o For health questions: Call 362-534-4358 or 1-149.107.7848 (7 a.m. to 7 p.m.)  o For questions about schools and childcare: Call 827-475-7706 or 1-786.400.9875 (7 a.m. to 7 p.m.)       Reason for Disposition    Back pain or stiffness from bending or twisting injury    Additional Information    Negative: [1] Major bleeding (e.g., actively dripping or spurting) AND [2] can't be stopped    Negative: Dangerous mechanism of injury (e.g., MVA, contact sports, trampoline, diving, fall > 10 feet or 3 meters)  (Exception: back pain began > 1 hour after injury)    Negative: [1] Weakness (i.e., paralysis, loss of muscle strength) of the leg(s) or foot AND [2] sudden onset after back injury    Negative: [1] Numbness (i.e., loss of sensation) of the leg(s) or foot AND [2] sudden onset after back injury    Negative: Bullet wound, knife wound, or other penetrating object    Negative: Shock suspected (e.g., cold/pale/clammy skin, too weak to stand, low BP, rapid pulse)    Negative: Sounds like a life-threatening emergency to the triager    Negative: [1] Injuries at more than 1 site AND [2] unsure which guideline to use    Negative: Injury to the neck    Negative: Injury to the tailbone    Negative: Back pain not from an injury    Negative: Back pain from overuse (work, exercise, gardening) OR from twisting, lifting, or bending injury    Negative: [1] SEVERE PAIN in kidney area (flank) AND [2] follows direct blow to that site    Negative: Blood in urine (red, pink, or tea-colored)    Negative: [1] Unable to urinate (or only a few drops) > 4 hours AND [2] bladder feels very full (e.g., palpable bladder or strong urge to urinate)    Negative: [1] Loss of bladder or bowel control (urine or bowel incontinence; wetting self, leaking stool) AND [2] new onset    Negative: Numbness (loss of sensation) in groin or rectal area     Negative: Skin is split open or gaping  (or length > 1/2 inch or 12 mm)    Negative: Puncture wound of back    Negative: [1] Bleeding AND [2] won't stop after 10 minutes of direct pressure (using correct technique)    Negative: Sounds like a serious injury to the triager    Negative: [1] SEVERE pain (e.g., excruciating) AND [2] not improved 2 hours after pain medicine/ice packs    Negative: Pain radiates into the thigh or further down the leg now    Negative: [1] Landed hard on feet or buttocks AND [2] pain over spine    Negative: Suspicious history for the injury    Negative: Patient is confused or is an unreliable provider of information (e.g., dementia, severe intellectual disability, alcohol intoxication)    Negative: [1] High-risk adult (e.g., age > 60, osteoporosis, chronic steroid use) AND [2] still hurts    Negative: Large swelling or bruise > 2 inches (5 cm)    Negative: [1] No prior tetanus shots (or is not fully vaccinated) AND [2] any wound (e.g., cut, scrape)    Negative: [1] HIV positive or severe immunodeficiency (severely weak immune system) AND [2] DIRTY cut or scrape    Negative: [1] Last tetanus shot > 5 years ago AND [2] DIRTY cut or scrape    Negative: [1] Last tetanus shot > 10 years ago AND [2] CLEAN cut or scrape (e.g., object AND skin were clean)    Negative: [1] After 3 days (72 hours) AND [2] back pain not improving    Negative: [1] After 1 week (7 days) AND [2] still painful or swollen    Negative: Back swelling, bruise or pain from direct blow to the back    Protocols used: BACK INJURY-A-

## 2021-12-19 ENCOUNTER — NURSE TRIAGE (OUTPATIENT)
Dept: NURSING | Facility: CLINIC | Age: 86
End: 2021-12-19
Payer: COMMERCIAL

## 2021-12-19 NOTE — TELEPHONE ENCOUNTER
S-(situation):  Patient says he took off bandage and it started bleeding. Patient put new gauze pad/bandage on it and it is not bleeding thought the gauze.    Patient says his legs are swollen and asked if it was edema and PCP does feel there's any issue with this      B-(background):   Treated for wound in lower leg on Friday  Seen in the ED on 12/17/21    A-(assessment): bleeding stopped; home care      R-(recommendations): home care  Advised to monitor and call back with concerns or if it starts to bleed through the bandage.  Advised if he starts to have more issues with his edema then discuss it further with PCP.    Reviewed care advice with caller per RN triage protocol guideline.  Advised to call back with worsening symptoms, concerns or questions.   Caller verbalized understanding.          Terry Jackson RN/[facility]    Reason for Disposition    Minor scrape (abrasion)    Additional Information    Negative: [1] Major bleeding (e.g., actively dripping or spurting) AND [2] can't be stopped    Negative: Amputation    Negative: Shock suspected (e.g., cold/pale/clammy skin, too weak to stand, low BP, rapid pulse)    Negative: Sounds like a life-threatening emergency to the triager    Negative: [1] Bleeding AND [2] won't stop after 10 minutes of direct pressure (using correct technique)    Negative: Skin is split open or gaping (or length > 1/2 inch or 12 mm on the skin, 1/4 inch or 6 mm on the face)    Negative: [1] Deep cut AND [2] can see bone or tendons    Negative: [1] Dirt in the wound AND [2] not removed with 15 minutes of scrubbing    Negative: Skin loss involves more than 10% of surface area (Note: the palm of the hand = 1%)    Negative: High pressure injection injury (e.g., from paint gun, usually work-related)    Negative: Wound causes numbness (i.e., loss of sensation)    Negative: Wound causes weakness (i.e., decreased ability to move hand, finger, toe)    Negative: Sounds like a  serious injury to the triager    Negative: [1] SEVERE pain AND [2] not improved 2 hours after pain medicine/ice packs    Negative: [1] Looks infected AND [2] large red area or streak (>2 inches or 5 cm)    Negative: [1] Fever AND [2] bright red area or streak    Negative: Suspicious history for the injury    Negative: [1] Raised bruise AND [2] size > 2 inches (5 cm) AND [3] expanding    Negative: [1] Looks infected (spreading redness, pus) AND [2] no fever    Negative: No prior tetanus shots (or is not fully vaccinated)    Negative: [1] HIV positive or severe immunodeficiency (severely weak immune system) AND [2] DIRTY cut or scrape    Negative: [1] Last tetanus shot > 10 years ago AND [2] CLEAN cut or scrape (e.g., object AND skin were clean)    Negative: [1] Last tetanus shot > 5 years ago AND [2] DIRTY cut or scrape    Negative: [1] After 14 days AND [2] wound isn't healed    Negative: [1] Has diabetes (diabetes mellitus) AND [2] wound on foot    Negative: Minor cut or scratch    Protocols used: SKIN INJURY-A-

## 2021-12-20 ENCOUNTER — PATIENT OUTREACH (OUTPATIENT)
Dept: FAMILY MEDICINE | Facility: CLINIC | Age: 86
End: 2021-12-20
Payer: COMMERCIAL

## 2021-12-20 NOTE — TELEPHONE ENCOUNTER
What type of discharge? Emergency Department  Risk of Hospital admission or ED visit: 58%  Is a TCM episode required? No  When should the patient follow up with PCP? within 30 days of discharge.    Tanya Faustin RN  [facility]

## 2021-12-27 NOTE — TELEPHONE ENCOUNTER
"Hospital/TCU/ED for chronic condition Discharge Protocol    \"Hi, my name is Kaleigh Flores RN, a registered nurse, and I am calling from [facility].  I am calling to follow up and see how things are going for you after your recent emergency visit/hospital/TCU stay.\"    Tell me how you are doing now that you are home?\" Doing OK, does note mild pain at tear to right shin.       Discharge Instructions    \"Let's review your discharge instructions.  What is/are the follow-up recommendations?  Pt. Response: They did not recommend any follow up to him.     \"Has an appointment with your primary care provider been scheduled?\"   No (schedule appointment)    \"When you see the provider, I would recommend that you bring your medications with you.\"    Medications    \"Tell me what changed about your medicines when you discharged?\"    Changes to chronic meds?    0-1    \"What questions do you have about your medications?\"    None     New diagnoses of heart failure, COPD, diabetes, or MI?    No              Post Discharge Medication Reconciliation Status: discharge medications reconciled, continue medications without change.    Was MTM referral placed (*Make sure to put transitions as reason for referral)?   No    Call Summary    \"What questions or concerns do you have about your recent visit and your follow-up care?\"     none    \"If you have questions or things don't continue to improve, we encourage you contact us through the main clinic number (give number).  Even if the clinic is not open, triage nurses are available 24/7 to help you.     We would like you to know that our clinic has extended hours (provide information).  We also have urgent care (provide details on closest location and hours/contact info)\"      \"Thank you for your time and take care!\"      Helped schedule the patient for f/u appointment per his request. He states his understanding that if things change or worsen he will call back.     Kaleigh LÓPEZ " RN  [facility]

## 2021-12-29 ENCOUNTER — NURSE TRIAGE (OUTPATIENT)
Dept: NURSING | Facility: CLINIC | Age: 86
End: 2021-12-29
Payer: COMMERCIAL

## 2021-12-30 ENCOUNTER — OFFICE VISIT (OUTPATIENT)
Dept: URGENT CARE | Facility: URGENT CARE | Age: 86
End: 2021-12-30
Payer: COMMERCIAL

## 2021-12-30 VITALS
OXYGEN SATURATION: 100 % | SYSTOLIC BLOOD PRESSURE: 137 MMHG | HEART RATE: 53 BPM | WEIGHT: 195 LBS | BODY MASS INDEX: 25.73 KG/M2 | RESPIRATION RATE: 16 BRPM | DIASTOLIC BLOOD PRESSURE: 60 MMHG

## 2021-12-30 DIAGNOSIS — L03.115 CELLULITIS OF RIGHT LOWER EXTREMITY: ICD-10-CM

## 2021-12-30 DIAGNOSIS — S81.801A OPEN WOUND OF RIGHT LOWER EXTREMITY, INITIAL ENCOUNTER: ICD-10-CM

## 2021-12-30 DIAGNOSIS — N18.30 STAGE 3 CHRONIC KIDNEY DISEASE, UNSPECIFIED WHETHER STAGE 3A OR 3B CKD (H): Primary | ICD-10-CM

## 2021-12-30 PROCEDURE — 96372 THER/PROPH/DIAG INJ SC/IM: CPT | Performed by: PHYSICIAN ASSISTANT

## 2021-12-30 PROCEDURE — 99214 OFFICE O/P EST MOD 30 MIN: CPT | Mod: 25 | Performed by: PHYSICIAN ASSISTANT

## 2021-12-30 RX ORDER — CEFTRIAXONE SODIUM 1 G
1 VIAL (EA) INJECTION ONCE
Status: COMPLETED | OUTPATIENT
Start: 2021-12-30 | End: 2021-12-30

## 2021-12-30 RX ORDER — CEPHALEXIN 500 MG/1
500 CAPSULE ORAL 3 TIMES DAILY
Qty: 30 CAPSULE | Refills: 0 | Status: SHIPPED | OUTPATIENT
Start: 2021-12-30 | End: 2022-03-18

## 2021-12-30 RX ORDER — CEPHALEXIN 500 MG/1
500 CAPSULE ORAL 3 TIMES DAILY
Qty: 30 CAPSULE | Refills: 0 | Status: SHIPPED | OUTPATIENT
Start: 2021-12-30 | End: 2021-12-30

## 2021-12-30 RX ADMIN — Medication 1 G: at 20:12

## 2021-12-30 NOTE — TELEPHONE ENCOUNTER
Patient calling - says about 2 weeks ago he had a fall and a cut on his leg.  Says he was seen in the ED.  They did not put stitches in because the skin was too thin there.  He thought it was healing okay but lately has been getting twinges of pain.  Tonight it is wet with oozes of blood if he touches a tissue to it.  First noticed this about an hour and a half ago.  Says it has not been red and wet like this until today.    No fever.      Triaged to disposition of See Physician Within 24 Hours.  Care advice given per protocol.  Advised to call back if symptoms worsen.    Sagrario Ponce, MOJGAN  Triage Nurse Advisor    COVID 19 Nurse Triage Plan/Patient Instructions    Please be aware that novel coronavirus (COVID-19) may be circulating in the community. If you develop symptoms such as fever, cough, or SOB or if you have concerns about the presence of another infection including coronavirus (COVID-19), please contact your health care provider or visit [email or web address].     Disposition/Instructions    In-Person Visit with provider recommended. Reference Visit Selection Guide.    Thank you for taking steps to prevent the spread of this virus.  o Limit your contact with others.  o Wear a simple mask to cover your cough.  o Wash your hands well and often.    Resources    [facility]: About COVID-19: [email or web address]    CDC: What to Do If You're Sick: www.cdc.gov/coronavirus/2019-ncov/about/steps-when-sick.html    CDC: Ending Home Isolation: www.cdc.gov/coronavirus/2019-ncov/hcp/disposition-in-home-patients.html     CDC: Caring for Someone: www.cdc.gov/coronavirus/2019-ncov/if-you-are-sick/care-for-someone.html     [facility]: Interim Guidance for Hospital Discharge to Home: www.health.[facility].mn.us/diseases/coronavirus/hcp/hospdischarge.pdf    [facility] clinical trials (COVID-19 research studies): [email or web address]     Below are the COVID-19 hotlines at the  [place] Department of Health ([facility]). Interpreters are available.   o For health questions: Call 769-919-4388 or 1-627.646.1639 (7 a.m. to 7 p.m.)  o For questions about schools and childcare: Call 566-153-6946 or 1-196.499.6982 (7 a.m. to 7 p.m.)       Reason for Disposition    [1] Looks infected (spreading redness, pus) AND [2] no fever    Additional Information    Negative: [1] Major bleeding (e.g., actively dripping or spurting) AND [2] can't be stopped    Negative: Amputation    Negative: Shock suspected (e.g., cold/pale/clammy skin, too weak to stand, low BP, rapid pulse)    Negative: [1] Knife wound (or other possibly deep cut) AND [2] to chest, abdomen, back, neck, or head    Negative: [1] Cutter (self-mutilator) AND [2] suicidal or out-of-control    Negative: Sounds like a life-threatening emergency to the triager    Negative: [1] Animal bite AND [2] broken skin    Negative: [1] Human bite AND [2] broken skin    Negative: [1] Bleeding AND [2] won't stop after 10 minutes of direct pressure (using correct technique)    Negative: Skin is split open or gaping (or length > 1/2 inch or 12 mm on the skin, 1/4 inch or 6 mm on the face)    Negative: [1] Deep cut AND [2] can see bone or tendons    Negative: Sensation of something in the wound (i.e., retained object in wound)    Negative: [1] Dirt in the wound AND [2] not removed with 15 minutes of scrubbing    Negative: Wound causes numbness (i.e., loss of sensation)    Negative: Wound causes weakness (i.e., decreased ability to move hand, finger, toe)    Negative: [1] SEVERE pain AND [2] not improved 2 hours after pain medicine    Negative: [1] Looks infected AND [2] large red area (>2 inches or 5 cm) or streak    Negative: [1] Fever AND [2] bright red area or streak    Negative: Suspicious history for the injury    Protocols used: CUTS AND [id number]

## 2021-12-30 NOTE — PROGRESS NOTES
"  Assessment & Plan     Stage 3 chronic kidney disease, unspecified whether stage 3a or 3b CKD (H)  CrCl cannot be calculated (Patient's most recent lab result is older than the maximum 30 days allowed.).      Open wound of right lower extremity, initial encounter  Daily dressing changes  Rocephin given  Start keflex  - cefTRIAXone (ROCEPHIN) injection 1 g  - cephALEXin (KEFLEX) 500 MG capsule; Take 1 capsule (500 mg) by mouth 3 times daily    Cellulitis of right lower extremity  Warm moist compresses  Wear knee high socks to help with edema, edema in leg if preventing the leg from healing  Follow up with PCP   - cefTRIAXone (ROCEPHIN) injection 1 g  - cephALEXin (KEFLEX) 500 MG capsule; Take 1 capsule (500 mg) by mouth 3 times daily    Review of external notes as documented elsewhere in note  30 minutes spent on the date of the encounter doing chart review, review of outside records, review of test results, interpretation of tests, patient visit and documentation        BMI:   Estimated body mass index is 25.73 kg/m  as calculated from the following:    Height as of 12/17/21: 1.854 m (6' 1\").    Weight as of this encounter: 88.5 kg (195 lb).           No follow-ups on file.    Scott Cannon PA-C  [facility] is a 89 year old who presents for the following health issues     HPI     Non healing leg wound  Leg redness    Review of Systems   Constitutional, HEENT, cardiovascular, pulmonary, gi and gu systems are negative, except as otherwise noted.      Objective    /60   Pulse 53   Resp 16   Wt 88.5 kg (195 lb)   SpO2 100%   BMI 25.73 kg/m    Body mass index is 25.73 kg/m .  Physical Exam   GENERAL: healthy, alert and no distress  MS: Positive for right lower leg infection  SKIN: Positive for slow healing wound, drainage and erythema  NEURO: Normal strength and tone, mentation intact and speech normal            "

## 2021-12-31 ENCOUNTER — NURSE TRIAGE (OUTPATIENT)
Dept: NURSING | Facility: CLINIC | Age: 86
End: 2021-12-31
Payer: COMMERCIAL

## 2021-12-31 NOTE — PROGRESS NOTES
Clinic Administered Medication Documentation    Administrations This Visit     cefTRIAXone (ROCEPHIN) injection 1 g     Admin Date  12/30/2021 Action  Given Dose  1 g Route  Intramuscular Site  Right Ventrogluteal Administered By  Yonny Silva CMA    Ordering Provider: Scott Cannon PA-C    Patient Supplied?: No              Yonny Silva CMA on 12/30/2021 at 8:13 PM

## 2022-01-01 NOTE — TELEPHONE ENCOUNTER
Pt is calling.    Seen yesterday in urgent care due to a fall, and laceration in his leg.  He was told that he could shower after 24 hrs.  He was wondering if he should leave the the dressing on while showering of take it off.  I advised him that he could take the dressing off. Let the soapy water run over the incision. Pat lightly dry. Do not rub. Then apply bacitracin and apply a new dressing.    He verbalized understanding.    Abena Mustafa RN  [facility] Nurse Advisor  12/31/2021 at 7:25 PM      Reason for Disposition    Sutured or stapled surgical incision, questions about    Additional Information    Negative: [1] Major abdominal surgical incision AND [2] wound gaping open AND [3] visible internal organs    Negative: Sounds like a life-threatening emergency to the triager    Negative: [1] Bleeding from incision AND [2] won't stop after 10 minutes of direct pressure    Negative: [1] Widespread rash AND [2] bright red, sunburn-like    Negative: Severe pain in the incision    Negative: [1] Incision gaping open AND [2] < 48 hours since wound re-opened    Negative: [1] Incision gaping open AND [2] length of opening > 2 inches (5 cm)    Negative: Patient sounds very sick or weak to the triager    Negative: Sounds like a serious complication to the triager    Negative: Fever > 100.4 F (38.0 C)    Negative: [1] Incision looks infected (spreading redness, pain) AND [2] fever > 99.5 F (37.5 C)    Negative: [1] Incision looks infected (spreading redness, pain) AND [2] large red area (> 2 in. or 5 cm)    Negative: [1] Incision looks infected (spreading redness, pain) AND [2] face wound    Negative: [1] Red streak runs from the incision AND [2] longer than 1 inch (2.5 cm)    Negative: [1] Pus or bad-smelling fluid draining from incision AND [2] no fever    Negative: [1] Post-op pain AND [2] not controlled with pain medications    Negative: Dressing soaked with blood or body fluid (e.g., drainage)    Negative: [1]  Raised bruise and [2] size > 2 inches (5 cm) and expanding    Negative: [1] Caller has URGENT question AND [2] triager unable to answer question    Negative: [1] INCREASING pain in incision AND [2] > 2 days (48 hours) since surgery    Negative: [1] Small red area or streak AND [2] no fever    Negative: [1] Clear or blood-tinged fluid draining from wound AND [2] no fever    Negative: [1] Incision gaping open AND [2] > 48 hours since wound re-opened AND [3] length of opening > 1/2 inch (12 mm)    Negative: [1] Incision on face gaping open after skin glue AND [2] > 48 hours since wound re-opened AND [3] length of opening > 1/4 inch (6 mm)    Negative: Suture or staple removal is overdue    Negative: [1] Suture or staple came out early AND [2] caller wants wound checked    Negative: [1] Caller has NON-URGENT question AND [2] triager unable to answer question    Negative: Pimple where a stitch comes through the skin    Negative: Suture (or staple) came out early    Negative: Mild bruising near incision site    Negative: Suture removal date, questions about    Negative: Skin tape (e.g., Steri-strips) removal, questions about    Protocols used: POST-OP INCISION SYMPTOMS AND [id number]

## 2022-01-05 ENCOUNTER — TELEPHONE (OUTPATIENT)
Dept: FAMILY MEDICINE | Facility: CLINIC | Age: 87
End: 2022-01-05
Payer: COMMERCIAL

## 2022-01-05 NOTE — TELEPHONE ENCOUNTER
Pt called reporting he is unable to put on compression socks to prevent swelling of his legs. He had a recent skin tear on his right lower leg on 12/17/2021 and was adviced to use compression socks to help heal the wound and prevent swelling. Pt wants to know if he should find someone at his facility to assist.     Triage advised he have facility staff help with compression stockings. Also scheduled future hospital follow up appt. Pt will contact triage back if any problems getting help to use compression socks for further directives.

## 2022-01-07 ENCOUNTER — NURSE TRIAGE (OUTPATIENT)
Dept: NURSING | Facility: CLINIC | Age: 87
End: 2022-01-07
Payer: COMMERCIAL

## 2022-01-07 NOTE — TELEPHONE ENCOUNTER
Calling with wound care questions. Currently putting a large bandaid on a leg wound. His is concerned because he feels like he is digging into the skin, making himself bleed while trying to get the bandaid off.He is n Plavix. He wants to know how to dress the wound differently. Advised using guaze and then wrapping with coban. Discussed wound care and also wearing compression socks for proper wound healing. Discussed when to call back as well.    Teresa Casas RN [facility] January 7, 2022 10:54 AM        COVID 19 Nurse Triage Plan/Patient Instructions    Please be aware that novel coronavirus (COVID-19) may be circulating in the community. If you develop symptoms such as fever, cough, or SOB or if you have concerns about the presence of another infection including coronavirus (COVID-19), please contact your health care provider or visit [email or web address].     Disposition/Instructions    Home care recommended. Follow home care protocol based instructions.    Thank you for taking steps to prevent the spread of this virus.  o Limit your contact with others.  o Wear a simple mask to cover your cough.  o Wash your hands well and often.    Resources    [facility]: About COVID-19: [email or web address]    CDC: What to Do If You're Sick: www.cdc.gov/coronavirus/2019-ncov/about/steps-when-sick.html    CDC: Ending Home Isolation: www.cdc.gov/coronavirus/2019-ncov/hcp/disposition-in-home-patients.html     CDC: Caring for Someone: www.cdc.gov/coronavirus/2019-ncov/if-you-are-sick/care-for-someone.html     [facility]: Interim Guidance for Hospital Discharge to Home: www.health.[facility].mn.us/diseases/coronavirus/hcp/hospdischarge.pdf    [facility] clinical trials (COVID-19 research studies): [email or web address]/umn-[email or web address]-[email or web address]     Below are the COVID-19 hotlines at the [place] Department of Health ([facility]). Interpreters are available.   o For health questions: Call  578.699.4174 or 1-227.211.1356 (7 a.m. to 7 p.m.)  o For questions about schools and childcare: Call 184-779-5693 or 1-431.778.3359 (7 a.m. to 7 p.m.)                        Additional Information    Information only question and nurse able to answer    Protocols used: INFORMATION ONLY CALL - NO TRIAGE-A-OH

## 2022-01-11 ENCOUNTER — TRANSFERRED RECORDS (OUTPATIENT)
Dept: HEALTH INFORMATION MANAGEMENT | Facility: CLINIC | Age: 87
End: 2022-01-11
Payer: COMMERCIAL

## 2022-01-11 DIAGNOSIS — E78.5 HYPERLIPIDEMIA LDL GOAL <100: ICD-10-CM

## 2022-01-11 DIAGNOSIS — E03.9 ACQUIRED HYPOTHYROIDISM: ICD-10-CM

## 2022-01-11 RX ORDER — SIMVASTATIN 20 MG
TABLET ORAL
Qty: 90 TABLET | Refills: 1 | Status: SHIPPED | OUTPATIENT
Start: 2022-01-11 | End: 2022-06-09

## 2022-01-11 RX ORDER — LEVOTHYROXINE SODIUM 100 UG/1
100 TABLET ORAL DAILY
Qty: 90 TABLET | Refills: 1 | Status: SHIPPED | OUTPATIENT
Start: 2022-01-11 | End: 2022-06-09

## 2022-01-11 NOTE — TELEPHONE ENCOUNTER
Pt called for 2 Rxs     [facility] did not have Rxs on file     Per Rxs sent previously Rxs approved     TSH   Date Value Ref Range Status   08/05/2021 1.24 0.40 - 4.00 mU/L Final   07/21/2020 0.46 0.40 - 4.00 mU/L Final     LDL Cholesterol Calculated   Date Value Ref Range Status   01/27/2020 60 <100 mg/dL Final     Comment:     Desirable:       <100 mg/dl       levothyroxine (SYNTHROID/LEVOTHROID) 100 MCG tablet 90 tablet 2 11/5/2021  No   Sig: TAKE 1 TABLET BY MOUTH  DAILY   Sent to pharmacy as: Levothyroxine Sodium 100 MCG Oral Tablet (SYNTHROID/LEVOTHROID)   Class: E-Prescribe   Notes to Pharmacy: Requesting 1 year supply   Order: [id number]   E-Prescribing Status: Receipt confirmed by pharmacy (11/5/2021 10:35 AM CDT)       Printout Tracking    External Result Report     Pharmacy    [facility] [place] [id number]     simvastatin (ZOCOR) 20 MG tablet 90 tablet 3 7/20/2021  No   Sig: TAKE 1 TABLET BY MOUTH AT  BEDTIME   Sent to pharmacy as: Simvastatin 20 MG Oral Tablet (ZOCOR)   Class: E-Prescribe   Notes to Pharmacy: Requesting 1 year supply   Order: [id number]   E-Prescribing Status: Receipt confirmed by pharmacy (7/20/2021  5:10 PM CDT)       Printout Tracking    External Result Report     Medication Administration Instructions    TAKE 1 TABLET BY MOUTH AT  BEDTIME     Pharmacy    [facility] - [place] [id number]

## 2022-01-13 ENCOUNTER — NURSE TRIAGE (OUTPATIENT)
Dept: NURSING | Facility: CLINIC | Age: 87
End: 2022-01-13
Payer: COMMERCIAL

## 2022-01-13 DIAGNOSIS — J06.9 UPPER RESPIRATORY TRACT INFECTION, UNSPECIFIED TYPE: Primary | ICD-10-CM

## 2022-01-14 ENCOUNTER — TELEPHONE (OUTPATIENT)
Dept: FAMILY MEDICINE | Facility: CLINIC | Age: 87
End: 2022-01-14

## 2022-01-14 ENCOUNTER — LAB (OUTPATIENT)
Dept: URGENT CARE | Facility: URGENT CARE | Age: 87
End: 2022-01-14
Attending: INTERNAL MEDICINE
Payer: COMMERCIAL

## 2022-01-14 DIAGNOSIS — J06.9 UPPER RESPIRATORY TRACT INFECTION, UNSPECIFIED TYPE: ICD-10-CM

## 2022-01-14 LAB — SARS-COV-2 RNA RESP QL NAA+PROBE: NEGATIVE

## 2022-01-14 PROCEDURE — U0003 INFECTIOUS AGENT DETECTION BY NUCLEIC ACID (DNA OR RNA); SEVERE ACUTE RESPIRATORY SYNDROME CORONAVIRUS 2 (SARS-COV-2) (CORONAVIRUS DISEASE [COVID-19]), AMPLIFIED PROBE TECHNIQUE, MAKING USE OF HIGH THROUGHPUT TECHNOLOGIES AS DESCRIBED BY CMS-2020-01-R: HCPCS

## 2022-01-14 PROCEDURE — U0005 INFEC AGEN DETEC AMPLI PROBE: HCPCS

## 2022-01-14 NOTE — TELEPHONE ENCOUNTER
Pharmacy seeking refill of:    Simbrinza opth susp   (Simbrinza (brinzolamide / brimonidine) is a combination medication that works well to lower pressure in the eye and treat glaucoma)    Not active in chart  Not listed in dispensing report    Unclear where medication may be from     Needs call to patient - who wrote Rx?     Padmaja Villarreal,  (R)

## 2022-01-14 NOTE — TELEPHONE ENCOUNTER
"Pt reports cough for 3-4 days, getting a little worse, nasal congestion and sneezing started at same time. Pt denies fever. Pt has had three doses of Moderna. Pt reports it is not possible he was exposed to Covid. \"Breathing fine\". Small amount of phlegm, cream color.     See Care Advice and call back protocol reviewed with pt below.     Pt verbalizes understanding and agrees to plan. Would like PCP to advise if he needs Covid testing.     Reason for Disposition    Cough is main symptom    Cough with cold symptoms (e.g., runny nose, postnasal drip, throat clearing)    Additional Information    Negative: Severe difficulty breathing (e.g., struggling for each breath, speaks in single words)    Negative: Bluish (or gray) lips or face now    Negative: [1] Difficulty breathing AND [2] exposure to flames, smoke, or fumes    Negative: [1] Stridor AND [2] difficulty breathing    Negative: Sounds like a life-threatening emergency to the triager    Negative: [1] Previous asthma attacks AND [2] this feels like asthma attack    Negative: Dry (non-productive) cough (i.e., no sputum or minimal clear sputum)    Negative: Chest pain  (Exception: MILD central chest pain, present only when coughing)    Negative: Difficulty breathing    Negative: Patient sounds very sick or weak to the triager    Negative: [1] Coughed up blood AND [2] > 1 tablespoon (15 ml) (Exception: blood-tinged sputum)    Negative: Fever > 103 F (39.4 C)    Negative: [1] Fever > 101 F (38.3 C) AND [2] age > 60    Negative: [1] Fever > 100.0 F (37.8 C) AND [2] bedridden (e.g., nursing home patient, CVA, chronic illness, recovering from surgery)    Negative: [1] Fever > 100.0 F (37.8 C) AND [2] diabetes mellitus or weak immune system (e.g., HIV positive, cancer chemo, splenectomy, organ transplant, chronic steroids)    Negative: Wheezing is present    Negative: SEVERE coughing spells (e.g., whooping sound after coughing, vomiting after coughing)    Negative: [1] " Continuous (nonstop) coughing interferes with work or school AND [2] no improvement using cough treatment per Care Advice    Negative: Coughing up jefferson-colored (reddish-brown) sputum    Negative: Fever present > 3 days (72 hours)    Negative: [1] Fever returns after gone for over 24 hours AND [2] symptoms worse or not improved    Negative: [1] Using nasal washes and pain medicine > 24 hours AND [2] sinus pain (around cheekbone or eye) persists    Negative: Earache    Negative: [1] Known COPD or other severe lung disease (i.e., bronchiectasis, cystic fibrosis, lung surgery) AND [2] worsening symptoms (i.e., increased sputum purulence or amount, increased breathing difficulty    Negative: Cough has been present for > 3 weeks    Negative: [1] Nasal discharge AND [2] present > 10 days    Negative: [1] Coughed up blood-tinged sputum AND [2] more than once    Negative: Exposure to TB (Tuberculosis)    Protocols used: COMMON COLD-A-AH, COUGH - ACUTE [id number]

## 2022-01-14 NOTE — TELEPHONE ENCOUNTER
Pt was informed PCP placed  PCR COVID-19 order and transferred him to scheduling. Informed him he would need to reschedule or change 01/21 appt type to virtual  If results is negative. Pt verbalized understanding.    He reported improvement of symptoms today. UC information also given if any symptom changers over the weekend.

## 2022-01-18 DIAGNOSIS — I63.9 CEREBROVASCULAR ACCIDENT (CVA), UNSPECIFIED MECHANISM (H): ICD-10-CM

## 2022-01-18 NOTE — TELEPHONE ENCOUNTER
Called patient     He has no idea what that eye drop medication is     He already told the pharmacy to disregard     Tanya MA, Triage RN  [facility]

## 2022-01-19 RX ORDER — CLOPIDOGREL BISULFATE 75 MG/1
75 TABLET ORAL DAILY
Qty: 90 TABLET | Refills: 3 | Status: SHIPPED | OUTPATIENT
Start: 2022-01-19 | End: 2022-09-19

## 2022-01-19 NOTE — TELEPHONE ENCOUNTER
Routing refill request to provider for review/approval because:  Labs out of range:    Hemoglobin   Date Value Ref Range Status   08/05/2021 12.9 (L) 13.3 - 17.7 g/dL Final   04/08/2021 11.8 (L) 13.3 - 17.7 g/dL Final   ]      Priscilla Marroquin RN  [facility] Triage

## 2022-01-21 ENCOUNTER — OFFICE VISIT (OUTPATIENT)
Dept: FAMILY MEDICINE | Facility: CLINIC | Age: 87
End: 2022-01-21
Payer: COMMERCIAL

## 2022-01-21 VITALS
TEMPERATURE: 97.3 F | HEIGHT: 73 IN | BODY MASS INDEX: 25.84 KG/M2 | DIASTOLIC BLOOD PRESSURE: 53 MMHG | SYSTOLIC BLOOD PRESSURE: 137 MMHG | RESPIRATION RATE: 16 BRPM | HEART RATE: 54 BPM | OXYGEN SATURATION: 94 % | WEIGHT: 195 LBS

## 2022-01-21 DIAGNOSIS — S81.811D LACERATION OF RIGHT LOWER EXTREMITY, SUBSEQUENT ENCOUNTER: Primary | ICD-10-CM

## 2022-01-21 DIAGNOSIS — L97.911: ICD-10-CM

## 2022-01-21 PROCEDURE — 99213 OFFICE O/P EST LOW 20 MIN: CPT | Performed by: INTERNAL MEDICINE

## 2022-01-21 ASSESSMENT — PAIN SCALES - GENERAL: PAINLEVEL: MILD PAIN (2)

## 2022-01-21 ASSESSMENT — MIFFLIN-ST. JEOR: SCORE: 1598.39

## 2022-01-21 NOTE — PROGRESS NOTES
This is a patient who presents for his December 17 emergency room follow-up.  At that time it was noted that he had a fall with his walker going forward after slipping.  He developed a skin tear to his right shin.  There was no head injury or loss of consciousness. At that point in time he was found to have a skin tear of his right lower extremity treated medically.  In follow-up to this he was seen at urgent care on December 30 and was felt to have cellulitis of his right lower extremity and was given an injection of ceftriaxone and started on Keflex 500 mg 3 times daily.  He is now here for follow-up.    The patient does not feel like the wound is really improving.  Its not really painful.  Its not draining a lot.  He is not ill and not having fevers.  He otherwise is felt fine.  No history of peripheral vascular disease.    Past Medical History:   Diagnosis Date     A-fib (H) 2010    post op     AAA (abdominal aortic aneurysm) without rupture (H)     Dr. 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    carotid us no stenosis, echo no change and no clots; ziopatch no afib, svt present     Anemia 2004     Aortic insufficiency 06/2014    1+ on echo     Aortic insufficiency 11/2016    mild to mod     ASCVD (arteriosclerotic cardiovascular disease) 2010    cabg, post op afib     BPH (benign prostatic hyperplasia) 2003    turp, flomax added by urol 2/17     Bradycardia 2016    stopped toprol     CKD (chronic kidney disease) stage 3, GFR 30-59 ml/min (H)      Constipation 05/2020    colonoscopy nl     Cough 2010    pulm eval, felt due to reflux     Dizzy 2001    mri small vessel dz     GERD (gastroesophageal reflux disease)      HTN (hypertension) 2002     Hx of colonoscopy 2003    tics     Hypothyroid      Hypovitaminosis D      Idiopathic neuropathy      Lung nodule 02/2018    6mm, found during eval of ascending aorta, fu 8/18 no change     OCD (obsessive compulsive disorder)     sees  psyche     Panic disorder     Dr. Luna     Spinal headache      Stroke (H) 12/2016    expressive aphasia, hosp, seen by neuro, mri pos, carotids min dz, changed from asa to plavix     Sudden hearing loss 06/2013    Dr. Garcia, mri brain no acoustic neuroma     SVT (supraventricular tachycardia) (H) 11/2016    seen on ziopatch done for amaurosis, longest 15 beats     Thoracic ascending aortic aneurysm (H) 06/2014    4.4cm on echo, aortic root 3.9, fu 5/15 4.8cm; fu done 12/15 and slightly enlarged, fu 6/16 no change, fu 11/16 no change; fu 1/18 echo 5.1-5.3, enlarged, then cta done and only 4.8cm, t fu 6 months, lvh, nl ef, mild ai; fu 8/18 slightly larger; fu 2/19 slightly smaller; fu 2/20 and then 1/21 and stable     Ulcerative colitis (H)     not active for years     Past Surgical History:   Procedure Laterality Date     BACK SURGERY  1998     BLADDER SURGERY  1985     CATARACT IOL, RT/LT  2011     COLONOSCOPY N/A 05/05/2020    Procedure: COLONOSCOPY;  Surgeon: Kenya Loco MD;  Location:  GI     CORONARY ARTERY BYPASS  2010     ENDOVASCULAR REPAIR ANEURYSM ABDOMINAL AORTA N/A 05/27/2015    Procedure: ENDOVASCULAR REPAIR ANEURYSM ABDOMINAL AORTA;  Surgeon: Darin Walters MD;  Location:  OR     HERNIA REPAIR  2005     HERNIA REPAIR  1998     HERNIORRHAPHY INGUINAL Left 01/05/2018    Procedure: HERNIORRHAPHY INGUINAL;  Incarcerated Left Inguinal Hernia;  Surgeon: Harsh Walker MD;  Location:  OR     KNEE SURGERY  1997     REPAIR HAMMER TOE  12/28/2012    Procedure: REPAIR HAMMER TOE;  LEFT SECOND AND THIRD CLAW TOE RECONSTRUCTION;  Surgeon: Gray Haynes MD;  Location:  OR     REPAIR HAMMER TOE  04/2018    tria     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     TURP  2003     ZZC TOTAL KNEE ARTHROPLASTY  2011    left     ZZC TOTAL KNEE ARTHROPLASTY  2009    right     Social History     Socioeconomic History     Marital status:      Spouse name: Not on file     Number of  children: 2     Years of education: Not on file     Highest education level: Not on file   Occupational History     Occupation: retail     Employer: RETIRED   Tobacco Use     Smoking status: Former Smoker     Packs/day: 1.00     Years: 5.00     Pack years: 5.00     Types: Cigarettes     Quit date: 1965     Years since quittin.6     Smokeless tobacco: Never Used   Substance and Sexual Activity     Alcohol use: Yes     Alcohol/week: 0.0 standard drinks     Comment: maybe one glass of wine a week     Drug use: No     Sexual activity: Not Currently     Partners: Female   Other Topics Concern     Parent/sibling w/ CABG, MI or angioplasty before 65F 55M? Not Asked   Social History Narrative     Not on file     Social Determinants of Health     Financial Resource Strain: Not on file   Food Insecurity: Not on file   Transportation Needs: Not on file   Physical Activity: Not on file   Stress: Not on file   Social Connections: Not on file   Intimate Partner Violence: Not on file   Housing Stability: Not on file     Current Outpatient Medications   Medication Sig Dispense Refill     acetaminophen (TYLENOL) 500 MG tablet Take 1,000 mg by mouth every 6 hours as needed for mild pain PAIN       ARIPiprazole (ABILIFY) 2 MG tablet Take 1 tablet by mouth At Bedtime.       cephALEXin (KEFLEX) 500 MG capsule Take 1 capsule (500 mg) by mouth 3 times daily 30 capsule 0     chlorthalidone (HYGROTON) 25 MG tablet TAKE 1 TABLET BY MOUTH  DAILY 90 tablet 3     ClomiPRAMINE HCl (ANAFRANIL PO) Take 25 mg by mouth every evening        clopidogrel (PLAVIX) 75 MG tablet Take 1 tablet (75 mg) by mouth daily 90 tablet 3     erythromycin (ROMYCIN) 5 MG/GM ophthalmic ointment Place 0.5 inches into the right eye 3 times daily 1 g 0     gabapentin (NEURONTIN) 400 MG capsule Take 1 capsule by mouth 2 times daily        lactulose (CEPHULAC) 10 GM packet Take 2 packets (20 g) by mouth 2 times daily (Patient taking differently: Take 20 g by mouth  "daily )       levothyroxine (SYNTHROID/LEVOTHROID) 100 MCG tablet Take 1 tablet (100 mcg) by mouth daily 90 tablet 1     LORAZEPAM PO Take 0.5 mg by mouth daily as needed for anxiety       MIRTAZAPINE PO Take 45 mg by mouth At Bedtime        oxyCODONE (ROXICODONE) 5 MG tablet Take 5 mg by mouth every 4 hours as needed for severe pain       simvastatin (ZOCOR) 20 MG tablet TAKE 1 TABLET BY MOUTH AT  BEDTIME 90 tablet 1     No Known Allergies  FAMILY HISTORY NOTED AND REVIEWED    REVIEW OF SYSTEMS: above    PHYSICAL EXAM    /53 (BP Location: Left arm, Patient Position: Chair, Cuff Size: Adult Large)   Pulse 54   Temp 97.3  F (36.3  C) (Tympanic)   Resp 16   Ht 1.854 m (6' 1\")   Wt 88.5 kg (195 lb)   SpO2 94%   BMI 25.73 kg/m      Patient appears non toxic  Trace to 1+ bilat lower leg edema, right more than left.  The legs themselves are not red or tender.  On the right shin approximately midway between the knee and the ankle there is a superficial laceration/ulceration.  There is good granulation tissue.  There is no pus.  There is normal skin surrounding it.    ASSESSMENT:  Leg laceration with nonhealing ulcer of the right lower leg limited to skin breakdown.  It does not appear infected and does not appear deep.  At this point in time I believe local measures are adequate    PLAN:  The patient is to clean the ulceration with soap and warm water gently once a day.  He should then cover it with Vaseline and a 4 x 4.  If it worsens he will let me know.  I would like to see him in follow-up in 10 to 14 days and I will also like him to be seen at the wound care clinic..    Zurdo Kendrick M.D.          "

## 2022-01-21 NOTE — PATIENT INSTRUCTIONS
I want you to gently wash the open area once daily with warm soapy water, then put vasoline on the wound and cover it with a 4x4 non stick pad.    Please follow up with me in 10 to 14 days to look at it again but call if problems    Zurdo Kendrick M.D.

## 2022-01-24 ENCOUNTER — TELEPHONE (OUTPATIENT)
Dept: WOUND CARE | Facility: CLINIC | Age: 87
End: 2022-01-24
Payer: COMMERCIAL

## 2022-01-24 NOTE — TELEPHONE ENCOUNTER
Consult received via workqueue from provider leg laceration for ulcer of the [place], Zurdo Carter MD  (leg/ankle or foot).   Per Standing order Patient qualifies for THAO to be scheduled prior to assessment with Je Mascorro at [facility]

## 2022-01-24 NOTE — TELEPHONE ENCOUNTER
[facility] Wound    Who is the name of the provider?:  New      What is the location you see this provider at?: [place]    Reason for call:  Referred PCP for wound on right shin.  States wound is healing, looking better today.    Can we leave a detailed message on this number?  YES

## 2022-01-26 ENCOUNTER — TELEPHONE (OUTPATIENT)
Dept: FAMILY MEDICINE | Facility: CLINIC | Age: 87
End: 2022-01-26
Payer: COMMERCIAL

## 2022-01-26 NOTE — TELEPHONE ENCOUNTER
Called patient with advise from Dr Kendrick.   Cancelled 2/8 appointment with Dr Kendrick, however will keep scheduled appointment with wound clinic 2/11/22 for now.   Patient will cancel closer to that date only if wound is 100% resolved.     Halle BARRIOS, RN      January 26, 2022  4:17 PM

## 2022-01-26 NOTE — TELEPHONE ENCOUNTER
"Pt contacted me to schedule appt for what sounds like a \"healing\" wound.   Schedule to see Taisha on 2/11 no order for THAO, told pt we would wait until he sees Taisha to determine if THAO is needed.       Pt agreed.    "

## 2022-01-26 NOTE — TELEPHONE ENCOUNTER
Patient requesting clarification for appointment recommendations. Patient is scheduled for follow-up with PCP on 2/8 and Wound Clinic 2/11 (soonest available). Does patient need to change follow-up date with PCP to sometime after Wound Clinic visit?    Update on wound - Discharge on bandage this AM is brownish/white and approximate size of nickel. No redness around wound edges. Patient feels wound is healing well.

## 2022-02-11 ENCOUNTER — HOSPITAL ENCOUNTER (OUTPATIENT)
Dept: WOUND CARE | Facility: CLINIC | Age: 87
Discharge: HOME | End: 2022-02-11
Attending: PHYSICIAN ASSISTANT | Admitting: PHYSICIAN ASSISTANT
Payer: COMMERCIAL

## 2022-02-11 VITALS
DIASTOLIC BLOOD PRESSURE: 67 MMHG | HEIGHT: 73 IN | SYSTOLIC BLOOD PRESSURE: 144 MMHG | BODY MASS INDEX: 25.46 KG/M2 | TEMPERATURE: 97.3 F | HEART RATE: 58 BPM | WEIGHT: 192.1 LBS

## 2022-02-11 DIAGNOSIS — S81.811D LACERATION OF RIGHT LOWER EXTREMITY, SUBSEQUENT ENCOUNTER: ICD-10-CM

## 2022-02-11 PROCEDURE — G0463 HOSPITAL OUTPT CLINIC VISIT: HCPCS | Mod: 25

## 2022-02-11 PROCEDURE — 11042 DBRDMT SUBQ TIS 1ST 20SQCM/<: CPT | Performed by: PHYSICIAN ASSISTANT

## 2022-02-11 PROCEDURE — 11042 DBRDMT SUBQ TIS 1ST 20SQCM/<: CPT

## 2022-02-11 PROCEDURE — 99204 OFFICE O/P NEW MOD 45 MIN: CPT | Mod: 25 | Performed by: PHYSICIAN ASSISTANT

## 2022-02-11 RX ORDER — LACTULOSE 10 G/15ML
SOLUTION ORAL; RECTAL
COMMUNITY
Start: 2021-12-10 | End: 2023-01-13

## 2022-02-11 RX ORDER — MIRTAZAPINE 45 MG/1
TABLET, FILM COATED ORAL
COMMUNITY
Start: 2021-12-28 | End: 2022-03-18

## 2022-02-11 ASSESSMENT — MIFFLIN-ST. JEOR: SCORE: 1585.24

## 2022-02-11 NOTE — DISCHARGE INSTRUCTIONS
Zack Santiago      5/29/1931    Wound Dressing Change:Right Leg  Cleanse wound and surrounding skin with: soap and water in shower  Cover wound with plurogel and band-aid  Change dressing daily.    Compression:   You have a compression wrap Spandigrip E is supposed to be removed at night and put back on first thing in the morning.   Please remove compression dressing if toes turn blue and/or tingle and can not be relieved by raising the leg for one hour.            Yu Rodríguez PA-C. February 11, 2022    Call us at [phone] if you have any questions about your wounds, have redness or swelling around your wound, have a fever of 101 or greater or if you have any other problems or concerns. We answer the phone Monday through Friday 8 am to 4 pm, please leave a message as we check the voicemail frequently throughout the day.     If you had a positive experience please indicate on your patient satisfaction survey form that [employer] will be sending you.    If you have any billing related questions please call the  [facility] office at [phone]. The clinic staff does not handle billing related matters.

## 2022-02-11 NOTE — PROGRESS NOTES
Patient arrived for wound care visit. Certified Wound Care Nurse time spent evaluating patient record, completed a full evaluation and documented wound(s) & francesca-wound skin; provided recommendation based on treatment plan. Applied dressing, reviewed discharge instructions, patient education, and discussed plan of care with appropriate medical team staff members and patient and/or family members.

## 2022-02-11 NOTE — ADDENDUM NOTE
Encounter addended by: Jayashree Hall RN on: 2/11/2022 3:56 PM   Actions taken: Edited Discharge Instructions

## 2022-02-11 NOTE — PROGRESS NOTES
"[facility]    ASSESSMENT:   1. (S90.345N) Laceration of right lower extremity, subsequent encounter    PLAN/DISCUSSION:   1. Likely previous abscess or hematoma, now with residual pocket  2. Unable to dress this or don compression socks independently, will arrange for homecare  3. Wound care plan: clean with soap and water, plurogel   4. Nutrition: vit D 5kIU/day, high protein diet  5. See bottom of note for detailed wound care and patient instructions    HISTORY OF PRESENT ILLNESS:   Zack Santiago is a 89 year old male with HLD, GERD, AAA, hypothyroidism, CKD3, CVA, and anticoagulation with Eliquis who presents today for face to face visit for a RLE non-healing wound. He struck the area back in December. Subsequently developed cellulitis. Has been dressing the area with vaseline and cover dressing. Significant swelling that has worsened since this injury. Unable to don compression socks himself. Lives in independent senior living facility. Not diabetic, no hx of DVT, no known PAD.     TREATMENT COURSE:  02/11/22: Presents for initial visit at our clinic.     VITALS: BP (!) 144/67 (BP Location: Left arm)   Pulse 58   Temp 97.3  F (36.3  C)   Ht 1.854 m (6' 1\")   Wt 87.1 kg (192 lb 1.6 oz)   BMI 25.34 kg/m       PHYSICAL EXAM:  GENERAL: Patient is alert and oriented and in no acute distress  CV: palpable DP pulse, difficult to palpate PT due to edema, feet warm with normal cap refill  INTEGUMENTARY:   Wound (used by OP [facility] only) 02/11/22 0905 Right (Active)   Thickness/Stage full thickness 02/11/22 0900   Present on Admission, Wound yes 02/11/22 0900   Dressing Appearance moist drainage 02/11/22 0900   Base granulating 02/11/22 0900   Periwound intact 02/11/22 0900   Periwound Temperature warm 02/11/22 0900   Periwound Skin Turgor soft 02/11/22 0900   Edges open 02/11/22 0900   Length (cm) 0.6 02/11/22 0900   Width (cm) 1.2 02/11/22 0900   Depth (cm) 0.4 02/11/22 0900   Wound (cm^2) 0.72 " cm^2 02/11/22 0900   Wound Volume (cm^3) 0.29 cm^3 02/11/22 0900   Undermining [Depth (cm)/Location] 6-8/1.0 cm 02/11/22 0900   Drainage Characteristics/Odor serosanguineous 02/11/22 0900   Drainage Amount moderate 02/11/22 0900   Care, Wound debrided 02/11/22 0900             PROCEDURE: 4% topical lidocaine was applied to the wound by nursing staff. Patient was determined to be capable of making their own medical decisions and informed consent was obtained. Using a curete a surgical debridement was performed down to and including subcutaneous tissue of <20 cm2. Hemostasis was achieved with pressure. The patient tolerated the procedure well.      MDM: 45-59 minutes were spent on the date of the visit reviewing previous chart notes, evaluating patient and developing the treatment plan, this excludes any time spent on procedures.     PATIENT INSTRUCTIONS      Further instructions from your care team       Zack Santiago      5/29/1931    Wound Dressing Change:Right Leg  Cleanse wound and surrounding skin with: soap and water in shower  Cover wound with plurogel and band-aid  Change dressing daily.  Compression:   You have a compression wrap Spandigrip E is supposed to be removed at night and put back on first thing in the morning.   Please remove compression dressing if toes turn blue and/or tingle and can not be relieved by raising the leg for one hour.            Yu Rodríguez PA-C. February 11, 2022    Call us at [phone] if you have any questions about your wounds, have redness or swelling around your wound, have a fever of 101 or greater or if you have any other problems or concerns. We answer the phone Monday through Friday 8 am to 4 pm, please leave a message as we check the voicemail frequently throughout the day.     If you had a positive experience please indicate on your patient satisfaction survey form that [employer] will be sending you.    If you have any billing related questions please  call the [facility] office at [phone]. The clinic staff does not handle billing related matters.            Electronically signed by Yu Rodríguez PA-C on February 11, 2022

## 2022-02-14 ENCOUNTER — TELEPHONE (OUTPATIENT)
Dept: WOUND CARE | Facility: CLINIC | Age: 87
End: 2022-02-14
Payer: COMMERCIAL

## 2022-02-14 NOTE — TELEPHONE ENCOUNTER
[facility] Wound    Who is the name of the provider?:  Anne      What is the location you see this provider at?: [place]    Reason for call:  Needs verbal orders for skilled nursing eval and treat, with start of care date Tuesday, 2/15.    Can we leave a detailed message on this number?  YES

## 2022-02-14 NOTE — TELEPHONE ENCOUNTER
[facility] was not consulted. The referral was sent to [facility] and awaiting a decision from them.

## 2022-02-14 NOTE — TELEPHONE ENCOUNTER
Irene from [facility] home care called to say they are declining services because of capacity.   So they cannot see him.   Any questions you can call Irene at  press 1 for intake.

## 2022-02-15 NOTE — TELEPHONE ENCOUNTER
Left message with Kerline at [facility]--Verbal order for home care provided to Kerline at [facility].  Also spoke verbally with director of [facility] and informed.

## 2022-02-16 ENCOUNTER — TELEPHONE (OUTPATIENT)
Dept: WOUND CARE | Facility: CLINIC | Age: 87
End: 2022-02-16
Payer: COMMERCIAL

## 2022-02-16 ENCOUNTER — TELEPHONE (OUTPATIENT)
Dept: FAMILY MEDICINE | Facility: CLINIC | Age: 87
End: 2022-02-16
Payer: COMMERCIAL

## 2022-02-16 NOTE — TELEPHONE ENCOUNTER
Wound Dressing Change:Right Leg  Cleanse wound and surrounding skin with: soap and water in shower  Cover wound with plurogel and band-aid  Change dressing MWF  Compression:  You have a compression wrap Spandigrip E is supposed to be removed at night and put back on first thing in the morning.  Please remove compression dressing if toes turn blue and/or tingle and can not be  relieved by raising the leg for one hour.

## 2022-02-16 NOTE — TELEPHONE ENCOUNTER
Luciano the home care nurse from [facility] called and needs orders for Zack.   Please call Luciano back at .

## 2022-02-16 NOTE — TELEPHONE ENCOUNTER
Home care called for V.O. on skilled nurse     3x/week for 4 weeks     1x/week for 4 weeks     Wound management     Tanya MA, Triage RN  [facility]

## 2022-02-21 ENCOUNTER — TELEPHONE (OUTPATIENT)
Dept: WOUND CARE | Facility: CLINIC | Age: 87
End: 2022-02-21
Payer: COMMERCIAL

## 2022-02-21 NOTE — TELEPHONE ENCOUNTER
Luciano the home care nurse from [facility] calling for verbal orders for Zack.   Please call him back at .

## 2022-02-21 NOTE — TELEPHONE ENCOUNTER
[facility] Wound    Who is the name of the provider?:  Anne      What is the location you see this provider at?: [place]    Reason for call:  Has questions about his wound care.     Can we leave a detailed message on this number?  YES

## 2022-02-21 NOTE — TELEPHONE ENCOUNTER
Call returned to patient. He reports the wound is closed so he wants to stop home care. He will continue spandigrip due to the continued swelling and he is able to apply himself. He would like to keep the 3/8 appointment to make sure wound stays closed and to discuss swelling. No further questions or concerns.

## 2022-02-28 ENCOUNTER — MEDICAL CORRESPONDENCE (OUTPATIENT)
Dept: HEALTH INFORMATION MANAGEMENT | Facility: CLINIC | Age: 87
End: 2022-02-28
Payer: COMMERCIAL

## 2022-02-28 DIAGNOSIS — Z53.9 DIAGNOSIS NOT YET DEFINED: Primary | ICD-10-CM

## 2022-02-28 PROCEDURE — G0180 MD CERTIFICATION HHA PATIENT: HCPCS | Performed by: SURGERY

## 2022-03-09 ENCOUNTER — NURSE TRIAGE (OUTPATIENT)
Dept: FAMILY MEDICINE | Facility: CLINIC | Age: 87
End: 2022-03-09
Payer: COMMERCIAL

## 2022-03-09 NOTE — TELEPHONE ENCOUNTER
Nurse Triage SBAR    Is this a 2nd Level Triage? NO    Situation : Pt experiencing persisting swelling in his legs. He states he has had swelling in both his legs for a while and has seen Dr. Kendrick for this. However, he fell last December and developed swelling in his left hip and right foot that has persisted since then. He cannot say if this has gotten worse or not just that it hasn't gone away.     Background : Last seen by Dr. Kendrick 1/21/22 and pt has been seen in wound clinic since then for the laceration on shin that is now healed.     Assessment : see below     (See information below for more triage details.)    Recommendation : Routing to provider for recommendation on protocol states to be seen in 3 days. Pt unable to do virtual, is this OK for same day slot this week? Please advise.     Protocol Recommended Disposition: Routine office follow-up appointment     Ok to leave detailed vm.     Reason for Disposition    MILD swelling of both ankles (i.e., pedal edema) AND new onset or worsening    Additional Information    Negative: Sounds like a life-threatening emergency to the triager    Negative: Chest pain    Negative: Small area of swelling and followed an insect bite to the area    Negative: Followed a knee injury    Negative: Ankle or foot injury    Negative: Pregnant with leg swelling or edema    Negative: Difficulty breathing at rest    Negative: Entire foot is cool or blue in comparison to other side    Negative: SEVERE swelling (e.g., swelling extends above knee, entire leg is swollen, weeping fluid)    Negative: Thigh or calf pain and only 1 side and present > 1 hour    Negative: Thigh, calf, or ankle swelling in only one leg    Negative: Thigh, calf, or ankle swelling in both legs, but one side is definitely more swollen (Exception: longstanding difference between legs)    Negative: Cast on leg or ankle and has increasing pain    Negative: Can't walk or can barely stand (new onset)    Negative:  "Fever and red area (or area very tender to touch)    Negative: Patient sounds very sick or weak to the triager    Negative: Swelling of face, arm or hands (Exception: slight puffiness of fingers during hot weather)    Negative: Pregnant > 20 weeks and sudden weight gain (i.e., > 2 lbs, 1 kg in one week)    Negative: MODERATE swelling of both ankles (e.g., swelling extends up to the knees) AND new onset or worsening    Negative: Difficulty breathing with exertion AND worsening or new onset    Negative: Looks like a boil, infected sore, deep ulcer, or other infected rash (spreading redness, pus)    Negative: Patient wants to be seen    Answer Assessment - Initial Assessment Questions  1. ONSET: \"When did the swelling start?\" (e.g., minutes, hours, days)        Pt fell in December and injured his right shin, this wound has healed however, he still have swelling present on his left hip and right foot. Swelling has been present since injury.     2. LOCATION: \"What part of the leg is swollen?\"  \"Are both legs swollen or just one leg?\"        Left hip and right foot.     3. SEVERITY: \"How bad is the swelling?\" (e.g., localized; mild, moderate, severe)   - Localized - small area of swelling localized to one leg   - MILD pedal edema - swelling limited to foot and ankle, pitting edema < 1/4 inch (6 mm) deep, rest and elevation eliminate most or all swelling   - MODERATE edema - swelling of lower leg to knee, pitting edema > 1/4 inch (6 mm) deep, rest and elevation only partially reduce swelling   - SEVERE edema - swelling extends above knee, facial or hand swelling present         Mild-Moderate. Pt states it does pit but only present in the hip and foot, not the whole leg.     4. REDNESS: \"Does the swelling look red or infected?\"        No.     5. PAIN: \"Is the swelling painful to touch?\" If so, ask: \"How painful is it?\"   (Scale 1-10; mild, moderate or severe)        No pain.     6. FEVER: \"Do you have a fever?\" If so, ask: " "\"What is it, how was it measured, and when did it start?\"         No.     7. CAUSE: \"What do you think is causing the leg swelling?\"        This has been going on since the fall in December.     8. MEDICAL HISTORY: \"Do you have a history of heart failure, kidney disease, liver failure, or cancer?\"        No.     9. RECURRENT SYMPTOM: \"Have you had leg swelling before?\" If so, ask: \"When was the last time?\" \"What happened that time?\"        Pt has had previous falls but never experienced this swelling.     10. OTHER SYMPTOMS: \"Do you have any other symptoms?\" (e.g., chest pain, difficulty breathing)          No.     11. PREGNANCY: \"Is there any chance you are pregnant?\" \"When was your last menstrual period?\"          NA    Protocols used: LEG SWELLING AND EDEMA-A-OH    SEE WITHIN 3 DAYS IN OFFICE: You need to be examined. Let me give you an appointment.        Patient/Caregiver understands and will follow care advice? Other, see yesica Dominguez [facility]   "

## 2022-03-09 NOTE — TELEPHONE ENCOUNTER
Zurdo Kendrick MD  [facility] Triage 1 minute ago (4:19 PM)     PG    With me, if can not get in within 2 weeks ok to use same day spot     Zurdo Kendrick M.D.    Message text        Patient Contact    Attempt # 1    Was call answered?  No.  Left message on voicemail with information to call back.    Tanya MA, Triage RN  [facility]

## 2022-03-09 NOTE — TELEPHONE ENCOUNTER
Zurdo Kendrick MD  [facility] Triage 1 hour ago (2:19 PM)     PG    Sounds like non urgent, please have patient see me in next 2 weeks     Zurdo Kendrick M.D.    Message text      Only virtual available with you in next couple of weeks - should pt schedule with team? Or first available in clinic with you?     Tanya MA, Triage RN  [facility]

## 2022-03-10 NOTE — TELEPHONE ENCOUNTER
Ai pearce with writer,   Patient scheduled   MAR 18   2022 02:00 PM - Provider Visit  [facility] - Zurdo MD Bobbi Bardales, RN  [facility]

## 2022-03-18 ENCOUNTER — OFFICE VISIT (OUTPATIENT)
Dept: FAMILY MEDICINE | Facility: CLINIC | Age: 87
End: 2022-03-18
Payer: COMMERCIAL

## 2022-03-18 VITALS
DIASTOLIC BLOOD PRESSURE: 71 MMHG | HEART RATE: 56 BPM | TEMPERATURE: 97 F | WEIGHT: 192 LBS | SYSTOLIC BLOOD PRESSURE: 160 MMHG | BODY MASS INDEX: 25.45 KG/M2 | OXYGEN SATURATION: 96 % | HEIGHT: 73 IN | RESPIRATION RATE: 16 BRPM

## 2022-03-18 DIAGNOSIS — I25.10 ASCVD (ARTERIOSCLEROTIC CARDIOVASCULAR DISEASE): ICD-10-CM

## 2022-03-18 DIAGNOSIS — I10 BENIGN ESSENTIAL HYPERTENSION: ICD-10-CM

## 2022-03-18 DIAGNOSIS — N18.30 STAGE 3 CHRONIC KIDNEY DISEASE, UNSPECIFIED WHETHER STAGE 3A OR 3B CKD (H): ICD-10-CM

## 2022-03-18 DIAGNOSIS — I71.21 THORACIC ASCENDING AORTIC ANEURYSM (H): ICD-10-CM

## 2022-03-18 DIAGNOSIS — T14.8XXA HEMATOMA: Primary | ICD-10-CM

## 2022-03-18 DIAGNOSIS — R60.0 BILATERAL LEG EDEMA: ICD-10-CM

## 2022-03-18 DIAGNOSIS — E03.9 ACQUIRED HYPOTHYROIDISM: ICD-10-CM

## 2022-03-18 DIAGNOSIS — I71.40 AAA (ABDOMINAL AORTIC ANEURYSM) WITHOUT RUPTURE (H): ICD-10-CM

## 2022-03-18 LAB
ERYTHROCYTE [DISTWIDTH] IN BLOOD BY AUTOMATED COUNT: 15 % (ref 10–15)
HCT VFR BLD AUTO: 36 % (ref 40–53)
HGB BLD-MCNC: 11.6 G/DL (ref 13.3–17.7)
MCH RBC QN AUTO: 32.8 PG (ref 26.5–33)
MCHC RBC AUTO-ENTMCNC: 32.2 G/DL (ref 31.5–36.5)
MCV RBC AUTO: 102 FL (ref 78–100)
PLATELET # BLD AUTO: 154 10E3/UL (ref 150–450)
RBC # BLD AUTO: 3.54 10E6/UL (ref 4.4–5.9)
WBC # BLD AUTO: 5.6 10E3/UL (ref 4–11)

## 2022-03-18 PROCEDURE — 80048 BASIC METABOLIC PNL TOTAL CA: CPT | Performed by: INTERNAL MEDICINE

## 2022-03-18 PROCEDURE — 99214 OFFICE O/P EST MOD 30 MIN: CPT | Performed by: INTERNAL MEDICINE

## 2022-03-18 PROCEDURE — 85027 COMPLETE CBC AUTOMATED: CPT | Performed by: INTERNAL MEDICINE

## 2022-03-18 PROCEDURE — 36415 COLL VENOUS BLD VENIPUNCTURE: CPT | Performed by: INTERNAL MEDICINE

## 2022-03-18 PROCEDURE — 84443 ASSAY THYROID STIM HORMONE: CPT | Performed by: INTERNAL MEDICINE

## 2022-03-18 ASSESSMENT — PAIN SCALES - GENERAL: PAINLEVEL: NO PAIN (0)

## 2022-03-18 NOTE — PATIENT INSTRUCTIONS
If the buttock lump hurts or grows let me know.    Use the tight stocking on the legs and feet daily for the swelling.  Also put your legs up.    Follow up with me in 2 months.    Zurdo Kendrick M.D.

## 2022-03-19 LAB
ANION GAP SERPL CALCULATED.3IONS-SCNC: 11 MMOL/L (ref 3–14)
BUN SERPL-MCNC: 31 MG/DL (ref 7–30)
CALCIUM SERPL-MCNC: 9.1 MG/DL (ref 8.5–10.1)
CHLORIDE BLD-SCNC: 110 MMOL/L (ref 94–109)
CO2 SERPL-SCNC: 20 MMOL/L (ref 20–32)
CREAT SERPL-MCNC: 1.31 MG/DL (ref 0.66–1.25)
GFR SERPL CREATININE-BSD FRML MDRD: 52 ML/MIN/1.73M2
GLUCOSE BLD-MCNC: 125 MG/DL (ref 70–99)
POTASSIUM BLD-SCNC: 4.3 MMOL/L (ref 3.4–5.3)
SODIUM SERPL-SCNC: 141 MMOL/L (ref 133–144)
TSH SERPL DL<=0.005 MIU/L-ACNC: 0.44 MU/L (ref 0.4–4)

## 2022-03-19 NOTE — PROGRESS NOTES
This is a very pleasant 9-year-old male here for a lump in his left buttock region has been present for approximately 2 months.  He did have a fall but there was never any bruising.  Its not growing or painful.  He is not ill.  No fevers.    Additionally, he has bilateral leg edema.  This is not new.  Is been going on for quite some time.  He is not using any compression.  It would be difficult to add more diuretics because there would be higher to go to the bathroom too often.  He had a prior leg wound on the right side but this is healed.  He has no sores on his toes.  He denies chest pain or shortness of breath.  No fevers.  No abdominal pain or nausea.  No bowel or bladder issues.    He has multiple other issues that include a AAA for which she has regular follow-up as well as a thoracic aneurysm which has had follow-up.  He has coronary artery disease as well as hypertension, CKD and hypothyroidism.    The patient lives independently in a senior building but is not getting assistance.  He may move into assisted living down the road.    Past Medical History:   Diagnosis Date     A-fib (H) 2010    post op     AAA (abdominal aortic aneurysm) without rupture (H)     Dr. 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    carotid us no stenosis, echo no change and no clots; ziopatch no afib, svt present     Anemia 2004     Aortic insufficiency 06/2014    1+ on echo     Aortic insufficiency 11/2016    mild to mod     ASCVD (arteriosclerotic cardiovascular disease) 2010    cabg, post op afib     BPH (benign prostatic hyperplasia) 2003    turp, flomax added by urol 2/17     Bradycardia 2016    stopped toprol     CKD (chronic kidney disease) stage 3, GFR 30-59 ml/min (H)      Constipation 05/2020    colonoscopy nl     Cough 2010    pulm eval, felt due to reflux     Dizzy 2001    mri small vessel dz     GERD (gastroesophageal reflux disease)      HTN (hypertension) 2002     Hx of colonoscopy  2003    tics     Hypothyroid      Hypovitaminosis D      Idiopathic neuropathy      Lung nodule 02/2018    6mm, found during eval of ascending aorta, fu 8/18 no change     OCD (obsessive compulsive disorder)     sees psyche     Panic disorder     Dr. Luna     Spinal headache      Stroke (H) 12/2016    expressive aphasia, hosp, seen by neuro, mri pos, carotids min dz, changed from asa to plavix     Sudden hearing loss 06/2013    Dr. Garcia, mri brain no acoustic neuroma     SVT (supraventricular tachycardia) (H) 11/2016    seen on ziopatch done for amaurosis, longest 15 beats     Thoracic ascending aortic aneurysm (H) 06/2014    4.4cm on echo, aortic root 3.9, fu 5/15 4.8cm; fu done 12/15 and slightly enlarged, fu 6/16 no change, fu 11/16 no change; fu 1/18 echo 5.1-5.3, enlarged, then cta done and only 4.8cm, t fu 6 months, lvh, nl ef, mild ai; fu 8/18 slightly larger; fu 2/19 slightly smaller; fu 2/20 and then 1/21 and stable     Ulcerative colitis (H)     not active for years     Past Surgical History:   Procedure Laterality Date     BACK SURGERY  1998     BLADDER SURGERY  1985     CATARACT IOL, RT/LT  2011     COLONOSCOPY N/A 05/05/2020    Procedure: COLONOSCOPY;  Surgeon: Kenya Loco MD;  Location:  GI     CORONARY ARTERY BYPASS  2010     ENDOVASCULAR REPAIR ANEURYSM ABDOMINAL AORTA N/A 05/27/2015    Procedure: ENDOVASCULAR REPAIR ANEURYSM ABDOMINAL AORTA;  Surgeon: Darin Walters MD;  Location:  OR     HERNIA REPAIR  2005     HERNIA REPAIR  1998     HERNIORRHAPHY INGUINAL Left 01/05/2018    Procedure: HERNIORRHAPHY INGUINAL;  Incarcerated Left Inguinal Hernia;  Surgeon: Harsh Walker MD;  Location:  OR     KNEE SURGERY  1997     REPAIR HAMMER TOE  12/28/2012    Procedure: REPAIR HAMMER TOE;  LEFT SECOND AND THIRD CLAW TOE RECONSTRUCTION;  Surgeon: Gray Haynes MD;  Location:  OR     REPAIR HAMMER TOE  04/2018    tria     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     TURP   2003     [facility] TOTAL KNEE ARTHROPLASTY  2011    left     [facility] TOTAL KNEE ARTHROPLASTY  2009    right     Social History     Socioeconomic History     Marital status:      Spouse name: Not on file     Number of children: 2     Years of education: Not on file     Highest education level: Not on file   Occupational History     Occupation: retail     Employer: RETIRED   Tobacco Use     Smoking status: Former Smoker     Packs/day: 1.00     Years: 5.00     Pack years: 5.00     Types: Cigarettes     Quit date: 1965     Years since quittin.8     Smokeless tobacco: Never Used   Substance and Sexual Activity     Alcohol use: Yes     Alcohol/week: 0.0 standard drinks     Comment: maybe one glass of wine a week     Drug use: No     Sexual activity: Not Currently     Partners: Female   Other Topics Concern     Parent/sibling w/ CABG, MI or angioplasty before 65F 55M? Not Asked   Social History Narrative     Not on file     Social Determinants of Health     Financial Resource Strain: Not on file   Food Insecurity: Not on file   Transportation Needs: Not on file   Physical Activity: Not on file   Stress: Not on file   Social Connections: Not on file   Intimate Partner Violence: Not on file   Housing Stability: Not on file     Current Outpatient Medications   Medication Sig Dispense Refill     acetaminophen (TYLENOL) 500 MG tablet Take 1,000 mg by mouth every 6 hours as needed for mild pain PAIN       ARIPiprazole (ABILIFY) 2 MG tablet Take 1 tablet by mouth At Bedtime.       chlorthalidone (HYGROTON) 25 MG tablet TAKE 1 TABLET BY MOUTH  DAILY 90 tablet 3     ClomiPRAMINE HCl (ANAFRANIL PO) Take 25 mg by mouth every evening        clopidogrel (PLAVIX) 75 MG tablet Take 1 tablet (75 mg) by mouth daily 90 tablet 3     ENULOSE 10 GM/15ML SOLUTION        gabapentin (NEURONTIN) 400 MG capsule Take 1 capsule by mouth 2 times daily        lactulose (CEPHULAC) 10 GM packet Take 2 packets (20 g) by mouth 2 times daily (Patient  "taking differently: Take 20 g by mouth daily )       levothyroxine (SYNTHROID/LEVOTHROID) 100 MCG tablet Take 1 tablet (100 mcg) by mouth daily 90 tablet 1     MIRTAZAPINE PO Take 45 mg by mouth At Bedtime        simvastatin (ZOCOR) 20 MG tablet TAKE 1 TABLET BY MOUTH AT  BEDTIME 90 tablet 1     No Known Allergies  FAMILY HISTORY NOTED AND REVIEWED    REVIEW OF SYSTEMS: above    PHYSICAL EXAM    BP (!) 160/71 (BP Location: Right arm, Patient Position: Chair, Cuff Size: Adult Large)   Pulse 56   Temp 97  F (36.1  C) (Tympanic)   Resp 16   Ht 1.854 m (6' 1\")   Wt 87.1 kg (192 lb)   SpO2 96%   BMI 25.33 kg/m      Patient appears non toxic  Lungs clear  cv reglar rate and rhythm,no murmer, rub or gallop  Abdomen normal active bowel sounds, soft non-tender  Legs with 1+ bilat edema, right more than left, old healed right ant lower leg lesions, no sores on feet or toes  On the left buttock area there is a soft raised grapefruit size mass that is nontender and nonmobile.  The skin itself is normal.    Labs sent    ASSESSMENT:  1.  Probable hematoma of left buttock region.  I doubt a malignant lesion.  Its been stable.  It does not appear to be infected.  At this point in time I think the next option is to observe it and follow-up with an office visit in about 6 weeks.  If is not resolving then we could consider imaging.  2.  Bilateral leg edema, suspect in part due to legs being dependent so often.  I doubt DVT or other cause.  We could try diuretics but I am afraid it would make him urinate too much and I think if we can do local measures that would be better.  3.  AAA, stable  4.  CKD, follow-up labs.  5.  Hypertension, control adequate.  6.  Hypothyroidism, follow-up TSH.  7.  Coronary artery disease, medical management.  8.  Thoracic aneurysm, has been stable and at this point no follow-up is needed.    PLAN:  Try local compression for the lower legs on in the morning off at night and try to keep the legs " elevated more.  Follow-up in 6 to 8 weeks.  Labs today.    Zurdo Kendrick M.D.

## 2022-03-21 ENCOUNTER — TELEPHONE (OUTPATIENT)
Dept: FAMILY MEDICINE | Facility: CLINIC | Age: 87
End: 2022-03-21
Payer: COMMERCIAL

## 2022-03-21 DIAGNOSIS — R60.0 BILATERAL LEG EDEMA: Primary | ICD-10-CM

## 2022-03-21 NOTE — TELEPHONE ENCOUNTER
If you are able to see what they ordered before and just pending that I be happy to order it for him.  If not if you can call the wound care clinic and asked them what they gave him before and then pen that that would be great.    Thank you    Zurdo Kendrick M.D.

## 2022-03-21 NOTE — RESULT ENCOUNTER NOTE
Zack,    It was a pleasure to see you.  You should be able to view your test results.    Your thyroid test is normal.  Your blood salts, sugar, and kidney tests are very stable and indicate no problems.  Your CBC your blood count is also stable.    If you have questions let me know.    Zurdo

## 2022-03-21 NOTE — TELEPHONE ENCOUNTER
"Pt called     States he was in to see PCP last week     Was advised to see wound clinic and order \"tubes\" for him     They called him back and said they cannot order the the \"tubes\" - they need specific size and the wound is healed so they cannot order    Cannot do regular compression stockings - he lives alone     \"they put it back in Dr Kendrick's court\"     Patient is not exactly sure what he needs, does not think it's compression stockings thinks it's a \"tube\" on his leg for swelling (unsure what he is asking for)     Pt is independent living so has no one to help him with compression stockings     Please advise     Tanya MA Triage RN  [facility]     "

## 2022-03-22 ENCOUNTER — NURSE TRIAGE (OUTPATIENT)
Dept: FAMILY MEDICINE | Facility: CLINIC | Age: 87
End: 2022-03-22
Payer: COMMERCIAL

## 2022-03-22 NOTE — TELEPHONE ENCOUNTER
Pt notified and gave him [facility] home medical number in [place]    Tanya MA, Triage RN  [facility]

## 2022-03-22 NOTE — TELEPHONE ENCOUNTER
"Patient called requesting to review lab results.   \"im weak\"   \"I don't have any energy and I'm not hungry\"   Onset yesterday. Cause uknown  Denies: chest pain, fever, cough, SOB, vomiting, diarrhea, bleeding, other areas of pain  Medicine: no  No one sided weakness   'I don't move very much\"  Writer advised fluids but if symptoms do not relieve to go to  patient expressed verbal understanding.      Bobbi Sandhu RN  [facility]    Reason for Disposition    Weakness from poor fluid intake    Additional Information    Negative: Severe difficulty breathing (e.g., struggling for each breath, speaks in single words)    Negative: Shock suspected (e.g., cold/pale/clammy skin, too weak to stand, low BP, rapid pulse)    Negative: Difficult to awaken or acting confused (e.g., disoriented, slurred speech)    Negative: Fainted > 15 minutes ago and still feels too weak or dizzy to stand    Negative: SEVERE weakness (i.e., unable to walk or barely able to walk, requires support) and new onset or worsening    Negative: Sounds like a life-threatening emergency to the triager    Negative: Weakness of the face, arm or leg on one side of the body    Negative: Has diabetes and weakness from low blood sugar (i.e., < 60 mg/dL or 3.5 mmol/L)    Negative: Recent heat exposure, suspected cause of weakness    Negative: Vomiting is the main symptom    Negative: Diarrhea is the main symptom    Negative: Difficulty breathing    Negative: Heart beating < 50 beats per minute OR > 140 beats per minute    Negative: Extra heartbeats OR irregular heart beating (i.e., 'palpitations')    Negative: Follows bleeding (e.g., from vomiting, rectum, vagina) (Exception: small transient weakness from sight of a small amount blood)    Negative: Bloody, black, or tarry bowel movements (Exception: chronic-unchanged  black-grey bowel movements and is taking iron pills or Pepto-bismol)    Negative: MODERATE weakness from poor fluid intake " with no improvement after 2 hours of rest and fluids    Negative: Drinking very little and dehydration suspected (e.g., no urine > 12 hours, very dry mouth, very lightheaded)    Negative: Patient sounds very sick or weak to the triager    Negative: MODERATE weakness (i.e., interferes with work, school, normal activities) and cause unknown (Exceptions: weakness with acute minor illness, or weakness from poor fluid intake)    Negative: Fever > 103 F (39.4 C) and not able to get the Fever down using CARE ADVICE    Negative: Fever > 100.0 F (37.8 C) and bedridden (e.g., nursing home patient, stroke, chronic illness, recovering from surgery)    Negative: Fever > 101 F (38.3 C) and over 60 years of age    Negative: Fever > 100.0 F (37.8 C) and diabetes mellitus or weak immune system (e.g., HIV positive, cancer chemo, splenectomy, organ transplant, chronic steroids)    Negative: Pale skin (pallor)    Negative: MODERATE weakness (i.e., interferes with work, school, normal activities) and persists > 3 days    Negative: Taking a medicine that could cause weakness (e.g., blood pressure medications, diuretics)    Negative: Patient wants to be seen    Negative: MILD weakness (i.e., does not interfere with ability to work, go to school, normal activities) and persists > 1 week    Negative: Fatigue (i.e., tires easily, decreased energy) and persists > 1 week    Negative: Weakness is a chronic symptom (recurrent or ongoing AND lasting > 4 weeks)    Negative: Fatigue is a chronic symptom (recurrent or ongoing AND present > 4 weeks)    Negative: MILD weakness or fatigue with acute minor illness (e.g., colds)    Protocols used: WEAKNESS (GENERALIZED) AND FATIGUE-A-OH

## 2022-03-22 NOTE — TELEPHONE ENCOUNTER
Per [facility] clinic telephone encounter on 2/16/22. Sandigrip ordered. No past DME order. Pended miscellaneous DME order for Sandigrip.        Wound care clinic ([facility]) 2/16/22 telephone encounter advice:   Compression:  You have a compression wrap Spandigrip E is supposed to be removed at night and put back on first thing in the morning.  Please remove compression dressing if toes turn blue and/or tingle and can not be  relieved by raising the leg for one hour.    Bobib Sandhu RN  [facility]

## 2022-03-25 NOTE — TELEPHONE ENCOUNTER
Patient calling regarding feeling tired, no symptom changes from Triage Enc 3 days ago.     Patient stated he drinks an estimated 4 cups of fluids per day.   Denies dry mouth, urine this AM was 'normal color'.   Reiterated to patient the importance of hydration and to increase fluid intake to 8 cups minimum daily.     Patient stated he will increase his fluid intake of water and apple juice to 8 cups per day over the weekend and will call Monday to give an update.

## 2022-04-17 ENCOUNTER — HEALTH MAINTENANCE LETTER (OUTPATIENT)
Age: 87
End: 2022-04-17

## 2022-05-10 ENCOUNTER — OFFICE VISIT (OUTPATIENT)
Dept: FAMILY MEDICINE | Facility: CLINIC | Age: 87
End: 2022-05-10
Payer: COMMERCIAL

## 2022-05-10 VITALS
HEIGHT: 73 IN | TEMPERATURE: 97.7 F | WEIGHT: 190 LBS | RESPIRATION RATE: 16 BRPM | SYSTOLIC BLOOD PRESSURE: 132 MMHG | DIASTOLIC BLOOD PRESSURE: 66 MMHG | OXYGEN SATURATION: 94 % | HEART RATE: 54 BPM | BODY MASS INDEX: 25.18 KG/M2

## 2022-05-10 DIAGNOSIS — T14.8XXA HEMATOMA: ICD-10-CM

## 2022-05-10 DIAGNOSIS — R60.0 BILATERAL LEG EDEMA: Primary | ICD-10-CM

## 2022-05-10 PROCEDURE — 99213 OFFICE O/P EST LOW 20 MIN: CPT | Performed by: INTERNAL MEDICINE

## 2022-05-10 ASSESSMENT — PAIN SCALES - GENERAL: PAINLEVEL: NO PAIN (0)

## 2022-05-10 NOTE — PROGRESS NOTES
The patient presents in follow-up to his last office visit for what was presumed a left hip hematoma as well as for lower extremity edema.  Labs were done and reviewed with patient and they are very stable.  Since the last office visit he notes that the left buttock hump has gone down and is resolved.    Additionally, he has bilateral leg edema which has been for quite some time.  He is not wanting further diuretics.  He finds it too hard to use ROGER stockings.    He has no chest pain or shortness of breath.    Past Medical History:   Diagnosis Date     A-fib (H) 2010    post op     AAA (abdominal aortic aneurysm) without rupture (H)     Dr. 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    carotid us no stenosis, echo no change and no clots; ziopatch no afib, svt present     Anemia 2004     Aortic insufficiency 06/2014    1+ on echo     Aortic insufficiency 11/2016    mild to mod     ASCVD (arteriosclerotic cardiovascular disease) 2010    cabg, post op afib     BPH (benign prostatic hyperplasia) 2003    turp, flomax added by urol 2/17     Bradycardia 2016    stopped toprol     CKD (chronic kidney disease) stage 3, GFR 30-59 ml/min (H)      Constipation 05/2020    colonoscopy nl     Cough 2010    pulm eval, felt due to reflux     Dizzy 2001    mri small vessel dz     GERD (gastroesophageal reflux disease)      HTN (hypertension) 2002     Hx of colonoscopy 2003    tics     Hypothyroid      Hypovitaminosis D      Idiopathic neuropathy      Lung nodule 02/2018    6mm, found during eval of ascending aorta, fu 8/18 no change     OCD (obsessive compulsive disorder)     sees psyche     Panic disorder     Dr. Luna     Spinal headache      Stroke (H) 12/2016    expressive aphasia, hosp, seen by neuro, mri pos, carotids min dz, changed from asa to plavix     Sudden hearing loss 06/2013    Dr. Garcia, mri brain no acoustic neuroma     SVT (supraventricular tachycardia) (H) 11/2016    seen on  ziopatch done for amaurosis, longest 15 beats     Thoracic ascending aortic aneurysm (H) 06/2014    4.4cm on echo, aortic root 3.9, fu 5/15 4.8cm; fu done 12/15 and slightly enlarged, fu 6/16 no change, fu 11/16 no change; fu 1/18 echo 5.1-5.3, enlarged, then cta done and only 4.8cm, t fu 6 months, lvh, nl ef, mild ai; fu 8/18 slightly larger; fu 2/19 slightly smaller; fu 2/20 and then 1/21 and stable     Ulcerative colitis (H)     not active for years     Past Surgical History:   Procedure Laterality Date     BACK SURGERY  1998     BLADDER SURGERY  1985     CATARACT IOL, RT/LT  2011     COLONOSCOPY N/A 05/05/2020    Procedure: COLONOSCOPY;  Surgeon: Kenya Loco MD;  Location:  GI     CORONARY ARTERY BYPASS  2010     ENDOVASCULAR REPAIR ANEURYSM ABDOMINAL AORTA N/A 05/27/2015    Procedure: ENDOVASCULAR REPAIR ANEURYSM ABDOMINAL AORTA;  Surgeon: Darin Walters MD;  Location:  OR     HERNIA REPAIR  2005     HERNIA REPAIR  1998     HERNIORRHAPHY INGUINAL Left 01/05/2018    Procedure: HERNIORRHAPHY INGUINAL;  Incarcerated Left Inguinal Hernia;  Surgeon: Harsh Walker MD;  Location:  OR     KNEE SURGERY  1997     REPAIR HAMMER TOE  12/28/2012    Procedure: REPAIR HAMMER TOE;  LEFT SECOND AND THIRD CLAW TOE RECONSTRUCTION;  Surgeon: Gray Haynes MD;  Location:  OR     REPAIR HAMMER TOE  04/2018    tria     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 TOTAL KNEE ARTHROPLASTY  2011    left     [facility] TOTAL KNEE ARTHROPLASTY  2009    right     Social History     Socioeconomic History     Marital status:      Spouse name: Not on file     Number of children: 2     Years of education: Not on file     Highest education level: Not on file   Occupational History     Occupation: retail     Employer: RETIRED   Tobacco Use     Smoking status: Former Smoker     Packs/day: 1.00     Years: 5.00     Pack years: 5.00     Types: Cigarettes     Quit date: 6/13/1965     Years since  "quittin.9     Smokeless tobacco: Never Used   Substance and Sexual Activity     Alcohol use: Yes     Alcohol/week: 0.0 standard drinks     Comment: maybe one glass of wine a week     Drug use: No     Sexual activity: Not Currently     Partners: Female   Other Topics Concern     Parent/sibling w/ CABG, MI or angioplasty before 65F 55M? Not Asked   Social History Narrative     Not on file     Social Determinants of Health     Financial Resource Strain: Not on file   Food Insecurity: Not on file   Transportation Needs: Not on file   Physical Activity: Not on file   Stress: Not on file   Social Connections: Not on file   Intimate Partner Violence: Not on file   Housing Stability: Not on file     Current Outpatient Medications   Medication Sig Dispense Refill     acetaminophen (TYLENOL) 500 MG tablet Take 1,000 mg by mouth every 6 hours as needed for mild pain PAIN       ARIPiprazole (ABILIFY) 2 MG tablet Take 1 tablet by mouth At Bedtime.       chlorthalidone (HYGROTON) 25 MG tablet TAKE 1 TABLET BY MOUTH  DAILY 90 tablet 3     ClomiPRAMINE HCl (ANAFRANIL PO) Take 25 mg by mouth every evening        clopidogrel (PLAVIX) 75 MG tablet Take 1 tablet (75 mg) by mouth daily 90 tablet 3     ENULOSE 10 GM/15ML SOLUTION        gabapentin (NEURONTIN) 400 MG capsule Take 1 capsule by mouth 2 times daily        levothyroxine (SYNTHROID/LEVOTHROID) 100 MCG tablet Take 1 tablet (100 mcg) by mouth daily 90 tablet 1     MIRTAZAPINE PO Take 45 mg by mouth At Bedtime        simvastatin (ZOCOR) 20 MG tablet TAKE 1 TABLET BY MOUTH AT  BEDTIME 90 tablet 1     No Known Allergies  FAMILY HISTORY NOTED AND REVIEWED    REVIEW OF SYSTEMS: above    PHYSICAL EXAM    /66   Pulse 54   Temp 97.7  F (36.5  C) (Temporal)   Resp 16   Ht 1.854 m (6' 1\")   Wt 86.2 kg (190 lb)   SpO2 94%   BMI 25.07 kg/m      Patient appears non toxic  Lungs clear  cv reglar rate and rhythm  Has resolving lump left buttock, hard to see now and much " smaller  Trace bilat lower leg edema    Labs reviewed    ASSESSMENT:  1. Hematoma, resolving  2. Edema, minimal    PLAN:  Call if changes  Follow up 4 months or prn    Zurdo Kendrick M.D.

## 2022-05-23 ENCOUNTER — TELEPHONE (OUTPATIENT)
Dept: FAMILY MEDICINE | Facility: CLINIC | Age: 87
End: 2022-05-23
Payer: COMMERCIAL

## 2022-05-23 NOTE — TELEPHONE ENCOUNTER
Patient asking if Dr. Kendrick received a letter from [facility] requesting records.  Patient states that he already checked with [facility] and they have not received a request.    TC to call the patient back.    Can we leave a detailed message on this number? YES  Phone number patient can be reached at: Home number on file [phone] (home)    Abena Brown RN  [facility] Triage

## 2022-05-25 NOTE — TELEPHONE ENCOUNTER
LVM- we have not received a medical records request from [facility] term [facility] insurance

## 2022-06-01 ENCOUNTER — TELEPHONE (OUTPATIENT)
Dept: ORTHOPEDICS | Facility: CLINIC | Age: 87
End: 2022-06-01
Payer: COMMERCIAL

## 2022-06-01 NOTE — TELEPHONE ENCOUNTER
[facility] Call [facility]    Phone Message    May a detailed message be left on voicemail: yes     Reason for Call: Other: Pt requesting upcoming appt to be mailed to him      Action Taken: Other: ortho     Travel Screening: Not Applicable

## 2022-06-08 DIAGNOSIS — E78.5 HYPERLIPIDEMIA LDL GOAL <100: ICD-10-CM

## 2022-06-08 DIAGNOSIS — E03.9 ACQUIRED HYPOTHYROIDISM: ICD-10-CM

## 2022-06-09 RX ORDER — SIMVASTATIN 20 MG
TABLET ORAL
Qty: 90 TABLET | Refills: 2 | Status: SHIPPED | OUTPATIENT
Start: 2022-06-09 | End: 2022-09-19

## 2022-06-09 RX ORDER — LEVOTHYROXINE SODIUM 100 UG/1
TABLET ORAL
Qty: 90 TABLET | Refills: 2 | Status: SHIPPED | OUTPATIENT
Start: 2022-06-09 | End: 2022-09-19

## 2022-06-09 NOTE — TELEPHONE ENCOUNTER
Routing refill request to provider for review/approval because:  LDL Cholesterol Calculated   Date Value Ref Range Status   01/27/2020 60 <100 mg/dL Final     Comment:     Desirable:       <100 mg/dl         Florentin Duke RN  [facility] Triage Nurse

## 2022-06-10 ENCOUNTER — TRANSFERRED RECORDS (OUTPATIENT)
Dept: HEALTH INFORMATION MANAGEMENT | Facility: CLINIC | Age: 87
End: 2022-06-10
Payer: COMMERCIAL

## 2022-06-27 ENCOUNTER — TELEPHONE (OUTPATIENT)
Dept: FAMILY MEDICINE | Facility: CLINIC | Age: 87
End: 2022-06-27

## 2022-06-27 NOTE — TELEPHONE ENCOUNTER
I received a form today from the [facility] that the patient needs a form filled out prior to his moving into their assisted living.  Please schedule either a virtual or in person visit with whoever is available first to do this.    Thank you    Zurdo Kendrick M.D.  \

## 2022-06-30 NOTE — PROGRESS NOTES
Zack is a 89 year old who is being evaluated via a billable telephone visit.      What phone number would you like to be contacted at? [phone]  How would you like to obtain your AVS? Phil Lambert   Zack is a 89 year old multiple medical problems who I am doing a phone visit to fill out his form for moving into assisted living.  Overall he has been stable but his legs are weak which is not new and he has some fatigue.  I did labs for this in March.  These were very stable.  The he has no new issues.  He would like to try physical therapy to try and improve his leg strength.    ASSESSMENT:  Leg weakness  Multiple other stable medical issues    Form done and faxed in.  Patient has follow up 9/22 with me.  physical therapy recommended.    Zurdo Kendrick M.D.  22 minutes on the day of the encounter doing chart review, history and exam, documentation and further activities as noted above.

## 2022-07-01 ENCOUNTER — MEDICAL CORRESPONDENCE (OUTPATIENT)
Dept: HEALTH INFORMATION MANAGEMENT | Facility: CLINIC | Age: 87
End: 2022-07-01

## 2022-07-01 ENCOUNTER — VIRTUAL VISIT (OUTPATIENT)
Dept: FAMILY MEDICINE | Facility: CLINIC | Age: 87
End: 2022-07-01
Payer: COMMERCIAL

## 2022-07-01 DIAGNOSIS — R29.898 LEG WEAKNESS, BILATERAL: Primary | ICD-10-CM

## 2022-07-01 PROCEDURE — 99443 PR PHYSICIAN TELEPHONE EVALUATION 21-30 MIN: CPT | Performed by: INTERNAL MEDICINE

## 2022-07-01 ASSESSMENT — PAIN SCALES - GENERAL: PAINLEVEL: NO PAIN (0)

## 2022-07-06 NOTE — TELEPHONE ENCOUNTER
Jayashree called form [facility] looking for completed forms  Pt is moving in today.  Will start med admin on Friday.    PCP note states forms were faxed on 7/1/22.  Can they be refaxed to the fax number below? Routing to team and tc, not sure who has these.      Jayashree   Phone number: [phone]  Fax number: [phone]

## 2022-07-19 ENCOUNTER — DOCUMENTATION ONLY (OUTPATIENT)
Dept: OTHER | Facility: CLINIC | Age: 87
End: 2022-07-19

## 2022-07-29 ENCOUNTER — TELEPHONE (OUTPATIENT)
Dept: OTHER | Facility: CLINIC | Age: 87
End: 2022-07-29

## 2022-08-05 DIAGNOSIS — R19.8 IRREGULAR BOWEL HABITS: ICD-10-CM

## 2022-08-05 RX ORDER — LACTULOSE 10 G/10G
20 SOLUTION ORAL DAILY
Qty: 60 EACH | Refills: 5 | Status: SHIPPED | OUTPATIENT
Start: 2022-08-05 | End: 2022-08-29 | Stop reason: ALTCHOICE

## 2022-08-05 NOTE — TELEPHONE ENCOUNTER
Per pharmacy dispense report: Pt has been getting Rx from [facility] provider (Brady Putnam) - 30mL twice per day - Lactulose 10gm/15mL solution

## 2022-08-05 NOTE — TELEPHONE ENCOUNTER
MOJGAN Blanc with [facility], calling to request an order for script of Lactulose to be sent to [facility].     Per chart review, medication is patient reported.     Medication pended    [facility] Fax: [phone]

## 2022-08-08 ENCOUNTER — TRANSFERRED RECORDS (OUTPATIENT)
Dept: HEALTH INFORMATION MANAGEMENT | Facility: CLINIC | Age: 87
End: 2022-08-08

## 2022-08-08 ENCOUNTER — NURSE TRIAGE (OUTPATIENT)
Dept: FAMILY MEDICINE | Facility: CLINIC | Age: 87
End: 2022-08-08

## 2022-08-08 NOTE — TELEPHONE ENCOUNTER
"CC: Patient calling stating that his hands are red and people keep noticing and pointing it out     Appearance: \"the skin on my hands is red\"   Location: denies rash or spots, redness to entire hand   Onset: \"I first noticed it at least a year ago\"  Itching: denies   Pain: denies  Other symptoms: denies fever, swelling, tenderness, or any other symptoms     Patient states he wants the doctor to know about this. Patient has upcoming appointment with PCP next month and wants to see if it is okay to address this then - per protocol - home care - writer advised patient to follow up with PCP during appointment:     9/16/2022 11:00 AM (Arrive by 10:40 AM) Zurdo Kendrick MD [facility]     Writer advised patient to call back with any new/worsening symptoms. Patient expressed verbal understanding.     Callback [phone] - ok to leave detailed VM    Routing to PCP as FYI to review     Uche Munoz RN  [facility]    Additional Information    Negative: Athlete's Foot suspected (i.e., itchy rash between the toes)    Negative: Sounds like a life-threatening emergency to the triager    Negative: Insect bite(s) suspected    Negative: Jock Itch suspected (i.e., itchy rash on inner thighs near genital area)    Negative: Localized lump (or swelling) without redness or rash    Negative: Poison ivy, oak, or sumac contact suspected    Negative: Rash of female genital area (vulva)    Negative: Rash of male genital area (penis or scrotum)    Negative: Redness of immunization site    Negative: Shingles suspected (i.e., painful rash, multiple small blisters grouped together in one area of body; dermatomal distribution)    Negative: Small spot, skin growth, or mole    Negative: Wound infection suspected (i.e., pain, spreading redness, or pus; in a cut, puncture, scrape or sutured wound)    Negative: Fever and localized purple or blood-colored spots or dots that are not from injury or " friction    Negative: Fever and localized rash is very painful    Negative: Patient sounds very sick or weak to the triager    Negative: Looks like a boil, infected sore, deep ulcer, or other infected rash (spreading redness, pus)    Negative: Painful rash with multiple small blisters grouped together (i.e., dermatomal distribution or 'band' or 'stripe')    Negative: Localized rash is very painful (no fever)    Negative: Localized purple or blood-colored spots or dots that are not from injury or friction (no fever)    Negative: Lyme disease suspected (e.g., bull's-eye rash or tick bite / exposure)    Negative: Patient wants to be seen    Negative: Tender bumps in armpits    Negative: Pimples (localized) and no improvement after using CARE ADVICE    Negative: SEVERE local itching persists after 2 days of steroid cream    Negative: Medication patch causing local rash or itching    Negative: Applying cream or ointment and it causes severe itch, burning, or pain    Negative: Localized rash present > 7 days    Negative: Red, moist, irritated area between skin folds (or under larger breasts)    Negative: Localized area of skin darkening or thickening on lower legs or ankles and has NOT been evaluated by a doctor (or NP/PA)    Negative: Mild localized rash    Negative: Pimples (localized)    Negative: Redness or itching where jewelry (or metal) touches skin and jewelry contains nickel    Negative: Mild localized rash from wearing a face mask    Protocols used: RASH OR REDNESS - [id number]

## 2022-08-11 NOTE — TELEPHONE ENCOUNTER
Received a call from the patient stating he has only been receiving 15 mL of Lactulose daily. Upon chart review, script was sent for patient to receive 20 mg of Lactulose daily (10mg/15 mL). Therefore, it appears patient should be receiving 30 mL daily.    Left voicemail for Jayashree (Head RN at [facility]) to call the clinic back. Upon call back, please clarify Lactulose dosing with Jayashree. Phone Number for Jayashree: [phone]    Patient Contact    Attempt # 1    Was call answered?  No.  Left message on voicemail with information to call me back.    Will route to team to follow-up tomorrow if no callback today.    Pallavi Ochoa RN

## 2022-08-12 ENCOUNTER — TELEPHONE (OUTPATIENT)
Dept: FAMILY MEDICINE | Facility: CLINIC | Age: 87
End: 2022-08-12

## 2022-08-12 NOTE — TELEPHONE ENCOUNTER
MOJGAN Blanc from [facility], calling back per below conversation.    Jayashree states that the patient has been taking 30 mL of lactulose daily. Pended order is for packets, Jayashree states that the medication is already in liquid form and comes out of a bottle - PCP please review - should this rx be written by GI provider? See below RN note.         If able to order, please fax order to [facility] at [phone]    Uche Munoz RN  [facility]

## 2022-08-17 NOTE — TELEPHONE ENCOUNTER
Patient Contact    Attempt # 1    Was call answered?  No.  Left detailed message to contact [facility] provider for orders/refills for Lactulose solution and information to call clinicback.     [id number] [facility]

## 2022-08-22 ENCOUNTER — HOSPITAL ENCOUNTER (OUTPATIENT)
Dept: CT IMAGING | Facility: CLINIC | Age: 87
Discharge: HOME | End: 2022-08-22
Attending: RADIOLOGY | Admitting: RADIOLOGY
Payer: COMMERCIAL

## 2022-08-22 DIAGNOSIS — I71.40 ABDOMINAL AORTIC ANEURYSM (AAA) WITHOUT RUPTURE (H): ICD-10-CM

## 2022-08-22 LAB
CREAT BLD-MCNC: 1.4 MG/DL (ref 0.7–1.3)
GFR SERPL CREATININE-BSD FRML MDRD: 47 ML/MIN/1.73M2

## 2022-08-22 PROCEDURE — 74174 CTA ABD&PLVS W/CONTRAST: CPT

## 2022-08-22 PROCEDURE — 250N000011 HC RX IP 250 OP 636: Performed by: RADIOLOGY

## 2022-08-22 PROCEDURE — 82565 ASSAY OF CREATININE: CPT

## 2022-08-22 PROCEDURE — 250N000009 HC RX 250: Performed by: RADIOLOGY

## 2022-08-22 RX ORDER — IOPAMIDOL 755 MG/ML
80 INJECTION, SOLUTION INTRAVASCULAR ONCE
Status: COMPLETED | OUTPATIENT
Start: 2022-08-22 | End: 2022-08-22

## 2022-08-22 RX ADMIN — SODIUM CHLORIDE 80 ML: 9 INJECTION, SOLUTION INTRAVENOUS at 14:31

## 2022-08-22 RX ADMIN — IOPAMIDOL 80 ML: 755 INJECTION, SOLUTION INTRAVENOUS at 14:31

## 2022-08-23 ENCOUNTER — TELEPHONE (OUTPATIENT)
Dept: OTHER | Facility: CLINIC | Age: 87
End: 2022-08-23

## 2022-08-23 ENCOUNTER — MEDICAL CORRESPONDENCE (OUTPATIENT)
Dept: HEALTH INFORMATION MANAGEMENT | Facility: CLINIC | Age: 87
End: 2022-08-23

## 2022-08-23 NOTE — TELEPHONE ENCOUNTER
Contacted patient with results.  Discussed Dr. Gallego recommendations, patient is not interested in treatment at this time.  We will re scan in 6 months,  CT without contrast to confirm sac measurement.  Trini Gonzalez RN  IR nurse clinician  [phone]    CTA ABDOMEN PELVIS WITH CONTRAST 8/22/2022 3:24 PM     HISTORY: 89-year-old patient with abdominal aortic aneurysm with  endovascular repair and type II endoleak. Endovascular repair was  performed May 27, 2015.     COMPARISON: August 2, 2021.     TECHNIQUE: Multiplanar and multiformatted CTA images obtained from the  lung bases through the abdomen and pelvis before and after the  uneventful administration of Isovue 370 intravenous contrast given for  a total of 80 mL. Radiation dose for this scan was reduced using  automated exposure control, adjustment of the mA and/or kV according  to patient size, or iterative reconstruction technique. 3-D  reformatted images were created at a separate workstation.     FINDINGS: Scattered linear bibasilar opacities likely atelectasis.  Heart size is normal. Large osteophytes arising from several lumbar  vertebrae. No acute osseous abnormality.     Hypodensity in the caudate lobe of the liver is unchanged and likely a  cyst. The gallbladder, spleen, adrenal glands, and pancreas are  unremarkable. Both kidneys are perfused. Appearance of both kidneys is  unchanged. Ventral hernia is noted in the epigastric region, with  scattered internal density, thought to be fluid and simple fluid. Do  not clearly identify loops of bowel. No intraperitoneal fluid or air.  The bladder is partially distended and unremarkable. Moderate amount  of stool throughout the colon. No dilated loops of bowel.     The visible descending thoracic aorta is patent. The celiac axis, SMA,  bilateral renal arteries are patent. The infrarenal abdominal aorta is  6.4 cm AP x 6.1 cm transverse, previously 6.2 cm AP x 5.6 cm  transverse. Both native internal iliac,  external iliac, and common  femoral arteries are patent. Type II endoleak again identified, best  visualized on delayed images and likely with communication between MEKA  and lumbar arteries.                                                                      IMPRESSION:  1. Persistent type II endoleak. Aneurysmal sac is 6.4 cm AP x 6.1 cm  transverse on today's exam, previously 6.2 cm AP x 5.6 cm transverse  indicating growth of the aneurysm since previous exam.  2. Ventral hernia in the epigastric region, perhaps with blood  vessels, also with fluid. The amount of fluid is increased since  previous exam. Do not clearly identify loops of bowel within the  hernia.  3. Remainder of the exam is unchanged.

## 2022-08-24 DIAGNOSIS — I71.40 ABDOMINAL AORTIC ANEURYSM (AAA) WITHOUT RUPTURE (H): Primary | ICD-10-CM

## 2022-08-29 ENCOUNTER — TELEPHONE (OUTPATIENT)
Dept: FAMILY MEDICINE | Facility: CLINIC | Age: 87
End: 2022-08-29

## 2022-08-29 RX ORDER — LACTULOSE 10 G/15ML
30 SOLUTION ORAL; RECTAL 2 TIMES DAILY
Qty: 2000 ML | Refills: 3 | Status: SHIPPED | OUTPATIENT
Start: 2022-08-29 | End: 2023-01-23

## 2022-08-29 NOTE — TELEPHONE ENCOUNTER
Patient calling patient states they are out of the medication, patient does not take lactulose  packets. Patient takes lactulose 30mL twice per day - Lactulose 10gm/15mL solution. Per chart enulose 10 gm/15 ml solution is listed as historical on medlist.     Writer pended Lactulose 10gm/15mL solution.and discontinued lactulose packets form med list. pcp please review and route to MA team to fax over script to [facility].      [facility]: [phone]      Callback: [phone] to leave detailed VM      Bobbi Sandhu RN  [facility]

## 2022-08-29 NOTE — TELEPHONE ENCOUNTER
Patient calling          Bobbi Sandhu RN  [facility]

## 2022-08-30 NOTE — TELEPHONE ENCOUNTER
script was e-scribed to [facility]. Did not print to manually fax.  Pt notified.

## 2022-08-30 NOTE — TELEPHONE ENCOUNTER
Provider ordered mediation, team can you please fax script to pharmacy and notify patient.    Bobbi Sandhu RN  [facility]

## 2022-08-31 ENCOUNTER — TRANSFERRED RECORDS (OUTPATIENT)
Dept: FAMILY MEDICINE | Facility: CLINIC | Age: 87
End: 2022-08-31

## 2022-09-19 ENCOUNTER — OFFICE VISIT (OUTPATIENT)
Dept: FAMILY MEDICINE | Facility: CLINIC | Age: 87
End: 2022-09-19
Payer: COMMERCIAL

## 2022-09-19 VITALS
WEIGHT: 200 LBS | SYSTOLIC BLOOD PRESSURE: 148 MMHG | OXYGEN SATURATION: 98 % | BODY MASS INDEX: 26.51 KG/M2 | HEIGHT: 73 IN | HEART RATE: 62 BPM | DIASTOLIC BLOOD PRESSURE: 54 MMHG

## 2022-09-19 DIAGNOSIS — E03.9 ACQUIRED HYPOTHYROIDISM: ICD-10-CM

## 2022-09-19 DIAGNOSIS — R21 RASH AND NONSPECIFIC SKIN ERUPTION: ICD-10-CM

## 2022-09-19 DIAGNOSIS — E78.5 HYPERLIPIDEMIA LDL GOAL <100: ICD-10-CM

## 2022-09-19 DIAGNOSIS — I63.9 CEREBROVASCULAR ACCIDENT (CVA), UNSPECIFIED MECHANISM (H): ICD-10-CM

## 2022-09-19 DIAGNOSIS — I10 ESSENTIAL HYPERTENSION: ICD-10-CM

## 2022-09-19 DIAGNOSIS — Z23 NEED FOR VACCINATION: ICD-10-CM

## 2022-09-19 DIAGNOSIS — K43.9 VENTRAL HERNIA WITHOUT OBSTRUCTION OR GANGRENE: Primary | ICD-10-CM

## 2022-09-19 DIAGNOSIS — Z23 HIGH PRIORITY FOR 2019-NCOV VACCINE: ICD-10-CM

## 2022-09-19 DIAGNOSIS — R25.1 TREMOR: ICD-10-CM

## 2022-09-19 PROCEDURE — 90714 TD VACC NO PRESV 7 YRS+ IM: CPT | Performed by: INTERNAL MEDICINE

## 2022-09-19 PROCEDURE — 90471 IMMUNIZATION ADMIN: CPT | Performed by: INTERNAL MEDICINE

## 2022-09-19 PROCEDURE — 91313 COVID-19,PF,MODERNA BIVALENT: CPT | Performed by: INTERNAL MEDICINE

## 2022-09-19 PROCEDURE — 99214 OFFICE O/P EST MOD 30 MIN: CPT | Mod: 25 | Performed by: INTERNAL MEDICINE

## 2022-09-19 PROCEDURE — 0134A COVID-19,PF,MODERNA BIVALENT: CPT | Performed by: INTERNAL MEDICINE

## 2022-09-19 RX ORDER — CLOPIDOGREL BISULFATE 75 MG/1
75 TABLET ORAL DAILY
Qty: 90 TABLET | Refills: 3 | Status: SHIPPED | OUTPATIENT
Start: 2022-09-19 | End: 2023-03-30

## 2022-09-19 RX ORDER — LEVOTHYROXINE SODIUM 100 UG/1
100 TABLET ORAL DAILY
Qty: 90 TABLET | Refills: 3 | Status: SHIPPED | OUTPATIENT
Start: 2022-09-19 | End: 2023-03-31

## 2022-09-19 RX ORDER — TRIAMCINOLONE ACETONIDE 1 MG/G
CREAM TOPICAL 2 TIMES DAILY
Qty: 45 G | Refills: 1 | Status: SHIPPED | OUTPATIENT
Start: 2022-09-19 | End: 2023-01-31

## 2022-09-19 RX ORDER — CHLORTHALIDONE 25 MG/1
25 TABLET ORAL DAILY
Qty: 90 TABLET | Refills: 3 | Status: SHIPPED | OUTPATIENT
Start: 2022-09-19 | End: 2023-03-30

## 2022-09-19 RX ORDER — SIMVASTATIN 20 MG
20 TABLET ORAL AT BEDTIME
Qty: 90 TABLET | Refills: 2 | Status: SHIPPED | OUTPATIENT
Start: 2022-09-19 | End: 2023-03-30

## 2022-09-19 ASSESSMENT — PAIN SCALES - GENERAL: PAINLEVEL: NO PAIN (0)

## 2022-09-19 NOTE — PROGRESS NOTES
The patient presents for several issues.    He has very red hands its been like that for several weeks.  They itch a little but do not hurt.  He has dry skin there as well.    The patient notes a tremor on the right side more than the left.  It can be a resting tremor but also when he uses his hand.    He has a mass in his epigastric area that bulges.  Its been there for years.  It does not really hurt but he wonders about it.  He did have a recent CT which is noted and discussed what it is.    He has hyperlipidemia and hypothyroidism and I will renew his medications.  He has had a prior CVA which is stable.  He has hypertension.  His blood pressure control is adequate.    He is now living in a senior living.  He is happy about that.    Past Medical History:   Diagnosis Date     A-fib (H) 2010    post op     AAA (abdominal aortic aneurysm) without rupture (H)     Dr. 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    carotid us no stenosis, echo no change and no clots; ziopatch no afib, svt present     Anemia 2004     Aortic insufficiency 06/2014    1+ on echo     Aortic insufficiency 11/2016    mild to mod     ASCVD (arteriosclerotic cardiovascular disease) 2010    cabg, post op afib     BPH (benign prostatic hyperplasia) 2003    turp, flomax added by urol 2/17     Bradycardia 2016    stopped toprol     CKD (chronic kidney disease) stage 3, GFR 30-59 ml/min (H)      Constipation 05/2020    colonoscopy nl     Cough 2010    pulm eval, felt due to reflux     Dizzy 2001    mri small vessel dz     GERD (gastroesophageal reflux disease)      HTN (hypertension) 2002     Hx of colonoscopy 2003    tics     Hypothyroid      Hypovitaminosis D      Idiopathic neuropathy      Lung nodule 02/2018    6mm, found during eval of ascending aorta, fu 8/18 no change     OCD (obsessive compulsive disorder)     sees psyche     Panic disorder     Dr. Luna, then someone after he retired     Spinal headache       Stroke (H) 12/2016    expressive aphasia, hosp, seen by neuro, mri pos, carotids min dz, changed from asa to plavix     Sudden hearing loss 06/2013    Dr. Garcia, mri brain no acoustic neuroma     SVT (supraventricular tachycardia) (H) 11/2016    seen on ziopatch done for amaurosis, longest 15 beats     Thoracic ascending aortic aneurysm (H) 06/2014    4.4cm on echo, aortic root 3.9, fu 5/15 4.8cm; fu done 12/15 and slightly enlarged, fu 6/16 no change, fu 11/16 no change; fu 1/18 echo 5.1-5.3, enlarged, then cta done and only 4.8cm, t fu 6 months, lvh, nl ef, mild ai; fu 8/18 slightly larger; fu 2/19 slightly smaller; fu 2/20 and then 1/21 and stable     Ulcerative colitis (H)     not active for years     Past Surgical History:   Procedure Laterality Date     BACK SURGERY  1998     BLADDER SURGERY  1985     CATARACT IOL, RT/LT  2011     COLONOSCOPY N/A 05/05/2020    Procedure: COLONOSCOPY;  Surgeon: Kenya Loco MD;  Location:  GI     CORONARY ARTERY BYPASS  2010     ENDOVASCULAR REPAIR ANEURYSM ABDOMINAL AORTA N/A 05/27/2015    Procedure: ENDOVASCULAR REPAIR ANEURYSM ABDOMINAL AORTA;  Surgeon: Darin Walters MD;  Location:  OR     HERNIA REPAIR  2005     HERNIA REPAIR  1998     HERNIORRHAPHY INGUINAL Left 01/05/2018    Procedure: HERNIORRHAPHY INGUINAL;  Incarcerated Left Inguinal Hernia;  Surgeon: Harsh Walker MD;  Location:  OR     KNEE SURGERY  1997     REPAIR HAMMER TOE  12/28/2012    Procedure: REPAIR HAMMER TOE;  LEFT SECOND AND THIRD CLAW TOE RECONSTRUCTION;  Surgeon: Gray Haynes MD;  Location:  OR     REPAIR HAMMER TOE  04/2018    tria     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     TURP  2003     [facility] TOTAL KNEE ARTHROPLASTY  2011    left     [facility] TOTAL KNEE ARTHROPLASTY  2009    right     Social History     Socioeconomic History     Marital status:      Spouse name: Not on file     Number of children: 2     Years of education: Not on file     Highest  education level: Not on file   Occupational History     Occupation: retail     Employer: RETIRED   Tobacco Use     Smoking status: Former Smoker     Packs/day: 1.00     Years: 5.00     Pack years: 5.00     Types: Cigarettes     Quit date: 1965     Years since quittin.3     Smokeless tobacco: Never Used   Substance and Sexual Activity     Alcohol use: Yes     Alcohol/week: 0.0 standard drinks     Comment: maybe one glass of wine a week     Drug use: No     Sexual activity: Not Currently     Partners: Female   Other Topics Concern     Parent/sibling w/ CABG, MI or angioplasty before 65F 55M? Not Asked   Social History Narrative     Not on file     Social Determinants of Health     Financial Resource Strain: Not on file   Food Insecurity: Not on file   Transportation Needs: Not on file   Physical Activity: Not on file   Stress: Not on file   Social Connections: Not on file   Intimate Partner Violence: Not on file   Housing Stability: Not on file     Current Outpatient Medications   Medication Sig Dispense Refill     acetaminophen (TYLENOL) 500 MG tablet Take 1,000 mg by mouth every 6 hours as needed for mild pain PAIN       ARIPiprazole (ABILIFY) 2 MG tablet Take 1 tablet by mouth At Bedtime.       chlorthalidone (HYGROTON) 25 MG tablet Take 1 tablet (25 mg) by mouth daily 90 tablet 3     ClomiPRAMINE HCl (ANAFRANIL PO) Take 25 mg by mouth every evening        clopidogrel (PLAVIX) 75 MG tablet Take 1 tablet (75 mg) by mouth daily 90 tablet 3     ENULOSE 10 GM/15ML SOLUTION        gabapentin (NEURONTIN) 400 MG capsule Take 1 capsule by mouth 2 times daily        lactulose encephalopathy (ENULOSE) 10 GM/15ML SOLUTION Take 30 mLs (20 g) by mouth 2 times daily 2000 mL 3     levothyroxine (SYNTHROID/LEVOTHROID) 100 MCG tablet Take 1 tablet (100 mcg) by mouth daily 90 tablet 3     MIRTAZAPINE PO Take 45 mg by mouth At Bedtime        simvastatin (ZOCOR) 20 MG tablet Take 1 tablet (20 mg) by mouth At Bedtime 90  "tablet 2     triamcinolone (KENALOG) 0.1 % external cream Apply topically 2 times daily 45 g 1     No Known Allergies  FAMILY HISTORY NOTED AND REVIEWED    REVIEW OF SYSTEMS: above    PHYSICAL EXAM    BP (!) 148/54 (BP Location: Right arm, Patient Position: Sitting, Cuff Size: Adult Regular)   Pulse 62   Ht 1.854 m (6' 1\")   Wt 90.7 kg (200 lb)   SpO2 98%   BMI 26.39 kg/m      Patient appears non toxic  Lungs clear  cv reglar rate and rhythm, no jvp, 1+ bilat lower leg edema  Abdomen normal active bowel sounds, he has an obvious ventral hernia that is not tender.  No other masses or organomegaly.  Hand exam reveals very red blanching hands bilaterally with some dry skin and eczematous changes as well.  The patient uses a walker and moves quite slowly.  She does have a resting tremor of the right greater than left hand.  He has somewhat of a masked facies.    ASSESSMENT:  1. Ventral hernia, to surgery  2. Tremor, ?essential vs parkinsons, less likely med  3. Rash, ?eczema  4. Elevated cholesterol  5. Hypothyroidism, last tsh fine  6. cvi  7. Hypertension, controlled    PLAN:  triam cream and to derm  Neuro eval  Surgery eval  Follow up 4 months  covid booster and td    Zurdo Kendrick M.D.        "

## 2022-09-19 NOTE — NURSING NOTE
Prior to immunization administration, verified patients identity using patient s name and date of birth. Please see Immunization Activity for additional information.     Screening Questionnaire for Adult Immunization    Are you sick today?   No   Do you have allergies to medications, food, a vaccine component or latex?   No   Have you ever had a serious reaction after receiving a vaccination?   No   Do you have a long-term health problem with heart, lung, kidney, or metabolic disease (e.g., diabetes), asthma, a blood disorder, no spleen, complement component deficiency, a cochlear implant, or a spinal fluid leak?  Are you on long-term aspirin therapy?      Do you have cancer, leukemia, HIV/AIDS, or any other immune system problem?      Do you have a parent, brother, or sister with an immune system problem?      In the past 3 months, have you taken medications that affect  your immune system, such as prednisone, other steroids, or anticancer drugs; drugs for the treatment of rheumatoid arthritis, Crohn s disease, or psoriasis; or have you had radiation treatments?      Have you had a seizure, or a brain or other nervous system problem?      During the past year, have you received a transfusion of blood or blood    products, or been given immune (gamma) globulin or antiviral drug?      For women: Are you pregnant or is there a chance you could become       pregnant during the next month?      Have you received any vaccinations in the past 4 weeks?   No     Immunization questionnaire answers were all negative.        Per orders of Dr. Kendrick, injection of Td given by Emily Cantrell MA. Patient instructed to remain in clinic for 15 minutes afterwards, and to report any adverse reaction to me immediately.       Screening performed by Emily Cantrell MA on 9/19/2022 at 3:54 PM.

## 2022-09-19 NOTE — PATIENT INSTRUCTIONS
Use the skin cream for the hands and follow up with dermatology  See surgery for the hernia  See neurology for the tremor  Follow up with me in 4 months      Zurdo Kendrick M.D.

## 2022-09-20 ENCOUNTER — TELEPHONE (OUTPATIENT)
Dept: FAMILY MEDICINE | Facility: CLINIC | Age: 87
End: 2022-09-20

## 2022-09-20 NOTE — TELEPHONE ENCOUNTER
He could call the [facility] if he would prefer.  I am not sure they can get him in sooner but he could try.    Thank you    Zurdo Kendrick M.D.

## 2022-09-20 NOTE — TELEPHONE ENCOUNTER
Patient saw Dr. Kendrick yesterday. Patient states that he was given a referral for neurology for a hand tremor. The soonest appointment that he can get is February.    Patient is requesting to another neurologist.  Abena Brown RN

## 2022-09-21 NOTE — TELEPHONE ENCOUNTER
Pt states that he called [facility] and has an appt for 10/13/22.    Chhaya Nesbitt,  [facility]

## 2022-09-21 NOTE — TELEPHONE ENCOUNTER
Call to patient. Message left for return call to clinic.     Surekha Calderon, RN BSN MSN  [facility]

## 2022-09-23 ENCOUNTER — OFFICE VISIT (OUTPATIENT)
Dept: SURGERY | Facility: CLINIC | Age: 87
End: 2022-09-23
Attending: INTERNAL MEDICINE
Payer: COMMERCIAL

## 2022-09-23 VITALS
WEIGHT: 192 LBS | BODY MASS INDEX: 25.45 KG/M2 | HEIGHT: 73 IN | OXYGEN SATURATION: 95 % | DIASTOLIC BLOOD PRESSURE: 82 MMHG | HEART RATE: 68 BPM | SYSTOLIC BLOOD PRESSURE: 132 MMHG | RESPIRATION RATE: 14 BRPM

## 2022-09-23 DIAGNOSIS — K43.9 VENTRAL HERNIA WITHOUT OBSTRUCTION OR GANGRENE: ICD-10-CM

## 2022-09-23 PROCEDURE — 99203 OFFICE O/P NEW LOW 30 MIN: CPT | Performed by: SURGERY

## 2022-09-23 NOTE — TELEPHONE ENCOUNTER
Per chart review, patient seen on 9/19/22 with PCP and addressed red skin on hands.     Signing encounter.    Uche Munoz RN  [facility]

## 2022-09-23 NOTE — LETTER
September 23, 2022          Zurdo Kendrick MD  [address] [facility] [address]  [place] [address]      RE:   Zack Santiago 5/29/1931      Dear Colleague,    Thank you for referring your patient, Zack Santiago, to [facility] at [place] location. Please see a copy of my visit note below.    Zack Santiago is a 89 year old male who is seen in consultation at the request of Dr. Kendrick for evaluation of ventral hernia.  The patient reports that he has had a hernia present in the midepigastric area for many years.  Over time, the hernia has increased in size.  The patient does not have any pain associated with the hernia.  Patient denies obstructive symptoms.  No nausea/vomiting.  The patient does believe that he previously underwent repair of a hernia in the same area.  He does not report any pain at the umbilicus.  Patient did recently undergo CT of the abdomen/pelvis, which did demonstrate ventral hernias in the midepigastric area and at the umbilicus.  Patient does have history of previous hernia repairs, including left inguinal hernia repair.  He denies any previous abdominal surgical procedures.     REVIEW OF SYSTEMS:  Constitutional: No fevers or chills  Eyes: No blurred or double vision  HENT: Denies headaches, No rhinorrhea, No sore throat  Respiratory: No cough or shortness of breath  Cardiovascular: Denies chest pain or palpitations  Gastrointestinal: No abdominal pain or nausea/vomiting  Genitourinary: No hematuria or dysuria  Musculoskeletal: Denies arthralgias or myalgias  Neurologic: No numbness or tingling  Integumentary: Skin rash on the hands bilaterally     Past Medical History        Past Medical History:   Diagnosis Date     A-fib (H) 2010     post op     AAA (abdominal aortic aneurysm) without rupture (H)       Dr. 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     carotid us no stenosis, echo no change and no clots; ziopatch no afib, svt present      Anemia 2004     Aortic insufficiency 06/2014     1+ on echo     Aortic insufficiency 11/2016     mild to mod     ASCVD (arteriosclerotic cardiovascular disease) 2010     cabg, post op afib     BPH (benign prostatic hyperplasia) 2003     turp, flomax added by urol 2/17     Bradycardia 2016     stopped toprol     CKD (chronic kidney disease) stage 3, GFR 30-59 ml/min (H)       Constipation 05/2020     colonoscopy nl     Cough 2010     pulm eval, felt due to reflux     Dizzy 2001     mri small vessel dz     GERD (gastroesophageal reflux disease)       HTN (hypertension) 2002     Hx of colonoscopy 2003     tics     Hypothyroid       Hypovitaminosis D       Idiopathic neuropathy       Lung nodule 02/2018     6mm, found during eval of ascending aorta, fu 8/18 no change     OCD (obsessive compulsive disorder)       sees psyche     Panic disorder       Dr. Luna, then someone after he retired     Spinal headache       Stroke (H) 12/2016     expressive aphasia, hosp, seen by neuro, mri pos, carotids min dz, changed from asa to plavix     Sudden hearing loss 06/2013     Dr. Garcia, mri brain no acoustic neuroma     SVT (supraventricular tachycardia) (H) 11/2016     seen on ziopatch done for amaurosis, longest 15 beats     Thoracic ascending aortic aneurysm (H) 06/2014     4.4cm on echo, aortic root 3.9, fu 5/15 4.8cm; fu done 12/15 and slightly enlarged, fu 6/16 no change, fu 11/16 no change; fu 1/18 echo 5.1-5.3, enlarged, then cta done and only 4.8cm, t fu 6 months, lvh, nl ef, mild ai; fu 8/18 slightly larger; fu 2/19 slightly smaller; fu 2/20 and then 1/21 and stable     Ulcerative colitis (H)       not active for years            Past Surgical History         Past Surgical History:   Procedure Laterality Date     BACK SURGERY   1998     BLADDER SURGERY   1985     CATARACT IOL, RT/LT   2011     COLONOSCOPY N/A 05/05/2020     Procedure: COLONOSCOPY;  Surgeon: Kenya Loco MD;  Location:  GI     CORONARY  ARTERY BYPASS        ENDOVASCULAR REPAIR ANEURYSM ABDOMINAL AORTA N/A 2015     Procedure: ENDOVASCULAR REPAIR ANEURYSM ABDOMINAL AORTA;  Surgeon: Darin Walters MD;  Location: [facility]     HERNIA REPAIR        HERNIA REPAIR   1998     HERNIORRHAPHY INGUINAL Left 2018     Procedure: HERNIORRHAPHY INGUINAL;  Incarcerated Left Inguinal Hernia;  Surgeon: Harsh Walker MD;  Location: [facility] OR     KNEE SURGERY        REPAIR HAMMER TOE   2012     Procedure: REPAIR HAMMER TOE;  LEFT SECOND AND THIRD CLAW TOE RECONSTRUCTION;  Surgeon: Gray Haynes MD;  Location: [facility] OR     REPAIR HAMMER TOE   2018     tria     toe amputation right foot   2021     TURP        ZZC TOTAL KNEE ARTHROPLASTY        left     ZZC TOTAL KNEE ARTHROPLASTY   2009     right            Family History         Family History   Problem Relation Age of Onset     C.A.D. Father       Lymphoma Sister       Retinal detachment Daughter       Glaucoma No family hx of       Macular Degeneration No family hx of              Social History            Tobacco Use     Smoking status: Former Smoker       Packs/day: 1.00       Years: 5.00       Pack years: 5.00       Types: Cigarettes       Quit date: 1965       Years since quittin.3     Smokeless tobacco: Never Used   Substance Use Topics     Alcohol use: Yes       Alcohol/week: 0.0 standard drinks       Comment: maybe one glass of wine a week             Patient Active Problem List   Diagnosis     Advanced directives, counseling/discussion     Hyperlipidemia LDL goal <100     OCD (obsessive compulsive disorder)     GERD (gastroesophageal reflux disease)     AAA (abdominal aortic aneurysm) without rupture (H)     Hypovitaminosis D     ASCVD (arteriosclerotic cardiovascular disease)     Acquired hypothyroidism     Idiopathic neuropathy     Benign essential hypertension     Cough     Claw toe, acquired     CKD (chronic kidney disease) stage 3, GFR  "30-59 ml/min (H)     Sudden hearing loss     Thoracic ascending aortic aneurysm (H)     Aortic insufficiency     Bradycardia     Amaurosis fugax of right eye     Cerebrovascular accident (CVA), unspecified mechanism (H)     Lung nodule         No Known Allergies     Current Outpatient Prescriptions          Current Outpatient Medications   Medication Sig Dispense Refill     acetaminophen (TYLENOL) 500 MG tablet Take 1,000 mg by mouth every 6 hours as needed for mild pain PAIN         ARIPiprazole (ABILIFY) 2 MG tablet Take 1 tablet by mouth At Bedtime.         chlorthalidone (HYGROTON) 25 MG tablet Take 1 tablet (25 mg) by mouth daily 90 tablet 3     ClomiPRAMINE HCl (ANAFRANIL PO) Take 25 mg by mouth every evening          clopidogrel (PLAVIX) 75 MG tablet Take 1 tablet (75 mg) by mouth daily 90 tablet 3     ENULOSE 10 GM/15ML SOLUTION           gabapentin (NEURONTIN) 400 MG capsule Take 1 capsule by mouth 2 times daily          lactulose encephalopathy (ENULOSE) 10 GM/15ML SOLUTION Take 30 mLs (20 g) by mouth 2 times daily 2000 mL 3     levothyroxine (SYNTHROID/LEVOTHROID) 100 MCG tablet Take 1 tablet (100 mcg) by mouth daily 90 tablet 3     MIRTAZAPINE PO Take 45 mg by mouth At Bedtime          simvastatin (ZOCOR) 20 MG tablet Take 1 tablet (20 mg) by mouth At Bedtime 90 tablet 2     triamcinolone (KENALOG) 0.1 % external cream Apply topically 2 times daily 45 g 1            Vitals: /82   Pulse 68   Resp 14   Ht 1.854 m (6' 1\")   Wt 87.1 kg (192 lb)   SpO2 95%   BMI 25.33 kg/m    BMI= Body mass index is 25.33 kg/m .     EXAM:  General: Vital signs reviewed, in no apparent distress  Eyes: Anicteric  HENT: Normocephalic, atraumatic, trachea midline   Respiratory: Breathing nonlabored  Cardiovascular: Regular rate and rhythm  GI: Abdomen soft, nondistended; moderate sized, partially reducible, nontender epigastric ventral hernia; small, nonreducible, umbilical hernia which is mildly tender with attempted " reduction  Musculoskeletal: No gross deformities  Neurologic: Grossly nonfocal exam  Psychiatric: Normal mood, affect and insight  Integumentary: Warm and dry     All labs and imaging personally reviewed and significant for:   CTA ABDOMEN PELVIS WITH CONTRAST 8/22/2022 3:24 PM     FINDINGS: Scattered linear bibasilar opacities likely atelectasis.  Heart size is normal. Large osteophytes arising from several lumbar  vertebrae. No acute osseous abnormality.     Hypodensity in the caudate lobe of the liver is unchanged and likely a  cyst. The gallbladder, spleen, adrenal glands, and pancreas are  unremarkable. Both kidneys are perfused. Appearance of both kidneys is  unchanged. Ventral hernia is noted in the epigastric region, with  scattered internal density, thought to be fluid and simple fluid. Do  not clearly identify loops of bowel. No intraperitoneal fluid or air.  The bladder is partially distended and unremarkable. Moderate amount  of stool throughout the colon. No dilated loops of bowel.     The visible descending thoracic aorta is patent. The celiac axis, SMA,  bilateral renal arteries are patent. The infrarenal abdominal aorta is  6.4 cm AP x 6.1 cm transverse, previously 6.2 cm AP x 5.6 cm  transverse. Both native internal iliac, external iliac, and common  femoral arteries are patent. Type II endoleak again identified, best  visualized on delayed images and likely with communication between MEKA  and lumbar arteries.                                                                      IMPRESSION:  1. Persistent type II endoleak. Aneurysmal sac is 6.4 cm AP x 6.1 cm  transverse on today's exam, previously 6.2 cm AP x 5.6 cm transverse  indicating growth of the aneurysm since previous exam.  2. Ventral hernia in the epigastric region, perhaps with blood  vessels, also with fluid. The amount of fluid is increased since  previous exam. Do not clearly identify loops of bowel within the  hernia.  3. Remainder of  the exam is unchanged.        ASSESSMENT:  Zack Santiago is a 89 year old who presents with umbilical and epigastric ventral hernias.         PLAN:  At this time, I would recommend continued observation for the patient's asymptomatic midepigastric and umbilical hernias.  I have encouraged the patient to continue to monitor his symptoms.  If he begins having increasing pain at either the umbilicus or the epigastric area, I would be happy to see him back in clinic for reevaluation at any time.  Signs and symptoms of hernia incarceration were discussed.  The patient understands that the signs/symptoms warrant urgent surgical evaluation.  Otherwise the patient may follow-up in the general surgery clinic on an as-needed basis.     It was my pleasure to participate in the care of Zack Santiago in clinic today.    Again, thank you for allowing me to participate in the care of your patient.      Sincerely,      Anila Story MD

## 2022-09-23 NOTE — PROGRESS NOTES
[facility] Consultation    CHIEF COMPLAINT:  Chief Complaint   Patient presents with     Consult     Consult Ventral Hernia, referring Dr. Zurdo Kendrick, CT in [facility]       HISTORY OF PRESENT ILLNESS:  Zack Santiago is a 89 year old male who is seen in consultation at the request of Dr. Kendrick for evaluation of ventral hernia.  The patient reports that he has had a hernia present in the midepigastric area for many years.  Over time, the hernia has increased in size.  The patient does not have any pain associated with the hernia.  Patient denies obstructive symptoms.  No nausea/vomiting.  The patient does believe that he previously underwent repair of a hernia in the same area.  He does not report any pain at the umbilicus.  Patient did recently undergo CT of the abdomen/pelvis, which did demonstrate ventral hernias in the midepigastric area and at the umbilicus.  Patient does have history of previous hernia repairs, including left inguinal hernia repair.  He denies any previous abdominal surgical procedures.    REVIEW OF SYSTEMS:  Constitutional: No fevers or chills  Eyes: No blurred or double vision  HENT: Denies headaches, No rhinorrhea, No sore throat  Respiratory: No cough or shortness of breath  Cardiovascular: Denies chest pain or palpitations  Gastrointestinal: No abdominal pain or nausea/vomiting  Genitourinary: No hematuria or dysuria  Musculoskeletal: Denies arthralgias or myalgias  Neurologic: No numbness or tingling  Integumentary: Skin rash on the hands bilaterally    Past Medical History:   Diagnosis Date     A-fib (H) 2010    post op     AAA (abdominal aortic aneurysm) without rupture (H)     Dr. 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    carotid us no stenosis, echo no change and no clots; ziopatch no afib, svt present     Anemia 2004     Aortic insufficiency 06/2014    1+ on echo     Aortic insufficiency 11/2016    mild to mod      ASCVD (arteriosclerotic cardiovascular disease) 2010    cabg, post op afib     BPH (benign prostatic hyperplasia) 2003    turp, flomax added by urol 2/17     Bradycardia 2016    stopped toprol     CKD (chronic kidney disease) stage 3, GFR 30-59 ml/min (H)      Constipation 05/2020    colonoscopy nl     Cough 2010    pulm eval, felt due to reflux     Dizzy 2001    mri small vessel dz     GERD (gastroesophageal reflux disease)      HTN (hypertension) 2002     Hx of colonoscopy 2003    tics     Hypothyroid      Hypovitaminosis D      Idiopathic neuropathy      Lung nodule 02/2018    6mm, found during eval of ascending aorta, fu 8/18 no change     OCD (obsessive compulsive disorder)     sees psyche     Panic disorder     Dr. Luna, then someone after he retired     Spinal headache      Stroke (H) 12/2016    expressive aphasia, hosp, seen by neuro, mri pos, carotids min dz, changed from asa to plavix     Sudden hearing loss 06/2013    Dr. Garcia, mri brain no acoustic neuroma     SVT (supraventricular tachycardia) (H) 11/2016    seen on ziopatch done for amaurosis, longest 15 beats     Thoracic ascending aortic aneurysm (H) 06/2014    4.4cm on echo, aortic root 3.9, fu 5/15 4.8cm; fu done 12/15 and slightly enlarged, fu 6/16 no change, fu 11/16 no change; fu 1/18 echo 5.1-5.3, enlarged, then cta done and only 4.8cm, t fu 6 months, lvh, nl ef, mild ai; fu 8/18 slightly larger; fu 2/19 slightly smaller; fu 2/20 and then 1/21 and stable     Ulcerative colitis (H)     not active for years       Past Surgical History:   Procedure Laterality Date     BACK SURGERY  1998     BLADDER SURGERY  1985     CATARACT IOL, RT/LT  2011     COLONOSCOPY N/A 05/05/2020    Procedure: COLONOSCOPY;  Surgeon: Kenya Loco MD;  Location:  GI     CORONARY ARTERY BYPASS  2010     ENDOVASCULAR REPAIR ANEURYSM ABDOMINAL AORTA N/A 05/27/2015    Procedure: ENDOVASCULAR REPAIR ANEURYSM ABDOMINAL AORTA;  Surgeon: Darin Walters MD;   Location: [facility] OR     HERNIA REPAIR       HERNIA REPAIR       HERNIORRHAPHY INGUINAL Left 2018    Procedure: HERNIORRHAPHY INGUINAL;  Incarcerated Left Inguinal Hernia;  Surgeon: Harsh Walker MD;  Location: [facility] OR     KNEE SURGERY       REPAIR HAMMER TOE  2012    Procedure: REPAIR HAMMER TOE;  LEFT SECOND AND THIRD CLAW TOE RECONSTRUCTION;  Surgeon: Gray Haynes MD;  Location: [facility] OR     REPAIR HAMMER TOE  2018    tria     toe amputation right foot  2021     TURP       [facility] TOTAL KNEE ARTHROPLASTY  2011    left     [facility] TOTAL KNEE ARTHROPLASTY  2009    right       Family History   Problem Relation Age of Onset     C.A.D. Father      Lymphoma Sister      Retinal detachment Daughter      Glaucoma No family hx of      Macular Degeneration No family hx of        Social History     Tobacco Use     Smoking status: Former Smoker     Packs/day: 1.00     Years: 5.00     Pack years: 5.00     Types: Cigarettes     Quit date: 1965     Years since quittin.3     Smokeless tobacco: Never Used   Substance Use Topics     Alcohol use: Yes     Alcohol/week: 0.0 standard drinks     Comment: maybe one glass of wine a week       Patient Active Problem List   Diagnosis     Advanced directives, counseling/discussion     Hyperlipidemia LDL goal <100     OCD (obsessive compulsive disorder)     GERD (gastroesophageal reflux disease)     AAA (abdominal aortic aneurysm) without rupture (H)     Hypovitaminosis D     ASCVD (arteriosclerotic cardiovascular disease)     Acquired hypothyroidism     Idiopathic neuropathy     Benign essential hypertension     Cough     Claw toe, acquired     CKD (chronic kidney disease) stage 3, GFR 30-59 ml/min (H)     Sudden hearing loss     Thoracic ascending aortic aneurysm (H)     Aortic insufficiency     Bradycardia     Amaurosis fugax of right eye     Cerebrovascular accident (CVA), unspecified mechanism (H)     Lung nodule       No Known  "Allergies    Current Outpatient Medications   Medication Sig Dispense Refill     acetaminophen (TYLENOL) 500 MG tablet Take 1,000 mg by mouth every 6 hours as needed for mild pain PAIN       ARIPiprazole (ABILIFY) 2 MG tablet Take 1 tablet by mouth At Bedtime.       chlorthalidone (HYGROTON) 25 MG tablet Take 1 tablet (25 mg) by mouth daily 90 tablet 3     ClomiPRAMINE HCl (ANAFRANIL PO) Take 25 mg by mouth every evening        clopidogrel (PLAVIX) 75 MG tablet Take 1 tablet (75 mg) by mouth daily 90 tablet 3     ENULOSE 10 GM/15ML SOLUTION        gabapentin (NEURONTIN) 400 MG capsule Take 1 capsule by mouth 2 times daily        lactulose encephalopathy (ENULOSE) 10 GM/15ML SOLUTION Take 30 mLs (20 g) by mouth 2 times daily 2000 mL 3     levothyroxine (SYNTHROID/LEVOTHROID) 100 MCG tablet Take 1 tablet (100 mcg) by mouth daily 90 tablet 3     MIRTAZAPINE PO Take 45 mg by mouth At Bedtime        simvastatin (ZOCOR) 20 MG tablet Take 1 tablet (20 mg) by mouth At Bedtime 90 tablet 2     triamcinolone (KENALOG) 0.1 % external cream Apply topically 2 times daily 45 g 1       Vitals: /82   Pulse 68   Resp 14   Ht 1.854 m (6' 1\")   Wt 87.1 kg (192 lb)   SpO2 95%   BMI 25.33 kg/m    BMI= Body mass index is 25.33 kg/m .    EXAM:  General: Vital signs reviewed, in no apparent distress  Eyes: Anicteric  HENT: Normocephalic, atraumatic, trachea midline   Respiratory: Breathing nonlabored  Cardiovascular: Regular rate and rhythm  GI: Abdomen soft, nondistended; moderate sized, partially reducible, nontender epigastric ventral hernia; small, nonreducible, umbilical hernia which is mildly tender with attempted reduction  Musculoskeletal: No gross deformities  Neurologic: Grossly nonfocal exam  Psychiatric: Normal mood, affect and insight  Integumentary: Warm and dry    All labs and imaging personally reviewed and significant for:   CTA ABDOMEN PELVIS WITH CONTRAST 8/22/2022 3:24 PM     FINDINGS: Scattered linear " bibasilar opacities likely atelectasis.  Heart size is normal. Large osteophytes arising from several lumbar  vertebrae. No acute osseous abnormality.     Hypodensity in the caudate lobe of the liver is unchanged and likely a  cyst. The gallbladder, spleen, adrenal glands, and pancreas are  unremarkable. Both kidneys are perfused. Appearance of both kidneys is  unchanged. Ventral hernia is noted in the epigastric region, with  scattered internal density, thought to be fluid and simple fluid. Do  not clearly identify loops of bowel. No intraperitoneal fluid or air.  The bladder is partially distended and unremarkable. Moderate amount  of stool throughout the colon. No dilated loops of bowel.     The visible descending thoracic aorta is patent. The celiac axis, SMA,  bilateral renal arteries are patent. The infrarenal abdominal aorta is  6.4 cm AP x 6.1 cm transverse, previously 6.2 cm AP x 5.6 cm  transverse. Both native internal iliac, external iliac, and common  femoral arteries are patent. Type II endoleak again identified, best  visualized on delayed images and likely with communication between MEKA  and lumbar arteries.                                                                      IMPRESSION:  1. Persistent type II endoleak. Aneurysmal sac is 6.4 cm AP x 6.1 cm  transverse on today's exam, previously 6.2 cm AP x 5.6 cm transverse  indicating growth of the aneurysm since previous exam.  2. Ventral hernia in the epigastric region, perhaps with blood  vessels, also with fluid. The amount of fluid is increased since  previous exam. Do not clearly identify loops of bowel within the  hernia.  3. Remainder of the exam is unchanged.       ASSESSMENT:  Zack Santiago is a 89 year old who presents with umbilical and epigastric ventral hernias.       PLAN:  At this time, I would recommend continued observation for the patient's asymptomatic midepigastric and umbilical hernias.  I have encouraged the patient to  continue to monitor his symptoms.  If he begins having increasing pain at either the umbilicus or the epigastric area, I would be happy to see him back in clinic for reevaluation at any time.  Signs and symptoms of hernia incarceration were discussed.  The patient understands that the signs/symptoms warrant urgent surgical evaluation.  Otherwise the patient may follow-up in the general surgery clinic on an as-needed basis.    It was my pleasure to participate in the care of Zack Santiago in clinic today. Thank you for this consultation.         Anila Story MD    Please route or send letter to:  Primary Care Provider (PCP) and Referring Provider

## 2022-10-24 ENCOUNTER — TRANSFERRED RECORDS (OUTPATIENT)
Dept: HEALTH INFORMATION MANAGEMENT | Facility: CLINIC | Age: 87
End: 2022-10-24

## 2022-11-07 ENCOUNTER — MEDICAL CORRESPONDENCE (OUTPATIENT)
Dept: HEALTH INFORMATION MANAGEMENT | Facility: CLINIC | Age: 87
End: 2022-11-07

## 2022-12-15 NOTE — TELEPHONE ENCOUNTER
"Patient called clinic.  No appts available with any provider today.   Please advise.    Patient able to come into clinic if able to work in.      Symptoms: generalized weakness in legs--significant decline in the last few days.   Using walker.  \"I wouldn't be able to get around today without my walker and that's unusual for me\".    Denies pain  Denies SOB  Denies dizziness  Onset/Duration:  2-3 days---worse today.   Fever: Denies      Plan: route to PCP for review/advise    Patient aware that if symptoms continue to worsen advise would be to ED.      [phone]  May leave detailed msg/orders on voice mail.       Halle BARRIOS, RN            " Walk in

## 2022-12-26 ENCOUNTER — MEDICAL CORRESPONDENCE (OUTPATIENT)
Dept: HEALTH INFORMATION MANAGEMENT | Facility: CLINIC | Age: 87
End: 2022-12-26

## 2023-01-13 ENCOUNTER — NURSE TRIAGE (OUTPATIENT)
Dept: FAMILY MEDICINE | Facility: CLINIC | Age: 88
End: 2023-01-13

## 2023-01-13 ENCOUNTER — APPOINTMENT (OUTPATIENT)
Dept: CT IMAGING | Facility: CLINIC | Age: 88
DRG: 193 | End: 2023-01-13
Attending: EMERGENCY MEDICINE
Payer: COMMERCIAL

## 2023-01-13 ENCOUNTER — HOSPITAL ENCOUNTER (INPATIENT)
Facility: CLINIC | Age: 88
LOS: 4 days | Discharge: NURSING, REHAB OR LONG-TERM CARE | DRG: 193 | End: 2023-01-17
Attending: EMERGENCY MEDICINE | Admitting: INTERNAL MEDICINE
Payer: COMMERCIAL

## 2023-01-13 ENCOUNTER — APPOINTMENT (OUTPATIENT)
Dept: GENERAL RADIOLOGY | Facility: CLINIC | Age: 88
DRG: 193 | End: 2023-01-13
Attending: EMERGENCY MEDICINE
Payer: COMMERCIAL

## 2023-01-13 DIAGNOSIS — R09.02 HYPOXIA: ICD-10-CM

## 2023-01-13 DIAGNOSIS — R05.1 ACUTE COUGH: ICD-10-CM

## 2023-01-13 DIAGNOSIS — R50.9 FEVER, UNSPECIFIED FEVER CAUSE: ICD-10-CM

## 2023-01-13 LAB
ANION GAP SERPL CALCULATED.3IONS-SCNC: 8 MMOL/L (ref 3–14)
ATRIAL RATE - MUSE: 66 BPM
BASOPHILS # BLD AUTO: 0 10E3/UL (ref 0–0.2)
BASOPHILS NFR BLD AUTO: 0 %
BUN SERPL-MCNC: 32 MG/DL (ref 7–30)
CALCIUM SERPL-MCNC: 8.8 MG/DL (ref 8.5–10.1)
CHLORIDE BLD-SCNC: 104 MMOL/L (ref 94–109)
CO2 SERPL-SCNC: 27 MMOL/L (ref 20–32)
CREAT SERPL-MCNC: 1.18 MG/DL (ref 0.66–1.25)
D DIMER PPP FEU-MCNC: 4.64 UG/ML FEU (ref 0–0.5)
DIASTOLIC BLOOD PRESSURE - MUSE: NORMAL MMHG
EOSINOPHIL # BLD AUTO: 0 10E3/UL (ref 0–0.7)
EOSINOPHIL NFR BLD AUTO: 0 %
ERYTHROCYTE [DISTWIDTH] IN BLOOD BY AUTOMATED COUNT: 14.7 % (ref 10–15)
FLUAV RNA SPEC QL NAA+PROBE: NEGATIVE
FLUBV RNA RESP QL NAA+PROBE: NEGATIVE
GFR SERPL CREATININE-BSD FRML MDRD: 58 ML/MIN/1.73M2
GLUCOSE BLD-MCNC: 104 MG/DL (ref 70–99)
HCO3 BLDV-SCNC: 25 MMOL/L (ref 21–28)
HCT VFR BLD AUTO: 34.6 % (ref 40–53)
HGB BLD-MCNC: 11.2 G/DL (ref 13.3–17.7)
HOLD SPECIMEN: NORMAL
IMM GRANULOCYTES # BLD: 0 10E3/UL
IMM GRANULOCYTES NFR BLD: 0 %
INTERPRETATION ECG - MUSE: NORMAL
LACTATE BLD-SCNC: 0.9 MMOL/L
LYMPHOCYTES # BLD AUTO: 0.3 10E3/UL (ref 0.8–5.3)
LYMPHOCYTES NFR BLD AUTO: 4 %
MCH RBC QN AUTO: 32.1 PG (ref 26.5–33)
MCHC RBC AUTO-ENTMCNC: 32.4 G/DL (ref 31.5–36.5)
MCV RBC AUTO: 99 FL (ref 78–100)
MONOCYTES # BLD AUTO: 0.5 10E3/UL (ref 0–1.3)
MONOCYTES NFR BLD AUTO: 6 %
NEUTROPHILS # BLD AUTO: 8 10E3/UL (ref 1.6–8.3)
NEUTROPHILS NFR BLD AUTO: 90 %
NRBC # BLD AUTO: 0 10E3/UL
NRBC BLD AUTO-RTO: 0 /100
NT-PROBNP SERPL-MCNC: 1050 PG/ML (ref 0–1800)
P AXIS - MUSE: 62 DEGREES
PCO2 BLDV: 41 MM HG (ref 40–50)
PH BLDV: 7.4 [PH] (ref 7.32–7.43)
PLATELET # BLD AUTO: 195 10E3/UL (ref 150–450)
PO2 BLDV: 26 MM HG (ref 25–47)
POTASSIUM BLD-SCNC: 3.8 MMOL/L (ref 3.4–5.3)
PR INTERVAL - MUSE: 136 MS
QRS DURATION - MUSE: 164 MS
QT - MUSE: 444 MS
QTC - MUSE: 465 MS
R AXIS - MUSE: -53 DEGREES
RBC # BLD AUTO: 3.49 10E6/UL (ref 4.4–5.9)
RSV RNA SPEC NAA+PROBE: NEGATIVE
SAO2 % BLDV: 48 % (ref 94–100)
SARS-COV-2 RNA RESP QL NAA+PROBE: NEGATIVE
SODIUM SERPL-SCNC: 139 MMOL/L (ref 133–144)
SYSTOLIC BLOOD PRESSURE - MUSE: NORMAL MMHG
T AXIS - MUSE: 96 DEGREES
TROPONIN I SERPL HS-MCNC: 14 NG/L
TROPONIN I SERPL HS-MCNC: 19 NG/L
VENTRICULAR RATE- MUSE: 66 BPM
WBC # BLD AUTO: 8.9 10E3/UL (ref 4–11)

## 2023-01-13 PROCEDURE — 250N000009 HC RX 250: Performed by: EMERGENCY MEDICINE

## 2023-01-13 PROCEDURE — 71275 CT ANGIOGRAPHY CHEST: CPT

## 2023-01-13 PROCEDURE — 84484 ASSAY OF TROPONIN QUANT: CPT | Performed by: EMERGENCY MEDICINE

## 2023-01-13 PROCEDURE — 250N000013 HC RX MED GY IP 250 OP 250 PS 637: Performed by: INTERNAL MEDICINE

## 2023-01-13 PROCEDURE — 250N000011 HC RX IP 250 OP 636: Performed by: EMERGENCY MEDICINE

## 2023-01-13 PROCEDURE — 99223 1ST HOSP IP/OBS HIGH 75: CPT | Mod: AI | Performed by: INTERNAL MEDICINE

## 2023-01-13 PROCEDURE — 99285 EMERGENCY DEPT VISIT HI MDM: CPT | Mod: 25

## 2023-01-13 PROCEDURE — 84484 ASSAY OF TROPONIN QUANT: CPT | Performed by: INTERNAL MEDICINE

## 2023-01-13 PROCEDURE — 93005 ELECTROCARDIOGRAM TRACING: CPT

## 2023-01-13 PROCEDURE — C9803 HOPD COVID-19 SPEC COLLECT: HCPCS

## 2023-01-13 PROCEDURE — 250N000013 HC RX MED GY IP 250 OP 250 PS 637: Performed by: EMERGENCY MEDICINE

## 2023-01-13 PROCEDURE — 96365 THER/PROPH/DIAG IV INF INIT: CPT

## 2023-01-13 PROCEDURE — 84145 PROCALCITONIN (PCT): CPT | Performed by: INTERNAL MEDICINE

## 2023-01-13 PROCEDURE — 87637 SARSCOV2&INF A&B&RSV AMP PRB: CPT | Performed by: EMERGENCY MEDICINE

## 2023-01-13 PROCEDURE — 85379 FIBRIN DEGRADATION QUANT: CPT | Performed by: EMERGENCY MEDICINE

## 2023-01-13 PROCEDURE — 71046 X-RAY EXAM CHEST 2 VIEWS: CPT

## 2023-01-13 PROCEDURE — 96367 TX/PROPH/DG ADDL SEQ IV INF: CPT

## 2023-01-13 PROCEDURE — 85025 COMPLETE CBC W/AUTO DIFF WBC: CPT | Performed by: EMERGENCY MEDICINE

## 2023-01-13 PROCEDURE — 80048 BASIC METABOLIC PNL TOTAL CA: CPT | Performed by: EMERGENCY MEDICINE

## 2023-01-13 PROCEDURE — 36415 COLL VENOUS BLD VENIPUNCTURE: CPT | Performed by: EMERGENCY MEDICINE

## 2023-01-13 PROCEDURE — 82803 BLOOD GASES ANY COMBINATION: CPT

## 2023-01-13 PROCEDURE — 120N000001 HC R&B MED SURG/OB

## 2023-01-13 PROCEDURE — 83880 ASSAY OF NATRIURETIC PEPTIDE: CPT | Performed by: EMERGENCY MEDICINE

## 2023-01-13 PROCEDURE — 36415 COLL VENOUS BLD VENIPUNCTURE: CPT | Performed by: INTERNAL MEDICINE

## 2023-01-13 RX ORDER — CHLORTHALIDONE 25 MG/1
25 TABLET ORAL DAILY
Status: DISCONTINUED | OUTPATIENT
Start: 2023-01-14 | End: 2023-01-17 | Stop reason: HOSPADM

## 2023-01-13 RX ORDER — SIMVASTATIN 20 MG
20 TABLET ORAL AT BEDTIME
Status: DISCONTINUED | OUTPATIENT
Start: 2023-01-13 | End: 2023-01-17 | Stop reason: HOSPADM

## 2023-01-13 RX ORDER — CLOPIDOGREL BISULFATE 75 MG/1
75 TABLET ORAL DAILY
Status: DISCONTINUED | OUTPATIENT
Start: 2023-01-14 | End: 2023-01-17 | Stop reason: HOSPADM

## 2023-01-13 RX ORDER — ACETAMINOPHEN 500 MG
1000 TABLET ORAL ONCE
Status: COMPLETED | OUTPATIENT
Start: 2023-01-13 | End: 2023-01-13

## 2023-01-13 RX ORDER — CEFTRIAXONE 2 G/1
2 INJECTION, POWDER, FOR SOLUTION INTRAMUSCULAR; INTRAVENOUS ONCE
Status: COMPLETED | OUTPATIENT
Start: 2023-01-13 | End: 2023-01-13

## 2023-01-13 RX ORDER — ONDANSETRON 4 MG/1
4 TABLET, ORALLY DISINTEGRATING ORAL EVERY 6 HOURS PRN
Status: DISCONTINUED | OUTPATIENT
Start: 2023-01-13 | End: 2023-01-17 | Stop reason: HOSPADM

## 2023-01-13 RX ORDER — ONDANSETRON 2 MG/ML
4 INJECTION INTRAMUSCULAR; INTRAVENOUS EVERY 6 HOURS PRN
Status: DISCONTINUED | OUTPATIENT
Start: 2023-01-13 | End: 2023-01-17 | Stop reason: HOSPADM

## 2023-01-13 RX ORDER — LIDOCAINE 40 MG/G
CREAM TOPICAL
Status: DISCONTINUED | OUTPATIENT
Start: 2023-01-13 | End: 2023-01-17 | Stop reason: HOSPADM

## 2023-01-13 RX ORDER — AMOXICILLIN 250 MG
2 CAPSULE ORAL 2 TIMES DAILY PRN
Status: DISCONTINUED | OUTPATIENT
Start: 2023-01-13 | End: 2023-01-17 | Stop reason: HOSPADM

## 2023-01-13 RX ORDER — MIRTAZAPINE 15 MG/1
45 TABLET, FILM COATED ORAL AT BEDTIME
Status: DISCONTINUED | OUTPATIENT
Start: 2023-01-13 | End: 2023-01-17 | Stop reason: HOSPADM

## 2023-01-13 RX ORDER — CARBOXYMETHYLCELLULOSE SODIUM 5 MG/ML
1 SOLUTION/ DROPS OPHTHALMIC 2 TIMES DAILY
Status: DISCONTINUED | OUTPATIENT
Start: 2023-01-13 | End: 2023-01-17 | Stop reason: HOSPADM

## 2023-01-13 RX ORDER — IOPAMIDOL 755 MG/ML
71 INJECTION, SOLUTION INTRAVASCULAR ONCE
Status: COMPLETED | OUTPATIENT
Start: 2023-01-13 | End: 2023-01-13

## 2023-01-13 RX ORDER — CARBOXYMETHYLCELLULOSE SODIUM AND GLYCERIN 5; 9 MG/ML; MG/ML
1 SOLUTION/ DROPS OPHTHALMIC 2 TIMES DAILY
COMMUNITY
End: 2023-03-30

## 2023-01-13 RX ORDER — LACTULOSE 10 G/15ML
30 SOLUTION ORAL DAILY
Status: DISCONTINUED | OUTPATIENT
Start: 2023-01-14 | End: 2023-01-17 | Stop reason: HOSPADM

## 2023-01-13 RX ORDER — CEFTRIAXONE 2 G/1
2 INJECTION, POWDER, FOR SOLUTION INTRAMUSCULAR; INTRAVENOUS EVERY 24 HOURS
Status: DISCONTINUED | OUTPATIENT
Start: 2023-01-14 | End: 2023-01-17

## 2023-01-13 RX ORDER — LEVOTHYROXINE SODIUM 50 UG/1
100 TABLET ORAL DAILY
Status: DISCONTINUED | OUTPATIENT
Start: 2023-01-14 | End: 2023-01-17 | Stop reason: HOSPADM

## 2023-01-13 RX ORDER — AMOXICILLIN 250 MG
1 CAPSULE ORAL 2 TIMES DAILY PRN
Status: DISCONTINUED | OUTPATIENT
Start: 2023-01-13 | End: 2023-01-17 | Stop reason: HOSPADM

## 2023-01-13 RX ORDER — ACETAMINOPHEN 650 MG/1
650 SUPPOSITORY RECTAL EVERY 6 HOURS PRN
Status: DISCONTINUED | OUTPATIENT
Start: 2023-01-13 | End: 2023-01-17 | Stop reason: HOSPADM

## 2023-01-13 RX ORDER — ARIPIPRAZOLE 2 MG/1
2 TABLET ORAL AT BEDTIME
Status: DISCONTINUED | OUTPATIENT
Start: 2023-01-13 | End: 2023-01-17 | Stop reason: HOSPADM

## 2023-01-13 RX ORDER — ACETAMINOPHEN 325 MG/1
650 TABLET ORAL EVERY 6 HOURS PRN
Status: DISCONTINUED | OUTPATIENT
Start: 2023-01-13 | End: 2023-01-17 | Stop reason: HOSPADM

## 2023-01-13 RX ORDER — BISACODYL 10 MG
10 SUPPOSITORY, RECTAL RECTAL DAILY PRN
Status: DISCONTINUED | OUTPATIENT
Start: 2023-01-13 | End: 2023-01-17 | Stop reason: HOSPADM

## 2023-01-13 RX ORDER — PROCHLORPERAZINE 25 MG
12.5 SUPPOSITORY, RECTAL RECTAL EVERY 12 HOURS PRN
Status: DISCONTINUED | OUTPATIENT
Start: 2023-01-13 | End: 2023-01-17 | Stop reason: HOSPADM

## 2023-01-13 RX ORDER — CLOMIPRAMINE HYDROCHLORIDE 25 MG/1
25 CAPSULE ORAL EVERY EVENING
Status: DISCONTINUED | OUTPATIENT
Start: 2023-01-13 | End: 2023-01-17 | Stop reason: HOSPADM

## 2023-01-13 RX ORDER — KETOCONAZOLE 20 MG/G
CREAM TOPICAL 2 TIMES DAILY
COMMUNITY
End: 2023-03-30

## 2023-01-13 RX ORDER — AZITHROMYCIN 500 MG/1
500 INJECTION, POWDER, LYOPHILIZED, FOR SOLUTION INTRAVENOUS ONCE
Status: COMPLETED | OUTPATIENT
Start: 2023-01-13 | End: 2023-01-13

## 2023-01-13 RX ORDER — PROCHLORPERAZINE MALEATE 5 MG
5 TABLET ORAL EVERY 6 HOURS PRN
Status: DISCONTINUED | OUTPATIENT
Start: 2023-01-13 | End: 2023-01-17 | Stop reason: HOSPADM

## 2023-01-13 RX ADMIN — MIRTAZAPINE 45 MG: 15 TABLET, FILM COATED ORAL at 23:54

## 2023-01-13 RX ADMIN — Medication 1 DROP: at 23:54

## 2023-01-13 RX ADMIN — IOPAMIDOL 71 ML: 755 INJECTION, SOLUTION INTRAVENOUS at 21:42

## 2023-01-13 RX ADMIN — GABAPENTIN 400 MG: 100 CAPSULE ORAL at 23:54

## 2023-01-13 RX ADMIN — CEFTRIAXONE SODIUM 2 G: 2 INJECTION, POWDER, FOR SOLUTION INTRAMUSCULAR; INTRAVENOUS at 20:12

## 2023-01-13 RX ADMIN — AZITHROMYCIN MONOHYDRATE 500 MG: 500 INJECTION, POWDER, LYOPHILIZED, FOR SOLUTION INTRAVENOUS at 20:55

## 2023-01-13 RX ADMIN — ACETAMINOPHEN 1000 MG: 500 TABLET ORAL at 18:10

## 2023-01-13 RX ADMIN — SIMVASTATIN 20 MG: 20 TABLET, FILM COATED ORAL at 23:53

## 2023-01-13 RX ADMIN — CLOMIPRAMINE HYDROCHLORIDE 25 MG: 25 CAPSULE ORAL at 23:53

## 2023-01-13 RX ADMIN — ARIPIPRAZOLE 2 MG: 2 TABLET ORAL at 23:53

## 2023-01-13 RX ADMIN — SODIUM CHLORIDE 95 ML: 9 INJECTION, SOLUTION INTRAVENOUS at 21:42

## 2023-01-13 ASSESSMENT — ENCOUNTER SYMPTOMS
WEAKNESS: 1
FEVER: 1
ABDOMINAL PAIN: 0
DIARRHEA: 0
VOMITING: 0
NAUSEA: 0
SORE THROAT: 0
CHILLS: 0
APPETITE CHANGE: 0
COUGH: 1

## 2023-01-13 ASSESSMENT — ACTIVITIES OF DAILY LIVING (ADL)
ADLS_ACUITY_SCORE: 35
TOILETING_ASSISTANCE: TOILETING DIFFICULTY, REQUIRES EQUIPMENT
WALKING_OR_CLIMBING_STAIRS: AMBULATION DIFFICULTY, REQUIRES EQUIPMENT
ADLS_ACUITY_SCORE: 35
DRESSING/BATHING: BATHING DIFFICULTY, ASSISTANCE 1 PERSON
TOILETING_ISSUES: YES
VISION_MANAGEMENT: GLASSES
ADLS_ACUITY_SCORE: 35
WALKING_OR_CLIMBING_STAIRS_DIFFICULTY: YES
FALL_HISTORY_WITHIN_LAST_SIX_MONTHS: YES
DIFFICULTY_EATING/SWALLOWING: NO
NUMBER_OF_TIMES_PATIENT_HAS_FALLEN_WITHIN_LAST_SIX_MONTHS: 3
DOING_ERRANDS_INDEPENDENTLY_DIFFICULTY: YES
CONCENTRATING,_REMEMBERING_OR_MAKING_DECISIONS_DIFFICULTY: NO
EQUIPMENT_CURRENTLY_USED_AT_HOME: WALKER, ROLLING
ADLS_ACUITY_SCORE: 35
WEAR_GLASSES_OR_BLIND: YES
CHANGE_IN_FUNCTIONAL_STATUS_SINCE_ONSET_OF_CURRENT_ILLNESS/INJURY: YES
DRESSING/BATHING_DIFFICULTY: YES

## 2023-01-13 NOTE — ED NOTES
Bed: ED22  Expected date:   Expected time:   Means of arrival:   Comments:  Luz 518 89 M productive cough, weakness, possible fever on 4 lpm ETA 1620

## 2023-01-13 NOTE — TELEPHONE ENCOUNTER
Patient complains of dry cough for 1 week during the day. Denies chest pain fever, breathing problems, fever. Admits leg swelling but notes this is ongoing. Triage advised he see  today and HC advise given. Pt unclear if able to go to  today but agreed to try OTC dayquil cough syrup and warm drinks with honey. Triage reviewed  hours and locations. Pt verbalized agreement with plan.     Reason for Disposition    SEVERE coughing spells (e.g., whooping sound after coughing, vomiting after coughing)    Additional Information    Negative: Bluish (or gray) lips or face    Negative: SEVERE difficulty breathing (e.g., struggling for each breath, speaks in single words)    Negative: Rapid onset of cough and has hives    Negative: Coughing started suddenly after medicine, an allergic food or bee sting    Negative: Difficulty breathing after exposure to flames, smoke, or fumes    Negative: Sounds like a life-threatening emergency to the triager    Negative: Previous asthma attacks and this feels like asthma attack    Negative: Dry cough (non-productive; no sputum or minimal clear sputum) and within 14 days of COVID-19 Exposure    Negative: MODERATE difficulty breathing (e.g., speaks in phrases, SOB even at rest, pulse 100-120) and still present when not coughing    Negative: Chest pain present when not coughing    Negative: Passed out (i.e., fainted, collapsed and was not responding)    Negative: Patient sounds very sick or weak to the triager    Negative: MILD difficulty breathing (e.g., minimal/no SOB at rest, SOB with walking, pulse <100) and still present when not coughing    Negative: Coughed up > 1 tablespoon (15 ml) blood (Exception: Blood-tinged sputum.)    Negative: Fever > 103 F (39.4 C)    Negative: Fever > 101 F (38.3 C) and over 60 years of age    Negative: Fever > 100.0 F (37.8 C) and has diabetes mellitus or a weak immune system (e.g., HIV positive, cancer chemotherapy, organ transplant, splenectomy,  chronic steroids)    Negative: Fever > 100.0 F (37.8 C) and bedridden (e.g., nursing home patient, stroke, chronic illness, recovering from surgery)    Negative: Increasing ankle swelling    Negative: Wheezing is present    Protocols used: COUGH-A-OH

## 2023-01-13 NOTE — ED PROVIDER NOTES
History     Chief Complaint:  Cough and Generalized Weakness     The history is provided by the patient.      Zack Santiago is a 89 year old male on Plavix with history hypertension, CKD, GERD, stroke, and A-fib who presents with cough and generalized weakness. The patient reports he has been sick for the last 6 days. He states he has been coughing and weaker than normal. He denies using oxygen regularly. He notes shortness of breath at times. He states swelling in bilateral legs is nothing recently new and has not gotten worse compared to baseline. Denies smoking or drinking. Denies abdominal pain, chest pain, nausea, vomiting, diarrhea, sore throat, chills, and appetite change. He lives in a senior living facility. He states he fell on 12/25/22. He has a fever of 101.9 in the emergency department and he states this is the first time he has had a fever in the last 6 days.     Independent Historian: Yes     Review of External Notes: 11/15/22 Neurology Note    ROS:  Review of Systems   Constitutional: Positive for fever. Negative for appetite change and chills.   HENT: Negative for sore throat.    Respiratory: Positive for cough.    Cardiovascular: Negative for chest pain.   Gastrointestinal: Negative for abdominal pain, diarrhea, nausea and vomiting.   Neurological: Positive for weakness.   All other systems reviewed and are negative.    Allergies:  No Known Allergies     Medications:    Abilify   Hygroton   Anafranil   Plavix   Neurontin   Enulose   Synthroid/Levothroid   Mirtazapine   Zocor   kenalog    Past Medical History:    A-fib   AAA  Amaurosis fugax of right eye   Anemia   Aortic insufficiency   ASCVD   BPH   CKD   Bradycardia   Constipation   Cough   Dizzy   GERD   Hypertension   Hypothyroid   Hypovitaminosis D   Idiopathic neuropathy   Lung nodule   OCD   Panic disorder   Spinal headache   Stroke   Sudden Hearing Loss   SVT   Thoracic ascending aortic aneurysm   Ulcerative colitis    Past Surgical  "History:    Back surgery   Bladder surgery   Cataract  Coronary artery bypass   Endovascular repair aneurysm abdominal aorta   Hernia repair   Knee surgery   Repair hammer toe   Toe amputation right foot   Total knee arthroplasty bilateral     Family History:    family history includes C.A.D. in his father; Lymphoma in his sister; Retinal detachment in his daughter.    Social History:   reports that he quit smoking about 57 years ago. His smoking use included cigarettes. He has a 5.00 pack-year smoking history. He has never used smokeless tobacco. He reports current alcohol use. He reports that he does not use drugs.  PCP: Zurdo Kendrick     Physical Exam     Patient Vitals for the past 24 hrs:   BP Temp Temp src Pulse Resp SpO2 Height Weight   01/13/23 1900 -- -- -- 63 -- 96 % -- --   01/13/23 1720 -- (!) 101.9  F (38.8  C) Oral -- -- 96 % -- --   01/13/23 1640 -- -- -- -- -- 95 % -- --   01/13/23 1637 -- -- -- -- -- (!) 86 % -- --   01/13/23 1636 -- -- -- -- -- (!) 85 % -- --   01/13/23 1635 -- -- -- -- -- (!) 89 % -- --   01/13/23 1630 139/61 98.9  F (37.2  C) Oral 73 18 93 % 1.867 m (6' 1.5\") 88.5 kg (195 lb)      Physical Exam  Constitutional: Well developed, nontox appearance  Head: Atraumatic.  Nasal cannula in place  Mouth/Throat: Oropharynx is clear and moist.   Neck:  no stridor  Eyes: no scleral icterus  Cardiovascular: RRR, 2+ bilat radial pulses  Pulmonary/Chest: nml resp effort, Clear BS bilat  Abdominal: ND, soft, NT, no rebound or guarding   Ext: Warm, well perfused, no edema  Neurological: A&O, symmetric facies, moves ext x4  Skin: Skin is warm and dry.   Psychiatric: Behavior is normal. Thought content normal.   Nursing note and vitals reviewed.    Emergency Department Course     ECG  ECG taken at 1723, ECG read at 1730  Sinus rhythm with premature atrial complexes   Right bundle branch block   Left anterior fascicular block   **Bifascicular block**  Left ventricular hypertrophy with " repolarization abnormality ( R in aVL , [place])  Cannot rule out Septal infarct, age undetermined  No change as compared to prior, dated 1/7/21.  Rate 66 bpm. NE interval 136 ms. QRS duration 164 ms. QT/QTc 444/465 ms. P-R-T axes 62 -53 96.     Imaging:  XR Chest 2 Views   Final Result   IMPRESSION:      Lung volumes are low. Streaky airspace opacities at the bases likely reflect atelectasis, although infection cannot be completely excluded. Scarring within the lateral right midlung appears similar to prior. Pulmonary vascularity is within normal limits.    No pleural effusions or pneumothorax. Nonenlarged cardiac silhouette. Median sternotomy wires.      CT Chest Pulmonary Embolism w Contrast    (Results Pending)      Report per radiology    Laboratory:  Labs Ordered and Resulted from Time of ED Arrival to Time of ED Departure   BASIC METABOLIC PANEL - Abnormal       Result Value    Sodium 139      Potassium 3.8      Chloride 104      Carbon Dioxide (CO2) 27      Anion Gap 8      Urea Nitrogen 32 (*)     Creatinine 1.18      Calcium 8.8      Glucose 104 (*)     GFR Estimate 58 (*)    CBC WITH PLATELETS AND DIFFERENTIAL - Abnormal    WBC Count 8.9      RBC Count 3.49 (*)     Hemoglobin 11.2 (*)     Hematocrit 34.6 (*)     MCV 99      MCH 32.1      MCHC 32.4      RDW 14.7      Platelet Count 195      % Neutrophils 90      % Lymphocytes 4      % Monocytes 6      % Eosinophils 0      % Basophils 0      % Immature Granulocytes 0      NRBCs per 100 WBC 0      Absolute Neutrophils 8.0      Absolute Lymphocytes 0.3 (*)     Absolute Monocytes 0.5      Absolute Eosinophils 0.0      Absolute Basophils 0.0      Absolute Immature Granulocytes 0.0      Absolute NRBCs 0.0     ISTAT GASES LACTATE VENOUS POCT - Abnormal    Lactic Acid POCT 0.9      Bicarbonate Venous POCT 25      O2 Sat, Venous POCT 48 (*)     pCO2V Venous POCT 41      pH Venous POCT 7.40      pO2 Venous POCT 26     D DIMER QUANTITATIVE - Abnormal    D-Dimer  Quantitative 4.64 (*)    INFLUENZA A/B & SARS-COV2 PCR MULTIPLEX - Normal    Influenza A PCR Negative      Influenza B PCR Negative      RSV PCR Negative      SARS CoV2 PCR Negative     NT PROBNP INPATIENT - Normal    N terminal Pro BNP Inpatient 1,050     TROPONIN I        Emergency Department Course & Assessments:       Interventions:  Medications   azithromycin (ZITHROMAX) 500 mg vial to attach to  mL bag (500 mg Intravenous New Bag 1/13/23 2055)   iopamidol (ISOVUE-370) solution 71 mL (has no administration in time range)   Saline Flush (has no administration in time range)   acetaminophen (TYLENOL) tablet 1,000 mg (1,000 mg Oral Given 1/13/23 1810)   cefTRIAXone (ROCEPHIN) 2 g vial to attach to  ml bag for ADULTS or NS 50 ml bag for PEDS (2 g Intravenous New Bag 1/13/23 2012)        Consultations/Discussion of Management or Tests:  2120 I spoke with Dr. Magdaleno, Hospitalist, about patient's findings.        Disposition:  The patient was admitted to the hospital under the care of Dr. Magdaleno.     Impression & Plan      Medical Decision Making:  Covid-19  Zack Santiago was evaluated during a global COVID-19 pandemic, which necessitated consideration that the patient might be at risk for infection with the SARS-CoV-2 virus that causes COVID-19.   Applicable protocols for evaluation were followed during the patient's care.        89 year old male presenting w/ cough, shortness of breath, fever     Social determinants affecting the patient's health include: Age and communal living with increased risk of respiratory viral infections     DDx includes viral syndrome NOS, influenza-like illness, influenza, COVID-19, bacterial pneumonia, PE.  EKG interpretation as noted above without acute ischemic findings.  Labs significant for normal VBG, elevated D-dimer, COVID-19 negative.  Imaging sig for streaky opacities without obvious consolidation on chest x-ray..  Patient developed a fever during his emergency  department course.  In context of his respiratory symptoms, fever, negative viral studies, patient was given antibiotics with consideration for sensitivity of chest x-ray.  D-dimer was ordered after initial chest x-ray was done with subsequent CT pending upon admission with plan for ED signout physician and hospitalist to follow-up on CT results.  Pt counseled on all results, disposition and diagnosis.  They are understanding and agreeable to plan. Patient admitted in guarded condition.      Diagnosis:    ICD-10-CM    1. Hypoxia  R09.02          Scribe Disclosure:  Linda ASTUDILLO, am serving as a scribe at 5:08 PM on 1/13/2023 to document services personally performed by Josiah Giles MD based on my observations and the provider's statements to me.     1/13/2023   Josiah Giles MD Vaughn, Christopher E, MD  01/13/23 9529

## 2023-01-14 LAB — PROCALCITONIN SERPL-MCNC: 0.75 NG/ML

## 2023-01-14 PROCEDURE — 250N000013 HC RX MED GY IP 250 OP 250 PS 637: Performed by: INTERNAL MEDICINE

## 2023-01-14 PROCEDURE — 258N000003 HC RX IP 258 OP 636: Performed by: INTERNAL MEDICINE

## 2023-01-14 PROCEDURE — 250N000011 HC RX IP 250 OP 636: Performed by: INTERNAL MEDICINE

## 2023-01-14 PROCEDURE — 99232 SBSQ HOSP IP/OBS MODERATE 35: CPT | Performed by: INTERNAL MEDICINE

## 2023-01-14 PROCEDURE — 120N000001 HC R&B MED SURG/OB

## 2023-01-14 RX ADMIN — ARIPIPRAZOLE 2 MG: 2 TABLET ORAL at 21:32

## 2023-01-14 RX ADMIN — SIMVASTATIN 20 MG: 20 TABLET, FILM COATED ORAL at 21:32

## 2023-01-14 RX ADMIN — CLOPIDOGREL BISULFATE 75 MG: 75 TABLET ORAL at 08:22

## 2023-01-14 RX ADMIN — LACTULOSE 30 ML: 20 SOLUTION ORAL at 08:22

## 2023-01-14 RX ADMIN — GABAPENTIN 400 MG: 100 CAPSULE ORAL at 08:22

## 2023-01-14 RX ADMIN — GABAPENTIN 400 MG: 100 CAPSULE ORAL at 21:32

## 2023-01-14 RX ADMIN — CEFTRIAXONE SODIUM 2 G: 2 INJECTION, POWDER, FOR SOLUTION INTRAMUSCULAR; INTRAVENOUS at 19:42

## 2023-01-14 RX ADMIN — AZITHROMYCIN MONOHYDRATE 250 MG: 500 INJECTION, POWDER, LYOPHILIZED, FOR SOLUTION INTRAVENOUS at 21:32

## 2023-01-14 RX ADMIN — Medication 1 DROP: at 08:22

## 2023-01-14 RX ADMIN — MIRTAZAPINE 45 MG: 15 TABLET, FILM COATED ORAL at 21:31

## 2023-01-14 RX ADMIN — CLOMIPRAMINE HYDROCHLORIDE 25 MG: 25 CAPSULE ORAL at 19:42

## 2023-01-14 RX ADMIN — LEVOTHYROXINE SODIUM 100 MCG: 50 TABLET ORAL at 08:22

## 2023-01-14 ASSESSMENT — ACTIVITIES OF DAILY LIVING (ADL)
ADLS_ACUITY_SCORE: 40
ADLS_ACUITY_SCORE: 35
ADLS_ACUITY_SCORE: 35
ADLS_ACUITY_SCORE: 40
ADLS_ACUITY_SCORE: 40
ADLS_ACUITY_SCORE: 35
ADLS_ACUITY_SCORE: 40
ADLS_ACUITY_SCORE: 35
ADLS_ACUITY_SCORE: 35
ADLS_ACUITY_SCORE: 40
ADLS_ACUITY_SCORE: 40
ADLS_ACUITY_SCORE: 41

## 2023-01-14 NOTE — PROGRESS NOTES
[facility]    Hospitalist Progress Note    Assessment & Plan   Zack Santiago is a 89 year old male with PMHx of CAD, hypertension, dyslipidemia, afib, hx of AAA s/p endovascular repair in 2015, GERD, hypothyroidism, BPH and ulcerative colitis among other medical problems who was admitted on 1/13/2023 with acute hypoxic respiratory failure dt suspected CAP.     Acute hypoxic respiratory failure dt CAP vs bronchitis  * Presented to ED with 5-6d hx of cough, shortness of breath and generalized weakness.   * Febrile on admission with Tmax 101.9. Was hypoxic on RA and needing 3-4L NC. WBC 8.9, procal 0.75 (possible early infection). RSV, influenza and COVID all neg. CXR neg showed streaky opacities at bases. D-dimer elevated so CT chest obtained -- neg for PE, no acute pulmonary disease. Started on treatment for possible bronchitis vs CAP with ceftriaxone and azithromycin.   * Condition improved, weaned off O2 on 1/14.   -- cont ceftriaxone and azithromycin (d#2/5)  -- monitor labs, fever curve  -- cont pulmonary toilet with IS/OOB    Coronary artery disease  Hypertension  Dyslipidemia  Prior CVA  * Chronic and stable on statin and Plavix  * Hydrochlorothiazide held on admission    Thoracic ascending aneurysm  * Measured at 5 cm on CT imaging this stay. Seems to be pretty stable from 2019 when it was 4.8 cm.    * Follow up with PCP for surveillance as needed.    Hypothyroidism  * Chronic and stable on levothyroxine.    OCD with panic disorder  * Chronic and stable on Abilify, Remeron and clomipramine.    Gait instability and possible Parkinson's symptoms    * Was recently seen by neurology and failed a trial of carbidopa/levodopa, so is being monitored and undergoing physical therapy.    Soft tissue injury/skin tear to L index finger  * Fell and injured his L index finger on the carpet prior to admission. No punture injury. Looks like superficial skin tear.  -- cont local cares to wound,  cleanse with warm water as needed and bandage when dry      FEN: no IVFs, lytes stable, regular diet  DVT Prophylaxis: PCDs  Code Status: Full Code    Disposition: Anticipate discharge in 2 days if O2 sats remain stable. PT consulted to discern if TCU stay will be needed    Hayde Carvajal, DO    Medical Decision Making       -------------------------- BILLING ON TIME ------------------------------------------------------------------------------------------------------------  35 MINUTES SPENT BY ME on the date of service doing chart review, history, exam, documentation & further activities per the note.           Interval History   Seen this afternoon. Has been weaned off O2. Reports ongoing cough. Breathing okay. No chest pain, abd pain/n/v. Tolerating po intake.     -Data reviewed today: I reviewed all new labs and imaging results over the last 24 hours. I personally reviewed no images or EKG's today.    Physical Exam   Temp: 98.2  F (36.8  C) Temp src: Axillary BP: 107/40 Pulse: (!) 47   Resp: 16 SpO2: 97 % O2 Device: None (Room air) Oxygen Delivery: 1 LPM  Vitals:    01/13/23 1630   Weight: 88.5 kg (195 lb)     Vital Signs with Ranges  Temp:  [98.2  F (36.8  C)-101.9  F (38.8  C)] 98.2  F (36.8  C)  Pulse:  [44-73] 47  Resp:  [16-18] 16  BP: ()/(40-61) 107/40  SpO2:  [85 %-98 %] 97 %  I/O last 3 completed shifts:  In: -   Out: 750 [Urine:750]    Constitutional: Resting comfortably, alert and conversing appropriately, NAD  Respiratory: +bibasilar crackles, no wheeze/rhonchi, no increased work of breathing  Cardiovascular: HRRR, no MGR, no LE edema  GI: S, NT, ND, +BS  Skin/Integumen: warm/dry, skin tear on L index finger w/o flutuance/purulence or surrounding erythema  Other:      Medications       ARIPiprazole  2 mg Oral At Bedtime     azithromycin  250 mg Intravenous Q24H     carboxymethylcellulose PF  1 drop Both Eyes BID     cefTRIAXone  2 g Intravenous Q24H     [Held by provider]  chlorthalidone  25 mg Oral Daily     clomiPRAMINE  25 mg Oral QPM     clopidogrel  75 mg Oral Daily     gabapentin  400 mg Oral BID     lactulose  30 mL Oral Daily     levothyroxine  100 mcg Oral Daily     mirtazapine  45 mg Oral At Bedtime     simvastatin  20 mg Oral At Bedtime     sodium chloride (PF)  3 mL Intracatheter Q8H       Data   Recent Labs   Lab 01/13/23  1655   WBC 8.9   HGB 11.2*   MCV 99         POTASSIUM 3.8   CHLORIDE 104   CO2 27   BUN 32*   CR 1.18   ANIONGAP 8   YOUNG 8.8   *       Recent Results (from the past 24 hour(s))   XR Chest 2 Views    Narrative    EXAM: XR CHEST 2 VIEWS  LOCATION: [facility]  DATE/TIME: 1/13/2023 6:56 PM    INDICATION: Hypoxia.  COMPARISON: Chest radiograph 04/08/2021. CT chest 01/29/2021.      Impression    IMPRESSION:    Lung volumes are low. Streaky airspace opacities at the bases likely reflect atelectasis, although infection cannot be completely excluded. Scarring within the lateral right midlung appears similar to prior. Pulmonary vascularity is within normal limits.   No pleural effusions or pneumothorax. Nonenlarged cardiac silhouette. Median sternotomy wires.   CT Chest Pulmonary Embolism w Contrast    Narrative    EXAM: CT CHEST WITH CONTRAST - PULMONARY EMBOLISM PROTOCOL   LOCATION: [facility]  DATE/TIME: 1/13/2023 10:08 PM    INDICATION: Hypoxemia. Elevated D-dimer.  COMPARISON: 1/29/2021 - CT chest, abdomen and pelvis.    TECHNIQUE: CT angiogram chest during arterial phase injection IV contrast. 2D and 3D MIP reconstructions were performed by the CT technologist. Dose reduction techniques were used.   CONTRAST: 71 mL Isovue 370.    FINDINGS:    ANGIOGRAM CHEST: No visualized pulmonary embolus. Dilatation of the ascending thoracic aorta, which measures up to 5.0 cm in diameter. No dissection of the thoracic aorta. Atherosclerotic calcification in the thoracic aorta.    LUNGS AND PLEURA: A  few curvilinear opacities scattered in the periphery of both lungs, most likely representing scarring and/or atelectasis. The lungs are otherwise clear.    MEDIASTINUM/AXILLAE: Prior median sternotomy.    CORONARY ARTERY CALCIFICATION: Present.    MUSCULOSKELETAL: No acute findings.    UPPER ABDOMEN: Partial visualization of a moderate-sized midline upper anterior abdominal hernia containing fat and fluid.      Impression    IMPRESSION:   1.  No visualized pulmonary embolus.  2.  No convincing evidence of active pulmonary disease.   3.  The ascending thoracic aorta is dilated, measuring up to 5.0 cm in diameter. No dissection of the thoracic aorta.

## 2023-01-14 NOTE — PLAN OF CARE
Goal Outcome Evaluation:      Plan of Care Reviewed With: patient, family    Overall Patient Progress: improvingOverall Patient Progress: improving         Summary: Weakness/ fever/ pneumonia vs bronchitis  DATE & TIME: 1/14/23   Cognitive Concerns/ Orientation : A&O x4, very pleasant, forgetful. Content in room, occ watches tv.   BEHAVIOR & AGGRESSION TOOL COLOR: Green  CIWA SCORE: NA   ABNL VS/O2: VSS on RA, HR 40's, MD aware. Check O2 sats on ear not fingers.   MOBILITY: Ax1 GB/walker, shuffles unsteady gait, lost balance x1. Chair during day, standing to use urinal, amb to BR, amb to sal and back, in sal to water fountain and back, seated exercises demonstrated as pt is very motivated to regain strength.  PAIN MANAGMENT: Denies  DIET: Regular, good appetite.   BOWEL/BLADDER: Continent b/b. Formed BM x1.   ABNL LAB/BG: no new labs drawn, check BMP and CBC 1/15 in AM.   DRAIN/DEVICES: L PIV SL, patent.   TELEMETRY RHYTHM: SB with BBB, no longer needed.   SKIN: Healing wound on L hand 2nd finger, drsg changed. Ab/dry/flaky skin on BLE and hands. Chronic fungal condition affecting skin and fingernails.   TESTS/PROCEDURES: NA  D/C DAY/GOALS/PLACE: PT to see for likely TCU need. Son at bedside, spoke with SW regarding TCU options.   OTHER IMPORTANT INFO: LS clear; congested, NP cough. BUE tremors, baseline. Luis LE ab, edema 1+. Receiving IV zithromax and rocephin.

## 2023-01-14 NOTE — ED NOTES
Patient BIBA. Patient started having cough and weakness yesterday. Patient lives in [facility] and [facility].  Patient having worsening cough and SOB and weakness today. Patient requiring assist of 2-3 to get up out of bed. Patient has productive cough, but lungs are clear with auscultation per EMS. Patient has tremulous Parkinson like movements that are being evaluated by neurology.  Patient SaO2 in low 90s but not great waveforms. Placed on 4 LPM NC prophylacticly. Patient has 20ga PIV in LAC. BS  140s. Patient's son was notified. Patient has COVID test at facility this morning that was negative.

## 2023-01-14 NOTE — PHARMACY-ADMISSION MEDICATION HISTORY
Pharmacy Medication History  Admission medication history interview status for the 1/13/2023  admission is complete. See EPIC admission navigator for prior to admission medications     Location of Interview: Outside patient room but on unit  Medication history sources: Patient and MAR (Seymour Perry)    Significant changes made to the medication list:  1. Added ketoconazole, Refresh Optive. Changed lactulose to QD    Additional medication history information:   1. Confirmed with Seymour Perry that patient got all AM meds    Medication reconciliation completed by provider prior to medication history? No    Time spent in this activity: 15 min    Prior to Admission medications    Medication Sig Last Dose Taking? Auth Provider Long Term End Date   ARIPiprazole (ABILIFY) 2 MG tablet Take 1 tablet by mouth At Bedtime. 1/12/2023 at 2145 Yes Zurdo Kendrick MD     carboxymethylcellulose-glycerin (OPTIVE) 0.5-0.9 % ophthalmic solution Place 1 drop into both eyes 2 times daily 1/13/2023 at 0825 Yes Unknown, Entered By History     chlorthalidone (HYGROTON) 25 MG tablet Take 1 tablet (25 mg) by mouth daily 1/13/2023 at 0825 Yes Zurdo Kendrick MD Yes    ClomiPRAMINE HCl (ANAFRANIL PO) Take 25 mg by mouth every evening  1/12/2023 at 2145 Yes Reported, Patient     clopidogrel (PLAVIX) 75 MG tablet Take 1 tablet (75 mg) by mouth daily 1/13/2023 at 0825 Yes Zurdo Kendrick MD Yes    gabapentin (NEURONTIN) 400 MG capsule Take 1 capsule by mouth 2 times daily  1/13/2023 at 0825 Yes Reported, Patient     ketoconazole (NIZORAL) 2 % external cream Apply topically 2 times daily 1/13/2023 at 0825 Yes Unknown, Entered By History     lactulose encephalopathy (ENULOSE) 10 GM/15ML SOLUTION Take 30 mLs (20 g) by mouth 2 times daily  Patient taking differently: Take 30 mLs by mouth daily 1/13/2023 at 0825 Yes Zurdo Kendrick MD     levothyroxine (SYNTHROID/LEVOTHROID) 100 MCG tablet Take 1 tablet (100 mcg) by mouth daily  1/13/2023 at 0825 Yes Zurdo Kendrick MD Yes    MIRTAZAPINE PO Take 45 mg by mouth At Bedtime  1/12/2023 at 2145 Yes Unknown, Entered By History No    simvastatin (ZOCOR) 20 MG tablet Take 1 tablet (20 mg) by mouth At Bedtime 1/12/2023 at 2145 Yes Zurdo Kendrick MD Yes    triamcinolone (KENALOG) 0.1 % external cream Apply topically 2 times daily  Patient not taking: Reported on 1/13/2023 Not Taking  Zurdo Kendrick MD         The information provided in this note is only as accurate as the sources available at the time of update(s)

## 2023-01-14 NOTE — PLAN OF CARE
RECEIVING UNIT ED HANDOFF REVIEW    ED Nurse Handoff Report was reviewed by: Simran Mobley RN on January 13, 2023 at 10:33 PM

## 2023-01-14 NOTE — H&P
"[facility]    History and Physical - Hospitalist Service       Date of Admission:  1/13/2023  Dictation #: [id number]  Brief Summary (see dictation for more details): 89M presents with 5-6 days of cough, SOB and weakness, noted to be hypoxic to 80s on RA and febrile.  Started on ceftriaxone and azithromycin for possible bronchitis vs pneumonia.  CTA chest negative for PE.     Clinically Significant Risk Factors Present on Admission                # Drug Induced Platelet Defect: home medication list includes an antiplatelet medication     # Acute Respiratory Failure: Documented O2 saturation < 91%.  Continue supplemental oxygen as needed     # Overweight: Estimated body mass index is 25.38 kg/m  as calculated from the following:    Height as of this encounter: 1.867 m (6' 1.5\").    Weight as of this encounter: 88.5 kg (195 lb).           Medical Decision Making             Jairon Magdaleno, DO  Hospitalist Service  [facility]  Securely message with [employer] (more info)  Text page via [employer] Paging/Directory   "

## 2023-01-14 NOTE — ED NOTES
[facility]  ED Nurse Handoff Report    ED Chief complaint: Cough and Generalized Weakness      ED Diagnosis:   Final diagnoses:   Hypoxia       Code Status: TBD    Allergies: No Known Allergies    Patient Story: Patient BIBA. Patient started having cough and weakness yesterday. Patient lives in [facility].  Patient having worsening cough and SOB and weakness today. Patient requiring assist of 2-3 to get up out of bed. Patient has productive cough, but lungs are clear with auscultation per EMS. Patient has tremulous Parkinson like movements that are being evaluated by neurology.  Patient SaO2 in low 90s but not great waveforms. Placed on 4 LPM NC prophylacticly. Patient has 20ga PIV in LAC. BS  140s. Patient's son was notified. Patient has COVID test at facility this morning that was negative.   Focused Assessment:  Pt. A&OX4, pleasant.  Pt. Has fever in ED.  Pt. Given Tylenol and Abx in ED.  Pt. Lives at nursing home and uses walker at baseline.    Treatments and/or interventions provided: See above  Patient's response to treatments and/or interventions: See above    To be done/followed up on inpatient unit:  Pending    Does this patient have any cognitive concerns?:  none    Activity level - Baseline/Home:  Walker  Activity Level - Current:   Unknown    Patient's Preferred language: English   Needed?: No    Isolation: None  Infection: Not Applicable  Patient tested for COVID 19 prior to admission: YES  Bariatric?: No    Vital Signs:   Vitals:    01/13/23 1637 01/13/23 1640 01/13/23 1720 01/13/23 1900   BP:       Pulse:    63   Resp:       Temp:   (!) 101.9  F (38.8  C)    TempSrc:   Oral    SpO2: (!) 86% 95% 96% 96%   Weight:       Height:           Cardiac Rhythm:     Was the PSS-3 completed:   Yes  What interventions are required if any?               Family Comments: No family at bedside in ED.  Has a son.  OBS brochure/video discussed/provided to patient/family:  No              Name of person given brochure if not patient: n/a              Relationship to patient: n/a    For the majority of the shift this patient's behavior was Green.   Behavioral interventions performed were none.    ED NURSE PHONE NUMBER: [phone]

## 2023-01-14 NOTE — PROVIDER NOTIFICATION
MD Notification    Notified Person: MD    Notified Person Name: Dr. Evangelista    Notification Date/Time: 1/14/23 0502    Notification Interaction: [employer] web page    Purpose of Notification: Pt consistently bradycardic (low to mid 40s), regular rhythm and asymptomatic.     Orders Received: Telemetry   Comments:

## 2023-01-14 NOTE — PLAN OF CARE
Goal Outcome Evaluation:      Plan of Care Reviewed With: patient    Summary:  Weakness/ fever/ pneumonia vs bronchitis  DATE & TIME: 1/14/23 3752-0977    Cognitive Concerns/ Orientation : A&O x4, forgetful   BEHAVIOR & AGGRESSION TOOL COLOR: Green  CIWA SCORE: NA   ABNL VS/O2: VSS except lucio, low to mid 40s (MD notified and tele ordered) on 1L O2 via nasal cannula  MOBILITY: Ax2 gb/walk, unsteady.  Needs clear directions.  PAIN MANAGMENT: Denies  DIET: Regular  BOWEL/BLADDER: Continent, voiding in urinal at the bedside.  No BM this shift.   ABNL LAB/BG: D-dimer 4.64, pro rachel 0.75  DRAIN/DEVICES: PIV saline locked  TELEMETRY RHYTHM: SB with BBB  SKIN: Healing wound on L hand 2nd finger, dressing CDI.  Ab/dry/flaky skin on BLE and hands.   TESTS/PROCEDURES: CT chest complete, no PE   D/C DAY/GOALS/PLACE: Discharge pending improvement.    OTHER IMPORTANT INFO: LS clear; congested cough. BUE tremors, baseline

## 2023-01-14 NOTE — H&P
Admitted: 01/13/2023    PRIMARY CARE PHYSICIAN:  Zurdo Kendrick MD    CODE STATUS:  FULL CODE.  Discussed with the patient.    CHIEF COMPLAINT:  Shortness of breath and cough.    HISTORY OF PRESENT ILLNESS:  Mr. Santiago is a 89-year-old male with a past medical history significant for coronary artery disease, hypertension, AAA, status post repair, who presents to the emergency department with the above concerns.  History is obtained through discussion with the ED physician as well as the patient.  The patient states that for the past 5-6 days, he has been having a few issues including cough and shortness of breath as well as feeling generally weak.  He thought that he just had a run of the mill cold and it would get better, but as it has not improved and he feels weaker, he came to the emergency department today for further evaluation.  He lives in a senior living facility and is pretty active and social, but has not noted that anyone else has been sick or had any definitive sick contacts.  He has not had a fever at home, but was febrile to 101.9 degrees here.  He was hypoxic upon presentation in the 80s on room air, so placed on nasal cannula oxygen with appropriate increase in his saturations.  The patient had a chest x-ray that showed some possible streaky opacities, could be potentially pneumonia versus atelectasis.  D-dimer came back very elevated to 4.6, which prompted a CT of the chest with contrast, which was negative for PE.  I do not see any definitive pulmonary pathology.    The patient states that his cough is mostly nonproductive, but he sometimes coughs so hard that he gets a sore throat or will have difficulty swallowing and catching his breath.  He denies any specific chest pain and does not have any pleuritic type chest pain.  He does have some lower extremity edema, which is about the same as always.  No abdominal pain, nausea or emesis.  No recent medication adjustments.  The patient was started  on ceftriaxone and azithromycin in the emergency department for the possibility of pulmonary infection.    PAST MEDICAL HISTORY:     1.  Coronary artery disease.  2.  Hypertension.  3.  Atrial fibrillation.  4.  AAA, status post endovascular repair in 2015 with persistent endoleak.  5.  Thoracic ascending aortic aneurysm, followed since 2015 and has been stable.  Most recently 4.8 cm in 2019.  6.  BPH.  7.  GERD.  8.  Hypothyroidism.  9.  Aortic insufficiency.  10.  Stroke.  11.  Ulcerative colitis.  12.  Dyslipidemia.  13.  Vitamin D deficiency.  14.  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  15.  Amaurosis fugax of the right eye.    MEDICATIONS:    Medications Prior to Admission   Medication Sig Dispense Refill Last Dose     ARIPiprazole (ABILIFY) 2 MG tablet Take 1 tablet by mouth At Bedtime.   1/12/2023 at 2145     carboxymethylcellulose-glycerin (OPTIVE) 0.5-0.9 % ophthalmic solution Place 1 drop into both eyes 2 times daily   1/13/2023 at 0825     chlorthalidone (HYGROTON) 25 MG tablet Take 1 tablet (25 mg) by mouth daily 90 tablet 3 1/13/2023 at 0825     ClomiPRAMINE HCl (ANAFRANIL PO) Take 25 mg by mouth every evening    1/12/2023 at 2145     clopidogrel (PLAVIX) 75 MG tablet Take 1 tablet (75 mg) by mouth daily 90 tablet 3 1/13/2023 at 0825     gabapentin (NEURONTIN) 400 MG capsule Take 1 capsule by mouth 2 times daily    1/13/2023 at 0825     ketoconazole (NIZORAL) 2 % external cream Apply topically 2 times daily   1/13/2023 at 0825     lactulose encephalopathy (ENULOSE) 10 GM/15ML SOLUTION Take 30 mLs (20 g) by mouth 2 times daily (Patient taking differently: Take 30 mLs by mouth daily) 2000 mL 3 1/13/2023 at 0825     levothyroxine (SYNTHROID/LEVOTHROID) 100 MCG tablet Take 1 tablet (100 mcg) by mouth daily 90 tablet 3 1/13/2023 at 0825     MIRTAZAPINE PO Take 45 mg by mouth At Bedtime    1/12/2023 at 2145     simvastatin (ZOCOR) 20 MG tablet Take 1 tablet (20 mg) by mouth At Bedtime 90 tablet  2 1/12/2023 at 2145     triamcinolone (KENALOG) 0.1 % external cream Apply topically 2 times daily (Patient not taking: Reported on 1/13/2023) 45 g 1 Not Taking         SOCIAL HISTORY:  The patient lives in a senior living facility.  Denies any use of tobacco or alcohol.    ALLERGIES:  NO KNOWN DRUG ALLERGIES.    REVIEW OF SYSTEMS:  A complete review of systems reviewed and negative, save for the pertinent positives which are recorded in the HPI.    PHYSICAL EXAMINATION:    VITAL SIGNS:  Show blood pressure of 98/50, heart rate 56, respirations 18, satting 96% on nasal cannula oxygen, was down to 85% prior, temperature as high as 101.9 degrees Fahrenheit.  GENERAL:  The patient is sitting in bed with oxygen in place and appears to be fairly comfortable.  HEENT:  Pupils equal, round, reactive to light.  Extraocular muscle function intact.  No scleral icterus.  Oropharynx is clear.  NECK:  No lymphadenopathy or thyromegaly.  CARDIOVASCULAR:  Heart is regular rate and rhythm without any murmur, rub or gallop.  PULMONARY:  He has diffuse wheezing mostly at the bases.  No rhonchus sounds.  GASTROINTESTINAL:  Positive bowel sounds, soft, nontender and nondistended.  SKIN:  No new rashes or lesions.  LYMPHATICS:  He has trace to 1+ pitting edema of the bilateral lower extremities.  PSYCHIATRIC:  Alert and oriented x 3, normal affect.  NEUROLOGIC:  Cranial nerves II through XII are grossly intact without any focal deficits.    LABORATORY DATA:  WBC count 8.9, hemoglobin 11.2, platelets of 195.  BMP unremarkable save for a BUN of 32.  BNP 1050.  Troponin stable from 14-19.  Lactic acid 0.9.  Chest x-ray shows streaky opacities at the bases, likely atelectasis though infection cannot be completely excluded.    CT of the chest with contrast shows no visualized pulmonary embolus and there is not convincing evidence of active pulmonary disease. Thoracic aorta is dilated, measuring up to 5 cm in diameter.  No  dissection.    IMPRESSION AND PLAN:  Mr. Pearson is a 89-year-old male with a past medical history significant for coronary artery disease, abdominal and thoracic aortic aneurysm, hypertension, BPH, who presents to the emergency department with 5-6 days of cough, shortness of breath and weakness.  1.  Acute hypoxic respiratory failure:  At this point, it seems like he probably has pulmonary infection causing this, most likely something like bronchitis as there is not definitive pneumonia seen on CT of the chest.  With his progressive cough, hypoxia and fever without any other obvious cause, I do think that infection is likely playing a role here.  He has been initiated on ceftriaxone and azithromycin, which I will continue for now.  Would plan for at least a short course of 5 days.  We will wean oxygen as able.  Of note, influenza, COVID, RSV screens are negative.  2.  Coronary artery disease, hypertension, previous stroke, dyslipidemia:  We will continue with PTA regimen of Plavix, simvastatin, but hold chlorthalidone for now pending blood pressure check in the morning.  3.  Hypothyroidism:  Continue with levothyroxine.  4.  Thoracic ascending aneurysm:  Measured at 5 cm today, which seems to be pretty stable from 2019 when it was 4.8 cm.  Follow up with PCP for surveillance as needed.  5.  OCD with panic disorder:  Continue on PTA regimen of Abilify and clomipramine.  6.  Gait instability and possible Parkinson's symptoms:  Was recently seen by neurology and failed a trial of carbidopa/levodopa, so is being monitored and undergoing physical therapy.  7.  Disposition:  Brought under inpatient status as I expect at least 2 nights in the hospital for further treatment of his respiratory failure prior to discharge.    Jairon Magdaleno DO        D: 2023   T: 2023   MT: DANILO    Name:     JOSSELINE PEARSON  MRN:      [id number]        Account:     [id number]   :      1931           Admitted:     01/13/2023       Document: [id number]

## 2023-01-14 NOTE — CONSULTS
Care Management Initial Consult    General Information  Assessment completed with: spoke with son briefly regarding TCU at his request.  Patient lives at the [facility] on the [facility] [place].  He reports his father, in the past, was at the TCU on campus and he would like to explore other options.  Son lives in [place] and would like to explore options in the [facility] area. Writer confirmed with son that his father's insurace is a [facility] Medicare replacement. Explained we need to work with a TCU within the network and gave him a [facility] list and Medicare list.   Son will review and give preferences tomorrow.  We also spoke about transportation and if son is not available he is requesting a medivan be used to transport his father to the TCU. He is aware of the cost.  Note PT to consult. Will follow for PT recommendation.        Primary Care Provider verified and updated as needed:     Readmission within the last 30 days:           Advance Care Planning:            Communication Assessment  Patient's communication style: spoken language (English or Bilingual)    Hearing Difficulty or Deaf: no   Wear Glasses or Blind: yes    Cognitive  Cognitive/Neuro/Behavioral: WDL  Level of Consciousness: alert  Arousal Level: opens eyes spontaneously  Orientation: oriented x 4 (forgetful)  Mood/Behavior: calm, cooperative  Best Language: 0 - No aphasia  Speech: clear    Living Environment:   People in home:       Current living Arrangements: assisted living  Name of Facility: [facility]   Able to return to prior arrangements:  (son feels his father will need a TCU stay)       Family/Social Support:  Care provided by:    Provides care for:       Children          Description of Support System:  (Son conrad)         Current Resources:   Patient receiving home care services:       Community Resources:    Equipment currently used at home: walker, rolling  Supplies currently used at home:       Employment/Financial:  Employment Status:          Financial Concerns:             Lifestyle & Psychosocial Needs:  Social Determinants of Health     Tobacco Use: Medium Risk     Smoking Tobacco Use: Former     Smokeless Tobacco Use: Never     Passive Exposure: Not on file   Alcohol Use: Not on file   Financial Resource Strain: Not on file   Food Insecurity: Not on file   Transportation Needs: Not on file   Physical Activity: Not on file   Stress: Not on file   Social Connections: Not on file   Intimate Partner Violence: Not on file   Depression: Not at risk     PHQ-2 Score: 2   Housing Stability: Not on file       Functional Status:  Prior to admission patient needed assistance:              Mental Health Status:          Chemical Dependency Status:                Values/Beliefs:  Spiritual, Cultural Beliefs, Taoist Practices, Values that affect care:                 Additional Information:      KHANH RazaSW

## 2023-01-15 ENCOUNTER — APPOINTMENT (OUTPATIENT)
Dept: PHYSICAL THERAPY | Facility: CLINIC | Age: 88
DRG: 193 | End: 2023-01-15
Attending: INTERNAL MEDICINE
Payer: COMMERCIAL

## 2023-01-15 LAB
ANION GAP SERPL CALCULATED.3IONS-SCNC: 9 MMOL/L (ref 3–14)
BUN SERPL-MCNC: 35 MG/DL (ref 7–30)
CALCIUM SERPL-MCNC: 9.1 MG/DL (ref 8.5–10.1)
CHLORIDE BLD-SCNC: 103 MMOL/L (ref 94–109)
CO2 SERPL-SCNC: 27 MMOL/L (ref 20–32)
CREAT SERPL-MCNC: 1.23 MG/DL (ref 0.66–1.25)
ERYTHROCYTE [DISTWIDTH] IN BLOOD BY AUTOMATED COUNT: 14.9 % (ref 10–15)
GFR SERPL CREATININE-BSD FRML MDRD: 55 ML/MIN/1.73M2
GLUCOSE BLD-MCNC: 129 MG/DL (ref 70–99)
HCT VFR BLD AUTO: 36.8 % (ref 40–53)
HGB BLD-MCNC: 12.1 G/DL (ref 13.3–17.7)
MCH RBC QN AUTO: 32.1 PG (ref 26.5–33)
MCHC RBC AUTO-ENTMCNC: 32.9 G/DL (ref 31.5–36.5)
MCV RBC AUTO: 98 FL (ref 78–100)
PLATELET # BLD AUTO: 186 10E3/UL (ref 150–450)
POTASSIUM BLD-SCNC: 3.5 MMOL/L (ref 3.4–5.3)
RBC # BLD AUTO: 3.77 10E6/UL (ref 4.4–5.9)
SODIUM SERPL-SCNC: 139 MMOL/L (ref 133–144)
WBC # BLD AUTO: 6.7 10E3/UL (ref 4–11)

## 2023-01-15 PROCEDURE — 97161 PT EVAL LOW COMPLEX 20 MIN: CPT | Mod: GP

## 2023-01-15 PROCEDURE — 99232 SBSQ HOSP IP/OBS MODERATE 35: CPT | Performed by: INTERNAL MEDICINE

## 2023-01-15 PROCEDURE — 80048 BASIC METABOLIC PNL TOTAL CA: CPT | Performed by: INTERNAL MEDICINE

## 2023-01-15 PROCEDURE — 120N000001 HC R&B MED SURG/OB

## 2023-01-15 PROCEDURE — 258N000003 HC RX IP 258 OP 636: Performed by: INTERNAL MEDICINE

## 2023-01-15 PROCEDURE — 36415 COLL VENOUS BLD VENIPUNCTURE: CPT | Performed by: INTERNAL MEDICINE

## 2023-01-15 PROCEDURE — 97116 GAIT TRAINING THERAPY: CPT | Mod: GP

## 2023-01-15 PROCEDURE — 250N000011 HC RX IP 250 OP 636: Performed by: INTERNAL MEDICINE

## 2023-01-15 PROCEDURE — 85027 COMPLETE CBC AUTOMATED: CPT | Performed by: INTERNAL MEDICINE

## 2023-01-15 PROCEDURE — 250N000013 HC RX MED GY IP 250 OP 250 PS 637: Performed by: INTERNAL MEDICINE

## 2023-01-15 RX ADMIN — CLOMIPRAMINE HYDROCHLORIDE 25 MG: 25 CAPSULE ORAL at 21:05

## 2023-01-15 RX ADMIN — Medication 1 DROP: at 07:52

## 2023-01-15 RX ADMIN — SIMVASTATIN 20 MG: 20 TABLET, FILM COATED ORAL at 21:05

## 2023-01-15 RX ADMIN — AZITHROMYCIN MONOHYDRATE 250 MG: 500 INJECTION, POWDER, LYOPHILIZED, FOR SOLUTION INTRAVENOUS at 21:02

## 2023-01-15 RX ADMIN — ARIPIPRAZOLE 2 MG: 2 TABLET ORAL at 21:06

## 2023-01-15 RX ADMIN — LACTULOSE 30 ML: 20 SOLUTION ORAL at 07:52

## 2023-01-15 RX ADMIN — CEFTRIAXONE SODIUM 2 G: 2 INJECTION, POWDER, FOR SOLUTION INTRAMUSCULAR; INTRAVENOUS at 20:14

## 2023-01-15 RX ADMIN — GABAPENTIN 400 MG: 100 CAPSULE ORAL at 07:52

## 2023-01-15 RX ADMIN — Medication 1 DROP: at 21:06

## 2023-01-15 RX ADMIN — MIRTAZAPINE 45 MG: 15 TABLET, FILM COATED ORAL at 21:05

## 2023-01-15 RX ADMIN — LEVOTHYROXINE SODIUM 100 MCG: 50 TABLET ORAL at 07:52

## 2023-01-15 RX ADMIN — GABAPENTIN 400 MG: 100 CAPSULE ORAL at 21:05

## 2023-01-15 RX ADMIN — CLOPIDOGREL BISULFATE 75 MG: 75 TABLET ORAL at 07:52

## 2023-01-15 RX ADMIN — PSYLLIUM HUSK 1 PACKET: 3.4 POWDER ORAL at 15:34

## 2023-01-15 ASSESSMENT — ACTIVITIES OF DAILY LIVING (ADL)
ADLS_ACUITY_SCORE: 39
ADLS_ACUITY_SCORE: 40
ADLS_ACUITY_SCORE: 40
ADLS_ACUITY_SCORE: 39
ADLS_ACUITY_SCORE: 40
ADLS_ACUITY_SCORE: 39
ADLS_ACUITY_SCORE: 40
ADLS_ACUITY_SCORE: 40
ADLS_ACUITY_SCORE: 39

## 2023-01-15 NOTE — PLAN OF CARE
Goal Outcome Evaluation:      Plan of Care Reviewed With: patient      Summary: Weakness/ fever/ pneumonia vs bronchitis  DATE & TIME: 1/15/23 0890-5970  Cognitive Concerns/ Orientation : A&O x4, very pleasant, forgetful.   BEHAVIOR & AGGRESSION TOOL COLOR: Green  CIWA SCORE: NA   ABNL VS/O2: VSS on RA, HR 40's, MD aware. Check O2 sats on ear not fingers.   MOBILITY: Ax1 GB/walker, shuffles unsteady gait. Standing to use urinal.  PAIN MANAGMENT: Denies  DIET: Regular, good appetite.   BOWEL/BLADDER: Continent b/b.   ABNL LAB/BG: None new  DRAIN/DEVICES: L PIV SL  TELEMETRY RHYTHM: NA  SKIN: Healing wound on L hand 2nd finger, drsg CDI. Ab/dry/flaky skin on BLE and hands. Chronic fungal condition affecting skin and fingernails.   TESTS/PROCEDURES: NA  D/C DAY/GOALS/PLACE: Discharge expected to TCU pending improvement.  OTHER IMPORTANT INFO: LS clear; congested, NP cough. BUE tremors, baseline. Luis LE ab, edema 1+. Receiving IV zithromax and rocephin.

## 2023-01-15 NOTE — PROGRESS NOTES
[facility]    Hospitalist Progress Note    Assessment & Plan   Zack Santiago is a 89 year old male with PMHx of CAD, hypertension, dyslipidemia, afib, hx of AAA s/p endovascular repair in 2015, GERD, hypothyroidism, BPH and ulcerative colitis among other medical problems who was admitted on 1/13/2023 with acute hypoxic respiratory failure dt suspected CAP.     Acute hypoxic respiratory failure dt CAP vs bronchitis  * Presented to ED with 5-6d hx of cough, shortness of breath and generalized weakness.   * Febrile on admission with Tmax 101.9. Was hypoxic on RA and needing 3-4L NC. WBC 8.9, procal 0.75 (possible early infection). RSV, influenza and COVID all neg. CXR neg showed streaky opacities at bases. D-dimer elevated so CT chest obtained -- neg for PE, no acute pulmonary disease. Started on treatment for possible bronchitis vs CAP with ceftriaxone and azithromycin.   * Condition improved, weaned off O2 on 1/14.  * Remains afebrile, labs stable.  -- cont ceftriaxone and azithromycin (d#3/5)  -- cont pulmonary toilet with IS/OOB    Coronary artery disease  Hypertension  Dyslipidemia  Prior CVA  * Chronic and stable on statin and Plavix  * Chlorthalidone held on admission, can resume at discharge    Thoracic ascending aneurysm  * Measured at 5 cm on CT imaging this stay. Seems to be pretty stable from 2019 when it was 4.8 cm.    * Follow up with PCP for surveillance as needed.    Hypothyroidism  * Chronic and stable on levothyroxine.    OCD with panic disorder  * Chronic and stable on Abilify, Remeron and clomipramine.    Gait instability and possible Parkinson's symptoms    * Was recently seen by neurology and failed a trial of carbidopa/levodopa, so is being monitored and undergoing physical therapy.    Soft tissue injury/skin tear to L index finger  * Fell and injured his L index finger on the carpet prior to admission. No punture injury. Looks like superficial skin tear. No need for  topical agents.  -- keep dry, bandage as needed    FEN: no IVFs, lytes stable, regular diet  DVT Prophylaxis: PCDs  Code Status: Full Code    Disposition: Anticipate he will be medically stable to discharge tomorrow if respiratory status remains stable. PT consulted to discern if TCU stay will be needed.    Hayde Carvajal, DO    Medical Decision Making       -------------------------- BILLING ON TIME ------------------------------------------------------------------------------------------------------------  35 MINUTES SPENT BY ME on the date of service doing chart review, history, exam, documentation & further activities per the note.           Interval History    Seen this morning. Sitting up in chair, seems to be in good spirits. Feeling good. O2 sats remain stable on RA. Breathing okay. Some cough. No chest pain. No abd pain/n/v. Taking po. Scheduled to work with PT this afternoon.    -Data reviewed today: I reviewed all new labs and imaging results over the last 24 hours. I personally reviewed no images or EKG's today.    Physical Exam   Temp: 97.9  F (36.6  C) Temp src: Oral BP: 128/66 Pulse: (!) 48   Resp: 18 SpO2: 95 % O2 Device: None (Room air)    Vitals:    01/13/23 1630   Weight: 88.5 kg (195 lb)     Vital Signs with Ranges  Temp:  [97.8  F (36.6  C)-98.2  F (36.8  C)] 97.9  F (36.6  C)  Pulse:  [46-53] 48  Resp:  [12-18] 18  BP: (114-128)/(44-66) 128/66  SpO2:  [93 %-98 %] 95 %  I/O last 3 completed shifts:  In: 1200 [P.O.:1200]  Out: 1050 [Urine:1050]    Constitutional: Resting comfortably, alert and conversing appropriately, NAD  Respiratory: +bibasilar crackles, no wheeze/rhonchi, no increased work of breathing  Cardiovascular: HRRR, no MGR, no LE edema  GI: S, NT, ND, +BS  Skin/Integumen: warm/dry, skin tear on L index finger w/o flutuance/purulence or surrounding erythema  Other:      Medications       ARIPiprazole  2 mg Oral At Bedtime     azithromycin  250 mg Intravenous Q24H      carboxymethylcellulose PF  1 drop Both Eyes BID     cefTRIAXone  2 g Intravenous Q24H     [Held by provider] chlorthalidone  25 mg Oral Daily     clomiPRAMINE  25 mg Oral QPM     clopidogrel  75 mg Oral Daily     gabapentin  400 mg Oral BID     lactulose  30 mL Oral Daily     levothyroxine  100 mcg Oral Daily     mirtazapine  45 mg Oral At Bedtime     simvastatin  20 mg Oral At Bedtime     sodium chloride (PF)  3 mL Intracatheter Q8H       Data   Recent Labs   Lab 01/15/23  0859 01/13/23  1655   WBC 6.7 8.9   HGB 12.1* 11.2*   MCV 98 99    195    139   POTASSIUM 3.5 3.8   CHLORIDE 103 104   CO2 27 27   BUN 35* 32*   CR 1.23 1.18   ANIONGAP 9 8   YOUNG 9.1 8.8   * 104*       No results found for this or any previous visit (from the past 24 hour(s)).

## 2023-01-15 NOTE — PROGRESS NOTES
01/15/23 6192   Appointment Info   Signing Clinician's Name / Credentials (PT) Lina Carvajal, PT, DPT   Living Environment   People in Home alone   Current Living Arrangements assisted living   Home Accessibility no concerns   Transportation Anticipated family or friend will provide   Living Environment Comments Gets assist w/ dressing, bathing, cooking, cleaning, meds. Independent w mobility w/ 4WW.   Self-Care   Usual Activity Tolerance moderate   Current Activity Tolerance fair   Regular Exercise No   Equipment Currently Used at Home walker, rolling  (4WW)   Fall history within last six months yes   Number of times patient has fallen within last six months 2   General Information   Onset of Illness/Injury or Date of Surgery 01/13/23   Referring Physician Hayde Carvajal, DO   Patient/Family Therapy Goals Statement (PT) to get better   Pertinent History of Current Problem (include personal factors and/or comorbidities that impact the POC) Acute hypoxic respiratory failure dt CAP vs bronchitis. Zack Santiago is a 89 year old male with PMHx of CAD, hypertension, dyslipidemia, afib, hx of AAA s/p endovascular repair in 2015, GERD, hypothyroidism, BPH and ulcerative colitis among other medical problems who was admitted on 1/13/2023 with acute hypoxic respiratory failure dt suspected CAP.   Existing Precautions/Restrictions fall   Weight-Bearing Status - LLE full weight-bearing   Weight-Bearing Status - RLE full weight-bearing   Cognition   Affect/Mental Status (Cognition) WFL   Pain Assessment   Patient Currently in Pain No   Integumentary/Edema   Integumentary/Edema Comments redness in hands/fingers and B legs. healing injury on finger w/ bandage.   Posture    Posture Forward head position   Range of Motion (ROM)   Range of Motion ROM is WFL   ROM Comment general stiffness   Strength (Manual Muscle Testing)   Strength Comments decreased B LE, generally 4-5/5 with MMT but fatigues quickly functionally    Bed Mobility   Comment, (Bed Mobility) not assessed; to/from chair   Transfers   Comment, (Transfers) CGA-min assist to stand to 4WW   Gait/Stairs (Locomotion)   [place] Level (Gait) contact guard   Assistive Device (Gait) walker, front-wheeled   Distance in Feet 80ft   Distance in Feet (Gait) 120ft, 90ft   Comment, (Gait/Stairs) shuffling gait pattern w/ flexed posture   Balance   Balance Comments fair-poor standing dynamic balance, needs UE support   Clinical Impression   Criteria for Skilled Therapeutic Intervention Yes, treatment indicated   PT Diagnosis (PT) impaired functional mobility   Influenced by the following impairments decreased strength, activity tolerance, balance, coordination   Functional limitations due to impairments bed mobility, transfers, ambulation   Clinical Presentation (PT Evaluation Complexity) Stable/Uncomplicated   Clinical Presentation Rationale clinical judgement   Clinical Decision Making (Complexity) low complexity   Planned Therapy Interventions (PT) bed mobility training;gait training;neuromuscular re-education;patient/family education;strengthening;transfer training   Anticipated Equipment Needs at Discharge (PT) walker, rolling   Risk & Benefits of therapy have been explained evaluation/treatment results reviewed;care plan/treatment goals reviewed;risks/benefits reviewed;current/potential barriers reviewed;participants voiced agreement with care plan;participants included;patient   PT Total Evaluation Time   PT Eval, Low Complexity Minutes (18008) 13   Physical Therapy Goals   PT Frequency Daily   PT Predicted Duration/Target Date for Goal Attainment 01/22/23   PT Goals Bed Mobility;Transfers;Gait   PT: Bed Mobility Independent   PT: Transfers Modified independent;Sit to/from stand;Bed to/from chair;Assistive device   PT: Gait Modified independent;Assistive device;Rolling walker;Greater than 200 feet   Interventions   Interventions Quick Adds Gait Training   Gait Training    Gait Training Minutes (43139) 25   Symptoms Noted During/After Treatment (Gait Training) fatigue   Treatment Detail/Skilled Intervention Shoes donned w/ assist prior to standing up. Sit to stand to 4WW w/ min assist, needed assist to stand to use urinal. Then ambulation w/ 4WW where pt demonstrates slow shuffling pattern, no overt LOB but loses upright posture over time and started to buckle, needing to sit to rest. Pt w/ significant difficulty backing up to sit and needed chair to be brought directly to him. After a long rest break, ambulated another 90ft w/ chair follow for safety.   [place] Level (Gait Training) minimum assist (75% patient effort)   Physical Assistance Level (Gait Training) 1 person assist   Weight Bearing (Gait Training) full weight-bearing   Assistive Device (Gait Training) rolling walker   PT Discharge Planning   PT Plan bring 4ww, ambulation w/ chair follow, general strengthening and balance   PT Discharge Recommendation (DC Rec) Transitional Care Facility   PT Rationale for DC Rec Patient w/ significantly decreased activity tolerance compared to his baseline where he is independent w/ mobility. As he lives alone, pt will benefit from TCU stay to address this prior to returning home for safety and independence. Pt does receive assist for most ADLs at home, so when activity tolerance for mobility improves, he may be able to look at returning home w/ home PT instead.   PT Brief overview of current status min assist transfers, CGA-min assist ambulation x 120ft but needed sudden rest break w/ buckling   Total Session Time   Timed Code Treatment Minutes 25   Total Session Time (sum of timed and untimed services) 38

## 2023-01-16 ENCOUNTER — APPOINTMENT (OUTPATIENT)
Dept: PHYSICAL THERAPY | Facility: CLINIC | Age: 88
DRG: 193 | End: 2023-01-16
Payer: COMMERCIAL

## 2023-01-16 LAB
ANION GAP SERPL CALCULATED.3IONS-SCNC: 9 MMOL/L (ref 3–14)
BUN SERPL-MCNC: 38 MG/DL (ref 7–30)
CALCIUM SERPL-MCNC: 9.2 MG/DL (ref 8.5–10.1)
CHLORIDE BLD-SCNC: 106 MMOL/L (ref 94–109)
CO2 SERPL-SCNC: 27 MMOL/L (ref 20–32)
CREAT SERPL-MCNC: 1.01 MG/DL (ref 0.66–1.25)
GFR SERPL CREATININE-BSD FRML MDRD: 70 ML/MIN/1.73M2
GLUCOSE BLD-MCNC: 120 MG/DL (ref 70–99)
POTASSIUM BLD-SCNC: 3.6 MMOL/L (ref 3.4–5.3)
SODIUM SERPL-SCNC: 142 MMOL/L (ref 133–144)

## 2023-01-16 PROCEDURE — 250N000013 HC RX MED GY IP 250 OP 250 PS 637: Performed by: INTERNAL MEDICINE

## 2023-01-16 PROCEDURE — 99232 SBSQ HOSP IP/OBS MODERATE 35: CPT | Performed by: INTERNAL MEDICINE

## 2023-01-16 PROCEDURE — 97116 GAIT TRAINING THERAPY: CPT | Mod: GP

## 2023-01-16 PROCEDURE — 97530 THERAPEUTIC ACTIVITIES: CPT | Mod: GP

## 2023-01-16 PROCEDURE — 36415 COLL VENOUS BLD VENIPUNCTURE: CPT | Performed by: INTERNAL MEDICINE

## 2023-01-16 PROCEDURE — 120N000001 HC R&B MED SURG/OB

## 2023-01-16 PROCEDURE — 80048 BASIC METABOLIC PNL TOTAL CA: CPT | Performed by: INTERNAL MEDICINE

## 2023-01-16 PROCEDURE — 250N000011 HC RX IP 250 OP 636: Performed by: INTERNAL MEDICINE

## 2023-01-16 RX ORDER — AZITHROMYCIN 250 MG/1
250 TABLET, FILM COATED ORAL DAILY
Status: DISCONTINUED | OUTPATIENT
Start: 2023-01-17 | End: 2023-01-16

## 2023-01-16 RX ORDER — AZITHROMYCIN 250 MG/1
250 TABLET, FILM COATED ORAL DAILY
Status: DISCONTINUED | OUTPATIENT
Start: 2023-01-16 | End: 2023-01-17 | Stop reason: HOSPADM

## 2023-01-16 RX ADMIN — ARIPIPRAZOLE 2 MG: 2 TABLET ORAL at 22:37

## 2023-01-16 RX ADMIN — LEVOTHYROXINE SODIUM 100 MCG: 50 TABLET ORAL at 08:51

## 2023-01-16 RX ADMIN — GABAPENTIN 400 MG: 100 CAPSULE ORAL at 08:51

## 2023-01-16 RX ADMIN — AZITHROMYCIN MONOHYDRATE 250 MG: 250 TABLET ORAL at 20:31

## 2023-01-16 RX ADMIN — SIMVASTATIN 20 MG: 20 TABLET, FILM COATED ORAL at 22:37

## 2023-01-16 RX ADMIN — Medication 1 DROP: at 20:31

## 2023-01-16 RX ADMIN — LACTULOSE 30 ML: 20 SOLUTION ORAL at 08:51

## 2023-01-16 RX ADMIN — CEFTRIAXONE SODIUM 2 G: 2 INJECTION, POWDER, FOR SOLUTION INTRAMUSCULAR; INTRAVENOUS at 20:29

## 2023-01-16 RX ADMIN — Medication 1 DROP: at 08:51

## 2023-01-16 RX ADMIN — CLOMIPRAMINE HYDROCHLORIDE 25 MG: 25 CAPSULE ORAL at 20:31

## 2023-01-16 RX ADMIN — GABAPENTIN 400 MG: 100 CAPSULE ORAL at 20:31

## 2023-01-16 RX ADMIN — MIRTAZAPINE 45 MG: 15 TABLET, FILM COATED ORAL at 22:36

## 2023-01-16 RX ADMIN — CLOPIDOGREL BISULFATE 75 MG: 75 TABLET ORAL at 08:51

## 2023-01-16 ASSESSMENT — ACTIVITIES OF DAILY LIVING (ADL)
ADLS_ACUITY_SCORE: 40
ADLS_ACUITY_SCORE: 41
ADLS_ACUITY_SCORE: 40
ADLS_ACUITY_SCORE: 41
ADLS_ACUITY_SCORE: 40
ADLS_ACUITY_SCORE: 41
ADLS_ACUITY_SCORE: 41

## 2023-01-16 NOTE — PLAN OF CARE
Goal Outcome Evaluation:      Plan of Care Reviewed With: patient    Overall Patient Progress: improvingOverall Patient Progress: improving         Summary: Weakness/ fever/ pneumonia vs bronchitis  DATE & TIME: 1/15/23   Cognitive Concerns/ Orientation : A&O x4, very pleasant, forgetful. Content in room watching tv.   BEHAVIOR & AGGRESSION TOOL COLOR: Green  CIWA SCORE: NA   ABNL VS/O2: VSS on RA, HR 40/50's, MD aware. Check O2 sats on ear not fingers.   MOBILITY: Ax1 GB/walker, shuffles unsteady gait. Standing to use urinal, amb in sal, chair all shift.   PAIN MANAGMENT: Denies  DIET: Regular, good appetite.   BOWEL/BLADDER: Continent B/B. Voiding per urinal with assist.   No BM, PRN metamucil given per pt request.   ABNL LAB: NA  DRAIN/DEVICES: L PIV SL  TELEMETRY RHYTHM: NA  SKIN: Healing wound on L hand 2nd finger, drsg changed. Ab/dry/flaky skin on BLE and hands. Chronic fungal condition affecting skin and fingernails.   TESTS/PROCEDURES: NA  D/C DAY/GOALS/PLACE: PT rec TCU, SW involved, son aware.    OTHER IMPORTANT INFO: LS clear; congested/NP cough. BUE tremors, baseline, unchanged. Luis LE ab, edema 1+. Receiving IV zithromax and rocephin.

## 2023-01-16 NOTE — PROGRESS NOTES
Care Management Follow Up    Length of Stay (days): 3    Expected Discharge Date: 01/17/2023     Concerns to be Addressed:       Patient plan of care discussed at interdisciplinary rounds: Yes    Anticipated Discharge Disposition:  TCU vs directly back to [facility]     Anticipated Discharge Services:    Anticipated Discharge DME:      Patient/family educated on Medicare website which has current facility and service quality ratings:    Education Provided on the Discharge Plan:    Patient/Family in Agreement with the Plan:      Referrals Placed by CM/SW:    Private pay costs discussed: transportation costs    Additional Information:  On 1/14, son Logan said he is interested in TCU's in the [facility] network within the [facility] area.  Today referrals were made to [facility] and [facility].  [facility] is able to offer admission into a semi-private room at 1700 today.  Writer met with patient to ask writer to discuss with his son.  Writer called Logan.  He reports his father was pleased with his PT session yesterday and that the therapist felt patient may be able to progress here and go home rather then to TCU.  Based on this, son does want patient to be seen by therapy today prior to agreeing to TCU.  Writer contacted William Souza, assigned therapist. Pt is currently on their wait list. William will try to see patient as soon as possible.  Whether patient goes to TCU or back to his [facility], son is requesting medivan transport and he reports patient is okay with the cost.   Writer also called the [facility] ([facility] [phone]) and left his nurse Jayashree a message that pending patient's PT session, he may return today.    Update at 8177  Writer received a call from Chhaya nurse at [facility]. She shares staff strongly recommends patient go to TCU first as he has had several falls at the [facility].  Also spoke with son and patient together. Patient has decided he will go to TCU. Patient was willing to consider [facility]  until he found out the room is a semi-private and he  is not interested in a semi-private room. He is concerned he will be kept awake at night by a roommate who snores.  He has told his son he wants to go to [facility] into a private room and he is aware of the daily private room fee beginning on day 8 of $36.00.  Writer made a referral to [facility] and the patient also called the TCU admissions department.   Radha in admissions said she spoke with patient and told him their TCU is not in the [facility] network however they can still request authorization and their experience is that [facility] sometimes pays In-network benefits and sometimes do not.  If they pay out of network benefit the daily cost may be up to $240.00 a day and she said patient said he is willing to take the financial risk.  Radha will be submitting for pre-authorization.  Writer shared that [facility] is waiving the pre-authorization before admission however she said the facility has run into difficulty with this process and she will submit authorization today and be able to admit tomorrow.   Jada Long, KHANHSW

## 2023-01-16 NOTE — PLAN OF CARE
"VSS. A/Ox4, forgetful. Compliant with cares. Continues with IV antibiotics. Ax1. No SOB. Will continue to monitor.    /55   Pulse 50   Temp 98.5  F (36.9  C)   Resp 18   Ht 1.867 m (6' 1.5\")   Wt 88.5 kg (195 lb)   SpO2 95%   BMI 25.38 kg/m      "

## 2023-01-16 NOTE — PLAN OF CARE
Goal Outcome Evaluation:      Plan of Care Reviewed With: patient    Overall Patient Progress: improvingOverall Patient Progress: improving         Summary: Weakness/ fever/ pneumonia vs bronchitis  DATE & TIME: 1/16/2023   Cognitive Concerns/ Orientation: A&Ox4, forgetful.  BEHAVIOR & AGGRESSION TOOL COLOR: Green  CIWA SCORE: N/A   ABNL VS/O2: VSS on RA x HR 40's. Check O2 sats on ear not fingers.   MOBILITY: Ax1 GB/walker, shuffles w/ unsteady gait. Amb in sal x2. Stands to use urinal.  PAIN MANAGMENT: Denies  DIET: Regular  BOWEL/BLADDER: Continent B/B. Voiding per urinal with assist. No BM this shift.  ABNL LAB: NA  DRAIN/DEVICES: L PIV SL  TELEMETRY RHYTHM: N/A  SKIN: Pale/radha but mostly intact. Healing wound on L hand 2nd finger, dressing changed. Chronic fungal condition affecting fingernails.   TESTS/PROCEDURES: N/A  D/C DAY/GOALS/PLACE: MD discharge order placed 1/16. PT rec TCU. See SW note, plan [facility] TCU 1/17.  OTHER IMPORTANT INFO: BUE tremors, baseline, unchanged. BLE radha, edema +1. Changed to PO Zithromax, cont IV rocephin while inpt.

## 2023-01-16 NOTE — PROGRESS NOTES
[facility]    Hospitalist Progress Note    Assessment & Plan   Zack Santiago is a 89 year old male with PMHx of CAD, hypertension, dyslipidemia, afib, hx of AAA s/p endovascular repair in 2015, GERD, hypothyroidism, BPH and ulcerative colitis among other medical problems who was admitted on 1/13/2023 with acute hypoxic respiratory failure dt suspected CAP.     Acute hypoxic respiratory failure dt CAP vs bronchitis  * Presented to ED with 5-6d hx of cough, shortness of breath and generalized weakness.   * Febrile on admission with Tmax 101.9. Was hypoxic on RA and needing 3-4L NC. WBC 8.9, procal 0.75 (possible early infection). RSV, influenza and COVID all neg. CXR neg showed streaky opacities at bases. D-dimer elevated so CT chest obtained -- neg for PE, no acute pulmonary disease. Started on treatment for possible bronchitis vs CAP with ceftriaxone and azithromycin.   * Condition improved, weaned off O2 on 1/14.  * Remains afebrile, labs stable.  -- cont ceftriaxone and azithromycin (d#4/5)  -- cont pulmonary toilet with IS/OOB    Coronary artery disease  Hypertension  Dyslipidemia  Prior CVA  * Chronic and stable on statin and Plavix  * Chlorthalidone held on admission, can resume at discharge    Thoracic ascending aneurysm  * Measured at 5 cm on CT imaging this stay. Seems to be pretty stable from 2019 when it was 4.8 cm.    * Follow up with PCP for surveillance as needed.    Stage II CKD  * Baseline GFR seems to be 50-70s. Renal function stable this stay.     Hypothyroidism  * Chronic and stable on levothyroxine.    OCD with panic disorder  * Chronic and stable on Abilify, Remeron and clomipramine.    Gait instability and possible Parkinson's symptoms    * Was recently seen by neurology and failed a trial of carbidopa/levodopa, so is being monitored and undergoing physical therapy.    Soft tissue injury/skin tear to L index finger  * Fell and injured his L index finger on the carpet  prior to admission. No punture injury. Looks like superficial skin tear. No need for topical agents.  -- keep dry, bandage as needed    FEN: no IVFs, lytes stable, regular diet  DVT Prophylaxis: PCDs  Code Status: Full Code    Disposition: Medically stable to discharge. PT recommends TCU stay, patient in agreement. SW following. Discharge once placement found.     Hayde Carvajal, DO    Medical Decision Making       -------------------------- BILLING ON TIME ------------------------------------------------------------------------------------------------------------  35 MINUTES SPENT BY ME on the date of service doing chart review, history, exam, documentation & further activities per the note.           Interval History    Seen this morning. Feeling well. Breathing okay. No specific complaints. Denies cp/sob/cough, abd pain/n/v. O2 sats remain stable on RA.     -Data reviewed today: I reviewed all new labs and imaging results over the last 24 hours. I personally reviewed no images or EKG's today.    Physical Exam   Temp: 97.8  F (36.6  C) Temp src: Oral BP: 139/64 Pulse: (!) 47   Resp: 18 SpO2: 98 % O2 Device: None (Room air)    Vitals:    01/13/23 1630 01/16/23 0650   Weight: 88.5 kg (195 lb) 86.2 kg (190 lb)     Vital Signs with Ranges  Temp:  [97.7  F (36.5  C)-98.5  F (36.9  C)] 97.8  F (36.6  C)  Pulse:  [47-56] 47  Resp:  [18] 18  BP: (120-139)/(46-64) 139/64  SpO2:  [95 %-98 %] 98 %  I/O last 3 completed shifts:  In: 760 [P.O.:760]  Out: 1200 [Urine:1200]    Constitutional: Resting comfortably, alert and conversing appropriately, NAD  Respiratory: +bibasilar crackles (L>R), no wheeze/rhonchi, no increased work of breathing  Cardiovascular: HRRR, no MGR, no LE edema  GI: S, NT, ND, +BS  Skin/Integumen: warm/dry, skin tear on L index finger w/o flutuance/purulence or surrounding erythema  Other:      Medications       ARIPiprazole  2 mg Oral At Bedtime     azithromycin  250 mg Intravenous Q24H      [START ON 1/17/2023] azithromycin  250 mg Oral Daily     carboxymethylcellulose PF  1 drop Both Eyes BID     cefTRIAXone  2 g Intravenous Q24H     [Held by provider] chlorthalidone  25 mg Oral Daily     clomiPRAMINE  25 mg Oral QPM     clopidogrel  75 mg Oral Daily     gabapentin  400 mg Oral BID     lactulose  30 mL Oral Daily     levothyroxine  100 mcg Oral Daily     mirtazapine  45 mg Oral At Bedtime     simvastatin  20 mg Oral At Bedtime     sodium chloride (PF)  3 mL Intracatheter Q8H       Data   Recent Labs   Lab 01/16/23  0846 01/15/23  0859 01/13/23  1655   WBC  --  6.7 8.9   HGB  --  12.1* 11.2*   MCV  --  98 99   PLT  --  186 195    139 139   POTASSIUM 3.6 3.5 3.8   CHLORIDE 106 103 104   CO2 27 27 27   BUN 38* 35* 32*   CR 1.01 1.23 1.18   ANIONGAP 9 9 8   YOUNG 9.2 9.1 8.8   * 129* 104*       No results found for this or any previous visit (from the past 24 hour(s)).

## 2023-01-16 NOTE — PLAN OF CARE
Goal Outcome Evaluation:    Plan of Care Reviewed With: patient    Overall Patient Progress: no change    Summary: Weakness/ fever/ pneumonia vs bronchitis  DATE & TIME: 1/15-1/16/2023 6284-7118  Cognitive Concerns/ Orientation: A&Ox4, forgetful.  BEHAVIOR & AGGRESSION TOOL COLOR: Green  CIWA SCORE: N/A   ABNL VS/O2: VSS on RA except bradycardia. Check O2 sats on ear not fingers.   MOBILITY: Ax1 GB/walker, shuffles w/ unsteady gait. Stands to use urinal.  PAIN MANAGMENT: Denies  DIET: Regular  BOWEL/BLADDER: Continent B/B. Voiding per urinal with assist. No BM this shift.  ABNL LAB: eGFR 55 mL/min/ 1.73m2  DRAIN/DEVICES: L PIV SL  TELEMETRY RHYTHM: N/A  SKIN: Pale/radha but mostly intact. Healing wound on L hand 2nd finger, dressing CDI. Chronic fungal condition affecting fingernails.   TESTS/PROCEDURES: N/A  D/C DAY/GOALS/PLACE: 1-2 days. PT recommends TCU. SW involved, son aware.    OTHER IMPORTANT INFO: BUE tremors, baseline, unchanged. BLE radha, edema +1.

## 2023-01-17 VITALS
HEART RATE: 52 BPM | TEMPERATURE: 97.9 F | WEIGHT: 190 LBS | OXYGEN SATURATION: 94 % | DIASTOLIC BLOOD PRESSURE: 66 MMHG | SYSTOLIC BLOOD PRESSURE: 143 MMHG | BODY MASS INDEX: 24.38 KG/M2 | RESPIRATION RATE: 18 BRPM | HEIGHT: 74 IN

## 2023-01-17 PROCEDURE — 99239 HOSP IP/OBS DSCHRG MGMT >30: CPT | Performed by: INTERNAL MEDICINE

## 2023-01-17 PROCEDURE — 250N000013 HC RX MED GY IP 250 OP 250 PS 637: Performed by: INTERNAL MEDICINE

## 2023-01-17 RX ORDER — CEFPODOXIME PROXETIL 200 MG/1
200 TABLET, FILM COATED ORAL EVERY 12 HOURS
Qty: 5 TABLET | Refills: 0 | DISCHARGE
Start: 2023-01-17 | End: 2023-01-20

## 2023-01-17 RX ORDER — AZITHROMYCIN 250 MG/1
250 TABLET, FILM COATED ORAL DAILY
Status: DISCONTINUED | OUTPATIENT
Start: 2023-01-17 | End: 2023-01-17 | Stop reason: HOSPADM

## 2023-01-17 RX ORDER — CEFDINIR 300 MG/1
300 CAPSULE ORAL EVERY 12 HOURS SCHEDULED
Status: DISCONTINUED | OUTPATIENT
Start: 2023-01-17 | End: 2023-01-17 | Stop reason: HOSPADM

## 2023-01-17 RX ADMIN — GABAPENTIN 400 MG: 100 CAPSULE ORAL at 09:29

## 2023-01-17 RX ADMIN — CLOPIDOGREL BISULFATE 75 MG: 75 TABLET ORAL at 09:29

## 2023-01-17 RX ADMIN — LACTULOSE 30 ML: 20 SOLUTION ORAL at 09:29

## 2023-01-17 RX ADMIN — CEFDINIR 300 MG: 300 CAPSULE ORAL at 10:46

## 2023-01-17 RX ADMIN — LEVOTHYROXINE SODIUM 100 MCG: 50 TABLET ORAL at 09:29

## 2023-01-17 RX ADMIN — Medication 1 DROP: at 09:30

## 2023-01-17 ASSESSMENT — ACTIVITIES OF DAILY LIVING (ADL)
ADLS_ACUITY_SCORE: 41

## 2023-01-17 NOTE — PLAN OF CARE
Goal Outcome Evaluation:         Summary: Weakness/ fever/ pneumonia vs bronchitis  DATE & TIME: 01/16/23-01/17/23 6813-4352  Cognitive Concerns/ Orientation: A&Ox4, forgetful.  BEHAVIOR & AGGRESSION TOOL COLOR: Green  CIWA SCORE: N/A   ABNL VS/O2: VSS on RA x HR 40's. Check O2 sats on ear not fingers. Pt. Refused vitals overnight   MOBILITY: Ax1 GB/walker, shuffles w/ unsteady gait. Stands to use urinal.  PAIN MANAGMENT: Denies  DIET: Regular  BOWEL/BLADDER: Continent B/B. Voiding per urinal with assist. No BM this shift.  ABNL LAB: NA  DRAIN/DEVICES: R PIV SL  TELEMETRY RHYTHM: N/A  SKIN: Pale/radha but mostly intact. Healing wound on L hand 2nd finger, dressing changed. Chronic fungal condition affecting fingernails. Hands are radha, states that happens sometimes intermittently.   TESTS/PROCEDURES: N/A  D/C DAY/GOALS/PLACE: MD discharge order placed 1/16. PT rec TCU. See SW note, plan [facility] TCU 1/17.  OTHER IMPORTANT INFO: BUE tremors, baseline, unchanged. BLE radha, edema +2 and +3. Changed to PO Zithromax, cont IV rocephin while inpt.

## 2023-01-17 NOTE — PLAN OF CARE
Goal Outcome Evaluation:    Summary: Weakness/ fever/ pneumonia vs bronchitis  DATE & TIME: 01/16/23 5256-4699  Cognitive Concerns/ Orientation: A&Ox4, forgetful.  BEHAVIOR & AGGRESSION TOOL COLOR: Green  CIWA SCORE: N/A   ABNL VS/O2: VSS on RA x HR 40's. Check O2 sats on ear not fingers.   MOBILITY: Ax1 GB/walker, shuffles w/ unsteady gait. Amb in sal x1. Stands to use urinal.  PAIN MANAGMENT: Denies  DIET: Regular  BOWEL/BLADDER: Continent B/B. Voiding per urinal with assist. No BM this shift.  ABNL LAB: NA  DRAIN/DEVICES: L PIV SL discontinued this shift due to leaking, new PIV placed in R arm  TELEMETRY RHYTHM: N/A  SKIN: Pale/radha but mostly intact. Healing wound on L hand 2nd finger, dressing changed. Chronic fungal condition affecting fingernails. Hands are radha, states that happens sometimes intermittently.   TESTS/PROCEDURES: N/A  D/C DAY/GOALS/PLACE: MD discharge order placed 1/16. PT rec TCU. See SW note, plan [facility] 1/17.  OTHER IMPORTANT INFO: BUE tremors, baseline, unchanged. BLE radha, edema +2 and +3. Changed to PO Zithromax, cont IV rocephin while inpt. Does not want to be woken up while he is sleeping tonight for vitals or anything else.      Past Medical History:   Diagnosis Date    H/O ETOH abuse     Hypertension        Past Surgical History:   Procedure Laterality Date    ADHD      TONSILLECTOMY         Review of patient's allergies indicates:  No Known Allergies    Current Facility-Administered Medications on File Prior to Encounter   Medication    [DISCONTINUED] acetaminophen tablet 650 mg    [DISCONTINUED] albuterol-ipratropium 2.5 mg-0.5 mg/3 mL nebulizer solution 3 mL    [DISCONTINUED] cefTRIAXone (ROCEPHIN) 2 g/50 mL D5W IVPB    [DISCONTINUED] doxycycline tablet 100 mg    [DISCONTINUED] enoxaparin injection 40 mg    [DISCONTINUED] famotidine tablet 20 mg    [DISCONTINUED] lactated ringers bolus 1,000 mL    [DISCONTINUED] lorazepam injection 4 mg    [DISCONTINUED] multivit-min-ferrous gluconate 9 mg iron/15 mL oral liquid 15 mL    [DISCONTINUED] mupirocin 2 % ointment    [DISCONTINUED] ondansetron injection 4 mg    [DISCONTINUED] senna-docusate 8.6-50 mg per tablet 2 tablet     Current Outpatient Medications on File Prior to Encounter   Medication Sig    losartan (COZAAR) 50 MG tablet Take 50 mg by mouth once daily.    [DISCONTINUED] LOSARTAN POTASSIUM (LOSARTAN ORAL) Take by mouth once daily.     [DISCONTINUED] albuterol 90 mcg/actuation inhaler Inhale 2 puffs into the lungs every 6 (six) hours as needed for Wheezing.    [DISCONTINUED] amlodipine (NORVASC) 5 MG tablet Take 1 tablet (5 mg total) by mouth once daily.    [DISCONTINUED] amoxicillin-clavulanate 875-125mg (AUGMENTIN) 875-125 mg per tablet Take 1 tablet by mouth every 12 (twelve) hours. for 7 days    [DISCONTINUED] dextroamphetamine-amphetamine 30 mg Tab Take by mouth 2 (two) times daily.    [DISCONTINUED] diclofenac (VOLTAREN) 50 MG EC tablet Take 1 tablet (50 mg total) by mouth 3 (three) times daily as needed.    [DISCONTINUED] doxycycline (VIBRA-TABS) 100 MG tablet Take 1 tablet (100 mg total) by mouth every 12 (twelve) hours. for 7 days    [DISCONTINUED] nabumetone (RELAFEN) 750 MG  "tablet Take 1 tablet (750 mg total) by mouth 2 (two) times daily as needed for Pain.    [DISCONTINUED] ondansetron (ZOFRAN-ODT) 4 MG TbDL Take 1 tablet (4 mg total) by mouth every 6 (six) hours as needed.    [DISCONTINUED] zolpidem (AMBIEN) 10 mg Tab Take 10 mg by mouth nightly as needed.     Family History       Problem Relation (Age of Onset)    Hypertension Father, Maternal Grandmother, Maternal Grandfather          Tobacco Use    Smoking status: Never Smoker    Smokeless tobacco: Current User     Types: Chew   Substance and Sexual Activity    Alcohol use: Yes     Alcohol/week: 14.0 standard drinks     Types: 14 Glasses of wine per week    Drug use: Yes     Types: Marijuana     Comment: "sometimes marijuana"    Sexual activity: Not on file     Review of Systems   Unable to perform ROS: Mental status change  confusion.      Objective:     Vital Signs (Most Recent):  Temp: 97.9 °F (36.6 °C) (06/01/22 1629)  Pulse: 72 (06/01/22 1629)  Resp: 18 (06/01/22 1629)  BP: (!) 183/94 (06/01/22 1629)  SpO2: 96 % (06/01/22 1629)   Vital Signs (24h Range):  Temp:  [97.9 °F (36.6 °C)-98.7 °F (37.1 °C)] 97.9 °F (36.6 °C)  Pulse:  [65-81] 72  Resp:  [18-20] 18  SpO2:  [94 %-99 %] 96 %  BP: (110-196)/() 183/94     Weight: (!) 139.7 kg (307 lb 15.7 oz)  Body mass index is 41.77 kg/m².    Physical Exam  Vitals and nursing note reviewed.   Constitutional:       General: He is not in acute distress.     Appearance: He is well-developed.   HENT:      Head: Normocephalic and atraumatic.   Cardiovascular:      Rate and Rhythm: Normal rate and regular rhythm.      Heart sounds: Normal heart sounds.   Pulmonary:      Effort: Pulmonary effort is normal. No respiratory distress.      Breath sounds: Normal breath sounds.   Abdominal:      General: Bowel sounds are normal. There is no distension.      Palpations: Abdomen is soft.      Tenderness: There is no abdominal tenderness.   Musculoskeletal:         General: Normal range of motion. "      Cervical back: Normal range of motion and neck supple. No edema.   Skin:     General: Skin is dry.   Neurological:      Mental Status: He is alert.      Comments: Oriented to person and place.  Forgetfulness.   Answer questions appropriately             Significant Labs: All pertinent labs within the past 24 hours have been reviewed.  CBC:   Recent Labs   Lab 05/31/22 0704 05/31/22 2039   WBC 3.84* 4.49   HGB 12.7* 13.6*   HCT 37.1* 39.4*   PLT 57* 72*     CMP:   Recent Labs   Lab 05/31/22 0704 05/31/22 2039    141   K 3.3* 4.1    103   CO2 24 25   GLU 89 100   BUN 11 9   CREATININE 0.9 0.9   CALCIUM 7.9* 8.9   PROT  --  7.1   ALBUMIN  --  3.8   BILITOT  --  1.3*   ALKPHOS  --  67   AST  --  40   ALT  --  44   ANIONGAP 11 13   EGFRNONAA >60 >60       Significant Imaging:   Imaging Results              MRI Brain Without Contrast (Final result)  Result time 06/01/22 08:27:24      Final result by Sanjay Cartwright Jr., MD (06/01/22 08:27:24)                   Impression:      1. No acute infarction or acute intracranial hemorrhage.  2. Chronic lacunar infarct within the left thalamus with tiny chronic infarcts within the superior cerebellar hemispheres.      Electronically signed by: Sanjay Cartwright Jr., MD  Date:    06/01/2022  Time:    08:27               Narrative:    EXAMINATION:  MRI BRAIN WITHOUT CONTRAST    CLINICAL HISTORY:  Mental status change, unknown cause;    TECHNIQUE:  Sagittal T1. Axial T1, T2, T2 FLAIR, GRE, DWI. Coronal T2 FLAIR.    COMPARISON:  Prior CT of the head from 05/29/2022.    FINDINGS:  The examination is slightly limited by motion artifact.  There is no restricted diffusion. Chronic lacunar infarct within the left thalamus.  Tiny, chronic infarcts within the cerebellar hemispheres.  The remainder of the brain is normal in morphology and signal.    The ventricles and sulci are normal in size and configuration. Midline structures are normal. There are preserved arterial  flow-voids on T2 weighted imaging. The paranasal sinuses and mastoid air cells are clear.    No abnormal marrow signal is identified.                                       CT Renal Stone Study ABD Pelvis WO (Final result)  Result time 05/31/22 22:04:30      Final result by Chinmay Hung MD (05/31/22 22:04:30)                   Impression:      No definite acute abnormality.    Moderate splenomegaly stable from the prior.  This is of unclear etiology.    Additional details as above.  Clinical correlation and further evaluation as warranted.    All CT scans at this facility are performed  using dose modulation techniques as appropriate to performed exam including the following:  automated exposure control; adjustment of mA and/or kV according to the patients size (this includes techniques or standardized protocols for targeted exams where dose is matched to indication/reason for exam: i.e. extremities or head);  iterative reconstruction technique.      Electronically signed by: Chinmay Hung  Date:    05/31/2022  Time:    22:04               Narrative:    EXAMINATION:  CT RENAL STONE STUDY ABD PELVIS WO    CLINICAL HISTORY:  Flank pain, kidney stone suspected;    TECHNIQUE:  Low dose axial images, sagittal and coronal reformations were obtained from the lung bases to the pubic symphysis.  Contrast was not administered.    COMPARISON:  05/29/2022    FINDINGS:  Heart: Normal in size. No pericardial effusion.    Lung Bases: Improvement in bibasilar opacities in comparison to the prior.    Liver: Normal in size and attenuation, with no focal hepatic lesions.    Gallbladder: Surgically absent.    Bile Ducts: No evidence of dilated ducts.    Pancreas: No mass or peripancreatic fat stranding.    Spleen: Prior splenic granulomatous disease.  Spleen is moderately enlarged.    Adrenals: Unremarkable.    Kidneys/ Ureters: Nonobstructive left nephrolithiasis.  No hydronephrosis or obstructive uropathy.    Bladder: No evidence  of wall thickening.    Reproductive organs: Unremarkable.    GI Tract/Mesentery: No evidence of bowel obstruction or inflammation.  No evidence of appendicitis.    Peritoneal Space: No ascites. No free air.    Retroperitoneum: No significant adenopathy.    Abdominal wall: Unremarkable.    Vasculature: No significant atherosclerosis or aneurysm.    Bones: No acute fracture.                                       CT Head Without Contrast (Final result)  Result time 05/31/22 21:59:23      Final result by Chinmay Hung MD (05/31/22 21:59:23)                   Impression:      No acute intracranial CT abnormality.    All CT scans at this facility are performed  using dose modulation techniques as appropriate to performed exam including the following:  automated exposure control; adjustment of mA and/or kV according to the patients size (this includes techniques or standardized protocols for targeted exams where dose is matched to indication/reason for exam: i.e. extremities or head);  iterative reconstruction technique.      Electronically signed by: Chinmay Hung  Date:    05/31/2022  Time:    21:59               Narrative:    EXAMINATION:  CT HEAD WITHOUT CONTRAST    CLINICAL HISTORY:  Mental status change, unknown cause;    TECHNIQUE:  Low dose axial CT images obtained throughout the head without intravenous contrast. Sagittal and coronal reconstructions were performed.    COMPARISON:  05/29/2022    FINDINGS:  Intracranial compartment:    Ventricles and sulci are normal in size for age without evidence of hydrocephalus. No extra-axial blood or fluid collections.    The brain parenchyma appears normal. No parenchymal mass, hemorrhage, edema or major vascular distribution infarct.    Skull/extracranial contents (limited evaluation): No fracture. Mastoid air cells and paranasal sinuses are essentially clear.

## 2023-01-17 NOTE — PROGRESS NOTES
Care Management Discharge Note    Discharge Date: 01/17/2023       Discharge Disposition:  ([facility] TCU)    Discharge Services:      Discharge DME:      Discharge Transportation:  ([employer] at 1500)    Private pay costs discussed: private room/amenity fees and insurance costs co-pays    PAS Confirmation Code:  [id number]  Patient/family educated on Medicare website which has current facility and service quality ratings:  yes    Education Provided on the Discharge Plan:  yes  Persons Notified of Discharge Plans: patient and son Logan  Patient/Family in Agreement with the Plan:      Handoff Referral Completed: Yes    Additional Information:  Per Radha at [facility], [facility] approved 3 days [facility] TCU.  [facility] will be [facility] as  In or Out of Network under [facility]  Patient and son notified and are pleased.  They wish to proceed with admission to the TCU.  They request transportation.  [facility] was not available until 1945. Writer made arrangements with [employer].  Son and patient notified of the payment. Logan will call and make the payment with [employer].  Orders have been sent to [facility] and transport time passed on to admissions.  MOJGAN Shaver from patient's shelter called for an update and update given to her.  PA approved.  Effective date: 1/17/22  PA reference #: [id number]  Pt. notified:   yes        KATIA Raza

## 2023-01-17 NOTE — PLAN OF CARE
Physical Therapy Discharge Summary    Reason for therapy discharge:    Discharged to transitional care facility.    Progress towards therapy goal(s). See goals on Care Plan in [facility] electronic health record for goal details.  Goals partially met.  Barriers to achieving goals:   discharge from facility.    Therapy recommendation(s):    Continued therapy is recommended.  Rationale/Recommendations:  To improve IND with functional mobility as pt below baseline at this time .

## 2023-01-17 NOTE — DISCHARGE SUMMARY
[facility]    Discharge Summary  Hospitalist    Date of Admission:  1/13/2023  Date of Discharge:  1/17/2023  Discharging Provider: Hayde Carvajal,     Discharge Diagnoses   Acute hypoxic respiratory failure dt CAP vs bronchitis  Coronary artery disease  Hypertension  Dyslipidemia  Prior CVA  Thoracic ascending aneurysm  Stage II CKD  Hypothyroidism  OCD with panic disorder  Gait instability and possible Parkinson's symptoms    Soft tissue injury/skin tear to L index finger    History of Present Illness   Zack Santiago is a 89 year old male with PMHx of CAD, hypertension, dyslipidemia, afib, hx of AAA s/p endovascular repair in 2015, GERD, hypothyroidism, BPH and ulcerative colitis among other medical problems who was admitted on 1/13/2023 with acute hypoxic respiratory failure dt suspected CAP.     Hospital Course   Zack Santiago was admitted on 1/13/2023.  The following problems were addressed during his hospitalization:    Acute hypoxic respiratory failure dt CAP vs bronchitis  * Presented to ED with 5-6d hx of cough, shortness of breath and generalized weakness.   * Febrile on admission with Tmax 101.9. Was hypoxic on RA and needing 3-4L NC. WBC 8.9, procal 0.75 (possible early infection). RSV, influenza and COVID all neg. CXR neg showed streaky opacities at bases. D-dimer elevated so CT chest obtained -- neg for PE, no acute pulmonary disease. Started on treatment for possible bronchitis vs CAP with ceftriaxone and azithromycin.   * Condition improved, weaned off O2 on 1/14.  * Remains afebrile, labs stable.  * Completed course of azithromycin during stay. Transitioned from ceftriaxone and cefpodoxime at discharge to complete total of 7d of treatment.      Coronary artery disease  Hypertension  Dyslipidemia  Prior CVA  * Chronic and stable on statin and Plavix  * Chlorthalidone held on admission dt acute illness, resumed at discharge     Thoracic ascending aneurysm  *  Measured at 5 cm on CT imaging this stay. Seems to be pretty stable from 2019 when it was 4.8 cm.    * Follow up with PCP for surveillance as needed.    Stage II CKD  * Baseline GFR seems to be 50-70s. Renal function stable this stay.      Hypothyroidism  * Chronic and stable on levothyroxine.     OCD with panic disorder  * Chronic and stable on Abilify, Remeron and clomipramine.     Gait instability and possible Parkinson's symptoms    * Was recently seen by neurology and failed a trial of carbidopa/levodopa, so is being monitored and undergoing physical therapy.   * Seen by PT this stay, TCU stay recommended at discharge.     Soft tissue injury/skin tear to L index finger  * Fell and injured his L index finger on the carpet prior to admission. No punture injury. Looks like superficial skin tear. No need for topical agents.    Hayde Carvajal, DO    Code Status   Full Code       Primary Care Physician   Zurdo Kendrick    Physical Exam   Temp: 97.9  F (36.6  C) Temp src: Oral BP: (!) 143/66 Pulse: 52   Resp: 18 SpO2: 94 % O2 Device: None (Room air)    Vitals:    01/13/23 1630 01/16/23 0650 01/17/23 0030   Weight: 88.5 kg (195 lb) 86.2 kg (190 lb) 86.2 kg (190 lb)     Vital Signs with Ranges  Temp:  [97.6  F (36.4  C)-98  F (36.7  C)] 97.9  F (36.6  C)  Pulse:  [49-95] 52  Resp:  [18] 18  BP: (103-143)/(53-66) 143/66  SpO2:  [94 %-95 %] 94 %  I/O last 3 completed shifts:  In: 400 [P.O.:400]  Out: 1500 [Urine:1500]    General: Resting comfortably, alert, conversive, NAD  CVS: HRRR, no MGR, trace bilateral LE edema to mid shins  Respiratory: CTAB, no wheeze/rhonchi, no increased work of breathing  GI: S, NT, ND, +BS  Skin: Warm/dry, bandage over skin tear on L index finger so not directly examined by me  Neuro: CNs 2-12 grossly intact, no focal motor/sensory deficits    Discharge Disposition   Discharged to TCU  Condition at discharge: Stable    Consultations This Hospital Stay   CARE MANAGEMENT / SOCIAL WORK IP  CONSULT  PHYSICAL THERAPY ADULT IP CONSULT  OCCUPATIONAL THERAPY ADULT IP CONSULT    Time Spent on this Encounter   I, Hayde Carvajal DO, personally saw the patient today and spent greater than 30 minutes discharging this patient.    Discharge Orders      General info for SNF    Length of Stay Estimate: Short Term Care: Estimated # of Days <30  Condition at Discharge: Improving  Level of care:skilled   Rehabilitation Potential: Excellent  Admission H&P remains valid and up-to-date: Yes  Recent Chemotherapy: N/A  Use Nursing Home Standing Orders: Yes     Mantoux instructions    Give two-step Mantoux (PPD) Per Facility Policy Yes     Reason for your hospital stay    Management of your suspected respiratory infection and possible pneumonia versus bronchitis, for which you were treated with antibiotics and oxygen.     Follow Up and recommended labs and tests    Follow up with facility physician in 2-3 days.     Activity - Up with nursing assistance     Physical Therapy Adult Consult    Evaluate and treat as clinically indicated.    Reason:  Physical deconditioning, pneumonia     Occupational Therapy Adult Consult    Evaluate and treat as clinically indicated.    Reason:  Physical deconditioning, pneumonia     Diet    Follow this diet upon discharge: Regular     Discharge Medications   Current Discharge Medication List      START taking these medications    Details   cefpodoxime (VANTIN) 200 MG tablet Take 1 tablet (200 mg) by mouth every 12 hours for 5 doses  Qty: 5 tablet, Refills: 0    Associated Diagnoses: Hypoxia; Acute cough         CONTINUE these medications which have NOT CHANGED    Details   ARIPiprazole (ABILIFY) 2 MG tablet Take 1 tablet by mouth At Bedtime.      carboxymethylcellulose-glycerin (OPTIVE) 0.5-0.9 % ophthalmic solution Place 1 drop into both eyes 2 times daily      chlorthalidone (HYGROTON) 25 MG tablet Take 1 tablet (25 mg) by mouth daily  Qty: 90 tablet, Refills: 3    Associated  Diagnoses: Essential hypertension      ClomiPRAMINE HCl (ANAFRANIL PO) Take 25 mg by mouth every evening     Associated Diagnoses: OCD (obsessive compulsive disorder)      clopidogrel (PLAVIX) 75 MG tablet Take 1 tablet (75 mg) by mouth daily  Qty: 90 tablet, Refills: 3    Associated Diagnoses: Cerebrovascular accident (CVA), unspecified mechanism (H)      gabapentin (NEURONTIN) 400 MG capsule Take 1 capsule by mouth 2 times daily       ketoconazole (NIZORAL) 2 % external cream Apply topically 2 times daily      lactulose encephalopathy (ENULOSE) 10 GM/15ML SOLUTION Take 30 mLs (20 g) by mouth 2 times daily  Qty: 2000 mL, Refills: 3    Associated Diagnoses: Irregular bowel habits      levothyroxine (SYNTHROID/LEVOTHROID) 100 MCG tablet Take 1 tablet (100 mcg) by mouth daily  Qty: 90 tablet, Refills: 3    Associated Diagnoses: Acquired hypothyroidism      MIRTAZAPINE PO Take 45 mg by mouth At Bedtime       simvastatin (ZOCOR) 20 MG tablet Take 1 tablet (20 mg) by mouth At Bedtime  Qty: 90 tablet, Refills: 2    Associated Diagnoses: Hyperlipidemia LDL goal <100      triamcinolone (KENALOG) 0.1 % external cream Apply topically 2 times daily  Qty: 45 g, Refills: 1    Associated Diagnoses: Rash and nonspecific skin eruption           Allergies   No Known Allergies  Data   Most Recent 3 CBC's:Recent Labs   Lab Test 01/15/23  0859 01/13/23  1655 03/18/22  1441   WBC 6.7 8.9 5.6   HGB 12.1* 11.2* 11.6*   MCV 98 99 102*    195 154      Most Recent 3 BMP's:  Recent Labs   Lab Test 01/16/23  0846 01/15/23  0859 01/13/23  1655    139 139   POTASSIUM 3.6 3.5 3.8   CHLORIDE 106 103 104   CO2 27 27 27   BUN 38* 35* 32*   CR 1.01 1.23 1.18   ANIONGAP 9 9 8   YOUNG 9.2 9.1 8.8   * 129* 104*       Results for orders placed or performed during the hospital encounter of 01/13/23   XR Chest 2 Views    Narrative    EXAM: XR CHEST 2 VIEWS  LOCATION: [facility]  DATE/TIME: 1/13/2023 6:56  PM    INDICATION: Hypoxia.  COMPARISON: Chest radiograph 04/08/2021. CT chest 01/29/2021.      Impression    IMPRESSION:    Lung volumes are low. Streaky airspace opacities at the bases likely reflect atelectasis, although infection cannot be completely excluded. Scarring within the lateral right midlung appears similar to prior. Pulmonary vascularity is within normal limits.   No pleural effusions or pneumothorax. Nonenlarged cardiac silhouette. Median sternotomy wires.   CT Chest Pulmonary Embolism w Contrast    Narrative    EXAM: CT CHEST WITH CONTRAST - PULMONARY EMBOLISM PROTOCOL   LOCATION: [facility]  DATE/TIME: 1/13/2023 10:08 PM    INDICATION: Hypoxemia. Elevated D-dimer.  COMPARISON: 1/29/2021 - CT chest, abdomen and pelvis.    TECHNIQUE: CT angiogram chest during arterial phase injection IV contrast. 2D and 3D MIP reconstructions were performed by the CT technologist. Dose reduction techniques were used.   CONTRAST: 71 mL Isovue 370.    FINDINGS:    ANGIOGRAM CHEST: No visualized pulmonary embolus. Dilatation of the ascending thoracic aorta, which measures up to 5.0 cm in diameter. No dissection of the thoracic aorta. Atherosclerotic calcification in the thoracic aorta.    LUNGS AND PLEURA: A few curvilinear opacities scattered in the periphery of both lungs, most likely representing scarring and/or atelectasis. The lungs are otherwise clear.    MEDIASTINUM/AXILLAE: Prior median sternotomy.    CORONARY ARTERY CALCIFICATION: Present.    MUSCULOSKELETAL: No acute findings.    UPPER ABDOMEN: Partial visualization of a moderate-sized midline upper anterior abdominal hernia containing fat and fluid.      Impression    IMPRESSION:   1.  No visualized pulmonary embolus.  2.  No convincing evidence of active pulmonary disease.   3.  The ascending thoracic aorta is dilated, measuring up to 5.0 cm in diameter. No dissection of the thoracic aorta.

## 2023-01-17 NOTE — PROGRESS NOTES
Care Management Follow Up    Length of Stay (days): 4    Expected Discharge Date: 01/17/2023     Concerns to be Addressed:       Patient plan of care discussed at interdisciplinary rounds: Yes    Anticipated Discharge Disposition:  TCU vs back to [facility]     Anticipated Discharge Services:    Anticipated Discharge DME:      Patient/family educated on Medicare website which has current facility and service quality ratings:    Education Provided on the Discharge Plan:    Patient/Family in Agreement with the Plan:      Referrals Placed by CM/SW:    Private pay costs discussed:     Additional Information:  [facility] sent clinical information to [facility] yesterday but they said they never received the information. So this morning, Radha at [facility] did re-send clinical information including the PT notes from 1/16.  Based on the PT notes, both Radha and writer from experience feel  it is unlikely [facility] will request a Peer to Peer before making a decision.  Writer needs to update patient and his son.        Jada Long, KHANHSW

## 2023-01-18 ENCOUNTER — PATIENT OUTREACH (OUTPATIENT)
Dept: FAMILY MEDICINE | Facility: CLINIC | Age: 88
End: 2023-01-18
Payer: COMMERCIAL

## 2023-01-18 NOTE — TELEPHONE ENCOUNTER
Appointments in Next Year    Jan 24, 2023  2:00 PM  (Arrive by 1:40 PM)  Provider Visit with Zurdo Kendrick MD  [facility] ([facility] ) [phone]   Feb 16, 2023  2:00 PM  (Arrive by 1:45 PM)  New Movement Disorder with Jefe Kirby MD  [facility] ([facility] ) [phone]        ED / Discharge Outreach Protocol    Patient Contact    Attempt # 1    Was call answered?  No.  Left message on voicemail with information to call triage back.    Tanya MA, Triage RN  [facility]

## 2023-01-18 NOTE — PROGRESS NOTES
[facility]  PRIMARY CARE PROVIDER AND CLINIC:  Zurdo Kendrick MD, [address] / [place] [address]  Chief Complaint   Patient presents with     [facility]     [place] Medical Record Number:  [id number]  Place of Service where encounter took place:  [facility] ([facility]) [[id number]]    Zack Santiago  is a 89 year old  (5/29/1931), admitted to the above facility from  [facility]. Hospital stay 1/13/23 through 1/17/23..  Admitted to this facility for  rehab, medical management and nursing care.  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   Brief Summary of Hospital Course: pt admitted with acute hypoxia,  Workup  Was negative for flu, rsv, Covid. CXR showed streaked opacities in bases,  +d dimer and ct of chest (-) for PE.  It was thought he had a possible bronchitis or CAP--give ceftriaxone and  Azithromycin, he improved, wean off O2,  Send out on  cefpodoxime.  Of note has some parkinsonian symptoms and was see by neurology in the past-- failed a trial of sinemet so is being watched.  Sent for rehab.    TODAY DURING EXAM/ROS:  No CP, SOB, Cough, dizziness, fevers, chills, HA, N/V, dysuria or Bowel Abnormalities. Appetite is fair.  No pain. Says has chronic edema.      CODE STATUS/ADVANCE DIRECTIVES DISCUSSION:   CPR/Full code   Patient's living condition: lives  alone  ALLERGIES: Patient has no known allergies.  PAST MEDICAL HISTORY:  has a past medical history of A-fib (H) (2010), AAA (abdominal aortic aneurysm) without rupture, Amaurosis fugax of right eye (10/2016), Anemia (2004), Aortic insufficiency (06/2014), Aortic insufficiency (11/2016), ASCVD (arteriosclerotic cardiovascular disease) (2010), BPH (benign prostatic hyperplasia) (2003), Bradycardia (2016), CKD (chronic kidney disease) stage 3, GFR 30-59 ml/min (H), Constipation (05/2020), Cough (2010), Dizzy (2001), GERD (gastroesophageal reflux disease), HTN (hypertension) (2002), colonoscopy (2003), Hypothyroid, Hypovitaminosis D, Idiopathic neuropathy, Lung nodule (02/2018), OCD (obsessive compulsive disorder), Panic disorder, Spinal headache, Stroke (H) (12/2016), Sudden hearing loss (06/2013), SVT (supraventricular tachycardia) (H) (11/2016), Thoracic ascending aortic aneurysm (06/2014), and Ulcerative colitis (H).    He has no past medical history of PONV (postoperative nausea and vomiting).  PAST SURGICAL HISTORY:   has a past surgical history that includes TOTAL KNEE ARTHROPLASTY (2011); TOTAL KNEE ARTHROPLASTY (2009); coronary artery bypass (2010); hernia repair (2005); turp (2003); knee surgery (1997); back surgery (1998); Bladder surgery (1985); hernia repair (1998); Repair hammer toe (12/28/2012); Endovascular repair aneurysm abdominal aorta (N/A, 05/27/2015); Herniorrhaphy inguinal (Left, 01/05/2018); Repair hammer toe (04/2018); Colonoscopy (N/A, 05/05/2020); toe amputation right foot (02/2021); and cataract iol, rt/lt (2011).  FAMILY HISTORY: family history includes C.A.D. in his father; Lymphoma in his sister; Retinal detachment in his daughter.  SOCIAL HISTORY:   reports that he quit smoking about 57 years ago. His smoking use included cigarettes. He has a 5.00 pack-year smoking history. He has never used smokeless tobacco. He reports current alcohol use. He reports that he does not use  "drugs.    Post Discharge Medication Reconciliation Status: discharge medications reconciled and changed, per note/orders     Current Outpatient Medications   Medication Sig Dispense Refill     ARIPiprazole (ABILIFY) 2 MG tablet Take 1 tablet by mouth At Bedtime.       cefpodoxime (VANTIN) 200 MG tablet Take 1 tablet (200 mg) by mouth every 12 hours for 5 doses 5 tablet 0     chlorthalidone (HYGROTON) 25 MG tablet Take 1 tablet (25 mg) by mouth daily 90 tablet 3     ClomiPRAMINE HCl (ANAFRANIL PO) Take 25 mg by mouth every evening        clopidogrel (PLAVIX) 75 MG tablet Take 1 tablet (75 mg) by mouth daily 90 tablet 3     gabapentin (NEURONTIN) 400 MG capsule Take 1 capsule by mouth 2 times daily        ketoconazole (NIZORAL) 2 % external cream Apply topically 2 times daily       lactulose encephalopathy (ENULOSE) 10 GM/15ML SOLUTION Take 30 mLs (20 g) by mouth 2 times daily 2000 mL 3     levothyroxine (SYNTHROID/LEVOTHROID) 100 MCG tablet Take 1 tablet (100 mcg) by mouth daily 90 tablet 3     MIRTAZAPINE PO Take 45 mg by mouth At Bedtime        nystatin (MYCOSTATIN) 196105 UNIT/ML suspension Take 500,000 Units by mouth 4 times daily Swish & swallow qid x 7 days.       simvastatin (ZOCOR) 20 MG tablet Take 1 tablet (20 mg) by mouth At Bedtime 90 tablet 2     triamcinolone (KENALOG) 0.1 % external cream Apply topically 2 times daily 45 g 1     carboxymethylcellulose-glycerin (OPTIVE) 0.5-0.9 % ophthalmic solution Place 1 drop into both eyes 2 times daily             ROS:see above under HPI    Vitals:  BP (!) 154/76   Pulse 63   Temp 97.3  F (36.3  C)   Resp 18   Ht 1.854 m (6' 1\")   Wt 90.7 kg (200 lb)   SpO2 96%   BMI 26.39 kg/m    Exam:  GENERAL APPEARANCE:  Alert, in no distress, oriented, , cooperative  ENT:  Mouth and posterior oropharynx normal, moist mucous membranes, except yeast on tongue, normal hearing acuity  EYES:  EOM, conjunctivae, lids, pupils and irises normal  RESP:  respiratory effort and " palpation of chest normal, lungs clear to auscultation , no respiratory distress  CV:  Palpation and auscultation of heart done , regular rate and rhythm, no murmur, rub, or gallop, +1 bilat feet to mid knees' edema.  ABDOMEN:  normal bowel sounds, soft, nontender, no hepatosplenomegaly or other masses  M/S:   NORRIS, has slgiht tremors Left hand > right Hand. up with help  SKIN:  Inspection of skin and subcutaneous tissue baseline  NEURO:   Cranial nerves 2-12 are normal tested and grossly at patient's baseline  PSYCH:  oriented X 3, affect abnormal a bit flat    Lab/Diagnostic data:  Recent Labs   Lab Test 01/16/23  0846 01/15/23  0859    139   POTASSIUM 3.6 3.5   CHLORIDE 106 103   CO2 27 27   ANIONGAP 9 9   * 129*   BUN 38* 35*   CR 1.01 1.23   YOUNG 9.2 9.1     CBC RESULTS: Recent Labs   Lab Test 01/15/23  0859   WBC 6.7   RBC 3.77*   HGB 12.1*   HCT 36.8*   MCV 98   MCH 32.1   MCHC 32.9   RDW 14.9          ASSESSMENT / PLAN:  (J96.01) Acute respiratory failure with hypoxia (H)  (primary encounter diagnosis)  Comment/Plan: improved, last day Abx 1/20, monitor.    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    (R25.9) Parkinsonian features   (R26.81) Gait instability   (R53.81) Physical deconditioning  Comment/Plan: PT OT, add Vit D to labs on 1/23      Total time spent with patient visit at the skilled nursing facility was 45 min  including patient visit, review of past records and phone call to patient contact conrad and update given, questions answered. Greater than 50% of total time spent with counseling and coordinating care..     Electronically signed by:  DOMENIC Gudino CNP

## 2023-01-18 NOTE — TELEPHONE ENCOUNTER
What type of discharge? Inpatient  Risk of Hospital admission or ED visit: 74%  Is a TCM episode required? Yes  When should the patient follow up with PCP? 7 days of discharge.    Surekha Calderon RN  [facility]

## 2023-01-19 ENCOUNTER — LAB REQUISITION (OUTPATIENT)
Dept: LAB | Facility: CLINIC | Age: 88
End: 2023-01-19
Payer: COMMERCIAL

## 2023-01-19 ENCOUNTER — TRANSITIONAL CARE UNIT VISIT (OUTPATIENT)
Dept: GERIATRICS | Facility: CLINIC | Age: 88
End: 2023-01-19
Payer: COMMERCIAL

## 2023-01-19 VITALS
OXYGEN SATURATION: 96 % | HEART RATE: 63 BPM | TEMPERATURE: 97.3 F | DIASTOLIC BLOOD PRESSURE: 76 MMHG | WEIGHT: 200 LBS | HEIGHT: 73 IN | RESPIRATION RATE: 18 BRPM | BODY MASS INDEX: 26.51 KG/M2 | SYSTOLIC BLOOD PRESSURE: 154 MMHG

## 2023-01-19 DIAGNOSIS — R26.81 GAIT INSTABILITY: ICD-10-CM

## 2023-01-19 DIAGNOSIS — I71.21 ANEURYSM OF ASCENDING AORTA WITHOUT RUPTURE (H): ICD-10-CM

## 2023-01-19 DIAGNOSIS — Z91.81 HISTORY OF FALLING: ICD-10-CM

## 2023-01-19 DIAGNOSIS — N18.31 HYPERTENSIVE HEART AND KIDNEY DISEASE WITHOUT HEART FAILURE AND WITH STAGE 3A CHRONIC KIDNEY DISEASE (H): ICD-10-CM

## 2023-01-19 DIAGNOSIS — E03.9 HYPOTHYROIDISM, UNSPECIFIED TYPE: ICD-10-CM

## 2023-01-19 DIAGNOSIS — Z86.73 HISTORY OF CVA (CEREBROVASCULAR ACCIDENT): ICD-10-CM

## 2023-01-19 DIAGNOSIS — E03.9 HYPOTHYROIDISM, UNSPECIFIED: ICD-10-CM

## 2023-01-19 DIAGNOSIS — R29.818 PARKINSONIAN FEATURES: ICD-10-CM

## 2023-01-19 DIAGNOSIS — R53.81 PHYSICAL DECONDITIONING: ICD-10-CM

## 2023-01-19 DIAGNOSIS — F42.9 OBSESSIVE-COMPULSIVE DISORDER, UNSPECIFIED TYPE: ICD-10-CM

## 2023-01-19 DIAGNOSIS — F41.0 PANIC DISORDER: ICD-10-CM

## 2023-01-19 DIAGNOSIS — I13.10 HYPERTENSIVE HEART AND KIDNEY DISEASE WITHOUT HEART FAILURE AND WITH STAGE 3A CHRONIC KIDNEY DISEASE (H): ICD-10-CM

## 2023-01-19 DIAGNOSIS — J96.01 ACUTE RESPIRATORY FAILURE WITH HYPOXIA (H): Primary | ICD-10-CM

## 2023-01-19 DIAGNOSIS — I25.10 ATHEROSCLEROSIS OF NATIVE CORONARY ARTERY OF NATIVE HEART WITHOUT ANGINA PECTORIS: ICD-10-CM

## 2023-01-19 DIAGNOSIS — E55.9 VITAMIN D DEFICIENCY, UNSPECIFIED: ICD-10-CM

## 2023-01-19 DIAGNOSIS — I10 ESSENTIAL (PRIMARY) HYPERTENSION: ICD-10-CM

## 2023-01-19 PROCEDURE — 99310 SBSQ NF CARE HIGH MDM 45: CPT | Performed by: NURSE PRACTITIONER

## 2023-01-19 RX ORDER — NYSTATIN 100000/ML
500000 SUSPENSION, ORAL (FINAL DOSE FORM) ORAL 4 TIMES DAILY
COMMUNITY
Start: 2023-01-19 | End: 2023-01-26

## 2023-01-19 NOTE — TELEPHONE ENCOUNTER
Patient Contact    Attempt # 2    Was call answered? No.    Left message for patient to call triage back.    Karen Suero RN

## 2023-01-20 NOTE — TELEPHONE ENCOUNTER
.  Patient Contact    Attempt # 3    Was call answered? No.    Left message for patient to call triage back.    Karen Suero RN

## 2023-01-23 ENCOUNTER — TRANSFERRED RECORDS (OUTPATIENT)
Dept: HEALTH INFORMATION MANAGEMENT | Facility: CLINIC | Age: 88
End: 2023-01-23

## 2023-01-23 ENCOUNTER — TRANSITIONAL CARE UNIT VISIT (OUTPATIENT)
Dept: GERIATRICS | Facility: CLINIC | Age: 88
End: 2023-01-23
Payer: COMMERCIAL

## 2023-01-23 VITALS
HEART RATE: 51 BPM | SYSTOLIC BLOOD PRESSURE: 123 MMHG | TEMPERATURE: 97.8 F | HEIGHT: 73 IN | BODY MASS INDEX: 26.4 KG/M2 | RESPIRATION RATE: 18 BRPM | OXYGEN SATURATION: 95 % | WEIGHT: 199.2 LBS | DIASTOLIC BLOOD PRESSURE: 56 MMHG

## 2023-01-23 DIAGNOSIS — R26.81 GAIT INSTABILITY: ICD-10-CM

## 2023-01-23 DIAGNOSIS — R29.818 PARKINSONIAN FEATURES: ICD-10-CM

## 2023-01-23 DIAGNOSIS — F42.9 OBSESSIVE-COMPULSIVE DISORDER, UNSPECIFIED TYPE: ICD-10-CM

## 2023-01-23 DIAGNOSIS — Z86.73 HISTORY OF CVA (CEREBROVASCULAR ACCIDENT): ICD-10-CM

## 2023-01-23 DIAGNOSIS — R53.81 PHYSICAL DECONDITIONING: ICD-10-CM

## 2023-01-23 DIAGNOSIS — N18.31 HYPERTENSIVE HEART AND KIDNEY DISEASE WITHOUT HEART FAILURE AND WITH STAGE 3A CHRONIC KIDNEY DISEASE (H): ICD-10-CM

## 2023-01-23 DIAGNOSIS — I25.10 ATHEROSCLEROSIS OF NATIVE CORONARY ARTERY OF NATIVE HEART WITHOUT ANGINA PECTORIS: ICD-10-CM

## 2023-01-23 DIAGNOSIS — R19.8 IRREGULAR BOWEL HABITS: ICD-10-CM

## 2023-01-23 DIAGNOSIS — J96.01 ACUTE RESPIRATORY FAILURE WITH HYPOXIA (H): Primary | ICD-10-CM

## 2023-01-23 DIAGNOSIS — I13.10 HYPERTENSIVE HEART AND KIDNEY DISEASE WITHOUT HEART FAILURE AND WITH STAGE 3A CHRONIC KIDNEY DISEASE (H): ICD-10-CM

## 2023-01-23 DIAGNOSIS — E03.9 HYPOTHYROIDISM, UNSPECIFIED TYPE: ICD-10-CM

## 2023-01-23 DIAGNOSIS — F41.0 PANIC DISORDER: ICD-10-CM

## 2023-01-23 LAB
ANION GAP SERPL CALCULATED.3IONS-SCNC: 15 MMOL/L (ref 7–15)
BUN SERPL-MCNC: 33.8 MG/DL (ref 8–23)
CALCIUM SERPL-MCNC: 9.6 MG/DL (ref 8.2–9.6)
CHLORIDE SERPL-SCNC: 102 MMOL/L (ref 98–107)
CREAT SERPL-MCNC: 1.32 MG/DL (ref 0.67–1.17)
DEPRECATED CALCIDIOL+CALCIFEROL SERPL-MC: 25 UG/L (ref 20–75)
DEPRECATED HCO3 PLAS-SCNC: 25 MMOL/L (ref 22–29)
GFR SERPL CREATININE-BSD FRML MDRD: 51 ML/MIN/1.73M2
GLUCOSE SERPL-MCNC: 66 MG/DL (ref 70–99)
POTASSIUM SERPL-SCNC: 4.3 MMOL/L (ref 3.4–5.3)
SODIUM SERPL-SCNC: 142 MMOL/L (ref 136–145)
TSH SERPL DL<=0.005 MIU/L-ACNC: 3.15 UIU/ML (ref 0.3–4.2)

## 2023-01-23 PROCEDURE — 99309 SBSQ NF CARE MODERATE MDM 30: CPT | Performed by: NURSE PRACTITIONER

## 2023-01-23 PROCEDURE — 84443 ASSAY THYROID STIM HORMONE: CPT | Performed by: NURSE PRACTITIONER

## 2023-01-23 PROCEDURE — 82306 VITAMIN D 25 HYDROXY: CPT | Performed by: NURSE PRACTITIONER

## 2023-01-23 PROCEDURE — 80048 BASIC METABOLIC PNL TOTAL CA: CPT | Performed by: NURSE PRACTITIONER

## 2023-01-23 PROCEDURE — P9604 ONE-WAY ALLOW PRORATED TRIP: HCPCS | Performed by: NURSE PRACTITIONER

## 2023-01-23 RX ORDER — AMOXICILLIN 250 MG
1 CAPSULE ORAL DAILY PRN
COMMUNITY
Start: 2023-01-23 | End: 2023-03-30

## 2023-01-23 RX ORDER — LACTULOSE 10 G/15ML
30 SOLUTION ORAL; RECTAL DAILY
COMMUNITY
End: 2023-03-30

## 2023-01-23 RX ORDER — METHYLCELLULOSE 2 G/19G
1 POWDER, FOR SOLUTION ORAL DAILY
COMMUNITY
Start: 2023-01-23 | End: 2023-03-30

## 2023-01-23 NOTE — PROGRESS NOTES
[facility]  [place] Medical Record Number:  [id number]  Place of Service where encounter took place:  [facility] ([facility]) [[id number]]  Chief Complaint   Patient presents with     Nursing Home Acute       HPI:    Zack Santiago  is a 89 year old (5/29/1931), who is being seen today for an episodic care visit.  HPI information obtained from: facility chart records, facility staff, patient report and [facility] chart review. 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    MEDICATIONS:     Current Outpatient Medications   Medication Sig Dispense Refill     lactulose encephalopathy (CHRONULAC) 10 GM/15ML SOLUTION Take 30 mLs by mouth 2 times daily Irregular bowels       methylcellulose (CITRUCEL) powder Take 1 teaspoonful by mouth daily constipation       senna-docusate (SENOKOT-S/PERICOLACE) 8.6-50 MG tablet Take 1 tablet by mouth daily as needed for constipation       ARIPiprazole (ABILIFY) 2 MG tablet Take 1 tablet by mouth At Bedtime.       carboxymethylcellulose-glycerin (OPTIVE) 0.5-0.9 % ophthalmic solution Place 1 drop into both eyes 2 times daily       chlorthalidone (HYGROTON) 25 MG tablet Take 1 tablet (25 mg) by mouth daily 90 tablet 3     ClomiPRAMINE HCl (ANAFRANIL PO) Take 25 mg by mouth every evening        clopidogrel (PLAVIX) 75 MG tablet Take 1 tablet (75 mg) by mouth daily 90 tablet 3     gabapentin (NEURONTIN) 400 MG capsule Take 1 capsule by mouth 2  "times daily        ketoconazole (NIZORAL) 2 % external cream Apply topically 2 times daily       levothyroxine (SYNTHROID/LEVOTHROID) 100 MCG tablet Take 1 tablet (100 mcg) by mouth daily 90 tablet 3     MIRTAZAPINE PO Take 45 mg by mouth At Bedtime        nystatin (MYCOSTATIN) 984456 UNIT/ML suspension Take 500,000 Units by mouth 4 times daily Swish & swallow qid x 7 days.       simvastatin (ZOCOR) 20 MG tablet Take 1 tablet (20 mg) by mouth At Bedtime 90 tablet 2     triamcinolone (KENALOG) 0.1 % external cream Apply topically 2 times daily 45 g 1     TODAY DURING EXAM/ROS:  No CP, SOB, Cough, dizziness, fevers, chills, HA, N/V, dysuria.  Feels constipated. Appetite is good.  No pain.  Says ankles get swollen sometimes (*swollen this am*)    Objective:  /56   Pulse 51   Temp 97.8  F (36.6  C)   Resp 18   Ht 1.854 m (6' 1\")   Wt 90.4 kg (199 lb 3.2 oz)   SpO2 95%   BMI 26.28 kg/m    Exam:  GENERAL APPEARANCE:  Alert, in no distress, oriented, , cooperative  ENT:  Mouth with moist mucous membranes, eating BF, normal hearing acuity  EYES: Conjunctivae, lids, pupils and irises normal  RESP:  respiratory effort of chest normal, lungs clear to auscultation , no respiratory distress  CV:  Auscultation of heart done , RRR, no murmur, rub, or gallop, +1 bilat feet above ankles' edema.  ABDOMEN:  normal bowel sounds, soft, nontender  M/S:   PISANO, tremors not noted today on exam.  SKIN:  Inspection of skin and subcutaneous tissue baseline  NEURO:   Cranial nerves are grossly intact and at patient's baseline  PSYCH:  oriented X 3, affect abnormal a bit flat    Labs:    Recent Labs   Lab Test 1/23/23  pending 01/16/23  0846 01/15/23  0859   NA  142 139   POTASSIUM  3.6 3.5   CHLORIDE  106 103   CO2  27 27   ANIONGAP  9 9   GLC  120* 129*   BUN  38* 35*   CR  1.01 1.23   YOUNG  9.2 9.1   1/23/23: TSH AND VITAMIN D PENDING ALSO    CBC RESULTS: Recent Labs   Lab Test 01/15/23  0859   WBC 6.7   RBC 3.77*   HGB 12.1* "   HCT 36.8*   MCV 98   MCH 32.1   MCHC 32.9   RDW 14.9        ASSESSMENT / PLAN:  (J96.01) Acute respiratory failure with hypoxia (H)  (primary encounter diagnosis)  Comment/Plan: Done with Abx--no cough, improved, monitor.    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  BMP pending,  SBP running 120-140's, occas > and HR 50-60's, cont with same meds, POC--Plavix, statin, hygroton.    (E03.9) Hypothyroidism, unspecified type  Comment/Plan: cont synthroid, Thyroid tests pending.    (F42.9) Obsessive-compulsive disorder, unspecified type   (F41.0) Panic disorder  Comment/Plan: cont same meds, calmer today, seems to be settled in, sleeping fine. Monitor.    (R25.9) Parkinsonian features  (R26.81) Gait instability  (R53.81) Physical deconditioning  Comment/Plan: PT OT    (R19.8) Irregular bowel habits  Comment/Plan: add Citrucel and prn senna S to bowel regimen--pt request.       Electronically signed by:  DOMENIC Gudino CNP

## 2023-01-24 ENCOUNTER — TELEPHONE (OUTPATIENT)
Dept: OTHER | Facility: CLINIC | Age: 88
End: 2023-01-24
Payer: COMMERCIAL

## 2023-01-24 NOTE — TELEPHONE ENCOUNTER
YANETH for patient to call and schedule imaging in February, 2023 ordered by Dr. Gallego.    Results will be called to the patient.

## 2023-01-26 ENCOUNTER — TRANSITIONAL CARE UNIT VISIT (OUTPATIENT)
Dept: GERIATRICS | Facility: CLINIC | Age: 88
End: 2023-01-26
Payer: COMMERCIAL

## 2023-01-26 VITALS
HEIGHT: 73 IN | DIASTOLIC BLOOD PRESSURE: 59 MMHG | TEMPERATURE: 97.3 F | OXYGEN SATURATION: 97 % | BODY MASS INDEX: 26.41 KG/M2 | HEART RATE: 50 BPM | SYSTOLIC BLOOD PRESSURE: 127 MMHG | RESPIRATION RATE: 18 BRPM | WEIGHT: 199.3 LBS

## 2023-01-26 DIAGNOSIS — F41.0 PANIC DISORDER: ICD-10-CM

## 2023-01-26 DIAGNOSIS — I25.10 ATHEROSCLEROSIS OF NATIVE CORONARY ARTERY OF NATIVE HEART WITHOUT ANGINA PECTORIS: ICD-10-CM

## 2023-01-26 DIAGNOSIS — I71.21 ANEURYSM OF ASCENDING AORTA WITHOUT RUPTURE (H): ICD-10-CM

## 2023-01-26 DIAGNOSIS — N18.31 HYPERTENSIVE HEART AND KIDNEY DISEASE WITHOUT HEART FAILURE AND WITH STAGE 3A CHRONIC KIDNEY DISEASE (H): ICD-10-CM

## 2023-01-26 DIAGNOSIS — J96.01 ACUTE RESPIRATORY FAILURE WITH HYPOXIA (H): Primary | ICD-10-CM

## 2023-01-26 DIAGNOSIS — R29.818 PARKINSONIAN FEATURES: ICD-10-CM

## 2023-01-26 DIAGNOSIS — J40 BRONCHITIS: ICD-10-CM

## 2023-01-26 DIAGNOSIS — E03.9 HYPOTHYROIDISM, UNSPECIFIED TYPE: ICD-10-CM

## 2023-01-26 DIAGNOSIS — I13.10 HYPERTENSIVE HEART AND KIDNEY DISEASE WITHOUT HEART FAILURE AND WITH STAGE 3A CHRONIC KIDNEY DISEASE (H): ICD-10-CM

## 2023-01-26 DIAGNOSIS — F42.9 OBSESSIVE-COMPULSIVE DISORDER, UNSPECIFIED TYPE: ICD-10-CM

## 2023-01-26 DIAGNOSIS — R53.81 PHYSICAL DECONDITIONING: ICD-10-CM

## 2023-01-26 DIAGNOSIS — R26.81 GAIT INSTABILITY: ICD-10-CM

## 2023-01-26 PROCEDURE — 99305 1ST NF CARE MODERATE MDM 35: CPT | Performed by: INTERNAL MEDICINE

## 2023-01-26 NOTE — PROGRESS NOTES
Zack Santiago is a 89 year old male seen January 26, 2023 at [facility] where he was admitted after [facility] hospitalization 1/13-17 for acute hypoxic respiratory failure secondary to CAP vs bronchitis.   Pt has HTN, CAD, atrial fib, AAA s/p repair in 2015 with persistent type II endoleak.   He presented with cough, dyspnea and generalized weakness   T 101.9 and needing 3-4 L/min oxygen supplementation.   Viral panel all negative.   Chest CT unremarkable.   He was treated with ceftriaxone and azithromycin, and improved.   Discharged to TCU for ongoing recovery and Rehab.        Today pt is seen in his room up to chair, reports no further respiratory symptoms, no longer on oxygen supplementation     Worked with therapies this morning, has a home exercise program    Breathing problem has resolved   No longer on O2   No pain, no GRIDER      Pt has been followed by Neurology Dr Fulton for Parkinsonism with gait instability and RUE tremor.   Sinemet caused a lot of anxiety and no improvement, so was discontinued in November 2022          Past Medical History:   Diagnosis Date     A-fib (H) 2010    post op     AAA (abdominal aortic aneurysm) without rupture     Dr. 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    carotid us no stenosis, echo no change and no clots; ziopatch no afib, svt present     Anemia 2004     Aortic insufficiency 06/2014    1+ on echo     Aortic insufficiency 11/2016    mild to mod     ASCVD (arteriosclerotic cardiovascular disease) 2010    cabg, post op afib     BPH (benign prostatic hyperplasia) 2003    turp, flomax added by urol 2/17     Bradycardia 2016    stopped toprol     CKD (chronic kidney disease) stage 3, GFR 30-59 ml/min (H)      Constipation 05/2020    colonoscopy nl     Cough 2010    pulm eval, felt due to reflux     Dizzy 2001    mri small vessel dz     GERD (gastroesophageal reflux disease)      HTN (hypertension) 2002      Hx of colonoscopy 2003    tics     Hypothyroid      Hypovitaminosis D      Idiopathic neuropathy      Lung nodule 02/2018    6mm, found during eval of ascending aorta, fu 8/18 no change     OCD (obsessive compulsive disorder)     sees psyche     Panic disorder     Dr. Luna, then someone after he retired     Spinal headache      Stroke (H) 12/2016    expressive aphasia, hosp, seen by neuro, mri pos, carotids min dz, changed from asa to plavix     Sudden hearing loss 06/2013    Dr. Garcia, mri brain no acoustic neuroma     SVT (supraventricular tachycardia) (H) 11/2016    seen on ziopatch done for amaurosis, longest 15 beats     Thoracic ascending aortic aneurysm 06/2014    4.4cm on echo, aortic root 3.9, fu 5/15 4.8cm; fu done 12/15 and slightly enlarged, fu 6/16 no change, fu 11/16 no change; fu 1/18 echo 5.1-5.3, enlarged, then cta done and only 4.8cm, t fu 6 months, lvh, nl ef, mild ai; fu 8/18 slightly larger; fu 2/19 slightly smaller; fu 2/20 and then 1/21 and stable     Ulcerative colitis (H)     not active for years       Past Surgical History:   Procedure Laterality Date     BACK SURGERY  1998     BLADDER SURGERY  1985     CATARACT IOL, RT/LT  2011     COLONOSCOPY N/A 05/05/2020    Procedure: COLONOSCOPY;  Surgeon: Kenya Loco MD;  Location:  GI     CORONARY ARTERY BYPASS  2010     ENDOVASCULAR REPAIR ANEURYSM ABDOMINAL AORTA N/A 05/27/2015    Procedure: ENDOVASCULAR REPAIR ANEURYSM ABDOMINAL AORTA;  Surgeon: Darin Walters MD;  Location:  OR     HERNIA REPAIR  2005     HERNIA REPAIR  1998     HERNIORRHAPHY INGUINAL Left 01/05/2018    Procedure: HERNIORRHAPHY INGUINAL;  Incarcerated Left Inguinal Hernia;  Surgeon: Harsh Walker MD;  Location:  OR     KNEE SURGERY  1997     REPAIR HAMMER TOE  12/28/2012    Procedure: REPAIR HAMMER TOE;  LEFT SECOND AND THIRD CLAW TOE RECONSTRUCTION;  Surgeon: Gray Haynes MD;  Location:  OR     [facility]    toe amputation right foot  2021     [facility]       [facility] TOTAL KNEE ARTHROPLASTY  2011    left     [facility] TOTAL KNEE ARTHROPLASTY  2009    right     Family History   Problem Relation Age of Onset     C.A.D. Father      Lymphoma Sister      Retinal detachment Daughter      Glaucoma No family hx of      Macular Degeneration No family hx of        Social History     Tobacco Use     Smoking status: Former     Packs/day: 1.00     Years: 5.00     Pack years: 5.00     Types: Cigarettes     Quit date: 1965     Years since quittin.6     Smokeless tobacco: Never   Substance Use Topics     Alcohol use: Yes     Alcohol/week: 0.0 standard drinks     Comment: maybe one glass of wine a week      SH: , lives at [facility], was in [place] before that.  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     ROS:   Review Of Systems  Skin: purpura, easy bruisability   Eyes: impaired vision, glasses     Ears/Nose/Throat: hearing loss  Respiratory: as above     Cardiovascular: negative  Gastrointestinal: ventral hernia, seen by Surgery Dr Story  Irregular bowel habits, on lactulose bid  Genitourinary: nocturia x1   Musculoskeletal: mod-max assist for dressing, ambulates ' with 4WW and SBA   TUG 43s  Neurologic: poor balance, some cognitive deficits, gait instability    Failed trial of Sinemet       Psychiatric: OCD, panic disorder      Hematologic/Lymphatic/Immunologic: negative  Endocrine: negative      GENERAL APPEARANCE: alert and no distress  /59   Pulse 50   Temp 97.3  F (36.3  C)   Resp 18   Ht 1.854 m (6' 1\")   Wt 90.4 kg (199 lb 4.8 oz)   SpO2 97%   BMI 26.29 kg/m     HEENT: normocephalic, no lesion or abnormalities  NECK: no adenopathy, no asymmetry, masses, or scars and thyroid normal to palpation  RESP: lungs with decreased BS, scattered crackes  CV: " regular rate and rhythm, normal S1 S2@60  ABDOMEN:  soft, nontender, no HSM or masses and bowel sounds normal  MS: extremities normal 2+ edema, tubigrips   SKIN: no suspicious lesions or rashes  NEURO: bilateral hand tremor  PSYCH: affect okay  LYMPHATICS: No cervical,  or supraclavicular nodes    Last Comprehensive Metabolic Panel:  Sodium   Date Value Ref Range Status   01/23/2023 142 136 - 145 mmol/L Final     Potassium   Date Value Ref Range Status   01/23/2023 4.3 3.4 - 5.3 mmol/L Final     Carbon Dioxide (CO2)   Date Value Ref Range Status   01/23/2023 25 22 - 29 mmol/L Final     Glucose   Date Value Ref Range Status   01/23/2023 66 (L) 70 - 99 mg/dL Final     Urea Nitrogen   Date Value Ref Range Status   01/23/2023 33.8 (H) 8.0 - 23.0 mg/dL Final     Creatinine   Date Value Ref Range Status   01/23/2023 1.32 (H) 0.67 - 1.17 mg/dL Final   04/08/2021 1.61 (H) 0.66 - 1.25 mg/dL Final     GFR Estimate   Date Value Ref Range Status   01/23/2023 51 (L) >60 mL/min/1.73m2 Final     Calcium   Date Value Ref Range Status   01/23/2023 9.6 8.2 - 9.6 mg/dL Final     Lab Results   Component Value Date    WBC 6.7 01/15/2023      HGB 12.1 01/15/2023      MCV 98 01/15/2023       01/15/2023     TSH   Date Value Ref Range Status   01/23/2023 3.15 0.30 - 4.20 uIU/mL Final   03/18/2022 0.44 0.40 - 4.00 mU/L Final   07/21/2020 0.46 0.40 - 4.00 mU/L Final           IMP/PLAN:    (J96.01) Acute respiratory failure with hypoxia (H)  (primary encounter diagnosis)  (J40) Bronchitis  Comment: improved symptoms and no longer hypoxic   Plan: finish course of cefdinir   Follow for any recurrence     (R25.9) Parkinsonian features  (R26.81) Gait instability  (R53.81) Physical deconditioning  Comment: failed a trial or Sinemet   Plan: PHYSICAL THERAPY / OCCUPATIONAL THERAPY for strengthening, balance, gait, ADLs.   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 (H)  Comment: BPs variable     Plan: chlorthalidone 25 mg/day (was restarted at hospital discharge)     Follow bps and BMP    (E03.9) Hypothyroidism, unspecified type  Comment:   TSH   Date Value Ref Range Status   01/23/2023 3.15 0.30 - 4.20 uIU/mL Final   03/18/2022 0.44 0.40 - 4.00 mU/L Final   07/21/2020 0.46 0.40 - 4.00 mU/L Final      Plan: levothyroxine 100 mcg/day       (F42.9) Obsessive-compulsive disorder, unspecified type  (F41.0) Panic disorder  Comment: follows with [facility]   Was hospitalized at [facility] in 1996, on long term tx      Plan: mirtazapine 45 m/day, clomipramine 25 mg/HS, gabapentin 400 mg bid and aripiprazole 2 mg/HS     Sammi Dumas MD

## 2023-01-26 NOTE — LETTER
1/26/2023        RE: Zack Santiago  [address] [facility]  [address] [address]        Zack Santiago is a 89 year old male seen January 26, 2023 at [facility] where he was admitted after [facility] hospitalization 1/13-17 for acute hypoxic respiratory failure secondary to CAP vs bronchitis.   Pt has HTN, CAD, atrial fib, AAA s/p repair in 2015 with persistent type II endoleak.   He presented with cough, dyspnea and generalized weakness   T 101.9 and needing 3-4 L/min oxygen supplementation.   Viral panel all negative.   Chest CT unremarkable.   He was treated with ceftriaxone and azithromycin, and improved.   Discharged to TCU for ongoing recovery and Rehab.        Today pt is seen in his room up to chair, reports no further respiratory symptoms, no longer on oxygen supplementation     Worked with therapies this morning, has a home exercise program    Breathing problem has resolved   No longer on O2   No pain, no GRIDER      Pt has been followed by Neurology Dr Fulton for Parkinsonism with gait instability and RUE tremor.   Sinemet caused a lot of anxiety and no improvement, so was discontinued in November 2022          Past Medical History:   Diagnosis Date     A-fib (H) 2010    post op     AAA (abdominal aortic aneurysm) without rupture     Dr. 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    carotid us no stenosis, echo no change and no clots; ziopatch no afib, svt present     Anemia 2004     Aortic insufficiency 06/2014    1+ on echo     Aortic insufficiency 11/2016    mild to mod     ASCVD (arteriosclerotic cardiovascular disease) 2010    cabg, post op afib     BPH (benign prostatic hyperplasia) 2003    turp, flomax added by urol 2/17     Bradycardia 2016    stopped toprol     CKD (chronic kidney disease) stage 3, GFR 30-59 ml/min (H)      Constipation 05/2020    colonoscopy nl     Cough 2010    pulm eval, felt due to reflux      Ok 2001    mri small vessel dz     GERD (gastroesophageal reflux disease)      HTN (hypertension) 2002     Hx of colonoscopy 2003    tics     Hypothyroid      Hypovitaminosis D      Idiopathic neuropathy      Lung nodule 02/2018    6mm, found during eval of ascending aorta, fu 8/18 no change     OCD (obsessive compulsive disorder)     sees psyche     Panic disorder     Dr. Luna, then someone after he retired     Spinal headache      Stroke (H) 12/2016    expressive aphasia, hosp, seen by neuro, mri pos, carotids min dz, changed from asa to plavix     Sudden hearing loss 06/2013    Dr. Garcia, mri brain no acoustic neuroma     SVT (supraventricular tachycardia) (H) 11/2016    seen on ziopatch done for amaurosis, longest 15 beats     Thoracic ascending aortic aneurysm 06/2014    4.4cm on echo, aortic root 3.9, fu 5/15 4.8cm; fu done 12/15 and slightly enlarged, fu 6/16 no change, fu 11/16 no change; fu 1/18 echo 5.1-5.3, enlarged, then cta done and only 4.8cm, t fu 6 months, lvh, nl ef, mild ai; fu 8/18 slightly larger; fu 2/19 slightly smaller; fu 2/20 and then 1/21 and stable     Ulcerative colitis (H)     not active for years       Past Surgical History:   Procedure Laterality Date     BACK SURGERY  1998     BLADDER SURGERY  1985     CATARACT IOL, RT/LT  2011     COLONOSCOPY N/A 05/05/2020    Procedure: COLONOSCOPY;  Surgeon: Kenya Loco MD;  Location:  GI     CORONARY ARTERY BYPASS  2010     ENDOVASCULAR REPAIR ANEURYSM ABDOMINAL AORTA N/A 05/27/2015    Procedure: ENDOVASCULAR REPAIR ANEURYSM ABDOMINAL AORTA;  Surgeon: Darin Walters MD;  Location:  OR     HERNIA REPAIR  2005     HERNIA REPAIR  1998     HERNIORRHAPHY INGUINAL Left 01/05/2018    Procedure: HERNIORRHAPHY INGUINAL;  Incarcerated Left Inguinal Hernia;  Surgeon: Harsh Walker MD;  Location:  OR     KNEE SURGERY  1997     REPAIR HAMMER TOE  12/28/2012    Procedure: REPAIR HAMMER TOE;  LEFT SECOND AND THIRD  "CLAW TOE RECONSTRUCTION;  Surgeon: Gray Haynes MD;  Location:  OR     REPAIR HAMMER TOE  2018    tria     toe amputation right foot  2021     TURP       [facility] TOTAL KNEE ARTHROPLASTY  2011    left     [facility] TOTAL KNEE ARTHROPLASTY  2009    right     Family History   Problem Relation Age of Onset     C.A.D. Father      Lymphoma Sister      Retinal detachment Daughter      Glaucoma No family hx of      Macular Degeneration No family hx of        Social History     Tobacco Use     Smoking status: Former     Packs/day: 1.00     Years: 5.00     Pack years: 5.00     Types: Cigarettes     Quit date: 1965     Years since quittin.6     Smokeless tobacco: Never   Substance Use Topics     Alcohol use: Yes     Alcohol/week: 0.0 standard drinks     Comment: maybe one glass of wine a week      SH: , lives at [facility], was in [place] before that.  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     ROS:   Review Of Systems  Skin: purpura, easy bruisability   Eyes: impaired vision, glasses     Ears/Nose/Throat: hearing loss  Respiratory: as above     Cardiovascular: negative  Gastrointestinal: ventral hernia, seen by Surgery Dr Story  Irregular bowel habits, on lactulose bid  Genitourinary: nocturia x1   Musculoskeletal: mod-max assist for dressing, ambulates ' with 4WW and SBA   TUG 43s  Neurologic: poor balance, some cognitive deficits, gait instability    Failed trial of Sinemet       Psychiatric: OCD, panic disorder      Hematologic/Lymphatic/Immunologic: negative  Endocrine: negative      GENERAL APPEARANCE: alert and no distress  /59   Pulse 50   Temp 97.3  F (36.3  C)   Resp 18   Ht 1.854 m (6' 1\")   Wt 90.4 kg (199 lb 4.8 oz)   SpO2 97%   BMI 26.29 kg/m     HEENT: normocephalic, no lesion or abnormalities  NECK: no adenopathy, no " asymmetry, masses, or scars and thyroid normal to palpation  RESP: lungs with decreased BS, scattered crackes  CV: regular rate and rhythm, normal S1 S2@60  ABDOMEN:  soft, nontender, no HSM or masses and bowel sounds normal  MS: extremities normal 2+ edema, tubigrips   SKIN: no suspicious lesions or rashes  NEURO: bilateral hand tremor  PSYCH: affect okay  LYMPHATICS: No cervical,  or supraclavicular nodes    Last Comprehensive Metabolic Panel:  Sodium   Date Value Ref Range Status   01/23/2023 142 136 - 145 mmol/L Final     Potassium   Date Value Ref Range Status   01/23/2023 4.3 3.4 - 5.3 mmol/L Final     Carbon Dioxide (CO2)   Date Value Ref Range Status   01/23/2023 25 22 - 29 mmol/L Final     Glucose   Date Value Ref Range Status   01/23/2023 66 (L) 70 - 99 mg/dL Final     Urea Nitrogen   Date Value Ref Range Status   01/23/2023 33.8 (H) 8.0 - 23.0 mg/dL Final     Creatinine   Date Value Ref Range Status   01/23/2023 1.32 (H) 0.67 - 1.17 mg/dL Final   04/08/2021 1.61 (H) 0.66 - 1.25 mg/dL Final     GFR Estimate   Date Value Ref Range Status   01/23/2023 51 (L) >60 mL/min/1.73m2 Final     Calcium   Date Value Ref Range Status   01/23/2023 9.6 8.2 - 9.6 mg/dL Final     Lab Results   Component Value Date    WBC 6.7 01/15/2023      HGB 12.1 01/15/2023      MCV 98 01/15/2023       01/15/2023     TSH   Date Value Ref Range Status   01/23/2023 3.15 0.30 - 4.20 uIU/mL Final   03/18/2022 0.44 0.40 - 4.00 mU/L Final   07/21/2020 0.46 0.40 - 4.00 mU/L Final           IMP/PLAN:    (J96.01) Acute respiratory failure with hypoxia (H)  (primary encounter diagnosis)  (J40) Bronchitis  Comment: improved symptoms and no longer hypoxic   Plan: finish course of cefdinir   Follow for any recurrence     (R25.9) Parkinsonian features  (R26.81) Gait instability  (R53.81) Physical deconditioning  Comment: failed a trial or Sinemet   Plan: PHYSICAL THERAPY / OCCUPATIONAL THERAPY for strengthening, balance, gait, ADLs.    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 (H)  Comment: BPs variable     Plan: chlorthalidone 25 mg/day (was restarted at hospital discharge)     Follow bps and BMP    (E03.9) Hypothyroidism, unspecified type  Comment:   TSH   Date Value Ref Range Status   01/23/2023 3.15 0.30 - 4.20 uIU/mL Final   03/18/2022 0.44 0.40 - 4.00 mU/L Final   07/21/2020 0.46 0.40 - 4.00 mU/L Final      Plan: levothyroxine 100 mcg/day       (F42.9) Obsessive-compulsive disorder, unspecified type  (F41.0) Panic disorder  Comment: follows with [facility]   Was hospitalized at [place] in 1996, on long term tx      Plan: mirtazapine 45 m/day, clomipramine 25 mg/HS, gabapentin 400 mg bid and aripiprazole 2 mg/HS     Sammi Dumas MD         Sincerely,        Sammi Dumas MD

## 2023-01-31 ENCOUNTER — DISCHARGE SUMMARY NURSING HOME (OUTPATIENT)
Dept: GERIATRICS | Facility: CLINIC | Age: 88
End: 2023-01-31
Payer: COMMERCIAL

## 2023-01-31 VITALS
BODY MASS INDEX: 26.37 KG/M2 | SYSTOLIC BLOOD PRESSURE: 150 MMHG | TEMPERATURE: 97.6 F | OXYGEN SATURATION: 96 % | RESPIRATION RATE: 18 BRPM | WEIGHT: 199 LBS | DIASTOLIC BLOOD PRESSURE: 60 MMHG | HEART RATE: 55 BPM | HEIGHT: 73 IN

## 2023-01-31 DIAGNOSIS — R29.818 PARKINSONIAN FEATURES: ICD-10-CM

## 2023-01-31 DIAGNOSIS — R26.81 GAIT INSTABILITY: ICD-10-CM

## 2023-01-31 DIAGNOSIS — J96.01 ACUTE RESPIRATORY FAILURE WITH HYPOXIA (H): Primary | ICD-10-CM

## 2023-01-31 DIAGNOSIS — Z86.73 HISTORY OF CVA (CEREBROVASCULAR ACCIDENT): ICD-10-CM

## 2023-01-31 DIAGNOSIS — G47.00 INSOMNIA, UNSPECIFIED TYPE: ICD-10-CM

## 2023-01-31 DIAGNOSIS — R19.8 IRREGULAR BOWEL HABITS: ICD-10-CM

## 2023-01-31 DIAGNOSIS — I13.10 HYPERTENSIVE HEART AND KIDNEY DISEASE WITHOUT HEART FAILURE AND WITH STAGE 3A CHRONIC KIDNEY DISEASE (H): ICD-10-CM

## 2023-01-31 DIAGNOSIS — R53.81 PHYSICAL DECONDITIONING: ICD-10-CM

## 2023-01-31 DIAGNOSIS — I25.10 ATHEROSCLEROSIS OF NATIVE CORONARY ARTERY OF NATIVE HEART WITHOUT ANGINA PECTORIS: ICD-10-CM

## 2023-01-31 DIAGNOSIS — I71.21 ANEURYSM OF ASCENDING AORTA WITHOUT RUPTURE (H): ICD-10-CM

## 2023-01-31 DIAGNOSIS — N18.31 HYPERTENSIVE HEART AND KIDNEY DISEASE WITHOUT HEART FAILURE AND WITH STAGE 3A CHRONIC KIDNEY DISEASE (H): ICD-10-CM

## 2023-01-31 DIAGNOSIS — F41.0 PANIC DISORDER: ICD-10-CM

## 2023-01-31 PROCEDURE — 99316 NF DSCHRG MGMT 30 MIN+: CPT | Performed by: NURSE PRACTITIONER

## 2023-01-31 RX ORDER — LANOLIN ALCOHOL/MO/W.PET/CERES
3 CREAM (GRAM) TOPICAL
COMMUNITY
Start: 2023-01-31 | End: 2023-03-30

## 2023-01-31 NOTE — PROGRESS NOTES
"   [facility] DISCHARGE SUMMARY    PATIENT'S NAME: Zack Santiago  YOB: 1931    PRIMARY CARE PROVIDER AND CLINIC RESPONSIBLE AFTER TRANSFER: Zurdo Kendrick     CODE STATUS Full    TRANSFERRING PROVIDERS: Shelby Hudson, DOMENIC VALDES, DR Sammi Dumas MD,      DATE OF SNF ADMISSION:  January / 17 / 2023    DATE OF SNF DISCHARGE (including anticipating DISCONTINUE): February / 02 / 2023    DISCHARGE DISPOSITION: [facility] stay 01/13/2023 to 01/17/2023.     Condition on Discharge:  Improving.    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 CXR showed streaked opacities in bases,  +d dimer and ct of chest (-) for PE.  It was thought he had a possible bronchitis or CAP--give ceftriaxone and  Azithromycin, he improved, wean off O2,  Send out on  cefpodoxime.  Of note has some parkinsonian symptoms and was see by neurology in the past-- failed a trial of sinemet so is being watched.  Sent for rehab.\"    TCU/SNF COURSE: Denies  Pain and discomfort, he  had some constipation addressed with medication changes . " Insomnia reported managed with melatonin as needed at bedtime.Skin baseline and weakness improved with PT. He remained room air and no complication with respiratory functions .       PAST MEDICAL HISTORY:  Past Medical History:   Diagnosis Date     A-fib (H) 2010    post op     AAA (abdominal aortic aneurysm) without rupture     Dr. 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    carotid us no stenosis, echo no change and no clots; ziopatch no afib, svt present     Anemia 2004     Aortic insufficiency 06/2014    1+ on echo     Aortic insufficiency 11/2016    mild to mod     ASCVD (arteriosclerotic cardiovascular disease) 2010    cabg, post op afib     BPH (benign prostatic hyperplasia) 2003    turp, flomax added by urol 2/17     Bradycardia 2016    stopped toprol     CKD (chronic kidney disease) stage 3, GFR 30-59 ml/min (H)      Constipation 05/2020    colonoscopy nl     Cough 2010    pulm eval, felt due to reflux     Dizzy 2001    mri small vessel dz     GERD (gastroesophageal reflux disease)      HTN (hypertension) 2002     Hx of colonoscopy 2003    tics     Hypothyroid      Hypovitaminosis D      Idiopathic neuropathy      Lung nodule 02/2018    6mm, found during eval of ascending aorta, fu 8/18 no change     OCD (obsessive compulsive disorder)     sees psyche     Panic disorder     Dr. Lnua, then someone after he retired     Spinal headache      Stroke (H) 12/2016    expressive aphasia, hosp, seen by neuro, mri pos, carotids min dz, changed from asa to plavix     Sudden hearing loss 06/2013    Dr. Garcia, mri brain no acoustic neuroma     SVT (supraventricular tachycardia) (H) 11/2016    seen on ziopatch done for amaurosis, longest 15 beats     Thoracic ascending aortic aneurysm 06/2014    4.4cm on echo, aortic root 3.9, fu 5/15 4.8cm; fu done 12/15 and slightly enlarged, fu 6/16 no change, fu 11/16 no change; fu 1/18 echo 5.1-5.3, enlarged, then cta done and only 4.8cm, t fu  "6 months, lvh, nl ef, mild ai; fu 8/18 slightly larger; fu 2/19 slightly smaller; fu 2/20 and then 1/21 and stable     Ulcerative colitis (H)     not active for years       DISCHARGE MEDICATIONS:  Current Outpatient Medications   Medication Sig Dispense Refill     ARIPiprazole (ABILIFY) 2 MG tablet Take 1 tablet by mouth At Bedtime.       carboxymethylcellulose-glycerin (OPTIVE) 0.5-0.9 % ophthalmic solution Place 1 drop into both eyes 2 times daily       chlorthalidone (HYGROTON) 25 MG tablet Take 1 tablet (25 mg) by mouth daily 90 tablet 3     ClomiPRAMINE HCl (ANAFRANIL PO) Take 25 mg by mouth every evening        clopidogrel (PLAVIX) 75 MG tablet Take 1 tablet (75 mg) by mouth daily 90 tablet 3     gabapentin (NEURONTIN) 400 MG capsule Take 1 capsule by mouth 2 times daily        ketoconazole (NIZORAL) 2 % external cream Apply topically 2 times daily       lactulose encephalopathy (CHRONULAC) 10 GM/15ML SOLUTION Take 30 mLs by mouth daily Irregular bowels Use PRN once daily       levothyroxine (SYNTHROID/LEVOTHROID) 100 MCG tablet Take 1 tablet (100 mcg) by mouth daily 90 tablet 3     melatonin 3 MG tablet Take 3 mg by mouth nightly as needed for sleep       methylcellulose (CITRUCEL) powder Take 1 teaspoonful by mouth daily constipation       MIRTAZAPINE PO Take 45 mg by mouth At Bedtime        senna-docusate (SENOKOT-S/PERICOLACE) 8.6-50 MG tablet Take 1 tablet by mouth daily as needed for constipation       simvastatin (ZOCOR) 20 MG tablet Take 1 tablet (20 mg) by mouth At Bedtime 90 tablet 2       MEDICATION CHANGES/RATIONALE:   See discharge note.  BP (!) 150/60   Pulse 55   Temp 97.6  F (36.4  C)   Resp 18   Ht 1.854 m (6' 1\")   Wt 90.3 kg (199 lb)   SpO2 96%   BMI 26.25 kg/m      ROS: TODAY DURING EXAM/ROS:  No CP, SOB, Cough, dizziness, fevers, chills, HA, N/V, dysuria or Bowel Abnormalities.Reports constipation , prn medication updated.  Appetite is good . No pain reported.      PHYSICAL EXAM " Today:  A & O x 3, NAD. Lungs CTA, non labored. RRR, S1/S2 w/o murmur,gallop or rub.  +2 extremity edema.  Abdomen soft, nontender, +BT'S.  No focal neurological deficits.        SNF LABS    CBC RESULTS: Recent Labs   Lab Test 01/15/23  0859   WBC 6.7   RBC 3.77*   HGB 12.1*   HCT 36.8*   MCV 98   MCH 32.1   MCHC 32.9   RDW 14.9          Recent Labs   Lab Test 01/23/23  0957 01/16/23  0846    142   POTASSIUM 4.3 3.6   CHLORIDE 102 106   CO2 25 27   ANIONGAP 15 9   GLC 66* 120*   BUN 33.8* 38*   CR 1.32* 1.01   YOUNG 9.6 9.2         DISCHARGE PLAN:  Occupational Therapy, Physical Therapy, Home Health Aide and From:  [facility] Follow-up with PCP in 7 days:   Current [place] or other scheduled appointments:None    MTM referral needed and placed by this provider: No    Pending labs: none     Discharge Treatments:None       TOTAL DISCHARGE TIME:   Greater than 30 minutes     DOMENIC Gudino CNP             Documentation of Face-to-Face and Certification for Home Health Services     Patient: Zack Santiago   YOB: 1931  MR Number: [id number]  Today's Date: 1/31/2023    I certify that patient: Zack Santiago is under my care and that I, or a nurse practitioner or physician's assistant working with me, had a face-to-face encounter that meets the physician face-to-face encounter requirements with this patient on: 1/31/2023.    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    I certify that, based on my findings, the following services are medically necessary home health services: Occupational Therapy and Physical Therapy.    My clinical findings support the need for the above services because: Occupational Therapy Services are needed to assess and treat cognitive ability and address ADL safety due to impairment in cognition, safety eval in apt. and Physical Therapy Services are needed to assess and treat the following functional impairments: deconditioning and weakness and see above diagnoses.    Further, I certify that my clinical findings support that this patient is homebound (i.e. absences from home require considerable and taxing effort and are for medical reasons or [facility] services or infrequently or of short duration when for other reasons) because: Requires assistance of another person or specialized equipment to access medical services because patient: Is unable to walk greater than 180-200 feet without rest...    Based on the above findings. I certify that this patient is confined to the home and needs intermittent skilled nursing care, physical therapy and/or speech therapy.  The patient is under my care, and I have initiated the establishment of the plan of care.  This patient will be followed by a physician who will periodically review the plan of care.  Physician/Provider to provide follow up care: Zurdo Kendrick    Responsible Medicare certified PECOS Physician: inessa Hudson RN, CNP  Physician Signature: See electronic signature associated with these discharge orders.  Date: 1/31/2023

## 2023-02-03 NOTE — TELEPHONE ENCOUNTER
Future Appointments   Date Time Provider Department Center   2/9/2023  2:00 PM [facility]

## 2023-02-04 ENCOUNTER — MEDICAL CORRESPONDENCE (OUTPATIENT)
Dept: HEALTH INFORMATION MANAGEMENT | Facility: CLINIC | Age: 88
End: 2023-02-04

## 2023-02-08 ENCOUNTER — TELEPHONE (OUTPATIENT)
Dept: FAMILY MEDICINE | Facility: CLINIC | Age: 88
End: 2023-02-08
Payer: COMMERCIAL

## 2023-02-08 NOTE — TELEPHONE ENCOUNTER
Patient calling to see if results of CT scan have come back. Patient stated he had a CT scan of lungs done in the hospital and was not updated on the results. Per chart review, RN relayed result interpretation from 1/13/2023 CT results. Patient did not have any additional concerns but stated he was not told about any follow up.     Routing to PCP for review. Do you know of any follow-up this patient is needing to have done?

## 2023-02-08 NOTE — TELEPHONE ENCOUNTER
Looked very good, has small thoracic aneurysm, similar to 2019 so no worries.    Zurdo Kendrick M.D.

## 2023-02-09 ENCOUNTER — HOSPITAL ENCOUNTER (OUTPATIENT)
Dept: CT IMAGING | Facility: CLINIC | Age: 88
Discharge: HOME | End: 2023-02-09
Attending: RADIOLOGY | Admitting: RADIOLOGY
Payer: COMMERCIAL

## 2023-02-09 ENCOUNTER — MEDICAL CORRESPONDENCE (OUTPATIENT)
Dept: HEALTH INFORMATION MANAGEMENT | Facility: CLINIC | Age: 88
End: 2023-02-09

## 2023-02-09 DIAGNOSIS — I71.40 ABDOMINAL AORTIC ANEURYSM (AAA) WITHOUT RUPTURE (H): ICD-10-CM

## 2023-02-09 DIAGNOSIS — Z53.9 DIAGNOSIS NOT YET DEFINED: Primary | ICD-10-CM

## 2023-02-09 PROCEDURE — G0180 MD CERTIFICATION HHA PATIENT: HCPCS | Performed by: INTERNAL MEDICINE

## 2023-02-09 PROCEDURE — 74150 CT ABDOMEN W/O CONTRAST: CPT

## 2023-02-09 NOTE — TELEPHONE ENCOUNTER
Called patient regarding MD message below. Patient verbalized understanding.     Karen Grewal RN on 2/9/2023 at 9:56 AM

## 2023-02-10 NOTE — RESULT ENCOUNTER NOTE
Called patient with results.  EVAR is patent, no change in aneurysm sac size.  Recommend 1 year follow up CT without contrast.  Trini Gonzalez RN  IR nurse clinician  [phone]

## 2023-02-16 ENCOUNTER — MEDICAL CORRESPONDENCE (OUTPATIENT)
Dept: HEALTH INFORMATION MANAGEMENT | Facility: CLINIC | Age: 88
End: 2023-02-16

## 2023-02-20 DIAGNOSIS — I71.40 ABDOMINAL AORTIC ANEURYSM (AAA) WITHOUT RUPTURE (H): Primary | ICD-10-CM

## 2023-03-01 ENCOUNTER — TRANSFERRED RECORDS (OUTPATIENT)
Dept: HEALTH INFORMATION MANAGEMENT | Facility: CLINIC | Age: 88
End: 2023-03-01

## 2023-03-07 ENCOUNTER — VIRTUAL VISIT (OUTPATIENT)
Dept: FAMILY MEDICINE | Facility: CLINIC | Age: 88
End: 2023-03-07
Payer: COMMERCIAL

## 2023-03-07 DIAGNOSIS — R09.81 NASAL CONGESTION: Primary | ICD-10-CM

## 2023-03-07 PROCEDURE — 99442 PR PHYSICIAN TELEPHONE EVALUATION 11-20 MIN: CPT | Mod: 95 | Performed by: INTERNAL MEDICINE

## 2023-03-07 RX ORDER — ECHINACEA PURPUREA EXTRACT 125 MG
TABLET ORAL
Qty: 15 ML | Refills: 1 | Status: SHIPPED | OUTPATIENT
Start: 2023-03-07 | End: 2023-04-05

## 2023-03-07 NOTE — PROGRESS NOTES
Zack is a 89 year old who is being evaluated via a billable telephone visit.      What phone number would you like to be contacted at?   How would you like to obtain your AVS? Phil    Distant Location (provider location):  On-site      This is a very pleasant 89-year-old who I am seeing for hospital then TCU follow-up.  He was hospitalized from January 13 through January 17 at [facility] and then discharged from TCU on February 2.  His discharge diagnoses include acute respiratory failure with hypoxia.  Work-up negative for flu, RSV or COVID.  Chest x-ray showed streaky opacities in the bases with a positive D-dimer.  A CT of his chest was negative for PE.  It was thought he could have bronchitis versus community-acquired pneumonia and was given ceftriaxone and Zithromax and improved and was weaned off his oxygen.  He was sent out on cefpodoxime.  In the TCU he did quite well.    In reviewing the discharge note from January 13 from the hospital he presented with a 5 to 6-day history of a cough and shortness of breath and weakness.  His temp was 101.9 and he was hypoxemic as noted.    He notes having weakness and having to use walker.  Notes have alluded to his bilateral leg weakness so this is not new.  He has knee issues so hard time getting up out of chair.  He lives in assisted living and is getting physical therapy there but not very helpful.  The knees have been bothering him for a month.  The knees do not hurt when seated, just when getting up.  They are not swollen.    He feels stuffed in his nose bilat.  There is no discharge.  It makes him feel panicy.  He is not using any nasal sprays.  We will try nasal saline spray and if this does not help will try flonase.    Otherwise has been stable.    At this time I will send in a prescription for saline nasal spray but if this is not effective he will let me know and we will try Flonase.  I would like to get him in for a physical in the next few months  and he will call if he has any problems before then.    Zrudo Kendrick M.D.  15 minutes on the day of the encounter doing chart review, history and exam, documentation and further activities as noted above.

## 2023-03-15 ENCOUNTER — OFFICE VISIT (OUTPATIENT)
Dept: NEUROLOGY | Facility: CLINIC | Age: 88
End: 2023-03-15
Attending: INTERNAL MEDICINE
Payer: COMMERCIAL

## 2023-03-15 VITALS — HEART RATE: 59 BPM | SYSTOLIC BLOOD PRESSURE: 119 MMHG | DIASTOLIC BLOOD PRESSURE: 69 MMHG | OXYGEN SATURATION: 94 %

## 2023-03-15 DIAGNOSIS — R25.1 TREMOR: ICD-10-CM

## 2023-03-15 DIAGNOSIS — G20.C PARKINSONISM, UNSPECIFIED PARKINSONISM TYPE (H): ICD-10-CM

## 2023-03-15 DIAGNOSIS — G62.9 NEUROPATHY: ICD-10-CM

## 2023-03-15 DIAGNOSIS — R26.89 BALANCE DISORDER: Primary | ICD-10-CM

## 2023-03-15 PROCEDURE — 99205 OFFICE O/P NEW HI 60 MIN: CPT | Performed by: PSYCHIATRY & NEUROLOGY

## 2023-03-15 PROCEDURE — 99417 PROLNG OP E/M EACH 15 MIN: CPT | Performed by: PSYCHIATRY & NEUROLOGY

## 2023-03-15 RX ORDER — CARBIDOPA AND LEVODOPA 25; 100 MG/1; MG/1
1.5 TABLET ORAL 3 TIMES DAILY
Qty: 405 TABLET | Refills: 3 | Status: SHIPPED | OUTPATIENT
Start: 2023-03-15 | End: 2023-04-17

## 2023-03-15 ASSESSMENT — UNIFIED PARKINSONS DISEASE RATING SCALE (UPDRS)
PARKINSONS_MEDS: OFF
FINGER_TAPPING_RIGHT: (3) MODERATE: ANY OF THE FOLLOWING: A) MORE THAN 5 INTERRUPTIONS OR AT LEAST ONE LONGER ARREST (FREEZE) IN ONGOING MOVEMENT  B) MODERATE SLOWING  C) THE AMPLITUDE DECREMENTS STARTING AFTER THE FIRST MOVEMENT.
LEG_AGILITY_LEFT: (2) MILD: ANY OF THE FOLLOWING: A) 3 TO 5 INTERRUPTIONS DURING TAPPING  B) MILD SLOWING  C) THE AMPLITUDE DECREMENTS MIDWAY IN THE 10-MOVEMENT SEQUENCE.
RIGIDITY_LUE: (2) MILD: RIGIDITY DETECTED WITHOUT THE ACTIVATION MANEUVER. FULL RANGE OF MOTION IS EASILY ACHIEVED.
TOTAL_SCORE_LEFT: 21
ARISING_CHAIR: (4) SEVERE: UNABLE TO ARISE WITHOUT HELP.
SPEECH: (2) MILD: LOSS OF MODULATION, DICTION OR VOLUME, WITH A FEW WORDS UNCLEAR, BUT THE OVERALL SENTENCES EASY TO FOLLOW.
TOTAL_SCORE: 83
MOVEMENTS_INTERFERE_WITH_RATINGS: NO
PRONATION_SUPINATION_LEFT: (3) MODERATE: ANY OF THE FOLLOWING: A) MORE THAN 5 INTERRUPTIONS OR AT LEAST ONE LONGER ARREST (FREEZE) IN ONGOING MOVEMENT  B) MODERATE SLOWING  C) THE AMPLITUDE DECREMENTS STARTING AFTER THE FIRST MOVEMENT.
POSTURE: (3) MODERATE: STOOPED POSTURE, SCOLIOSIS, OR LEANING TO ONE SIDE THAT CANNOT BE CORRECTED VOLITIONALLY TO A NORMAL POSTURE BY THE PATIENT.
TOETAPPING_LEFT: (2) MILD: ANY OF THE FOLLOWING: A) 3 TO 5 INTERRUPTIONS DURING TAPPING  B) MILD SLOWING  C) THE AMPLITUDE DECREMENTS MIDWAY IN THE 10-MOVEMENT SEQUENCE.
RIGIDITY_LLE: (2) MILD: RIGIDITY DETECTED WITHOUT THE ACTIVATION MANEUVER. FULL RANGE OF MOTION IS EASILY ACHIEVED.
FACIAL_EXPRESSION: (4) SEVERE: MASKED FACIES WITH LIPS PARTED MOST OF THE TIME WHEN THE MOUTH IS AT REST.
SPONTANEITY_OF_MOVEMENT: (3) MODERATE: MODERATE GLOBAL SLOWNESS AND POVERTY OF MOVEMENTS.
FINGER_TAPPING_LEFT: (3) MODERATE: ANY OF THE FOLLOWING: A) MORE THAN 5 INTERRUPTIONS OR AT LEAST ONE LONGER ARREST (FREEZE) IN ONGOING MOVEMENT  B) MODERATE SLOWING  C) THE AMPLITUDE DECREMENTS STARTING AFTER THE FIRST MOVEMENT.
AMPLITUDE_LLE: (0) NORMAL: NO TREMOR.
POSTURAL_STABILITY: (4) SEVERE: VERY UNSTABLE, TENDS TO LOSE BALANCE SPONTANEOUSLY OR WITH JUST A GENTLE PULL ON THE SHOULDERS.
LEG_AGILITY_RIGHT: (2) MILD: ANY OF THE FOLLOWING: A) 3 TO 5 INTERRUPTIONS DURING TAPPING  B) MILD SLOWING  C) THE AMPLITUDE DECREMENTS MIDWAY IN THE 10-MOVEMENT SEQUENCE.
HANDMOVEMENTS_LEFT: (3) MODERATE: ANY OF THE FOLLOWING: A) MORE THAN 5 INTERRUPTIONS OR AT LEAST ONE LONGER ARREST (FREEZE) IN ONGOING MOVEMENT  B) MODERATE SLOWING  C) THE AMPLITUDE DECREMENTS STARTING AFTER THE FIRST MOVEMENT.
AXIAL_SCORE: 30
RIGIDITY_NECK: (3) MODERATE: RIGIDITY DETECTED WITHOUT THE ACTIVATION MANEUVER. FULL RANGE OF MOTION IS ACHIEVED WITH EFFORT.
FREEZING_GAIT: (3) MODERATE: FREEZES ONCE DURING STRAIGHT WALKING.
TOTAL_SCORE: 29
RIGIDITY_RUE: (3) MODERATE: RIGIDITY DETECTED WITHOUT THE ACTIVATION MANEUVER. FULL RANGE OF MOTION IS ACHIEVED WITH EFFORT.
GAIT: (4) SEVERE: CANNOT WALK AT ALL OR ONLY WITH ANOTHER PERSON'S ASSISTANCE.
HANDMOVEMENTS_RIGHT: (3) MODERATE: ANY OF THE FOLLOWING: A) MORE THAN 5 INTERRUPTIONS OR AT LEAST ONE LONGER ARREST (FREEZE) IN ONGOING MOVEMENT  B) MODERATE SLOWING  C) THE AMPLITUDE DECREMENTS STARTING AFTER THE FIRST MOVEMENT.
AMPLITUDE_RLE: (1) SLIGHT: < 1 CM IN MAXIMAL AMPLITUDE.
AMPLITUDE_LIP_JAW: (0) NORMAL: NO TREMOR.
DYSKINESIAS_PRESENT: NO
AMPLITUDE_LUE: (0) NORMAL: NO TREMOR.
TOETAPPING_RIGHT: (2) MILD: ANY OF THE FOLLOWING: A) 3 TO 5 INTERRUPTIONS DURING TAPPING  B) MILD SLOWING  C) THE AMPLITUDE DECREMENTS MIDWAY IN THE 10-MOVEMENT SEQUENCE.
RIGIDITY_RLE: (3) MODERATE: RIGIDITY DETECTED WITHOUT THE ACTIVATION MANEUVER. FULL RANGE OF MOTION IS ACHIEVED WITH EFFORT.
CONSTANCY_TREMOR_ATREST: (3) MODERATE: TREMOR AT REST IS PRESENT 51-75% OF THE ENTIRE EXAMINATION PERIOD.
PRONATION_SUPINATION_RIGHT: (3) MODERATE: ANY OF THE FOLLOWING: A) MORE THAN 5 INTERRUPTIONS OR AT LEAST ONE LONGER ARREST (FREEZE) IN ONGOING MOVEMENT  B) MODERATE SLOWING  C) THE AMPLITUDE DECREMENTS STARTING AFTER THE FIRST MOVEMENT.
AMPLITUDE_RUE: (3) MODERATE: 3 - 10 CM IN MAXIMAL AMPLITUDE.

## 2023-03-15 NOTE — PROGRESS NOTES
"Department of Neurology  Movement Disorders Division   New Patient Visit    Patient: Zack Santiago   MRN: [id number]   : 1931   Date of Visit: 3/15/2023  PCP: Zurdo Kendrick MD   Referring provider: Aroldo    CC:  Evaluation for parkinsonism    HPI:  Mr. Santiago is a 89 year old R-handed male with history significant for thyroid and reported psychiatric diseases who presents to movement disorder clinic for tremors. He is accompanied by his son in law who assists with collateral information.  The timeline is difficult to piece together because patient doesn't recall some of the events, and he is in a group home. He says he's experienced tremors affecting his R hand only when he is nervous, now ongoing for the past 6 months. However he has seen an outside neurologist twice for the problem, and recalls a trial of sinemet that took place lasting for what he recalls being a few months even though on the dispense report I can only find a 14 day supply of sinemet.  The tremors can be at rest, and also with rigidity or bradykinesia. He denies any tremors prior to a year ago. When asked regarding difficulties with performing ADLs he says that anything makes him nervous and makes him shake. He denies rigidity or bradykinesia. His son-in-law does point out that the other day his R foot was also shaking. He denies symptoms on the L side.  When asked about postural changes and balance problems, Zack states that \"as many other seniors\", his balance has been off for a while. He estimates a couple of years, when he began to use a cane to assist with balance, because at times he would shuffle and at times he would fall. That has progressed over the past recent months to a walker to ambulate around the house, and wheelchair for long distances. He has also been through PT for balance and gait and tries to implement fall precaution strategies.  No hyposmia, no dysphagia, no dysarthria, longstanding constipation, he takes " stool softeners, no bladder problems. No orthostasis.   No FH of PD, retired from working retail, denies exposures to heavy chemicals. In reviewing his medications, he has been chronically on Abilify, reportedly for severe depression, for several years. He feels his depression has been stable on the current regimen, and he follows with Dr Keshia Bowman, in [facility] in [place]. He doesn't recall discussing these symptoms with his psychiatrist and last time he was seen was via telehealth.  For these complaints, he has seen a neurologist at the [facility], who per notes has tried him on sinemet and he stated that he was more anxious, leading to discontinuation of the drug. Zack himself doesn't recall and says the rather the medication did not have any effect, so he stopped it.  He is coming here for evaluation and an opinion on the case.     ROS:  All others negative except as listed above. Pertinent movement disorders-specific ROS listed above.    Past Medical History:   Diagnosis Date     A-fib (H) 2010    post op     AAA (abdominal aortic aneurysm) without rupture     Dr. 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    carotid us no stenosis, echo no change and no clots; ziopatch no afib, svt present     Anemia 2004     Aortic insufficiency 06/2014    1+ on echo     Aortic insufficiency 11/2016    mild to mod     Ascending aorta dilatation (H) 01/2023    5cm on ct     ASCVD (arteriosclerotic cardiovascular disease) 2010    cabg, post op afib     BPH (benign prostatic hyperplasia) 2003    turp, flomax added by urol 2/17     Bradycardia 2016    stopped toprol     CKD (chronic kidney disease) stage 3, GFR 30-59 ml/min (H)      Constipation 05/2020    colonoscopy nl     Cough 2010    pulm eval, felt due to reflux     Dizzy 2001    mri small vessel dz     GERD (gastroesophageal reflux disease)      HTN (hypertension) 2002     Hx of colonoscopy 2003     tics     Hypothyroid      Hypovitaminosis D      Idiopathic neuropathy      Lung nodule 02/2018    6mm, found during eval of ascending aorta, fu 8/18 no change     OCD (obsessive compulsive disorder)     sees psyche     Panic disorder     Dr. Luna, then someone after he retired     Spinal headache      Stroke (H) 12/2016    expressive aphasia, hosp, seen by neuro, mri pos, carotids min dz, changed from asa to plavix     Sudden hearing loss 06/2013    Dr. Garcia, mri brain no acoustic neuroma     SVT (supraventricular tachycardia) (H) 11/2016    seen on ziopatch done for amaurosis, longest 15 beats     Thoracic ascending aortic aneurysm 06/2014    4.4cm on echo, aortic root 3.9, fu 5/15 4.8cm; fu done 12/15 and slightly enlarged, fu 6/16 no change, fu 11/16 no change; fu 1/18 echo 5.1-5.3, enlarged, then cta done and only 4.8cm, t fu 6 months, lvh, nl ef, mild ai; fu 8/18 slightly larger; fu 2/19 slightly smaller; fu 2/20 and then 1/21 and stable     Ulcerative colitis (H)     not active for years        Past Surgical History:   Procedure Laterality Date     BACK SURGERY  1998     BLADDER SURGERY  1985     CATARACT IOL, RT/LT  2011     COLONOSCOPY N/A 05/05/2020    Procedure: COLONOSCOPY;  Surgeon: Kenya Loco MD;  Location:  GI     CORONARY ARTERY BYPASS  2010     ENDOVASCULAR REPAIR ANEURYSM ABDOMINAL AORTA N/A 05/27/2015    Procedure: ENDOVASCULAR REPAIR ANEURYSM ABDOMINAL AORTA;  Surgeon: Darin Walters MD;  Location:  OR     HERNIA REPAIR  2005     HERNIA REPAIR  1998     HERNIORRHAPHY INGUINAL Left 01/05/2018    Procedure: HERNIORRHAPHY INGUINAL;  Incarcerated Left Inguinal Hernia;  Surgeon: Harsh Walker MD;  Location:  OR     KNEE SURGERY  1997     REPAIR HAMMER TOE  12/28/2012    Procedure: REPAIR HAMMER TOE;  LEFT SECOND AND THIRD CLAW TOE RECONSTRUCTION;  Surgeon: Gray Haynes MD;  Location:  OR     REPAIR HAMMER TOE  04/2018    tria     toe amputation right  foot  02/2021     TURP  2003     [facility] TOTAL KNEE ARTHROPLASTY  2011    left     [facility] TOTAL KNEE ARTHROPLASTY  2009    right          Current Outpatient Medications:      ARIPiprazole (ABILIFY) 2 MG tablet, Take 1 tablet by mouth At Bedtime., Disp: , Rfl:      carbidopa-levodopa (SINEMET)  MG tablet, Take 1.5 tablets by mouth 3 times daily, Disp: 405 tablet, Rfl: 3     carboxymethylcellulose-glycerin (OPTIVE) 0.5-0.9 % ophthalmic solution, Place 1 drop into both eyes 2 times daily, Disp: , Rfl:      chlorthalidone (HYGROTON) 25 MG tablet, Take 1 tablet (25 mg) by mouth daily, Disp: 90 tablet, Rfl: 3     ClomiPRAMINE HCl (ANAFRANIL PO), Take 25 mg by mouth every evening , Disp: , Rfl:      clopidogrel (PLAVIX) 75 MG tablet, Take 1 tablet (75 mg) by mouth daily, Disp: 90 tablet, Rfl: 3     gabapentin (NEURONTIN) 400 MG capsule, Take 1 capsule by mouth 2 times daily , Disp: , Rfl:      ketoconazole (NIZORAL) 2 % external cream, Apply topically 2 times daily, Disp: , Rfl:      lactulose encephalopathy (CHRONULAC) 10 GM/15ML SOLUTION, Take 30 mLs by mouth daily Irregular bowels Use PRN once daily, Disp: , Rfl:      levothyroxine (SYNTHROID/LEVOTHROID) 100 MCG tablet, Take 1 tablet (100 mcg) by mouth daily, Disp: 90 tablet, Rfl: 3     methylcellulose (CITRUCEL) powder, Take 1 teaspoonful by mouth daily constipation, Disp: , Rfl:      MIRTAZAPINE PO, Take 45 mg by mouth At Bedtime , Disp: , Rfl:      senna-docusate (SENOKOT-S/PERICOLACE) 8.6-50 MG tablet, Take 1 tablet by mouth daily as needed for constipation, Disp: , Rfl:      simvastatin (ZOCOR) 20 MG tablet, Take 1 tablet (20 mg) by mouth At Bedtime, Disp: 90 tablet, Rfl: 2     sodium chloride (OCEAN) 0.65 % nasal spray, Use this 3x daily, Disp: 15 mL, Rfl: 1     melatonin 3 MG tablet, Take 3 mg by mouth nightly as needed for sleep (Patient not taking: Reported on 3/15/2023), Disp: , Rfl:      No Known Allergies     Family History   Problem Relation Age of Onset      YOSHI Father      Lymphoma Sister      Retinal detachment Daughter      Glaucoma No family hx of      Macular Degeneration No family hx of         Social History:  He  reports that he quit smoking about 57 years ago. His smoking use included cigarettes. He has a 5.00 pack-year smoking history. He has never used smokeless tobacco. He reports current alcohol use. He reports that he does not use drugs.     PHYSICAL EXAM:  /69   Pulse 59   SpO2 94%      Gen: no acute distress, respirations appeared nonlabored    NEURO:  MS:  Alert, oriented, appropriate    CN:  PERRLA, EOMI, face symmetric, normal strength and sensation, tongue and palate are midline, SCM 5/5 throughout    Motor:    Increased tone, no fasciculations, strength is 5/5 throughout    Sensation:  Reduced to vibratory sense at the toes    Coordination:  Normal finger-nose    Reflexes: 2+ throughout, except for ankles which are mute, but toes were down going    Gait:  Significant postural instability, cannot stand unassisted due to tendency to fall backwards. Did not feel safe to ambulate.     UPDRS Motor Scale 3/15/2023   Time:  5:07 PM   Medication Off   R Brain DBS: None   L Brain DBS: None   Dyskinesia (LID) No   Did LID interfere No   Speech 2   Facial Expression 4   Rigidity Neck 3   Rigidity RUE 3   Rigidity LUE 2   Rigidity RLE 3   Rigidity LLE 2   Finger Taps R 3   Finger Taps L 3   Hand Mvt R 3   Hand Mvt L 3   Pron-/Supinate R 3   Pron-/Supinate L 3   Toe Tap R 2   Toe Tap L 2   Leg Agility R 2   Leg Agility L 2   Arise From Chair 4   Gait 4   Gait Freezing 3   Postural Stability 4   Posture 3   Global Spont Mvt 3   Postural Tremor RUE 3   Postural Tremor LUE 2   Kinetic Tremor RUE 3   Kinetic Tremor LUE 2   Rest Tremor RUE 3   Rest Tremor LUE 0   Rest Tremor RLE 1   Rest Tremor LLE 0   Rest Tremor Lip/Jaw 0   Rest Tremor Constancy 3   Total Right 29   Total Left 21   Axial Total 30   Total 83         DATA REVIEWED:  Reviewed notes  available through care everywhere. Reviewed also dispense history available in Epic.    ASSESSMENT:  89 yo M with chronic psychiatric problems,chronic exposure to dopamine blocking agents, presenting with an unclear timeline of what appears to be a parkinsonian disorder, of either onset or exacerbation of symptoms over the past year or two with tremors and poor balance. Exam does confirm parkinsonian signs. Perhaps some underlying neuropathy given reduced vibration at the toes.     PLAN:  - Reviewed impression and plan  - We will copy his psychiatrist for input in his case in terms of his Abilify. If this is a drug-induced picture which can happen even with exposures to a very low dose, but for a prolonged period of time, or if this is a neurodegenerative parkinsonian disorder, a dopamine blocking agent is mostly contraindicated as it can precipitate a worsening in parkinsonism, unless this is the agent needed to control patient's psychiatric condition. Ideally he should be switched off of antipsychotics if possible  -  We will reconsider another trial of sinemet, goal for 1.5 tabs TID (25/100mg) until next visit. Reviewed potential side effects. Provided titration schedule for him and and staff at the facility. May uptitrate further, however want to see him and screen for side effects given a failed first trial for unclear reasons. Encouraged him to not stop the medication if he doesn't notice a difference in symptoms as this might be just because he needs an adjustment and he voiced understanding  - his PCP and outside neurologist may consider a screening for reversible causes of neuropathy including DM, thyroid disease, b12, b6, thiamine, SPEP, and address those accordingly.   - encouraged to continue seeing PT on a regular basis for gait and balance training  - will regroup for a quick check in 2-3 months, sooner if needed, encouraged to call if questions, concerns, or changes.       Patient Instructions   Dear  Zack,    After the visit today, we discussed the following:    I do agree that you have symptoms concerning for Parkinson's or a Parkinson's-like disorder (we call that parkinsonism). We should consider the followin. If would be a good idea if we could switch you off of the Abilify into something else that does not block a neurotransmitter (or a brain hormone) called dopamine. That is because either this is A) a drug induced problem, being on that chronically can make folks look like they have Parkinson's; or B) because if you do have Parkinson's, that medication can make your Parkinson's symptoms worse.  a. We will try to track down your psychiatrist to try to coordinate and send our note suggesting that medication change if he/she things it is something that can be done  2. We would like to try that medication called carbidopa/levodopa again. You cannot recall whether it actually made you more anxious or not, but I think it is worth trying to see if we can make your tremors and slowness better let's follow the schedule below:  a. Weeks 1 and 2. Take 1/2 tablet of carbidopa/levodopa 25/100mg three times a day (before breakfast, lunch and dinner)  b. Weeks 3 and 4. Increase to 1 tablet of carbidopa/levodopa 25/100mg and keep at three times a day (before breakfast, lunch and dinner)  c. Week 5 until I see you again. Increase to 1.5 tablets of carbidopa/levodopa 25/100mg and keep at three times a day (before breakfast, lunch and dinner)  i. Please watch for any potential side effects as we discussed in clinic.        Amadeo Jackson MD (Leo) (he/him/his)   of Neurology  [facility]  Department of Neurology      Thank you for referring Zack Santiago to our [facility] Movement Disorders Program, we appreciate the opportunity of being your partner in healthcare. Please feel free to reach out to us at any point if any questions or concerns about this visit.    Attending attestation:  Today I spent a total 80 minutes on this case, with greater than 50% of the time spent in face-to-face, examining, obtaining history, providing education and coordination of care. Additional time was spent in chart review, ancillary data, and documentation as delineated above.

## 2023-03-15 NOTE — NURSING NOTE
"Zack Santiago is a 89 year old male who presents for:  Chief Complaint   Patient presents with     Tremors     Please send confirmation letter   Tremors   Ref Zurdo Kendrick MD  appt kain with pt        Initial Vitals:  /69   Pulse 59   SpO2 94%  Estimated body mass index is 26.25 kg/m  as calculated from the following:    Height as of 1/31/23: 1.854 m (6' 1\").    Weight as of 1/31/23: 90.3 kg (199 lb).. There is no height or weight on file to calculate BSA. BP completed using cuff size: regular  Data Unavailable    Nursing Comments:     Kika Nixon MA    "

## 2023-03-15 NOTE — PATIENT INSTRUCTIONS
Dear Zack,    After the visit today, we discussed the following:    I do agree that you have symptoms concerning for Parkinson's or a Parkinson's-like disorder (we call that parkinsonism). We should consider the following:    If would be a good idea if we could switch you off of the Abilify into something else that does not block a neurotransmitter (or a brain hormone) called dopamine. That is because either this is A) a drug induced problem, being on that chronically can make folks look like they have Parkinson's; or B) because if you do have Parkinson's, that medication can make your Parkinson's symptoms worse.  We will try to track down your psychiatrist to try to coordinate and send our note suggesting that medication change if he/she things it is something that can be done  We would like to try that medication called carbidopa/levodopa again. You cannot recall whether it actually made you more anxious or not, but I think it is worth trying to see if we can make your tremors and slowness better let's follow the schedule below:  Weeks 1 and 2. Take 1/2 tablet of carbidopa/levodopa 25/100mg three times a day (before breakfast, lunch and dinner)  Weeks 3 and 4. Increase to 1 tablet of carbidopa/levodopa 25/100mg and keep at three times a day (before breakfast, lunch and dinner)  Week 5 until I see you again. Increase to 1.5 tablets of carbidopa/levodopa 25/100mg and keep at three times a day (before breakfast, lunch and dinner)  Please watch for any potential side effects as we discussed in clinic.

## 2023-03-15 NOTE — LETTER
"    3/15/2023         RE: Zack Santiago  [id number] [address] [address]        Dear Colleague,    Thank you for referring your patient, Zack Santiago, to the [facility] NEUROLOG[facility]. Please see a copy of my visit note below.    Department of Neurology  Movement Disorders Division   New Patient Visit    Patient: Zack Santiago   MRN: [id number]   : 1931   Date of Visit: 3/15/2023  PCP: Zurdo Kendrick MD   Referring provider: Aroldo    CC:  Evaluation for parkinsonism    HPI:  Mr. Santiago is a 89 year old R-handed male with history significant for thyroid and reported psychiatric diseases who presents to movement disorder clinic for tremors. He is accompanied by his son in law who assists with collateral information.  The timeline is difficult to piece together because patient doesn't recall some of the events, and he is in a group home. He says he's experienced tremors affecting his R hand only when he is nervous, now ongoing for the past 6 months. However he has seen an outside neurologist twice for the problem, and recalls a trial of sinemet that took place lasting for what he recalls being a few months even though on the dispense report I can only find a 14 day supply of sinemet.  The tremors can be at rest, and also with rigidity or bradykinesia. He denies any tremors prior to a year ago. When asked regarding difficulties with performing ADLs he says that anything makes him nervous and makes him shake. He denies rigidity or bradykinesia. His son-in-law does point out that the other day his R foot was also shaking. He denies symptoms on the L side.  When asked about postural changes and balance problems, Zack states that \"as many other seniors\", his balance has been off for a while. He estimates a couple of years, when he began to use a cane to assist with balance, because at times he would shuffle and at times he would fall. That has progressed " over the past recent months to a walker to ambulate around the house, and wheelchair for long distances. He has also been through PT for balance and gait and tries to implement fall precaution strategies.  No hyposmia, no dysphagia, no dysarthria, longstanding constipation, he takes stool softeners, no bladder problems. No orthostasis.   No FH of PD, retired from working retail, denies exposures to heavy chemicals. In reviewing his medications, he has been chronically on Abilify, reportedly for severe depression, for several years. He feels his depression has been stable on the current regimen, and he follows with Dr Keshia Bowman, in [facility] in [place]. He doesn't recall discussing these symptoms with his psychiatrist and last time he was seen was via telehealth.  For these complaints, he has seen a neurologist at the [facility], who per notes has tried him on sinemet and he stated that he was more anxious, leading to discontinuation of the drug. Zack himself doesn't recall and says the rather the medication did not have any effect, so he stopped it.  He is coming here for evaluation and an opinion on the case.     ROS:  All others negative except as listed above. Pertinent movement disorders-specific ROS listed above.    Past Medical History:   Diagnosis Date     A-fib (H) 2010    post op     AAA (abdominal aortic aneurysm) without rupture     Dr. 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    carotid us no stenosis, echo no change and no clots; ziopatch no afib, svt present     Anemia 2004     Aortic insufficiency 06/2014    1+ on echo     Aortic insufficiency 11/2016    mild to mod     Ascending aorta dilatation (H) 01/2023    5cm on ct     ASCVD (arteriosclerotic cardiovascular disease) 2010    cabg, post op afib     BPH (benign prostatic hyperplasia) 2003    turp, flomax added by urol 2/17     Bradycardia 2016    stopped toprol     CKD  (chronic kidney disease) stage 3, GFR 30-59 ml/min (H)      Constipation 05/2020    colonoscopy nl     Cough 2010    pulm eval, felt due to reflux     Dizzy 2001    mri small vessel dz     GERD (gastroesophageal reflux disease)      HTN (hypertension) 2002     Hx of colonoscopy 2003    tics     Hypothyroid      Hypovitaminosis D      Idiopathic neuropathy      Lung nodule 02/2018    6mm, found during eval of ascending aorta, fu 8/18 no change     OCD (obsessive compulsive disorder)     sees psyche     Panic disorder     Dr. Luna, then someone after he retired     Spinal headache      Stroke (H) 12/2016    expressive aphasia, hosp, seen by neuro, mri pos, carotids min dz, changed from asa to plavix     Sudden hearing loss 06/2013    Dr. Garcia, mri brain no acoustic neuroma     SVT (supraventricular tachycardia) (H) 11/2016    seen on ziopatch done for amaurosis, longest 15 beats     Thoracic ascending aortic aneurysm 06/2014    4.4cm on echo, aortic root 3.9, fu 5/15 4.8cm; fu done 12/15 and slightly enlarged, fu 6/16 no change, fu 11/16 no change; fu 1/18 echo 5.1-5.3, enlarged, then cta done and only 4.8cm, t fu 6 months, lvh, nl ef, mild ai; fu 8/18 slightly larger; fu 2/19 slightly smaller; fu 2/20 and then 1/21 and stable     Ulcerative colitis (H)     not active for years        Past Surgical History:   Procedure Laterality Date     BACK SURGERY  1998     BLADDER SURGERY  1985     CATARACT IOL, RT/LT  2011     COLONOSCOPY N/A 05/05/2020    Procedure: COLONOSCOPY;  Surgeon: Kenya Loco MD;  Location:  GI     CORONARY ARTERY BYPASS  2010     ENDOVASCULAR REPAIR ANEURYSM ABDOMINAL AORTA N/A 05/27/2015    Procedure: ENDOVASCULAR REPAIR ANEURYSM ABDOMINAL AORTA;  Surgeon: Darin Walters MD;  Location:  OR     HERNIA REPAIR  2005     HERNIA REPAIR  1998     HERNIORRHAPHY INGUINAL Left 01/05/2018    Procedure: HERNIORRHAPHY INGUINAL;  Incarcerated Left Inguinal Hernia;  Surgeon: Aaron  Harsh Zamora MD;  Location:  OR     KNEE SURGERY  1997     REPAIR HAMMER TOE  12/28/2012    Procedure: REPAIR HAMMER TOE;  LEFT SECOND AND THIRD CLAW TOE RECONSTRUCTION;  Surgeon: Gray Haynes MD;  Location:  OR     REPAIR HAMMER TOE  04/2018    tria     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     TURP  2003     [email or web address] TOTAL KNEE ARTHROPLASTY  2011    left     [facility] [[email or web address] TOTAL KNEE ARTHROPLASTY  2009    right          Current Outpatient Medications:      ARIPiprazole (ABILIFY) 2 MG tablet, Take 1 tablet by mouth At Bedtime., Disp: , Rfl:      carbidopa-levodopa (SINEMET)  MG tablet, Take 1.5 tablets by mouth 3 times daily, Disp: 405 tablet, Rfl: 3     carboxymethylcellulose-glycerin (OPTIVE) 0.5-0.9 % ophthalmic solution, Place 1 drop into both eyes 2 times daily, Disp: , Rfl:      chlorthalidone (HYGROTON) 25 MG tablet, Take 1 tablet (25 mg) by mouth daily, Disp: 90 tablet, Rfl: 3     ClomiPRAMINE HCl (ANAFRANIL PO), Take 25 mg by mouth every evening , Disp: , Rfl:      clopidogrel (PLAVIX) 75 MG tablet, Take 1 tablet (75 mg) by mouth daily, Disp: 90 tablet, Rfl: 3     gabapentin (NEURONTIN) 400 MG capsule, Take 1 capsule by mouth 2 times daily , Disp: , Rfl:      ketoconazole (NIZORAL) 2 % external cream, Apply topically 2 times daily, Disp: , Rfl:      lactulose encephalopathy (CHRONULAC) 10 GM/15ML SOLUTION, Take 30 mLs by mouth daily Irregular bowels Use PRN once daily, Disp: , Rfl:      levothyroxine (SYNTHROID/LEVOTHROID) 100 MCG tablet, Take 1 tablet (100 mcg) by mouth daily, Disp: 90 tablet, Rfl: 3     methylcellulose (CITRUCEL) powder, Take 1 teaspoonful by mouth daily constipation, Disp: , Rfl:      MIRTAZAPINE PO, Take 45 mg by mouth At Bedtime , Disp: , Rfl:      senna-docusate (SENOKOT-S/PERICOLACE) 8.6-50 MG tablet, Take 1 tablet by mouth daily as needed for constipation, Disp: , Rfl:      simvastatin (ZOCOR) 20 MG tablet, Take 1 tablet (20 mg) by mouth At Bedtime, Disp: 90 tablet, Rfl: 2      sodium chloride (OCEAN) 0.65 % nasal spray, Use this 3x daily, Disp: 15 mL, Rfl: 1     melatonin 3 MG tablet, Take 3 mg by mouth nightly as needed for sleep (Patient not taking: Reported on 3/15/2023), Disp: , Rfl:      No Known Allergies     Family History   Problem Relation Age of Onset     C.A.D. Father      Lymphoma Sister      Retinal detachment Daughter      Glaucoma No family hx of      Macular Degeneration No family hx of         Social History:  He  reports that he quit smoking about 57 years ago. His smoking use included cigarettes. He has a 5.00 pack-year smoking history. He has never used smokeless tobacco. He reports current alcohol use. He reports that he does not use drugs.     PHYSICAL EXAM:  /69   Pulse 59   SpO2 94%      Gen: no acute distress, respirations appeared nonlabored    NEURO:  MS:  Alert, oriented, appropriate    CN:  PERRLA, EOMI, face symmetric, normal strength and sensation, tongue and palate are midline, SCM 5/5 throughout    Motor:    Increased tone, no fasciculations, strength is 5/5 throughout    Sensation:  Reduced to vibratory sense at the toes    Coordination:  Normal finger-nose    Reflexes: 2+ throughout, except for ankles which are mute, but toes were down going    Gait:  Significant postural instability, cannot stand unassisted due to tendency to fall backwards. Did not feel safe to ambulate.     UPDRS Motor Scale 3/15/2023   Time:  5:07 PM   Medication Off   R Brain DBS: None   L Brain DBS: None   Dyskinesia (LID) No   Did LID interfere No   Speech 2   Facial Expression 4   Rigidity Neck 3   Rigidity RUE 3   Rigidity LUE 2   Rigidity RLE 3   Rigidity LLE 2   Finger Taps R 3   Finger Taps L 3   Hand Mvt R 3   Hand Mvt L 3   Pron-/Supinate R 3   Pron-/Supinate L 3   Toe Tap R 2   Toe Tap L 2   Leg Agility R 2   Leg Agility L 2   Arise From Chair 4   Gait 4   Gait Freezing 3   Postural Stability 4   Posture 3   Global Spont Mvt 3   Postural Tremor RUE 3   Postural  Tremor LUE 2   Kinetic Tremor RUE 3   Kinetic Tremor LUE 2   Rest Tremor RUE 3   Rest Tremor LUE 0   Rest Tremor RLE 1   Rest Tremor LLE 0   Rest Tremor Lip/Jaw 0   Rest Tremor Constancy 3   Total Right 29   Total Left 21   Axial Total 30   Total 83         DATA REVIEWED:  Reviewed notes available through care everywhere. Reviewed also dispense history available in Epic.    ASSESSMENT:  89 yo M with chronic psychiatric problems,chronic exposure to dopamine blocking agents, presenting with an unclear timeline of what appears to be a parkinsonian disorder, of either onset or exacerbation of symptoms over the past year or two with tremors and poor balance. Exam does confirm parkinsonian signs. Perhaps some underlying neuropathy given reduced vibration at the toes.     PLAN:  - Reviewed impression and plan  - We will copy his psychiatrist for input in his case in terms of his Abilify. If this is a drug-induced picture which can happen even with exposures to a very low dose, but for a prolonged period of time, or if this is a neurodegenerative parkinsonian disorder, a dopamine blocking agent is mostly contraindicated as it can precipitate a worsening in parkinsonism, unless this is the agent needed to control patient's psychiatric condition. Ideally he should be switched off of antipsychotics if possible  -  We will reconsider another trial of sinemet, goal for 1.5 tabs TID (25/100mg) until next visit. Reviewed potential side effects. Provided titration schedule for him and and staff at the facility. May uptitrate further, however want to see him and screen for side effects given a failed first trial for unclear reasons. Encouraged him to not stop the medication if he doesn't notice a difference in symptoms as this might be just because he needs an adjustment and he voiced understanding  - his PCP and outside neurologist may consider a screening for reversible causes of neuropathy including DM, thyroid disease, b12, b6,  thiamine, SPEP, and address those accordingly.   - encouraged to continue seeing PT on a regular basis for gait and balance training  - will regroup for a quick check in 2-3 months, sooner if needed, encouraged to call if questions, concerns, or changes.       Patient Instructions   Dear Zack,    After the visit today, we discussed the following:    I do agree that you have symptoms concerning for Parkinson's or a Parkinson's-like disorder (we call that parkinsonism). We should consider the followin. If would be a good idea if we could switch you off of the Abilify into something else that does not block a neurotransmitter (or a brain hormone) called dopamine. That is because either this is A) a drug induced problem, being on that chronically can make folks look like they have Parkinson's; or B) because if you do have Parkinson's, that medication can make your Parkinson's symptoms worse.  a. We will try to track down your psychiatrist to try to coordinate and send our note suggesting that medication change if he/she things it is something that can be done  2. We would like to try that medication called carbidopa/levodopa again. You cannot recall whether it actually made you more anxious or not, but I think it is worth trying to see if we can make your tremors and slowness better let's follow the schedule below:  a. Weeks 1 and 2. Take 1/2 tablet of carbidopa/levodopa 25/100mg three times a day (before breakfast, lunch and dinner)  b. Weeks 3 and 4. Increase to 1 tablet of carbidopa/levodopa 25/100mg and keep at three times a day (before breakfast, lunch and dinner)  c. Week 5 until I see you again. Increase to 1.5 tablets of carbidopa/levodopa 25/100mg and keep at three times a day (before breakfast, lunch and dinner)  i. Please watch for any potential side effects as we discussed in clinic.        Amadeo Jackson MD (Leo) (he/him/his)   of Neurology  [facility]  Department  of Neurology      Thank you for referring aZck Santiago to our [facility], we appreciate the opportunity of being your partner in healthcare. Please feel free to reach out to us at any point if any questions or concerns about this visit.    Attending attestation: Today I spent a total 80 minutes on this case, with greater than 50% of the time spent in face-to-face, examining, obtaining history, providing education and coordination of care. Additional time was spent in chart review, ancillary data, and documentation as delineated above.      Again, thank you for allowing me to participate in the care of your patient.        Sincerely,        Amadeo Tavera MD

## 2023-03-17 ENCOUNTER — TELEPHONE (OUTPATIENT)
Dept: FAMILY MEDICINE | Facility: CLINIC | Age: 88
End: 2023-03-17
Payer: COMMERCIAL

## 2023-03-17 ENCOUNTER — TELEPHONE (OUTPATIENT)
Dept: NEUROLOGY | Facility: CLINIC | Age: 88
End: 2023-03-17
Payer: COMMERCIAL

## 2023-03-17 DIAGNOSIS — Z86.73 HISTORY OF CVA (CEREBROVASCULAR ACCIDENT): Primary | ICD-10-CM

## 2023-03-17 NOTE — TELEPHONE ENCOUNTER
LVM on confidential line.  Gave RN number to call back to discuss questions.     Jennifer IQBAL RN, BSN  [facility] Neurology [facility]

## 2023-03-17 NOTE — TELEPHONE ENCOUNTER
Jayashree from [facility] would like a call back regarding a medication order she needs clarified. Please call her back at [phone]. Thank you

## 2023-03-17 NOTE — TELEPHONE ENCOUNTER
Jayashree from [facility] facility called   Phone: [phone]    Patient has pressure ulcer on left buttocks     Requesting orders for home care skilled nursing for wound care faxed to them     Patient has [facility] (has PT currently, discharged from home OT) but not for nursing care     Requesting faxed written home care order for skilled nursing     Faxed to Jayashree at: fax [phone]    Detailed messages isabella MA, Triage RN  [facility]

## 2023-03-20 ENCOUNTER — MEDICAL CORRESPONDENCE (OUTPATIENT)
Dept: HEALTH INFORMATION MANAGEMENT | Facility: CLINIC | Age: 88
End: 2023-03-20

## 2023-03-20 ENCOUNTER — TELEPHONE (OUTPATIENT)
Dept: FAMILY MEDICINE | Facility: CLINIC | Age: 88
End: 2023-03-20
Payer: COMMERCIAL

## 2023-03-20 NOTE — TELEPHONE ENCOUNTER
Faxed updated signed order, with titration schedule to [facility].  Verified went through on right fax.     Jennifer IQBAL RN, BSN  [facility]

## 2023-03-20 NOTE — TELEPHONE ENCOUNTER
Jayashree RUSSO on RN line.  They are needing clarification on sinemet. As script says Sig - Route: Take 1.5 tablets by mouth 3 times daily - Oral    But the detailed instructions state:   a. Weeks 1 and 2. Take 1/2 tablet of carbidopa/levodopa 25/100mg three times a day (before breakfast, lunch and dinner)  b. Weeks 3 and 4. Increase to 1 tablet of carbidopa/levodopa 25/100mg and keep at three times a day (before breakfast, lunch and dinner)  c. Week 5 until I see you again. Increase to 1.5 tablets of carbidopa/levodopa 25/100mg and keep at three times a day (before breakfast, lunch and dinner)      They need an order faxed to them at [phone].      Jennifer IQBAL RN, BSN  [facility]

## 2023-03-20 NOTE — TELEPHONE ENCOUNTER
Rosemary with [facility]  Called requesting approval for skilled nursing visit evaluation to treat wound on left buttocks.    Rosemary will need a call back at  [phone]. Ok to leave a detailed message.

## 2023-03-21 ENCOUNTER — TELEPHONE (OUTPATIENT)
Dept: FAMILY MEDICINE | Facility: CLINIC | Age: 88
End: 2023-03-21
Payer: COMMERCIAL

## 2023-03-21 ENCOUNTER — MEDICAL CORRESPONDENCE (OUTPATIENT)
Dept: HEALTH INFORMATION MANAGEMENT | Facility: CLINIC | Age: 88
End: 2023-03-21

## 2023-03-21 NOTE — TELEPHONE ENCOUNTER
Rosemary PT with [facility] calling to check on the status of requested Wound Care orders. RN relayed PCP approval of requested orders. Rosemary had no additional questions or concerns.

## 2023-03-23 ENCOUNTER — TRANSFERRED RECORDS (OUTPATIENT)
Dept: HEALTH INFORMATION MANAGEMENT | Facility: CLINIC | Age: 88
End: 2023-03-23

## 2023-03-26 ENCOUNTER — MEDICAL CORRESPONDENCE (OUTPATIENT)
Dept: HEALTH INFORMATION MANAGEMENT | Facility: CLINIC | Age: 88
End: 2023-03-26

## 2023-03-27 ENCOUNTER — MEDICAL CORRESPONDENCE (OUTPATIENT)
Dept: HEALTH INFORMATION MANAGEMENT | Facility: CLINIC | Age: 88
End: 2023-03-27

## 2023-03-29 DIAGNOSIS — I63.9 CEREBROVASCULAR ACCIDENT (CVA), UNSPECIFIED MECHANISM (H): ICD-10-CM

## 2023-03-29 DIAGNOSIS — E03.9 ACQUIRED HYPOTHYROIDISM: ICD-10-CM

## 2023-03-29 DIAGNOSIS — E03.9 HYPOTHYROIDISM, UNSPECIFIED TYPE: ICD-10-CM

## 2023-03-29 DIAGNOSIS — H04.123 DRY EYES: Primary | ICD-10-CM

## 2023-03-29 DIAGNOSIS — R21 RASH AND NONSPECIFIC SKIN ERUPTION: ICD-10-CM

## 2023-03-29 DIAGNOSIS — I10 ESSENTIAL HYPERTENSION: ICD-10-CM

## 2023-03-29 DIAGNOSIS — E78.5 HYPERLIPIDEMIA LDL GOAL <100: ICD-10-CM

## 2023-03-29 DIAGNOSIS — R19.8 ALTERED BOWEL FUNCTION: ICD-10-CM

## 2023-03-29 DIAGNOSIS — F42.9 OBSESSIVE-COMPULSIVE DISORDER, UNSPECIFIED TYPE: Chronic | ICD-10-CM

## 2023-03-30 ENCOUNTER — MEDICAL CORRESPONDENCE (OUTPATIENT)
Dept: HEALTH INFORMATION MANAGEMENT | Facility: CLINIC | Age: 88
End: 2023-03-30

## 2023-03-30 RX ORDER — MIRTAZAPINE 45 MG/1
TABLET, FILM COATED ORAL
Qty: 30 TABLET | Refills: 10 | Status: SHIPPED | OUTPATIENT
Start: 2023-03-30 | End: 2024-07-19

## 2023-03-30 RX ORDER — CLOMIPRAMINE HYDROCHLORIDE 25 MG/1
CAPSULE ORAL
Qty: 28 CAPSULE | Refills: 10 | Status: SHIPPED | OUTPATIENT
Start: 2023-03-30 | End: 2024-07-02 | Stop reason: DRUGHIGH

## 2023-03-30 RX ORDER — DOCUSATE SODIUM 50MG AND SENNOSIDES 8.6MG 8.6; 5 MG/1; MG/1
TABLET, FILM COATED ORAL
Qty: 30 TABLET | Refills: 10 | Status: SHIPPED | OUTPATIENT
Start: 2023-03-30 | End: 2024-07-18

## 2023-03-30 RX ORDER — METHYLCELLULOSE 2 G/19G
POWDER, FOR SOLUTION ORAL
Qty: 454 G | Refills: 10 | Status: SHIPPED | OUTPATIENT
Start: 2023-03-30 | End: 2024-04-10

## 2023-03-30 RX ORDER — ARIPIPRAZOLE 2 MG/1
TABLET ORAL
Qty: 30 TABLET | Refills: 10 | Status: SHIPPED | OUTPATIENT
Start: 2023-03-30 | End: 2023-04-19

## 2023-03-30 RX ORDER — KETOCONAZOLE 20 MG/G
CREAM TOPICAL
Qty: 30 G | Refills: 10 | Status: SHIPPED | OUTPATIENT
Start: 2023-03-30 | End: 2024-07-02

## 2023-03-30 RX ORDER — CARBOXYMETHYLCELLULOSE SODIUM AND GLYCERIN 5; 9 MG/ML; MG/ML
SOLUTION/ DROPS OPHTHALMIC
Qty: 15 ML | Refills: 10 | Status: SHIPPED | OUTPATIENT
Start: 2023-03-30 | End: 2023-06-12

## 2023-03-30 RX ORDER — SIMVASTATIN 20 MG
TABLET ORAL
Qty: 30 TABLET | Refills: 10 | Status: SHIPPED | OUTPATIENT
Start: 2023-03-30 | End: 2023-06-26

## 2023-03-30 RX ORDER — GABAPENTIN 400 MG/1
CAPSULE ORAL
Qty: 56 CAPSULE | Refills: 10 | Status: SHIPPED | OUTPATIENT
Start: 2023-03-30 | End: 2023-06-26

## 2023-03-30 RX ORDER — CLOPIDOGREL BISULFATE 75 MG/1
TABLET ORAL
Qty: 30 TABLET | Refills: 10 | Status: SHIPPED | OUTPATIENT
Start: 2023-03-30 | End: 2023-06-26

## 2023-03-30 RX ORDER — LANOLIN ALCOHOL/MO/W.PET/CERES
CREAM (GRAM) TOPICAL
Qty: 30 TABLET | Refills: 10 | Status: SHIPPED | OUTPATIENT
Start: 2023-03-30 | End: 2024-09-16

## 2023-03-30 RX ORDER — LEVOTHYROXINE SODIUM 100 UG/1
TABLET ORAL
Qty: 30 TABLET | Refills: 10 | OUTPATIENT
Start: 2023-03-30

## 2023-03-30 RX ORDER — CHLORTHALIDONE 25 MG/1
TABLET ORAL
Qty: 30 TABLET | Refills: 10 | Status: SHIPPED | OUTPATIENT
Start: 2023-03-30 | End: 2023-06-26

## 2023-03-30 RX ORDER — LACTULOSE 10 G/15ML
SOLUTION ORAL
Qty: 473 ML | Refills: 10 | Status: SHIPPED | OUTPATIENT
Start: 2023-03-30 | End: 2023-06-26

## 2023-03-31 ENCOUNTER — MEDICAL CORRESPONDENCE (OUTPATIENT)
Dept: HEALTH INFORMATION MANAGEMENT | Facility: CLINIC | Age: 88
End: 2023-03-31

## 2023-03-31 DIAGNOSIS — E03.9 ACQUIRED HYPOTHYROIDISM: ICD-10-CM

## 2023-03-31 RX ORDER — LEVOTHYROXINE SODIUM 100 UG/1
TABLET ORAL
Qty: 30 TABLET | Refills: 10 | Status: SHIPPED | OUTPATIENT
Start: 2023-03-31 | End: 2023-06-26

## 2023-03-31 NOTE — TELEPHONE ENCOUNTER
Prescription approved per [facility] Refill Protocol.  Pallavi Ochoa, RN  [facility] Triage Nurse

## 2023-04-03 ENCOUNTER — MEDICAL CORRESPONDENCE (OUTPATIENT)
Dept: HEALTH INFORMATION MANAGEMENT | Facility: CLINIC | Age: 88
End: 2023-04-03

## 2023-04-04 DIAGNOSIS — R09.81 NASAL CONGESTION: ICD-10-CM

## 2023-04-05 ENCOUNTER — MEDICAL CORRESPONDENCE (OUTPATIENT)
Dept: HEALTH INFORMATION MANAGEMENT | Facility: CLINIC | Age: 88
End: 2023-04-05

## 2023-04-05 RX ORDER — ECHINACEA PURPUREA EXTRACT 125 MG
TABLET ORAL
Qty: 15 ML | Refills: 1 | Status: SHIPPED | OUTPATIENT
Start: 2023-04-05 | End: 2023-06-12

## 2023-04-06 DIAGNOSIS — Z53.9 DIAGNOSIS NOT YET DEFINED: Primary | ICD-10-CM

## 2023-04-06 PROCEDURE — G0179 MD RECERTIFICATION HHA PT: HCPCS | Performed by: INTERNAL MEDICINE

## 2023-04-11 DIAGNOSIS — Z53.9 DIAGNOSIS NOT YET DEFINED: Primary | ICD-10-CM

## 2023-04-11 PROCEDURE — G0179 MD RECERTIFICATION HHA PT: HCPCS | Performed by: INTERNAL MEDICINE

## 2023-04-17 ENCOUNTER — OFFICE VISIT (OUTPATIENT)
Dept: NEUROLOGY | Facility: CLINIC | Age: 88
End: 2023-04-17
Payer: COMMERCIAL

## 2023-04-17 ENCOUNTER — TELEPHONE (OUTPATIENT)
Dept: FAMILY MEDICINE | Facility: CLINIC | Age: 88
End: 2023-04-17

## 2023-04-17 ENCOUNTER — TELEPHONE (OUTPATIENT)
Dept: NEUROLOGY | Facility: CLINIC | Age: 88
End: 2023-04-17

## 2023-04-17 VITALS — HEART RATE: 56 BPM | SYSTOLIC BLOOD PRESSURE: 135 MMHG | OXYGEN SATURATION: 100 % | DIASTOLIC BLOOD PRESSURE: 74 MMHG

## 2023-04-17 DIAGNOSIS — R25.1 TREMOR: ICD-10-CM

## 2023-04-17 DIAGNOSIS — G20.C PARKINSONISM, UNSPECIFIED PARKINSONISM TYPE (H): ICD-10-CM

## 2023-04-17 DIAGNOSIS — R26.89 BALANCE DISORDER: Primary | ICD-10-CM

## 2023-04-17 PROCEDURE — 99215 OFFICE O/P EST HI 40 MIN: CPT | Performed by: PSYCHIATRY & NEUROLOGY

## 2023-04-17 RX ORDER — CARBIDOPA AND LEVODOPA 25; 100 MG/1; MG/1
2 TABLET ORAL 3 TIMES DAILY
Qty: 540 TABLET | Refills: 3 | Status: SHIPPED | OUTPATIENT
Start: 2023-04-17 | End: 2023-05-04

## 2023-04-17 ASSESSMENT — UNIFIED PARKINSONS DISEASE RATING SCALE (UPDRS)
SPONTANEITY_OF_MOVEMENT: (2) MILD: MILD GLOBAL SLOWNESS AND POVERTY OF SPONTANEOUS MOVEMENTS.
HANDMOVEMENTS_LEFT: (1) SLIGHT: ANY OF THE FOLLOWING: A) THE REGULAR RHYTHM IS BROKEN WITH ONE WITH ONE OR TWO INTERRUPTIONS OR HESITATIONS OF THE MOVEMENT  B) SLIGHT SLOWING  C) THE AMPLITUDE DECREMENTS NEAR THE END OF THE 10 MOVEMENTS.
RIGIDITY_NECK: (3) MODERATE: RIGIDITY DETECTED WITHOUT THE ACTIVATION MANEUVER. FULL RANGE OF MOTION IS ACHIEVED WITH EFFORT.
GAIT: (3) MODERATE: REQUIRES AN ASSISTANCE DEVICE FOR SAFE WALKING (WALKING STICK, WALKER) BUT NOT A PERSON.
ARISING_CHAIR: (0) NORMAL: NO PROBLEMS. ABLE TO ARISE QUICKLY WITHOUT HESITATION.
HANDMOVEMENTS_RIGHT: (3) MODERATE: ANY OF THE FOLLOWING: A) MORE THAN 5 INTERRUPTIONS OR AT LEAST ONE LONGER ARREST (FREEZE) IN ONGOING MOVEMENT  B) MODERATE SLOWING  C) THE AMPLITUDE DECREMENTS STARTING AFTER THE FIRST MOVEMENT.
AMPLITUDE_RUE: (1) SLIGHT: < 1 CM IN MAXIMAL AMPLITUDE.
TOTAL_SCORE: 55
DYSKINESIAS_PRESENT: NO
TOETAPPING_LEFT: (2) MILD: ANY OF THE FOLLOWING: A) 3 TO 5 INTERRUPTIONS DURING TAPPING  B) MILD SLOWING  C) THE AMPLITUDE DECREMENTS MIDWAY IN THE 10-MOVEMENT SEQUENCE.
AMPLITUDE_LIP_JAW: (1) SLIGHT: < 1 CM IN MAXIMAL AMPLITUDE.
TOTAL_SCORE_LEFT: 10
PRONATION_SUPINATION_RIGHT: (3) MODERATE: ANY OF THE FOLLOWING: A) MORE THAN 5 INTERRUPTIONS OR AT LEAST ONE LONGER ARREST (FREEZE) IN ONGOING MOVEMENT  B) MODERATE SLOWING  C) THE AMPLITUDE DECREMENTS STARTING AFTER THE FIRST MOVEMENT.
LEG_AGILITY_RIGHT: (2) MILD: ANY OF THE FOLLOWING: A) 3 TO 5 INTERRUPTIONS DURING TAPPING  B) MILD SLOWING  C) THE AMPLITUDE DECREMENTS MIDWAY IN THE 10-MOVEMENT SEQUENCE.
LEG_AGILITY_LEFT: (1) SLIGHT: ANY OF THE FOLLOWING: A) THE REGULAR RHYTHM IS BROKEN WITH ONE WITH ONE OR TWO INTERRUPTIONS OR HESITATIONS OF THE MOVEMENT  B) SLIGHT SLOWING  C) THE AMPLITUDE DECREMENTS NEAR THE END OF THE 10 MOVEMENTS.
RIGIDITY_RUE: (2) MILD: RIGIDITY DETECTED WITHOUT THE ACTIVATION MANEUVER. FULL RANGE OF MOTION IS EASILY ACHIEVED.
PARKINSONS_MEDS: ON
FACIAL_EXPRESSION: (3) MASKED FACIES WITH LIPS PARTED SOME OF THE TIME WHEN THE MOUTH IS AT REST.
AMPLITUDE_LLE: (0) NORMAL: NO TREMOR.
FINGER_TAPPING_RIGHT: (3) MODERATE: ANY OF THE FOLLOWING: A) MORE THAN 5 INTERRUPTIONS OR AT LEAST ONE LONGER ARREST (FREEZE) IN ONGOING MOVEMENT  B) MODERATE SLOWING  C) THE AMPLITUDE DECREMENTS STARTING AFTER THE FIRST MOVEMENT.
CONSTANCY_TREMOR_ATREST: (2) MILD: TREMOR AT REST IS PRESENT 25-50% OF THE ENTIRE EXAMINATION PERIOD.
AMPLITUDE_RLE: (0) NORMAL: NO TREMOR.
POSTURAL_STABILITY: (3) MODERATE: STANDS SAFELY, BUT WITH ABSENCE OF POSTURAL RESPONSE, FALLS IF NOT CAUGHT BY EXAMINER.
RIGIDITY_RLE: (2) MILD: RIGIDITY DETECTED WITHOUT THE ACTIVATION MANEUVER. FULL RANGE OF MOTION IS EASILY ACHIEVED.
TOETAPPING_RIGHT: (3) MODERATE: ANY OF THE FOLLOWING: A) MORE THAN 5 INTERRUPTIONS OR AT LEAST ONE LONGER ARREST (FREEZE) IN ONGOING MOVEMENT  B) MODERATE SLOWING  C) THE AMPLITUDE DECREMENTS STARTING AFTER THE FIRST MOVEMENT.
FREEZING_GAIT: (0) NORMAL: NO FREEZING.
AXIAL_SCORE: 19
TOTAL_SCORE: 23
RIGIDITY_LUE: (1) SLIGHT: RIGIDITY ONLY DETECTED WITH ACTIVATION MANEUVER.
MOVEMENTS_INTERFERE_WITH_RATINGS: NO
POSTURE: (3) MODERATE: STOOPED POSTURE, SCOLIOSIS, OR LEANING TO ONE SIDE THAT CANNOT BE CORRECTED VOLITIONALLY TO A NORMAL POSTURE BY THE PATIENT.
AMPLITUDE_LUE: (0) NORMAL: NO TREMOR.
PRONATION_SUPINATION_LEFT: (1) SLIGHT: ANY OF THE FOLLOWING: A) THE REGULAR RHYTHM IS BROKEN WITH ONE WITH ONE OR TWO INTERRUPTIONS OR HESITATIONS OF THE MOVEMENT  B) SLIGHT SLOWING  C) THE AMPLITUDE DECREMENTS NEAR THE END OF THE 10 MOVEMENTS.
SPEECH: (2) MILD: LOSS OF MODULATION, DICTION OR VOLUME, WITH A FEW WORDS UNCLEAR, BUT THE OVERALL SENTENCES EASY TO FOLLOW.
FINGER_TAPPING_LEFT: (2) MILD: ANY OF THE FOLLOWING: A) 3 TO 5 INTERRUPTIONS DURING TAPPING  B) MILD SLOWING  C) THE AMPLITUDE DECREMENTS MIDWAY IN THE 10-MOVEMENT SEQUENCE.
RIGIDITY_LLE: (1) SLIGHT: RIGIDITY ONLY DETECTED WITH ACTIVATION MANEUVER.

## 2023-04-17 ASSESSMENT — PAIN SCALES - GENERAL: PAINLEVEL: NO PAIN (0)

## 2023-04-17 NOTE — NURSING NOTE
Nursing at the facility administers medications.  Zohreh to call facility for nursing questions and pharmacy information.  Need C2C for Jayashree and Logan.    No improvement noted with the 1.5 three times per day. He feels it made him nervous/antsy.

## 2023-04-17 NOTE — TELEPHONE ENCOUNTER
Spoke to MOJGAN Blanc. She stated they use [facility] pharmacy only now. Writer deleted the other 4 pharmacies listed.    Fax [phone]

## 2023-04-17 NOTE — PROGRESS NOTES
Department of Neurology  Movement Disorders Division   Return Patient Visit    Patient: Zack Santiago   MRN: [id number]   : 1931   Date of Visit: 2023  PCP: Zurdo Kendrick MD   Referring provider: No ref. provider found    CC:  Parkinsonism return    Interval history:  Mr. Santiago is a 89 year old male with history significant for suspected Parkinson disease who presents to movement disorder clinic for same problem. Last visit was few weeks ago, with a plan to initiate Sinemet, discontinue Abilify at his psychiatrist discretion. He is accompanied by his son-in-law.    Overall he endorses no changes in his symptoms.  It is unclear how much medication he has been taking.  Per son-in-law he did not notice any half tablets as recommended by us last visit.  We did provide instructions in writing that were faxed to his facility.  Patient himself does not recall taking half tablets, therefore he says he is either taking 1 tablet of Sinemet or they increased it to 2 tablets, so he is not sure.    He endorses no significant side effects from levodopa.  On the flip side he has not noticed any benefit in his tremor.  When further asked, however, he does acknowledge that the tremor is not as constant, and there are times that he can use his hand a little bit better.  However he is right hand is the most problematic and he still notices that he shakes when he is trying to do personal hygiene, feeding, drinking from a cup, as well as all other activities of daily living.    No interval issues with falls, swallowing difficulties, worsening his speech, or any other problems to be discussed today.    ROS:  All others negative except as listed above. Pertinent movement disorders-specific ROS listed above.    Past Medical History:   Diagnosis Date     A-fib (H)     post op     AAA (abdominal aortic aneurysm) without rupture     Dr. 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     carotid us no stenosis, echo no change and no clots; ziopatch no afib, svt present     Anemia 2004     Aortic insufficiency 06/2014    1+ on echo     Aortic insufficiency 11/2016    mild to mod     Ascending aorta dilatation (H) 01/2023    5cm on ct     ASCVD (arteriosclerotic cardiovascular disease) 2010    cabg, post op afib     BPH (benign prostatic hyperplasia) 2003    turp, flomax added by urol 2/17     Bradycardia 2016    stopped toprol     CKD (chronic kidney disease) stage 3, GFR 30-59 ml/min (H)      Constipation 05/2020    colonoscopy nl     Cough 2010    pulm eval, felt due to reflux     Dizzy 2001    mri small vessel dz     GERD (gastroesophageal reflux disease)      HTN (hypertension) 2002     Hx of colonoscopy 2003    tics     Hypothyroid      Hypovitaminosis D      Idiopathic neuropathy      Lung nodule 02/2018    6mm, found during eval of ascending aorta, fu 8/18 no change     OCD (obsessive compulsive disorder)     sees psyche     Panic disorder     Dr. Luna, then someone after he retired     Spinal headache      Stroke (H) 12/2016    expressive aphasia, hosp, seen by neuro, mri pos, carotids min dz, changed from asa to plavix     Sudden hearing loss 06/2013    Dr. Garcia, mri brain no acoustic neuroma     SVT (supraventricular tachycardia) (H) 11/2016    seen on ziopatch done for amaurosis, longest 15 beats     Thoracic ascending aortic aneurysm 06/2014    4.4cm on echo, aortic root 3.9, fu 5/15 4.8cm; fu done 12/15 and slightly enlarged, fu 6/16 no change, fu 11/16 no change; fu 1/18 echo 5.1-5.3, enlarged, then cta done and only 4.8cm, t fu 6 months, lvh, nl ef, mild ai; fu 8/18 slightly larger; fu 2/19 slightly smaller; fu 2/20 and then 1/21 and stable     Ulcerative colitis (H)     not active for years        Past Surgical History:   Procedure Laterality Date     BACK SURGERY  1998     BLADDER SURGERY  1985     CATARACT IOL, RT/LT  2011     COLONOSCOPY N/A 05/05/2020    Procedure:  COLONOSCOPY;  Surgeon: Kenya Loco MD;  Location:  GI     CORONARY ARTERY BYPASS  2010     ENDOVASCULAR REPAIR ANEURYSM ABDOMINAL AORTA N/A 05/27/2015    Procedure: ENDOVASCULAR REPAIR ANEURYSM ABDOMINAL AORTA;  Surgeon: Darin Walters MD;  Location:  OR     HERNIA REPAIR  2005     HERNIA REPAIR  1998     HERNIORRHAPHY INGUINAL Left 01/05/2018    Procedure: HERNIORRHAPHY INGUINAL;  Incarcerated Left Inguinal Hernia;  Surgeon: Harsh Walker MD;  Location:  OR     KNEE SURGERY  1997     REPAIR HAMMER TOE  12/28/2012    Procedure: REPAIR HAMMER TOE;  LEFT SECOND AND THIRD CLAW TOE RECONSTRUCTION;  Surgeon: Gray Haynes MD;  Location: [facility] OR     REPAIR HAMMER TOE  04/2018    tria     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     TURP  2003     ZZ TOTAL KNEE ARTHROPLASTY  2011    left     ZC TOTAL KNEE ARTHROPLASTY  2009    right          Current Outpatient Medications:      carbidopa-levodopa (SINEMET)  MG tablet, Take 2 tablets by mouth 3 times daily, Disp: 540 tablet, Rfl: 3     chlorthalidone (HYGROTON) 25 MG tablet, 1 TABLET ORALLY EVERY MORNING  (DX: DIURETIC ), Disp: 30 tablet, Rfl: 10     clomiPRAMINE (ANAFRANIL) 25 MG capsule, 1 CAPSULE ORALLY AT BEDTIME (DX: DEPRESSION), Disp: 28 capsule, Rfl: 10     ClomiPRAMINE HCl (ANAFRANIL PO), Take 25 mg by mouth every evening , Disp: , Rfl:      clopidogrel (PLAVIX) 75 MG tablet, 1 TABLET ORALLY EVERY MORNING  (DX: HEART  ), Disp: 30 tablet, Rfl: 10     gabapentin (NEURONTIN) 400 MG capsule, 1 CAPSULE ORALLY 2 TIMES DAILY FOR NERVES, Disp: 56 capsule, Rfl: 10     ketoconazole (NIZORAL) 2 % external cream, APPLY TOPICALLY TO INFLAMED SKIN 2 TIMES DAILY   (DX: INFLAMED SKIN ), Disp: 30 g, Rfl: 10     lactulose (CHRONULAC) 10 GM/15ML solution, DRINK 30ML  ORALLY DAILY (DX: CONSTIPATION);DRINK 30ML  ORALLY DAILY AS NEEDED (DX: CONSTIPATION), Disp: 473 mL, Rfl: 10     levothyroxine (SYNTHROID/LEVOTHROID) 100 MCG tablet, 1 TABLET ORALLY  DAILY  (DX: THYROID), Disp: 30 tablet, Rfl: 10     melatonin 3 MG tablet, 1 TABLET ORALLY AT BEDTIME AS NEEDED  (DX: SLEEP ), Disp: 30 tablet, Rfl: 10     mirtazapine (REMERON) 45 MG tablet, 1 TABLET ORALLY AT BEDTIME (DX: DEPRESSION), Disp: 30 tablet, Rfl: 10     OPTIVE 0.5-0.9 % ophthalmic solution, INSTILL 1 DROP INTO BOTH EYES 2 TIMES DAILY  (DX: DRY EYES), Disp: 15 mL, Rfl: 10     simvastatin (ZOCOR) 20 MG tablet, 1 TABLET ORALLY AT BEDTIME   (DX: CHOLESTEROL), Disp: 30 tablet, Rfl: 10     sodium chloride (OCEAN) 0.65 % nasal spray, Use this 3x daily, Disp: 15 mL, Rfl: 1     SOLUBLE FIBER THERAPY powder, TAKE 1 TEASPOONFUL ORALLY DAILY (DX: CONSTIPATION), Disp: 454 g, Rfl: 10     ARIPiprazole (ABILIFY) 2 MG tablet, 1 TABLET ORALLY AT BEDTIME (DX: DEPRESSION) (Patient not taking: Reported on 4/17/2023), Disp: 30 tablet, Rfl: 10     SENEXON-S 8.6-50 MG tablet, 1 TABLET ORALLY DAILY AS NEEDED (DX: CONSTIPATION) (Patient not taking: Reported on 4/17/2023), Disp: 30 tablet, Rfl: 10     No Known Allergies     Family History   Problem Relation Age of Onset     C.A.D. Father      Lymphoma Sister      Retinal detachment Daughter      Glaucoma No family hx of      Macular Degeneration No family hx of         Social History:  He  reports that he quit smoking about 57 years ago. His smoking use included cigarettes. He has a 5.00 pack-year smoking history. He has never used smokeless tobacco. He reports current alcohol use. He reports that he does not use drugs.     PHYSICAL EXAM:  /74   Pulse 56   SpO2 100%      NEURO:  UPDRS  Time:: 1744  Medication: On  R Brain DBS:: None  L Brain DBS:: None  Dyskinesia (LID): No  Did LID interfere: No  Speech: (2) Mild: Loss of modulation, diction or volume, with a few words unclear, but the overall sentences easy to follow.  Facial Expression: (3) Masked facies with lips parted some of the time when the mouth is at rest.  Rigidity Neck: (3) Moderate: Rigidity detected without  the activation maneuver. Full range of motion is achieved with effort.  Rigidity RUE: (2) Mild: Rigidity detected without the activation maneuver. Full range of motion is easily achieved.  Rigidity LUE: (1) Slight: Rigidity only detected with activation maneuver.  Rigidity RLE: (2) Mild: Rigidity detected without the activation maneuver. Full range of motion is easily achieved.  Rigidity LLE: (1) Slight: Rigidity only detected with activation maneuver.  Finger Taps R: (3) Moderate: Any of the following: a) more than 5 interruptions or at least one longer arrest (freeze) in ongoing movement  b) moderate slowing  c) the amplitude decrements starting after the first movement.  Finger Taps L: (2) Mild: Any of the following: a) 3 to 5 interruptions during tapping  b) mild slowing  c) the amplitude decrements midway in the 10-movement sequence.  Hand Mvt R: (3) Moderate: Any of the following: a) more than 5 interruptions or at least one longer arrest (freeze) in ongoing movement  b) moderate slowing  c) the amplitude decrements starting after the first movement.  Hand Mvt L: (1) Slight: Any of the following: a) the regular rhythm is broken with one with one or two interruptions or hesitations of the movement  b) slight slowing  c) the amplitude decrements near the end of the 10 movements.  Pron-/Supinate R: (3) Moderate: Any of the following: a) more than 5 interruptions or at least one longer arrest (freeze) in ongoing movement  b) moderate slowing  c) the amplitude decrements starting after the first movement.  Pron-/Supinate L: (1) Slight: Any of the following: a) the regular rhythm is broken with one with one or two interruptions or hesitations of the movement  b) slight slowing  c) the amplitude decrements near the end of the 10 movements.  Toe Tap R: (3) Moderate: Any of the following: a) more than 5 interruptions or at least one longer arrest (freeze) in ongoing movement  b) moderate slowing  c) the amplitude  decrements starting after the first movement.  Toe Tap L: (2) Mild: Any of the following: a) 3 to 5 interruptions during tapping  b) mild slowing  c) the amplitude decrements midway in the 10-movement sequence.  Leg Agility R: (2) Mild: Any of the following: a) 3 to 5 interruptions during tapping  b) mild slowing  c) the amplitude decrements midway in the 10-movement sequence.  Leg Agility L: (1) Slight: Any of the following: a) the regular rhythm is broken with one with one or two interruptions or hesitations of the movement  b) slight slowing  c) the amplitude decrements near the end of the 10 movements.  Arise From Chair: (0) Normal: No problems. Able to arise quickly without hesitation.  Gait: (3) Moderate: Requires an assistance device for safe walking (walking stick, walker) but not a person.  Gait Freezing: (0) Normal: No freezing.  Postural Stability: (3) Moderate: Stands safely, but with absence of postural response, falls if not caught by examiner.  Posture: (3) Moderate: Stooped posture, scoliosis, or leaning to one side that cannot be corrected volitionally to a normal posture by the patient.  Global Spont Mvt: (2) Mild: Mild global slowness and poverty of spontaneous movements.  Postural Tremor RUE: (2) Mild: Tremor is at least 1 but less than 3 cm in amplitude.  Postural Tremor LUE: (0) Normal: No tremor.  Kinetic Tremor RUE: (2) Mild: Tremor is at least 1 but less than 3 cm in amplitude.  Kinetic Tremor LUE: (1) Slight: Tremor is present but less than 1 cm in amplitude.  Rest Tremor RUE: (1) Slight: < 1 cm in maximal amplitude.  Rest Tremor LUE: (0) Normal: No tremor.  Rest Tremor RLE: (0) Normal: No tremor.  Rest Tremor LLE: (0) Normal: No tremor.  Rest Tremor Lip/Jaw: (0) Normal: No tremor.  Rest Tremor Constancy: (2) Mild: Tremor at rest is present 25-50% of the entire examination period.  Total Right: 23  Total Left: 10  Axial Total: 19  Total: 54    DATA REVIEWED:  No pertinent data  available.    ASSESSMENT:  89-year-old male with suspected Parkinson disease, status post initiation of carbidopa levodopa.  At this point it is unclear how much medication patient has been given at the assisted living facility where he resides.  Objectively there seems to be some slight improvement comparing to initial evaluation a few weeks ago.    PLAN:  1.  Reviewed impression and plan with patient and family    2.  We were able to get our staff to contact his assisted living facility.  Reportedly the staff here spoke with the charge nurse and upon reviewing his medication administration record it was discovered that he had just been started on 1 tablet of carbidopa-levodopa.  There was a plan to, starting tomorrow, increase his medication to 1.5 tablets per dose, which was our initial target dose.  I do suspect that he will likely have some degree of response to levodopa, and he may benefit from having a revised target dose of 2 tablets per dose, keeping at 3 times a day.  Therefore we went ahead and change his prescription to reflect that.  A written prescription was signed and faxed to his facility.    3.  He was encouraged to continue pursuing physical and occupational therapy as per availability at the facility.    4.  He does endorse having discontinued the Abilify after discussing with his psychiatrist.  That occurred within a week following our previous appointment.  He was encouraged to stay away from dopamine blocking agents inclusive of Abilify moving forward, provided that this is a reasonable option for management of depression in the future.    5.  His son-in-law was concerned about some chronic discoloration in his extremities.  He wanted for the patient to have his blood pressure being taken at 4 different extremities and having a dedicated assessment for peripheral vascular disease.  I encouraged him to discuss that with the patient's primary care provider and he currently asked for me to copy  his PCP in this note    6.  Otherwise we will plan on regrouping in about 4 to 6 months.  He was again counseled on the fact that his Abilify can take several months, up to a year or 2, to complete washout of his system and a sense of if this is a case of a tar dive syndrome that is the expected time course that he may take for him to expect some recovery.  However we also explained there is very likely that he has an underlying neurodegenerative process such as Parkinson disease, and the Abilify was potentially just aggravating the picture at this point.    There are no Patient Instructions on file for this visit.      Amadeo Jackson MD (Leo) (he/him/his)   of Neurology  [facility]  Department of Neurology      Thank you for referring Zack Santiago to our [facility] Movement Disorders Program, we appreciate the opportunity of being your partner in healthcare. Please feel free to reach out to us at any point if any questions or concerns about this visit.    Attending attestation: Today I spent a total 40 minutes on this case, with greater than 50% of the time spent in face-to-face, examining, obtaining history, providing education and coordination of care. Additional time was spent in chart review, ancillary data, and documentation as delineated above.

## 2023-04-17 NOTE — TELEPHONE ENCOUNTER
Forms/Letter Request    Type of form/letter: MD orders for [facility]    Have you been seen for this request: Yes     Do we have the form/letter: Yes:     Who is the form from? Patient    Where did/will the form come from? Patient or family brought in       When is form/letter needed by: ASAP    How would you like the form/letter returned: Mail in provided envelope     Patient Notified form requests are processed in 3-5 business days:No    Could we send this information to you in [facility] or would you prefer to receive a phone call?:   Patient would prefer a phone call     Okay to leave a detailed message?: Yes at number on file: [phone]

## 2023-04-17 NOTE — LETTER
2023         RE: Zack Santiago  [id number] [address] [address]        Dear Colleague,    Thank you for referring your patient, Zack Santiago, to the [facility] NEUROLOGY CLINICS [facility]. Please see a copy of my visit note below.    Department of Neurology  Movement Disorders Division   Return Patient Visit    Patient: Zack Santiago   MRN: [id number]   : 1931   Date of Visit: 2023  PCP: Zurdo Kendrick MD   Referring provider: No ref. provider found    CC:  Parkinsonism return    Interval history:  Mr. Santiago is a 89 year old male with history significant for suspected Parkinson disease who presents to movement disorder clinic for same problem. Last visit was few weeks ago, with a plan to initiate Sinemet, discontinue Abilify at his psychiatrist discretion. He is accompanied by his son-in-law.    Overall he endorses no changes in his symptoms.  It is unclear how much medication he has been taking.  Per son-in-law he did not notice any half tablets as recommended by us last visit.  We did provide instructions in writing that were faxed to his facility.  Patient himself does not recall taking half tablets, therefore he says he is either taking 1 tablet of Sinemet or they increased it to 2 tablets, so he is not sure.    He endorses no significant side effects from levodopa.  On the flip side he has not noticed any benefit in his tremor.  When further asked, however, he does acknowledge that the tremor is not as constant, and there are times that he can use his hand a little bit better.  However he is right hand is the most problematic and he still notices that he shakes when he is trying to do personal hygiene, feeding, drinking from a cup, as well as all other activities of daily living.    No interval issues with falls, swallowing difficulties, worsening his speech, or any other problems to be discussed today.    ROS:  All others negative except as  listed above. Pertinent movement disorders-specific ROS listed above.    Past Medical History:   Diagnosis Date     A-fib (H) 2010    post op     AAA (abdominal aortic aneurysm) without rupture     Dr. 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    carotid us no stenosis, echo no change and no clots; ziopatch no afib, svt present     Anemia 2004     Aortic insufficiency 06/2014    1+ on echo     Aortic insufficiency 11/2016    mild to mod     Ascending aorta dilatation (H) 01/2023    5cm on ct     ASCVD (arteriosclerotic cardiovascular disease) 2010    cabg, post op afib     BPH (benign prostatic hyperplasia) 2003    turp, flomax added by urol 2/17     Bradycardia 2016    stopped toprol     CKD (chronic kidney disease) stage 3, GFR 30-59 ml/min (H)      Constipation 05/2020    colonoscopy nl     Cough 2010    pulm eval, felt due to reflux     Dizzy 2001    mri small vessel dz     GERD (gastroesophageal reflux disease)      HTN (hypertension) 2002     Hx of colonoscopy 2003    tics     Hypothyroid      Hypovitaminosis D      Idiopathic neuropathy      Lung nodule 02/2018    6mm, found during eval of ascending aorta, fu 8/18 no change     OCD (obsessive compulsive disorder)     sees psyche     Panic disorder     Dr. Luna, then someone after he retired     Spinal headache      Stroke (H) 12/2016    expressive aphasia, hosp, seen by neuro, mri pos, carotids min dz, changed from asa to plavix     Sudden hearing loss 06/2013    Dr. Garcia, mri brain no acoustic neuroma     SVT (supraventricular tachycardia) (H) 11/2016    seen on ziopatch done for amaurosis, longest 15 beats     Thoracic ascending aortic aneurysm 06/2014    4.4cm on echo, aortic root 3.9, fu 5/15 4.8cm; fu done 12/15 and slightly enlarged, fu 6/16 no change, fu 11/16 no change; fu 1/18 echo 5.1-5.3, enlarged, then cta done and only 4.8cm, t fu 6 months, lvh, nl ef, mild ai; fu 8/18 slightly larger; fu 2/19 slightly  smaller; fu 2/20 and then 1/21 and stable     Ulcerative colitis (H)     not active for years        Past Surgical History:   Procedure Laterality Date     BACK SURGERY  1998     BLADDER SURGERY  1985     CATARACT IOL, RT/LT  2011     COLONOSCOPY N/A 05/05/2020    Procedure: COLONOSCOPY;  Surgeon: Kenya Loco MD;  Location:  GI     CORONARY ARTERY BYPASS  2010     ENDOVASCULAR REPAIR ANEURYSM ABDOMINAL AORTA N/A 05/27/2015    Procedure: ENDOVASCULAR REPAIR ANEURYSM ABDOMINAL AORTA;  Surgeon: Darin Walters MD;  Location:  OR     HERNIA REPAIR  2005     HERNIA REPAIR  1998     HERNIORRHAPHY INGUINAL Left 01/05/2018    Procedure: HERNIORRHAPHY INGUINAL;  Incarcerated Left Inguinal Hernia;  Surgeon: Harsh Walker MD;  Location:  OR     KNEE SURGERY  1997     REPAIR HAMMER TOE  12/28/2012    Procedure: REPAIR HAMMER TOE;  LEFT SECOND AND THIRD CLAW TOE RECONSTRUCTION;  Surgeon: Gray Haynes MD;  Location:  OR     REPAIR HAMMER TOE  04/2018    tria     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     TURP  2003     ZZ TOTAL KNEE ARTHROPLASTY  2011    left     ZZC TOTAL KNEE ARTHROPLASTY  2009    right          Current Outpatient Medications:      carbidopa-levodopa (SINEMET)  MG tablet, Take 2 tablets by mouth 3 times daily, Disp: 540 tablet, Rfl: 3     chlorthalidone (HYGROTON) 25 MG tablet, 1 TABLET ORALLY EVERY MORNING  (DX: DIURETIC ), Disp: 30 tablet, Rfl: 10     clomiPRAMINE (ANAFRANIL) 25 MG capsule, 1 CAPSULE ORALLY AT BEDTIME (DX: DEPRESSION), Disp: 28 capsule, Rfl: 10     ClomiPRAMINE HCl (ANAFRANIL PO), Take 25 mg by mouth every evening , Disp: , Rfl:      clopidogrel (PLAVIX) 75 MG tablet, 1 TABLET ORALLY EVERY MORNING  (DX: HEART  ), Disp: 30 tablet, Rfl: 10     gabapentin (NEURONTIN) 400 MG capsule, 1 CAPSULE ORALLY 2 TIMES DAILY FOR NERVES, Disp: 56 capsule, Rfl: 10     ketoconazole (NIZORAL) 2 % external cream, APPLY TOPICALLY TO INFLAMED SKIN 2 TIMES DAILY   (DX:  INFLAMED SKIN ), Disp: 30 g, Rfl: 10     lactulose (CHRONULAC) 10 GM/15ML solution, DRINK 30ML  ORALLY DAILY (DX: CONSTIPATION);DRINK 30ML  ORALLY DAILY AS NEEDED (DX: CONSTIPATION), Disp: 473 mL, Rfl: 10     levothyroxine (SYNTHROID/LEVOTHROID) 100 MCG tablet, 1 TABLET ORALLY DAILY  (DX: THYROID), Disp: 30 tablet, Rfl: 10     melatonin 3 MG tablet, 1 TABLET ORALLY AT BEDTIME AS NEEDED  (DX: SLEEP ), Disp: 30 tablet, Rfl: 10     mirtazapine (REMERON) 45 MG tablet, 1 TABLET ORALLY AT BEDTIME (DX: DEPRESSION), Disp: 30 tablet, Rfl: 10     OPTIVE 0.5-0.9 % ophthalmic solution, INSTILL 1 DROP INTO BOTH EYES 2 TIMES DAILY  (DX: DRY EYES), Disp: 15 mL, Rfl: 10     simvastatin (ZOCOR) 20 MG tablet, 1 TABLET ORALLY AT BEDTIME   (DX: CHOLESTEROL), Disp: 30 tablet, Rfl: 10     sodium chloride (OCEAN) 0.65 % nasal spray, Use this 3x daily, Disp: 15 mL, Rfl: 1     SOLUBLE FIBER THERAPY powder, TAKE 1 TEASPOONFUL ORALLY DAILY (DX: CONSTIPATION), Disp: 454 g, Rfl: 10     ARIPiprazole (ABILIFY) 2 MG tablet, 1 TABLET ORALLY AT BEDTIME (DX: DEPRESSION) (Patient not taking: Reported on 4/17/2023), Disp: 30 tablet, Rfl: 10     SENEXON-S 8.6-50 MG tablet, 1 TABLET ORALLY DAILY AS NEEDED (DX: CONSTIPATION) (Patient not taking: Reported on 4/17/2023), Disp: 30 tablet, Rfl: 10     No Known Allergies     Family History   Problem Relation Age of Onset     C.A.D. Father      Lymphoma Sister      Retinal detachment Daughter      Glaucoma No family hx of      Macular Degeneration No family hx of         Social History:  He  reports that he quit smoking about 57 years ago. His smoking use included cigarettes. He has a 5.00 pack-year smoking history. He has never used smokeless tobacco. He reports current alcohol use. He reports that he does not use drugs.     PHYSICAL EXAM:  /74   Pulse 56   SpO2 100%      NEURO:  UPDRS  Time:: 1744  Medication: On  R Brain DBS:: None  L Brain DBS:: None  Dyskinesia (LID): No  Did LID interfere:  No  Speech: (2) Mild: Loss of modulation, diction or volume, with a few words unclear, but the overall sentences easy to follow.  Facial Expression: (3) Masked facies with lips parted some of the time when the mouth is at rest.  Rigidity Neck: (3) Moderate: Rigidity detected without the activation maneuver. Full range of motion is achieved with effort.  Rigidity RUE: (2) Mild: Rigidity detected without the activation maneuver. Full range of motion is easily achieved.  Rigidity LUE: (1) Slight: Rigidity only detected with activation maneuver.  Rigidity RLE: (2) Mild: Rigidity detected without the activation maneuver. Full range of motion is easily achieved.  Rigidity LLE: (1) Slight: Rigidity only detected with activation maneuver.  Finger Taps R: (3) Moderate: Any of the following: a) more than 5 interruptions or at least one longer arrest (freeze) in ongoing movement  b) moderate slowing  c) the amplitude decrements starting after the first movement.  Finger Taps L: (2) Mild: Any of the following: a) 3 to 5 interruptions during tapping  b) mild slowing  c) the amplitude decrements midway in the 10-movement sequence.  Hand Mvt R: (3) Moderate: Any of the following: a) more than 5 interruptions or at least one longer arrest (freeze) in ongoing movement  b) moderate slowing  c) the amplitude decrements starting after the first movement.  Hand Mvt L: (1) Slight: Any of the following: a) the regular rhythm is broken with one with one or two interruptions or hesitations of the movement  b) slight slowing  c) the amplitude decrements near the end of the 10 movements.  Pron-/Supinate R: (3) Moderate: Any of the following: a) more than 5 interruptions or at least one longer arrest (freeze) in ongoing movement  b) moderate slowing  c) the amplitude decrements starting after the first movement.  Pron-/Supinate L: (1) Slight: Any of the following: a) the regular rhythm is broken with one with one or two interruptions or  hesitations of the movement  b) slight slowing  c) the amplitude decrements near the end of the 10 movements.  Toe Tap R: (3) Moderate: Any of the following: a) more than 5 interruptions or at least one longer arrest (freeze) in ongoing movement  b) moderate slowing  c) the amplitude decrements starting after the first movement.  Toe Tap L: (2) Mild: Any of the following: a) 3 to 5 interruptions during tapping  b) mild slowing  c) the amplitude decrements midway in the 10-movement sequence.  Leg Agility R: (2) Mild: Any of the following: a) 3 to 5 interruptions during tapping  b) mild slowing  c) the amplitude decrements midway in the 10-movement sequence.  Leg Agility L: (1) Slight: Any of the following: a) the regular rhythm is broken with one with one or two interruptions or hesitations of the movement  b) slight slowing  c) the amplitude decrements near the end of the 10 movements.  Arise From Chair: (0) Normal: No problems. Able to arise quickly without hesitation.  Gait: (3) Moderate: Requires an assistance device for safe walking (walking stick, walker) but not a person.  Gait Freezing: (0) Normal: No freezing.  Postural Stability: (3) Moderate: Stands safely, but with absence of postural response, falls if not caught by examiner.  Posture: (3) Moderate: Stooped posture, scoliosis, or leaning to one side that cannot be corrected volitionally to a normal posture by the patient.  Global Spont Mvt: (2) Mild: Mild global slowness and poverty of spontaneous movements.  Postural Tremor RUE: (2) Mild: Tremor is at least 1 but less than 3 cm in amplitude.  Postural Tremor LUE: (0) Normal: No tremor.  Kinetic Tremor RUE: (2) Mild: Tremor is at least 1 but less than 3 cm in amplitude.  Kinetic Tremor LUE: (1) Slight: Tremor is present but less than 1 cm in amplitude.  Rest Tremor RUE: (1) Slight: < 1 cm in maximal amplitude.  Rest Tremor LUE: (0) Normal: No tremor.  Rest Tremor RLE: (0) Normal: No tremor.  Rest Tremor  LLE: (0) Normal: No tremor.  Rest Tremor Lip/Jaw: (0) Normal: No tremor.  Rest Tremor Constancy: (2) Mild: Tremor at rest is present 25-50% of the entire examination period.  Total Right: 23  Total Left: 10  Axial Total: 19  Total: 54    DATA REVIEWED:  No pertinent data available.    ASSESSMENT:  89-year-old male with suspected Parkinson disease, status post initiation of carbidopa levodopa.  At this point it is unclear how much medication patient has been given at the assisted living facility where he resides.  Objectively there seems to be some slight improvement comparing to initial evaluation a few weeks ago.    PLAN:  1.  Reviewed impression and plan with patient and family    2.  We were able to get our staff to contact his assisted living facility.  Reportedly the staff here spoke with the charge nurse and upon reviewing his medication administration record it was discovered that he had just been started on 1 tablet of carbidopa-levodopa.  There was a plan to, starting tomorrow, increase his medication to 1.5 tablets per dose, which was our initial target dose.  I do suspect that he will likely have some degree of response to levodopa, and he may benefit from having a revised target dose of 2 tablets per dose, keeping at 3 times a day.  Therefore we went ahead and change his prescription to reflect that.  A written prescription was signed and faxed to his facility.    3.  He was encouraged to continue pursuing physical and occupational therapy as per availability at the facility.    4.  He does endorse having discontinued the Abilify after discussing with his psychiatrist.  That occurred within a week following our previous appointment.  He was encouraged to stay away from dopamine blocking agents inclusive of Abilify moving forward, provided that this is a reasonable option for management of depression in the future.    5.  His son-in-law was concerned about some chronic discoloration in his extremities.   He wanted for the patient to have his blood pressure being taken at 4 different extremities and having a dedicated assessment for peripheral vascular disease.  I encouraged him to discuss that with the patient's primary care provider and he currently asked for me to copy his PCP in this note    6.  Otherwise we will plan on regrouping in about 4 to 6 months.  He was again counseled on the fact that his Abilify can take several months, up to a year or 2, to complete washout of his system and a sense of if this is a case of a tar dive syndrome that is the expected time course that he may take for him to expect some recovery.  However we also explained there is very likely that he has an underlying neurodegenerative process such as Parkinson disease, and the Abilify was potentially just aggravating the picture at this point.    There are no Patient Instructions on file for this visit.      Amadeo Jackson MD (Leo) (he/him/his)   of Neurology  [facility]  Department of Neurology      Thank you for referring Zack Santiago to our [facility] Movement Disorders Program, we appreciate the opportunity of being your partner in healthcare. Please feel free to reach out to us at any point if any questions or concerns about this visit.    Attending attestation: Today I spent a total 40 minutes on this case, with greater than 50% of the time spent in face-to-face, examining, obtaining history, providing education and coordination of care. Additional time was spent in chart review, ancillary data, and documentation as delineated above.        Again, thank you for allowing me to participate in the care of your patient.        Sincerely,        Amadeo Tavera MD

## 2023-04-17 NOTE — TELEPHONE ENCOUNTER
Called Jayashree Alarcon RN back to discuss carbidopa/levodopa. Left voice mail for her to call me back to discuss. Patient states that he has not been getting 1.5 tabs of carbidopa/levodopa three times per day. The medication list shows that on 4/4/23 the prescription is    Carbidopa/levodopa  mg 1 tab three times per day     and on 4/18/23 (tomorrow?)    In my message I detailed the above and also that he should not be getting his medication with protein (meals).    Kika faxed the written new carbidopa/levodopa prescription to [facility] and [facility], also scanned facility medication list into chart.    Spoke to Apolonia (4/19).  Jayashree states they got the original titration order for the carbidopa/levodopa on 3/20. It said to titrate up every 2 weeks:  Week 1    1/2 tab three times per day 2 weeks  Week 3     1 tab three times per day for 2 weeks  Week 5     1.5 tab three times per day for 2 weeks    They started him on the 2 tabs three times per day and he is doing fine. He is not noticing any benefit yet but is hopeful. They are giving him the dose before meals not with meals. She said the med list from us included Abilify, but he is not on that anymore. Writer will correct.

## 2023-04-17 NOTE — NURSING NOTE
"Zack Santiago is a 89 year old male who presents for:  Chief Complaint   Patient presents with     Follow Up     Follow Up        Initial Vitals:  /74   Pulse 56   SpO2 100%  Estimated body mass index is 26.25 kg/m  as calculated from the following:    Height as of 1/31/23: 1.854 m (6' 1\").    Weight as of 1/31/23: 90.3 kg (199 lb).. There is no height or weight on file to calculate BSA. BP completed using cuff size: regular  No Pain (0)    Nursing Comments:     Kika Nixon MA    "

## 2023-04-18 ENCOUNTER — TRANSFERRED RECORDS (OUTPATIENT)
Dept: HEALTH INFORMATION MANAGEMENT | Facility: CLINIC | Age: 88
End: 2023-04-18
Payer: COMMERCIAL

## 2023-04-18 NOTE — TELEPHONE ENCOUNTER
[facility] Call Center    Phone Message    May a detailed message be left on voicemail: yes     Reason for Call:  MOJGAN Blanc is returning a call to MOJGAN Bruner please call Jayashree back at [phone]   She will be at her desk until 11:15 AM    Action Taken: CS Neurology      Travel Screening: Not Applicable

## 2023-04-19 ENCOUNTER — TELEPHONE (OUTPATIENT)
Dept: FAMILY MEDICINE | Facility: CLINIC | Age: 88
End: 2023-04-19
Payer: COMMERCIAL

## 2023-04-19 DIAGNOSIS — I63.9 CEREBROVASCULAR ACCIDENT (CVA), UNSPECIFIED MECHANISM (H): Primary | ICD-10-CM

## 2023-04-19 NOTE — TELEPHONE ENCOUNTER
Order/Referral Request    Who is requesting: [facility]    Orders being requested: Outpatient PT Eval and treat    Reason service is needed/diagnosis: Gait training, standing, balance, lower balance strengthening    When are orders needed by: By Friday    Has this been discussed with Provider: No    Does patient have a preference on a Group/Provider/Facility? [facility]    Does patient have an appointment scheduled?: No    Where to send orders: Fax    Could we send this information to you in [employer] or would you prefer to receive a phone call?:   No preference   Okay to leave a detailed message?: Yes at Other phone number:  Prefer Fax to  [phone]

## 2023-04-19 NOTE — TELEPHONE ENCOUNTER
"Spoke with Tanya with [facility]     To clarify: pt is finishing up with home care episode through [facility]     Requesting to continue outpatient after completion of home care. They are requesting outpatient physical therapy orders faxed to them. Requesting to continue using [facility] because they can do \"outpatient\" therapy at patient's LTC facility     Tanya MA Triage RN  [facility]     "

## 2023-04-19 NOTE — TELEPHONE ENCOUNTER
Routing to PCP to please review and advise on requested home care orders    Thank you!    Uche Munoz RN  [facility]

## 2023-04-19 NOTE — TELEPHONE ENCOUNTER
Faxed referral to below fax number    Tanya MA, Triage RN  [facility]

## 2023-04-29 ENCOUNTER — NURSE TRIAGE (OUTPATIENT)
Dept: NURSING | Facility: CLINIC | Age: 88
End: 2023-04-29
Payer: COMMERCIAL

## 2023-04-29 ENCOUNTER — TELEPHONE (OUTPATIENT)
Dept: NEUROLOGY | Facility: CLINIC | Age: 88
End: 2023-04-29
Payer: COMMERCIAL

## 2023-04-29 NOTE — TELEPHONE ENCOUNTER
Called by patients daughter that he is having abnormal jaw movements.  He is having thrusting tongue movements.   He is having involunatry movements.  Never had this before per daughter.  ONly recent medication switch was a increase in sinemet.  Recommended decreasing sinemet back to previous dosing which he was tolerating of 1 tablet 3x daily for the weekend.  Should contact his neurologist Monday for further recommendations.      Jan Banuelos,   Neurolgoy

## 2023-04-29 NOTE — TELEPHONE ENCOUNTER
Nurse Triage SBAR    Is this a 2nd Level Triage? YES, LICENSED PRACTITIONER REVIEW IS REQUIRED    Situation: involuntary tongue and lower jaw protrusion    Background:   Daughter Jayashree calling with c2c on file. States that she saw pt for dinner last night and involuntary tongue and jaw protrusion was noticeable. Daughter thinks this started after med change.  Pt recently taken off Abilify 2 weeks ago, started on Sinemet  mg, take 2 tabs TID. Pt informed daughter that he pans to refuse Sinemet this weekend.    Pt lives at the [facility].      Assessment:   Teeth grinding. Pt scheduled dental appointment on Monday thinking it is a dental concern  Increased anxiety due to involuntary movement  No SOB, no dysphagia  No new/worsening muscle weakness/numbness    Protocol Recommended Disposition:   See HCP Within 4 Hours (Or PCP Triage)    Recommendation:   Paged to provider  RN paged on call provider Dr. Lakisha MD via Neurology answering service at 10:48 pm to call daughter Jayashree back directly at [phone]    FNA advised patient to phone back neurology answering service in 20 minutes if no response from on call MD and family agreed.      Does the patient meet one of the following criteria for ADS visit consideration? 16+ years old, with an [facility] PCP     TIP  Providers, please consider if this condition is appropriate for management at one of our Acute and Diagnostic Services sites.     If patient is a good candidate, please use dotphrase <dot>triageresponse and select Refer to ADS to document.    Megan Gaspar RN/[place] Nurse Advisor      Reason for Disposition    [1] New-onset muscle jerks AND [2] unexplained AND [3] 3 or more times/day    Additional Information    Negative: Difficult to awaken or acting confused (e.g., disoriented, slurred speech)    Negative: Sounds like a life-threatening emergency to the triager    Negative: Abnormal twitch, blinking, tic, or spasm  of eyelid(s)    Negative: Sounds like a seizure (generalized or focal)    Negative: Suspected alcohol withdrawal    Negative: Suspected substance use (drug use), addiction, or withdrawal    Negative: [1] Shivering or chills AND [2] fever    Negative: [1] Shivering or chills AND [2] cold exposure (R/O hypothermia)    Negative: Face, arm, or leg weakness    Negative: [1] Muscle rigidity or tightness AND [2] present now    Negative: [1] New-onset muscle jerks (twitches, spasms) AND [2] present now    Negative: [1] New-onset muscle jerks AND [2] episode lasts > 1 minute AND [3] resolved    Negative: Patient sounds very sick or weak to the triager    Negative: [1] Muscle rigidity or tightness AND [2] brief (now gone)    Protocols used: MUSCLE JERKS - TICS - [id number]

## 2023-05-01 ENCOUNTER — TELEPHONE (OUTPATIENT)
Dept: NEUROLOGY | Facility: CLINIC | Age: 88
End: 2023-05-01
Payer: COMMERCIAL

## 2023-05-01 DIAGNOSIS — R25.1 TREMOR: ICD-10-CM

## 2023-05-01 NOTE — TELEPHONE ENCOUNTER
Mom called back.  Informed of dosing written below.  Mom was asking about zyrtec dosing- informed can start with 2.5 ml of the 5 mg/5 ml solution.  Informed it would be ok to go up to 5 ml if needed.  Mom wanted to make sure these medications were ok with benadryl.  Informed ok to give benadryl, but should choose to use either claritin or zyrtec not both.  No further questions.   Per chart review, Dr. Banuelos spoke to pt's daughter 4/29:  Called by patients daughter that he is having abnormal jaw movements.  He is having thrusting tongue movements.   He is having involunatry movements.  Never had this before per daughter.  ONly recent medication switch was a increase in sinemet.  Recommended decreasing sinemet back to previous dosing which he was tolerating of 1 tablet 3x daily for the weekend.  Should contact his neurologist Monday for further recommendations.       Jan Banuelos DO  Neurolgoy    Called nurse Maggie back.  Pt has not decreased the sinemet to 1 tablet 3 x daily, as they need a signed script.     Pt has been refusing the second tablet the last couple days.  He has experienced grinding of teeth, jaw thrusting and he feels his tongue is swollen since increasing the sinemet.      Will route to provider to advise on adjusting carbidopa-levodopa dose down to 1 tablet 3 times daily.    If medication change appropriate, the nurse is requesting that updated signed script be faxed to [phone], and she will send to pharmacy.    Jennifer IQBAL RN, BSN  [facility]

## 2023-05-01 NOTE — TELEPHONE ENCOUNTER
[facility]    Phone Message    May a detailed message be left on voicemail: yes     Reason for Call: Symptoms or Concerns     If patient has red-flag symptoms, warm transfer to triage line    Current symptom or concern:Patient said he feels medication Carbidopa-Levodopa increase to two tablets is too much.  Patient wants to go back to one tablet 3 times per day.    Nurse Maggie would like a new order for 1 tablet three times a day.    Tongue rolling.  Please call Maggie to discuss  [phone]      Symptoms have been present for:  3 day(s)    Has patient previously been seen for this? Yes    By:  FREDDY Childs  Date: ASAP    Are there any new or worsening symptoms? Not any better      Action Taken: Other: [facility]    Travel Screening: Not Applicable

## 2023-05-01 NOTE — TELEPHONE ENCOUNTER
Please see 5/1/23 telephone encounter.     Jennifer IQBAL RN, BSN  [facility]

## 2023-05-01 NOTE — TELEPHONE ENCOUNTER
[facility] Call [facility]    Phone Message    May a detailed message be left on voicemail: yes     Reason for Call: Symptoms or Concerns     If patient has red-flag symptoms, warm transfer to triage line    Current symptom or concern:Patient is grinding teeth and fold tongue uncontrollably.      Symptoms have been present for:  2 week(s)    Has patient previously been seen for this? No    By Chung Rao:     Date: ASAP    Are there any new or worsening symptoms? Yes:       Action Taken: Message routed to:  [facility]    Travel Screening: Not Applicable

## 2023-05-02 NOTE — TELEPHONE ENCOUNTER
Patient is requesting to speak with nurse or  regarding his medication for parkinsons. Patient said he was taking 6 pills and is having side effects of this and would like to adjust the medication and speak with clinic about this. # [phone]

## 2023-05-03 ENCOUNTER — TELEPHONE (OUTPATIENT)
Dept: FAMILY MEDICINE | Facility: CLINIC | Age: 88
End: 2023-05-03
Payer: COMMERCIAL

## 2023-05-03 NOTE — TELEPHONE ENCOUNTER
[facility] for nurses line.      Provider requesting clarification on what dose pt was having problems with 1.5 or 2 tablets.     Jennifer IQBAL RN, BSN  [facility]

## 2023-05-03 NOTE — TELEPHONE ENCOUNTER
Nurse Maggie called back.  She was not certain if pt had been taking 1.5 tablets.     Called pt and he states that he was on 1.5 tablets prior to appt 4/17/23, and he was tolerating it fine.     Pt states that he has been taking only 1 tablet three times daily since talking to on- call neurologist 4/29/23.     Pt states his symptoms have improved since decreasing the sinemet to 1 tab 3 times daily.  However, he is still experiencing some teeth grinding and involuntary tongue movement.      Advised pt to continue doing 1 tablet 3 times daily until hear back from provider.     Will route to provider to review and advise.     Signed order will need to be faxed to facility.     Jennifer IQBAL RN, BSN  [facility]

## 2023-05-03 NOTE — TELEPHONE ENCOUNTER
LTC nurse Chrissy called     Resident who sat near pt tested positive for COVID-19 today     Pt now tested positive also with antigen test     Not having any symptoms currently - feels fine     Facility nursing staff asking if provider would recommend paxlovid for patient apophylactically?     Uses [facility] pharmacy - pended     Fax orders to: [phone]    Tanya MA, Triage RN  [facility]

## 2023-05-04 ENCOUNTER — TRANSFERRED RECORDS (OUTPATIENT)
Dept: HEALTH INFORMATION MANAGEMENT | Facility: CLINIC | Age: 88
End: 2023-05-04

## 2023-05-04 ENCOUNTER — VIRTUAL VISIT (OUTPATIENT)
Dept: FAMILY MEDICINE | Facility: CLINIC | Age: 88
End: 2023-05-04
Payer: COMMERCIAL

## 2023-05-04 DIAGNOSIS — U07.1 INFECTION DUE TO 2019 NOVEL CORONAVIRUS: Primary | ICD-10-CM

## 2023-05-04 PROCEDURE — 99442 PR PHYSICIAN TELEPHONE EVALUATION 11-20 MIN: CPT | Mod: 95 | Performed by: INTERNAL MEDICINE

## 2023-05-04 RX ORDER — CARBIDOPA AND LEVODOPA 25; 100 MG/1; MG/1
1 TABLET ORAL 3 TIMES DAILY
Qty: 270 TABLET | Refills: 1 | Status: SHIPPED | OUTPATIENT
Start: 2023-05-04 | End: 2023-08-21

## 2023-05-04 NOTE — TELEPHONE ENCOUNTER
Per provider:   If he mouth movements are not bothersome to him let's stick to the 1 tablet/dose and see how it goes until I see him back in clinic. If it is still bothering him we can change things around a little, just let me know.     Thanks      Called pt and he said symptoms have improved, and he would like to stay at 1 tablet sinemet 3 times daily.      Nurse Kim was in the room with patient.  Relayed providers recommendations to nurse.  She requested new script be sent to [facility] pharmacy, and she stated that electronically signed script will work.      Updated order sent to pharmacy, and faxed to facility.  Confirmed on rightfax.     Jennifer IQBAL, RN, BSN  [facility]

## 2023-05-04 NOTE — TELEPHONE ENCOUNTER
Triage called MOJGAN Lowe. She is unaware of COVID-19 symptoms at this time, since she has not seen pt yet today. Pt was asymptomatic yesterday. Triage COVID-19 protocol would not be appropriate if pt is not having symptoms. VV was scheduled today to discuss COVID-19 treatment.

## 2023-05-04 NOTE — PROGRESS NOTES
Zack is a 89 year old who is being evaluated via a billable telephone visit.      What phone number would you like to be contacted at? [phone]  How would you like to obtain your AVS? Mail a copy  Distant Location (provider location):  On-site    Assessment & Plan     Infection due to 2019 novel coronavirus  Drug drug interaction would require stopping plavix, remeron and simvastatin to safely take paxlovid  Given that limitation to using  Paxlovid, we decided to use molnupiravir instead   Side effects and risks discussed  Hopefully since he is currently without symptoms and since he is full vaccinated /booster he should have a mild course  long term is quarantining per their protocol  Can get next booster in 1-3 months  Discussed with patient and RN   - molnupiravir (LAGEVRIO) 200 MG capsule; Take 4 capsules (800 mg) by mouth every 12 hours      18 minutes spent by me on the date of the encounter doing chart review, history and exam, documentation and further activities per the note        Margarito Rubio MD  [facility]   Zack is a 89 year old, presenting for the following health issues:  Covid Concern (Patient having a telephone visit for Covid Concerns.  Patient living in Assisted Living.  He tested positive but no symptoms.  Call his cell phone at [phone].  Nurse will be with patient. To help talk with patient.  )         View : No data to display.              History of Present Illness       Reason for visit:  Covid Concerns  Symptom onset:  Today  Symptoms include:  Had swab test for Covid.  Symptom intensity:  Mild  Symptom progression:  Staying the same  Had these symptoms before:  No  What makes it worse:  No  What makes it better:  No    He eats 2-3 servings of fruits and vegetables daily.He consumes 0 sweetened beverage(s) daily.He exercises with enough effort to increase his heart rate 20 to 29 minutes per day.  He exercises with enough effort to increase his heart  rate 7 days per week.   He is taking medications regularly.           COVID-19 Symptom Review  How many days ago did these symptoms start? Tested Positive.  Living [place].    Are any of the following symptoms significant for you?    New or worsening difficulty breathing? No    Worsening cough? No    Fever or chills? No    Headache: No    Sore throat: No    Chest pain: No    Diarrhea: No    Body aches? No    What treatments has patient tried?    Does patient live in a nursing home, group home, or shelter? YES- Assisted Living  Does patient have a way to get food/medications during quarantined? Yes, I have a friend or family member who can help me.    Tested due to other residents of [facility] with confirmed COVID  Zack is without symptoms  He has history of stroke on Plavix and is on remeron and simvastatin too     Review of Systems         Objective           Vitals:  No vitals were obtained today due to virtual visit.    Physical Exam   healthy, alert and no distress  PSYCH: Alert and oriented times 3; coherent speech, normal   rate and volume, able to articulate logical thoughts, able   to abstract reason, no tangential thoughts, no hallucinations   or delusions  His affect is normal  RESP: No cough, no audible wheezing, able to talk in full sentences  Remainder of exam unable to be completed due to telephone visits                Phone call duration: 14 minutes

## 2023-05-11 ENCOUNTER — NURSE TRIAGE (OUTPATIENT)
Dept: NURSING | Facility: CLINIC | Age: 88
End: 2023-05-11
Payer: COMMERCIAL

## 2023-05-11 NOTE — TELEPHONE ENCOUNTER
Nurse Triage SBAR    Is this a 2nd Level Triage? Yes    Situation/Background: Patient is calling with concern of elevated blood pressure. [place] Is in quarantine due to Covid 19 until Sunday, 5/14/23. Patient states that he has completed Lagevrio.     Assessment:   Patient reports his BP this morning was 160/80. He states his BP later during the day was 174/XX. Patient states he called and spoke with the RN in the Assisted Living.   Patient denies symptoms during the day or during the call  Patient states he is usually much more active     Recommendation: Per disposition, See PCP within 3 days. Home care advice provided. Patient is in quarantine until 5/14/23. Advised patient to call back with any new or worsening symptoms. Patient verbalized understanding and agrees with plan.    Protocol Recommended Disposition: Routine office follow-up appointment      Dimple Martinez RN on 5/11/2023 at 6:00 PM  [facility]    Reason for Disposition    Systolic BP  >= 160 OR Diastolic >= 100    Additional Information    Negative: Difficult to awaken or acting confused (e.g., disoriented, slurred speech)    Negative: Severe difficulty breathing (e.g., struggling for each breath, speaks in single words)    Negative: [1] Weakness of the face, arm or leg on one side of the body AND [2] new onset    Negative: [1] Numbness (i.e., loss of sensation) of the face, arm or leg on one side of the body AND [2] new onset    Negative: [1] Chest pain lasts > 5 minutes AND [2] history of heart disease  (i.e., heart attack, bypass surgery, angina, angioplasty, CHF)    Negative: [1] Chest pain AND [2] took nitrogylcerin AND [3] pain was not relieved    Negative: Sounds like a life-threatening emergency to the triager    Negative: Symptom is main concern  (e.g., headache, chest pain)    Negative: Low blood pressure is main concern    Negative: [1] Systolic BP  >= 160 OR Diastolic >= 100 AND [2] cardiac or  neurologic symptoms (e.g., chest pain, difficulty breathing, unsteady gait, blurred vision)    Negative: [1] Pregnant > 20 weeks (or postpartum < 6 weeks) AND [2] new hand or face swelling    Negative: [1] Pregnant > 20 weeks AND [2] BP Systolic BP  >= 140 OR Diastolic >= 90    Negative: [1] Systolic BP  >= 200 OR Diastolic >= 120  AND [2] having NO cardiac or neurologic symptoms    Negative: [1] Postpartum < 6 weeks AND [2] BP Systolic BP  >= 140 OR Diastolic >= 90    Negative: [1] Systolic BP  >= 180 OR Diastolic >= 110 AND [2] missed most recent dose of blood pressure medication    Negative: Systolic BP  >= 180 OR Diastolic >= 110    Negative: Ran out of BP medications    Protocols used: HIGH BLOOD PRESSURE-A-AH       Statement Selected

## 2023-05-12 NOTE — TELEPHONE ENCOUNTER
This is not at all dangerous, monitor blood pressure over weekend and report readings next week, if chest pain, ha or shortness of breath to emergency room    Zurdo Kendrick M.D.

## 2023-05-12 NOTE — TELEPHONE ENCOUNTER
Called patient and reviewed response from PCP with patient he is agreeable to PCP recommendations below.     Agreed to ER if any symptoms. Otherwise will have LTC nurses check BP today through Monday and report back to us with BP update early next week.     Tanya MA, Triage RN  [facility]

## 2023-05-26 ENCOUNTER — OFFICE VISIT (OUTPATIENT)
Dept: FAMILY MEDICINE | Facility: CLINIC | Age: 88
End: 2023-05-26
Payer: COMMERCIAL

## 2023-05-26 VITALS
OXYGEN SATURATION: 97 % | SYSTOLIC BLOOD PRESSURE: 147 MMHG | HEIGHT: 73 IN | TEMPERATURE: 98.2 F | DIASTOLIC BLOOD PRESSURE: 53 MMHG | WEIGHT: 186.7 LBS | BODY MASS INDEX: 24.75 KG/M2 | HEART RATE: 58 BPM | RESPIRATION RATE: 21 BRPM

## 2023-05-26 DIAGNOSIS — J02.9 SORE THROAT: Primary | ICD-10-CM

## 2023-05-26 DIAGNOSIS — H61.23 BILATERAL IMPACTED CERUMEN: ICD-10-CM

## 2023-05-26 PROCEDURE — 99214 OFFICE O/P EST MOD 30 MIN: CPT | Performed by: PHYSICIAN ASSISTANT

## 2023-05-26 ASSESSMENT — PAIN SCALES - GENERAL: PAINLEVEL: MILD PAIN (3)

## 2023-05-26 NOTE — PROGRESS NOTES
"Assessment and Plan:     (J02.9) Sore throat  (primary encounter diagnosis)  CommentMr. Santiago presents with sore throat for the last 5 to 6 days, it is very mild, he has not had any exposure to strep, he had COVID last month, he denies fever/chills, trouble with his airway or trouble handling secretions, his exam is reassuring, however, I did discuss with him and his daughter that point tenderness of submandibular area could represent early abscess or deep space infection, though I think this is unlikely given his very mild pain, lack of systemic complaints and minimal tenderness, I do wonder if he could have a salivary stone causing the discomfort and we discussed this as well  Plan: Tylenol as needed, could try gum or sour candy to help facilitate stone passage, discussed going to the ED if fever, worsening pain, trouble with airway or handling secretions, if pain persists over the next week consider imaging    (H61.23) Bilateral impacted cerumen  Comment: Irrigated today  Plan:       Mira Keyes PA-C  30 minutes on the day of the encounter doing chart review, history and exam, documentation and further activities as noted above.      Subjective   Zack is a 89 year old, presenting for the following health issues:  Throat Problem         View : No data to display.              HPI     Zack is here for sore throat   It has been ongoing for 5-6 days  The pain is more right-sided  His pain is mild, he rates it as a 2 out of 10    He denies fever/chills, difficulty handling secretions  He denies any known exposure to covid or strep throat    He has also noticed some crackling in bilateral ears x last few days     He had a \"bad\" URI in April    Review of Systems   See above      Objective    There were no vitals taken for this visit.  There is no height or weight on file to calculate BMI.   BP (!) 147/53 (BP Location: Right arm, Patient Position: Sitting, Cuff Size: Adult Regular)   Pulse 58   Temp 98.2 " " F (36.8  C) (Temporal)   Resp 21   Ht 1.854 m (6' 1\")   Wt 84.7 kg (186 lb 11.2 oz)   SpO2 97%   BMI 24.63 kg/m      Physical Exam     EXAM:  GENERAL APPEARANCE: in NAD, very pleasant  EYES: Eyes grossly normal to inspection, conjunctivae and sclerae normal  HENT: ear canals w/cerumen impaction bilaterally, floor of mouth is soft, mouth without ulcers or lesions, oropharynx is clear without exudate or erythema, no tonsillar swelling  NECK: no adenopathy, no asymmetry, masses, mild submandibular tenderness on the right side, no mass  RESP: lungs clear to auscultation - no rales, rhonchi or wheezes  CV: regular rates and rhythm, normal S1 S2, no S3 or S4 and no murmur, click or rub  EXT: trace edema bilat lower ext         "

## 2023-05-26 NOTE — PATIENT INSTRUCTIONS
Flonase daily in each nostril    Take tylenol as needed for pain up to 650-1000mg three times daily, do not exceed 3000mg in 24 hour period

## 2023-05-30 ENCOUNTER — TELEPHONE (OUTPATIENT)
Dept: FAMILY MEDICINE | Facility: CLINIC | Age: 88
End: 2023-05-30
Payer: COMMERCIAL

## 2023-05-30 DIAGNOSIS — J02.9 SORE THROAT: Primary | ICD-10-CM

## 2023-05-30 RX ORDER — ACETAMINOPHEN 500 MG
500-1000 TABLET ORAL EVERY 8 HOURS PRN
Start: 2023-05-30 | End: 2024-07-02

## 2023-05-30 NOTE — TELEPHONE ENCOUNTER
Jayashree ULRICH with [facility] calling to update last visiting provider. Jayashree ULRICH stated patient endorsed feeling better and does not wish to use the Flonase nasal spray. Jayashree ULRICH also stated that Tylenol order cannot be in ranges and needs to be a specific mg dose as staff giving medications cannot change dosages. Jayashree ULRICH is requesting updated dosage be faxed to her.     Routing to last ordering provider for review. Can we get this Tylenol dosage updated to not include a mg range?    [facility] Fax Number: [phone] address to Jayashree ULRICH.

## 2023-05-30 NOTE — TELEPHONE ENCOUNTER
Sure, tylenol 1000mg every 8 hours as needed, maximum 3000mg in 24 hours     Order also signed, could you please fax?      thanks

## 2023-05-30 NOTE — TELEPHONE ENCOUNTER
Rx faxed to Attn: MOJGAN Blanc at #[phone] on 0530/2023@1238 PM.     Karen Grewal RN on 5/30/2023 at 12:38 PM

## 2023-06-01 ENCOUNTER — HEALTH MAINTENANCE LETTER (OUTPATIENT)
Age: 88
End: 2023-06-01

## 2023-06-01 NOTE — TELEPHONE ENCOUNTER
"Received a call from MOJGAN Blanc with [facility], stating she received the fax for Tylenol. However, the instructions still state \"take 1-2 tablets (500-1,000 mg) by mouth every 8 hours as needed for mild pain.\" Jayashree needs the script to either say 1 OR 2 tablets NO range.     Jayashree also states the patient was prescribed Flonase but the patient has been refusing this medication because he does not need it. Jayashree would like this medication discontinued. Upon chart review, medication is not listed on the med list.     Jayashree needs orders faxed to [phone].    Will route to Mira Keyes for review.     Pallavi Ochoa RN  "

## 2023-06-02 ENCOUNTER — TELEPHONE (OUTPATIENT)
Dept: FAMILY MEDICINE | Facility: CLINIC | Age: 88
End: 2023-06-02
Payer: COMMERCIAL

## 2023-06-02 ENCOUNTER — NURSE TRIAGE (OUTPATIENT)
Dept: FAMILY MEDICINE | Facility: CLINIC | Age: 88
End: 2023-06-02
Payer: COMMERCIAL

## 2023-06-02 RX ORDER — ACETAMINOPHEN 500 MG
TABLET ORAL
Qty: 100 TABLET | Refills: 0
Start: 2023-06-02 | End: 2024-09-16

## 2023-06-02 NOTE — TELEPHONE ENCOUNTER
I promised Zack I would call him this week to check in to see how he is doing.  He notes that his sore throat is almost completely gone and he is feeling much better.    Mira Keyes PA-C

## 2023-06-02 NOTE — TELEPHONE ENCOUNTER
Faxed copy of Tylenol Rx with updated sig at fax #  [phone].. Left voice message informing Jayashree this was done.

## 2023-06-02 NOTE — TELEPHONE ENCOUNTER
Patient called     States for the last 3-4 weeks has been awakened at night by tingling pain starts in feet and goes up legs - spasmodic   Bilateral, wakes him up at about 5am   No swelling   Only at bedtime, is normal during the day   Duration: stops when he gets up   No back pain or injury   No breathing concerns   No rash on legs     Unsure what it could be from     Per protocol: be seen within 2 weeks. Pt declined visit today, scheduled for Tuesday 6/6/23:     Advised call back if any new/worsening before appointment     Appointments in Next Year    Jun 06, 2023  2:00 PM  (Arrive by 1:40 PM)  Provider Visit with Alyssa Cox PA-C  [facility] ([facility] ) [phone]   Jun 26, 2023  1:00 PM  (Arrive by 12:40 PM)  Annual Wellness Visit with Zurdo Kendrick MD  [facility] ([facility] ) [phone]   Aug 21, 2023  1:30 PM  (Arrive by 1:15 PM)  Return Movement Disorder with Amadeo Tavera MD  [facility] ([facility] ) [phone]        Tanya Faustin, RN on 6/2/2023 at 8:30 AM      Reason for Disposition    MILD pain (e.g., does not interfere with normal activities) and present > 7 days    Additional Information    Negative: Looks like a broken bone or dislocated joint (e.g., crooked or deformed)    Negative: Sounds like a life-threatening emergency to the triager    Negative: Followed a hip injury    Negative: Followed a knee injury    Negative: Followed an ankle or foot injury    Negative: Back pain radiating (shooting) into leg(s)    Negative: Foot pain is main symptom    Negative: Ankle pain is main symptom    Negative: Knee pain is main symptom    Negative: Leg swelling is main symptom    Negative: Chest pain    Negative: Difficulty breathing    Negative: Entire foot is cool or blue in comparison to other side    Negative: Unable to walk    Negative:  Fever and red area (or area very tender to touch)    Negative: Swollen joint and fever    Negative: Thigh or calf pain in only one leg and present > 1 hour    Negative: Thigh, calf, or ankle swelling in only one leg    Negative: Thigh, calf, or ankle swelling in both legs, but one side is definitely more swollen    Negative: History of prior 'blood clot' in leg or lungs (i.e., deep vein thrombosis, pulmonary embolism)    Negative: History of inherited increased risk of blood clots (e.g., factor 5 Leiden, antithrombin 3, protein C or protein S deficiency, prothrombin mutation)    Negative: Major surgery in past month    Negative: Hip or leg fracture (broken bone) in past month (or had cast on leg or ankle in past month)    Negative: Illness requiring prolonged bedrest in past month (e.g., immobilization, long hospital stay)    Negative: Long-distance travel in past month (e.g., car, bus, train, plane; with trip lasting 6 or more hours)    Negative: Cancer treatment in past six months (or has cancer now)    Negative: Patient sounds very sick or weak to the triager    Negative: SEVERE pain (e.g., excruciating, unable to do any normal activities)    Negative: Cast on leg or ankle and now has increasing pain    Negative: Red area or streak > 2 inches (or 5 cm)    Negative: Painful rash with multiple small blisters grouped together (i.e., dermatomal distribution or 'band' or 'stripe')    Negative: Looks like a boil, infected sore, deep ulcer, or other infected rash (spreading redness, pus)    Negative: Localized rash is very painful (no fever)    Negative: Numbness in a leg or foot (i.e., loss of sensation)    Negative: Localized pain, redness or hard lump along vein    Negative: Patient wants to be seen    Negative: MODERATE pain (e.g., interferes with normal activities, limping) and present > 3 days    Negative: Swollen joint with no fever or redness    Negative: Leg pain which occurs after walking a certain distance  and disappears with rest, AND age > 50    Negative: Leg pain in shins (front of lower legs) and it occurs with running or jumping exercise (e.g., jogging, basketball)    Protocols used: LEG PAIN-A-OH

## 2023-06-12 DIAGNOSIS — R09.81 NASAL CONGESTION: ICD-10-CM

## 2023-06-12 DIAGNOSIS — H04.123 DRY EYES: ICD-10-CM

## 2023-06-12 RX ORDER — CARBOXYMETHYLCELLULOSE SODIUM AND GLYCERIN 5; 9 MG/ML; MG/ML
SOLUTION/ DROPS OPHTHALMIC
Qty: 15 ML | Refills: 11 | Status: SHIPPED | OUTPATIENT
Start: 2023-06-12

## 2023-06-12 RX ORDER — ECHINACEA PURPUREA EXTRACT 125 MG
TABLET ORAL
Qty: 44 ML | Refills: 11 | Status: SHIPPED | OUTPATIENT
Start: 2023-06-12 | End: 2024-06-20

## 2023-06-26 ENCOUNTER — TELEPHONE (OUTPATIENT)
Dept: FAMILY MEDICINE | Facility: CLINIC | Age: 88
End: 2023-06-26

## 2023-06-26 ENCOUNTER — OFFICE VISIT (OUTPATIENT)
Dept: FAMILY MEDICINE | Facility: CLINIC | Age: 88
End: 2023-06-26
Payer: COMMERCIAL

## 2023-06-26 VITALS
WEIGHT: 184 LBS | OXYGEN SATURATION: 93 % | BODY MASS INDEX: 24.39 KG/M2 | DIASTOLIC BLOOD PRESSURE: 56 MMHG | HEART RATE: 59 BPM | RESPIRATION RATE: 16 BRPM | HEIGHT: 73 IN | SYSTOLIC BLOOD PRESSURE: 112 MMHG | TEMPERATURE: 97.7 F

## 2023-06-26 DIAGNOSIS — G20.A1 PARKINSON'S DISEASE (H): ICD-10-CM

## 2023-06-26 DIAGNOSIS — E03.9 ACQUIRED HYPOTHYROIDISM: ICD-10-CM

## 2023-06-26 DIAGNOSIS — I63.9 CEREBROVASCULAR ACCIDENT (CVA), UNSPECIFIED MECHANISM (H): ICD-10-CM

## 2023-06-26 DIAGNOSIS — I13.10 HYPERTENSIVE HEART AND KIDNEY DISEASE WITHOUT HEART FAILURE AND WITH STAGE 3A CHRONIC KIDNEY DISEASE (H): ICD-10-CM

## 2023-06-26 DIAGNOSIS — N18.30 STAGE 3 CHRONIC KIDNEY DISEASE, UNSPECIFIED WHETHER STAGE 3A OR 3B CKD (H): ICD-10-CM

## 2023-06-26 DIAGNOSIS — E03.9 HYPOTHYROIDISM, UNSPECIFIED TYPE: ICD-10-CM

## 2023-06-26 DIAGNOSIS — E55.9 HYPOVITAMINOSIS D: ICD-10-CM

## 2023-06-26 DIAGNOSIS — Z00.00 MEDICARE ANNUAL WELLNESS VISIT, SUBSEQUENT: ICD-10-CM

## 2023-06-26 DIAGNOSIS — I71.21 ANEURYSM OF ASCENDING AORTA WITHOUT RUPTURE (H): ICD-10-CM

## 2023-06-26 DIAGNOSIS — Z23 HIGH PRIORITY FOR 2019-NCOV VACCINE: Primary | ICD-10-CM

## 2023-06-26 DIAGNOSIS — I10 ESSENTIAL HYPERTENSION: ICD-10-CM

## 2023-06-26 DIAGNOSIS — R19.8 ALTERED BOWEL FUNCTION: ICD-10-CM

## 2023-06-26 DIAGNOSIS — E78.5 HYPERLIPIDEMIA LDL GOAL <100: ICD-10-CM

## 2023-06-26 DIAGNOSIS — I10 BENIGN ESSENTIAL HYPERTENSION: ICD-10-CM

## 2023-06-26 DIAGNOSIS — N18.31 HYPERTENSIVE HEART AND KIDNEY DISEASE WITHOUT HEART FAILURE AND WITH STAGE 3A CHRONIC KIDNEY DISEASE (H): ICD-10-CM

## 2023-06-26 DIAGNOSIS — F42.9 OBSESSIVE-COMPULSIVE DISORDER, UNSPECIFIED TYPE: Chronic | ICD-10-CM

## 2023-06-26 DIAGNOSIS — I25.10 ATHEROSCLEROSIS OF NATIVE CORONARY ARTERY OF NATIVE HEART WITHOUT ANGINA PECTORIS: ICD-10-CM

## 2023-06-26 PROBLEM — J96.01 ACUTE RESPIRATORY FAILURE WITH HYPOXIA (H): Status: RESOLVED | Noted: 2023-01-19 | Resolved: 2023-06-26

## 2023-06-26 PROBLEM — Z86.73 HISTORY OF CVA (CEREBROVASCULAR ACCIDENT): Status: RESOLVED | Noted: 2023-01-19 | Resolved: 2023-06-26

## 2023-06-26 PROBLEM — R53.81 PHYSICAL DECONDITIONING: Status: RESOLVED | Noted: 2023-01-19 | Resolved: 2023-06-26

## 2023-06-26 LAB
ANION GAP SERPL CALCULATED.3IONS-SCNC: 12 MMOL/L (ref 7–15)
BUN SERPL-MCNC: 26.9 MG/DL (ref 8–23)
CALCIUM SERPL-MCNC: 9.7 MG/DL (ref 8.2–9.6)
CHLORIDE SERPL-SCNC: 101 MMOL/L (ref 98–107)
CREAT SERPL-MCNC: 1.22 MG/DL (ref 0.67–1.17)
DEPRECATED CALCIDIOL+CALCIFEROL SERPL-MC: 30 UG/L (ref 20–75)
DEPRECATED HCO3 PLAS-SCNC: 26 MMOL/L (ref 22–29)
ERYTHROCYTE [DISTWIDTH] IN BLOOD BY AUTOMATED COUNT: 14.5 % (ref 10–15)
GFR SERPL CREATININE-BSD FRML MDRD: 56 ML/MIN/1.73M2
GLUCOSE SERPL-MCNC: 106 MG/DL (ref 70–99)
HCT VFR BLD AUTO: 37.6 % (ref 40–53)
HGB BLD-MCNC: 11.8 G/DL (ref 13.3–17.7)
MCH RBC QN AUTO: 31.8 PG (ref 26.5–33)
MCHC RBC AUTO-ENTMCNC: 31.4 G/DL (ref 31.5–36.5)
MCV RBC AUTO: 101 FL (ref 78–100)
PLATELET # BLD AUTO: 196 10E3/UL (ref 150–450)
POTASSIUM SERPL-SCNC: 4.1 MMOL/L (ref 3.4–5.3)
RBC # BLD AUTO: 3.71 10E6/UL (ref 4.4–5.9)
SODIUM SERPL-SCNC: 139 MMOL/L (ref 136–145)
TOTAL PROTEIN SERUM FOR ELP: 6.6 G/DL (ref 6.4–8.3)
TSH SERPL DL<=0.005 MIU/L-ACNC: 0.51 UIU/ML (ref 0.3–4.2)
VIT B12 SERPL-MCNC: 534 PG/ML (ref 232–1245)
WBC # BLD AUTO: 7 10E3/UL (ref 4–11)

## 2023-06-26 PROCEDURE — 99000 SPECIMEN HANDLING OFFICE-LAB: CPT | Performed by: INTERNAL MEDICINE

## 2023-06-26 PROCEDURE — 82306 VITAMIN D 25 HYDROXY: CPT | Performed by: INTERNAL MEDICINE

## 2023-06-26 PROCEDURE — 82607 VITAMIN B-12: CPT | Performed by: INTERNAL MEDICINE

## 2023-06-26 PROCEDURE — G0439 PPPS, SUBSEQ VISIT: HCPCS | Performed by: INTERNAL MEDICINE

## 2023-06-26 PROCEDURE — 91313 COVID-19 BIVALENT 18+ (MODERNA): CPT | Performed by: INTERNAL MEDICINE

## 2023-06-26 PROCEDURE — 84425 ASSAY OF VITAMIN B-1: CPT | Mod: 90 | Performed by: INTERNAL MEDICINE

## 2023-06-26 PROCEDURE — 84443 ASSAY THYROID STIM HORMONE: CPT | Performed by: INTERNAL MEDICINE

## 2023-06-26 PROCEDURE — 36415 COLL VENOUS BLD VENIPUNCTURE: CPT | Performed by: INTERNAL MEDICINE

## 2023-06-26 PROCEDURE — 85027 COMPLETE CBC AUTOMATED: CPT | Performed by: INTERNAL MEDICINE

## 2023-06-26 PROCEDURE — 0134A COVID-19 BIVALENT 18+ (MODERNA): CPT | Performed by: INTERNAL MEDICINE

## 2023-06-26 PROCEDURE — 84207 ASSAY OF VITAMIN B-6: CPT | Mod: 90 | Performed by: INTERNAL MEDICINE

## 2023-06-26 PROCEDURE — 84155 ASSAY OF PROTEIN SERUM: CPT | Performed by: INTERNAL MEDICINE

## 2023-06-26 PROCEDURE — 84165 PROTEIN E-PHORESIS SERUM: CPT

## 2023-06-26 PROCEDURE — 80048 BASIC METABOLIC PNL TOTAL CA: CPT | Performed by: INTERNAL MEDICINE

## 2023-06-26 RX ORDER — CHLORTHALIDONE 25 MG/1
TABLET ORAL
Qty: 90 TABLET | Refills: 3 | Status: SHIPPED | OUTPATIENT
Start: 2023-06-26 | End: 2024-02-27

## 2023-06-26 RX ORDER — CLOPIDOGREL BISULFATE 75 MG/1
TABLET ORAL
Qty: 90 TABLET | Refills: 3 | Status: SHIPPED | OUTPATIENT
Start: 2023-06-26 | End: 2024-02-27

## 2023-06-26 RX ORDER — LEVOTHYROXINE SODIUM 100 UG/1
TABLET ORAL
Qty: 90 TABLET | Refills: 3 | Status: SHIPPED | OUTPATIENT
Start: 2023-06-26 | End: 2024-01-30

## 2023-06-26 RX ORDER — LACTULOSE 10 G/15ML
SOLUTION ORAL
Qty: 473 ML | Refills: 3 | Status: SHIPPED | OUTPATIENT
Start: 2023-06-26 | End: 2023-09-13

## 2023-06-26 RX ORDER — GABAPENTIN 400 MG/1
CAPSULE ORAL
Qty: 180 CAPSULE | Refills: 3 | Status: SHIPPED | OUTPATIENT
Start: 2023-06-26 | End: 2024-05-02

## 2023-06-26 RX ORDER — SIMVASTATIN 20 MG
TABLET ORAL
Qty: 90 TABLET | Refills: 3 | Status: SHIPPED | OUTPATIENT
Start: 2023-06-26 | End: 2024-02-27

## 2023-06-26 ASSESSMENT — PAIN SCALES - GENERAL: PAINLEVEL: NO PAIN (0)

## 2023-06-26 ASSESSMENT — ACTIVITIES OF DAILY LIVING (ADL): CURRENT_FUNCTION: NO ASSISTANCE NEEDED

## 2023-06-26 NOTE — PROGRESS NOTES
SUBJECTIVE:   Zack is a 89 year old who presents for Preventive Visit.    The patient presents with his g he is really doing quite well.  He has gait difficulty but has not fallen.  G[place].  He lives in assisted living.  He has help 5 days a week.  His depression is well controlled.  As noted, he was felt to have Parkinson's by neurology and is on Sinemet.  He feels that has helped.  He is off the Abilify per neurology.  He has no complaints on review of systems.               Past Medical History:      Past Medical History:   Diagnosis Date     A-fib (H) 2010    post op     AAA (abdominal aortic aneurysm) without rupture (H)     Dr. 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    carotid us no stenosis, echo no change and no clots; ziopatch no afib, svt present     Anemia 2004     Aortic insufficiency 06/2014    1+ on echo     Aortic insufficiency 11/2016    mild to mod     Ascending aorta dilatation (H) 01/2023    5cm on ct     ASCVD (arteriosclerotic cardiovascular disease) 2010    cabg, post op afib     BPH (benign prostatic hyperplasia) 2003    turp, flomax added by urol 2/17     Bradycardia 2016    stopped toprol     CKD (chronic kidney disease) stage 3, GFR 30-59 ml/min (H)      Constipation 05/2020    colonoscopy nl     Cough 2010    pulm eval, felt due to reflux     Dizzy 2001    mri small vessel dz     GERD (gastroesophageal reflux disease)      HTN (hypertension) 2002     Hx of colonoscopy 2003    tics     Hypothyroid      Hypovitaminosis D      Idiopathic neuropathy      Lung nodule 02/2018    6mm, found during eval of ascending aorta, fu 8/18 no change     OCD (obsessive compulsive disorder)     sees psyche     Panic disorder     Dr. Luna, then someone after he retired     Parkinson's disease (H) 03/2023    per neuro     Spinal headache      Stroke (H) 12/2016    expressive aphasia, hosp, seen by neuro, mri pos, carotids min dz, changed from asa to plavix     Sudden  hearing loss 06/2013    Dr. Garcia, mri brain no acoustic neuroma     SVT (supraventricular tachycardia) (H) 11/2016    seen on ziopatch done for amaurosis, longest 15 beats     Thoracic ascending aortic aneurysm (H) 06/2014    4.4cm on echo, aortic root 3.9, fu 5/15 4.8cm; fu done 12/15 and slightly enlarged, fu 6/16 no change, fu 11/16 no change; fu 1/18 echo 5.1-5.3, enlarged, then cta done and only 4.8cm, t fu 6 months, lvh, nl ef, mild ai; fu 8/18 slightly larger; fu 2/19 slightly smaller; fu 2/20 and then 1/21 and stable     Ulcerative colitis (H)     not active for years             Past Surgical History:      Past Surgical History:   Procedure Laterality Date     BACK SURGERY  1998     BLADDER SURGERY  1985     CATARACT IOL, RT/LT  2011     COLONOSCOPY N/A 05/05/2020    Procedure: COLONOSCOPY;  Surgeon: Kenya Loco MD;  Location:  GI     CORONARY ARTERY BYPASS  2010     ENDOVASCULAR REPAIR ANEURYSM ABDOMINAL AORTA N/A 05/27/2015    Procedure: ENDOVASCULAR REPAIR ANEURYSM ABDOMINAL AORTA;  Surgeon: Darin Walters MD;  Location:  OR     HERNIA REPAIR  2005     HERNIA REPAIR  1998     HERNIORRHAPHY INGUINAL Left 01/05/2018    Procedure: HERNIORRHAPHY INGUINAL;  Incarcerated Left Inguinal Hernia;  Surgeon: Harsh Walker MD;  Location:  OR     KNEE SURGERY  1997     REPAIR HAMMER TOE  12/28/2012    Procedure: REPAIR HAMMER TOE;  LEFT SECOND AND THIRD CLAW TOE RECONSTRUCTION;  Surgeon: Gray Haynes MD;  Location:  OR     REPAIR HAMMER TOE  04/2018    tria     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0     Smokeless tobacco: Never   Substance and Sexual Activity     Alcohol use: Yes     Comment: none     Drug use: No     Sexual activity: Not Currently     Partners: Female   Other Topics Concern     Parent/sibling w/ CABG, MI or angioplasty before 65F 55M? Not Asked   Social History Narrative     Not on file     Social Determinants of Health     Financial Resource Strain: Not on file   Food Insecurity: Not on file   Transportation Needs: Not on file   Physical Activity: Not on file   Stress: Not on file   Social Connections: Not on file   Intimate Partner Violence: Not on file   Housing Stability: Not on file             Family History:   reviewed         Allergies:   No Known Allergies          Medications:     Current Outpatient Medications   Medication Sig Dispense Refill     acetaminophen (TYLENOL) 500 MG tablet Take 1 or 2 tablets every 8 hours as needed for pain  Do not exceed 3000mg in 24 hours 100 tablet 0     acetaminophen (TYLENOL) 500 MG tablet Take 1-2 tablets (500-1,000 mg) by mouth every 8 hours as needed for mild pain       carbidopa-levodopa (SINEMET)  MG tablet Take 1 tablet by mouth 3 times daily 270 tablet 1     chlorthalidone (HYGROTON) 25 MG tablet 1 TABLET ORALLY EVERY MORNING  (DX: DIURETIC ) 90 tablet 3     clomiPRAMINE (ANAFRANIL) 25 MG capsule 1 CAPSULE ORALLY AT BEDTIME (DX: DEPRESSION) 28 capsule 10     clopidogrel (PLAVIX) 75 MG tablet 1 TABLET ORALLY EVERY MORNING  (DX: HEART  ) 90 tablet 3     gabapentin (NEURONTIN) 400 MG capsule 1 CAPSULE ORALLY 2 TIMES DAILY FOR NERVES 180 capsule 3     ketoconazole (NIZORAL) 2 % external cream APPLY TOPICALLY TO INFLAMED SKIN 2 TIMES DAILY   (DX: INFLAMED SKIN ) 30 g 10     lactulose (CHRONULAC) 10 GM/15ML solution DRINK 30ML  ORALLY DAILY (DX:  "CONSTIPATION);DRINK 30ML  ORALLY DAILY AS NEEDED (DX: CONSTIPATION) 473 mL 3     levothyroxine (SYNTHROID/LEVOTHROID) 100 MCG tablet 1 TABLET ORALLY DAILY  (DX: THYROID) 90 tablet 3     melatonin 3 MG tablet 1 TABLET ORALLY AT BEDTIME AS NEEDED  (DX: SLEEP ) 30 tablet 10     mirtazapine (REMERON) 45 MG tablet 1 TABLET ORALLY AT BEDTIME (DX: DEPRESSION) 30 tablet 10     OPTIVE 0.5-0.9 % ophthalmic solution INSTILL 1 DROP INTO BOTH EYES 2 TIMES DAILY  (DX: DRY EYES) 15 mL 11     SENEXON-S 8.6-50 MG tablet 1 TABLET ORALLY DAILY AS NEEDED (DX: CONSTIPATION) 30 tablet 10     simvastatin (ZOCOR) 20 MG tablet 1 TABLET ORALLY AT BEDTIME   (DX: CHOLESTEROL) 90 tablet 3     sodium chloride (DEEP SEA NASAL SPRAY) 0.65 % nasal spray INSTILL SPRAY(S) INTO NOSTRIL(S) 3 TIMES DAILY 44 mL 11     SOLUBLE FIBER THERAPY powder TAKE 1 TEASPOONFUL ORALLY DAILY (DX: CONSTIPATION) 454 g 10               Review of Systems:   The 10 point Review of Systems is negative other than noted in the HPI           Physical Exam:   Blood pressure 112/56, pulse 59, temperature 97.7  F (36.5  C), temperature source Temporal, resp. rate 16, height 1.854 m (6' 1\"), weight 83.5 kg (184 lb), SpO2 93 %.    Exam:  Constitutional: healthy appearing, alert and in no distress  Heent: Normocephalic. Head without obvious masses or lesions. PERRLDC, EOMI. Mouth exam within normal limits: tongue, mucous membranes, posterior pharynx all normal, no lesions or abnormalities seen.  Tm's and canals within normal limits bilaterally. Neck supple, no nuchal rigidity or masses. No supraclavicular, or cervical adenopathy. Thyroid symmetric, no masses.  Cardiovascular: Regular rate and rhythm, no murmer, rub or gallops.  JVP not elevated, no edema.  Carotids within normal limits bilaterally, no bruits.  Respiratory: Normal respiratory effort.  Lungs clear, normal flow, no wheezing or crackles.  Breasts: Normal bilaterally.  No masses or lesions.  Nipples within normal " "limites.  No axillary lesions or nodes.  Gastrointestinal: Normal active bowel sounds.   Soft, not tender, no masses, guarding or rebound.  No hepatosplenomegaly.  He does have a very soft nontender hernia above the umbilicus.  Genitourinary: Rectal not done  Musculoskeletal: extremities normal, no gross deformities noted.  Skin: no suspicious lesions or rashes but somewhat dry and flaky.  Neurologic: Mental status within normal limits.  Speech fluent.  No gross motor abnormalities and gait intact.  Psychiatric: mentation appears normal and affect normal.         Data:   Labs sent        Assessment:   1. Normal complete physical exam  2. Parkinsons, labs done per neuro, stable  3. Psyche issues, stable  4. Cad, med mgmt  5. Hypothyroidism, follow up tsh  6. Thor aneurysm, no follow up indicated as nothing we would do about it  7. Ckd, follow up labs  8. Hypertension, controlled  9. Prior cvi, med mgmt  10. Elevated cholesterol, on statin  11. Low vit d, follow up today  12. hcm         Plan:   covid booster  Letter with labs  Follow up specialists  Call if problems      Zurdo Kendrick M.D.             No data to display              Are you in the first 12 months of your Medicare coverage?  No    Healthy Habits:    In general, how would you rate your overall health?  Good    Frequency of exercise:  4-5 days/week    Duration of exercise:  30-45 minutes    Do you usually eat at least 4 servings of fruit and vegetables a day, include whole grains    & fiber and avoid regularly eating high fat or \"junk\" foods?  No    Taking medications regularly:  Yes    Barriers to taking medications:  None    Medication side effects:  None    Ability to successfully perform activities of daily living:  No assistance needed    Home Safety:  No safety concerns identified    Hearing Impairment:  No hearing concerns    In the past 6 months, have you been bothered by leaking of urine?  No    In general, how would you rate your overall " mental or emotional health?  Very good      PHQ-2 Total Score:    Additional concerns today:  No        Have you ever done Advance Care Planning? (For example, a Health Directive, POLST, or a discussion with a medical provider or your loved ones about your wishes): Yes, advance care planning is on file.       Fall risk       Cognitive Screening   1) Repeat 3 items (Leader, Season, Table)    2) Clock draw: NORMAL  3) 3 item recall: Recalls 3 objects  Results: 3 items recalled: COGNITIVE IMPAIRMENT LESS LIKELY    Mini-CogTM Copyright LANRE Graham. Licensed by the author for use in [employer]; reprinted with permission (soob@[facility]). All rights reserved.      Do you have sleep apnea, excessive snoring or daytime drowsiness?: no    Reviewed and updated as needed this visit by clinical staff                  Reviewed and updated as needed this visit by Provider                 Social History     Tobacco Use     Smoking status: Former     Packs/day: 1.00     Years: 5.00     Pack years: 5.00     Types: Cigarettes     Quit date: 1965     Years since quittin.0     Smokeless tobacco: Never   Substance Use Topics     Alcohol use: Yes     Alcohol/week: 0.0 standard drinks of alcohol     Comment: maybe one glass of wine a week              No data to display                   No data to display              Do you have a current opioid prescription? No  Do you use any other controlled substances or medications that are not prescribed by a provider? None              Current providers sharing in care for this patient include:   Patient Care Team:  Zurdo Kendrick MD as PCP - General (Internal Medicine)  Zurdo Kendrick MD as Assigned PCP  Tello, Matthew HAYES MD as Referring Physician (Ophthalmology)  Raphael Barrios MD as MD (Ophthalmology)  Ara Acosta DO as MD (Neurology)  Anila Story MD as Assigned Surgical Provider  Keshia Bowman as Psychiatrist (Psychiatry & Neurology -  "Neurology)  Amadeo Madrigal MD as Assigned Neuroscience Provider    The following health maintenance items are reviewed in Epic and correct as of today:  Health Maintenance   Topic Date Due     MICROALBUMIN  Never done     MEDICARE ANNUAL WELLNESS VISIT  01/27/2021     LIPID  01/27/2021     COVID-19 Vaccine (6 - Moderna series) 01/19/2023     HEMOGLOBIN  01/15/2024     BMP  01/23/2024     TSH W/FREE T4 REFLEX  01/23/2024     ANNUAL REVIEW OF HM ORDERS  05/04/2024     FALL RISK ASSESSMENT  05/26/2024     ADVANCE CARE PLANNING  07/19/2027     DTAP/TDAP/TD IMMUNIZATION (3 - Td or Tdap) 09/19/2032     PHQ-2 (once per calendar year)  Completed     INFLUENZA VACCINE  Completed     Pneumococcal Vaccine: 65+ Years  Completed     URINALYSIS  Completed     ZOSTER IMMUNIZATION  Completed     IPV IMMUNIZATION  Aged Out     MENINGITIS IMMUNIZATION  Aged Out               Review of Systems      OBJECTIVE:   There were no vitals taken for this visit. Estimated body mass index is 24.63 kg/m  as calculated from the following:    Height as of 5/26/23: 1.854 m (6' 1\").    Weight as of 5/26/23: 84.7 kg (186 lb 11.2 oz).  Physical Exam          ASSESSMENT / PLAN:             COUNSELING:  Reviewed preventive health counseling, as reflected in patient instructions       Regular exercise        He reports that he quit smoking about 58 years ago. His smoking use included cigarettes. He has a 5.00 pack-year smoking history. He has never used smokeless tobacco.      Appropriate preventive services were discussed with this patient, including applicable screening as appropriate for cardiovascular disease, diabetes, osteopenia/osteoporosis, and glaucoma.  As appropriate for age/gender, discussed screening for colorectal cancer, prostate cancer, breast cancer, and cervical cancer. Checklist reviewing preventive services available has been given to the patient.    Reviewed patients plan of care and provided an AVS. The Basic Care Plan " (routine screening as documented in Health Maintenance) for Zack meets the Care Plan requirement. This Care Plan has been established and reviewed with the Patient and other:ace.          Zurdo Kendrick MD  [facility]    Identified Health Risks:    I have reviewed Opioid Use Disorder and Substance Use Disorder risk factors and made any needed referrals.

## 2023-06-26 NOTE — TELEPHONE ENCOUNTER
Spoke with Jayashree and relayed message from Dr. Lama.    MOJGAN Noriega  [facility]

## 2023-06-26 NOTE — TELEPHONE ENCOUNTER
FYI - Status Update    Who is Calling: nurse, [facility]    Update: Pt was on his way back into his assisted living facility from [place] with PCP on 06.26.23. On his way in, started his knees felt weak and Pt fell. Pt did not call, no noted injuries. Was able to get up and walk on his own a few steps but staff provided a wheelchair to get to the rest of the way to his room    Vitals taken at 2:20pm: /67, pulse 73, temp 98.1, resp 18, O2 92    Does caller want a call/response back: No

## 2023-06-26 NOTE — TELEPHONE ENCOUNTER
Please monitor his vitals and clinical status for the next 12- 24 hours and if things change he should go to the ER.    Zurdo Kendrick M.D.

## 2023-06-27 LAB
ALBUMIN SERPL ELPH-MCNC: 3.9 G/DL (ref 3.7–5.1)
ALPHA1 GLOB SERPL ELPH-MCNC: 0.3 G/DL (ref 0.2–0.4)
ALPHA2 GLOB SERPL ELPH-MCNC: 0.7 G/DL (ref 0.5–0.9)
B-GLOBULIN SERPL ELPH-MCNC: 0.8 G/DL (ref 0.6–1)
GAMMA GLOB SERPL ELPH-MCNC: 0.9 G/DL (ref 0.7–1.6)
M PROTEIN SERPL ELPH-MCNC: 0 G/DL
PROT PATTERN SERPL ELPH-IMP: NORMAL

## 2023-06-29 LAB — VIT B1 PYROPHOSHATE BLD-SCNC: 134 NMOL/L

## 2023-06-29 NOTE — RESULT ENCOUNTER NOTE
Zack,    It was very nice to see you.  You should be able to view all of your test results.    Your CBC your complete blood count is very stable with a normal white count and platelet count and slightly low hemoglobin.  This has not changed in quite some time and is something that we do not need to worry about but I will monitor it.    Your chemistry panel shows no diabetes.  The blood sugar is just slightly high but not a problem.  Your blood salts and kidney tests look fine as well.    Your protein studies are normal as is your vitamin B-1 level, vitamin D level, vitamin B12 level, and thyroid test.    I am happy to bring you this excellent report.  Please let me know if you have questions.    Zurdo

## 2023-06-30 LAB — PYRIDOXAL PHOS SERPL-SCNC: 57.8 NMOL/L

## 2023-06-30 NOTE — RESULT ENCOUNTER NOTE
Hello -    I'm covering for Dr. Kendrick today:    Here are my comments about the recent results: vitamin B6 level was normal.     Please let us know if you have any questions or concerns.    Regards,  Mira Keyes PA-C

## 2023-07-21 ENCOUNTER — MEDICAL CORRESPONDENCE (OUTPATIENT)
Dept: HEALTH INFORMATION MANAGEMENT | Facility: CLINIC | Age: 88
End: 2023-07-21

## 2023-07-27 ENCOUNTER — MEDICAL CORRESPONDENCE (OUTPATIENT)
Dept: HEALTH INFORMATION MANAGEMENT | Facility: CLINIC | Age: 88
End: 2023-07-27

## 2023-08-01 ENCOUNTER — TRANSFERRED RECORDS (OUTPATIENT)
Dept: HEALTH INFORMATION MANAGEMENT | Facility: CLINIC | Age: 88
End: 2023-08-01
Payer: COMMERCIAL

## 2023-08-21 ENCOUNTER — TELEPHONE (OUTPATIENT)
Dept: NEUROLOGY | Facility: CLINIC | Age: 88
End: 2023-08-21

## 2023-08-21 ENCOUNTER — OFFICE VISIT (OUTPATIENT)
Dept: NEUROLOGY | Facility: CLINIC | Age: 88
End: 2023-08-21
Payer: COMMERCIAL

## 2023-08-21 VITALS — SYSTOLIC BLOOD PRESSURE: 133 MMHG | HEART RATE: 69 BPM | DIASTOLIC BLOOD PRESSURE: 66 MMHG | OXYGEN SATURATION: 99 %

## 2023-08-21 DIAGNOSIS — G20.A1 PARKINSON'S DISEASE (H): Primary | ICD-10-CM

## 2023-08-21 DIAGNOSIS — R25.1 TREMOR: ICD-10-CM

## 2023-08-21 PROCEDURE — 99214 OFFICE O/P EST MOD 30 MIN: CPT | Mod: GC | Performed by: PSYCHIATRY & NEUROLOGY

## 2023-08-21 RX ORDER — CARBIDOPA AND LEVODOPA 25; 100 MG/1; MG/1
1.5 TABLET ORAL 3 TIMES DAILY
Qty: 405 TABLET | Refills: 0 | Status: SHIPPED | OUTPATIENT
Start: 2023-08-21

## 2023-08-21 ASSESSMENT — MOVEMENT DISORDERS SOCIETY - UNIFIED PARKINSONS DISEASE RATING SCALE (MDS-UPDRS)
TREMOR: (3) MODERATE: SHAKING OR TREMOR CAUSES PROBLEMS WITH MANY OF MY DAILY ACTIVITIES.
CHEWING_AND_SWALLOWING: (0) NORMAL: NO PROBLEMS.
TURNING_IN_BED: (0) NORMAL: NOT AT ALL (NO PROBLEMS).
FREEZING: (0) NORMAL: NOT AT ALL (NO PROBLEMS).
DRESSING: (3) MODERATE: I NEED HELP FOR MANY DRESSING TASKS.
TOTAL_SCORE: 22
HOBBIES_AND_OTHER_ACTIVITIES: (1) SLIGHT: I AM A BIT SLOW BUT DO THESE ACTIVITIES EASILY.
SPEECH: (0) NORMAL: NOT AT ALL (NO PROBLEMS).
WALKING_AND_BALANCE: (3) MODERATE: I USUALLY USE A WALKING AID (CANE, WALKER) TO WALK SAFELY WITHOUT FALLING.  HOWEVER, I DO NOT USUALLY NEED THE SUPPORT OF ANOTHER PERSON.
GETTING_OUT_OF_BED_CAR_DEEP_CHAIR: (3) MODERATE: I SOMETIMES NEED HELP TO GET UP, BUT MOST TIMES I CAN STILL DO IT ON MY OWN.
SALIVA_AND_DROOLING: (0) NORMAL: NOT AT ALL (NO PROBLEMS).
EATING_TASKS: (2) MILD: I AM SLOW WITH MY EATING AND HAVE OCCASIONAL FOOD SPILLS.  I MAY NEED HELP WITH A FEW TASKS SUCH AS CUTTING MEAT.
HANDWRITING: (4) SEVERE: MOST OR ALL WORDS CANNOT BE READ.
HYGIENE: (3) MODERATE: I NEED HELP FOR MANY HYGIENE TASKS.

## 2023-08-21 ASSESSMENT — UNIFIED PARKINSONS DISEASE RATING SCALE (UPDRS)
GAIT: (4) SEVERE: CANNOT WALK AT ALL OR ONLY WITH ANOTHER PERSON'S ASSISTANCE.
RIGIDITY_RUE: (2) MILD: RIGIDITY DETECTED WITHOUT THE ACTIVATION MANEUVER. FULL RANGE OF MOTION IS EASILY ACHIEVED.
TOTAL_SCORE: 16
FREEZING_GAIT: (0) NORMAL: NO FREEZING.
AMPLITUDE_RUE: (1) SLIGHT: < 1 CM IN MAXIMAL AMPLITUDE.
POSTURE: (3) MODERATE: STOOPED POSTURE, SCOLIOSIS, OR LEANING TO ONE SIDE THAT CANNOT BE CORRECTED VOLITIONALLY TO A NORMAL POSTURE BY THE PATIENT.
FINGER_TAPPING_LEFT: (3) MODERATE: ANY OF THE FOLLOWING: A) MORE THAN 5 INTERRUPTIONS OR AT LEAST ONE LONGER ARREST (FREEZE) IN ONGOING MOVEMENT  B) MODERATE SLOWING  C) THE AMPLITUDE DECREMENTS STARTING AFTER THE FIRST MOVEMENT.
RIGIDITY_NECK: (2) MILD: RIGIDITY DETECTED WITHOUT THE ACTIVATION MANEUVER. FULL RANGE OF MOTION IS EASILY ACHIEVED.
PRONATION_SUPINATION_LEFT: (1) SLIGHT: ANY OF THE FOLLOWING: A) THE REGULAR RHYTHM IS BROKEN WITH ONE WITH ONE OR TWO INTERRUPTIONS OR HESITATIONS OF THE MOVEMENT  B) SLIGHT SLOWING  C) THE AMPLITUDE DECREMENTS NEAR THE END OF THE 10 MOVEMENTS.
TOETAPPING_LEFT: (1) SLIGHT: ANY OF THE FOLLOWING: A) THE REGULAR RHYTHM IS BROKEN WITH ONE WITH ONE OR TWO INTERRUPTIONS OR HESITATIONS OF THE MOVEMENT  B) SLIGHT SLOWING  C) THE AMPLITUDE DECREMENTS NEAR THE END OF THE 10 MOVEMENTS.
SPEECH: (2) MILD: LOSS OF MODULATION, DICTION OR VOLUME, WITH A FEW WORDS UNCLEAR, BUT THE OVERALL SENTENCES EASY TO FOLLOW.
CONSTANCY_TREMOR_ATREST: (2) MILD: TREMOR AT REST IS PRESENT 25-50% OF THE ENTIRE EXAMINATION PERIOD.
AMPLITUDE_LIP_JAW: (1) SLIGHT: < 1 CM IN MAXIMAL AMPLITUDE.
LEG_AGILITY_LEFT: (0) NORMAL: NO PROBLEMS.
FACIAL_EXPRESSION: (4) SEVERE: MASKED FACIES WITH LIPS PARTED MOST OF THE TIME WHEN THE MOUTH IS AT REST.
AMPLITUDE_LLE: (0) NORMAL: NO TREMOR.
TOTAL_SCORE: 51
POSTURAL_STABILITY: (3) MODERATE: STANDS SAFELY, BUT WITH ABSENCE OF POSTURAL RESPONSE, FALLS IF NOT CAUGHT BY EXAMINER.
LEG_AGILITY_RIGHT: (0) NORMAL: NO PROBLEMS.
AMPLITUDE_LUE: (0) NORMAL: NO TREMOR.
MOVEMENTS_INTERFERE_WITH_RATINGS: NO
TOETAPPING_RIGHT: (1) SLIGHT: ANY OF THE FOLLOWING: A) THE REGULAR RHYTHM IS BROKEN WITH ONE WITH ONE OR TWO INTERRUPTIONS OR HESITATIONS OF THE MOVEMENT  B) SLIGHT SLOWING  C) THE AMPLITUDE DECREMENTS NEAR THE END OF THE 10 MOVEMENTS.
SPONTANEITY_OF_MOVEMENT: (2) MILD: MILD GLOBAL SLOWNESS AND POVERTY OF SPONTANEOUS MOVEMENTS.
TOTAL_SCORE_LEFT: 11
DYSKINESIAS_PRESENT: NO
AMPLITUDE_RLE: (0) NORMAL: NO TREMOR.
HANDMOVEMENTS_LEFT: (1) SLIGHT: ANY OF THE FOLLOWING: A) THE REGULAR RHYTHM IS BROKEN WITH ONE WITH ONE OR TWO INTERRUPTIONS OR HESITATIONS OF THE MOVEMENT  B) SLIGHT SLOWING  C) THE AMPLITUDE DECREMENTS NEAR THE END OF THE 10 MOVEMENTS.
HANDMOVEMENTS_RIGHT: (3) MODERATE: ANY OF THE FOLLOWING: A) MORE THAN 5 INTERRUPTIONS OR AT LEAST ONE LONGER ARREST (FREEZE) IN ONGOING MOVEMENT  B) MODERATE SLOWING  C) THE AMPLITUDE DECREMENTS STARTING AFTER THE FIRST MOVEMENT.
RIGIDITY_LUE: (1) SLIGHT: RIGIDITY ONLY DETECTED WITH ACTIVATION MANEUVER.
ARISING_CHAIR: (1) SLIGHT: ARISING IS SLOWER THAN NORMAL, OR MAY NEED MORE THAN ONE ATTEMPT, OR MAY NEED TO MOVE FORWARD IN THE CHAIR TO ARISE. NO NEED TO USE THE ARMS OF THE CHAIR.
AXIAL_SCORE: 21
RIGIDITY_LLE: (2) MILD: RIGIDITY DETECTED WITHOUT THE ACTIVATION MANEUVER. FULL RANGE OF MOTION IS EASILY ACHIEVED.
PRONATION_SUPINATION_RIGHT: (2) MILD: ANY OF THE FOLLOWING: A) 3 TO 5 INTERRUPTIONS DURING TAPPING  B) MILD SLOWING  C) THE AMPLITUDE DECREMENTS MIDWAY IN THE 10-MOVEMENT SEQUENCE.
FINGER_TAPPING_RIGHT: (3) MODERATE: ANY OF THE FOLLOWING: A) MORE THAN 5 INTERRUPTIONS OR AT LEAST ONE LONGER ARREST (FREEZE) IN ONGOING MOVEMENT  B) MODERATE SLOWING  C) THE AMPLITUDE DECREMENTS STARTING AFTER THE FIRST MOVEMENT.
RIGIDITY_RLE: (2) MILD: RIGIDITY DETECTED WITHOUT THE ACTIVATION MANEUVER. FULL RANGE OF MOTION IS EASILY ACHIEVED.
PARKINSONS_MEDS: ON

## 2023-08-21 NOTE — PROGRESS NOTES
Department of Neurology  Movement Disorders Division   Return Patient Visit    Patient: Zack Santiago   MRN: [id number]   : 1931   Date of Visit: 2023  PCP: Zurdo Kendrick MD   Referring provider: No ref. provider found    CC:  Parkinsonism return    Interval history:  Mr. Santiago is a 89-year- old male with history significant for longterm Abilify exposure (now off as of ) and suspected Parkinson disease who presents to movement disorder clinic for parkinsonism. Last visit was 23, with a plan to increase Sinemet. He is accompanied by his son-in-law.    Overall he endorses some improvement in his symptoms. His R hand remains the most problematic, but he is able to do more in terms of: personal hygiene, feeding, drinking from a cup, as well as all other activities of daily living.     He is taking 3 tabs TID of sinemet 25/100. He is taking them at 0800, 1200, and 1600; he says the facility is good about giving his medications on time.    He endorses significant significant side effects from levodopa when he tried 2 tabs TID: hand movements, teeth clenching, tongue biting. Denies lightheadedness, sleepiness, ICD. He is not sure if he has tried 1.5 tabs TID.    No interval issues with falls, swallowing difficulties, worsening his speech, or any other problems to be discussed today.    Does bed and chair exercises daily. Walks infrequently, mostly in his apartment. Goes to PT and uses [facility] once a week, this is facilitated by his facility.        2023     1:00 PM   UPDRS EDL Scale   2.1  Speech 0   2.2  Salivation 0   2.3  Chew & Swallow 0   2.4  Eating                  2   2.5  Dressing                3   2.6  Hygiene                 3   2.7  Handwriting             4   2.8  Hobbies etc.            1   2.9  Turn in bed             0   2.10 Tremor                  3   2.11 Stand from chair        3   2.12 Walking & Balance  3   2.13 Freezing                0   MDS-UPDRS II Total Score 22          ROS:  All others negative except as listed above. Pertinent movement disorders-specific ROS listed above.      Current Outpatient Medications:     acetaminophen (TYLENOL) 500 MG tablet, Take 1 or 2 tablets every 8 hours as needed for pain Do not exceed 3000mg in 24 hours, Disp: 100 tablet, Rfl: 0    acetaminophen (TYLENOL) 500 MG tablet, Take 1-2 tablets (500-1,000 mg) by mouth every 8 hours as needed for mild pain, Disp: , Rfl:     carbidopa-levodopa (SINEMET)  MG tablet, Take 1 tablet by mouth 3 times daily, Disp: 270 tablet, Rfl: 1    chlorthalidone (HYGROTON) 25 MG tablet, 1 TABLET ORALLY EVERY MORNING  (DX: DIURETIC ), Disp: 90 tablet, Rfl: 3    clomiPRAMINE (ANAFRANIL) 25 MG capsule, 1 CAPSULE ORALLY AT BEDTIME (DX: DEPRESSION), Disp: 28 capsule, Rfl: 10    clopidogrel (PLAVIX) 75 MG tablet, 1 TABLET ORALLY EVERY MORNING  (DX: HEART  ), Disp: 90 tablet, Rfl: 3    gabapentin (NEURONTIN) 400 MG capsule, 1 CAPSULE ORALLY 2 TIMES DAILY FOR NERVES, Disp: 180 capsule, Rfl: 3    ketoconazole (NIZORAL) 2 % external cream, APPLY TOPICALLY TO INFLAMED SKIN 2 TIMES DAILY   (DX: INFLAMED SKIN ), Disp: 30 g, Rfl: 10    lactulose (CHRONULAC) 10 GM/15ML solution, DRINK 30ML  ORALLY DAILY (DX: CONSTIPATION);DRINK 30ML  ORALLY DAILY AS NEEDED (DX: CONSTIPATION), Disp: 473 mL, Rfl: 3    levothyroxine (SYNTHROID/LEVOTHROID) 100 MCG tablet, 1 TABLET ORALLY DAILY  (DX: THYROID), Disp: 90 tablet, Rfl: 3    melatonin 3 MG tablet, 1 TABLET ORALLY AT BEDTIME AS NEEDED  (DX: SLEEP ), Disp: 30 tablet, Rfl: 10    mirtazapine (REMERON) 45 MG tablet, 1 TABLET ORALLY AT BEDTIME (DX: DEPRESSION), Disp: 30 tablet, Rfl: 10    OPTIVE 0.5-0.9 % ophthalmic solution, INSTILL 1 DROP INTO BOTH EYES 2 TIMES DAILY  (DX: DRY EYES), Disp: 15 mL, Rfl: 11    SENEXON-S 8.6-50 MG tablet, 1 TABLET ORALLY DAILY AS NEEDED (DX: CONSTIPATION), Disp: 30 tablet, Rfl: 10    simvastatin (ZOCOR) 20 MG tablet, 1 TABLET ORALLY AT BEDTIME   (DX:  CHOLESTEROL), Disp: 90 tablet, Rfl: 3    sodium chloride (DEEP SEA NASAL SPRAY) 0.65 % nasal spray, INSTILL SPRAY(S) INTO NOSTRIL(S) 3 TIMES DAILY, Disp: 44 mL, Rfl: 11    SOLUBLE FIBER THERAPY powder, TAKE 1 TEASPOONFUL ORALLY DAILY (DX: CONSTIPATION), Disp: 454 g, Rfl: 10     No Known Allergies       PHYSICAL EXAM:  /66   Pulse 69   SpO2 99%      NEURO:  Last sinemet taken at 1200      8/21/2023     1:00 PM   UPDRS Motor Scale   Time: 13:50   Medication On   R Brain DBS: None   L Brain DBS: None   Dyskinesia (LID) No   Did LID interfere No   Speech 2   Facial Expression 4   Rigidity Neck 2   Rigidity RUE 2   Rigidity LUE 1   Rigidity RLE 2   Rigidity LLE 2   Finger Taps R 3   Finger Taps L 3   Hand Mvt R 3   Hand Mvt L 1   Pron-/Supinate R 2   Pron-/Supinate L 1   Toe Tap R 1   Toe Tap L 1   Leg Agility R 0   Leg Agility L 0   Arise From Chair 1   Gait 4   Gait Freezing 0   Postural Stability 3   Posture 3   Global Spont Mvt 2   Postural Tremor RUE 1   Postural Tremor LUE 1   Kinetic Tremor RUE 1   Kinetic Tremor LUE 1   Rest Tremor RUE 1   Rest Tremor LUE 0   Rest Tremor RLE 0   Rest Tremor LLE 0   Rest Tremor Lip/Jaw 1   Rest Tremor Constancy 2   Total Right 16   Total Left 11   Axial Total 21   Total 51       Last 55 on 4/17/23      DATA REVIEWED:  No pertinent data available.    ASSESSMENT:  89-year-old male with suspected Parkinson disease, status post initiation of carbidopa levodopa.      At this point he is having some benefit to medication but with intolerable side effects at relatively low doses. Objectively there does seem to be some slight improvement compared to prior evaluations.    He unfortunately did not tolerate higher doses of levodopa (2 tabs TID) and there is concern from the patient that the facility may not be able to split pills. We will plan to communicate with the facility and confirm whether 1.5 tabs TID is feasible.     If this is not a good longterm solution, we can consider a  trial of amantadine with the caveat that it may induce additional intolerable side effects given his age.    PLAN:  - trial sinemet 25/100 1.5 tab TID  - consider amantadine 100mg BID if the above is not tolerated or not feasible    Return to clinic in 3mo    Baltazar Cunningham MD  Fellow  Movement Disorders Division  [facility] Department of Neurology

## 2023-08-21 NOTE — TELEPHONE ENCOUNTER
Patient and son in law (Marcelino) reporting that Zack is not receiving his medications as directed by the provider. The care home he is currently at is not providing him with the adequate dosage of medication. Patient is expressing high concerns of incorrect medications.   - (Levadopa) 1.5 tabs TID is feasible: per doctors notes.     Patient is requesting nurse to contact facility and discuss doses being given to patient.  Will send to Neurology RN to look into further. Patient would also like a check in call regarding his meds from a nurse to make sure everything is going okay.    Facility Information: [facility] - [phone], Fax: [phone]       Kika Nixon MA

## 2023-08-21 NOTE — PATIENT INSTRUCTIONS
We will try to give you just a bit more medication to see if you can get more benefit without bad side effects.    We will communicate with the facility to ensure they can do 1.5 tabs three times a day.    If not, we can try an additional medication to help with these symptoms.

## 2023-08-21 NOTE — TELEPHONE ENCOUNTER
Per chart review pt was to be taking carbidopa-levodopa (SINEMET)  MG tablet 1 tablet 3 times daily.      At today's visit, Dr. Jackson recommended increasing the medication to 1.5 tablets 3 times daily.      RN called [facility] for Jayashree Martin to see what pt's current dosage was. RN requested call back to discuss further.     Per further chart review, [facility] needs ALL updated medications to be sent to [facility], and script can be faxed to them as well at [phone].      RN will route to provider to see if he would like to send updated script to pharmacy.     Jennifer IQBAL RN, BSN  [facility]

## 2023-08-21 NOTE — PROGRESS NOTES
I, Amadeo Tavera MD, personally interviewed, examined and evaluated this patient. I personally reviewed the vital signs, medications and pertinent labs/imaging. I discussed the patient with the fellow and agree with the assessment, examination and plan of care documented, with the additions and/or exceptions delineated below:    Patient comes back for another evaluation.  He is on Sinemet 25/100 mg 1 tablet 3 times a day.  He denies any side effects to it.  He seems to be engaging physical therapy and regulates he was a little bit better.  Still has a good amount of residual parkinsonian symptoms.  He still try to exercise much as he can he is still participating in physical therapy more regularly.    Last visit we plan on increasing his dosage from 1 tablet to 1.5 tablets, however it seems like the instead of getting 1.5 labs he rather received 2 tablets straightaway, which led to reported side effects of involuntary movements affecting his face upper extremities and trunk.  Those were suspect to be dyskinesias and upon reduce medication back down to 1 tablet per dose they subsided immediately.  Therefore he remained in 1 tablet per dose until the appointment today.    His examination does look a little bit better than what is been previously documented, his amplitude movements a little bit better and less tremors are appreciated on examination today.  Gait still remains largely impaired.    I do suspect a parkinsonian disorder with stretcher and there is some levodopa response.  Unfortunately has not been able to tolerate higher doses, however he remains unclear whether he actually try 1.5 tablets versus 2 tablets, so he might be worth trying the 1.5 tablet dosage 3 times a day again.    We will go ahead and try to send in a prescription 1.5 tablets of the 25/100 mg carbidopa/levodopa to be taken 3 times a day.  We will try to coordinate in the facility and see if that is a possibility.  If Sinemet at  1.5 tablets is not a possibility, we may entertain the possibility of trying amantadine 100 mg twice daily.  We will prefer to stay on monotherapy if possible to mitigate potential chance of interaction with the other medicine he might be taking.    Otherwise continue supportive care.  We will see him back in about 2 to 3 months, sooner if needed.  He was encouraged to contact us back if any questions, concerns or any other issues in the meantime.    Attending attestation: Today a total 30 minutes were spent on this case, with greater than 50% of the time spent in face-to-face, examining, obtaining history, providing education and coordination of care. Additional time was spent in chart review, ancillary data, and documentation as delineated above.

## 2023-08-21 NOTE — LETTER
2023         RE: Zack Santiago  [address] [address] [address]        Dear Colleague,    Thank you for referring your patient, Zack Santiago, to the [facility]UROLOG[facility]. Please see a copy of my visit note below.    Department of Neurology  Movement Disorders Division   Return Patient Visit    Patient: Zack Santiago   MRN: [id number]   : 1931   Date of Visit: 2023  PCP: Zurdo Kendrick MD   Referring provider: No ref. provider found    CC:  Parkinsonism return    Interval history:  Mr. Santiago is a 89-year- old male with history significant for longterm Abilify exposure (now off as of ) and suspected Parkinson disease who presents to movement disorder clinic for parkinsonism. Last visit was 23, with a plan to increase Sinemet. He is accompanied by his son-in-law.    Overall he endorses some improvement in his symptoms. His R hand remains the most problematic, but he is able to do more in terms of: personal hygiene, feeding, drinking from a cup, as well as all other activities of daily living.     He is taking 3 tabs TID of sinemet 25/100. He is taking them at 0800, 1200, and 1600; he says the facility is good about giving his medications on time.    He endorses significant significant side effects from levodopa when he tried 2 tabs TID: hand movements, teeth clenching, tongue biting. Denies lightheadedness, sleepiness, ICD. He is not sure if he has tried 1.5 tabs TID.    No interval issues with falls, swallowing difficulties, worsening his speech, or any other problems to be discussed today.    Does bed and chair exercises daily. Walks infrequently, mostly in his apartment. Goes to PT and uses [facility] once a week, this is facilitated by his facility.        2023     1:00 PM   UPDRS EDL Scale   2.1  Speech 0   2.2  Salivation 0   2.3  Chew & Swallow 0   2.4  Eating                  2   2.5  Dressing                3   2.6   Hygiene                 3   2.7  Handwriting             4   2.8  Hobbies etc.            1   2.9  Turn in bed             0   2.10 Tremor                  3   2.11 Stand from chair        3   2.12 Walking & Balance  3   2.13 Freezing                0   MDS-UPDRS II Total Score 22         ROS:  All others negative except as listed above. Pertinent movement disorders-specific ROS listed above.      Current Outpatient Medications:      acetaminophen (TYLENOL) 500 MG tablet, Take 1 or 2 tablets every 8 hours as needed for pain Do not exceed 3000mg in 24 hours, Disp: 100 tablet, Rfl: 0     acetaminophen (TYLENOL) 500 MG tablet, Take 1-2 tablets (500-1,000 mg) by mouth every 8 hours as needed for mild pain, Disp: , Rfl:      carbidopa-levodopa (SINEMET)  MG tablet, Take 1 tablet by mouth 3 times daily, Disp: 270 tablet, Rfl: 1     chlorthalidone (HYGROTON) 25 MG tablet, 1 TABLET ORALLY EVERY MORNING  (DX: DIURETIC ), Disp: 90 tablet, Rfl: 3     clomiPRAMINE (ANAFRANIL) 25 MG capsule, 1 CAPSULE ORALLY AT BEDTIME (DX: DEPRESSION), Disp: 28 capsule, Rfl: 10     clopidogrel (PLAVIX) 75 MG tablet, 1 TABLET ORALLY EVERY MORNING  (DX: HEART  ), Disp: 90 tablet, Rfl: 3     gabapentin (NEURONTIN) 400 MG capsule, 1 CAPSULE ORALLY 2 TIMES DAILY FOR NERVES, Disp: 180 capsule, Rfl: 3     ketoconazole (NIZORAL) 2 % external cream, APPLY TOPICALLY TO INFLAMED SKIN 2 TIMES DAILY   (DX: INFLAMED SKIN ), Disp: 30 g, Rfl: 10     lactulose (CHRONULAC) 10 GM/15ML solution, DRINK 30ML  ORALLY DAILY (DX: CONSTIPATION);DRINK 30ML  ORALLY DAILY AS NEEDED (DX: CONSTIPATION), Disp: 473 mL, Rfl: 3     levothyroxine (SYNTHROID/LEVOTHROID) 100 MCG tablet, 1 TABLET ORALLY DAILY  (DX: THYROID), Disp: 90 tablet, Rfl: 3     melatonin 3 MG tablet, 1 TABLET ORALLY AT BEDTIME AS NEEDED  (DX: SLEEP ), Disp: 30 tablet, Rfl: 10     mirtazapine (REMERON) 45 MG tablet, 1 TABLET ORALLY AT BEDTIME (DX: DEPRESSION), Disp: 30 tablet, Rfl: 10     OPTIVE 0.5-0.9  % ophthalmic solution, INSTILL 1 DROP INTO BOTH EYES 2 TIMES DAILY  (DX: DRY EYES), Disp: 15 mL, Rfl: 11     SENEXON-S 8.6-50 MG tablet, 1 TABLET ORALLY DAILY AS NEEDED (DX: CONSTIPATION), Disp: 30 tablet, Rfl: 10     simvastatin (ZOCOR) 20 MG tablet, 1 TABLET ORALLY AT BEDTIME   (DX: CHOLESTEROL), Disp: 90 tablet, Rfl: 3     sodium chloride (DEEP SEA NASAL SPRAY) 0.65 % nasal spray, INSTILL SPRAY(S) INTO NOSTRIL(S) 3 TIMES DAILY, Disp: 44 mL, Rfl: 11     SOLUBLE FIBER THERAPY powder, TAKE 1 TEASPOONFUL ORALLY DAILY (DX: CONSTIPATION), Disp: 454 g, Rfl: 10     No Known Allergies       PHYSICAL EXAM:  /66   Pulse 69   SpO2 99%      NEURO:  Last sinemet taken at 1200      8/21/2023     1:00 PM   UPDRS Motor Scale   Time: 13:50   Medication On   R Brain DBS: None   L Brain DBS: None   Dyskinesia (LID) No   Did LID interfere No   Speech 2   Facial Expression 4   Rigidity Neck 2   Rigidity RUE 2   Rigidity LUE 1   Rigidity RLE 2   Rigidity LLE 2   Finger Taps R 3   Finger Taps L 3   Hand Mvt R 3   Hand Mvt L 1   Pron-/Supinate R 2   Pron-/Supinate L 1   Toe Tap R 1   Toe Tap L 1   Leg Agility R 0   Leg Agility L 0   Arise From Chair 1   Gait 4   Gait Freezing 0   Postural Stability 3   Posture 3   Global Spont Mvt 2   Postural Tremor RUE 1   Postural Tremor LUE 1   Kinetic Tremor RUE 1   Kinetic Tremor LUE 1   Rest Tremor RUE 1   Rest Tremor LUE 0   Rest Tremor RLE 0   Rest Tremor LLE 0   Rest Tremor Lip/Jaw 1   Rest Tremor Constancy 2   Total Right 16   Total Left 11   Axial Total 21   Total 51       Last 55 on 4/17/23      DATA REVIEWED:  No pertinent data available.    ASSESSMENT:  89-year-old male with suspected Parkinson disease, status post initiation of carbidopa levodopa.      At this point he is having some benefit to medication but with intolerable side effects at relatively low doses. Objectively there does seem to be some slight improvement compared to prior evaluations.    He unfortunately did not  tolerate higher doses of levodopa (2 tabs TID) and there is concern from the patient that the facility may not be able to split pills. We will plan to communicate with the facility and confirm whether 1.5 tabs TID is feasible.     If this is not a good longterm solution, we can consider a trial of amantadine with the caveat that it may induce additional intolerable side effects given his age.    PLAN:  - trial sinemet 25/100 1.5 tab TID  - consider amantadine 100mg BID if the above is not tolerated or not feasible    Return to clinic in o    Baltazar Cunningham MD  Fellow  Movement Disorders Division  [facility] Department of Neurology      I, Amadeo Tavera MD, personally interviewed, examined and evaluated this patient. I personally reviewed the vital signs, medications and pertinent labs/imaging. I discussed the patient with the fellow and agree with the assessment, examination and plan of care documented, with the additions and/or exceptions delineated below:    Patient comes back for another evaluation.  He is on Sinemet 25/100 mg 1 tablet 3 times a day.  He denies any side effects to it.  He seems to be engaging physical therapy and regulates he was a little bit better.  Still has a good amount of residual parkinsonian symptoms.  He still try to exercise much as he can he is still participating in physical therapy more regularly.    Last visit we plan on increasing his dosage from 1 tablet to 1.5 tablets, however it seems like the instead of getting 1.5 labs he rather received 2 tablets straightaway, which led to reported side effects of involuntary movements affecting his face upper extremities and trunk.  Those were suspect to be dyskinesias and upon reduce medication back down to 1 tablet per dose they subsided immediately.  Therefore he remained in 1 tablet per dose until the appointment today.    His examination does look a little bit better than what is been previously documented, his  amplitude movements a little bit better and less tremors are appreciated on examination today.  Gait still remains largely impaired.    I do suspect a parkinsonian disorder with stretcher and there is some levodopa response.  Unfortunately has not been able to tolerate higher doses, however he remains unclear whether he actually try 1.5 tablets versus 2 tablets, so he might be worth trying the 1.5 tablet dosage 3 times a day again.    We will go ahead and try to send in a prescription 1.5 tablets of the 25/100 mg carbidopa/levodopa to be taken 3 times a day.  We will try to coordinate in the facility and see if that is a possibility.  If Sinemet at 1.5 tablets is not a possibility, we may entertain the possibility of trying amantadine 100 mg twice daily.  We will prefer to stay on monotherapy if possible to mitigate potential chance of interaction with the other medicine he might be taking.    Otherwise continue supportive care.  We will see him back in about 2 to 3 months, sooner if needed.  He was encouraged to contact us back if any questions, concerns or any other issues in the meantime.    Attending attestation: Today a total 30 minutes were spent on this case, with greater than 50% of the time spent in face-to-face, examining, obtaining history, providing education and coordination of care. Additional time was spent in chart review, ancillary data, and documentation as delineated above.      Again, thank you for allowing me to participate in the care of your patient.        Sincerely,        Amadeo Tavera MD

## 2023-08-21 NOTE — NURSING NOTE
"Zack Santiago is a 89 year old male who presents for:  Chief Complaint   Patient presents with    Parkinson     Follow Up        Initial Vitals:  /66   Pulse 69   SpO2 99%  Estimated body mass index is 24.28 kg/m  as calculated from the following:    Height as of 6/26/23: 1.854 m (6' 1\").    Weight as of 6/26/23: 83.5 kg (184 lb).. There is no height or weight on file to calculate BSA. BP completed using cuff size: regular  Data Unavailable    Nursing Comments:     Kika Nixon MA   "

## 2023-08-22 ENCOUNTER — TELEPHONE (OUTPATIENT)
Dept: NEUROLOGY | Facility: CLINIC | Age: 88
End: 2023-08-22
Payer: COMMERCIAL

## 2023-08-22 NOTE — TELEPHONE ENCOUNTER
Called patient care home, retrieved facility fax number to send patient orders.  [phone].        Kika Nixon MA

## 2023-08-22 NOTE — TELEPHONE ENCOUNTER
Faxed Form/MedicationOrder(SINEMET) August 22, 2023 to fax number [phone]    Right Fax confirmed at 9:50 AM    Kika Nixon MA

## 2023-08-23 NOTE — TELEPHONE ENCOUNTER
Called [facility] and spoke to Ai.     She will leave message for pt's nurse Jayashree Martin, to make sure they got the updated carbidopa/levodopa order.      She will have Jayashree call back only if it was not received.     Writer will follow up in 1 month to check on how pt is doing on medication.     Jennifer IQBAL RN, BSN  [facility]

## 2023-09-13 DIAGNOSIS — R19.8 ALTERED BOWEL FUNCTION: ICD-10-CM

## 2023-09-13 RX ORDER — LACTULOSE 10 G/15ML
SOLUTION ORAL
Qty: 946 ML | Refills: 11 | Status: SHIPPED | OUTPATIENT
Start: 2023-09-13 | End: 2024-08-16

## 2023-10-09 ENCOUNTER — TELEPHONE (OUTPATIENT)
Dept: NEUROLOGY | Facility: CLINIC | Age: 88
End: 2023-10-09
Payer: COMMERCIAL

## 2023-10-09 NOTE — TELEPHONE ENCOUNTER
"At last OV with Dr. Jackson on 8/21/23 sinemet 25/100 was increased to 1.5 tablets 3 times daily.     Previously pt did not tolerate 2 tabs TID.  Pt requested clinic to check on him following increase.     Called pt and he said he still is getting some \"shakes\", but says he is tolerating medication, with no c/o side effects.     Recommended to continue on current dosage and will re-evaluate at upcoming visit 11/13/23.   Jennifer IBQAL RN, BSN  [facility] Neurology        "

## 2023-10-16 ENCOUNTER — NURSE TRIAGE (OUTPATIENT)
Dept: FAMILY MEDICINE | Facility: CLINIC | Age: 88
End: 2023-10-16
Payer: COMMERCIAL

## 2023-10-16 NOTE — TELEPHONE ENCOUNTER
Writer called and spoke with patient and reviewed covering provider's recommendations with patient who expressed verbal understanding and is agreeable.    Will increase water intake and callback if any reoccurrence or new/worsening symptoms.    Closing encounter.    Uche Munoz RN  [facility]

## 2023-10-16 NOTE — TELEPHONE ENCOUNTER
"CC: Patient calling reporting \"a sharp pain in my bladder after urinating\"    Urinary Symptoms:     SYMPTOM: sharp pain 7/10 in bladder after urinating   ONSET: this morning - lasted for 20 minutes and then resolved   PAIN: no pain or burning with urination - patient is not having any pain now   CAUSE: unknown - has not had symptoms like this in the past   OTHER SYMPTOMS: denies blood in urine, fever, flank pain, pain with urination     Triaged per [facility] protocol, patient to be seen in office within 2 weeks.    Routing to PCP to please review and advise as patient's symptoms have now resolved and is not having any symptoms. Please advise if patient should continue to monitor at home or schedule appointment?     Reviewed with patient to callback if any new symptoms, frequency, urgency, pain with urination, blood in urine, fever, flank pain, odor to urine etc. Patient expressed verbal understanding and is agreeable.     Callback [phone] - ok to leave detailed VM    Uche Munoz RN  [facility]    Reason for Disposition   All other urine symptoms    Additional Information   Negative: Shock suspected (e.g., cold/pale/clammy skin, too weak to stand, low BP, rapid pulse)   Negative: Sounds like a life-threatening emergency to the triager   Negative: Followed a female genital area injury (e.g., labia, vagina, vulva)   Negative: Followed a male genital area injury (penis, scrotum)   Negative: Vaginal discharge   Negative: Pus (white, yellow) or bloody discharge from end of penis   Negative: Pain or burning with passing urine (urination) and pregnant   Negative: Pain or burning with passing urine (urination) and female   Negative: Pain or burning with passing urine (urination) and male   Negative: Pain or itching in the vulvar area   Negative: Pain in scrotum is main symptom   Negative: Blood in the urine is main symptom   Negative: Symptoms arising from use of a urinary catheter (e.g., Coude, " Puri)   Negative: Unable to urinate (or only a few drops) > 4 hours and bladder feels very full (e.g., palpable bladder or strong urge to urinate)   Negative: Decreased urination and drinking very little and dehydration suspected (e.g., dark urine, no urine > 12 hours, very dry mouth, very lightheaded)   Negative: Patient sounds very sick or weak to the triager   Negative: Fever > 100.4 F  (38.0 C)   Negative: Side (flank) or lower back pain present   Negative: Bad or foul-smelling urine   Negative: Urinating more frequently than usual (i.e., frequency)   Negative: Can't control passage of urine (i.e., urinary incontinence) and new-onset (< 2 weeks) or worsening   Negative: Patient wants to be seen   Negative: Can't control passage of urine (i.e., urinary incontinence, wetting self) and present > 2 weeks   Negative: Urination is difficult to start (i.e., hesitancy) or straining   Negative: Dribbling (losing urine) just after finishing urination (i.e., post-void dribbling)   Negative: Has to get out of bed to urinate > 2 times a night (i.e., nocturia)    Protocols used: Urinary Symptoms-A-[place]

## 2023-10-16 NOTE — TELEPHONE ENCOUNTER
Can monitor symptoms.  Increase water intake (extra 20 ounces today and tomorrow).  If symptoms recur let us know.    Thank you  Latrell Arriaga MD on 10/16/2023 at 8:13 AM

## 2023-10-17 DIAGNOSIS — R25.1 TREMOR: ICD-10-CM

## 2023-10-17 NOTE — TELEPHONE ENCOUNTER
Pending Prescriptions:                       Disp   Refills    carbidopa-levodopa (SINEMET)  MG ta*405 ta*0            Sig: Take 1.5 tablets by mouth 3 times daily    Last Appt: 8/21/2023  Next Appt: 11/13/2023

## 2023-10-18 ENCOUNTER — OFFICE VISIT (OUTPATIENT)
Dept: FAMILY MEDICINE | Facility: CLINIC | Age: 88
End: 2023-10-18
Payer: COMMERCIAL

## 2023-10-18 VITALS
HEART RATE: 58 BPM | TEMPERATURE: 97.8 F | HEIGHT: 73 IN | OXYGEN SATURATION: 95 % | DIASTOLIC BLOOD PRESSURE: 66 MMHG | BODY MASS INDEX: 24.28 KG/M2 | SYSTOLIC BLOOD PRESSURE: 112 MMHG | RESPIRATION RATE: 14 BRPM

## 2023-10-18 DIAGNOSIS — R39.89 BLADDER PAIN: Primary | ICD-10-CM

## 2023-10-18 DIAGNOSIS — G20.A1 PARKINSON'S DISEASE, UNSPECIFIED WHETHER DYSKINESIA PRESENT, UNSPECIFIED WHETHER MANIFESTATIONS FLUCTUATE (H): ICD-10-CM

## 2023-10-18 DIAGNOSIS — R19.8 IRREGULAR BOWEL HABITS: ICD-10-CM

## 2023-10-18 LAB
ALBUMIN UR-MCNC: NEGATIVE MG/DL
APPEARANCE UR: CLEAR
BASO+EOS+MONOS # BLD AUTO: ABNORMAL 10*3/UL
BASO+EOS+MONOS NFR BLD AUTO: ABNORMAL %
BASOPHILS # BLD AUTO: 0 10E3/UL (ref 0–0.2)
BASOPHILS NFR BLD AUTO: 0 %
BILIRUB UR QL STRIP: NEGATIVE
COLOR UR AUTO: YELLOW
EOSINOPHIL # BLD AUTO: 0.1 10E3/UL (ref 0–0.7)
EOSINOPHIL NFR BLD AUTO: 2 %
ERYTHROCYTE [DISTWIDTH] IN BLOOD BY AUTOMATED COUNT: 14 % (ref 10–15)
GLUCOSE UR STRIP-MCNC: NEGATIVE MG/DL
HCT VFR BLD AUTO: 36.7 % (ref 40–53)
HGB BLD-MCNC: 11.6 G/DL (ref 13.3–17.7)
HGB UR QL STRIP: NEGATIVE
HYALINE CASTS #/AREA URNS LPF: ABNORMAL /LPF
IMM GRANULOCYTES # BLD: 0 10E3/UL
IMM GRANULOCYTES NFR BLD: 0 %
KETONES UR STRIP-MCNC: 15 MG/DL
LEUKOCYTE ESTERASE UR QL STRIP: ABNORMAL
LYMPHOCYTES # BLD AUTO: 1.1 10E3/UL (ref 0.8–5.3)
LYMPHOCYTES NFR BLD AUTO: 19 %
MCH RBC QN AUTO: 31.2 PG (ref 26.5–33)
MCHC RBC AUTO-ENTMCNC: 31.6 G/DL (ref 31.5–36.5)
MCV RBC AUTO: 99 FL (ref 78–100)
MONOCYTES # BLD AUTO: 0.5 10E3/UL (ref 0–1.3)
MONOCYTES NFR BLD AUTO: 8 %
NEUTROPHILS # BLD AUTO: 4.2 10E3/UL (ref 1.6–8.3)
NEUTROPHILS NFR BLD AUTO: 71 %
NITRATE UR QL: NEGATIVE
PH UR STRIP: 6.5 [PH] (ref 5–7)
PLATELET # BLD AUTO: 185 10E3/UL (ref 150–450)
RBC # BLD AUTO: 3.72 10E6/UL (ref 4.4–5.9)
RBC #/AREA URNS AUTO: ABNORMAL /HPF
SP GR UR STRIP: 1.01 (ref 1–1.03)
SQUAMOUS #/AREA URNS AUTO: ABNORMAL /LPF
UROBILINOGEN UR STRIP-ACNC: 1 E.U./DL
WBC # BLD AUTO: 5.9 10E3/UL (ref 4–11)
WBC #/AREA URNS AUTO: ABNORMAL /HPF

## 2023-10-18 PROCEDURE — 85025 COMPLETE CBC W/AUTO DIFF WBC: CPT | Performed by: PHYSICIAN ASSISTANT

## 2023-10-18 PROCEDURE — 81001 URINALYSIS AUTO W/SCOPE: CPT | Performed by: PHYSICIAN ASSISTANT

## 2023-10-18 PROCEDURE — 99214 OFFICE O/P EST MOD 30 MIN: CPT | Performed by: PHYSICIAN ASSISTANT

## 2023-10-18 PROCEDURE — 36415 COLL VENOUS BLD VENIPUNCTURE: CPT | Performed by: PHYSICIAN ASSISTANT

## 2023-10-18 ASSESSMENT — PAIN SCALES - GENERAL: PAINLEVEL: NO PAIN (0)

## 2023-10-18 NOTE — RESULT ENCOUNTER NOTE
Zack,    Your urine test does NOT show a bladder infection and your blood work is normal/stable for you.  Please take the senna, drink fluids and increase dietary fiber to try to have a good bowel movement to see if that alleviates your symptoms.  If it is does not, please let me know.    Alyssa Cox PA-C

## 2023-10-18 NOTE — TELEPHONE ENCOUNTER
Received a call back from the patient stating he woke up early this morning and continued to feel some pain and pressure in his bladder. Triage RN assisted in getting the patient scheduled for an appointment with Alyssa Cox this afternoon to further assess his symptoms.     Appointments in Next Year      Oct 18, 2023  2:00 PM  (Arrive by 1:40 PM)  Provider Visit with Alyssa Cox PA-C  [facility] [phone]     Pallavi Ochoa RN

## 2023-10-18 NOTE — PROGRESS NOTES
HPI: Zack is a 89 yo male with parkinsons here with son in law for bladder pain x 5 nights  He denies any pain during the day  The pain is a pulsating/rolling pain that comes on around 5am while in bed  Pain lasts 45-60min and then resolves spont even if he doesn't use the bathroom  Denies any urinary freq, urg or dysuria  Denies gross hematuria   Denies body aches or chills  Denies any hx of similar sxs  Sxs not better or worse with urination or defecation  Has about 2 BM per week.  He takes lactulose and a fiber supplement daily for constipation    Past Medical History:   Diagnosis Date    A-fib (H) 2010    post op    AAA (abdominal aortic aneurysm) without rupture (H24)     Dr. 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    carotid us no stenosis, echo no change and no clots; ziopatch no afib, svt present    Anemia 2004    Aortic insufficiency 06/2014    1+ on echo    Aortic insufficiency 11/2016    mild to mod    Ascending aorta dilatation (H24) 01/2023    5cm on ct    ASCVD (arteriosclerotic cardiovascular disease) 2010    cabg, post op afib    BPH (benign prostatic hyperplasia) 2003    turp, flomax added by urol 2/17    Bradycardia 2016    stopped toprol    CKD (chronic kidney disease) stage 3, GFR 30-59 ml/min (H)     Constipation 05/2020    colonoscopy nl    Cough 2010    pulm eval, felt due to reflux    Dizzy 2001    mri small vessel dz    GERD (gastroesophageal reflux disease)     HTN (hypertension) 2002    Hx of colonoscopy 2003    tics    Hypothyroid     Hypovitaminosis D     Idiopathic neuropathy     Lung nodule 02/2018    6mm, found during eval of ascending aorta, fu 8/18 no change    OCD (obsessive compulsive disorder)     sees psyche    Panic disorder     Dr. Luna, then someone after he retired    Parkinson's disease 03/2023    per neuro    Spinal headache     Stroke (H) 12/2016    expressive aphasia, hosp, seen by neuro, mri pos, carotids min dz, changed from asa  to plavix    Sudden hearing loss 2013    Dr. Garcia, mri brain no acoustic neuroma    SVT (supraventricular tachycardia) 2016    seen on ziopatch done for amaurosis, longest 15 beats    Thoracic ascending aortic aneurysm (H24) 2014    4.4cm on echo, aortic root 3.9, fu 5/15 4.8cm; fu done 12/15 and slightly enlarged, fu  no change, fu  no change; fu  echo 5.1-5.3, enlarged, then cta done and only 4.8cm, t fu 6 months, lvh, nl ef, mild ai; fu  slightly larger; fu  slightly smaller; fu  and then  and stable    Ulcerative colitis (H)     not active for years     Past Surgical History:   Procedure Laterality Date    BACK SURGERY      BLADDER SURGERY      CATARACT IOL, RT/LT      COLONOSCOPY N/A 2020    Procedure: COLONOSCOPY;  Surgeon: Kenya Loco MD;  Location:  GI    CORONARY ARTERY BYPASS      ENDOVASCULAR REPAIR ANEURYSM ABDOMINAL AORTA N/A 2015    Procedure: ENDOVASCULAR REPAIR ANEURYSM ABDOMINAL AORTA;  Surgeon: Darin Walters MD;  Location:  OR    HERNIA REPAIR  2005    HERNIA REPAIR      HERNIORRHAPHY INGUINAL Left 2018    Procedure: HERNIORRHAPHY INGUINAL;  Incarcerated Left Inguinal Hernia;  Surgeon: Harsh Walker MD;  Location:  OR    KNEE SURGERY      REPAIR HAMMER TOE  2012    Procedure: REPAIR HAMMER TOE;  LEFT SECOND AND THIRD CLAW TOE RECONSTRUCTION;  Surgeon: Gray Haynes MD;  Location:  OR    REPAIR HAMMER TOE  2018    tria    toe amputation right foot  2021    TURP      [email or web address] TOTAL KNEE ARTHROPLASTY      left    [email or web address] TOTAL KNEE ARTHROPLASTY  2009    right     Social History     Tobacco Use    Smoking status: Former     Packs/day: 1.00     Years: 5.00     Additional pack years: 0.00     Total pack years: 5.00     Types: Cigarettes     Quit date: 1965     Years since quittin.3    Smokeless tobacco: Never   Substance Use Topics    Alcohol use: Yes      Comment: none     Current Outpatient Medications   Medication Sig Dispense Refill    acetaminophen (TYLENOL) 500 MG tablet Take 1 or 2 tablets every 8 hours as needed for pain  Do not exceed 3000mg in 24 hours 100 tablet 0    acetaminophen (TYLENOL) 500 MG tablet Take 1-2 tablets (500-1,000 mg) by mouth every 8 hours as needed for mild pain      carbidopa-levodopa (SINEMET)  MG tablet Take 1.5 tablets by mouth 3 times daily 405 tablet 0    chlorthalidone (HYGROTON) 25 MG tablet 1 TABLET ORALLY EVERY MORNING  (DX: DIURETIC ) 90 tablet 3    clomiPRAMINE (ANAFRANIL) 25 MG capsule 1 CAPSULE ORALLY AT BEDTIME (DX: DEPRESSION) 28 capsule 10    clopidogrel (PLAVIX) 75 MG tablet 1 TABLET ORALLY EVERY MORNING  (DX: HEART  ) 90 tablet 3    gabapentin (NEURONTIN) 400 MG capsule 1 CAPSULE ORALLY 2 TIMES DAILY FOR NERVES 180 capsule 3    ketoconazole (NIZORAL) 2 % external cream APPLY TOPICALLY TO INFLAMED SKIN 2 TIMES DAILY   (DX: INFLAMED SKIN ) 30 g 10    lactulose (CHRONULAC) 10 GM/15ML solution DRINK 30ML  ORALLY DAILY (DX: CONSTIPATION);DRINK 30ML  ORALLY DAILY AS NEEDED (DX: CONSTIPATION) 946 mL 11    levothyroxine (SYNTHROID/LEVOTHROID) 100 MCG tablet 1 TABLET ORALLY DAILY  (DX: THYROID) 90 tablet 3    melatonin 3 MG tablet 1 TABLET ORALLY AT BEDTIME AS NEEDED  (DX: SLEEP ) 30 tablet 10    mirtazapine (REMERON) 45 MG tablet 1 TABLET ORALLY AT BEDTIME (DX: DEPRESSION) 30 tablet 10    OPTIVE 0.5-0.9 % ophthalmic solution INSTILL 1 DROP INTO BOTH EYES 2 TIMES DAILY  (DX: DRY EYES) 15 mL 11    SENEXON-S 8.6-50 MG tablet 1 TABLET ORALLY DAILY AS NEEDED (DX: CONSTIPATION) 30 tablet 10    simvastatin (ZOCOR) 20 MG tablet 1 TABLET ORALLY AT BEDTIME   (DX: CHOLESTEROL) 90 tablet 3    sodium chloride (DEEP SEA NASAL SPRAY) 0.65 % nasal spray INSTILL SPRAY(S) INTO NOSTRIL(S) 3 TIMES DAILY 44 mL 11    SOLUBLE FIBER THERAPY powder TAKE 1 TEASPOONFUL ORALLY DAILY (DX: CONSTIPATION) 454 g 10     No Known Allergies    PHYSICAL  "EXAM:    /66 (BP Location: Left arm, Patient Position: Sitting, Cuff Size: Adult Regular)   Pulse 58   Temp 97.8  F (36.6  C)   Resp 14   Ht 1.854 m (6' 1\")   SpO2 95%   BMI 24.28 kg/m      Patient appears non toxic  Abd: soft, slightly tender to deep palp over the suprapubic area, no masses. No guarding or rebound    Results for orders placed or performed in visit on 10/18/23   UA Macroscopic with reflex to Microscopic and Culture - Lab Collect     Status: Abnormal    Specimen: Urine, Midstream   Result Value Ref Range    Color Urine Yellow Colorless, Straw, Light Yellow, Yellow    Appearance Urine Clear Clear    Glucose Urine Negative Negative mg/dL    Bilirubin Urine Negative Negative    Ketones Urine 15 (A) Negative mg/dL    Specific Gravity Urine 1.015 1.003 - 1.035    Blood Urine Negative Negative    pH Urine 6.5 5.0 - 7.0    Protein Albumin Urine Negative Negative mg/dL    Urobilinogen Urine 1.0 0.2, 1.0 E.U./dL    Nitrite Urine Negative Negative    Leukocyte Esterase Urine Trace (A) Negative   CBC with platelets and differential     Status: Abnormal   Result Value Ref Range    WBC Count 5.9 4.0 - 11.0 10e3/uL    RBC Count 3.72 (L) 4.40 - 5.90 10e6/uL    Hemoglobin 11.6 (L) 13.3 - 17.7 g/dL    Hematocrit 36.7 (L) 40.0 - 53.0 %    MCV 99 78 - 100 fL    MCH 31.2 26.5 - 33.0 pg    MCHC 31.6 31.5 - 36.5 g/dL    RDW 14.0 10.0 - 15.0 %    Platelet Count 185 150 - 450 10e3/uL    % Neutrophils 71 %    % Lymphocytes 19 %    % Monocytes 8 %    Mids % (Monos, Eos, Basos)      % Eosinophils 2 %    % Basophils 0 %    % Immature Granulocytes 0 %    Absolute Neutrophils 4.2 1.6 - 8.3 10e3/uL    Absolute Lymphocytes 1.1 0.8 - 5.3 10e3/uL    Absolute Monocytes 0.5 0.0 - 1.3 10e3/uL    Mids Abs (Monos, Eos, Basos)      Absolute Eosinophils 0.1 0.0 - 0.7 10e3/uL    Absolute Basophils 0.0 0.0 - 0.2 10e3/uL    Absolute Immature Granulocytes 0.0 <=0.4 10e3/uL   UA Microscopic with Reflex to Culture     Status: Abnormal "   Result Value Ref Range    RBC Urine None Seen 0-2 /HPF /HPF    WBC Urine None Seen 0-5 /HPF /HPF    Squamous Epithelials Urine Few (A) None Seen /LPF    Hyaline Casts Urine 0-2 (A) None Seen /LPF    Narrative    Urine Culture not indicated   CBC with platelets and differential     Status: Abnormal    Narrative    The following orders were created for panel order CBC with platelets and differential.  Procedure                               Abnormality         Status                     ---------                               -----------         ------                     CBC with platelets and d...[[id number]]  Abnormal            Final result                 Please view results for these tests on the individual orders.         Assessment and Plan:     (R39.89) Bladder pain  (primary encounter diagnosis)  Comment: may be secondary to constipation so recd he use his senna to clean himself out and if sxs persist, consider CT to r/o diverticulitis. UA neg  Plan: CBC with platelets and differential shows normal WBC            (G20.A1) Parkinson's disease, unspecified whether dyskinesia present, unspecified whether manifestations fluctuate  Comment:   Plan: on Sinemet    (R19.8) Irregular bowel habits  Comment:   Plan: chronic constipation which doesn't sound worse than usual      Alyssa Cox PA-C

## 2023-10-19 RX ORDER — CARBIDOPA AND LEVODOPA 25; 100 MG/1; MG/1
1.5 TABLET ORAL 3 TIMES DAILY
Qty: 405 TABLET | Refills: 0 | OUTPATIENT
Start: 2023-10-19

## 2023-11-09 ENCOUNTER — TRANSFERRED RECORDS (OUTPATIENT)
Dept: HEALTH INFORMATION MANAGEMENT | Facility: CLINIC | Age: 88
End: 2023-11-09
Payer: COMMERCIAL

## 2023-11-14 ENCOUNTER — TELEPHONE (OUTPATIENT)
Dept: NEUROLOGY | Facility: CLINIC | Age: 88
End: 2023-11-14
Payer: COMMERCIAL

## 2023-11-14 NOTE — TELEPHONE ENCOUNTER
[facility] requesting new written prescription for patient fill cycle. Message states: Dr.Leonardo Jackson-Requesting refills for cycle fill. Thankyou!   Sent new prescription off to [facility]  [phone]  Also to [facility]   [phone]    Also sent to [facility] for uploading to patient records. Completed and signed by provider.     Kika Nixon MA

## 2023-11-14 NOTE — TELEPHONE ENCOUNTER
Faxed RX and Form November 14, 2023 to fax number [phone] & [phone]    Right Fax confirmed at 8:45 AM    Kika Nixon MA

## 2023-11-16 ENCOUNTER — TELEPHONE (OUTPATIENT)
Dept: FAMILY MEDICINE | Facility: CLINIC | Age: 88
End: 2023-11-16
Payer: COMMERCIAL

## 2023-11-16 NOTE — TELEPHONE ENCOUNTER
Provider Communication    Who is calling:  Dr. Bowman     Facility in which provider is associated:  [facility]    Reason for call:  Would like to speak to Dr. Kendrick about pt    Please call Dr. Bowman back at [phone]. She is only in office on Tuesdays, Wednesdays, and Thursdays.    Chhaya Nesbitt,  [facility]

## 2023-11-17 NOTE — TELEPHONE ENCOUNTER
Patient called regarding the provider to provider call. He stated that [facility] has called several times and no response from PCP.    I advised the patient that we received a message yesterday afternoon and this morning which both times we have sent to PCP as high priority. Also notified pt. That Dr. Veloz stated that she is only in the Office Tuesday-Thursday. He will contact [facility] to expedite this call.

## 2023-11-21 ENCOUNTER — TELEPHONE (OUTPATIENT)
Dept: FAMILY MEDICINE | Facility: CLINIC | Age: 88
End: 2023-11-21
Payer: COMMERCIAL

## 2023-11-21 NOTE — TELEPHONE ENCOUNTER
I discussed with her, needs meds adjusted for his ocd.  I explained he has had prolonged qtc.  I rec if med change then follow up office visit with team one week later and check EKG then.      Zurdo Kendrick M.D.

## 2023-11-21 NOTE — TELEPHONE ENCOUNTER
Reason for Call:  Discuss medication    Detailed comments: Patient would like a call back to discuss a medication dosage. Is not sure the name of it. Please call    Phone Number Patient can be reached at: Home number on file [phone] (home)    Best Time: after 10:30 am    Can we leave a detailed message on this number? YES    Call taken on 11/21/2023 at 3:23 PM by Cindy Cade

## 2023-11-22 NOTE — TELEPHONE ENCOUNTER
I am ok with the dose increase, if he develops significant fatigue let me know or if balance off.  Check with psyche on other question.    Zurdo Kendrick M.D.

## 2023-11-22 NOTE — TELEPHONE ENCOUNTER
TO PCP:     Pt okay with waiting until PCP back in office to review message:     States psychiatrist wants increase in OCD medication. Per prior message, PCP spoke with psychiatrist directly. Reviewed message with patient. But patient states that was regarding the Clomipramine medication.     Now he has questions regarding Gabapentin:     1) Gabapentin - taking 400mg twice a day currently. Psych provider is suggesting to increase to total of 1000mg per day. That provider mentioned loss of balance as a possible side effect. Pt asking if PCP would agree with this dosage increase?     2) pt also wants to know how long he should wait to see if a dosage increase with gabapentin helps his OCD, or is this question better for the psych provider?     Tanya MA, Triage RN  [facility]

## 2023-11-22 NOTE — TELEPHONE ENCOUNTER
Called pt and discussed PCP response with him     Tanya MA, Triage RN  [facility]

## 2023-12-05 ENCOUNTER — TELEPHONE (OUTPATIENT)
Dept: FAMILY MEDICINE | Facility: CLINIC | Age: 88
End: 2023-12-05
Payer: COMMERCIAL

## 2023-12-05 NOTE — TELEPHONE ENCOUNTER
Patient calling wanting refill on plavix. Writer advised patient refills are at pharmacy. He verbalized understanding.

## 2023-12-11 ENCOUNTER — TELEPHONE (OUTPATIENT)
Dept: FAMILY MEDICINE | Facility: CLINIC | Age: 88
End: 2023-12-11
Payer: COMMERCIAL

## 2023-12-11 NOTE — TELEPHONE ENCOUNTER
Reason for Call:  Appointment Request    Patient requesting this type of appt:  Sore throat,    Requested provider: Zurdo Kendrick    Reason patient unable to be scheduled: Not within requested timeframe    When does patient want to be seen/preferred time: 1-2 days    Comments: Pt is open to seeing any provider available if PCP is unavailable.    Could we [facility] this [facility] you [facility] or would you prefer to receive a phone call?:   Patient would prefer a phone call   Okay to leave a detailed message?: Yes at Home number on file [phone] (home)    Call taken on 12/11/2023 at 3:32 PM by DANIAL CASH

## 2023-12-11 NOTE — PATIENT INSTRUCTIONS
Labs today  Schedule an appointment for upper GI endoscopy at your earliest convenience  Do FIT test at your earliest convenience  Follow up with PCP in one month  Seek sooner medical attention if there is any worsening of symptoms or problems   stated

## 2023-12-12 ENCOUNTER — TRANSFERRED RECORDS (OUTPATIENT)
Dept: HEALTH INFORMATION MANAGEMENT | Facility: CLINIC | Age: 88
End: 2023-12-12
Payer: COMMERCIAL

## 2023-12-12 NOTE — TELEPHONE ENCOUNTER
No Ans, unable to leave voice mail.  Schedule with Team.  Dr. Kendrick schedule full today and out of office Wed.     Juana TURK MA

## 2023-12-13 ENCOUNTER — OFFICE VISIT (OUTPATIENT)
Dept: FAMILY MEDICINE | Facility: CLINIC | Age: 88
End: 2023-12-13
Payer: COMMERCIAL

## 2023-12-13 VITALS
HEART RATE: 64 BPM | SYSTOLIC BLOOD PRESSURE: 128 MMHG | RESPIRATION RATE: 15 BRPM | OXYGEN SATURATION: 98 % | DIASTOLIC BLOOD PRESSURE: 73 MMHG | WEIGHT: 175.8 LBS | TEMPERATURE: 97.2 F | HEIGHT: 73 IN | BODY MASS INDEX: 23.3 KG/M2

## 2023-12-13 DIAGNOSIS — J02.9 ACUTE PHARYNGITIS, UNSPECIFIED ETIOLOGY: Primary | ICD-10-CM

## 2023-12-13 DIAGNOSIS — R63.4 WEIGHT LOSS: ICD-10-CM

## 2023-12-13 LAB
DEPRECATED S PYO AG THROAT QL EIA: NEGATIVE
GROUP A STREP BY PCR: NOT DETECTED
SARS-COV-2 RNA RESP QL NAA+PROBE: NEGATIVE

## 2023-12-13 PROCEDURE — 87651 STREP A DNA AMP PROBE: CPT | Performed by: PHYSICIAN ASSISTANT

## 2023-12-13 PROCEDURE — 36415 COLL VENOUS BLD VENIPUNCTURE: CPT | Performed by: PHYSICIAN ASSISTANT

## 2023-12-13 PROCEDURE — 84443 ASSAY THYROID STIM HORMONE: CPT | Performed by: PHYSICIAN ASSISTANT

## 2023-12-13 PROCEDURE — 99213 OFFICE O/P EST LOW 20 MIN: CPT | Performed by: PHYSICIAN ASSISTANT

## 2023-12-13 PROCEDURE — 87635 SARS-COV-2 COVID-19 AMP PRB: CPT | Performed by: PHYSICIAN ASSISTANT

## 2023-12-13 RX ORDER — RESPIRATORY SYNCYTIAL VIRUS VACCINE 120MCG/0.5
0.5 KIT INTRAMUSCULAR ONCE
Qty: 1 EACH | Refills: 0 | Status: CANCELLED | OUTPATIENT
Start: 2023-12-13 | End: 2023-12-13

## 2023-12-13 ASSESSMENT — PAIN SCALES - GENERAL: PAINLEVEL: MODERATE PAIN (5)

## 2023-12-13 NOTE — PROGRESS NOTES
"Assessment and Plan:     (J02.9) Acute pharyngitis, unspecified etiology  (primary encounter diagnosis)  Comment: onset 4-5 days ago, no fever, no trouble handling secretions, appears well today and VSS  Plan: Symptomatic COVID-19 Virus (Coronavirus) by PCR        Nose, Streptococcus A Rapid Screen w/Reflex to         PCR - Clinic Collect, Group A Streptococcus PCR        Throat Swab        Discussed likely viral, will check for covid since could start paxlovid if comes back today  Symptomatic cares    (R63.4) Weight loss  Comment: has had >20# weight loss over last year, he feels due to OCD/depression and is working with psychiatry on medications  Plan: TSH with free T4 reflex        Follow-up with pcp, appt made today      Mira Keyes PA-C      Fausto Dixon is a 89 year old, presenting for the following health issues:  Throat Problem      HPI     Zack is here for sore throat  It started 4-5 days ago  Symptoms haven't worsened but don't seem to be getting better  He has some pain with swallowing but no issues eating or handling secretions/liquids  He denies fever/chills, congestion, cough   He hasn't tested for covid     He also notes about 25lb weight loss over the last year  He is concerned about this  He does feel depression may be contributing   He is on anafranil 25mg daily and does see psychiatry at [facility] and have recently increased gabapentin for OCD       Review of Systems   See above      Objective    /73 (BP Location: Left arm, Patient Position: Sitting, Cuff Size: Adult Regular)   Pulse 64   Temp 97.2  F (36.2  C) (Temporal)   Resp 15   Ht 1.854 m (6' 1\")   Wt 79.7 kg (175 lb 12.8 oz)   SpO2 95%   BMI 23.19 kg/m    Body mass index is 23.19 kg/m .    Physical Exam     GENERAL: in NAD, thin   ENT: mmm, op clear, no oral lesions, neck is supple no masses, no pain with tracheal manipulation , TMs normal bilaterally  RESP: lungs clear to auscultation - no rales, no rhonchi, no " wheezes  CV: regular rates and rhythm, normal S1 S2, no S3 or S4 and no murmur, no click or rub   MS: extremities- no gross deformities noted, no edema

## 2023-12-13 NOTE — PATIENT INSTRUCTIONS
Take tylenol as needed for pain up to 1000mg three times daily, do not exceed 3000mg in 24 hour period    Get plenty of fluids    Covid and strep tests today

## 2023-12-14 LAB — TSH SERPL DL<=0.005 MIU/L-ACNC: 0.37 UIU/ML (ref 0.3–4.2)

## 2023-12-14 NOTE — RESULT ENCOUNTER NOTE
Please call patient (does not check mychart) and let him know covid and strep both negative.  TSH was normal.  thanks

## 2023-12-14 NOTE — PROGRESS NOTES
Patient Contact    Attempt # 1    Was call answered? No.    Left message for patient to call triage back.    Karen Suero RN.

## 2023-12-18 ENCOUNTER — TELEPHONE (OUTPATIENT)
Dept: FAMILY MEDICINE | Facility: CLINIC | Age: 88
End: 2023-12-18
Payer: COMMERCIAL

## 2023-12-18 NOTE — TELEPHONE ENCOUNTER
Patient calling clinic reporting continued sore throat symptoms since 12/13/2023 OV with Mira Keyes PA-C. Patient reported symptoms have not worsened but symptoms have not improved. Patient stated he is hydrating and taking Tylenol as instructed. Patient is seeking PCP recommendations on what else can be done to help relieve throat pain.     Routing to PCP for recommendations.

## 2023-12-18 NOTE — TELEPHONE ENCOUNTER
Unless he is having a fever, trouble swallowing or feeling particularly ill there really is not much to do but give it a few more days.  If he is not better or worsens let us know.    Thank you    Zurdo Kendrick M.D.

## 2023-12-19 NOTE — TELEPHONE ENCOUNTER
Patient calling back to followup on question to PCP below. Writer relayed PCP's message below, patient expressed verbal understanding.    Marissa Silva RN  -[facility]

## 2023-12-21 NOTE — TELEPHONE ENCOUNTER
Called patient. He states he will be ok to wait until tomorrow. Writer scheduled appointment with patient.     Appointments in Next Year      Dec 22, 2023  1:00 PM  (Arrive by 12:40 PM)  Provider Visit with Mira Keyes PA-C  [facility] ([facility] ) [phone]

## 2023-12-21 NOTE — TELEPHONE ENCOUNTER
Pt reports sore throat is worst today. He has pain with swallowing. He denies fever or breathing problems. Triage advised gargle with warm salt water. Should pt contact ENT for follow up?

## 2023-12-21 NOTE — TELEPHONE ENCOUNTER
Called and spoke with pt to assist with scheduling.   There is one acute appt available late morning at [facility], however pt states he will be unable to make it in time.     No appts available today at [facility], [facility], or [place].     Routing to PCP to advise. Suggest UC?    Can we leave a detailed message on this number? YES  Phone number patient can be reached at: Home number on file [phone] (home)    Genoveva Skinner, RN  [facility] Triage

## 2023-12-21 NOTE — TELEPHONE ENCOUNTER
then I would suggest team tomorrow or ENT or if nothing available urgent care today    Zurdo Kendrick M.D.

## 2023-12-22 ENCOUNTER — OFFICE VISIT (OUTPATIENT)
Dept: FAMILY MEDICINE | Facility: CLINIC | Age: 88
End: 2023-12-22
Payer: COMMERCIAL

## 2023-12-22 VITALS
BODY MASS INDEX: 23.43 KG/M2 | TEMPERATURE: 97.2 F | WEIGHT: 176.8 LBS | OXYGEN SATURATION: 97 % | HEIGHT: 73 IN | HEART RATE: 52 BPM | SYSTOLIC BLOOD PRESSURE: 109 MMHG | DIASTOLIC BLOOD PRESSURE: 65 MMHG | RESPIRATION RATE: 20 BRPM

## 2023-12-22 DIAGNOSIS — J02.9 PHARYNGITIS, UNSPECIFIED ETIOLOGY: Primary | ICD-10-CM

## 2023-12-22 PROCEDURE — 99213 OFFICE O/P EST LOW 20 MIN: CPT | Performed by: PHYSICIAN ASSISTANT

## 2023-12-22 RX ORDER — RESPIRATORY SYNCYTIAL VIRUS VACCINE 120MCG/0.5
0.5 KIT INTRAMUSCULAR ONCE
Qty: 1 EACH | Refills: 0 | Status: CANCELLED | OUTPATIENT
Start: 2023-12-22 | End: 2023-12-22

## 2023-12-22 ASSESSMENT — PAIN SCALES - GENERAL: PAINLEVEL: MODERATE PAIN (5)

## 2023-12-22 NOTE — PROGRESS NOTES
"Assessment and Plan:     (J02.9) Pharyngitis, unspecified etiology  (primary encounter diagnosis)  Comment: ongoing x >2 week, no issues with airway or secretions, no fever/chills, exam reassuring but given prolonged duration will have him see ENT, discussed today  Plan: Adult ENT  Referral        Continue tylenol as needed  Discussed when to be seen promptly       Mira Keyes PA-C      Subjective   Zack is a 89 year old, presenting for the following health issues:  Pharyngitis (Patient is here for complaints of a sore throat.  )      History of Present Illness       Reason for visit:  Sore throat ongoing.  Symptom onset:  1-2 weeks ago  Symptoms include:  Sore throat  Symptom intensity:  Moderate  Symptom progression:  Worsening  Had these symptoms before:  No  What makes it worse:  No  What makes it better:  Cough drops help very little.    He eats 2-3 servings of fruits and vegetables daily.He consumes 1 sweetened beverage(s) daily.He exercises with enough effort to increase his heart rate 20 to 29 minutes per day.  He exercises with enough effort to increase his heart rate 7 days per week.   He is taking medications regularly.     Zack is here for sore throat  His symptoms started 2-3 weeks ago and don't seem to be getting much better  He was see on 12/13/23 and strep and covid were negative at that time  He notes it hurts more to swallow now  He denies fever/chills, neck mass, issues with airway or secretions  He denies that throat symptoms have precluded him from eating     Review of Systems     See above      Objective      /65 (BP Location: Left arm, Patient Position: Sitting, Cuff Size: Adult Regular)   Pulse 52   Temp 97.2  F (36.2  C) (Temporal)   Resp 20   Ht 1.854 m (6' 1\")   Wt 75.8 kg (167 lb 3.2 oz)   SpO2 97%   BMI 22.06 kg/m        Physical Exam     EXAM:  GENERAL APPEARANCE: in NAD  EYES: Eyes grossly normal to inspection, conjunctivae and sclerae normal  HENT: ear " canals w/cerumen bilaterally, TMs normal, mouth without ulcers or lesions, oropharynx is clear without exudate or erythema, no tonsillar swelling  NECK: supple, no adenopathy, no asymmetry, masses, no pain with tracheal manipulation   RESP: lungs clear to auscultation - no rales, rhonchi or wheezes  CV: regular rates and rhythm, normal S1 S2, no S3 or S4 and no murmur, click or rub  EXT: trace edema bilat lower ext

## 2024-01-18 ENCOUNTER — TRANSFERRED RECORDS (OUTPATIENT)
Dept: HEALTH INFORMATION MANAGEMENT | Facility: CLINIC | Age: 89
End: 2024-01-18
Payer: COMMERCIAL

## 2024-01-23 ENCOUNTER — TRANSFERRED RECORDS (OUTPATIENT)
Dept: HEALTH INFORMATION MANAGEMENT | Facility: CLINIC | Age: 89
End: 2024-01-23
Payer: COMMERCIAL

## 2024-01-30 DIAGNOSIS — E03.9 ACQUIRED HYPOTHYROIDISM: ICD-10-CM

## 2024-01-30 RX ORDER — LEVOTHYROXINE SODIUM 100 UG/1
TABLET ORAL
Qty: 28 TABLET | Refills: 8 | Status: SHIPPED | OUTPATIENT
Start: 2024-01-30 | End: 2024-02-19

## 2024-02-02 ENCOUNTER — NURSE TRIAGE (OUTPATIENT)
Dept: NURSING | Facility: CLINIC | Age: 89
End: 2024-02-02
Payer: COMMERCIAL

## 2024-02-02 ENCOUNTER — TELEPHONE (OUTPATIENT)
Dept: FAMILY MEDICINE | Facility: CLINIC | Age: 89
End: 2024-02-02
Payer: COMMERCIAL

## 2024-02-03 NOTE — TELEPHONE ENCOUNTER
Caller:   Patient    Situation:   Has an awful cold since yesterday  Wants to know if he could get a med from his doctor without being seen first.    He report slight dizziness when he turns  Lost of nasal drainage  Cough   No chest pain  No fever - says may be a low grade fever (didn't check it)  No trouble with breathing    Background:  PCP: [facility]      Assessment:  Needs to be seen for any new meds        Recommendation:  Disposition: Informed he could do urgent care visit in person or telephone    Informed to call back w/ any questions or new concerns. Informed of severe symptoms to monitor and call back.    Patient verbalized understanding of care advice.        Terry Jackson RN, BSN  Triage Nurse Advisor      Reason for Disposition   Prescription request for new medicine (not a refill)    Protocols used: Medication Question Call-A-

## 2024-02-03 NOTE — TELEPHONE ENCOUNTER
New Medication Request        What medication are you requesting?: something for symptons    Reason for medication request: PT has a severe cold and a lot of nasal congestion and drainage and dizziness when turning head. [facility]    Have you taken this medication before?: No    Controlled Substance Agreement on file:   CSA -- Patient Level:    CSA: None found at the patient level.         Patient offered an appointment? No    Preferred Pharmacy:     [address] [address]  Phone: [phone] Fax: [phone]      Could we send this information to you in [employer] or would you prefer to receive a phone call?:   Patient would prefer a phone call   Okay to leave a detailed message?: Yes at Cell number on file:    No relevant phone numbers on file.

## 2024-02-06 ENCOUNTER — TELEPHONE (OUTPATIENT)
Dept: FAMILY MEDICINE | Facility: CLINIC | Age: 89
End: 2024-02-06

## 2024-02-06 ENCOUNTER — VIRTUAL VISIT (OUTPATIENT)
Dept: FAMILY MEDICINE | Facility: CLINIC | Age: 89
End: 2024-02-06
Payer: COMMERCIAL

## 2024-02-06 DIAGNOSIS — U07.1 INFECTION DUE TO 2019 NOVEL CORONAVIRUS: Primary | ICD-10-CM

## 2024-02-06 PROCEDURE — 99441 PR PHYSICIAN TELEPHONE EVALUATION 5-10 MIN: CPT | Mod: 93 | Performed by: INTERNAL MEDICINE

## 2024-02-06 NOTE — TELEPHONE ENCOUNTER
"Per MOJGAN Blanc @ [facility]  -Patient has tested positive for COVID today (2/6/24)  -\"Runny nose\" only symptom  -Symptoms started 2/2/24.    Writer scheduled virtual visit d/t patient taking Plavix.  "

## 2024-02-06 NOTE — PROGRESS NOTES
Zack is a 89 year old who is being evaluated via a billable telephone visit.      What phone number would you like to be contacted at? [phone]  How would you like to obtain your AVS? Mail a copy    Distant Location (provider location):  On-site    Assessment & Plan     Infection due to 2019 novel coronavirus  Due to advanced age and comorbid conditions he is at high risk for disease progression and therefore candidate for antiviral therapy.  Drug drug interactions between Paxlovid and simvastatin as well as Paxlovid and Plavix complicate therapy with Paxlovid, so I decided to use molnupiravir as antiviral therapy for COVID-19 infection.  I tried my best to explain the potential side effects and risks to him by telephone although as previously mentioned our telephone connection was not optimal and I think there is some hearing loss.  I did instruct him to try to get started as soon as possible.  From what I can tell from my limited history symptoms seem to be mild.  - molnupiravir (LAGEVRIO) 200 MG capsule; Take 4 capsules (800 mg) by mouth every 12 hours      No LOS data to display   Time spent by me doing chart review, history and exam, documentation and further activities per the note        FUTURE APPOINTMENTS:       -Recommend face-to-face appointment if symptoms not improved after completion of antiviral therapy or sooner if symptoms worsen or new symptoms develop    Subjective   Zack is a 89 year old, presenting for the following health issues:  Covid Concern (Patient is having a telephone visit with Covid concerns.)    History of Present Illness       Reason for visit:  Covid positive.  Symptom onset:  3-7 days ago  Symptoms include:  Cold symptoms  Symptom intensity:  Mild  Symptom progression:  Improving  Had these symptoms before:  No  What makes it worse:  No  What makes it better:  No    He eats 2-3 servings of fruits and vegetables daily.He consumes 0 sweetened beverage(s) daily.He exercises with  enough effort to increase his heart rate 30 to 60 minutes per day.  He exercises with enough effort to increase his heart rate 7 days per week.   He is taking medications regularly.         COVID-19 Symptom Review  How many days ago did these symptoms start? 2/1/2024    Are any of the following symptoms significant for you?  New or worsening difficulty breathing? No  Worsening cough? No  Fever or chills? No  Headache: No  Sore throat: No  Chest pain: No  Diarrhea: No  Body aches? No    What treatments has patient tried? None   Does patient live in a nursing home, group home, or shelter? No  Does patient have a way to get food/medications during quarantined? Yes, I have a friend or family member who can help me.        Mild, cold-like symptoms onset over weekend.  Tested positive for COVID today.  History is very difficult as we had a very poor phone connection and I think there may be some underlying hearing loss.    He has Parkinson's disease and is also on Plavix  There is a chart history of atherosclerotic coronary vascular disease and cerebrovascular accident history    GFR is 54      Objective           Vitals:  No vitals were obtained today due to virtual visit.    Physical Exam   General: Alert and no distress //Respiratory: No audible wheeze, cough, or shortness of breath // Psychiatric:  Appropriate affect, tone, and pace of words            Phone call duration: 10 minutes  Signed Electronically by: Margarito Rubio MD

## 2024-02-08 NOTE — TELEPHONE ENCOUNTER
Called patient regarding message below. He states he has been seen for Covid and is now on Molnupiravir. He states he is feeling much better now.

## 2024-02-16 ENCOUNTER — OFFICE VISIT (OUTPATIENT)
Dept: FAMILY MEDICINE | Facility: CLINIC | Age: 89
End: 2024-02-16
Payer: COMMERCIAL

## 2024-02-16 VITALS
OXYGEN SATURATION: 98 % | SYSTOLIC BLOOD PRESSURE: 129 MMHG | WEIGHT: 174 LBS | TEMPERATURE: 97 F | HEIGHT: 73 IN | HEART RATE: 51 BPM | BODY MASS INDEX: 23.06 KG/M2 | RESPIRATION RATE: 16 BRPM | DIASTOLIC BLOOD PRESSURE: 65 MMHG

## 2024-02-16 DIAGNOSIS — I25.10 ASCVD (ARTERIOSCLEROTIC CARDIOVASCULAR DISEASE): ICD-10-CM

## 2024-02-16 DIAGNOSIS — E78.5 HYPERLIPIDEMIA LDL GOAL <100: ICD-10-CM

## 2024-02-16 DIAGNOSIS — I13.10 HYPERTENSIVE HEART AND KIDNEY DISEASE WITHOUT HEART FAILURE AND WITH STAGE 3A CHRONIC KIDNEY DISEASE (H): ICD-10-CM

## 2024-02-16 DIAGNOSIS — G20.A1 PARKINSON'S DISEASE, UNSPECIFIED WHETHER DYSKINESIA PRESENT, UNSPECIFIED WHETHER MANIFESTATIONS FLUCTUATE (H): Primary | ICD-10-CM

## 2024-02-16 DIAGNOSIS — I71.21 ANEURYSM OF ASCENDING AORTA WITHOUT RUPTURE (H): ICD-10-CM

## 2024-02-16 DIAGNOSIS — I10 BENIGN ESSENTIAL HYPERTENSION: ICD-10-CM

## 2024-02-16 DIAGNOSIS — N18.31 HYPERTENSIVE HEART AND KIDNEY DISEASE WITHOUT HEART FAILURE AND WITH STAGE 3A CHRONIC KIDNEY DISEASE (H): ICD-10-CM

## 2024-02-16 DIAGNOSIS — I71.40 ABDOMINAL AORTIC ANEURYSM (AAA) WITHOUT RUPTURE, UNSPECIFIED PART (H): ICD-10-CM

## 2024-02-16 DIAGNOSIS — N18.30 STAGE 3 CHRONIC KIDNEY DISEASE, UNSPECIFIED WHETHER STAGE 3A OR 3B CKD (H): ICD-10-CM

## 2024-02-16 DIAGNOSIS — E03.9 HYPOTHYROIDISM, UNSPECIFIED TYPE: ICD-10-CM

## 2024-02-16 DIAGNOSIS — F42.9 OBSESSIVE-COMPULSIVE DISORDER, UNSPECIFIED TYPE: Chronic | ICD-10-CM

## 2024-02-16 DIAGNOSIS — R63.4 WEIGHT LOSS: ICD-10-CM

## 2024-02-16 PROBLEM — R29.818 PARKINSONIAN FEATURES: Status: RESOLVED | Noted: 2023-01-19 | Resolved: 2024-02-16

## 2024-02-16 LAB
ALBUMIN SERPL BCG-MCNC: 4 G/DL (ref 3.5–5.2)
ALP SERPL-CCNC: 85 U/L (ref 40–150)
ALT SERPL W P-5'-P-CCNC: 9 U/L (ref 0–70)
ANION GAP SERPL CALCULATED.3IONS-SCNC: 11 MMOL/L (ref 7–15)
AST SERPL W P-5'-P-CCNC: 17 U/L (ref 0–45)
BILIRUB SERPL-MCNC: 0.5 MG/DL
BUN SERPL-MCNC: 32.2 MG/DL (ref 8–23)
CALCIUM SERPL-MCNC: 9.2 MG/DL (ref 8.2–9.6)
CHLORIDE SERPL-SCNC: 101 MMOL/L (ref 98–107)
CREAT SERPL-MCNC: 1.09 MG/DL (ref 0.67–1.17)
DEPRECATED HCO3 PLAS-SCNC: 26 MMOL/L (ref 22–29)
EGFRCR SERPLBLD CKD-EPI 2021: 64 ML/MIN/1.73M2
ERYTHROCYTE [DISTWIDTH] IN BLOOD BY AUTOMATED COUNT: 13.7 % (ref 10–15)
GLUCOSE SERPL-MCNC: 98 MG/DL (ref 70–99)
HCT VFR BLD AUTO: 36.5 % (ref 40–53)
HGB BLD-MCNC: 11.4 G/DL (ref 13.3–17.7)
MCH RBC QN AUTO: 31.5 PG (ref 26.5–33)
MCHC RBC AUTO-ENTMCNC: 31.2 G/DL (ref 31.5–36.5)
MCV RBC AUTO: 101 FL (ref 78–100)
PLATELET # BLD AUTO: 189 10E3/UL (ref 150–450)
POTASSIUM SERPL-SCNC: 4.2 MMOL/L (ref 3.4–5.3)
PROT SERPL-MCNC: 6.6 G/DL (ref 6.4–8.3)
RBC # BLD AUTO: 3.62 10E6/UL (ref 4.4–5.9)
SODIUM SERPL-SCNC: 138 MMOL/L (ref 135–145)
T4 FREE SERPL-MCNC: 1.46 NG/DL (ref 0.9–1.7)
TSH SERPL DL<=0.005 MIU/L-ACNC: 0.18 UIU/ML (ref 0.3–4.2)
WBC # BLD AUTO: 5.6 10E3/UL (ref 4–11)

## 2024-02-16 PROCEDURE — 84439 ASSAY OF FREE THYROXINE: CPT | Performed by: INTERNAL MEDICINE

## 2024-02-16 PROCEDURE — 84443 ASSAY THYROID STIM HORMONE: CPT | Performed by: INTERNAL MEDICINE

## 2024-02-16 PROCEDURE — 85027 COMPLETE CBC AUTOMATED: CPT | Performed by: INTERNAL MEDICINE

## 2024-02-16 PROCEDURE — 80053 COMPREHEN METABOLIC PANEL: CPT | Performed by: INTERNAL MEDICINE

## 2024-02-16 PROCEDURE — 36415 COLL VENOUS BLD VENIPUNCTURE: CPT | Performed by: INTERNAL MEDICINE

## 2024-02-16 PROCEDURE — 99214 OFFICE O/P EST MOD 30 MIN: CPT | Performed by: INTERNAL MEDICINE

## 2024-02-16 ASSESSMENT — PAIN SCALES - GENERAL: PAINLEVEL: NO PAIN (0)

## 2024-02-16 NOTE — PATIENT INSTRUCTIONS
For the nasal crusting try using saline nasal spray 2 or 3x a day.    I would get the rsv vaccine at your pharmacy.    Please get a ct of your chest in addition to the abdomin.    Zurdo Kendrick M.D.

## 2024-02-16 NOTE — PROGRESS NOTES
This is a very pleasant 89-year-old who presents with his son-in-law for follow-up to multiple issues.    Patient has had weight loss.  His appetite is not great.  He is not having stomach pain or nausea or vomiting.  His depression seems to be fairly well-controlled and he sees psychiatry for this and his OCD.    He has a history of a AAA as well as an aneurysm  of his ascending aorta.    He has hypertension which is controlled, he has CKD.  I will get labs.  He has hypothyroidism.  He has hyperlipidemia.  He has a history of coronary disease.  [facility] and is doing well there.  He uses a walker.  He was diagnosed with Parkinson's disease and is on medication for that.    He otherwise is doing well with no chest pain or shortness of breath.  No other GI symptoms.    Past Medical History:   Diagnosis Date    A-fib (H) 2010    post op    AAA (abdominal aortic aneurysm) without rupture (H24)     Dr. 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    carotid us no stenosis, echo no change and no clots; ziopatch no afib, svt present    Anemia 2004    Aortic insufficiency 06/2014    1+ on echo    Aortic insufficiency 11/2016    mild to mod    Ascending aorta dilatation (H24) 01/2023    5cm on ct    ASCVD (arteriosclerotic cardiovascular disease) 2010    cabg, post op afib    BPH (benign prostatic hyperplasia) 2003    turp, flomax added by urol 2/17    Bradycardia 2016    stopped toprol    CKD (chronic kidney disease) stage 3, GFR 30-59 ml/min (H)     Constipation 05/2020    colonoscopy nl    Cough 2010    pulm eval, felt due to reflux    Dizzy 2001    mri small vessel dz    GERD (gastroesophageal reflux disease)     HTN (hypertension) 2002    Hx of colonoscopy 2003    tics    Hypothyroid     Hypovitaminosis D     Idiopathic neuropathy     Lung nodule 02/2018    6mm, found during eval of ascending aorta, fu 8/18 no change    OCD (obsessive compulsive disorder)     sees  psyche    Panic disorder     Dr. Luna, then someone after he retired    Parkinson's disease 03/2023    per neuro    Spinal headache     Stroke (H) 12/2016    expressive aphasia, hosp, seen by neuro, mri pos, carotids min dz, changed from asa to plavix    Sudden hearing loss 06/2013    Dr. Garcia, mri brain no acoustic neuroma    SVT (supraventricular tachycardia) 11/2016    seen on ziopatch done for amaurosis, longest 15 beats    Thoracic ascending aortic aneurysm (H24) 06/2014    4.4cm on echo, aortic root 3.9, fu 5/15 4.8cm; fu done 12/15 and slightly enlarged, fu 6/16 no change, fu 11/16 no change; fu 1/18 echo 5.1-5.3, enlarged, then cta done and only 4.8cm, t fu 6 months, lvh, nl ef, mild ai; fu 8/18 slightly larger; fu 2/19 slightly smaller; fu 2/20 and then 1/21 and stable    Ulcerative colitis (H)     not active for years     Past Surgical History:   Procedure Laterality Date    BACK SURGERY  1998    BLADDER SURGERY  1985    CATARACT IOL, RT/LT  2011    COLONOSCOPY N/A 05/05/2020    Procedure: COLONOSCOPY;  Surgeon: Kenya Loco MD;  Location:  GI    CORONARY ARTERY BYPASS  2010    ENDOVASCULAR REPAIR ANEURYSM ABDOMINAL AORTA N/A 05/27/2015    Procedure: ENDOVASCULAR REPAIR ANEURYSM ABDOMINAL AORTA;  Surgeon: Darin Walters MD;  Location:  OR    HERNIA REPAIR  2005    HERNIA REPAIR  1998    HERNIORRHAPHY INGUINAL Left 01/05/2018    Procedure: HERNIORRHAPHY INGUINAL;  Incarcerated Left Inguinal Hernia;  Surgeon: Harsh Walker MD;  Location:  OR    KNEE SURGERY  1997    REPAIR HAMMER TOE  12/28/2012    Procedure: REPAIR HAMMER TOE;  LEFT SECOND AND THIRD CLAW TOE RECONSTRUCTION;  Surgeon: Gray Haynes MD;  Location:  OR    REPAIR HAMMER TOE  04/2018    tria    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 TOTAL KNEE ARTHROPLASTY  2011    left    [facility] TOTAL KNEE ARTHROPLASTY  2009    right     Social History     Socioeconomic History    Marital status:       Spouse name: Not on file    Number of children: 2    Years of education: Not on file    Highest education level: Not on file   Occupational History    Occupation: retail     Employer: RETIRED   Tobacco Use    Smoking status: Former     Packs/day: 1.00     Years: 5.00     Additional pack years: 0.00     Total pack years: 5.00     Types: Cigarettes     Quit date: 1965     Years since quittin.7    Smokeless tobacco: Never   Vaping Use    Vaping Use: Never used   Substance and Sexual Activity    Alcohol use: Yes     Comment: none    Drug use: No    Sexual activity: Not Currently     Partners: Female   Other Topics Concern    Parent/sibling w/ CABG, MI or angioplasty before 65F 55M? Not Asked   Social History Narrative    Not on file     Social Determinants of Health     Financial Resource Strain: Low Risk  (2023)    Financial Resource Strain     Within the past 12 months, have you or your family members you live with been unable to get utilities (heat, electricity) when it was really needed?: No   Food Insecurity: Low Risk  (2023)    Food Insecurity     Within the past 12 months, did you worry that your food would run out before you got money to buy more?: No     Within the past 12 months, did the food you bought just not last and you didn t have money to get more?: No   Transportation Needs: Low Risk  (2023)    Transportation Needs     Within the past 12 months, has lack of transportation kept you from medical appointments, getting your medicines, non-medical meetings or appointments, work, or from getting things that you need?: No   Physical Activity: Not on file   Stress: Not on file   Social Connections: Not on file   Interpersonal Safety: Low Risk  (2023)    Interpersonal Safety     Do you feel physically and emotionally safe where you currently live?: Yes     Within the past 12 months, have you been hit, slapped, kicked or otherwise physically hurt by someone?: No      Within the past 12 months, have you been humiliated or emotionally abused in other ways by your partner or ex-partner?: No   Housing Stability: Low Risk  (12/22/2023)    Housing Stability     Do you have housing? : Yes     Are you worried about losing your housing?: No     Current Outpatient Medications   Medication Sig Dispense Refill    acetaminophen (TYLENOL) 500 MG tablet Take 1 or 2 tablets every 8 hours as needed for pain  Do not exceed 3000mg in 24 hours 100 tablet 0    acetaminophen (TYLENOL) 500 MG tablet Take 1-2 tablets (500-1,000 mg) by mouth every 8 hours as needed for mild pain      carbidopa-levodopa (SINEMET)  MG tablet Take 1.5 tablets by mouth 3 times daily 405 tablet 0    chlorthalidone (HYGROTON) 25 MG tablet 1 TABLET ORALLY EVERY MORNING  (DX: DIURETIC ) 90 tablet 3    clomiPRAMINE (ANAFRANIL) 25 MG capsule 1 CAPSULE ORALLY AT BEDTIME (DX: DEPRESSION) 28 capsule 10    clopidogrel (PLAVIX) 75 MG tablet 1 TABLET ORALLY EVERY MORNING  (DX: HEART  ) 90 tablet 3    gabapentin (NEURONTIN) 400 MG capsule 1 CAPSULE ORALLY 2 TIMES DAILY FOR NERVES (Patient taking differently: Take 400 mg by mouth 3 times daily 1 CAPSULE ORALLY 2 TIMES DAILY FOR NERVES) 180 capsule 3    ketoconazole (NIZORAL) 2 % external cream APPLY TOPICALLY TO INFLAMED SKIN 2 TIMES DAILY   (DX: INFLAMED SKIN ) 30 g 10    lactulose (CHRONULAC) 10 GM/15ML solution DRINK 30ML  ORALLY DAILY (DX: CONSTIPATION);DRINK 30ML  ORALLY DAILY AS NEEDED (DX: CONSTIPATION) 946 mL 11    levothyroxine (SYNTHROID/LEVOTHROID) 100 MCG tablet 1 TABLET ORALLY DAILY  (DX: THYROID) 28 tablet 8    melatonin 3 MG tablet 1 TABLET ORALLY AT BEDTIME AS NEEDED  (DX: SLEEP ) 30 tablet 10    mirtazapine (REMERON) 45 MG tablet 1 TABLET ORALLY AT BEDTIME (DX: DEPRESSION) 30 tablet 10    OPTIVE 0.5-0.9 % ophthalmic solution INSTILL 1 DROP INTO BOTH EYES 2 TIMES DAILY  (DX: DRY EYES) 15 mL 11    SENEXON-S 8.6-50 MG tablet 1 TABLET ORALLY DAILY AS NEEDED (DX:  "CONSTIPATION) 30 tablet 10    simvastatin (ZOCOR) 20 MG tablet 1 TABLET ORALLY AT BEDTIME   (DX: CHOLESTEROL) 90 tablet 3    sodium chloride (DEEP SEA NASAL SPRAY) 0.65 % nasal spray INSTILL SPRAY(S) INTO NOSTRIL(S) 3 TIMES DAILY 44 mL 11    SOLUBLE FIBER THERAPY powder TAKE 1 TEASPOONFUL ORALLY DAILY (DX: CONSTIPATION) 454 g 10     Allergies   Allergen Reactions    No Known Allergies      FAMILY HISTORY NOTED AND REVIEWED    REVIEW OF SYSTEMS: above    PHYSICAL EXAM    /65 (BP Location: Left arm, Patient Position: Sitting, Cuff Size: Adult Regular)   Pulse 51   Temp 97  F (36.1  C) (Temporal)   Resp 16   Ht 1.854 m (6' 1\")   Wt 78.9 kg (174 lb)   SpO2 98%   BMI 22.96 kg/m      Patient appears non toxic  Mouth - tongue midline and within normal limits, mucous membranes and posterior pharynx within normal limits, no lesions seen.  Neck - no masses, lesions or tenderness  Nodes - no supraclavicular, cervical or axially adenopathy .  Lungs - clear, normal flow  Cardiovascular - regular rate and rhythm, no murmer, rub or gallop, no jvp or edema, carotids within normal limits, no bruits.  Abdomen - normal active bowel sounds, soft, non tender, no masses, guarding or rebound, no hepatosplenomegaly  He does move quite slowly and is somewhat bent over.    Labs sent    ASSESSMENT:  Weight loss, suspect due to his Parkinson's, age.  I will check labs and a CT scan.  Aneurysms as noted above, he is due for right CT abdomen for his AAA and I will add a CT chest for the thoracic aneurysm and his weight loss  Parkinson's, follow-up with neurology  Hypertension, controlled  CKD, follow-up labs  Hypothyroidism, follow-up labs  Hyperlipidemia  OCD  Coronary disease, stable    PLAN:  Labs  CT chest and abdomen  RSV shot at pharmacy  Follow-up office visit in 4 months    Discussed with patient and son-in-law      Zurdo Kendrick M.D.                "

## 2024-02-19 ENCOUNTER — TELEPHONE (OUTPATIENT)
Dept: FAMILY MEDICINE | Facility: CLINIC | Age: 89
End: 2024-02-19
Payer: COMMERCIAL

## 2024-02-19 RX ORDER — LEVOTHYROXINE SODIUM 88 UG/1
88 TABLET ORAL DAILY
Qty: 90 TABLET | Refills: 3 | Status: SHIPPED | OUTPATIENT
Start: 2024-02-19

## 2024-02-19 NOTE — TELEPHONE ENCOUNTER
Patient calling asking for the necessity of the chest CT. He has an appointment tomorrow where he says he will be getting another scan. Is this a duplicate? Please advise.     Appointments in Next Year      Feb 20, 2024 11:40 AM  (Arrive by 11:25 AM)  CT ABDOMEN W/O CONTRAST with [facility] ([facility] ) [phone]     Feb 28, 2024 11:00 AM  (Arrive by 10:45 AM)  CT CHEST W/O CONTRAST with [address] [facility] ([facility] ) [phone]     Jun 21, 2024 11:30 AM  (Arrive by 11:10 AM)  Provider Visit with Zurdo Kendrick MD  [facility] ([facility] ) [phone]          MOJGAN Noriega  [facility] Triage

## 2024-02-19 NOTE — RESULT ENCOUNTER NOTE
Zack,    It was very nice seeing you.  You should be able to view your test results.    Your CBC your blood count is very stable with a normal white count and platelet count and slightly low hemoglobin.  I am not concerned about this but I will keep an eye on it.    Your chemistry panel shows no diabetes.  Your blood salts, kidney tests, liver tests, and proteins are all normal.    Your TSH or thyroid test indicate you are getting a bit too much Synthroid.  I would like to lower the dose.  I believe you currently on 100 mcg a day so I will send in a lower dose and we should change it to that.  We can repeat this test when I see you next.    Overall your test results are fine.  Please get the CT scans when you get a chance and I would like to see you back in 4 months.    Zurdo Kendrick M.D+.

## 2024-02-19 NOTE — TELEPHONE ENCOUNTER
Per chart review, patient resides at the [facility]. Writer called to the [facility] and was told that Jayashree Alarcon is the nurse at [facility] facility for this patient.    Writer left a voicemail on Jayashree Alarcon's confidential vm with information from PCP below. Writer also provided callback number to clinic, should nursing staff have a question about PCP's message.    Marissa Silva RN  -[facility]

## 2024-02-19 NOTE — TELEPHONE ENCOUNTER
Please call the patient's living facility and let the nurse know that I am changing his thyroid dose.  I am lowering it to 88 mcg.  Please see the MyChart result note sent to him.    Thank you    Zurdo Kendrick M.D.

## 2024-02-20 ENCOUNTER — HOSPITAL ENCOUNTER (OUTPATIENT)
Dept: CT IMAGING | Facility: CLINIC | Age: 89
Discharge: HOME | End: 2024-02-20
Attending: RADIOLOGY | Admitting: RADIOLOGY
Payer: COMMERCIAL

## 2024-02-20 DIAGNOSIS — I71.40 ABDOMINAL AORTIC ANEURYSM (AAA) WITHOUT RUPTURE (H): ICD-10-CM

## 2024-02-20 PROCEDURE — 74176 CT ABD & PELVIS W/O CONTRAST: CPT

## 2024-02-21 ENCOUNTER — TELEPHONE (OUTPATIENT)
Dept: OTHER | Facility: CLINIC | Age: 89
End: 2024-02-21
Payer: COMMERCIAL

## 2024-02-21 ENCOUNTER — TELEPHONE (OUTPATIENT)
Dept: FAMILY MEDICINE | Facility: CLINIC | Age: 89
End: 2024-02-21
Payer: COMMERCIAL

## 2024-02-21 DIAGNOSIS — I71.40 ABDOMINAL AORTIC ANEURYSM (AAA) WITHOUT RUPTURE (H): Primary | ICD-10-CM

## 2024-02-21 NOTE — TELEPHONE ENCOUNTER
Patient called back. Read the below results from the CT and gave patient the number for IR nurse clinician.     MOJGAN Noriega  [facility]

## 2024-02-21 NOTE — TELEPHONE ENCOUNTER
Called and spoke to the patient. Relayed message from the provider below and re-iterated information from the AVS. Patient was wondering how the results of the Chest CT could provide any information about his weight loss?     Advised patient the first step is to get the imaging completed. Second step will be for PCP to review the results and, hopefully, most of this questions will be answered when the results have been finalized.     Patient expressed understanding and had no additional questions at this time.     Pallavi Ochoa RN

## 2024-02-21 NOTE — TELEPHONE ENCOUNTER
Reason for call:  Results   Name of test or procedure: CT  Date of test or procedure: 2/19/23  Location of test or procedure:     Additional comments: patient was told there was a lump in his scan and he wants to know what that is    Phone number to reach patient:  Home number on file [phone] (home)    Best Time:  any    Can we leave a detailed message on this number?  YES    Travel screening: Not Applicable

## 2024-02-21 NOTE — TELEPHONE ENCOUNTER
Called patient with results of CT abdomen without contrast.  Aneurysm sac is stable.  Also, noted 6 mm lung nodule.  Left VM.    Recommend 1 year follow up.  Trini Gonzalez RN  IR nurse clinician  [phone]

## 2024-02-22 ENCOUNTER — NURSE TRIAGE (OUTPATIENT)
Dept: NURSING | Facility: CLINIC | Age: 89
End: 2024-02-22

## 2024-02-22 ENCOUNTER — VIRTUAL VISIT (OUTPATIENT)
Dept: FAMILY MEDICINE | Facility: CLINIC | Age: 89
End: 2024-02-22
Payer: COMMERCIAL

## 2024-02-22 DIAGNOSIS — R91.1 LUNG NODULE: Primary | ICD-10-CM

## 2024-02-22 PROCEDURE — 99207 PR NON-BILLABLE SERV PER CHARTING: CPT | Mod: 93 | Performed by: INTERNAL MEDICINE

## 2024-02-22 NOTE — TELEPHONE ENCOUNTER
It appears that his aneurysm is stable and he is a tiny, 6 mm, lung nodule.  He is also due for a CT of his lungs given his weight loss and we can see more on that.  If he has other questions let me know but I do not think he needs a phone visit unless he wants it.    Thank you    Zurdo Kendrick M.D.

## 2024-02-22 NOTE — TELEPHONE ENCOUNTER
PCP, pt needs explanation of 02/20/2024 CT abdomen scan.  Triage advised pt to schedule VV to discuss results.  Telephone visit was scheduled with doctor Ramiro today.    If appt is not needed, please advice on results and route message back to triage to cancel appt.     Future Appointments 2/22/2024 - 8/20/2024        Date Visit Type Length Department Provider     2/22/2024  5:30 PM OFFICE VISIT 30 min CS FAMILY PRAC/IM Margarito Rubio MD    Location Instructions:     [facility] is in [address] of the [facility] at [address] This is just south of [facility] and the [facility] exit off of [address]. Free parking is available; access the lot by turning east from [facility] onto [address]. Through the main entrance, the clinic is directly to the left.              2/28/2024 11:00 AM CT CHEST WO 20 min SH CT SCAN SHCT1    Location Instructions:     [facility] [address] [place] [address]  Parking Self parking is available for Imaging customers in the [facility] s Skyway Parking Ramp, across [facility] from the hospital. Access the skyway from Level C of the parking ramp. There is no attendant on duty in the parking ramp. Pay stations accept Visa, MasterCard, Discover, American Express, and cash.  Self Parking rates 0-15 minutes $0.00 15-30 minutes $3.00 30 min.-1 hr. $4.00 1 -2 hours $5.00 2-3 hours $6.00 3-5 hours $7.00 5-7 hours $8.00 7-8 hours $9.00 8-24 hours $10.00 Each additional 24 hours - $5 (car must remain in the ramp or daily parking rates apply). Above prices include tax. Rates are subject to change.*Under 15 minutes: no charge.  Entrance and check-in location If you choose to self-park in the Skyway Ramp, the skyway will bring you into the building to the floor above the Skyway Lobby. Proceed down 1 floor (stairs or elevator access are available when you enter the hospital). If you choose  to astrid cruz, you ll enter the [employer] from the [employer] station at Door 2. Both self-park and  customers can check in at the Welcome Desk in the [employer].              6/21/2024 11:30 AM OFFICE VISIT 30 min CS FAMILY MEJIA/Zurdo Carrero MD    Location Instructions:     [facility] is in [address] of the [facility] at [address] This is just south of [facility] and the [facility] exit off of [address]. Free parking is available; access the lot by turning east from [facility] onto [address]. Through the main entrance, the clinic is directly to the left.

## 2024-02-22 NOTE — PROGRESS NOTES
Zack is a 89 year old who is being evaluated via a billable telephone visit.      What phone number would you like to be contacted at? [phone]  How would you like to obtain your AVS? Phil    Distant Location (provider location):  On-site    Assessment & Plan     Lung nodule  I explained to the patient that most lung nodules are not dangerous, but they do require imaging follow-up to make sure that they are not a cancerous problem.  I recommended that he report for the scheduled CT scan of the chest next week to further characterize the lung nodule and investigate for any dangerous causes for his recent weight loss as previously planned with his primary care physician.  He was grateful for the context and plans to follow-up for that scan next week.      No LOS data to display   Time spent by me doing chart review, history and exam, documentation and further activities per the note        FUTURE APPOINTMENTS:       -CT scan of the chest on February 28    Subjective   Zack is a 89 year old, presenting for the following health issues:  Results (ct)        2/22/2024     5:15 PM   Additional Questions   Roomed by Quang   Accompanied by Not applicable, by themselves     HPI           89-year-old man who had some questions about an incidental 0.6 cm lung nodule noted on a CT of the abdomen and pelvis obtained to perform surveillance on an aneurysm repair.  The aneurysm was noted to be stable, but the patient had concerns about the lung nodule's clinical significance.  His primary care physician had ordered a dedicated CT of the chest to both investigate the nodule but also investigate some weight loss that the patient has been having.  The patient is scheduled for that CT scan next week.      Objective           Vitals:  No vitals were obtained today due to virtual visit.    Physical Exam   General: Alert and no distress //Respiratory: No audible wheeze, cough, or shortness of breath // Psychiatric:  Appropriate  affect, tone, and pace of words            Phone call duration: 4 minutes  Signed Electronically by: Margarito Rubio MD

## 2024-02-22 NOTE — TELEPHONE ENCOUNTER
Patient Contact    Attempt # 1    Was call answered?  No.  Unable to leave VM as phone just rings.    Tanya ZAMBRANO, Triage RN  [facility]

## 2024-02-23 NOTE — TELEPHONE ENCOUNTER
Nurse Triage SBAR    Is this a 2nd Level Triage? NO    Situation: Pt calling with questions about lung nodules.    Background: Pt had a virtual appointment with Dr. Rubio today regarding sed nodules. He had a CT scan performed yesterday which showed lung nodules in her lower lobe.    Assessment: Pt is wondering what would be done about nodules, how nodules will be evaluated, and when potential biopsy would occur. Informed pt to the best of my ability informing him that they would potentially be removed. He is quite anxious about the nodules and is requesting be sent to provider to contact him for further questions.    Protocol Recommended Disposition:   Call PCP When Office is Open    Recommendation: Please review and contact pt to discuss.      Reason for Disposition   [1] Caller requesting NON-URGENT health information AND [2] PCP's office is the best resource    Protocols used: Information Only Call - No Triage-A-    Ros Anaya RN  [facility] Nurse Advisor   2/22/2024  7:49 PM

## 2024-02-23 NOTE — TELEPHONE ENCOUNTER
Called patient regarding message below. Writer discussed in length, Fleischner Scale and how it works, lung nodules, possible causes and the probably of CA being small.     Writer spent 20 minutes going over VV notes with patient further discussion regarding lung nodules.     Patient thanked writer for taking the time to discuss in depth what provider had also discussed with patient and that he felt better about the situation. He will plan to have CT next week and wait for provider interpretation at that time.

## 2024-02-27 DIAGNOSIS — I63.9 CEREBROVASCULAR ACCIDENT (CVA), UNSPECIFIED MECHANISM (H): ICD-10-CM

## 2024-02-27 DIAGNOSIS — E78.5 HYPERLIPIDEMIA LDL GOAL <100: ICD-10-CM

## 2024-02-27 DIAGNOSIS — I10 ESSENTIAL HYPERTENSION: ICD-10-CM

## 2024-02-27 RX ORDER — CHLORTHALIDONE 25 MG/1
TABLET ORAL
Qty: 30 TABLET | Refills: 5 | Status: SHIPPED | OUTPATIENT
Start: 2024-02-27 | End: 2024-08-15

## 2024-02-27 RX ORDER — SIMVASTATIN 20 MG
TABLET ORAL
Qty: 30 TABLET | Refills: 11 | Status: SHIPPED | OUTPATIENT
Start: 2024-02-27

## 2024-02-27 RX ORDER — CLOPIDOGREL BISULFATE 75 MG/1
TABLET ORAL
Qty: 30 TABLET | Refills: 11 | Status: SHIPPED | OUTPATIENT
Start: 2024-02-27

## 2024-02-28 ENCOUNTER — TELEPHONE (OUTPATIENT)
Dept: FAMILY MEDICINE | Facility: CLINIC | Age: 89
End: 2024-02-28
Payer: COMMERCIAL

## 2024-02-28 ENCOUNTER — HOSPITAL ENCOUNTER (OUTPATIENT)
Dept: CT IMAGING | Facility: CLINIC | Age: 89
Discharge: HOME | End: 2024-02-28
Attending: INTERNAL MEDICINE | Admitting: INTERNAL MEDICINE
Payer: COMMERCIAL

## 2024-02-28 DIAGNOSIS — I71.21 ANEURYSM OF ASCENDING AORTA WITHOUT RUPTURE (H): ICD-10-CM

## 2024-02-28 DIAGNOSIS — R63.4 WEIGHT LOSS: ICD-10-CM

## 2024-02-28 PROCEDURE — 71250 CT THORAX DX C-: CPT

## 2024-02-28 NOTE — TELEPHONE ENCOUNTER
Test Results    Contacts         Type Contact Phone/Fax    02/28/2024 01:47 PM CST Phone (Incoming) Zack Santiago (Self) [phone] ()            Who ordered the test:  Dr. Lama    Type of test: Lab    Date of test:  2/16/24    Where was the test performed:  [facility]     What are your questions/concerns?:  PT wants call back with results, does not want to use [facility]    Could we send this information to you in [email or web address] or would you prefer to receive a phone call?:   Patient would prefer a phone call   Okay to leave a detailed message?: Yes at Home number on file [phone] ([place])

## 2024-02-29 NOTE — RESULT ENCOUNTER NOTE
Zack, I am happy to report that your aneurysm is stable and not a problem at 5 cm.  I do not think this will ever become a problem.  There is no other acute findings in the chest.  Please get the CT of your abdomen and pelvis when you get a chance.    Zurdo Kendrick M.D.

## 2024-03-05 ENCOUNTER — TELEPHONE (OUTPATIENT)
Dept: FAMILY MEDICINE | Facility: CLINIC | Age: 89
End: 2024-03-05
Payer: COMMERCIAL

## 2024-03-05 NOTE — TELEPHONE ENCOUNTER
Patient called wondering what his results were from the CT Chest scan. Provider's message was relayed to patient. He stated understanding and had no further questions.     Zurdo Kendrick MD  2/29/2024  7:42 AM DANG Dixon, I am happy to report that your aneurysm is stable and not a problem at 5 cm.  I do not think this will ever become a problem.  There is no other acute findings in the chest.  Please get the CT of your abdomen and pelvis when you get a chance.     Zurdo Kendrick M.D.       Dorothy RICKETTS RN  [facility] Triage Team

## 2024-03-07 NOTE — TELEPHONE ENCOUNTER
Patient calling back to determine cause of weight loss. Per note below, PCP requested CT abdomen pelvis, which was performed on 02/20/24.     Patient requests to know what PCP thinks about recent weight loss. PCP, please advise on weight loss, or if OV/VV follow-up is recommended.    Callback: [phone] ok to leave a detailed vm    Marissa Silva RN  [facility]

## 2024-03-08 NOTE — TELEPHONE ENCOUNTER
TO PCP  YENNY    Called patient regarding PCP message below. He verbalized understanding. He has refused to see any other provider except Dr. Kendrick regarding his weight loss.     Appointments in Next Year      Mar 18, 2024  3:00 PM  (Arrive by 2:40 PM)  Provider Visit with Zurdo Kendrick MD  [facility] [phone]

## 2024-03-08 NOTE — TELEPHONE ENCOUNTER
Ct scans look super, no tumors or cause of weight loss.  If he is ill let me know.  Otherwise next available office visit with me    Zurdo Kendrick M.D.

## 2024-03-12 ENCOUNTER — TRANSFERRED RECORDS (OUTPATIENT)
Dept: HEALTH INFORMATION MANAGEMENT | Facility: CLINIC | Age: 89
End: 2024-03-12
Payer: COMMERCIAL

## 2024-03-18 ENCOUNTER — TELEPHONE (OUTPATIENT)
Dept: OTHER | Facility: CLINIC | Age: 89
End: 2024-03-18

## 2024-03-18 ENCOUNTER — OFFICE VISIT (OUTPATIENT)
Dept: FAMILY MEDICINE | Facility: CLINIC | Age: 89
End: 2024-03-18
Payer: COMMERCIAL

## 2024-03-18 VITALS
HEIGHT: 73 IN | SYSTOLIC BLOOD PRESSURE: 125 MMHG | BODY MASS INDEX: 23.59 KG/M2 | OXYGEN SATURATION: 97 % | TEMPERATURE: 97 F | RESPIRATION RATE: 18 BRPM | HEART RATE: 52 BPM | WEIGHT: 178 LBS | DIASTOLIC BLOOD PRESSURE: 72 MMHG

## 2024-03-18 DIAGNOSIS — I71.43 INFRARENAL ABDOMINAL AORTIC ANEURYSM (AAA) WITHOUT RUPTURE (H): ICD-10-CM

## 2024-03-18 DIAGNOSIS — L98.9 SKIN LESION: ICD-10-CM

## 2024-03-18 DIAGNOSIS — J34.89 NASAL CRUSTING: Primary | ICD-10-CM

## 2024-03-18 DIAGNOSIS — L89.90 PRESSURE INJURY OF SKIN, UNSPECIFIED INJURY STAGE, UNSPECIFIED LOCATION: ICD-10-CM

## 2024-03-18 PROCEDURE — 99214 OFFICE O/P EST MOD 30 MIN: CPT | Performed by: INTERNAL MEDICINE

## 2024-03-18 ASSESSMENT — PAIN SCALES - GENERAL: PAINLEVEL: NO PAIN (0)

## 2024-03-18 NOTE — PROGRESS NOTES
This is a very pleasant 89-year-old with multiple medical problems here for follow-up.  He presents with his son-in-law.  One of his biggest concerns lately has been his weight loss.  I did labs as noted on February 16 that showed a slightly low hemoglobin and his chemistries look fine.  His TSH indicated we needed to reduce his Synthroid dose and that I have done.  He then went on to have a CT of his chest due to his history of an ascending aortic aneurysm and that showed it to be stable at 5 cm.  He also had a CT of his abdomen pelvis on February 20 which showed a stable AAA and a 6 mm lung nodule but a follow-up CT of his chest did not show any concerning lung lesions, just emphysema with fibrosis and scattered granulomas.    [facility]  He uses a walker.  He has Parkinson's disease as noted.    Since last visit his weight is actually up a little bit as noted.  He is feeling quite well.  He seems to be more animated today.  He is scattered skin lesions on his buttocks that are uncomfortable as well as the right earlobe.  He is not having chest pain or shortness of breath.  No abdominal pain or nausea or vomiting.    His daughter came in the room to show me a note about the COVID lawsuit from the  of [place], [employer] [place].  I told her thank you for the article.    The patient has nasal crusting that gets in his nose hairs.    There is [facility] that says when he drinks his coffee in the morning he can start coughing.  The patient does not feel this is an issue and does not have any trouble with throat pain or other swallowing issues.    Past Medical History:   Diagnosis Date    A-fib (H) 2010    post op    AAA (abdominal aortic aneurysm) without rupture (H24)     Dr. 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    carotid us no stenosis, echo no change and no clots; ziopatch no afib, svt present    Anemia 2004     Aortic insufficiency 06/2014    1+ on echo    Aortic insufficiency 11/2016    mild to mod    Ascending aorta dilatation (H24) 01/2023    5cm on ct, fu done 2/24 and stable    ASCVD (arteriosclerotic cardiovascular disease) 2010    cabg, post op afib    BPH (benign prostatic hyperplasia) 2003    turp, flomax added by urol 2/17    Bradycardia 2016    stopped toprol    CKD (chronic kidney disease) stage 3, GFR 30-59 ml/min (H)     Constipation 05/2020    colonoscopy nl    Cough 2010    pulm eval, felt due to reflux    Dizzy 2001    mri small vessel dz    GERD (gastroesophageal reflux disease)     HTN (hypertension) 2002    Hx of colonoscopy 2003    tics    Hypothyroid     Hypovitaminosis D     Idiopathic neuropathy     Lung nodule 02/2018    6mm, found during eval of ascending aorta, fu 8/18 no change    OCD (obsessive compulsive disorder)     sees psyche    Panic disorder     Dr. Luna, then someone after he retired    Parkinson's disease 03/2023    per neuro    Spinal headache     Stroke (H) 12/2016    expressive aphasia, hosp, seen by neuro, mri pos, carotids min dz, changed from asa to plavix    Sudden hearing loss 06/2013    Dr. Garcia, mri brain no acoustic neuroma    SVT (supraventricular tachycardia) 11/2016    seen on ziopatch done for amaurosis, longest 15 beats    Thoracic ascending aortic aneurysm (H24) 06/2014    4.4cm on echo, aortic root 3.9, fu 5/15 4.8cm; fu done 12/15 and slightly enlarged, fu 6/16 no change, fu 11/16 no change; fu 1/18 echo 5.1-5.3, enlarged, then cta done and only 4.8cm, t fu 6 months, lvh, nl ef, mild ai; fu 8/18 slightly larger; fu 2/19 slightly smaller; fu 2/20 and then 1/21 and stable    Ulcerative colitis (H)     not active for years     Past Surgical History:   Procedure Laterality Date    BACK SURGERY  1998    BLADDER SURGERY  1985    CATARACT IOL, RT/LT  2011    COLONOSCOPY N/A 05/05/2020    Procedure: COLONOSCOPY;  Surgeon: Kenya Loco MD;  Location:  GI     CORONARY ARTERY BYPASS      ENDOVASCULAR REPAIR ANEURYSM ABDOMINAL AORTA N/A 2015    Procedure: ENDOVASCULAR REPAIR ANEURYSM ABDOMINAL AORTA;  Surgeon: Darin Walters MD;  Location: [facility] OR    HERNIA REPAIR  2005    HERNIA REPAIR  1998    HERNIORRHAPHY INGUINAL Left 2018    Procedure: HERNIORRHAPHY INGUINAL;  Incarcerated Left Inguinal Hernia;  Surgeon: Harsh Walker MD;  Location: [facility] OR    KNEE SURGERY      REPAIR HAMMER TOE  2012    Procedure: REPAIR HAMMER TOE;  LEFT SECOND AND THIRD CLAW TOE RECONSTRUCTION;  Surgeon: Gray Haynes MD;  Location: [facility] OR    REPAIR HAMMER TOE  2018    tria    toe amputation right foot  2021    TURP  2003    ZZC TOTAL KNEE ARTHROPLASTY  2011    left    ZZC TOTAL KNEE ARTHROPLASTY  2009    right     Social History     Socioeconomic History    Marital status:      Spouse name: Not on file    Number of children: 2    Years of education: Not on file    Highest education level: Not on file   Occupational History    Occupation: retail     Employer: RETIRED   Tobacco Use    Smoking status: Former     Packs/day: 1.00     Years: 5.00     Additional pack years: 0.00     Total pack years: 5.00     Types: Cigarettes     Quit date: 1965     Years since quittin.8    Smokeless tobacco: Never   Vaping Use    Vaping Use: Never used   Substance and Sexual Activity    Alcohol use: Yes     Comment: none    Drug use: No    Sexual activity: Not Currently     Partners: Female   Other Topics Concern    Parent/sibling w/ CABG, MI or angioplasty before 65F 55M? Not Asked   Social History Narrative    Not on file     Social Determinants of Health     Financial Resource Strain: Low Risk  (2023)    Financial Resource Strain     Within the past 12 months, have you or your family members you live with been unable to get utilities (heat, electricity) when it was really needed?: No   Food Insecurity: Low Risk  (2023)    Food Insecurity      Within the past 12 months, did you worry that your food would run out before you got money to buy more?: No     Within the past 12 months, did the food you bought just not last and you didn t have money to get more?: No   Transportation Needs: Low Risk  (12/22/2023)    Transportation Needs     Within the past 12 months, has lack of transportation kept you from medical appointments, getting your medicines, non-medical meetings or appointments, work, or from getting things that you need?: No   Physical Activity: Not on file   Stress: Not on file   Social Connections: Not on file   Interpersonal Safety: Low Risk  (12/13/2023)    Interpersonal Safety     Do you feel physically and emotionally safe where you currently live?: Yes     Within the past 12 months, have you been hit, slapped, kicked or otherwise physically hurt by someone?: No     Within the past 12 months, have you been humiliated or emotionally abused in other ways by your partner or ex-partner?: No   Housing Stability: Low Risk  (12/22/2023)    Housing Stability     Do you have housing? : Yes     Are you worried about losing your housing?: No     Current Outpatient Medications   Medication Sig Dispense Refill    carbidopa-levodopa (SINEMET)  MG tablet Take 1.5 tablets by mouth 3 times daily 405 tablet 0    chlorthalidone (HYGROTON) 25 MG tablet 1 TABLET ORALLY EVERY MORNING  (DX: DIURETIC ) 30 tablet 5    clomiPRAMINE (ANAFRANIL) 25 MG capsule 1 CAPSULE ORALLY AT BEDTIME (DX: DEPRESSION) 28 capsule 10    clopidogrel (PLAVIX) 75 MG tablet 1 TABLET ORALLY EVERY MORNING  (DX: HEART  ) 30 tablet 11    gabapentin (NEURONTIN) 400 MG capsule 1 CAPSULE ORALLY 2 TIMES DAILY FOR NERVES (Patient taking differently: Take 400 mg by mouth 3 times daily 1 CAPSULE ORALLY 2 TIMES DAILY FOR NERVES) 180 capsule 3    ketoconazole (NIZORAL) 2 % external cream APPLY TOPICALLY TO INFLAMED SKIN 2 TIMES DAILY   (DX: INFLAMED SKIN ) 30 g 10    lactulose (CHRONULAC) 10  "GM/15ML solution DRINK 30ML  ORALLY DAILY (DX: CONSTIPATION);DRINK 30ML  ORALLY DAILY AS NEEDED (DX: CONSTIPATION) 946 mL 11    levothyroxine (SYNTHROID/LEVOTHROID) 88 MCG tablet Take 1 tablet (88 mcg) by mouth daily 90 tablet 3    melatonin 3 MG tablet 1 TABLET ORALLY AT BEDTIME AS NEEDED  (DX: SLEEP ) 30 tablet 10    mirtazapine (REMERON) 45 MG tablet 1 TABLET ORALLY AT BEDTIME (DX: DEPRESSION) 30 tablet 10    OPTIVE 0.5-0.9 % ophthalmic solution INSTILL 1 DROP INTO BOTH EYES 2 TIMES DAILY  (DX: DRY EYES) 15 mL 11    SENEXON-S 8.6-50 MG tablet 1 TABLET ORALLY DAILY AS NEEDED (DX: CONSTIPATION) 30 tablet 10    simvastatin (ZOCOR) 20 MG tablet 1 TABLET ORALLY AT BEDTIME   (DX: CHOLESTEROL) 30 tablet 11    sodium chloride (DEEP SEA NASAL SPRAY) 0.65 % nasal spray INSTILL SPRAY(S) INTO NOSTRIL(S) 3 TIMES DAILY 44 mL 11    SOLUBLE FIBER THERAPY powder TAKE 1 TEASPOONFUL ORALLY DAILY (DX: CONSTIPATION) 454 g 10    acetaminophen (TYLENOL) 500 MG tablet Take 1 or 2 tablets every 8 hours as needed for pain  Do not exceed 3000mg in 24 hours 100 tablet 0    acetaminophen (TYLENOL) 500 MG tablet Take 1-2 tablets (500-1,000 mg) by mouth every 8 hours as needed for mild pain       Allergies   Allergen Reactions    No Known Allergies      FAMILY HISTORY NOTED AND REVIEWED    REVIEW OF SYSTEMS: above    PHYSICAL EXAM    /72 (BP Location: Right arm, Patient Position: Sitting, Cuff Size: Adult Regular)   Pulse 52   Temp 97  F (36.1  C)   Resp 18   Ht 1.854 m (6' 1\")   Wt 80.7 kg (178 lb)   SpO2 97%   BMI 23.48 kg/m      Patient appears non toxic  Mouth - tongue midline and within normal limits, mucous membranes and posterior pharynx within normal limits, no lesions seen.  Neck - no masses, lesions or tenderness  Nodes - no supraclavicular, cervical or axially adenopathy .  Lungs - clear, normal flow  Cardiovascular - regular rate and rhythm, no murmer, rub or gallop, no jvp or edema, carotids within normal limits, no " bruits.  Abdomen - normal active bowel sounds, soft, non tender, no masses, guarding or rebound, no hepatosplenomegaly  He does have an exophytic growth that is very firm on the right outer earlobe.  At the base it is a little bit reddened.  On the buttock there are several areas that are raised scabby lesions that are not at all ulcerated.  There is no buttock sores.    Labs noted    ASSESSMENT:  Weight loss, no signs of pathologic cause based on imaging as well as labs.  Doubt vasculitis or TB.  Suspect some may be Parkinson's but also his mental health issues.  AAA, appears to be stable with endovascular dimensions as noted.  The son-in-law is very concerned about this so we will refer to vascular surgery  Skin lesions, ear and buttock, refer to Derm  There is a family history of potential aneurysms and the son was very concerned about this and given the patient's aneurysms will refer for genetic testing  Nasal crusting, to use warm wet washcloths  Potential swallowing issues, discussed trying speech therapy with the patient as recommended by the care center but he does not want it.  If it worsens he will let me know.    PLAN:  Above  Has physical and if more weight loss prior let me know    Zurdo Kendrick M.D.

## 2024-03-18 NOTE — TELEPHONE ENCOUNTER
Referral received via [email or web address] on 3/18/24.    Pt referred to [facility] by Zurdo Kendrick MD for ascending aortic aneurysm.   Patient has hx of EVAR with Dr. Walters on   Pt appears to be following with IR for annual surveillance for the EVAR.    Routing to scheduling to coordinate the following:    NEW VASCULAR PATIENT consult with Vascular Medicine  Please schedule this at next available    Appt note:  Pt referred to [facility] by Zurdo Kendrick MD for ascending aortic aneurysm. CT chest in [facility] on 2/28/24.    Tamie BARRIOS, RN    [facility]  Office: [phone]  Fax: [phone]

## 2024-03-18 NOTE — TELEPHONE ENCOUNTER
Patient called stating the referring provider below would like for him to follow-up with Dr Walters. Informed patient of the referral process and that he would start with seeing a vascular medicine provider. Patient reiterated that he has been seen in the past by vascular and would like to schedule with the recommended provider.    Informed patient that RN will contact patient to discuss why he would be scheduled with another provider.    Please route back to [facility] scheduling pool to contact patient to schedule.

## 2024-03-19 ENCOUNTER — TRANSCRIBE ORDERS (OUTPATIENT)
Dept: OTHER | Age: 89
End: 2024-03-19

## 2024-03-19 DIAGNOSIS — I71.43 INFRARENAL ABDOMINAL AORTIC ANEURYSM (AAA) WITHOUT RUPTURE (H): Primary | ICD-10-CM

## 2024-03-20 NOTE — TELEPHONE ENCOUNTER
I returned call to Zack and explained Dr. Walters does not see patients for ascending aortic aneurysms and that it needs to be vascular medicine. He is in agreement and would like to be called in regards to referral note below. Routing back to scheduling to call patient.     Tamie BARRIOS, RN    [facility]  Office: [phone]  Fax: [phone]

## 2024-04-02 ENCOUNTER — TELEPHONE (OUTPATIENT)
Dept: FAMILY MEDICINE | Facility: CLINIC | Age: 89
End: 2024-04-02
Payer: COMMERCIAL

## 2024-04-02 NOTE — TELEPHONE ENCOUNTER
Triage received above referral request information     Routing to triage to call patient with information above as requested by PCP     Uche Munoz RN  [facility]

## 2024-04-03 NOTE — TELEPHONE ENCOUNTER
Pt was already told by genetic testing per notes.   Nory Allan, RN  [facility] RN Triage Team

## 2024-04-09 ENCOUNTER — TELEPHONE (OUTPATIENT)
Dept: OTHER | Facility: CLINIC | Age: 89
End: 2024-04-09

## 2024-04-09 ENCOUNTER — OFFICE VISIT (OUTPATIENT)
Dept: OTHER | Facility: CLINIC | Age: 89
End: 2024-04-09
Attending: INTERNAL MEDICINE
Payer: COMMERCIAL

## 2024-04-09 VITALS
OXYGEN SATURATION: 98 % | DIASTOLIC BLOOD PRESSURE: 50 MMHG | BODY MASS INDEX: 23.48 KG/M2 | HEART RATE: 58 BPM | WEIGHT: 178 LBS | SYSTOLIC BLOOD PRESSURE: 138 MMHG

## 2024-04-09 DIAGNOSIS — I25.10 CORONARY ARTERY DISEASE DUE TO LIPID RICH PLAQUE: ICD-10-CM

## 2024-04-09 DIAGNOSIS — I25.83 CORONARY ARTERY DISEASE DUE TO LIPID RICH PLAQUE: ICD-10-CM

## 2024-04-09 DIAGNOSIS — I71.43 INFRARENAL ABDOMINAL AORTIC ANEURYSM (AAA) WITHOUT RUPTURE (H): ICD-10-CM

## 2024-04-09 DIAGNOSIS — E78.5 HYPERLIPIDEMIA LDL GOAL <70: ICD-10-CM

## 2024-04-09 DIAGNOSIS — R19.8 ALTERED BOWEL FUNCTION: ICD-10-CM

## 2024-04-09 DIAGNOSIS — I10 ESSENTIAL HYPERTENSION: ICD-10-CM

## 2024-04-09 DIAGNOSIS — R09.89 ABNORMAL PERIPHERAL PULSE: ICD-10-CM

## 2024-04-09 DIAGNOSIS — I71.21 ANEURYSM OF ASCENDING AORTA WITHOUT RUPTURE (H): Primary | ICD-10-CM

## 2024-04-09 PROCEDURE — 99417 PROLNG OP E/M EACH 15 MIN: CPT | Performed by: INTERNAL MEDICINE

## 2024-04-09 PROCEDURE — G0463 HOSPITAL OUTPT CLINIC VISIT: HCPCS | Performed by: INTERNAL MEDICINE

## 2024-04-09 PROCEDURE — 99205 OFFICE O/P NEW HI 60 MIN: CPT | Performed by: INTERNAL MEDICINE

## 2024-04-09 PROCEDURE — G2211 COMPLEX E/M VISIT ADD ON: HCPCS | Performed by: INTERNAL MEDICINE

## 2024-04-09 NOTE — PROGRESS NOTES
INITIAL VASCULAR MEDICAL ASSESSMENT  REFERRAL SOURCE: Zurdo Kendrick MD   REASON FOR CONSULT: for ascending aortic   aneurysm.     A/P:    (I71.21) Slowly growing 50 mm aneurysm of ascending aorta  (primary encounter diagnosis)  Comment: This has grown 2 mm in six years. He is 89, is frail, has Parkinson's like symptoms, is a fall risk. He would have a high mortality from operative repair of an an ATAA. Repair is not indicated at its current size. He had genetic counseling ordered by his PCP but it was never scheduled. He would like to have this done for the befit of his descendants.   Plan: I recommended he not have future surveillance of this given his frailty and likelihood of not being able to tolerate operative repair. Quite frankly he is likely given its slow rate of expansion to die from something else but with the aneurysm. Accordingly, future surveillance is unlikely to add to length or quality of life. I will rediscuss this with him when teena in f/u. His BP at home is an SBP of 105 so I would not recommend ace inhibitor or beta blocker at present.     (I71.43) AAA S/P EVAR 2015 with persistent type II endoleak which has been stable since 2022  Comment: He has an endoleak which si stable. He is due for repeat surveillance in 2025  Plan: I advised he discuss with IR whether he should continue surveillance based upon the sac size stability or lack thereof in his next imaging.     (I25.10,  I25.83) Coronary artery disease due to lipid rich plaque, S/P CABGx5 2010  Comment: Treat with statin to reduce CV events.   Plan: His BP at home is an SBP of 105 so I would not recommend ace inhibitor or beta blocker at present.     (E78.5) Hyperlipidemia LDL goal <70  Comment: Check the below, RTC two weeks later.   Plan: C-Reactive Protein, High Sensitivity, LipoFit         by NMR, Lipoprotein (a)            (I10) Essential hypertension  Comment: As above  Plan: Comprehensive metabolic panel            (R09.89)  Abnormal peripheral pulse  Comment: Check the below to rule out concurrent fem po aneurysmal ds  Plan: US Lower Extremity Arterial Duplex Bilateral      The longitudinal care of plan for Zack was addressed during this visit. Due to added complexity of care, we will continue to support Zack Santiago  and the subsequent management of these conditions and with ongoing continuity of care for these conditions.     90 minutes total care on today's date. Extended time given patietn complexity and need to answer questions of the patietn and his son in law.             HPI: Zack Santiago is a 89 year old hypertensive hyperlipidemic male with a h/o CAD, S/P CABG x 5 2/24/2010 (LIMA-LAD, RSVG-D1,Cx, SVG-PDA,PL), known AAA S/P EVAR 2015 with persistent stable type II endoleak being followed by IR, as well as known asx 50 mm ATAA which is growing slowly (48 mm on 2018 CTA chest)  who was referred for evlauation of 50 mm ATAA.     Review Of Systems  Skin: positive for nail changes, and bruising  Eyes: negative  Ears/Nose/Throat: negative  Respiratory: No shortness of breath, dyspnea on exertion, cough, or hemoptysis  Cardiovascular: negative  Gastrointestinal: negative  Genitourinary: negative  Musculoskeletal: negative  Neurologic: positive for Parkinson's like sxs  Psychiatric: negative  Hematologic/Lymphatic/Immunologic: negative  Endocrine: negative      PAST MEDICAL HISTORY:                  Past Medical History:   Diagnosis Date    A-fib (H) 2010    post op    AAA (abdominal aortic aneurysm) without rupture (H24)     Dr. 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    carotid us no stenosis, echo no change and no clots; ziopatch no afib, svt present    Anemia 2004    Aortic insufficiency 06/2014    1+ on echo    Aortic insufficiency 11/2016    mild to mod    Ascending aorta dilatation (H24) 01/2023    5cm on ct, fu done 2/24 and stable    ASCVD (arteriosclerotic cardiovascular disease)  2010    cabg, post op afib    BPH (benign prostatic hyperplasia) 2003    turp, flomax added by urol 2/17    Bradycardia 2016    stopped toprol    CKD (chronic kidney disease) stage 3, GFR 30-59 ml/min (H)     Constipation 05/2020    colonoscopy nl    Cough 2010    pulm eval, felt due to reflux    Dizzy 2001    mri small vessel dz    GERD (gastroesophageal reflux disease)     HTN (hypertension) 2002    Hx of colonoscopy 2003    tics    Hypothyroid     Hypovitaminosis D     Idiopathic neuropathy     Lung nodule 02/2018    6mm, found during eval of ascending aorta, fu 8/18 no change    OCD (obsessive compulsive disorder)     sees psyche    Panic disorder     Dr. Luna, then someone after he retired    Parkinson's disease 03/2023    per neuro    Spinal headache     Stroke (H) 12/2016    expressive aphasia, hosp, seen by neuro, mri pos, carotids min dz, changed from asa to plavix    Sudden hearing loss 06/2013    Dr. Garcia, mri brain no acoustic neuroma    SVT (supraventricular tachycardia) 11/2016    seen on ziopatch done for amaurosis, longest 15 beats    Thoracic ascending aortic aneurysm (H24) 06/2014    4.4cm on echo, aortic root 3.9, fu 5/15 4.8cm; fu done 12/15 and slightly enlarged, fu 6/16 no change, fu 11/16 no change; fu 1/18 echo 5.1-5.3, enlarged, then cta done and only 4.8cm, t fu 6 months, lvh, nl ef, mild ai; fu 8/18 slightly larger; fu 2/19 slightly smaller; fu 2/20 and then 1/21 and stable    Ulcerative colitis (H)     not active for years       PAST SURGICAL HISTORY:                  Past Surgical History:   Procedure Laterality Date    BACK SURGERY  1998    BLADDER SURGERY  1985    CATARACT IOL, RT/LT  2011    COLONOSCOPY N/A 05/05/2020    Procedure: COLONOSCOPY;  Surgeon: Kenya Loco MD;  Location:  GI    CORONARY ARTERY BYPASS  2010    ENDOVASCULAR REPAIR ANEURYSM ABDOMINAL AORTA N/A 05/27/2015    Procedure: ENDOVASCULAR REPAIR ANEURYSM ABDOMINAL AORTA;  Surgeon: Darin Walters  MD Pedro;  Location: [facility] OR    HERNIA REPAIR  2005    HERNIA REPAIR  1998    HERNIORRHAPHY INGUINAL Left 01/05/2018    Procedure: HERNIORRHAPHY INGUINAL;  Incarcerated Left Inguinal Hernia;  Surgeon: Harsh Walker MD;  Location: [facility] OR    KNEE SURGERY  1997    REPAIR HAMMER TOE  12/28/2012    Procedure: REPAIR HAMMER TOE;  LEFT SECOND AND THIRD CLAW TOE RECONSTRUCTION;  Surgeon: Gray Haynes MD;  Location: [facility] OR    REPAIR HAMMER TOE  04/2018    tria    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    TURP  2003    Z TOTAL KNEE ARTHROPLASTY  2011    left    Z TOTAL KNEE ARTHROPLASTY  2009    right       CURRENT MEDICATIONS:                  Current Outpatient Medications   Medication Sig Dispense Refill    acetaminophen (TYLENOL) 500 MG tablet Take 1 or 2 tablets every 8 hours as needed for pain  Do not exceed 3000mg in 24 hours 100 tablet 0    acetaminophen (TYLENOL) 500 MG tablet Take 1-2 tablets (500-1,000 mg) by mouth every 8 hours as needed for mild pain      carbidopa-levodopa (SINEMET)  MG tablet Take 1.5 tablets by mouth 3 times daily 405 tablet 0    chlorthalidone (HYGROTON) 25 MG tablet 1 TABLET ORALLY EVERY MORNING  (DX: DIURETIC ) 30 tablet 5    clomiPRAMINE (ANAFRANIL) 25 MG capsule 1 CAPSULE ORALLY AT BEDTIME (DX: DEPRESSION) 28 capsule 10    clopidogrel (PLAVIX) 75 MG tablet 1 TABLET ORALLY EVERY MORNING  (DX: HEART  ) 30 tablet 11    gabapentin (NEURONTIN) 400 MG capsule 1 CAPSULE ORALLY 2 TIMES DAILY FOR NERVES (Patient taking differently: Take 400 mg by mouth 3 times daily 1 CAPSULE ORALLY 2 TIMES DAILY FOR NERVES) 180 capsule 3    ketoconazole (NIZORAL) 2 % external cream APPLY TOPICALLY TO INFLAMED SKIN 2 TIMES DAILY   (DX: INFLAMED SKIN ) 30 g 10    lactulose (CHRONULAC) 10 GM/15ML solution DRINK 30ML  ORALLY DAILY (DX: CONSTIPATION);DRINK 30ML  ORALLY DAILY AS NEEDED (DX: CONSTIPATION) 946 mL 11    levothyroxine (SYNTHROID/LEVOTHROID) 88 MCG tablet Take 1 tablet (88 mcg) by mouth  daily 90 tablet 3    melatonin 3 MG tablet 1 TABLET ORALLY AT BEDTIME AS NEEDED  (DX: SLEEP ) 30 tablet 10    mirtazapine (REMERON) 45 MG tablet 1 TABLET ORALLY AT BEDTIME (DX: DEPRESSION) 30 tablet 10    OPTIVE 0.5-0.9 % ophthalmic solution INSTILL 1 DROP INTO BOTH EYES 2 TIMES DAILY  (DX: DRY EYES) 15 mL 11    SENEXON-S 8.6-50 MG tablet 1 TABLET ORALLY DAILY AS NEEDED (DX: CONSTIPATION) 30 tablet 10    simvastatin (ZOCOR) 20 MG tablet 1 TABLET ORALLY AT BEDTIME   (DX: CHOLESTEROL) 30 tablet 11    sodium chloride (DEEP SEA NASAL SPRAY) 0.65 % nasal spray INSTILL SPRAY(S) INTO NOSTRIL(S) 3 TIMES DAILY 44 mL 11    SOLUBLE FIBER THERAPY powder TAKE 1 TEASPOONFUL ORALLY DAILY (DX: CONSTIPATION) 454 g 10       ALLERGIES:                  Allergies   Allergen Reactions    No Known Allergies        SOCIAL HISTORY:                  Social History     Socioeconomic History    Marital status:      Spouse name: Not on file    Number of children: 2    Years of education: Not on file    Highest education level: Not on file   Occupational History    Occupation: retail     Employer: RETIRED   Tobacco Use    Smoking status: Former     Packs/day: 1.00     Years: 5.00     Additional pack years: 0.00     Total pack years: 5.00     Types: Cigarettes     Quit date: 1965     Years since quittin.8    Smokeless tobacco: Never   Vaping Use    Vaping Use: Never used   Substance and Sexual Activity    Alcohol use: Yes     Comment: none    Drug use: No    Sexual activity: Not Currently     Partners: Female   Other Topics Concern    Parent/sibling w/ CABG, MI or angioplasty before 65F 55M? Not Asked   Social History Narrative    Not on file     Social Determinants of Health     Financial Resource Strain: Low Risk  (2023)    Financial Resource Strain     Within the past 12 months, have you or your family members you live with been unable to get utilities (heat, electricity) when it was really needed?: No   Food  Insecurity: Low Risk  (12/22/2023)    Food Insecurity     Within the past 12 months, did you worry that your food would run out before you got money to buy more?: No     Within the past 12 months, did the food you bought just not last and you didn t have money to get more?: No   Transportation Needs: Low Risk  (12/22/2023)    Transportation Needs     Within the past 12 months, has lack of transportation kept you from medical appointments, getting your medicines, non-medical meetings or appointments, work, or from getting things that you need?: No   Physical Activity: Not on file   Stress: Not on file   Social Connections: Not on file   Interpersonal Safety: Low Risk  (12/13/2023)    Interpersonal Safety     Do you feel physically and emotionally safe where you currently live?: Yes     Within the past 12 months, have you been hit, slapped, kicked or otherwise physically hurt by someone?: No     Within the past 12 months, have you been humiliated or emotionally abused in other ways by your partner or ex-partner?: No   Housing Stability: Low Risk  (12/22/2023)    Housing Stability     Do you have housing? : Yes     Are you worried about losing your housing?: No       FAMILY HISTORY:                   Family History   Problem Relation Age of Onset    C.A.D. Father     Lymphoma Sister     Retinal detachment Daughter     Glaucoma No family hx of     Macular Degeneration No family hx of          Physical exam Reveals:    O/P: WNL  HEENT: WNL  NECK: No JVD, thyromegaly, or lymphadenopathy  HEART: RRR, no murmurs, gallops, or rubs  LUNGS: CTA bilaterally without rales, wheezes, or rhonchi  GI: NABS, nondistended, nontender, soft  EXT:without cyanosis, clubbing, or edema  NEURO: nonfocal  : no flank tenderness    Jose A testing with refilling BLUE's in the ulnar and radial positions in < 5 secs bilaterally, fungal appearing nail infections of right hand. .               CT CHEST WITHOUT CONTRAST  2/28/2024 11:11 AM       HISTORY: Aneurysm of ascending aorta without rupture (H24). Weight  loss.     TECHNIQUE: CT scan of the chest was performed without IV contrast.  Multiplanar reformats were obtained. Dose reduction techniques were  used.  CONTRAST: None.     COMPARISON: 1/13/2023     FINDINGS:   LUNGS AND PLEURA: Moderate emphysema. There is mild peripheral  fibrosis without honeycombing. Scattered calcified granulomas. Lungs  are otherwise clear.     MEDIASTINUM/AXILLAE: Stable 5 cm ascending aortic aneurysm. No  lymphadenopathy. Prior CABG.     UPPER ABDOMEN: Small fat-containing ventral hernia in the epigastric  region.                                                                      IMPRESSION:  1.  Stable 5 cm ascending aortic aneurysm.  2.  No acute findings in the chest.     AMPARO HESTER MD       CT ABDOMEN PELVIS W/O CONTRAST 2/20/2024 12:10 PM     CLINICAL HISTORY: s/p EVAR, comparison study done 2/2023  1 year  follow up . Call with results.; Abdominal aortic aneurysm (AAA)  without rupture (H24)  TECHNIQUE: CT scan of the abdomen and pelvis was performed without IV  contrast. Multiplanar reformats were obtained. Dose reduction  techniques were used.  CONTRAST: None.     COMPARISON: CT of the abdomen pelvis dated 2/9/2023     FINDINGS: Evaluation is limited due to a lack of intravenous contrast.  LOWER CHEST: 6 mm nodule in the right lower lobe. COPD changes at the  lung bases.     HEPATOBILIARY: Stable cystic lesion in the caudate lobe of the liver.     PANCREAS: Unremarkable     SPLEEN: Unremarkable     ADRENAL GLANDS: Unremarkable     KIDNEYS/BLADDER: Stable cyst at the anterior upper pole of the right  kidney. Stable cyst at the lower pole of the left kidney.     BOWEL: Colonic diverticulosis.     PELVIC ORGANS: Unremarkable     LYMPH NODES: Unremarkable     VASCULATURE: Changes consistent with endovascular repair of an  infrarenal abdominal aortic aneurysm utilizing a bifurcated endograft.  The excluded  aneurysm sac measures approximately 6.5 x 6.2 cm. These  measurements are not significantly changed.     ADDITIONAL FINDINGS: None.     MUSCULOSKELETAL: Degenerative changes in the lumbar spine.                                                                      IMPRESSION:   1.  Stable abdominal aortic aneurysm post endovascular repair.  2.  6 cm nodule right lower lobe.     Recommendations for one or multiple incidental lung nodules < 6mm :    Low risk patients: No routine follow-up.    High risk patients: Optional follow-up CT at 12 months; if  unchanged, no further follow-up.     *Low Risk: Minimal or absent history of smoking or other known risk  factors.  *Nonsolid (ground glass) or partly solid nodules may require longer  follow-up to exclude indolent adenocarcinoma.  *Recommendations based on Guidelines for the Management of Incidental  Pulmonary Nodules Detected at CT: From the Fleischner Society 2017,  Radiology 2017.  1.    RAFIA RODRIGUEZ MD         EXAM: CT CHEST WITH CONTRAST - PULMONARY EMBOLISM PROTOCOL   LOCATION: [facility]  DATE/TIME: 1/13/2023 10:08 PM     INDICATION: Hypoxemia. Elevated D-dimer.  COMPARISON: 1/29/2021 - CT chest, abdomen and pelvis.     TECHNIQUE: CT angiogram chest during arterial phase injection IV contrast. 2D and 3D MIP reconstructions were performed by the CT technologist. Dose reduction techniques were used.   CONTRAST: 71 mL Isovue 370.     FINDINGS:     ANGIOGRAM CHEST: No visualized pulmonary embolus. Dilatation of the ascending thoracic aorta, which measures up to 5.0 cm in diameter. No dissection of the thoracic aorta. Atherosclerotic calcification in the thoracic aorta.     LUNGS AND PLEURA: A few curvilinear opacities scattered in the periphery of both lungs, most likely representing scarring and/or atelectasis. The lungs are otherwise clear.     MEDIASTINUM/AXILLAE: Prior median sternotomy.     CORONARY ARTERY CALCIFICATION: Present.      MUSCULOSKELETAL: No acute findings.     UPPER ABDOMEN: Partial visualization of a moderate-sized midline upper anterior abdominal hernia containing fat and fluid.                                                                      IMPRESSION:   1.  No visualized pulmonary embolus.  2.  No convincing evidence of active pulmonary disease.   3.  The ascending thoracic aorta is dilated, measuring up to 5.0 cm in diameter. No dissection of the thoracic aorta.      CTA ABDOMEN PELVIS WITH CONTRAST 8/22/2022 3:24 PM     HISTORY: 89-year-old patient with abdominal aortic aneurysm with  endovascular repair and type II endoleak. Endovascular repair was  performed May 27, 2015.     COMPARISON: August 2, 2021.     TECHNIQUE: Multiplanar and multiformatted CTA images obtained from the  lung bases through the abdomen and pelvis before and after the  uneventful administration of Isovue 370 intravenous contrast given for  a total of 80 mL. Radiation dose for this scan was reduced using  automated exposure control, adjustment of the mA and/or kV according  to patient size, or iterative reconstruction technique. 3-D  reformatted images were created at a separate workstation.     FINDINGS: Scattered linear bibasilar opacities likely atelectasis.  Heart size is normal. Large osteophytes arising from several lumbar  vertebrae. No acute osseous abnormality.     Hypodensity in the caudate lobe of the liver is unchanged and likely a  cyst. The gallbladder, spleen, adrenal glands, and pancreas are  unremarkable. Both kidneys are perfused. Appearance of both kidneys is  unchanged. Ventral hernia is noted in the epigastric region, with  scattered internal density, thought to be fluid and simple fluid. Do  not clearly identify loops of bowel. No intraperitoneal fluid or air.  The bladder is partially distended and unremarkable. Moderate amount  of stool throughout the colon. No dilated loops of bowel.     The visible descending thoracic  aorta is patent. The celiac axis, SMA,  bilateral renal arteries are patent. The infrarenal abdominal aorta is  6.4 cm AP x 6.1 cm transverse, previously 6.2 cm AP x 5.6 cm  transverse. Both native internal iliac, external iliac, and common  femoral arteries are patent. Type II endoleak again identified, best  visualized on delayed images and likely with communication between MEKA  and lumbar arteries.                                                                      IMPRESSION:  1. Persistent type II endoleak. Aneurysmal sac is 6.4 cm AP x 6.1 cm  transverse on today's exam, previously 6.2 cm AP x 5.6 cm transverse  indicating growth of the aneurysm since previous exam.  2. Ventral hernia in the epigastric region, perhaps with blood  vessels, also with fluid. The amount of fluid is increased since  previous exam. Do not clearly identify loops of bowel within the  hernia.  3. Remainder of the exam is unchanged.     BRITTANEY DACOSTA MD       EXAM: CTA CHEST ABDOMEN PELVIS W CONTRAST     DATE/TIME: 1/29/2021 12:00 PM     INDICATION: Known thoracoabdominal aneurysm, history of stent graft  reconstruction done 5/2015. Annual follow-up.  COMPARISON: 2/5/2020     TECHNIQUE: Helical acquisition through the chest, abdomen, pelvis was  performed during the without, and in the arterial phase phase of  contrast enhancement. 2D and 3D reconstructions performed by the CT  technologist. Dose reduction techniques were used.  CONTRAST: 80mL ISOVUE-370 from a single use vial     FINDINGS:   ARTERIAL FINDINGS: Postoperative changes of CABG. Aneurysmal dilation  of the ascending thoracic aorta measuring up to 4.7 cm in maximal  diameter, previously 4.6 cm. Normal great vessel branching pattern.  The visualized great vessels are widely patent without significant  focal vascular abnormality. Physiologic kinking of the subclavian  arteries bilaterally due to arm positioning. The remainder of the  thoracic aorta demonstrates a normal  caliber.     Postsurgical changes of aortoiliac stent graft reconstruction of the  infrarenal abdominal aortic aneurysm. The aneurysm sac is bilobed  measuring 4.6 x 4.7 cm proximally and 5.9 x 5.4 cm in maximal  diameter. The proximal margin is not significantly changed. The distal  margin is slightly increased in size, previously measuring 5.8 x 5.2  cm. Stable bilateral common iliac artery aneurysms measuring 2.0 cm on  the left and 2.3 cm on the right. Iliac arteries and visualized lower  portion of the arteries are widely patent. The stent graft is in  appropriate position and is likewise widely patent. Occlusion of the  MEKA origin with proximal reconstitution. This arteries are patent  without significant focal vascular abnormality.     NONVASCULAR FINDINGS:  CHEST: Stable right upper lobe lung nodules. Dependent atelectasis. No  pleural effusion or pneumothorax. Cardiomegaly. No central pulmonary  embolism. Significant native coronary artery calcifications. Patent  CABG. Patent LIMA. No bulky lymphadenopathy.     ABDOMEN: Suboptimal evaluation of the solid organs due to the  noncontrast and arterial phase of contrast enhancement. Simple cyst in  the caudate lobe, unchanged. Simple cyst in the lower pole of the left  kidney. The right kidney, adrenal glands, spleen, gallbladder, and  pancreas are grossly unremarkable.     PELVIS: No abnormally dilated loops of bowel. No free fluid or free  air. Diverticulosis. Unchanged fat-containing hernia.     MUSCULOSKELETAL: Degenerative disease of the lumbar spine.                                                                      IMPRESSION:  1.  Continued aneurysmal dilation of the ascending thoracic aorta  measuring up to 4.7 cm in maximal diameter, this is not significantly  changed from prior studies.  2.  Postsurgical changes of aortobiiliac stent graft reconstruction of  the bilobed infrarenal abdominal aortic aneurysm. The proximal lobe is  unchanged with the  distal lobe measuring slightly increased in size  from 2/5/2020 now measuring 5.9 x 5.4 cm in maximal diameter. No  endoleak is seen on the current study although no delayed images were  obtained.  3.  Stable right pulmonary nodules.  4.  Diverticulosis.     FELECIA PYLE MD

## 2024-04-09 NOTE — PROGRESS NOTES
[facility]        Patient is here for a consult.    Pt is currently taking Statin and Plavix.    /50 (BP Location: Left arm, Patient Position: Chair, Cuff Size: Adult Regular)   Pulse 58   Wt 178 lb (80.7 kg)   SpO2 98%   BMI 23.48 kg/m      The provider has been notified that the patient has no concerns.     Questions patient would like addressed today are: N/A.    Refills are needed: N/A    Has homecare services and agency name:  Yes: [facility]    Kristen Brown MA

## 2024-04-09 NOTE — TELEPHONE ENCOUNTER
Follow-up to 04/09/24    Fasting labs  BLE arterial duplex ultrasound  In clinic visit 2 weeks later

## 2024-04-10 ENCOUNTER — TELEPHONE (OUTPATIENT)
Dept: CONSULT | Facility: CLINIC | Age: 89
End: 2024-04-10
Payer: COMMERCIAL

## 2024-04-10 RX ORDER — METHYLCELLULOSE 2 G/19G
POWDER, FOR SOLUTION ORAL
Qty: 454 G | Refills: 11 | Status: SHIPPED | OUTPATIENT
Start: 2024-04-10

## 2024-04-10 NOTE — TELEPHONE ENCOUNTER
At the request of the family, I contacted Zack's daughter Jayashree who we have consent to communicate with, to discuss Zack's referral to genetics.  The referral was initially made for an abdominal aortic aneurysm, leading us to decline this referral.  The family had questions about this and clarified during our call that Zack actually has an ascending aortic aneurysm as well as Marfanoid features.  We therefore are able to accept this referral and our  will reach back out to the family to schedule.        Radha Baldwin MS [facility]  Genetic Counselor  Division of Genetics and Metabolism

## 2024-04-23 ENCOUNTER — NURSE TRIAGE (OUTPATIENT)
Dept: NURSING | Facility: CLINIC | Age: 89
End: 2024-04-23

## 2024-04-23 NOTE — TELEPHONE ENCOUNTER
"\"I had a stool this afternoon: I had traces of dried blood when I wiped myself. No blood in the toilet. I have had a lot of gas coming out rectally most of the day. I feel a slight irritation in the anal area.\" No hx of hemorrhoids that he knows of. He states that the stool was not constipated and it was formed. The stool was brown color. No abdominal pain. No fever or chills. \"I take fiber every day. I take Lactulose once a day\". He uses a walker. \"I take Plavix blood thinner.\" He also has an abdominal aortic aneurysm. See by Vascular MD on 4/9 Dr Puri.   He last saw PCP: Dr Kendrick on 3/18/24.  He lives in [facility].   Triaged to a disposition of Go to ED now or PCP triage.   Dr Jose Zuniga on call, paged @ 6:55pm via am com. Answering service contacted @ 7:15pm.   OK to continue to monitor at home. If bleeding reoccurs then he should get it checked out (ER).   7:22pm Contacted patient with these instructions, he agrees with this plan. If he is unsure about whether or not he should go to the ER, he will call back..      Shahrzad Ji RN Triage Nurse Advisor 7:26 PM 4/23/2024  Reason for Disposition   Taking Coumadin (warfarin) or other strong blood thinner, or known bleeding disorder (e.g., thrombocytopenia)   High-risk adult (e.g., prior surgery on aorta, abdominal aortic aneurysm)    Additional Information   Negative: Shock suspected (e.g., cold/pale/clammy skin, too weak to stand, low BP, rapid pulse)   Negative: Difficult to awaken or acting confused (e.g., disoriented, slurred speech)   Negative: Passed out (i.e., lost consciousness, collapsed and was not responding)   Negative: [1] Vomiting AND [2] contains red blood or black (\"coffee ground\") material  (Exception: Few red streaks in vomit that only happened once.)   Negative: Sounds like a life-threatening emergency to the triager   Negative: Diarrhea is main symptom   Negative: Stool color other than brown or tan is main " concern  (no bleeding and no melena)   Negative: SEVERE rectal bleeding (large blood clots; constant or on and off bleeding)   Negative: SEVERE dizziness (e.g., unable to stand, requires support to walk, feels like passing out now)   Negative: [1] MODERATE rectal bleeding (small blood clots, passing blood without stool, or toilet water turns red) AND [2] more than once a day   Negative: Pale skin (pallor) of new-onset or worsening   Negative: Black or tarry bowel movements  (Exception: Chronic-unchanged black-grey BMs AND is taking iron pills or Pepto-Bismol.)   Negative: [1] Constant abdominal pain AND [2] present > 2 hours   Negative: Rectal foreign body (i.e., now or within past week;  inserted or swallowed)   Negative: Known cirrhosis of the liver (or history of liver failure or ascites)   Negative: [1] Colonoscopy AND [2] in past 72 hours    Protocols used: Rectal Bleeding-A-AH

## 2024-04-24 ENCOUNTER — NURSE TRIAGE (OUTPATIENT)
Dept: NURSING | Facility: CLINIC | Age: 89
End: 2024-04-24
Payer: COMMERCIAL

## 2024-04-24 NOTE — TELEPHONE ENCOUNTER
"Nurse Triage SBAR    Situation: Scrotum problem.     Background:   -Patient calling  -It is okay to call back and leave a detailed message at this number:    Assessment: Pt has irritation in his genital area.  Feels pain when he sits, rates pain 3/10 when sitting. No pain while standing. The RN at [facility] looked at it and advised that pt call provider.  Pt describes as red area under scrotum. Stated the area feels painful when he passes gas. Yesterday, when he wiped himself after bowel movement, he had scant dry stringy \"blood,\" but pt unable to tell what color the \"blood\" was.    -no fever    Recommendation: See provider within 24 hours, if unable to see in clinic, go to urgent care.     -Plans to follow recommendations  -Warm transferred to central scheduling to make an appointment  -If nothing is available go to /[facility]  -Call back with and questions, concerns, or any change in symptoms           Reason for Disposition   MODERATE-SEVERE rectal pain (i.e., interferes with school, work, or sleep)    Additional Information   Negative: [1] Blood from end of penis AND [2] large amount   Negative: [1] Not circumcised AND [2] foreskin pulled back and stuck   Negative: [1] Looks infected (e.g., draining sore, ulcer, rash is painful to touch) AND [2] fever > 100.4 F (38.0 C)   Negative: [1] Unable to urinate (or only a few drops) > 4 hours AND [2] bladder feels very full (e.g., palpable bladder or strong urge to urinate)   Negative: [1] Prolonged unwanted erection (i.e., not related to sexual interest) AND [2] lasting > 4 hours   Negative: SEVERE pain (e.g., excruciating)   Negative: Patient sounds very sick or weak to the triager   Negative: [1] Pus or bloody discharge from the end of the penis AND [2] fever   Negative: [1] Pain or burning with passing urine AND [2] fever > 100.4 F (38.0 C)   Negative: [1] Pain or burning with passing urine AND [2] side (flank) or back pain present   Negative: Entire penis is " swollen (i.e., edema)   Negative: [1] Antibiotic treatment > 3 days for STI (e.g., penile discharge from gonorrhea, chlamydia) AND [2] painful urination not improved   Negative: Pus (white, yellow) or bloody discharge from end of penis   Negative: Pain or burning with passing urine   Negative: [1] Not circumcised AND [2] swollen foreskin   Negative: [1] Tiny water blisters rash AND [2] 3 or more   Negative: Looks infected (e.g., draining sore, ulcer, rash is painful to touch)   Negative: Blood in urine (red, pink, or tea-colored)   Negative: Severe itching   Negative: [1] Painless rash (e.g., redness, tiny bumps, sore) AND [2] present > 24 hours   Negative: Blood in semen   Negative: [1] Prolonged unwanted erection (i.e., not related to sexual interest) AND [2] lasting < 4 hours   Negative: Patient is worried they have a sexually transmitted infection (STI)   Negative: Injury to rectum   Negative: Large mass protruding out of rectum   Negative: Patient sounds very sick or weak to the triager   Negative: SEVERE rectal pain (e.g., excruciating, unable to have a bowel movement)   Negative: [1] Rectal pain or redness AND [2] fever   Negative: [1] Sudden onset rectal pain AND [2] constipated (straining, rectal pressure or fullness) AND [3] NOT better after SITZ bath, suppository or enema   Negative: MODERATE-SEVERE rectal itching (i.e., interferes with school, work, or sleep)    Protocols used: Penis and Scrotum Symptoms-A-AH, Rectal Symptoms-A-AH

## 2024-04-25 ENCOUNTER — OFFICE VISIT (OUTPATIENT)
Dept: URGENT CARE | Facility: URGENT CARE | Age: 89
End: 2024-04-25
Payer: COMMERCIAL

## 2024-04-25 VITALS
OXYGEN SATURATION: 98 % | HEART RATE: 54 BPM | RESPIRATION RATE: 12 BRPM | DIASTOLIC BLOOD PRESSURE: 71 MMHG | TEMPERATURE: 97.6 F | SYSTOLIC BLOOD PRESSURE: 149 MMHG

## 2024-04-25 DIAGNOSIS — I13.10 HYPERTENSIVE HEART AND KIDNEY DISEASE WITHOUT HEART FAILURE AND WITH STAGE 3A CHRONIC KIDNEY DISEASE (H): ICD-10-CM

## 2024-04-25 DIAGNOSIS — N18.31 HYPERTENSIVE HEART AND KIDNEY DISEASE WITHOUT HEART FAILURE AND WITH STAGE 3A CHRONIC KIDNEY DISEASE (H): ICD-10-CM

## 2024-04-25 DIAGNOSIS — T14.8XXA SKIN EXCORIATION: ICD-10-CM

## 2024-04-25 DIAGNOSIS — B37.2 CANDIDIASIS OF SKIN: Primary | ICD-10-CM

## 2024-04-25 PROCEDURE — 99214 OFFICE O/P EST MOD 30 MIN: CPT | Performed by: NURSE PRACTITIONER

## 2024-04-25 RX ORDER — FLUCONAZOLE 150 MG/1
150 TABLET ORAL ONCE
Qty: 1 TABLET | Refills: 0 | Status: SHIPPED | OUTPATIENT
Start: 2024-04-25 | End: 2024-04-25

## 2024-04-25 RX ORDER — NYSTATIN 100000 U/G
CREAM TOPICAL
Qty: 60 G | Refills: 0 | Status: SHIPPED | OUTPATIENT
Start: 2024-04-25 | End: 2024-07-02

## 2024-04-25 NOTE — PATIENT INSTRUCTIONS
Drink Plenty of water and rest. Hold simvastatin for 3 days after fluconazole.    Sitz baths for 15 minutes 3-4 times a day for several days.    If unable to have a bath please have him sit in a shower chair with a hand-held shower sprain on the buttocks and rectal area for 10 minutes 2 times a day.  Pat dry then apply the nystatin cream.  He may apply skin barrier cream 2 hours after the nystatin to help protect the skin.  This did from last week

## 2024-04-25 NOTE — PROGRESS NOTES
Chief Complaint   Patient presents with    Derm Problem     Patient here with complaint of sores in crotch and rectum area. Noticed a couple of days ago and states they are getting very painful. .         ICD-10-CM    1. Candidiasis of skin  B37.2 fluconazole (DIFLUCAN) 150 MG tablet     nystatin (MYCOSTATIN) 516499 UNIT/GM external cream      2. Skin excoriation  T14.8XXA nystatin (MYCOSTATIN) 458635 UNIT/GM external cream      3. 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  Ideally patient would be completing sitz bath's 3 times a day to help soothe this area and keep clean.  He does not have a tub available so he is instructed to sit in a shower chair and use the hand-held shower to spray water couple of times a day.  In between times of using nystatin he may use a barrier cream such as Aquaphor to soothe the area.  Patient lives in assisted living and does have somebody that comes to help with the shower but only once a week.  Discussed with daughter and encouraged them to get more assistance for him over the next week.    Monitor blood pressure at home and if it remains elevated discussed with primary care at next visit.  Continue all medications as prescribed.    38 minutes total time spent reviewing patient's chart, completing history and physical exam, establishing plan of care, answering all patient and family's questions and completing documentation.    Subjective     Zack Santiago is an 89 year old male who presents to clinic today for sore perineal and perirectal area for about a week, noticed a small amount of bright red blood on tissue when wiping after bowel movement.  Reports the whole area is very tender.  He lives in a assisted living residence.    He denies any trauma, fever, urinary symptoms or missed treatment.      Objective    BP (!) 149/71  (BP Location: Right arm, Patient Position: Sitting, Cuff Size: Adult Regular)   Pulse 54   Temp 97.6  F (36.4  C) (Tympanic)   Resp 12   SpO2 98%   Nurses notes and VS have been reviewed.    Physical Exam       GENERAL APPEARANCE: Alert, forgetful     MS: extremities normal- no gross deformities noted; normal muscle tone for his age.     SKIN: Fold of left groin and inner thigh is red with white exudate present, perirectal area into the gluteal fold is excoriated, red with multiple 2 to 3 mm open areas that have tiny amount of blood present, no blood is noted from the rectal area, no hemorrhoids are present     NEURO: Normal strength and tone, mentation intact and speech normal      DOMENIC Scott, CNP  [facility] Provider    The use of Dragon/Similar Pages dictation services may have been used to construct the content in this note; any grammatical or spelling errors are non-intentional. Please contact the author of this note directly if you are in need of any clarification.

## 2024-04-26 ENCOUNTER — TELEPHONE (OUTPATIENT)
Dept: URGENT CARE | Facility: URGENT CARE | Age: 89
End: 2024-04-26
Payer: COMMERCIAL

## 2024-04-26 NOTE — TELEPHONE ENCOUNTER
General Call    Contacts         Type Contact Phone/Fax    04/26/2024 06:39 PM CDT Phone (Incoming) Zack Santiago (Self) [phone] (H)          Reason for Call:  Clarification    What are your questions or concerns:  Patient was given a written piece of paper regarding his care that he doesn't understand. He would like to clarify the directions for his cream.    Date of last appointment with provider: 4/25/24    Could we send this information to you in [employer] or would you prefer to receive a phone call?:   Patient would prefer a phone call   Okay to leave a detailed message?: Yes at Home number on file [phone] ([place])

## 2024-04-27 ENCOUNTER — NURSE TRIAGE (OUTPATIENT)
Dept: NURSING | Facility: CLINIC | Age: 89
End: 2024-04-27
Payer: COMMERCIAL

## 2024-04-27 NOTE — TELEPHONE ENCOUNTER
Patient states he was seen in  on Thursday for skin Candidiasis. He states he has questions about his discharge instructions. He is asking if he can sit in his shower chair while using the hand held shower spray. Upon reviewing the AVS, found the following information:        Relayed the above information to him. He states his shower chair only has 10 small holes and his buttocks and rectal area won't be washed that way. He states he will have to stand up. States he has staff assistance to help him shower once a day only at his [facility]. Explained that staff will have to figure out a way to follow the instructions as best as possible, and if he can only stand for this, then that will have to do. Explained that the area should be cleaned well twice a day and the nystatin cream applied after the area is dried well by pat drying only. Then apply barrier cream 2 hours after nystatin. He states his paperwork says to only do this for 4 days. Explained that triager does not see that information on AVS. Advised him to call his PCP at the end of the 4 days for clarification then. Patient had no further questions and plans to follow advice.    Reason for Disposition   [1] Follow-up call to recent contact AND [2] information only call, no triage required    Protocols used: Information Only Call - No Triage-A-

## 2024-04-27 NOTE — TELEPHONE ENCOUNTER
Daughter, Jayashree, is calling stating that she brought her father to the urgent care on Thursday last week due to the patient having a sore francesca area.  She had spoken with the Assisted Living facility where the patient resides and they are not able to get the patient into the shower.  The patient has an outside company that comes in once per week to give him a shower and they are not able to come on the weekend.  Jayashree is wondering if there is any other options that could help the patient with getting him a shower over the weekend.  Family is not available to help the patient.  Explained to daughter that the provider at  is recommending that the area be cleaned 2-3 times per day and then dried thoroughly and then applying the medicated ointment.  If the area could be at least cleaned with a warm washcloth and patted dry and then the ointment could be applied would help if he is not able to get a shower.  Jayashree verbalized understanding information and will reach out to the assisted living facility to see if they can help with washing the area with a washcloth over the weekend and she said she will contact the other agency on Monday to see if they can come in more often to help with showering.  Daughter was not with the patient.  No triage.      Reason for Disposition   Health Information question, no triage required and triager able to answer question    Additional Information   Negative: RN needs further essential information from caller in order to complete triage   Negative: Requesting regular office appointment   Negative: [1] Caller requesting NON-URGENT health information AND [2] PCP's office is the best resource    Protocols used: [facility] - [facility]

## 2024-04-29 NOTE — TELEPHONE ENCOUNTER
Tried calling again and left message to call us back. It looks like he is on [facility], so will send message that way also.  Ally Cabrera LPN

## 2024-05-07 ENCOUNTER — LAB (OUTPATIENT)
Dept: LAB | Facility: CLINIC | Age: 89
End: 2024-05-07
Attending: INTERNAL MEDICINE
Payer: COMMERCIAL

## 2024-05-07 ENCOUNTER — HOSPITAL ENCOUNTER (OUTPATIENT)
Dept: ULTRASOUND IMAGING | Facility: CLINIC | Age: 89
Discharge: HOME | End: 2024-05-07
Attending: INTERNAL MEDICINE
Payer: COMMERCIAL

## 2024-05-07 DIAGNOSIS — R09.89 ABNORMAL PERIPHERAL PULSE: ICD-10-CM

## 2024-05-07 DIAGNOSIS — E78.5 HYPERLIPIDEMIA LDL GOAL <70: ICD-10-CM

## 2024-05-07 DIAGNOSIS — I10 ESSENTIAL HYPERTENSION: ICD-10-CM

## 2024-05-07 LAB
ALBUMIN SERPL BCG-MCNC: 4.4 G/DL (ref 3.5–5.2)
ALP SERPL-CCNC: 96 U/L (ref 40–150)
ALT SERPL W P-5'-P-CCNC: 6 U/L (ref 0–70)
ANION GAP SERPL CALCULATED.3IONS-SCNC: 13 MMOL/L (ref 7–15)
APO A-I SERPL-MCNC: 19 MG/DL
AST SERPL W P-5'-P-CCNC: 14 U/L (ref 0–45)
BILIRUB SERPL-MCNC: 0.7 MG/DL
BUN SERPL-MCNC: 32.9 MG/DL (ref 8–23)
CALCIUM SERPL-MCNC: 9.5 MG/DL (ref 8.2–9.6)
CHLORIDE SERPL-SCNC: 100 MMOL/L (ref 98–107)
CREAT SERPL-MCNC: 1.15 MG/DL (ref 0.67–1.17)
CRP SERPL HS-MCNC: 4.36 MG/L
DEPRECATED HCO3 PLAS-SCNC: 26 MMOL/L (ref 22–29)
EGFRCR SERPLBLD CKD-EPI 2021: 60 ML/MIN/1.73M2
GLUCOSE SERPL-MCNC: 89 MG/DL (ref 70–99)
POTASSIUM SERPL-SCNC: 4.3 MMOL/L (ref 3.4–5.3)
PROT SERPL-MCNC: 7.3 G/DL (ref 6.4–8.3)
SODIUM SERPL-SCNC: 139 MMOL/L (ref 135–145)

## 2024-05-07 PROCEDURE — 83695 ASSAY OF LIPOPROTEIN(A): CPT

## 2024-05-07 PROCEDURE — 86141 C-REACTIVE PROTEIN HS: CPT

## 2024-05-07 PROCEDURE — 36415 COLL VENOUS BLD VENIPUNCTURE: CPT

## 2024-05-07 PROCEDURE — 93925 LOWER EXTREMITY STUDY: CPT

## 2024-05-07 PROCEDURE — 80061 LIPID PANEL: CPT | Mod: XU

## 2024-05-07 PROCEDURE — 82040 ASSAY OF SERUM ALBUMIN: CPT

## 2024-05-09 LAB
CHOLEST SERPL-MCNC: 115 MG/DL
HDL SERPL QN: 8.9 NM
HDL SERPL-SCNC: 28.9 UMOL/L
HDLC SERPL-MCNC: 49 MG/DL
HLD.LARGE SERPL-SCNC: 4.9 UMOL/L
LDL SERPL QN: 20.7 NM
LDL SERPL-SCNC: 736 NMOL/L
LDL SMALL SERPL-SCNC: 362 NMOL/L
LDLC SERPL CALC-MCNC: 51 MG/DL
PATHOLOGY STUDY: ABNORMAL
TRIGL SERPL-MCNC: 74 MG/DL
VLDL LARGE SERPL-SCNC: <1.5 NMOL/L
VLDL SERPL QN: 46 NM

## 2024-05-21 ENCOUNTER — TELEPHONE (OUTPATIENT)
Dept: FAMILY MEDICINE | Facility: CLINIC | Age: 89
End: 2024-05-21
Payer: COMMERCIAL

## 2024-05-21 DIAGNOSIS — F42.9 OBSESSIVE-COMPULSIVE DISORDER, UNSPECIFIED TYPE: Chronic | ICD-10-CM

## 2024-05-21 NOTE — TELEPHONE ENCOUNTER
"Patient contacts clinic initially asking \"what gabapentin dose do you have on file\".    Writer reviewed patient's active gabapentin dose and informed patient that the dose on file is gabapentin 400mg,TID.    Patient states that his pharmacy has been dispensing him gabapentin 400mg BID, and that he needs assistance correcting this discrepancy.     Writer contacted [facility], patient's psychiatrist's office, and spoke with clinic nurse. Clinic nurse informed Writer that, according to their active medication list, patient should also be on gabapentin 400mg TID.     [facility] Nurse stated that she will contact patient's [facility] and confirm that patient is on gabapentin 400mg TID to ensure that patient is appropriately dispensed correct supply.    Writer then relayed above information to patient.   "

## 2024-05-21 NOTE — TELEPHONE ENCOUNTER
Dr. Cait Betts wants a doctor to doctor call with her. #[phone] Opt#9.    It is regarding Gabapentin. She wants to know is there a reason he shouldn't take his 400 mg 3 times a day for refill? Patient states he has an rx for BID from  [facility] on 4/25/24.    Dr Cait Betts has also been ordering gabapentin for this patient since 2022    She will be at this number tomorrow as well.

## 2024-05-22 DIAGNOSIS — Z53.9 DIAGNOSIS NOT YET DEFINED: Primary | ICD-10-CM

## 2024-06-06 ENCOUNTER — OFFICE VISIT (OUTPATIENT)
Dept: OTHER | Facility: CLINIC | Age: 89
End: 2024-06-06
Attending: INTERNAL MEDICINE
Payer: COMMERCIAL

## 2024-06-06 VITALS
OXYGEN SATURATION: 95 % | HEIGHT: 73 IN | DIASTOLIC BLOOD PRESSURE: 77 MMHG | HEART RATE: 56 BPM | SYSTOLIC BLOOD PRESSURE: 153 MMHG | WEIGHT: 177.4 LBS | BODY MASS INDEX: 23.51 KG/M2

## 2024-06-06 DIAGNOSIS — I72.4 POPLITEAL ANEURYSM (H): Primary | ICD-10-CM

## 2024-06-06 DIAGNOSIS — R09.89 ABNORMAL PERIPHERAL PULSE: ICD-10-CM

## 2024-06-06 DIAGNOSIS — I71.43 INFRARENAL ABDOMINAL AORTIC ANEURYSM (AAA) WITHOUT RUPTURE (H): ICD-10-CM

## 2024-06-06 DIAGNOSIS — I71.21 ANEURYSM OF ASCENDING AORTA WITHOUT RUPTURE (H): ICD-10-CM

## 2024-06-06 DIAGNOSIS — I25.83 CORONARY ARTERY DISEASE DUE TO LIPID RICH PLAQUE: ICD-10-CM

## 2024-06-06 DIAGNOSIS — I10 ESSENTIAL HYPERTENSION: ICD-10-CM

## 2024-06-06 DIAGNOSIS — I25.10 CORONARY ARTERY DISEASE DUE TO LIPID RICH PLAQUE: ICD-10-CM

## 2024-06-06 DIAGNOSIS — E78.5 HYPERLIPIDEMIA LDL GOAL <70: ICD-10-CM

## 2024-06-06 PROCEDURE — 99215 OFFICE O/P EST HI 40 MIN: CPT | Performed by: INTERNAL MEDICINE

## 2024-06-06 PROCEDURE — 99417 PROLNG OP E/M EACH 15 MIN: CPT | Performed by: INTERNAL MEDICINE

## 2024-06-06 PROCEDURE — G2211 COMPLEX E/M VISIT ADD ON: HCPCS | Performed by: INTERNAL MEDICINE

## 2024-06-06 PROCEDURE — G0463 HOSPITAL OUTPT CLINIC VISIT: HCPCS | Performed by: INTERNAL MEDICINE

## 2024-06-06 NOTE — PROGRESS NOTES
VASCULAR MEDICAL FOLLOW UP ASSESSMENT  REFERRAL SOURCE: Zurdo Kendrick MD   REASON FOR CONSULT: for ascending aortic   aneurysm.      A/P:     (I71.21) Slowly growing 50 mm aneurysm of ascending aorta  (primary encounter diagnosis)    Comment: This has grown 2 mm in six years. He is 89, is frail, has Parkinson's like symptoms, is a fall risk. He would have a high mortality from operative repair of an an ATAA. Repair is not indicated at its current size. He had genetic counseling ordered by his PCP but it was never scheduled. He would like to have this done for the befit of his descendants.     Plan: I recommended he not have future surveillance of this given his frailty and likelihood of not being able to tolerate operative repair. Quite frankly he is likely given its slow rate of expansion to die from something else but with the aneurysm. Accordingly, future surveillance is unlikely to add to length or quality of life.   His BP at home is an SBP of 105 so I would not recommend ace inhibitor or beta blocker at present.        (I71.43) AAA S/P EVAR 2015 with persistent type II endoleak which has been stable since 2022    Comment: He has an endoleak which is stable. He is due for repeat surveillance in 2025    Plan: I advised he discuss with IR in 02/2025 whether he should continue surveillance based upon the sac size stability or lack thereof in his next imaging.        (I25.10,  I25.83) Coronary artery disease due to lipid rich plaque, S/P CABGx5 2010    Comment: Treat with statin to reduce CV events.     Plan: His BP at home is an SBP of 105 so I would not recommend ace inhibitor or beta blocker at present.        (E78.5) Hyperlipidemia LDL goal <70    Comment: Advanced lipid testing of 5/7/2024 on simvastatin 20 mg daily revealed LDL-C of 51, LDL-P of 736, cardiac CRP of 4.36, and Lp(a) of 19.    Plan: Continue simvastatin without change. Check C-Reactive Protein, High Sensitivity, LipoFit         by NMR,  Lipoprotein (a) in 05/2025             (I10) Essential hypertension    Comment: As above    Plan: Comprehensive metabolic panel in 5/2025             (R09.89) Abnormal peripheral pulse    Comment: He has multifocal LE ectasia and popliteal aneurysms    Plan: US Lower Extremity Arterial Duplex Bilateral in 05/2025, RTC on week later.        The longitudinal care of plan for Zack was addressed during this visit. Due to added complexity of care, we will continue to support Zack Santiago  and the subsequent management of these conditions and with ongoing continuity of care for these conditions.      55   minutes total care on today's date. Extended time given patietn complexity and need to answer questions of the patietn and his son in law.                HPI: Zack Santiago is a 89 year old hypertensive hyperlipidemic male with a h/o CAD, S/P CABG x 5 2/24/2010 (LIMA-LAD, RSVG-D1,Cx, SVG-PDA,PL), known AAA S/P EVAR 2015 with persistent stable type II endoleak being followed by IR, as well as known asx 50 mm ATAA which is growing slowly (48 mm on 2018 CTA chest)  who was referred for evlauation of 50 mm ATAA.      Review Of Systems  Skin: positive for nail changes, and bruising  Eyes: negative  Ears/Nose/Throat: negative  Respiratory: No shortness of breath, dyspnea on exertion, cough, or hemoptysis  Cardiovascular: negative  Gastrointestinal: negative  Genitourinary: negative  Musculoskeletal: negative  Neurologic: positive for Parkinson's like sxs  Psychiatric: negative  Hematologic/Lymphatic/Immunologic: negative  Endocrine: negative        PAST MEDICAL HISTORY:                  Past Medical History        Past Medical History:   Diagnosis Date    A-fib (H) 2010     post op    AAA (abdominal aortic aneurysm) without rupture (H24)       Dr. 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     carotid us no stenosis, echo no change and no clots; ziopatch no afib, svt present    Anemia  2004    Aortic insufficiency 06/2014     1+ on echo    Aortic insufficiency 11/2016     mild to mod    Ascending aorta dilatation (H24) 01/2023     5cm on ct, fu done 2/24 and stable    ASCVD (arteriosclerotic cardiovascular disease) 2010     cabg, post op afib    BPH (benign prostatic hyperplasia) 2003     turp, flomax added by urol 2/17    Bradycardia 2016     stopped toprol    CKD (chronic kidney disease) stage 3, GFR 30-59 ml/min (H)      Constipation 05/2020     colonoscopy nl    Cough 2010     pulm eval, felt due to reflux    Dizzy 2001     mri small vessel dz    GERD (gastroesophageal reflux disease)      HTN (hypertension) 2002    Hx of colonoscopy 2003     tics    Hypothyroid      Hypovitaminosis D      Idiopathic neuropathy      Lung nodule 02/2018     6mm, found during eval of ascending aorta, fu 8/18 no change    OCD (obsessive compulsive disorder)       sees psyche    Panic disorder       Dr. Luna, then someone after he retired    Parkinson's disease 03/2023     per neuro    Spinal headache      Stroke (H) 12/2016     expressive aphasia, hosp, seen by neuro, mri pos, carotids min dz, changed from asa to plavix    Sudden hearing loss 06/2013     Dr. Garcia, mri brain no acoustic neuroma    SVT (supraventricular tachycardia) 11/2016     seen on ziopatch done for amaurosis, longest 15 beats    Thoracic ascending aortic aneurysm (H24) 06/2014     4.4cm on echo, aortic root 3.9, fu 5/15 4.8cm; fu done 12/15 and slightly enlarged, fu 6/16 no change, fu 11/16 no change; fu 1/18 echo 5.1-5.3, enlarged, then cta done and only 4.8cm, t fu 6 months, lvh, nl ef, mild ai; fu 8/18 slightly larger; fu 2/19 slightly smaller; fu 2/20 and then 1/21 and stable    Ulcerative colitis (H)       not active for years            PAST SURGICAL HISTORY:                  Past Surgical History         Past Surgical History:   Procedure Laterality Date    BACK SURGERY   1998    BLADDER SURGERY   1985    CATARACT IOL, RT/LT   2011     COLONOSCOPY N/A 05/05/2020     Procedure: COLONOSCOPY;  Surgeon: Kenya Loco MD;  Location:  GI    CORONARY ARTERY BYPASS   2010    ENDOVASCULAR REPAIR ANEURYSM ABDOMINAL AORTA N/A 05/27/2015     Procedure: ENDOVASCULAR REPAIR ANEURYSM ABDOMINAL AORTA;  Surgeon: Darin Walters MD;  Location:  OR    HERNIA REPAIR   2005    HERNIA REPAIR   1998    HERNIORRHAPHY INGUINAL Left 01/05/2018     Procedure: HERNIORRHAPHY INGUINAL;  Incarcerated Left Inguinal Hernia;  Surgeon: Harsh Walker MD;  Location:  OR    KNEE SURGERY   1997    REPAIR HAMMER TOE   12/28/2012     Procedure: REPAIR HAMMER TOE;  LEFT SECOND AND THIRD CLAW TOE RECONSTRUCTION;  Surgeon: Gray Haynes MD;  Location:  OR    REPAIR HAMMER TOE   04/2018     tria    toe amputation right foot   02/2021    TURP   2003    Z TOTAL KNEE ARTHROPLASTY   2011     left    [facility] TOTAL KNEE ARTHROPLASTY   2009     right            CURRENT MEDICATIONS:                  Current Outpatient Prescriptions          Current Outpatient Medications   Medication Sig Dispense Refill    acetaminophen (TYLENOL) 500 MG tablet Take 1 or 2 tablets every 8 hours as needed for pain  Do not exceed 3000mg in 24 hours 100 tablet 0    acetaminophen (TYLENOL) 500 MG tablet Take 1-2 tablets (500-1,000 mg) by mouth every 8 hours as needed for mild pain        carbidopa-levodopa (SINEMET)  MG tablet Take 1.5 tablets by mouth 3 times daily 405 tablet 0    chlorthalidone (HYGROTON) 25 MG tablet 1 TABLET ORALLY EVERY MORNING  (DX: DIURETIC ) 30 tablet 5    clomiPRAMINE (ANAFRANIL) 25 MG capsule 1 CAPSULE ORALLY AT BEDTIME (DX: DEPRESSION) 28 capsule 10    clopidogrel (PLAVIX) 75 MG tablet 1 TABLET ORALLY EVERY MORNING  (DX: HEART  ) 30 tablet 11    gabapentin (NEURONTIN) 400 MG capsule 1 CAPSULE ORALLY 2 TIMES DAILY FOR NERVES (Patient taking differently: Take 400 mg by mouth 3 times daily 1 CAPSULE ORALLY 2 TIMES DAILY FOR NERVES) 180 capsule 3     ketoconazole (NIZORAL) 2 % external cream APPLY TOPICALLY TO INFLAMED SKIN 2 TIMES DAILY   (DX: INFLAMED SKIN ) 30 g 10    lactulose (CHRONULAC) 10 GM/15ML solution DRINK 30ML  ORALLY DAILY (DX: CONSTIPATION);DRINK 30ML  ORALLY DAILY AS NEEDED (DX: CONSTIPATION) 946 mL 11    levothyroxine (SYNTHROID/LEVOTHROID) 88 MCG tablet Take 1 tablet (88 mcg) by mouth daily 90 tablet 3    melatonin 3 MG tablet 1 TABLET ORALLY AT BEDTIME AS NEEDED  (DX: SLEEP ) 30 tablet 10    mirtazapine (REMERON) 45 MG tablet 1 TABLET ORALLY AT BEDTIME (DX: DEPRESSION) 30 tablet 10    OPTIVE 0.5-0.9 % ophthalmic solution INSTILL 1 DROP INTO BOTH EYES 2 TIMES DAILY  (DX: DRY EYES) 15 mL 11    SENEXON-S 8.6-50 MG tablet 1 TABLET ORALLY DAILY AS NEEDED (DX: CONSTIPATION) 30 tablet 10    simvastatin (ZOCOR) 20 MG tablet 1 TABLET ORALLY AT BEDTIME   (DX: CHOLESTEROL) 30 tablet 11    sodium chloride (DEEP SEA NASAL SPRAY) 0.65 % nasal spray INSTILL SPRAY(S) INTO NOSTRIL(S) 3 TIMES DAILY 44 mL 11    SOLUBLE FIBER THERAPY powder TAKE 1 TEASPOONFUL ORALLY DAILY (DX: CONSTIPATION) 454 g 10            ALLERGIES:                  Allergies        Allergies   Allergen Reactions    No Known Allergies              SOCIAL HISTORY:                  Social History   Social History            Socioeconomic History    Marital status:        Spouse name: Not on file    Number of children: 2    Years of education: Not on file    Highest education level: Not on file   Occupational History    Occupation: retail       Employer: RETIRED   Tobacco Use    Smoking status: Former       Packs/day: 1.00       Years: 5.00       Additional pack years: 0.00       Total pack years: 5.00       Types: Cigarettes       Quit date: 1965       Years since quittin.8    Smokeless tobacco: Never   Vaping Use    Vaping Use: Never used   Substance and Sexual Activity    Alcohol use: Yes       Comment: none    Drug use: No    Sexual activity: Not Currently       Partners:  Female   Other Topics Concern    Parent/sibling w/ CABG, MI or angioplasty before 65F 55M? Not Asked   Social History Narrative    Not on file      Social Determinants of Health           Financial Resource Strain: Low Risk  (12/22/2023)     Financial Resource Strain      Within the past 12 months, have you or your family members you live with been unable to get utilities (heat, electricity) when it was really needed?: No   Food Insecurity: Low Risk  (12/22/2023)     Food Insecurity      Within the past 12 months, did you worry that your food would run out before you got money to buy more?: No      Within the past 12 months, did the food you bought just not last and you didn t have money to get more?: No   Transportation Needs: Low Risk  (12/22/2023)     Transportation Needs      Within the past 12 months, has lack of transportation kept you from medical appointments, getting your medicines, non-medical meetings or appointments, work, or from getting things that you need?: No   Physical Activity: Not on file   Stress: Not on file   Social Connections: Not on file   Interpersonal Safety: Low Risk  (12/13/2023)     Interpersonal Safety      Do you feel physically and emotionally safe where you currently live?: Yes      Within the past 12 months, have you been hit, slapped, kicked or otherwise physically hurt by someone?: No      Within the past 12 months, have you been humiliated or emotionally abused in other ways by your partner or ex-partner?: No   Housing Stability: Low Risk  (12/22/2023)     Housing Stability      Do you have housing? : Yes      Are you worried about losing your housing?: No            FAMILY HISTORY:                   Family History         Family History   Problem Relation Age of Onset    C.A.D. Father      Lymphoma Sister      Retinal detachment Daughter      Glaucoma No family hx of      Macular Degeneration No family hx of                 Physical exam Reveals:     O/P: WNL  HEENT:  WNL  NECK: No JVD, thyromegaly, or lymphadenopathy  HEART: RRR, no murmurs, gallops, or rubs  LUNGS: CTA bilaterally without rales, wheezes, or rhonchi  GI: NABS, nondistended, nontender, soft  EXT:without cyanosis, clubbing, or edema  NEURO: nonfocal  : no flank tenderness     Jose A testing with refilling BLUE's in the ulnar and radial positions in < 5 secs bilaterally, fungal appearing nail infections of right hand. .                     CT CHEST WITHOUT CONTRAST  2/28/2024 11:11 AM      HISTORY: Aneurysm of ascending aorta without rupture (H24). Weight  loss.     TECHNIQUE: CT scan of the chest was performed without IV contrast.  Multiplanar reformats were obtained. Dose reduction techniques were  used.  CONTRAST: None.     COMPARISON: 1/13/2023     FINDINGS:   LUNGS AND PLEURA: Moderate emphysema. There is mild peripheral  fibrosis without honeycombing. Scattered calcified granulomas. Lungs  are otherwise clear.     MEDIASTINUM/AXILLAE: Stable 5 cm ascending aortic aneurysm. No  lymphadenopathy. Prior CABG.     UPPER ABDOMEN: Small fat-containing ventral hernia in the epigastric  region.                                                                      IMPRESSION:  1.  Stable 5 cm ascending aortic aneurysm.  2.  No acute findings in the chest.     AMPARO HESTER MD         CT ABDOMEN PELVIS W/O CONTRAST 2/20/2024 12:10 PM     CLINICAL HISTORY: s/p EVAR, comparison study done 2/2023  1 year  follow up . Call with results.; Abdominal aortic aneurysm (AAA)  without rupture (H24)  TECHNIQUE: CT scan of the abdomen and pelvis was performed without IV  contrast. Multiplanar reformats were obtained. Dose reduction  techniques were used.  CONTRAST: None.     COMPARISON: CT of the abdomen pelvis dated 2/9/2023     FINDINGS: Evaluation is limited due to a lack of intravenous contrast.  LOWER CHEST: 6 mm nodule in the right lower lobe. COPD changes at the  lung bases.     HEPATOBILIARY: Stable cystic lesion in the  caudate lobe of the liver.     PANCREAS: Unremarkable     SPLEEN: Unremarkable     ADRENAL GLANDS: Unremarkable     KIDNEYS/BLADDER: Stable cyst at the anterior upper pole of the right  kidney. Stable cyst at the lower pole of the left kidney.     BOWEL: Colonic diverticulosis.     PELVIC ORGANS: Unremarkable     LYMPH NODES: Unremarkable     VASCULATURE: Changes consistent with endovascular repair of an  infrarenal abdominal aortic aneurysm utilizing a bifurcated endograft.  The excluded aneurysm sac measures approximately 6.5 x 6.2 cm. These  measurements are not significantly changed.     ADDITIONAL FINDINGS: None.     MUSCULOSKELETAL: Degenerative changes in the lumbar spine.                                                                      IMPRESSION:   1.  Stable abdominal aortic aneurysm post endovascular repair.  2.  6 cm nodule right lower lobe.     Recommendations for one or multiple incidental lung nodules < 6mm :    Low risk patients: No routine follow-up.    High risk patients: Optional follow-up CT at 12 months; if  unchanged, no further follow-up.     *Low Risk: Minimal or absent history of smoking or other known risk  factors.  *Nonsolid (ground glass) or partly solid nodules may require longer  follow-up to exclude indolent adenocarcinoma.  *Recommendations based on Guidelines for the Management of Incidental  Pulmonary Nodules Detected at CT: From the Fleischner Society 2017,  Radiology 2017.  1.    RAFIA RODRIGUEZ MD            EXAM: CT CHEST WITH CONTRAST - PULMONARY EMBOLISM PROTOCOL   LOCATION: [facility]  DATE/TIME: 1/13/2023 10:08 PM     INDICATION: Hypoxemia. Elevated D-dimer.  COMPARISON: 1/29/2021 - CT chest, abdomen and pelvis.     TECHNIQUE: CT angiogram chest during arterial phase injection IV contrast. 2D and 3D MIP reconstructions were performed by the CT technologist. Dose reduction techniques were used.   CONTRAST: 71 mL Isovue 370.     FINDINGS:      ANGIOGRAM CHEST: No visualized pulmonary embolus. Dilatation of the ascending thoracic aorta, which measures up to 5.0 cm in diameter. No dissection of the thoracic aorta. Atherosclerotic calcification in the thoracic aorta.     LUNGS AND PLEURA: A few curvilinear opacities scattered in the periphery of both lungs, most likely representing scarring and/or atelectasis. The lungs are otherwise clear.     MEDIASTINUM/AXILLAE: Prior median sternotomy.     CORONARY ARTERY CALCIFICATION: Present.     MUSCULOSKELETAL: No acute findings.     UPPER ABDOMEN: Partial visualization of a moderate-sized midline upper anterior abdominal hernia containing fat and fluid.                                                                      IMPRESSION:   1.  No visualized pulmonary embolus.  2.  No convincing evidence of active pulmonary disease.   3.  The ascending thoracic aorta is dilated, measuring up to 5.0 cm in diameter. No dissection of the thoracic aorta.        CTA ABDOMEN PELVIS WITH CONTRAST 8/22/2022 3:24 PM     HISTORY: 89-year-old patient with abdominal aortic aneurysm with  endovascular repair and type II endoleak. Endovascular repair was  performed May 27, 2015.     COMPARISON: August 2, 2021.     TECHNIQUE: Multiplanar and multiformatted CTA images obtained from the  lung bases through the abdomen and pelvis before and after the  uneventful administration of Isovue 370 intravenous contrast given for  a total of 80 mL. Radiation dose for this scan was reduced using  automated exposure control, adjustment of the mA and/or kV according  to patient size, or iterative reconstruction technique. 3-D  reformatted images were created at a separate workstation.     FINDINGS: Scattered linear bibasilar opacities likely atelectasis.  Heart size is normal. Large osteophytes arising from several lumbar  vertebrae. No acute osseous abnormality.     Hypodensity in the caudate lobe of the liver is unchanged and likely a  cyst. The  gallbladder, spleen, adrenal glands, and pancreas are  unremarkable. Both kidneys are perfused. Appearance of both kidneys is  unchanged. Ventral hernia is noted in the epigastric region, with  scattered internal density, thought to be fluid and simple fluid. Do  not clearly identify loops of bowel. No intraperitoneal fluid or air.  The bladder is partially distended and unremarkable. Moderate amount  of stool throughout the colon. No dilated loops of bowel.     The visible descending thoracic aorta is patent. The celiac axis, SMA,  bilateral renal arteries are patent. The infrarenal abdominal aorta is  6.4 cm AP x 6.1 cm transverse, previously 6.2 cm AP x 5.6 cm  transverse. Both native internal iliac, external iliac, and common  femoral arteries are patent. Type II endoleak again identified, best  visualized on delayed images and likely with communication between MEKA  and lumbar arteries.                                                                      IMPRESSION:  1. Persistent type II endoleak. Aneurysmal sac is 6.4 cm AP x 6.1 cm  transverse on today's exam, previously 6.2 cm AP x 5.6 cm transverse  indicating growth of the aneurysm since previous exam.  2. Ventral hernia in the epigastric region, perhaps with blood  vessels, also with fluid. The amount of fluid is increased since  previous exam. Do not clearly identify loops of bowel within the  hernia.  3. Remainder of the exam is unchanged.     BRITTANEY DACOSTA MD         EXAM: CTA CHEST ABDOMEN PELVIS W CONTRAST     DATE/TIME: 1/29/2021 12:00 PM     INDICATION: Known thoracoabdominal aneurysm, history of stent graft  reconstruction done 5/2015. Annual follow-up.  COMPARISON: 2/5/2020     TECHNIQUE: Helical acquisition through the chest, abdomen, pelvis was  performed during the without, and in the arterial phase phase of  contrast enhancement. 2D and 3D reconstructions performed by the CT  technologist. Dose reduction techniques were used.  CONTRAST:  80mL ISOVUE-370 from a single use vial     FINDINGS:   ARTERIAL FINDINGS: Postoperative changes of CABG. Aneurysmal dilation  of the ascending thoracic aorta measuring up to 4.7 cm in maximal  diameter, previously 4.6 cm. Normal great vessel branching pattern.  The visualized great vessels are widely patent without significant  focal vascular abnormality. Physiologic kinking of the subclavian  arteries bilaterally due to arm positioning. The remainder of the  thoracic aorta demonstrates a normal caliber.     Postsurgical changes of aortoiliac stent graft reconstruction of the  infrarenal abdominal aortic aneurysm. The aneurysm sac is bilobed  measuring 4.6 x 4.7 cm proximally and 5.9 x 5.4 cm in maximal  diameter. The proximal margin is not significantly changed. The distal  margin is slightly increased in size, previously measuring 5.8 x 5.2  cm. Stable bilateral common iliac artery aneurysms measuring 2.0 cm on  the left and 2.3 cm on the right. Iliac arteries and visualized lower  portion of the arteries are widely patent. The stent graft is in  appropriate position and is likewise widely patent. Occlusion of the  MEKA origin with proximal reconstitution. This arteries are patent  without significant focal vascular abnormality.     NONVASCULAR FINDINGS:  CHEST: Stable right upper lobe lung nodules. Dependent atelectasis. No  pleural effusion or pneumothorax. Cardiomegaly. No central pulmonary  embolism. Significant native coronary artery calcifications. Patent  CABG. Patent LIMA. No bulky lymphadenopathy.     ABDOMEN: Suboptimal evaluation of the solid organs due to the  noncontrast and arterial phase of contrast enhancement. Simple cyst in  the caudate lobe, unchanged. Simple cyst in the lower pole of the left  kidney. The right kidney, adrenal glands, spleen, gallbladder, and  pancreas are grossly unremarkable.     PELVIS: No abnormally dilated loops of bowel. No free fluid or free  air. Diverticulosis.  Unchanged fat-containing hernia.     MUSCULOSKELETAL: Degenerative disease of the lumbar spine.                                                                      IMPRESSION:  1.  Continued aneurysmal dilation of the ascending thoracic aorta  measuring up to 4.7 cm in maximal diameter, this is not significantly  changed from prior studies.  2.  Postsurgical changes of aortobiiliac stent graft reconstruction of  the bilobed infrarenal abdominal aortic aneurysm. The proximal lobe is  unchanged with the distal lobe measuring slightly increased in size  from 2/5/2020 now measuring 5.9 x 5.4 cm in maximal diameter. No  endoleak is seen on the current study although no delayed images were  obtained.  3.  Stable right pulmonary nodules.  4.  Diverticulosis.     FELECIA PYLE MD      Component      Latest Ref Rng 5/7/2024  10:41 AM   Sodium      135 - 145 mmol/L 139    Potassium      3.4 - 5.3 mmol/L 4.3    Carbon Dioxide (CO2)      22 - 29 mmol/L 26    Anion Gap      7 - 15 mmol/L 13    Urea Nitrogen      8.0 - 23.0 mg/dL 32.9 (H)    Creatinine      0.67 - 1.17 mg/dL 1.15    GFR Estimate      >60 mL/min/1.73m2 60 (L)    Calcium      8.2 - 9.6 mg/dL 9.5    Chloride      98 - 107 mmol/L 100    Glucose      70 - 99 mg/dL 89    Alkaline Phosphatase      40 - 150 U/L 96    AST      0 - 45 U/L 14    ALT      0 - 70 U/L 6    Protein Total      6.4 - 8.3 g/dL 7.3    Albumin      3.5 - 5.2 g/dL 4.4    Bilirubin Total      <=1.2 mg/dL 0.7    Total Cholesterol      <=199 mg/dL 115    Triglycerides      30 - 149 mg/dL 74    HDL Cholesterol      40 - 59 mg/dL 49    LDL Cholesterol      <=129 mg/dL 51    HDL Size      >=8.9 nm 8.9    VLDL Size      <=46.7 nm 46.0    LDL Particle Size      >=20.7 nm 20.7    Lge HDL Particle number      >=4.2 umol/L 4.9    HDL Particle Number NMR      >=33.0 umol/L 28.9 (L)    Lge VLDL Part number      <=2.7 nmol/L <1.5    Small LDL Particle number      <=634 nmol/L 362    LDL Particle Number       <=1135 nmol/L 736    EER LipoFit by NMR See Note    C-Reactive Protein High Sensitivity 4.36    Lipoprotein (a)      <30 mg/dL 19       Legend:  (H) High  (L) Low      US LOWER EXTREMITY ARTERIAL DUPLEX BILATERAL 5/7/2024 11:36 AM     HISTORY: 89-year-old patient with known abdominal aortic aneurysm and  thoracic aneurysm, request made for evaluation of lower extremity  arterial aneurysm.     COMPARISON: None     TECHNIQUE: Color Doppler and spectral waveform analysis performed  throughout the arteries of both lower extremities.     FINDINGS:  Right lower extremity: Arteries are patent throughout the right lower  extremity with triphasic waveforms. Waveforms are normal without  velocity elevation. Scattered atherosclerotic calcification. The  proximal popliteal artery is 12 x 14 mm and the more distal segment is  11 x 11 mm. All arteries are ectatic with the common femoral artery  measuring 5 mm, SFA 8-10 mm.     Left lower extremity: Diffuse atherosclerotic calcification. Waveforms  are triphasic/biphasic. No velocity elevation to suggest stenosis. The  popliteal artery proximally is 12 x 12 mm and more distally 12 x 10  mm. All arteries are at minimum ectatic measuring 13 mm in the common  femoral artery and SFA ranging from 6-12 mm.                                                                      IMPRESSION:  1. 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  2. Patent arteries with multiphasic waveforms.  3. Scattered atherosclerotic calcification throughout all visible  arteries.     BRITTANEY DACOSTA MD

## 2024-06-06 NOTE — PROGRESS NOTES
"Patient is here to discuss follow up    BP (!) 153/77 (BP Location: Right arm, Patient Position: Chair, Cuff Size: Adult Regular)   Pulse 56   Ht 6' 1\" (1.854 m)   Wt 177 lb 6.4 oz (80.5 kg)   SpO2 95%   BMI 23.41 kg/m      Questions patient would like addressed today are: N/A.    Refills are needed: No    Has homecare services and agency name:  [facility]    "

## 2024-06-09 NOTE — TELEPHONE ENCOUNTER
Pt called to check on this    Notified of PCP's response     He will go now to  MAXIMILIANO MA RN     Previously Declined (within the last year)

## 2024-06-11 ENCOUNTER — TELEPHONE (OUTPATIENT)
Dept: FAMILY MEDICINE | Facility: CLINIC | Age: 89
End: 2024-06-11
Payer: COMMERCIAL

## 2024-06-11 ENCOUNTER — TRANSFERRED RECORDS (OUTPATIENT)
Dept: HEALTH INFORMATION MANAGEMENT | Facility: CLINIC | Age: 89
End: 2024-06-11
Payer: COMMERCIAL

## 2024-06-11 DIAGNOSIS — F42.9 OBSESSIVE-COMPULSIVE DISORDER, UNSPECIFIED TYPE: Primary | Chronic | ICD-10-CM

## 2024-06-11 NOTE — TELEPHONE ENCOUNTER
The patient psychiatry Dr. Bowman called me just now regarding the patient's OCD which has gotten significantly worse.  She would like to adjust his medications but was concerned with doing so given all his health issues.  He is on clomipramine and she was considering going up to 50 mg and add versus trying a different agent.  He is on multiple drugs and will need to see MTM to go over all of this.  I think it would be reasonable to increase the dose and then follow-up with MTM.  He has an appointment with me in 10 days.  I can check an EKG at that point.    Zurdo Kendrick M.D.

## 2024-06-13 ENCOUNTER — TELEPHONE (OUTPATIENT)
Dept: FAMILY MEDICINE | Facility: CLINIC | Age: 89
End: 2024-06-13
Payer: COMMERCIAL

## 2024-06-13 NOTE — TELEPHONE ENCOUNTER
Left fairly general message for pt that this was done, but to call back if further info needed .  Nory Allan, RN  [facility] RN Triage Team

## 2024-06-13 NOTE — TELEPHONE ENCOUNTER
TO PCP    Pt calling, states he has a pressure injury to sacrum and would like [facility] to be able to treat, however they need MD permission. Does pt need visit before treatment can begin? He has appt on 6/21/24 but would like treatment to start earlier if possible.    NAWAF FORTE RN on 6/13/2024 at 11:04 AM

## 2024-06-20 DIAGNOSIS — R09.81 NASAL CONGESTION: ICD-10-CM

## 2024-06-20 RX ORDER — SODIUM CHLORIDE 0.65 %
AEROSOL, SPRAY (ML) NASAL
Qty: 44 ML | Refills: 11 | Status: SHIPPED | OUTPATIENT
Start: 2024-06-20

## 2024-06-21 ENCOUNTER — OFFICE VISIT (OUTPATIENT)
Dept: FAMILY MEDICINE | Facility: CLINIC | Age: 89
End: 2024-06-21
Payer: COMMERCIAL

## 2024-06-21 VITALS
BODY MASS INDEX: 23.33 KG/M2 | HEIGHT: 73 IN | DIASTOLIC BLOOD PRESSURE: 66 MMHG | TEMPERATURE: 97.7 F | HEART RATE: 52 BPM | OXYGEN SATURATION: 99 % | RESPIRATION RATE: 16 BRPM | WEIGHT: 176 LBS | SYSTOLIC BLOOD PRESSURE: 138 MMHG

## 2024-06-21 DIAGNOSIS — I25.10 ATHEROSCLEROSIS OF NATIVE CORONARY ARTERY OF NATIVE HEART WITHOUT ANGINA PECTORIS: ICD-10-CM

## 2024-06-21 DIAGNOSIS — N18.31 STAGE 3A CHRONIC KIDNEY DISEASE (H): ICD-10-CM

## 2024-06-21 DIAGNOSIS — T14.8XXA BRUISING: Primary | ICD-10-CM

## 2024-06-21 DIAGNOSIS — F42.9 OBSESSIVE-COMPULSIVE DISORDER, UNSPECIFIED TYPE: Chronic | ICD-10-CM

## 2024-06-21 DIAGNOSIS — L89.309 PRESSURE INJURY OF SKIN OF BUTTOCK, UNSPECIFIED INJURY STAGE, UNSPECIFIED LATERALITY: ICD-10-CM

## 2024-06-21 DIAGNOSIS — I10 BENIGN ESSENTIAL HYPERTENSION: ICD-10-CM

## 2024-06-21 DIAGNOSIS — E03.9 ACQUIRED HYPOTHYROIDISM: ICD-10-CM

## 2024-06-21 DIAGNOSIS — G20.A1 PARKINSON'S DISEASE, UNSPECIFIED WHETHER DYSKINESIA PRESENT, UNSPECIFIED WHETHER MANIFESTATIONS FLUCTUATE (H): ICD-10-CM

## 2024-06-21 LAB
ERYTHROCYTE [DISTWIDTH] IN BLOOD BY AUTOMATED COUNT: 14.1 % (ref 10–15)
HCT VFR BLD AUTO: 36.6 % (ref 40–53)
HGB BLD-MCNC: 11.6 G/DL (ref 13.3–17.7)
MCH RBC QN AUTO: 31.9 PG (ref 26.5–33)
MCHC RBC AUTO-ENTMCNC: 31.7 G/DL (ref 31.5–36.5)
MCV RBC AUTO: 101 FL (ref 78–100)
PLATELET # BLD AUTO: 171 10E3/UL (ref 150–450)
RBC # BLD AUTO: 3.64 10E6/UL (ref 4.4–5.9)
WBC # BLD AUTO: 6.1 10E3/UL (ref 4–11)

## 2024-06-21 PROCEDURE — 84443 ASSAY THYROID STIM HORMONE: CPT | Performed by: INTERNAL MEDICINE

## 2024-06-21 PROCEDURE — 99214 OFFICE O/P EST MOD 30 MIN: CPT | Performed by: INTERNAL MEDICINE

## 2024-06-21 PROCEDURE — 93000 ELECTROCARDIOGRAM COMPLETE: CPT | Performed by: INTERNAL MEDICINE

## 2024-06-21 PROCEDURE — 80053 COMPREHEN METABOLIC PANEL: CPT | Performed by: INTERNAL MEDICINE

## 2024-06-21 PROCEDURE — 85027 COMPLETE CBC AUTOMATED: CPT | Performed by: INTERNAL MEDICINE

## 2024-06-21 PROCEDURE — 36415 COLL VENOUS BLD VENIPUNCTURE: CPT | Performed by: INTERNAL MEDICINE

## 2024-06-21 ASSESSMENT — PAIN SCALES - GENERAL: PAINLEVEL: NO PAIN (0)

## 2024-06-21 NOTE — PATIENT INSTRUCTIONS
We will call the care center to have the wound nurse come out asap.    I wrote to clean your feet daily and use skin lotion.    I will do labs today    I wrote for you to see psychiatry.  They should call you.      Zurdo Kendrick M.D.

## 2024-06-21 NOTE — LETTER
June 24, 2024      Zack Santiago  [address]     [facility] [address]        Dear ,    It was a pleasure seeing you.  I wanted to get back to you with your test results.  I have enclosed a copy for your records.     Your labs look good.  The chemistries are all stable and your blood count and thyroid test are fine.         Resulted Orders   CBC with platelets   Result Value Ref Range    WBC Count 6.1 4.0 - 11.0 10e3/uL    RBC Count 3.64 (L) 4.40 - 5.90 10e6/uL    Hemoglobin 11.6 (L) 13.3 - 17.7 g/dL    Hematocrit 36.6 (L) 40.0 - 53.0 %     (H) 78 - 100 fL    MCH 31.9 26.5 - 33.0 pg    MCHC 31.7 31.5 - 36.5 g/dL    RDW 14.1 10.0 - 15.0 %    Platelet Count 171 150 - 450 10e3/uL   Comprehensive metabolic panel   Result Value Ref Range    Sodium 139 135 - 145 mmol/L      Comment:      Reference intervals for this test were updated on 09/26/2023 to more accurately reflect our healthy population. There may be differences in the flagging of prior results with similar values performed with this method. Interpretation of those prior results can be made in the context of the updated reference intervals.     Potassium 4.4 3.4 - 5.3 mmol/L    Carbon Dioxide (CO2) 28 22 - 29 mmol/L    Anion Gap 10 7 - 15 mmol/L    Urea Nitrogen 31.0 (H) 8.0 - 23.0 mg/dL    Creatinine 1.18 (H) 0.67 - 1.17 mg/dL    GFR Estimate 58 (L) >60 mL/min/1.73m2      Comment:      eGFR calculated using 2021 CKD-EPI equation.    Calcium 9.4 8.2 - 9.6 mg/dL    Chloride 101 98 - 107 mmol/L    Glucose 78 70 - 99 mg/dL    Alkaline Phosphatase 85 40 - 150 U/L    AST 15 0 - 45 U/L      Comment:      Reference intervals for this test were updated on 6/12/2023 to more accurately reflect our healthy population. There may be differences in the flagging of prior results with similar values performed with this method. Interpretation of those prior results can be made in the context of the updated reference intervals.    ALT 8 0 - 70  U/L      Comment:      Reference intervals for this test were updated on 6/12/2023 to more accurately reflect our healthy population. There may be differences in the flagging of prior results with similar values performed with this method. Interpretation of those prior results can be made in the context of the updated reference intervals.      Protein Total 7.1 6.4 - 8.3 g/dL    Albumin 4.4 3.5 - 5.2 g/dL    Bilirubin Total 0.6 <=1.2 mg/dL   TSH with free T4 reflex   Result Value Ref Range    TSH 0.50 0.30 - 4.20 uIU/mL       If you have any questions please let me know.     Sincerely,      Zurdo Kendrick MD

## 2024-06-21 NOTE — PROGRESS NOTES
This is a very complicated 89-year-old who lives at the [facility] whom I am seeing for multiple issues.  He presents by himself today.    Patient notes bruising but not bleeding.  It is mostly in his upper extremities.    Patient has OCD and recently saw psychiatry.  I reviewed the note but I also spoke with her that day.  He feels as if his depression is controlled.  He is on clomipramine for this.  He is on gabapentin as well for this.  I did not recommend benzos given his gait instability.  The patient does not have full confidence in his psychiatrist and would like a referral to somebody else.  We will check an EKG today.    The patient notes a pressure sore on the buttock region that is quite uncomfortable.  It does not sound like wound care is come out there yet despite ordering it.    Patient has CKD as well as Parkinson's disease.  He has a thoracic ascending aneurysm which is followed by Dr. Puri and no follow-up is felt to be needed.  He has coronary artery disease without recent symptoms.  He is not falling.  He uses a walker.  His blood pressure is controlled.  He has hypothyroidism.  He has no chest pain or shortness of breath or GI symptoms.    Past Medical History:   Diagnosis Date    A-fib (H) 2010    post op    AAA (abdominal aortic aneurysm) without rupture (H24)     Dr. 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    carotid us no stenosis, echo no change and no clots; ziopatch no afib, svt present    Anemia 2004    Aortic insufficiency 06/2014    1+ on echo    Aortic insufficiency 11/2016    mild to mod    Ascending aorta dilatation (H24) 01/2023    5cm on ct, fu done 2/24 and stable    ASCVD (arteriosclerotic cardiovascular disease) 2010    cabg, post op afib    BPH (benign prostatic hyperplasia) 2003    turp, flomax added by urol 2/17    Bradycardia 2016    stopped toprol    CKD (chronic kidney disease) stage 3, GFR 30-59 ml/min (H)     Constipation  05/2020    colonoscopy nl    Cough 2010    pulm eval, felt due to reflux    Dizzy 2001    mri small vessel dz    GERD (gastroesophageal reflux disease)     HTN (hypertension) 2002    Hx of colonoscopy 2003    tics    Hypothyroid     Hypovitaminosis D     Idiopathic neuropathy     Lung nodule 02/2018    6mm, found during eval of ascending aorta, fu 8/18 no change    OCD (obsessive compulsive disorder)     sees psyche    Panic disorder     Dr. Luna, then someone after he retired    Parkinson's disease (H) 03/2023    per neuro    Spinal headache     Stroke (H) 12/2016    expressive aphasia, hosp, seen by neuro, mri pos, carotids min dz, changed from asa to plavix    Sudden hearing loss 06/2013    Dr. Garcia, mri brain no acoustic neuroma    SVT (supraventricular tachycardia) (H24) 11/2016    seen on ziopatch done for amaurosis, longest 15 beats    Thoracic ascending aortic aneurysm (H24) 06/2014    4.4cm on echo, aortic root 3.9, fu 5/15 4.8cm; fu done 12/15 and slightly enlarged, fu 6/16 no change, fu 11/16 no change; fu 1/18 echo 5.1-5.3, enlarged, then cta done and only 4.8cm, t fu 6 months, lvh, nl ef, mild ai; fu 8/18 slightly larger; fu 2/19 slightly smaller; fu 2/20 and then 1/21 and stable    Ulcerative colitis (H)     not active for years     Past Surgical History:   Procedure Laterality Date    BACK SURGERY  1998    BLADDER SURGERY  1985    CATARACT IOL, RT/LT  2011    COLONOSCOPY N/A 05/05/2020    Procedure: COLONOSCOPY;  Surgeon: Kenya Loco MD;  Location:  GI    CORONARY ARTERY BYPASS  2010    ENDOVASCULAR REPAIR ANEURYSM ABDOMINAL AORTA N/A 05/27/2015    Procedure: ENDOVASCULAR REPAIR ANEURYSM ABDOMINAL AORTA;  Surgeon: Darin Walters MD;  Location:  OR    HERNIA REPAIR  2005    HERNIA REPAIR  1998    HERNIORRHAPHY INGUINAL Left 01/05/2018    Procedure: HERNIORRHAPHY INGUINAL;  Incarcerated Left Inguinal Hernia;  Surgeon: Harsh Walker MD;  Location:  OR    KNEE  SURGERY  1997    REPAIR HAMMER TOE  2012    Procedure: REPAIR HAMMER TOE;  LEFT SECOND AND THIRD CLAW TOE RECONSTRUCTION;  Surgeon: Gray Haynes MD;  Location: [facility] OR    REPAIR HAMMER TOE  2018    tria    toe amputation right foot  2021    TURP      ZZ TOTAL KNEE ARTHROPLASTY  2011    left    ZZC TOTAL KNEE ARTHROPLASTY  2009    right     Social History     Socioeconomic History    Marital status:      Spouse name: Not on file    Number of children: 2    Years of education: Not on file    Highest education level: Not on file   Occupational History    Occupation: retail     Employer: RETIRED   Tobacco Use    Smoking status: Former     Current packs/day: 0.00     Average packs/day: 1 pack/day for 5.0 years (5.0 ttl pk-yrs)     Types: Cigarettes     Start date: 1960     Quit date: 1965     Years since quittin.0    Smokeless tobacco: Never   Vaping Use    Vaping status: Never Used   Substance and Sexual Activity    Alcohol use: Yes     Comment: none    Drug use: No    Sexual activity: Not Currently     Partners: Female   Other Topics Concern    Parent/sibling w/ CABG, MI or angioplasty before 65F 55M? Not Asked   Social History Narrative    Not on file     Social Determinants of Health     Financial Resource Strain: Low Risk  (2023)    Financial Resource Strain     Within the past 12 months, have you or your family members you live with been unable to get utilities (heat, electricity) when it was really needed?: No   Food Insecurity: Low Risk  (2023)    Food Insecurity     Within the past 12 months, did you worry that your food would run out before you got money to buy more?: No     Within the past 12 months, did the food you bought just not last and you didn t have money to get more?: No   Transportation Needs: Low Risk  (2023)    Transportation Needs     Within the past 12 months, has lack of transportation kept you from medical appointments, getting your  medicines, non-medical meetings or appointments, work, or from getting things that you need?: No   Physical Activity: Not on file   Stress: Not on file   Social Connections: Not on file   Interpersonal Safety: Low Risk  (12/13/2023)    Interpersonal Safety     Do you feel physically and emotionally safe where you currently live?: Yes     Within the past 12 months, have you been hit, slapped, kicked or otherwise physically hurt by someone?: No     Within the past 12 months, have you been humiliated or emotionally abused in other ways by your partner or ex-partner?: No   Housing Stability: Low Risk  (12/22/2023)    Housing Stability     Do you have housing? : Yes     Are you worried about losing your housing?: No     Current Outpatient Medications   Medication Sig Dispense Refill    acetaminophen (TYLENOL) 500 MG tablet Take 1 or 2 tablets every 8 hours as needed for pain  Do not exceed 3000mg in 24 hours 100 tablet 0    acetaminophen (TYLENOL) 500 MG tablet Take 1-2 tablets (500-1,000 mg) by mouth every 8 hours as needed for mild pain      carbidopa-levodopa (SINEMET)  MG tablet Take 1.5 tablets by mouth 3 times daily 405 tablet 0    chlorthalidone (HYGROTON) 25 MG tablet 1 TABLET ORALLY EVERY MORNING  (DX: DIURETIC ) 30 tablet 5    clomiPRAMINE (ANAFRANIL) 25 MG capsule 1 CAPSULE ORALLY AT BEDTIME (DX: DEPRESSION) 28 capsule 10    clopidogrel (PLAVIX) 75 MG tablet 1 TABLET ORALLY EVERY MORNING  (DX: HEART  ) 30 tablet 11    ketoconazole (NIZORAL) 2 % external cream APPLY TOPICALLY TO INFLAMED SKIN 2 TIMES DAILY   (DX: INFLAMED SKIN ) 30 g 10    lactulose (CHRONULAC) 10 GM/15ML solution DRINK 30ML  ORALLY DAILY (DX: CONSTIPATION);DRINK 30ML  ORALLY DAILY AS NEEDED (DX: CONSTIPATION) 946 mL 11    levothyroxine (SYNTHROID/LEVOTHROID) 88 MCG tablet Take 1 tablet (88 mcg) by mouth daily 90 tablet 3    melatonin 3 MG tablet 1 TABLET ORALLY AT BEDTIME AS NEEDED  (DX: SLEEP ) 30 tablet 10    mirtazapine (REMERON) 45  "MG tablet 1 TABLET ORALLY AT BEDTIME (DX: DEPRESSION) 30 tablet 10    nystatin (MYCOSTATIN) 477593 UNIT/GM external cream After cleaning skin thoroughly and patting dry apply cream to groin folds and perirectal area 60 g 0    OPTIVE 0.5-0.9 % ophthalmic solution INSTILL 1 DROP INTO BOTH EYES 2 TIMES DAILY  (DX: DRY EYES) 15 mL 11    SENEXON-S 8.6-50 MG tablet 1 TABLET ORALLY DAILY AS NEEDED (DX: CONSTIPATION) 30 tablet 10    simvastatin (ZOCOR) 20 MG tablet 1 TABLET ORALLY AT BEDTIME   (DX: CHOLESTEROL) 30 tablet 11    sodium chloride (DEEP SEA NASAL SPRAY) 0.65 % nasal spray INSTILL SPRAY(S) INTO NOSTRIL(S) 3 TIMES DAILY 44 mL 11    SOLUBLE FIBER THERAPY powder TAKE 1 TEASPOONFUL ORALLY DAILY (DX: CONSTIPATION) 454 g 11    gabapentin (NEURONTIN) 400 MG capsule Take 1 capsule (400 mg) by mouth 3 times daily for 30 days 90 capsule 0     Allergies   Allergen Reactions    No Known Allergies      FAMILY HISTORY NOTED AND REVIEWED    REVIEW OF SYSTEMS: above    PHYSICAL EXAM    /66 (BP Location: Left arm, Patient Position: Sitting, Cuff Size: Adult Regular)   Pulse 52   Temp 97.7  F (36.5  C) (Temporal)   Resp 16   Ht 1.854 m (6' 1\")   Wt 79.8 kg (176 lb)   SpO2 99%   BMI 23.22 kg/m      Patient appears non toxic  He moves very slowly and deliberately.  He is able to get up and off the table with minimal assist.  Lungs clear normal flow  Cardiovascular regular in rhythm 1 out of 6 systolic murmur no JVP or edema  Abdomen soft nontender  I examined both his feet.  The dry and flaky, no open sores, slightly hyperemic  Examination of his buttocks on the left side reveals a very small superficial minimal ulceration.  It is not infected appearing.  On the right buttock there is smaller lesions.    Labs sent; EKG - nsr, rbbb, qtc 0.46    ASSESSMENT:  OCD, control not optimal, recently had dose increase of clomipramine and hopefully that will help.  I did a mental health referral and he will follow-up with her " current psychiatrist think till he can get in with a new one.  Bruising, doubt pathologic, check labs  Pressure ulcer, superficial, I will have the nurses call and get him a wound care nurse out of soon as possible  Hypothyroidism, labs today  CKD, labs today  Parkinson's disease, stable  Coronary artery disease, stable  Hypertension, controlled    PLAN:  Labs today  We will call his healthcare facility to expedite wound care coming out  Letter with labs  Referral to psychiatry  I have asked the facility to clean his feet daily and apply a skin lotion    Zurdo Kendrick M.D.

## 2024-06-22 LAB
ALBUMIN SERPL BCG-MCNC: 4.4 G/DL (ref 3.5–5.2)
ALP SERPL-CCNC: 85 U/L (ref 40–150)
ALT SERPL W P-5'-P-CCNC: 8 U/L (ref 0–70)
ANION GAP SERPL CALCULATED.3IONS-SCNC: 10 MMOL/L (ref 7–15)
AST SERPL W P-5'-P-CCNC: 15 U/L (ref 0–45)
BILIRUB SERPL-MCNC: 0.6 MG/DL
BUN SERPL-MCNC: 31 MG/DL (ref 8–23)
CALCIUM SERPL-MCNC: 9.4 MG/DL (ref 8.2–9.6)
CHLORIDE SERPL-SCNC: 101 MMOL/L (ref 98–107)
CREAT SERPL-MCNC: 1.18 MG/DL (ref 0.67–1.17)
DEPRECATED HCO3 PLAS-SCNC: 28 MMOL/L (ref 22–29)
EGFRCR SERPLBLD CKD-EPI 2021: 58 ML/MIN/1.73M2
GLUCOSE SERPL-MCNC: 78 MG/DL (ref 70–99)
POTASSIUM SERPL-SCNC: 4.4 MMOL/L (ref 3.4–5.3)
PROT SERPL-MCNC: 7.1 G/DL (ref 6.4–8.3)
SODIUM SERPL-SCNC: 139 MMOL/L (ref 135–145)
TSH SERPL DL<=0.005 MIU/L-ACNC: 0.5 UIU/ML (ref 0.3–4.2)

## 2024-06-23 ENCOUNTER — TELEPHONE (OUTPATIENT)
Dept: FAMILY MEDICINE | Facility: CLINIC | Age: 89
End: 2024-06-23
Payer: COMMERCIAL

## 2024-06-24 NOTE — TELEPHONE ENCOUNTER
I was informed this morning - all specialty insurance referrals for [facility] need to come from the PCP  I am not sure who that is but you will need to contact them  If they have epic they will be able to see the referral Dr Olga Lidia Hogan put in and decide wether they will give her the referral   Our referral will not be accepted by the insurance company  Please call and speak to a nurse at the patient's living facility.  I wanted to have the wound nurse come out in speaking with the patient Friday he indicated no one has been out.  I like him to come out and evaluate his buttock wound as soon as possible.    Thank you    Zurdo Kendrick M.D.

## 2024-06-24 NOTE — RESULT ENCOUNTER NOTE
It was a pleasure seeing you.  I wanted to get back to you with your test results.  I have enclosed a copy for your records.    Your labs look good.  The chemistries are all stable and your blood count and thyroid test are fine.    If you have any questions please let me know.    Zurdo

## 2024-06-25 NOTE — TELEPHONE ENCOUNTER
Thank you.  I would think it would not be that difficult to figure out what home care agency is covered.  Perhaps the patient or his family can call and find out any information.    Zurdo Kendrick M.D.

## 2024-06-25 NOTE — TELEPHONE ENCOUNTER
Patient returns call to clinic regarding the request for a wound care nurse to evaluate his buttocks wound.    Patient encouraged Writer to contact his primary RN, Jayashree Alarcon, who also is a care-coordinator.     Writer contacted [phone] and left detailed voice message for Jayashree Alarcon to return call to clinic to discuss need for wound care nurse.

## 2024-06-25 NOTE — TELEPHONE ENCOUNTER
MOJGAN Blanc called the clinic back and stated that they have sent three different referrals for home care out. They have sent it to [facility] (out of network), [facility] (out of network) and [facility] (not available due to staffing). They are having the patient contact his insurance to see what home care agencies are within network. In the meantime they are currently just doing basic wound care.     Routing to provider to advise if there is any further recommendations.     Dorothy RICKETTS RN  [facility] Triage Team

## 2024-06-26 NOTE — TELEPHONE ENCOUNTER
Sounds good, please let the appropriate people know who to send the referral to.    Zurdo Kendrick M.D.

## 2024-06-26 NOTE — TELEPHONE ENCOUNTER
Called Jayashree     She already faxed the referral to [facility] after patient called his insurance    Florentin Duke RN

## 2024-07-01 ENCOUNTER — TELEPHONE (OUTPATIENT)
Dept: FAMILY MEDICINE | Facility: CLINIC | Age: 89
End: 2024-07-01
Payer: COMMERCIAL

## 2024-07-02 ENCOUNTER — VIRTUAL VISIT (OUTPATIENT)
Dept: PHARMACY | Facility: CLINIC | Age: 89
End: 2024-07-02
Attending: INTERNAL MEDICINE
Payer: COMMERCIAL

## 2024-07-02 DIAGNOSIS — E03.9 ACQUIRED HYPOTHYROIDISM: ICD-10-CM

## 2024-07-02 DIAGNOSIS — G60.9 IDIOPATHIC NEUROPATHY: ICD-10-CM

## 2024-07-02 DIAGNOSIS — I25.10 ASCVD (ARTERIOSCLEROTIC CARDIOVASCULAR DISEASE): ICD-10-CM

## 2024-07-02 DIAGNOSIS — K59.00 CONSTIPATION, UNSPECIFIED CONSTIPATION TYPE: ICD-10-CM

## 2024-07-02 DIAGNOSIS — G47.00 INSOMNIA, UNSPECIFIED TYPE: ICD-10-CM

## 2024-07-02 DIAGNOSIS — N18.31 HYPERTENSIVE HEART AND KIDNEY DISEASE WITHOUT HEART FAILURE AND WITH STAGE 3A CHRONIC KIDNEY DISEASE (H): ICD-10-CM

## 2024-07-02 DIAGNOSIS — H04.123 DRY EYES: ICD-10-CM

## 2024-07-02 DIAGNOSIS — E78.5 HYPERLIPIDEMIA LDL GOAL <100: ICD-10-CM

## 2024-07-02 DIAGNOSIS — I13.10 HYPERTENSIVE HEART AND KIDNEY DISEASE WITHOUT HEART FAILURE AND WITH STAGE 3A CHRONIC KIDNEY DISEASE (H): ICD-10-CM

## 2024-07-02 DIAGNOSIS — J34.89 DRY NARES: ICD-10-CM

## 2024-07-02 DIAGNOSIS — F42.9 OBSESSIVE-COMPULSIVE DISORDER, UNSPECIFIED TYPE: Chronic | ICD-10-CM

## 2024-07-02 DIAGNOSIS — I71.40 ABDOMINAL AORTIC ANEURYSM (AAA) WITHOUT RUPTURE, UNSPECIFIED PART (H): ICD-10-CM

## 2024-07-02 DIAGNOSIS — G20.A1 PARKINSON'S DISEASE, UNSPECIFIED WHETHER DYSKINESIA PRESENT, UNSPECIFIED WHETHER MANIFESTATIONS FLUCTUATE (H): ICD-10-CM

## 2024-07-02 DIAGNOSIS — L89.90 PRESSURE INJURY OF SKIN, UNSPECIFIED INJURY STAGE, UNSPECIFIED LOCATION: Primary | ICD-10-CM

## 2024-07-02 PROCEDURE — 99207 PR NO CHARGE LOS: CPT | Mod: 93 | Performed by: PHARMACIST

## 2024-07-02 RX ORDER — CLOMIPRAMINE HYDROCHLORIDE 50 MG/1
50 CAPSULE ORAL AT BEDTIME
COMMUNITY
Start: 2024-06-28 | End: 2024-07-09 | Stop reason: ALTCHOICE

## 2024-07-02 RX ORDER — BETAMETHASONE DIPROPIONATE 0.5 MG/G
OINTMENT, AUGMENTED TOPICAL DAILY PRN
COMMUNITY
Start: 2024-06-28 | End: 2024-09-16

## 2024-07-02 NOTE — PATIENT INSTRUCTIONS
"Recommendations from today's MTM visit:                                                    MTM (medication therapy management) is a service provided by a clinical pharmacist designed to help you get the most of out of your medicines.   Today we reviewed what your medicines are for, how to know if they are working, that your medicines are safe and how to make your medicine regimen as easy as possible.      Plan in place to see psychiatry for 2nd opinion next week.  We discussed that SSRIs are main treatment options for OCD and that it may be a possibility to change mirtazapine/clomipramine to alternative agent(s).  Please discuss with new psychiatrist and we will follow-up after that.    Follow-up: Return in about 1 week (around 7/9/2024) for Follow-up Medication Review.    It was great speaking with you today.  I value your experience and would be very thankful for your time in providing feedback in our clinic survey. In the next few days, you may receive an email or text message from [employer] with a link to a survey related to your  clinical pharmacist.\"     To schedule another MTM appointment, please call the clinic directly or you may call the MTM scheduling line at [phone] or toll-free at [phone].     My Clinical Pharmacist's contact information:                                                      Please feel free to contact me with any questions or concerns you have.      Jenny Obregon PharmD, [facility]  Medication Therapy Management Provider  [phone]    "

## 2024-07-02 NOTE — PROGRESS NOTES
"Medication Therapy Management (MTM) Encounter    ASSESSMENT:                            Medication Adherence/Access: No issues identified    Pressure Ulcer:  Plan in place for wound care.  Betamethasone ointment could be causing skin thinning and may be contributing.    Will await recommendations from wound care.    Insomnia and OCD  Unimproved.  Plan in place for 2nd opinion.  History is a bit unclear, but aside from mirtazapine it does not sound like he's tried other SSRIs, which are generally 1st line treatment for OCD.     Per [employer] -   \"Concurrent use of MIRTAZAPINE and CLOMIPRAMINE may result in increased risk of serotonin syndrome - no current signs and symptoms of serotonin syndrome are present.  Clomipramine - Beers Criteria: Use caution or avoid use as potentially inappropriate in older adults  Patients with decreased renal function may require reduced doses\"    Hypertension/CAD/Thoracic Ascending Aneurysm:    Stable. Patient is meeting blood pressure goal of < 140/90mmHg.     Hyperlipidemia:   Stable.     Hypothyroidism:   Stable. Last TSH is within normal limits.      Parkinson's Disease:  Stable per his report.    Pain:  Stable.    Constipation:  Stable.    Dry Eyes/Nares:  Stable.    PLAN:                            Plan in place to see psychiatry for 2nd opinion next week.  We discussed that SSRIs are main treatment options for OCD and that it may be a possibility to change mirtazapine/clomipramine to alternative agent(s).  He will discuss with new psychiatrist and we will follow-up after that.    Follow-up: Return in about 1 week (around 7/9/2024) for Follow-up Medication Review.    SUBJECTIVE/OBJECTIVE:                          Zack Santiago is a 89 year old male seen for an initial visit. He was referred to me from Dr. Kendrick.      Reason for visit: \"His psychiatrist wants him to go to higher dose clomipramine.  He is on many medications and I like to make sure it is safe.\"    Allergies/ADRs: " Reviewed in chart  Past Medical History: Reviewed in chart  Tobacco: He reports that he quit smoking about 59 years ago. His smoking use included cigarettes. He started smoking about 64 years ago. He has a 5 pack-year smoking history. He has never used smokeless tobacco.  Alcohol: not currently using  Other Substance Use: none  Caffeine: Decaf coffee only  Activity: Walks the halls at his assisted living, does some chair/bed exercises with an aid that comes most days    Medication Adherence/Access: Patient has assistance with medication administration: assisted living.  Patient takes medications 3 time(s) per day.  Per patient, misses medication 0 times per week.     Pressure Ulcer:  Betamethasone ointment as needed   Has wound care starting next week  He reports symptoms are improving, pain is much improved.  Denies side effects.    Mental Health   OCD/Insomnia  Clomipramine 50mg daily - dose increased 6/11/2024  Gabapentin 400mg three times daily   Melatonin 3mg at bedtime as needed - no recent use  Mirtazapine 45mg at bedtime   Patient reports no current medication side effects.  He's noticed no change in OCD symptoms with increased dose of clomipramine  Currently following with Dr. Bowman (psychiatry) at [facility] with plan to establish with new provider for a 2nd opinion (Dr. Juan Duran at [facility] in [place] - has appt on 7/8)  Medication history: Abilify (stopped due to tremor/Parkinson's symptoms); doesn't recall taking any other medications for OCD in the past.  Feels OCD symptoms are currently quite impactful.  Denies suicidal ideation.     Hypertension /CAD/Thoracic Ascending Aneurysm:  Clopidogrel 75mg daily  Chlorthalidone 25mg daily  Patient reports no current medication side effects  Patient does not self-monitor blood pressure.    Followed by Dr. Puri - no further follow up felt to be needed regarding aneurysm.  Patient had CABG x5 in 2010.      Hyperlipidemia    Simvastatin 20mg daily  Patient reports no significant myalgias or other side effects.    Recent Labs   Lab Test 01/27/20  1131 01/25/19  1045   CHOL 117 105   HDL 43 42   LDL 60 48   TRIG 72 73      Hypothyroidism   Levothyroxine 88 mcg daily.   Patient is having the following symptoms: none.    TSH   Date Value Ref Range Status   06/21/2024 0.50 0.30 - 4.20 uIU/mL Final   03/18/2022 0.44 0.40 - 4.00 mU/L Final   07/21/2020 0.46 0.40 - 4.00 mU/L Final     Parkinson's Disease:   Carbidopa/levodopa 25/100mg - 1.5 tablets three times daily  He reports symptoms (mostly tremor) are stable.  Denies side effects    Pain:  Acetaminophen 500-1000mg three times daily as needed - using rarely  He reports pain is stable.    Constipation:  Lactulose 20g daily and additional dose if needed  Senna-docusate 8.6/50mg daily as needed   Fiber 1 tsp daily  He reports bowels are improved.  Denies side effects    Dry Eyes/Nares:  Optive eye drops twice daily   Saline spray nasally three times daily   He finds these effective and denies side effects.    Today's Vitals: There were no vitals taken for this visit.  ----------------    I spent 31 minutes with this patient today. A copy of the visit note was provided to the patient's provider(s).    A summary of these recommendations was sent via [employer].    Jenny Obregon, PharmD, BCACP  Medication Therapy Management Provider  [phone]     Telemedicine Visit Details  Type of service:  Telephone visit  Start Time: 1:30 PM  End Time: 2:01 PM     Medication Therapy Recommendations  No medication therapy recommendations to display

## 2024-07-03 NOTE — TELEPHONE ENCOUNTER
Spoke with patient     He already spoke at length with MTM regarding this     He is scheduled already for a 2nd opinion with another psych provider     Abeba Degroot RN  [facility] Internal Medicine [facility]

## 2024-07-08 ENCOUNTER — TRANSFERRED RECORDS (OUTPATIENT)
Dept: HEALTH INFORMATION MANAGEMENT | Facility: CLINIC | Age: 89
End: 2024-07-08
Payer: COMMERCIAL

## 2024-07-09 ENCOUNTER — VIRTUAL VISIT (OUTPATIENT)
Dept: PHARMACY | Facility: CLINIC | Age: 89
End: 2024-07-09
Payer: COMMERCIAL

## 2024-07-09 ENCOUNTER — MEDICAL CORRESPONDENCE (OUTPATIENT)
Dept: HEALTH INFORMATION MANAGEMENT | Facility: CLINIC | Age: 89
End: 2024-07-09

## 2024-07-09 DIAGNOSIS — F42.9 OBSESSIVE-COMPULSIVE DISORDER, UNSPECIFIED TYPE: Primary | ICD-10-CM

## 2024-07-09 DIAGNOSIS — G47.00 INSOMNIA, UNSPECIFIED TYPE: ICD-10-CM

## 2024-07-09 PROCEDURE — 99207 PR NO CHARGE LOS: CPT | Mod: 93 | Performed by: PHARMACIST

## 2024-07-09 RX ORDER — SERTRALINE HYDROCHLORIDE 25 MG/1
25 TABLET, FILM COATED ORAL DAILY
COMMUNITY
Start: 2024-07-08 | End: 2024-09-16

## 2024-07-09 NOTE — PATIENT INSTRUCTIONS
"Recommendations from today's MTM visit:                                                    MTM (medication therapy management) is a service provided by a clinical pharmacist designed to help you get the most of out of your medicines.   Today we reviewed what your medicines are for, how to know if they are working, that your medicines are safe and how to make your medicine regimen as easy as possible.      Continue plan set in place with Dr. Duran    Follow-up: Return in about 6 months (around 1/9/2025) for Follow-up Medication Review.    It was great speaking with you today.  I value your experience and would be very thankful for your time in providing feedback in our clinic survey. In the next few days, you may receive an email or text message from [employer] with a link to a survey related to your  clinical pharmacist.\"     To schedule another MTM appointment, please call the clinic directly or you may call the MTM scheduling line at [phone] or toll-free at [phone].     My Clinical Pharmacist's contact information:                                                      Please feel free to contact me with any questions or concerns you have.      Jenny Obregon, PharmD, [facility]  Medication Therapy Management Provider  [phone]    "

## 2024-07-09 NOTE — PROGRESS NOTES
Medication Therapy Management (MTM) Encounter    ASSESSMENT:                            Medication Adherence/Access: No issues identified    OCD/Insomnia:   Plan in place.    PLAN:                            Continue plan set in place with Dr. Duran    Follow-up: Return in about 6 months (around 1/9/2025) for Follow-up Medication Review.    SUBJECTIVE/OBJECTIVE:                          Zack Santiago is a 89 year old male seen for a follow-up visit.       Reason for visit: Follow-up on OCD management.    Tobacco: He reports that he quit smoking about 59 years ago. His smoking use included cigarettes. He started smoking about 64 years ago. He has a 5 pack-year smoking history. He has never used smokeless tobacco.  Alcohol: not currently using    Medication Adherence/Access: no issues reported    Mental Health   OCD/Insomnia  Clomipramine 50mg daily - will be discontinued  Gabapentin 400mg three times daily   Melatonin 3mg at bedtime as needed - no recent use  Mirtazapine 45mg at bedtime   Sertraline 25mg daily - just started  Patient reports no current medication side effects.  Patient did see Dr. Juan Duran at [facility] in [place] yesterday, felt very good about this appointment and the plan, plans to transfer psychiatry care there.  He will notify Dr. Bowman, current psychiatrist at [facility].  Will follow up with Dr. Duran in 1 month.  He's also had one visit with a therapist, which he plans to continue.  Denies suicidal ideation.     Today's Vitals: There were no vitals taken for this visit.  ----------------    I spent 8 minutes with this patient today. A copy of the visit note was provided to the patient's provider(s).    A summary of these recommendations was sent via [employer].    Jenny Obregon, PharmD, BCACP  Medication Therapy Management Provider  [phone]     Telemedicine Visit Details  Type of service:  Telephone visit  Start Time: 3:07 PM  End Time: 3:15 PM     Medication  Therapy Recommendations  No medication therapy recommendations to display

## 2024-07-17 DIAGNOSIS — R19.8 ALTERED BOWEL FUNCTION: ICD-10-CM

## 2024-07-18 RX ORDER — DOCUSATE SODIUM 50MG AND SENNOSIDES 8.6MG 8.6; 5 MG/1; MG/1
TABLET, FILM COATED ORAL
Qty: 30 TABLET | Refills: 10 | Status: SHIPPED | OUTPATIENT
Start: 2024-07-18

## 2024-07-19 DIAGNOSIS — F42.9 OBSESSIVE-COMPULSIVE DISORDER, UNSPECIFIED TYPE: Chronic | ICD-10-CM

## 2024-07-19 RX ORDER — MIRTAZAPINE 45 MG/1
TABLET, FILM COATED ORAL
Qty: 28 TABLET | Refills: 11 | Status: SHIPPED | OUTPATIENT
Start: 2024-07-19

## 2024-07-19 NOTE — TELEPHONE ENCOUNTER
Looks like Keshia Bowman MD has been filling this in 2024     Please refuse if appropriate    Florentin Duke RN

## 2024-08-09 ENCOUNTER — TELEPHONE (OUTPATIENT)
Dept: PHARMACY | Facility: CLINIC | Age: 89
End: 2024-08-09
Payer: COMMERCIAL

## 2024-08-09 NOTE — TELEPHONE ENCOUNTER
Received VM from patient asking for return call.  I called him back, there was no answer, LVM for him to call me back at his convenience.    Jenny Obregon, PharmD, [facility]  Medication Therapy Management Provider  [phone]

## 2024-08-12 NOTE — TELEPHONE ENCOUNTER
We have been unable to reach this patient for MTM follow-up after several attempts. We will stop reaching out to the patient at this time. Please let us know if we can assist in this patient's care in the future.    Routing to PCP as FYI.

## 2024-08-12 NOTE — TELEPHONE ENCOUNTER
Received return VM from Zack asking for call back.    I returned his call.  There was no answer, left another VM for him to call me back if he still has questions.    Jenny Obregon, PharmD, [facility]  Medication Therapy Management Provider  [phone]

## 2024-08-13 ENCOUNTER — NURSE TRIAGE (OUTPATIENT)
Dept: FAMILY MEDICINE | Facility: CLINIC | Age: 89
End: 2024-08-13
Payer: COMMERCIAL

## 2024-08-13 NOTE — TELEPHONE ENCOUNTER
Spoke to pt and confirmed what CC said regarding homecare order she received today for wound care - pt verbalized understanding, appreciated assistance and had no further questions.

## 2024-08-13 NOTE — TELEPHONE ENCOUNTER
"RN Apolonia Nugent ([facility]) - [phone], [phone] (verbal consent from pt to discuss PHI)    1. LOCATION: \"Where is the wound located?\"       Pressure wound(s) lower on the buttocks, closer to rectum  2. WOUND APPEARANCE: \"What does the wound look like?\"       Unknown, facility nurses to be contacted for detail  3. SIZE: If redness is present, ask: \"What is the size of the red area?\" (Inches, centimeters, or compare to size of a coin)         4. SPREAD: \"What's changed in the last day?\"  \"Do you see any red streaks coming from the wound?\"        5. ONSET: \"When did it start to look infected?\"       A month ago  6. MECHANISM: \"How did the wound start, what was the cause?\"      Started as pressure wound  7. PAIN: Do you have any pain?\"  If Yes, ask: \"How bad is the pain?\"  (e.g., Scale 1-10; mild, moderate, or severe)     - MILD (1-3): Doesn't interfere with normal activities.      - MODERATE (4-7): Interferes with normal activities or awakens from sleep.     - SEVERE (8-10): Excruciating pain, unable to do any normal activities.        3/10  8. FEVER: \"Do you have a fever?\" If Yes, ask: \"What is your temperature, how was it measured, and when did it start?\"      Denies  9. OTHER SYMPTOMS: \"Do you have any other symptoms?\" (e.g., shaking chills, weakness, rash elsewhere on body)      Light bleeding    Additional Information   Negative: Widespread rash and bright red, sunburn-like and too weak to stand   Negative: Sounds like a life-threatening emergency to the triager   Negative: Wound infection diagnosed and taking an antibiotic   Negative: Cellulitis diagnosed and taking an antibiotic   Negative: Animal bite wound infection suspected   Negative: Boil suspected (painful red lump)   Negative: Surgical wound infection suspected (post-op)   Negative: Skin glue used to close wound and not infected   Negative: Stitches and not infected   Negative: SEVERE pain in the wound   Negative: SEVERE pain with " bending of finger (or toe) in wound on hand (or foot)   Negative: Bright red, wide-spread, sunburn-like rash   Negative: Fever > 103 F (39.4 C)   Negative: Black (necrotic), dark purple, or blisters develop in area of wound   Negative: Patient sounds very sick or weak to the triager   Negative: Finger wound and entire finger swollen    Protocols used: Wound Infection Ueidnexia-U-XI

## 2024-08-13 NOTE — TELEPHONE ENCOUNTER
Spoke to facility CC nurse - she confirmed that home care orders were sent over to Dr. Kendrick and that she received the orders JUST NOW to work on home care with pt.    Writer said I'd followup with pt to ensure he knows.

## 2024-08-15 DIAGNOSIS — I10 ESSENTIAL HYPERTENSION: ICD-10-CM

## 2024-08-15 RX ORDER — CHLORTHALIDONE 25 MG/1
TABLET ORAL
Qty: 28 TABLET | Refills: 11 | Status: SHIPPED | OUTPATIENT
Start: 2024-08-15

## 2024-08-16 DIAGNOSIS — R19.8 ALTERED BOWEL FUNCTION: ICD-10-CM

## 2024-08-16 RX ORDER — LACTULOSE 10 G/15ML
SOLUTION ORAL
Qty: 946 ML | Refills: 0 | Status: SHIPPED | OUTPATIENT
Start: 2024-08-16 | End: 2024-09-11

## 2024-08-17 ENCOUNTER — HEALTH MAINTENANCE LETTER (OUTPATIENT)
Age: 89
End: 2024-08-17

## 2024-08-27 ENCOUNTER — TELEPHONE (OUTPATIENT)
Dept: WOUND CARE | Facility: CLINIC | Age: 89
End: 2024-08-27
Payer: COMMERCIAL

## 2024-08-27 NOTE — TELEPHONE ENCOUNTER
Patient called, looking to schedule appointment to be seen for wound on the buttock, AMB.  Really hoping to get home care set up.

## 2024-08-28 ENCOUNTER — TELEPHONE (OUTPATIENT)
Dept: FAMILY MEDICINE | Facility: CLINIC | Age: 89
End: 2024-08-28
Payer: COMMERCIAL

## 2024-08-28 NOTE — TELEPHONE ENCOUNTER
Already attempted to call patient in other TE encounter on 8/28/24.    Ayesha ULRICH  [facility]

## 2024-08-28 NOTE — TELEPHONE ENCOUNTER
Patient returned call to clinic. Discussed with him that he can be scheduled at [facility] but shouldn't wait until the appointment at [facility] for home care to be established. He should contact his PCP for home care. The patient is mostly interested in home care but discussed with the patient that he can be seen at [facility] for wound assessment and the provider can recommend wound treatment for home care to complete. The patient was scheduled for the next available appointment and the patient instructed to call his PCP clinic to get home care initiated.

## 2024-08-28 NOTE — TELEPHONE ENCOUNTER
Since they apparently just saw him for this it would be preferable for them to order it.  I have not seen him for over 2 months or not sure that he would not need a face-to-face.  If they could order it that would be great.    Thank you    Zurdo Kendrick M.D.

## 2024-08-28 NOTE — TELEPHONE ENCOUNTER
Nurse Triage SBAR    Is this a 2nd Level Triage? NO    Situation:Pt is requesting advice on chlorthalidone dose increase to tx of right foot tendonitis.     Background: Pt has appt with podiatrist and that provider advised pt to contact PCP and ask if chlorthalidone dose could be increased.    Assessment: Pt has right foot swelling and pain. Pt is unaware of injury. He has no fever and is able to walk with some pain.       Recommendation:Routing message to PCP for advice on medication dose increase? Or should pt schedule appt?    Routed to provider    Does the patient meet one of the following criteria for ADS visit consideration? No

## 2024-08-28 NOTE — TELEPHONE ENCOUNTER
Wound care advised pt to have home care   Pt is going in next week for assessment but was advised to call PCP right away to get home care set up.     Rocío Osman RN

## 2024-08-28 NOTE — TELEPHONE ENCOUNTER
Patient Contact    Attempt # 1    Was call answered?  No.  Left message on voicemail with information to call triage back.    Please also see other encounter on 8/28:        Review both messages from both encounters with patient on callback.     Ayesha ULRICH  [facility]

## 2024-08-29 NOTE — TELEPHONE ENCOUNTER
Patient returning call to clinic.     Reviewed provider's message with patient. Patient is agreeable to team appt, scheduled appt at next available:    8/30/2024 11:00 AM (Arrive by 10:40 AM) Mira Ireland PA-C [facility]     Also see other TE on 8/28 - patient was advised by wound care clinic to get home care referral, needs face to face appt for this, scheduled above for team appt. Patient notified this can be addressed at appt tomorrow.    No further questions or concerns at this time.    Signing encounter.    Ayesha ULRICH  [facility]

## 2024-08-30 ENCOUNTER — OFFICE VISIT (OUTPATIENT)
Dept: FAMILY MEDICINE | Facility: CLINIC | Age: 89
End: 2024-08-30
Payer: COMMERCIAL

## 2024-08-30 VITALS
SYSTOLIC BLOOD PRESSURE: 150 MMHG | BODY MASS INDEX: 24.19 KG/M2 | DIASTOLIC BLOOD PRESSURE: 55 MMHG | TEMPERATURE: 96.8 F | HEIGHT: 73 IN | HEART RATE: 48 BPM | RESPIRATION RATE: 16 BRPM | OXYGEN SATURATION: 98 % | WEIGHT: 182.5 LBS

## 2024-08-30 DIAGNOSIS — L89.159 DECUBITUS ULCER OF COCCYGEAL REGION, UNSPECIFIED ULCER STAGE: Primary | ICD-10-CM

## 2024-08-30 DIAGNOSIS — M25.571 PAIN IN JOINT INVOLVING ANKLE AND FOOT, RIGHT: ICD-10-CM

## 2024-08-30 DIAGNOSIS — R60.0 BILATERAL LOWER EXTREMITY EDEMA: ICD-10-CM

## 2024-08-30 PROCEDURE — 99214 OFFICE O/P EST MOD 30 MIN: CPT | Performed by: PHYSICIAN ASSISTANT

## 2024-08-30 RX ORDER — METHYLCELLULOSE 4000CPS 30 %
POWDER (GRAM) MISCELLANEOUS ONCE
COMMUNITY

## 2024-08-30 RX ORDER — ECONAZOLE NITRATE 10 MG/G
CREAM TOPICAL PRN
COMMUNITY

## 2024-08-30 ASSESSMENT — PAIN SCALES - GENERAL: PAINLEVEL: MILD PAIN (2)

## 2024-08-30 NOTE — PROGRESS NOTES
Name:  Zack Santiago  :   1931  MRN:   [id number]  Date of service: Sep 3, 2024  Primary Provider: Zurdo Kendrick  Referring Provider: Zurdo Kendrick    Presenting Information:  Zack, a 89 year old male, was seen was seen at the [facility] for evaluation of ***. Zack was accompanied to this visit by his {AAFAMILYMEMBERS:[id number]}. I met with Zack at the request of {GBMDList:[id number]} to obtain a family history, to discuss possible genetic contributions to his symptoms, and to obtain informed consent for genetic testing.***       Family History:   A three generation pedigree was obtained today. A copy is located in the media tab and full details are available in the pedigree section of the history tab. The following information was provided:     Personal:  Zack has a history of ***. Refer to {GBMDList:[id number]}'s note for a more detailed personal history.   Children:   ***  Siblings:  ***  Maternal Relatives:  Mother (***) is ***.  Grandmother ***.  Grandfather ***.   Paternal Relatives:  Father (***) is ***.  Grandmother ***.  Grandfather ***.     The family history is otherwise negative for aortic root dilation, aneurysm, high degree of myopia, ectopia lentis, retinal detachment, pneumothorax, scoliosis, other skeletal concerns, organ rupture, sudden death, and known genetic disorders. Consanguinity is denied.      Discussion and Assessment:  Genes are long stretches of DNA that are responsible for how our bodies look and how our bodies work. Our genes are inherited on structures called chromosomes of which we have 23 pairs. One copy of each chromosome in a pair is inherited from the mother and one is inherited from the father. Changes in the DNA sequence of a gene, called variants, as well as changes within the chromosomes can cause the signs and symptoms of a genetic condition.     Aortic dilation/aneurysm occurs when a portion of the aorta, a large blood vessel in the  heart, becomes enlarged due to weakening of the artery wall. Depending on the size of the aneurysm, there may be a risk for the aneurysm to break open and rupture, also known as an aortic dissection. Aortic aneurysms can be caused by disruptions, variants, in a number of genes, which can be inherited within families. Aortic aneurysms can be the only symptom caused by the gene disruption, such as in the genetic condition, familial thoracic aortic aneurysm and/or dissection (TAAD).  Aortic aneurysms can also occur as part of a syndrome that includes other symptoms or physical features. These syndromes are known as connective tissue disorders because the genes involved make connective tissue proteins found in joints, organs, and blood vessels. One of these syndromes is called Marfan syndrome. In addition to aortic aneurysms, other features include tall stature, slender limbs, long arm span, scoliosis, and lens dislocation. Examples of two other connective tissue disorders are Loeys-Angela syndrome and Perez-Danlos syndrome. Aortic aneurysm can be found in association with genetic syndromes, such as Marfan and Loeys-Angela syndrome or as an isolated event that runs in families. ***Zack does not have other symptoms suggestive of a connective tissue disorder but history is suggestive of familial thoracic aortic aneurysm and dissection (TAAD).***    Genetic testing is available for thoracic aortic aneurysms. The testing panel includes 29 genes associated with both syndromic and isolated aortic aneurysms. Testing currently identifies pathogenic variants in about 20% of individuals with TAAD. Diagnostic yield is higher for syndromic individuals. The potential results were discussed including a positive, negative, or variant of uncertain significance.     One possibility is a change(s) could be seen in Zack and this change(s) is known to cause similar symptoms to the symptoms Zack has experienced.  This is considered a  positive result.  A positive result may provide more information on appropriate clinical management for Zack and may provide information on additional potential health risks associated with Zack's diagnosis.  A positive result can also have implications for the health and reproductive risks of other relatives.  It is also possible that no change(s) that are likely to explain Zack's symptoms are found.  This is considered a negative result.  A negative result would not completely rule out a possible genetic cause for Zack's symptoms.  Not all changes in our genes cause disease.  Sometimes, it can be difficult for the laboratory to determine whether or not a change that is found contributes to the patient's symptoms.  If the meaning of a particular gene change is unknown, the lab classifies the result as a variant of unknown significance (VUS). Follow-up testing of relatives may be beneficial in clarifying the meaning of this result.     It is medically necessary to determine if there is an underlying genetic cause for Zack's symptoms for several reasons. First and foremost this can be important for his own health. It is possible that an underlying cause may also predispose Zack to other health risks. Knowing about these additional health risks can help us stay ahead of Zack's healthcare to more appropriately screen for other complications.  Some diagnoses may also have treatment options. Additionally, discovering an underlying reason may help predict the chance for other family members to have similar healthcare needs. Finally, having a specific underlying diagnosis can sometimes help individuals receive the services they need to help reach their full potential in school, in work, or in day to day life.     Insurance and billing procedures were covered with Zack. Zack will be sent a link where he can access [employer] billing policy. [employer] will submit a claim to Zack's insurance. If Zack owes more than $100 after  the claim is processed, [facility] will be [facility] to discuss billing options including financial assistance.     Zack provided informed consent for the testing. I will plan to follow-up with Zack by phone when results are returned, approximately *** weeks after the testing is initiated. A follow-up appointment will be scheduled as needed according to  ***. Additional questions or concerns were denied at this time.        Plan:  Zack had his blood drawn for a *** at [facility] today.     Once the lab receives the sample, Zack's genetic test will be initiated.    Results are expected in approximately *** weeks and will be returned by phone. A follow-up appointment will be scheduled as needed at that time.    Contact information was provided should any questions arise in the future.       Agnes Cardenas [facility]  Genetic Counselor  JERMAINE Patton   Phone: [phone]     Approximate Time Spent in Consultation: *** min

## 2024-08-30 NOTE — PATIENT INSTRUCTIONS
Elevated legs above level of heart    Avoid salt    Compression stockings if above does not help    Home care for wounds on buttocks

## 2024-08-30 NOTE — PROGRESS NOTES
"Assessment and Plan:     (L89.159) Decubitus ulcer of coccygeal region, unspecified ulcer stage  (primary encounter diagnosis)  Comment: seeing wound next week, no e/o secondary infection  Plan: Home Care Referral        Keep appt with wound clinic    (R60.0) Bilateral lower extremity edema  Comment: non-tender, symmetrical, no weeping, no e/o infection or DVT  Plan: elevate above level of heart, avoid/minimize salt, compression stockings  Would not increase chlorthalidone as he is taking max daily dose    (M25.571) Pain in joint involving ankle and foot, right  Comment: following with podiatry for ankle sprain  Plan: cont above    ROSEANNA [facility] Same Day Provider   30 minutes on the day of the encounter doing chart review, history and exam, documentation and further activities as noted above.        Subjective   Zack is a 89 year old, presenting for the following health issues:  Foot Swelling (Check buttocks)    HPI     Zack is here for two issues  He recently saw his podiatrist for right foot swelling and it was suggested that he increase his diuretic, he is on 25mg of chlorthalidone   He has had pain and swelling x last few weeks  He was diagnosed with tendonitis     He would like home care ordered for wound care   He is following with the wound care clinic, next appt is next week  He has a wound on his coccyx         Objective    BP (!) 150/55 (BP Location: Left arm, Patient Position: Sitting, Cuff Size: Adult Large)   Pulse (!) 48   Temp 96.8  F (36  C) (Tympanic)   Resp 16   Ht 1.854 m (6' 1\")   Wt 82.8 kg (182 lb 8 oz)   SpO2 98%   BMI 24.08 kg/m    Body mass index is 24.08 kg/m .    Physical Exam     GENERAL: in NAD  RESP: lungs clear to auscultation - no rales, no rhonchi, no wheezes  CV: regular rates and rhythm, normal S1 S2, no S3 or S4 and no murmur, no click or rub   MS: extremities- no gross deformities noted, mild-moderate symmetrical pitting edema bilat lower ext, feet are " free of sores/lesions, erythema, no weeping   SKIN: partial thickness decub ulcer bilat buttocks, no surrounding swelling, induration or tenderness           Signed Electronically by: Mira Keyes PA-C

## 2024-09-03 ENCOUNTER — OFFICE VISIT (OUTPATIENT)
Dept: CONSULT | Facility: CLINIC | Age: 89
End: 2024-09-03
Attending: INTERNAL MEDICINE
Payer: COMMERCIAL

## 2024-09-03 VITALS
BODY MASS INDEX: 24.19 KG/M2 | SYSTOLIC BLOOD PRESSURE: 125 MMHG | HEIGHT: 73 IN | DIASTOLIC BLOOD PRESSURE: 89 MMHG | RESPIRATION RATE: 24 BRPM | WEIGHT: 182.54 LBS | HEART RATE: 120 BPM

## 2024-09-03 DIAGNOSIS — I71.21 ANEURYSM OF ASCENDING AORTA WITHOUT RUPTURE (H): ICD-10-CM

## 2024-09-03 DIAGNOSIS — I71.43 INFRARENAL ABDOMINAL AORTIC ANEURYSM (AAA) WITHOUT RUPTURE (H): ICD-10-CM

## 2024-09-03 DIAGNOSIS — I71.21 ANEURYSM OF ASCENDING AORTA WITHOUT RUPTURE (H): Primary | ICD-10-CM

## 2024-09-03 DIAGNOSIS — Z13.71 ENCOUNTER FOR NONPROCREATIVE GENETIC COUNSELING AND TESTING: Primary | ICD-10-CM

## 2024-09-03 DIAGNOSIS — Z71.83 ENCOUNTER FOR NONPROCREATIVE GENETIC COUNSELING AND TESTING: Primary | ICD-10-CM

## 2024-09-03 PROCEDURE — 96040 HC GENETIC COUNSELING, EACH 30 MINUTES: CPT | Performed by: GENETIC COUNSELOR, MS

## 2024-09-03 PROCEDURE — G0463 HOSPITAL OUTPT CLINIC VISIT: HCPCS | Performed by: MEDICAL GENETICS

## 2024-09-03 PROCEDURE — 99215 OFFICE O/P EST HI 40 MIN: CPT | Mod: GC | Performed by: MEDICAL GENETICS

## 2024-09-03 ASSESSMENT — PAIN SCALES - GENERAL: PAINLEVEL: MILD PAIN (2)

## 2024-09-03 NOTE — LETTER
9/3/2024      RE: Zack Santiago  [address]   [address]  [facility] [address]     Dear Colleague,    Thank you for the opportunity to participate in the care of your patient, Zack Santiago, at the [facility] at [facility]. Please see a copy of my visit note below.    Name:  Zack Santiago  :   1931  MRN:   [id number]  Date of service: Sep 3, 2024  Primary Provider: Zurdo Kendrick  Referring Provider: Zurdo Kendrick    Presenting Information:  Zack, a 89 year old male, was seen was seen at the [facility] for evaluation of aortic aneurysm. Zack was accompanied to this visit by his son-in-law. I met with Zack at the request of Dr. Grewal to obtain a family history, to discuss possible genetic contributions to his symptoms, and to obtain informed consent for genetic testing.     Personal History:   Zack has a history of ascending aortic aneurysm which has been stable at 5cm. Refer to Dr. Grewal's note for a more detailed personal history.     Patient Active Problem List   Diagnosis     Hyperlipidemia LDL goal <100     OCD (obsessive compulsive disorder)     GERD (gastroesophageal reflux disease)     AAA (abdominal aortic aneurysm) without rupture (H24)     Hypovitaminosis D     ASCVD (arteriosclerotic cardiovascular disease)     Acquired hypothyroidism     Idiopathic neuropathy     Benign essential hypertension     Cough     Claw toe, acquired     CKD (chronic kidney disease) stage 3, GFR 30-59 ml/min (H)     Sudden hearing loss     Thoracic ascending aortic aneurysm (H24)     Aortic insufficiency     Bradycardia     Cerebrovascular accident (CVA), unspecified mechanism (H)     Lung nodule     Atherosclerosis of native coronary artery of native heart without angina pectoris     Panic disorder     Gait instability     Irregular bowel habits     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    carotid us no stenosis, echo no change and no clots; ziopatch no afib, svt present     Anemia 2004     Aortic insufficiency 06/2014    1+ on echo     Aortic insufficiency 11/2016    mild to mod     Ascending aorta dilatation (H24) 01/2023    5cm on ct, fu done 2/24 and stable     ASCVD (arteriosclerotic cardiovascular disease) 2010    cabg, post op afib     BPH (benign prostatic hyperplasia) 2003    turp, flomax added by urol 2/17     Bradycardia 2016    stopped toprol     CKD (chronic kidney disease) stage 3, GFR 30-59 ml/min (H)      Constipation 05/2020    colonoscopy nl     Cough 2010    pulm eval, felt due to reflux     Dizzy 2001    mri small vessel dz     GERD (gastroesophageal reflux disease)      HTN (hypertension) 2002     Hx of colonoscopy 2003    tics     Hypothyroid      Hypovitaminosis D      Idiopathic neuropathy      Lung nodule 02/2018    6mm, found during eval of ascending aorta, fu 8/18 no change     OCD (obsessive compulsive disorder)     sees psyche     Panic disorder     Dr. Luna, then someone after he retired     Parkinson's disease (H) 03/2023    per neuro     Spinal headache      Stroke (H) 12/2016    expressive aphasia, hosp, seen by neuro, mri pos, carotids min dz, changed from asa to plavix     Sudden hearing loss 06/2013    Dr. Garcia, mri brain no acoustic neuroma     SVT (supraventricular tachycardia) (H24) 11/2016    seen on ziopatch done for amaurosis, longest 15 beats     Thoracic ascending aortic aneurysm (H24) 06/2014    4.4cm on echo, aortic root 3.9, fu 5/15 4.8cm; fu done 12/15 and slightly enlarged, fu 6/16 no change, fu  11/16 no change; fu 1/18 echo 5.1-5.3, enlarged, then cta done and only 4.8cm, t fu 6 months, lvh, nl ef, mild ai; fu 8/18 slightly larger; fu 2/19 slightly smaller; fu 2/20 and then 1/21 and stable     Ulcerative colitis (H)     not active for years     Previous Genetic Testing  None       Family History:   A three generation pedigree was obtained today. A copy is located in the media tab and full details are available in the pedigree section of the history tab. The following information was provided:     Children:   Son (62y) has diverticulitis.  Grandson (19y) is well.   Granddaughter (16y) is well.  Daughter (60y) has myopia, pronation, and had retinal detachment in 2018. She had a normal echocardiogram recently due to Zack's history.  Granddaughter (21y) has mental health concerns, ADHD, kyphosis, and myopia. Family suspects that she may have mosaic monosomy X due to history of congenital hip dysplasia and PROM. She had a normal echocardiogram. She was seen by genetics but genetic testing was not done because of aversion to blood draw. We briefly discussed that a cheek swab sample could now be done for this testing if desired. She also had a Perez-Danlos syndrome panel done at [facility] which reportedly had several VUSs.   Grandson (19y) has myopia and striae on abdomen and thighs.  Siblings:  Brother passed away at age 5y from yellow fever.  Brother passed away at age 90 from unknown causes.  Sister passed away at age 73 from lymphoma.  She had 4 children who have no known health concerns.   Parents/Other Relatives:  Mother passed away at age 83 from a heart problem.  Father passed away at age 65 from heart disease.   Paternal great-uncle passed away at age 55 from heart concerns.     The family history is otherwise negative for aortic root dilation, aneurysm, high degree of myopia, ectopia lentis, retinal detachment, pneumothorax, scoliosis, other skeletal concerns, organ rupture, sudden death, and known  genetic disorders. Consanguinity is denied.    Pedigree Image      Discussion and Assessment:  Genes are long stretches of DNA that are responsible for how our bodies look and how our bodies work. Our genes are inherited on structures called chromosomes of which we have 23 pairs. One copy of each chromosome in a pair is inherited from the mother and one is inherited from the father. Changes in the DNA sequence of a gene, called variants, as well as changes within the chromosomes can cause the signs and symptoms of a genetic condition.     Aortic dilation/aneurysm occurs when a portion of the aorta, a large blood vessel in the heart, becomes enlarged due to weakening of the artery wall. Depending on the size of the aneurysm, there may be a risk for the aneurysm to break open and rupture, also known as an aortic dissection. Aortic aneurysms can be caused by disruptions, variants, in a number of genes, which can be inherited within families. Aortic aneurysms can be the only symptom caused by the gene disruption, such as in the genetic condition, familial thoracic aortic aneurysm and/or dissection (TAAD).  Aortic aneurysms can also occur as part of a syndrome that includes other symptoms or physical features. These syndromes are known as connective tissue disorders because the genes involved make connective tissue proteins found in joints, organs, and blood vessels. One of these syndromes is called Marfan syndrome. In addition to aortic aneurysms, other features include tall stature, slender limbs, long arm span, scoliosis, and lens dislocation. Examples of two other connective tissue disorders are Loeys-Angela syndrome and Perez-Danlos syndrome. Aortic aneurysm can be found in association with genetic syndromes, such as Marfan and Loeys-Angela syndrome or as an isolated event that runs in families. 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    Genetic testing is available for thoracic aortic aneurysms. The testing panel includes 29 genes associated with both syndromic and isolated aortic aneurysms. The AEBP1 gene was also added on due to familial concern. Testing currently identifies pathogenic variants in about 20% of individuals with TAAD. Diagnostic yield is higher for syndromic individuals. The potential results were discussed including a positive, negative, or variant of uncertain significance.     One possibility is a change(s) could be seen in Zack and this change(s) is known to cause similar symptoms to the symptoms Zack has experienced.  This is considered a positive result.  A positive result may provide more information on appropriate clinical management for Zack and may provide information on additional potential health risks associated with Zack's diagnosis.  A positive result can also have implications for the health and reproductive risks of other relatives.  It is also possible that no change(s) that are likely to explain Zack's symptoms are found.  This is considered a negative result.  A negative result would not completely rule out a possible genetic cause for Zack's symptoms.  Not all changes in our genes cause disease.  Sometimes, it can be difficult for the laboratory to determine whether or not a change that is found contributes to the patient's symptoms.  If the meaning of a particular gene change is unknown, the lab classifies the result as a variant of unknown significance (VUS). Follow-up testing of relatives may be beneficial in clarifying the meaning of this result.     It is medically necessary to determine if there is an underlying genetic cause for Zack's symptoms for several reasons. First and foremost this can be important for his own health. It is possible that an underlying cause may also predispose Zack to other health risks. Knowing about these additional health risks can help  us stay ahead of [facility] to more appropriately screen for other complications.  Some diagnoses may also have treatment options. Additionally, discovering an underlying reason may help predict the chance for other family members to have similar healthcare needs. Finally, having a specific underlying diagnosis can sometimes help individuals receive the services they need to help reach their full potential in school, in work, or in day to day life.     The lab we are using for this test is called [facility]. Insurance and billing procedures were covered with the family. They will be sent a link where they can access [facility]'s billing policy. [facility] will submit a claim to [email or web address]. If the family owes more than $100 after the claim is processed, they will be contacted by [facility] to discuss billing options including financial assistance.     The family provided informed consent for the testing. I will plan to follow-up with the family by phone when results are returned, approximately 2-3 weeks after the testing is initiated. A follow-up appointment will be scheduled as needed according to Dr. Grewal. Additional questions or concerns were denied at this time.        Plan:  Zack's buccal sample was collected and sent out to [facility] today.     Once the lab receives the sample, Zack's Aortopathy Comprehensive Panel will be initiated.    Results are expected in approximately 2-3 weeks and will be returned by phone. A follow-up appointment will be scheduled as needed at that time.    Contact information was provided should any questions arise in the future.       Agnes Cardenas [facility]  Genetic Counselor  [facility]   Phone: [phone]     Approximate Time Spent in Consultation: 40 min       Please do not hesitate to contact me if you have any questions/concerns.     Sincerely,       Agnes Cardenas

## 2024-09-03 NOTE — PATIENT INSTRUCTIONS
Genetics  [facility]     Contact our nurse care coordinator Rowena VILLAN, RN, PHN at [phone] or send a [employer] message for any non-urgent general or medical questions.     If you had genetic testing and have further questions, please contact the genetic counselor:  Agnes Fischer  Ph: [phone]    To schedule appointments:  Pediatric Call Center for [facility]: [phone]  Neuropsychology [phone]   Radiology/ Imaging/Echocardiogram: [phone]   Services:   [phone]     You should receive a phone call about your next appointment. If you do not receive this within two weeks of your visit, please call [phone].     IF REFERRALS WERE PLACED/ DISCUSSED DURING THE VISIT, PLEASE LET OUR TEAM KNOW IF YOU DO NOT HEAR FROM THE SCHEDULERS IN 2 WEEKS    If you have not already done so consider signing up for [email or web address] by speaking with the person at the  on your way out or go to [email or web address] to sign up online.     [email or web address] enables easy and confidential communication with your care team.

## 2024-09-03 NOTE — PROGRESS NOTES
GENETICS CLINIC CONSULTATION     Name:  Zack Santiago  :   1931  MRN:   [id number]  Date of service: Sep 3, 2024  Primary Provider: Zurdo Kendrick  Referring Provider: Zurdo Kendrick    Reason for consultation:  A consultation in the [facility] was requested by Zurdo Kendrick for Zack, a 89 year old man, for evaluation of conditions that may associated with ascending aortic aneurysm.  Zack was accompanied to this visit by his  son-in-law . He also saw our genetic counselor at this visit.       Assessment:    Mr. Santiago has a history of an ascending aortic aneurysm and relatively tall stature when considered in the context of his family.  He does not have skeletal disproportion nor history of eye findings such as might be seen in a connective tissue disorder such as Marfan syndrome.  If he has a defined genetic connective tissue disorder, he has had a fortunate and long life of living with this.  Given the potential implications for other family members, however, we we will determine if authorization for genetic testing can be obtained.    Plan:     Ordered at this visit:  Aortopathy comprehensive panel to [facility] (pending prior authorization)  Genetic counseling consultation with Agnes Cardenas MS, [facility] to obtain a pedigree and for genetic counseling regarding genetic testing.   Return to the Genetics Clinic for follow-up depending on the results of testing.      -----  History of Present Illness:  Patient Active Problem List   Diagnosis    Hyperlipidemia LDL goal <100    OCD (obsessive compulsive disorder)    GERD (gastroesophageal reflux disease)    AAA (abdominal aortic aneurysm) without rupture (H24)    Hypovitaminosis D    ASCVD (arteriosclerotic cardiovascular disease)    Acquired hypothyroidism    Idiopathic neuropathy    Benign essential hypertension    Cough    Claw toe, acquired    CKD (chronic kidney disease) stage 3, GFR 30-59 ml/min (H)    Sudden  hearing loss    Thoracic ascending aortic aneurysm (H24)    Aortic insufficiency    Bradycardia    Cerebrovascular accident (CVA), unspecified mechanism (H)    Lung nodule    Atherosclerosis of native coronary artery of native heart without angina pectoris    Panic disorder    Gait instability    Irregular bowel habits    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 Santiago had a chest CT in March that demonstrated his ascending aortic aneurysm was stable at 5 cm.  He has also been known to have an abdominal aortic aneurysm for which he received an operation in 2015.  He has previously had a quadruple bypass.  The occurrence of the ascending or aorta aneurysm has prompted questions primarily by his family regarding risk for other family members to have similar clinical features.  .     Review of available medical records:  6/21/2024 PCP Visit - Dr. Kendrick  6/6/2024 Vascular Surgery visit - Dr. Puri    Pertinent studies/abnormal test results: No previous genetic testing  Imaging results:   Most recent relevant imaging:  CT CHEST WITHOUT CONTRAST  2/28/2024 11:11 AM      HISTORY: Aneurysm of ascending aorta without rupture (H24). Weight  loss.     TECHNIQUE: CT scan of the chest was performed without IV contrast.  Multiplanar reformats were obtained. Dose reduction techniques were  used.  CONTRAST: None.     COMPARISON: 1/13/2023     FINDINGS:   LUNGS AND PLEURA: Moderate emphysema. There is mild peripheral  fibrosis without honeycombing. Scattered calcified granulomas. Lungs  are otherwise clear.     MEDIASTINUM/AXILLAE: Stable 5 cm ascending aortic aneurysm. No  lymphadenopathy. Prior CABG.     UPPER ABDOMEN: Small fat-containing ventral hernia in the epigastric  region.                                                                   IMPRESSION:  1.  Stable 5 cm ascending aortic  aneurysm.  2.  No acute findings in the chest.     AMPARO HESTER MD   --  CT ABDOMEN PELVIS W/O CONTRAST 2/20/2024 12:10 PM     CLINICAL HISTORY: s/p EVAR, comparison study done 2/2023  1 year  follow up . Call with results.; Abdominal aortic aneurysm (AAA)  without rupture (H24)  TECHNIQUE: CT scan of the abdomen and pelvis was performed without IV  contrast. Multiplanar reformats were obtained. Dose reduction  techniques were used.  CONTRAST: None.     COMPARISON: CT of the abdomen pelvis dated 2/9/2023     FINDINGS: Evaluation is limited due to a lack of intravenous contrast.  LOWER CHEST: 6 mm nodule in the right lower lobe. COPD changes at the  lung bases.     HEPATOBILIARY: Stable cystic lesion in the caudate lobe of the liver.     PANCREAS: Unremarkable     SPLEEN: Unremarkable     ADRENAL GLANDS: Unremarkable     KIDNEYS/BLADDER: Stable cyst at the anterior upper pole of the right  kidney. Stable cyst at the lower pole of the left kidney.     BOWEL: Colonic diverticulosis.     PELVIC ORGANS: Unremarkable     LYMPH NODES: Unremarkable     VASCULATURE: Changes consistent with endovascular repair of an  infrarenal abdominal aortic aneurysm utilizing a bifurcated endograft.  The excluded aneurysm sac measures approximately 6.5 x 6.2 cm. These  measurements are not significantly changed.     ADDITIONAL FINDINGS: None.     MUSCULOSKELETAL: Degenerative changes in the lumbar spine.                                                      IMPRESSION:   1.  Stable abdominal aortic aneurysm post endovascular repair.  2.  6 cm nodule right lower lobe.     Recommendations for one or multiple incidental lung nodules < 6mm :    Low risk patients: No routine follow-up.    High risk patients: Optional follow-up CT at 12 months; if  unchanged, no further follow-up.     *Low Risk: Minimal or absent history of smoking or other known risk  factors.  *Nonsolid (ground glass) or partly solid nodules may require longer  follow-up to  "exclude indolent adenocarcinoma.  *Recommendations based on Guidelines for the Management of Incidental  Pulmonary Nodules Detected at CT: From the Fleischner Society 2017,  Radiology 2017.  1.    RAFIA RODRIGUEZ MD     Past Medical History:    Mr Santiago is a 89 year-old man with history significant for OCD, Parkinsonism, pressure ulcers to buttock, HTN, HLD, CKD, CAD s/p CABG x5 (2010), AAA s/p EVAR (2015) with persistent stable type II endoleak, and known 50 mm ascending thoracic aortic aneurysm (stable from previous - in 2018 was 48mm on CTA) who was referred to genetics due to his ATAA.     In discussing Zack's past medical history as a younger man, pertinent for myopia since 15 years-old. He had cataract surgery and likely corrective lens surgery at an older age though doesn't remember the year. He is without history of joint dislocation or subluxation, no descriptions of hypermobility, and no significant elasticity of skin or description of \"excess\" skin. He has notable gait instability and follows with a neurologist for Parkinsonism; instability related to advanced age, no significant injuries. He uses a rolling walker for assistance. No recent falls.    No significant dental or orthodontic history (no dentures).    Past Medical History:   Diagnosis Date    A-fib (H) 2010    post op    AAA (abdominal aortic aneurysm) without rupture (H24)     Dr. 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    carotid us no stenosis, echo no change and no clots; ziopatch no afib, svt present    Anemia 2004    Aortic insufficiency 06/2014    1+ on echo    Aortic insufficiency 11/2016    mild to mod    Ascending aorta dilatation (H24) 01/2023    5cm on ct, fu done 2/24 and stable    ASCVD (arteriosclerotic cardiovascular disease) 2010    cabg, post op afib    BPH (benign prostatic hyperplasia) 2003    turp, flomax added by urol 2/17    Bradycardia 2016    stopped toprol    CKD (chronic kidney " disease) stage 3, GFR 30-59 ml/min (H)     Constipation 05/2020    colonoscopy nl    Cough 2010    pulm eval, felt due to reflux    Dizzy 2001    mri small vessel dz    GERD (gastroesophageal reflux disease)     HTN (hypertension) 2002    Hx of colonoscopy 2003    tics    Hypothyroid     Hypovitaminosis D     Idiopathic neuropathy     Lung nodule 02/2018    6mm, found during eval of ascending aorta, fu 8/18 no change    OCD (obsessive compulsive disorder)     sees psyche    Panic disorder     Dr. Luna, then someone after he retired    Parkinson's disease (H) 03/2023    per neuro    Spinal headache     Stroke (H) 12/2016    expressive aphasia, hosp, seen by neuro, mri pos, carotids min dz, changed from asa to plavix    Sudden hearing loss 06/2013    Dr. Garcia, mri brain no acoustic neuroma    SVT (supraventricular tachycardia) (H24) 11/2016    seen on ziopatch done for amaurosis, longest 15 beats    Thoracic ascending aortic aneurysm (H24) 06/2014    4.4cm on echo, aortic root 3.9, fu 5/15 4.8cm; fu done 12/15 and slightly enlarged, fu 6/16 no change, fu 11/16 no change; fu 1/18 echo 5.1-5.3, enlarged, then cta done and only 4.8cm, t fu 6 months, lvh, nl ef, mild ai; fu 8/18 slightly larger; fu 2/19 slightly smaller; fu 2/20 and then 1/21 and stable    Ulcerative colitis (H)     not active for years     Hospitalization History: Most recent hospitalization was 1/13/2023.  4 days for acute hypoxic respiratory failure    Surgical History:  - Abdominal Aortic Aneurysm s/p EVAR in 2015 (age 84) w/ persistent type II endoleak stable since 2022  - Coronary artery disease due to lipid rich plaque s/p CABG x 5 in 2010 (age 79)    Other health services currently received are primary care, psychiatry, vascular surgery, cardiology, ophthalmology, neurology, and dermatology.    Immunization status is: up to date.    Review of Systems:  Constitional: no fevers, generally feels he is healthy for his age.  Uses a walker for  mobility  Eyes: History of myopia and is status post cataract removal  Ears/Nose/Throat: negative   Respiratory: negative-no current shortness of breath  Cardiovascular: Cardiovascular disease.  No symptoms or signs of hypertension.  Gastrointestinal: negative  Genitourinary: Some degree of chronic kidney disease  Hematologic/Lymphatic: negative  Allergy/Immunologic: negative - no drug allergies  Musculoskeletal: Leg fracture in high school.  Has had hammertoes release  Endocrine: Hypothyroidism  Integument: Has had a decubitus ulcer in the coccygeal area.  Has some easy bruising  Neurologic: Parkinson's disease  Psychiatric: In therapy for OCD symptoms    Remainder of comprehensive review of systems is complete and negative.    Personal History  Family History:    A three generation pedigree was obtained today. A copy is located in the media tab and full details are available in the pedigree section of the history tab. The following information was provided:      Children:   Son (62y) has diverticulitis.  Grandson (19y) is well.   Granddaughter (16y) is well.  Daughter (60y) has myopia, pronation, and had retinal detachment in 2018. She had a normal echocardiogram recently due to Zack's history.  Granddaughter (21y) has mental health concerns, ADHD, kyphosis, and myopia. Family suspects that she may have mosaic monosomy X due to history of congenital hip dysplasia and PROM. She had a normal echocardiogram. She was seen by genetics but genetic testing was not done because of aversion to blood draw. We briefly discussed that a cheek swab sample could now be done for this testing if desired. She also had a Perez-Danlos syndrome panel done at [facility] which reportedly had several VUSs.   Grandson (19y) has myopia and striae on abdomen and thighs.  Siblings:  Brother passed away at age 5y from yellow fever.  Brother passed away at age 90 from unknown causes.  Sister passed away at age 73 from lymphoma.  She had 4  children who have no known health concerns.   Parents/Other Relatives:  Mother passed away at age 83 from a heart problem.  Father passed away at age 65 from heart disease.   Paternal great-uncle passed away at age 55 from heart concerns.      The family history is otherwise negative for aortic root dilation, aneurysm, high degree of myopia, ectopia lentis, retinal detachment, pneumothorax, scoliosis, other skeletal concerns, organ rupture, sudden death, and known genetic disorders. Consanguinity is denied.  Family History   Problem Relation Age of Onset    Heart Disease Father     Glasses (<7 y/o) Daughter     Retinal detachment Daughter 50 - 59    Diverticulitis Son     Hip dysplasia Granddaughter     Glasses (<7 y/o) Granddaughter     Kyphosis Granddaughter     Attention Deficit Disorder Granddaughter     Mental Illness Granddaughter     Glasses (<7 y/o) Grandson     Lymphoma Sister     Glaucoma No family hx of     Macular Degeneration No family hx of     Consanguinity No family hx of      Social History:  Lives in assisted living at [facility] in [place]. 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    I have reviewed Zack s past medical history, family history, social history, medications and allergies as documented in the electronic medical record.  There were no additional findings except as noted.    Medications:  Current Outpatient Medications   Medication Sig Dispense Refill    acetaminophen (TYLENOL) 500 MG tablet Take 1 or 2 tablets every 8 hours as needed for pain  Do not exceed 3000mg in 24 hours (Patient not taking: Reported on 7/2/2024) 100 tablet 0    augmented betamethasone dipropionate (DIPROLENE-AF) 0.05 % external ointment Apply topically daily as needed (Patient not taking: Reported on 8/30/2024)      carbidopa-levodopa (SINEMET)  MG tablet Take 1.5 tablets by mouth 3 times daily 405 tablet 0    chlorthalidone (HYGROTON) 25 MG tablet 1 TABLET ORALLY  "EVERY MORNING  (DX: DIURETIC ) 28 tablet 11    clopidogrel (PLAVIX) 75 MG tablet 1 TABLET ORALLY EVERY MORNING  (DX: HEART  ) 30 tablet 11    econazole nitrate 1 % external cream Apply topically as needed.      gabapentin (NEURONTIN) 400 MG capsule Take 1 capsule (400 mg) by mouth 3 times daily for 30 days 90 capsule 0    lactulose (CHRONULAC) 10 GM/15ML solution DRINK 30ML  ORALLY DAILY (DX: CONSTIPATION);DRINK 30ML  ORALLY DAILY AS NEEDED (DX: CONSTIPATION) 946 mL 0    levothyroxine (SYNTHROID/LEVOTHROID) 88 MCG tablet Take 1 tablet (88 mcg) by mouth daily 90 tablet 3    melatonin 3 MG tablet 1 TABLET ORALLY AT BEDTIME AS NEEDED  (DX: SLEEP ) (Patient not taking: Reported on 7/2/2024) 30 tablet 10    methylcellulose POWD powder Apply topically once.      mirtazapine (REMERON) 45 MG tablet 1 TABLET ORALLY AT BEDTIME (DX: DEPRESSION) 28 tablet 11    OPTIVE 0.5-0.9 % ophthalmic solution INSTILL 1 DROP INTO BOTH EYES 2 TIMES DAILY  (DX: DRY EYES) 15 mL 11    senna-docusate (SENEXON-S) 8.6-50 MG tablet 1 TABLET ORALLY DAILY AS NEEDED (DX: CONSTIPATION) 30 tablet 10    sertraline (ZOLOFT) 25 MG tablet Take 25 mg by mouth daily (Patient not taking: Reported on 8/30/2024)      simvastatin (ZOCOR) 20 MG tablet 1 TABLET ORALLY AT BEDTIME   (DX: CHOLESTEROL) 30 tablet 11    sodium chloride (DEEP SEA NASAL SPRAY) 0.65 % nasal spray INSTILL SPRAY(S) INTO NOSTRIL(S) 3 TIMES DAILY 44 mL 11    SOLUBLE FIBER THERAPY powder TAKE 1 TEASPOONFUL ORALLY DAILY (DX: CONSTIPATION) 454 g 11     Allergies:  Allergies   Allergen Reactions    No Known Allergies      Physical Examination:  Blood pressure 125/89, pulse 120, resp. rate 24, height 6' 0.99\" (185.4 cm), weight 182 lb 8.7 oz (82.8 kg), head circumference 60 cm (23.62\").     Constitutional: This was a well-developed, well nourished male who responded appropriately to all requests during the examination.    Head and Neck:  He had hair of normal texture and distribution and his head " was proportionate in appearance.  The face was symmetric and did not have dysmorphic features.   Eyes:  The pupils were equal, round, and reacted to light.   The conjunctivae were clear.   Ears:  His ears were normal in architecture and placement. Skin breakdown to left auricle.  Nose: The nose was clear.    Mouth and Throat: The throat was without erythema. Normal palatal arch.    Respiratory: The chest was clear to auscultation and had a symmetric appearance.    Cardiovascular:  On examination of the heart, the rhythm was regular and there was no murmur.  The peripheral pulses were normal.    Gastrointestinal: The abdomen was soft and had normal bowel sounds.  There was no hepatosplenomegaly.    : I deferred a  examination.  Musculoskeletal: Patient uses rolling walker for mobility aid. There was a full range of motion on the extremity exam of upper extremities, lower deferred due to patient's mobility issues, and normal muscular volume and bulk. There was no evidence of scoliosis.  Middle finger as percent total hand 41% (normal), negative wrist sign, negative thumb sign.   Neurologic: The neurologic exam was normal, with normal cranial nerves,, normal sensation. He had normal tone.  Integument: Thin skin with scattered age related ecchymoses to forearms. Significant fungal nail changes to right hand. The dentition was normal given his age. He had normal dermatoglyphics.  ---  Darin Gutierrez MD  [facility] Medicine Pediatrics PGY-1.    JOSSELINE PEARSON was seen in the [facility]. I, Zohreh Grewal MD, saw this patient with the resident, reviewed the history & exam and repeated key elements. I agree with the resident s findings and plan of care as documented in the resident s note.    ZOHREH GREWAL M.D., FAAP, FACMG  Professor   Division of Genetics and Metabolism  Department of Pediatrics  [facility]     Routed to family in [facility]  Also to  Zurdo Kendrick Paul  Raul Kendrick    60 minutes spent on the date of the encounter doing chart review, history and exam, documentation and further activities per the note

## 2024-09-03 NOTE — NURSING NOTE
"Chief Complaint   Patient presents with    Consult     Aneurysm of ascending aorta without rupture/genetics consult.     Vitals:    09/03/24 1315   BP: 125/89   BP Location: Right arm   Patient Position: Chair   Pulse: 120   Resp: 24   Weight: 182 lb 8.7 oz (82.8 kg)   Height: 6' 0.99\" (185.4 cm)   HC: 60 cm (23.62\")           Jahaira Vitale M.A.    September 3, 2024  "

## 2024-09-03 NOTE — LETTER
9/3/2024      RE: Zack Santiago  [id number]   [address]  [facility] [address]     Dear Colleague,    Thank you for the opportunity to participate in the care of your patient, Zack Santiago, at the [facility] at [facility]. Please see a copy of my visit note below.    GENETICS CLINIC CONSULTATION     Name:  Zack Santiago  :   1931  MRN:   [id number]  Date of service: Sep 3, 2024  Primary Provider: Zurdo Kendrick  Referring Provider: Zurdo Kendrick    Reason for consultation:  A consultation in the [facility] was requested by Zurdo Kendrick for Zack, a 89 year old man, for evaluation of conditions that may associated with ascending aortic aneurysm.  Zack was accompanied to this visit by his  son-in-law . He also saw our genetic counselor at this visit.       Assessment:    Mr. Santiago has a history of an ascending aortic aneurysm and relatively tall stature when considered in the context of his family.  He does not have skeletal disproportion nor history of eye findings such as might be seen in a connective tissue disorder such as Marfan syndrome.  If he has a defined genetic connective tissue disorder, he has had a fortunate and long life of living with this.  Given the potential implications for other family members, however, we we will determine if authorization for genetic testing can be obtained.    Plan:     Ordered at this visit:  Aortopathy comprehensive panel to [facility] (pending prior authorization)  Genetic counseling consultation with Agnes Cardenas MS, [facility] to obtain a pedigree and for genetic counseling regarding genetic testing.   Return to the Genetics Clinic for follow-up depending on the results of testing.      -----  History of Present Illness:  Patient Active Problem List   Diagnosis     Hyperlipidemia LDL goal <100     OCD (obsessive compulsive  disorder)     GERD (gastroesophageal reflux disease)     AAA (abdominal aortic aneurysm) without rupture (H24)     Hypovitaminosis D     ASCVD (arteriosclerotic cardiovascular disease)     Acquired hypothyroidism     Idiopathic neuropathy     Benign essential hypertension     Cough     Claw toe, acquired     CKD (chronic kidney disease) stage 3, GFR 30-59 ml/min (H)     Sudden hearing loss     Thoracic ascending aortic aneurysm (H24)     Aortic insufficiency     Bradycardia     Cerebrovascular accident (CVA), unspecified mechanism (H)     Lung nodule     Atherosclerosis of native coronary artery of native heart without angina pectoris     Panic disorder     Gait instability     Irregular bowel habits     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 Santiago had a chest CT in March that demonstrated his ascending aortic aneurysm was stable at 5 cm.  He has also been known to have an abdominal aortic aneurysm for which he received an operation in 2015.  He has previously had a quadruple bypass.  The occurrence of the ascending or aorta aneurysm has prompted questions primarily by his family regarding risk for other family members to have similar clinical features.  .     Review of available medical records:  6/21/2024 PCP Visit - Dr. Kendrick  6/6/2024 Vascular Surgery visit - Dr. Puri    Pertinent studies/abnormal test results: No previous genetic testing  Imaging results:   Most recent relevant imaging:  CT CHEST WITHOUT CONTRAST  2/28/2024 11:11 AM      HISTORY: Aneurysm of ascending aorta without rupture (H24). Weight  loss.     TECHNIQUE: CT scan of the chest was performed without IV contrast.  Multiplanar reformats were obtained. Dose reduction techniques were  used.  CONTRAST: None.     COMPARISON: 1/13/2023     FINDINGS:   LUNGS AND PLEURA: Moderate emphysema. There is mild  peripheral  fibrosis without honeycombing. Scattered calcified granulomas. Lungs  are otherwise clear.     MEDIASTINUM/AXILLAE: Stable 5 cm ascending aortic aneurysm. No  lymphadenopathy. Prior CABG.     UPPER ABDOMEN: Small fat-containing ventral hernia in the epigastric  region.                                                                   IMPRESSION:  1.  Stable 5 cm ascending aortic aneurysm.  2.  No acute findings in the chest.     AMPARO HESTER MD   --  CT ABDOMEN PELVIS W/O CONTRAST 2/20/2024 12:10 PM     CLINICAL HISTORY: s/p EVAR, comparison study done 2/2023  1 year  follow up . Call with results.; Abdominal aortic aneurysm (AAA)  without rupture (H24)  TECHNIQUE: CT scan of the abdomen and pelvis was performed without IV  contrast. Multiplanar reformats were obtained. Dose reduction  techniques were used.  CONTRAST: None.     COMPARISON: CT of the abdomen pelvis dated 2/9/2023     FINDINGS: Evaluation is limited due to a lack of intravenous contrast.  LOWER CHEST: 6 mm nodule in the right lower lobe. COPD changes at the  lung bases.     HEPATOBILIARY: Stable cystic lesion in the caudate lobe of the liver.     PANCREAS: Unremarkable     SPLEEN: Unremarkable     ADRENAL GLANDS: Unremarkable     KIDNEYS/BLADDER: Stable cyst at the anterior upper pole of the right  kidney. Stable cyst at the lower pole of the left kidney.     BOWEL: Colonic diverticulosis.     PELVIC ORGANS: Unremarkable     LYMPH NODES: Unremarkable     VASCULATURE: Changes consistent with endovascular repair of an  infrarenal abdominal aortic aneurysm utilizing a bifurcated endograft.  The excluded aneurysm sac measures approximately 6.5 x 6.2 cm. These  measurements are not significantly changed.     ADDITIONAL FINDINGS: None.     MUSCULOSKELETAL: Degenerative changes in the lumbar spine.                                                      IMPRESSION:   1.  Stable abdominal aortic aneurysm post endovascular repair.  2.  6 cm nodule  "right lower lobe.     Recommendations for one or multiple incidental lung nodules < 6mm :    Low risk patients: No routine follow-up.    High risk patients: Optional follow-up CT at 12 months; if  unchanged, no further follow-up.     *Low Risk: Minimal or absent history of smoking or other known risk  factors.  *Nonsolid (ground glass) or partly solid nodules may require longer  follow-up to exclude indolent adenocarcinoma.  *Recommendations based on Guidelines for the Management of Incidental  Pulmonary Nodules Detected at CT: From the Fleischner Society 2017,  Radiology 2017.  1.    RAFIA RODRIGUEZ MD     Past Medical History:    Mr Santiago is a 89 year-old man with history significant for OCD, Parkinsonism, pressure ulcers to buttock, HTN, HLD, CKD, CAD s/p CABG x5 (2010), AAA s/p EVAR (2015) with persistent stable type II endoleak, and known 50 mm ascending thoracic aortic aneurysm (stable from previous - in 2018 was 48mm on CTA) who was referred to genetics due to his ATAA.     In discussing Zack's past medical history as a younger man, pertinent for myopia since 15 years-old. He had cataract surgery and likely corrective lens surgery at an older age though doesn't remember the year. He is without history of joint dislocation or subluxation, no descriptions of hypermobility, and no significant elasticity of skin or description of \"excess\" skin. He has notable gait instability and follows with a neurologist for Parkinsonism; instability related to advanced age, no significant injuries. He uses a rolling walker for assistance. No recent falls.    No significant dental or orthodontic history (no dentures).    Past Medical History:   Diagnosis Date     A-fib (H) 2010    post op     AAA (abdominal aortic aneurysm) without rupture (H24)     Dr. 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    carotid us no stenosis, echo no change and no clots; ziopatch no afib, svt present     " Anemia 2004     Aortic insufficiency 06/2014    1+ on echo     Aortic insufficiency 11/2016    mild to mod     Ascending aorta dilatation (H24) 01/2023    5cm on ct, fu done 2/24 and stable     ASCVD (arteriosclerotic cardiovascular disease) 2010    cabg, post op afib     BPH (benign prostatic hyperplasia) 2003    turp, flomax added by urol 2/17     Bradycardia 2016    stopped toprol     CKD (chronic kidney disease) stage 3, GFR 30-59 ml/min (H)      Constipation 05/2020    colonoscopy nl     Cough 2010    pulm eval, felt due to reflux     Dizzy 2001    mri small vessel dz     GERD (gastroesophageal reflux disease)      HTN (hypertension) 2002     Hx of colonoscopy 2003    tics     Hypothyroid      Hypovitaminosis D      Idiopathic neuropathy      Lung nodule 02/2018    6mm, found during eval of ascending aorta, fu 8/18 no change     OCD (obsessive compulsive disorder)     sees psyche     Panic disorder     Dr. Luna, then someone after he retired     Parkinson's disease (H) 03/2023    per neuro     Spinal headache      Stroke (H) 12/2016    expressive aphasia, hosp, seen by neuro, mri pos, carotids min dz, changed from asa to plavix     Sudden hearing loss 06/2013    Dr. Garcia, mri brain no acoustic neuroma     SVT (supraventricular tachycardia) (H24) 11/2016    seen on ziopatch done for amaurosis, longest 15 beats     Thoracic ascending aortic aneurysm (H24) 06/2014    4.4cm on echo, aortic root 3.9, fu 5/15 4.8cm; fu done 12/15 and slightly enlarged, fu 6/16 no change, fu 11/16 no change; fu 1/18 echo 5.1-5.3, enlarged, then cta done and only 4.8cm, t fu 6 months, lvh, nl ef, mild ai; fu 8/18 slightly larger; fu 2/19 slightly smaller; fu 2/20 and then 1/21 and stable     Ulcerative colitis (H)     not active for years     Hospitalization History: Most recent hospitalization was 1/13/2023.  4 days for acute hypoxic respiratory failure    Surgical History:  - Abdominal Aortic Aneurysm s/p EVAR in 2015 (age 84)  w/ persistent type II endoleak stable since 2022  - Coronary artery disease due to lipid rich plaque s/p CABG x 5 in 2010 (age 79)    Other health services currently received are primary care, psychiatry, vascular surgery, cardiology, ophthalmology, neurology, and dermatology.    Immunization status is: up to date.    Review of Systems:  Constitional: no fevers, generally feels he is healthy for his age.  Uses a walker for mobility  Eyes: History of myopia and is status post cataract removal  Ears/Nose/Throat: negative   Respiratory: negative-no current shortness of breath  Cardiovascular: Cardiovascular disease.  No symptoms or signs of hypertension.  Gastrointestinal: negative  Genitourinary: Some degree of chronic kidney disease  Hematologic/Lymphatic: negative  Allergy/Immunologic: negative - no drug allergies  Musculoskeletal: Leg fracture in high school.  Has had hammertoes release  Endocrine: Hypothyroidism  Integument: Has had a decubitus ulcer in the coccygeal area.  Has some easy bruising  Neurologic: Parkinson's disease  Psychiatric: In therapy for OCD symptoms    Remainder of comprehensive review of systems is complete and negative.    Personal History  Family History:    A three generation pedigree was obtained today. A copy is located in the media tab and full details are available in the pedigree section of the history tab. The following information was provided:      Children:   Son (62y) has diverticulitis.  Grandson (19y) is well.   Granddaughter (16y) is well.  Daughter (60y) has myopia, pronation, and had retinal detachment in 2018. She had a normal echocardiogram recently due to Zack's history.  Granddaughter (21y) has mental health concerns, ADHD, kyphosis, and myopia. Family suspects that she may have mosaic monosomy X due to history of congenital hip dysplasia and PROM. She had a normal echocardiogram. She was seen by genetics but genetic testing was not done because of aversion to blood  draw. We briefly discussed that a cheek swab sample could now be done for this testing if desired. She also had a Perez-Danlos syndrome panel done at [facility] which reportedly had several VUSs.   Grandson (19y) has myopia and striae on abdomen and thighs.  Siblings:  Brother passed away at age 5y from yellow fever.  Brother passed away at age 90 from unknown causes.  Sister passed away at age 73 from lymphoma.  She had 4 children who have no known health concerns.   Parents/Other Relatives:  Mother passed away at age 83 from a heart problem.  Father passed away at age 65 from heart disease.   Paternal great-uncle passed away at age 55 from heart concerns.      The family history is otherwise negative for aortic root dilation, aneurysm, high degree of myopia, ectopia lentis, retinal detachment, pneumothorax, scoliosis, other skeletal concerns, organ rupture, sudden death, and known genetic disorders. Consanguinity is denied.  Family History   Problem Relation Age of Onset     Heart Disease Father      Glasses (<9 y/o) Daughter      Retinal detachment Daughter 50 - 59     Diverticulitis Son      Hip dysplasia Granddaughter      Glasses (<9 y/o) Granddaughter      Kyphosis Granddaughter      Attention Deficit Disorder Granddaughter      Mental Illness Granddaughter      Glasses (<9 y/o) Grandson      Lymphoma Sister      Glaucoma No family hx of      Macular Degeneration No family hx of      Consanguinity No family hx of      Social History:  Lives in assisted living at [facility] in [place]. 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    I have reviewed Zack s past medical history, family history, social history, medications and allergies as documented in the electronic medical record.  There were no additional findings except as noted.    Medications:  Current Outpatient Medications   Medication Sig Dispense Refill     acetaminophen (TYLENOL) 500 MG tablet Take 1 or 2 tablets  every 8 hours as needed for pain  Do not exceed 3000mg in 24 hours (Patient not taking: Reported on 7/2/2024) 100 tablet 0     augmented betamethasone dipropionate (DIPROLENE-AF) 0.05 % external ointment Apply topically daily as needed (Patient not taking: Reported on 8/30/2024)       carbidopa-levodopa (SINEMET)  MG tablet Take 1.5 tablets by mouth 3 times daily 405 tablet 0     chlorthalidone (HYGROTON) 25 MG tablet 1 TABLET ORALLY EVERY MORNING  (DX: DIURETIC ) 28 tablet 11     clopidogrel (PLAVIX) 75 MG tablet 1 TABLET ORALLY EVERY MORNING  (DX: HEART  ) 30 tablet 11     econazole nitrate 1 % external cream Apply topically as needed.       gabapentin (NEURONTIN) 400 MG capsule Take 1 capsule (400 mg) by mouth 3 times daily for 30 days 90 capsule 0     lactulose (CHRONULAC) 10 GM/15ML solution DRINK 30ML  ORALLY DAILY (DX: CONSTIPATION);DRINK 30ML  ORALLY DAILY AS NEEDED (DX: CONSTIPATION) 946 mL 0     levothyroxine (SYNTHROID/LEVOTHROID) 88 MCG tablet Take 1 tablet (88 mcg) by mouth daily 90 tablet 3     melatonin 3 MG tablet 1 TABLET ORALLY AT BEDTIME AS NEEDED  (DX: SLEEP ) (Patient not taking: Reported on 7/2/2024) 30 tablet 10     methylcellulose POWD powder Apply topically once.       mirtazapine (REMERON) 45 MG tablet 1 TABLET ORALLY AT BEDTIME (DX: DEPRESSION) 28 tablet 11     OPTIVE 0.5-0.9 % ophthalmic solution INSTILL 1 DROP INTO BOTH EYES 2 TIMES DAILY  (DX: DRY EYES) 15 mL 11     senna-docusate (SENEXON-S) 8.6-50 MG tablet 1 TABLET ORALLY DAILY AS NEEDED (DX: CONSTIPATION) 30 tablet 10     sertraline (ZOLOFT) 25 MG tablet Take 25 mg by mouth daily (Patient not taking: Reported on 8/30/2024)       simvastatin (ZOCOR) 20 MG tablet 1 TABLET ORALLY AT BEDTIME   (DX: CHOLESTEROL) 30 tablet 11     sodium chloride (DEEP SEA NASAL SPRAY) 0.65 % nasal spray INSTILL SPRAY(S) INTO NOSTRIL(S) 3 TIMES DAILY 44 mL 11     SOLUBLE FIBER THERAPY powder TAKE 1 TEASPOONFUL ORALLY DAILY (DX: CONSTIPATION) 454 g 11  "    Allergies:  Allergies   Allergen Reactions     No Known Allergies      Physical Examination:  Blood pressure 125/89, pulse 120, resp. rate 24, height 6' 0.99\" (185.4 cm), weight 182 lb 8.7 oz (82.8 kg), head circumference 60 cm (23.62\").     Constitutional: This was a well-developed, well nourished male who responded appropriately to all requests during the examination.    Head and Neck:  He had hair of normal texture and distribution and his head was proportionate in appearance.  The face was symmetric and did not have dysmorphic features.   Eyes:  The pupils were equal, round, and reacted to light.   The conjunctivae were clear.   Ears:  His ears were normal in architecture and placement. Skin breakdown to left auricle.  Nose: The nose was clear.    Mouth and Throat: The throat was without erythema. Normal palatal arch.    Respiratory: The chest was clear to auscultation and had a symmetric appearance.    Cardiovascular:  On examination of the heart, the rhythm was regular and there was no murmur.  The peripheral pulses were normal.    Gastrointestinal: The abdomen was soft and had normal bowel sounds.  There was no hepatosplenomegaly.    : I deferred a  examination.  Musculoskeletal: Patient uses rolling walker for mobility aid. There was a full range of motion on the extremity exam of upper extremities, lower deferred due to patient's mobility issues, and normal muscular volume and bulk. There was no evidence of scoliosis.  Middle finger as percent total hand 41% (normal), negative wrist sign, negative thumb sign.   Neurologic: The neurologic exam was normal, with normal cranial nerves,, normal sensation. He had normal tone.  Integument: Thin skin with scattered age related ecchymoses to forearms. Significant fungal nail changes to right hand. The dentition was normal given his age. He had normal dermatoglyphics.  ---  Darin Gutierrez MD  [facility] Medicine Pediatrics PGY-1.    JOSSELINE PEARSON was seen in the " [facility]. I, Zohreh Ayon MD, saw this patient with the resident, reviewed the history & exam and repeated key elements. I agree with the resident s findings and plan of care as documented in the resident s note.    ZOHREH AYON M.D., FAAP, [facility]  Professor   Division of Genetics and Metabolism  Department of Pediatrics  [facility]     Routed to family in Comm Mgt  Also to  [place], Zurdo Bustos    60 minutes spent on the date of the encounter doing chart review, history and exam, documentation and further activities per the note    Please do not hesitate to contact me if you have any questions/concerns.     Sincerely,       Zohreh Ayon MD

## 2024-09-03 NOTE — PROGRESS NOTES
Name:  Zack Santiago  :   1931  MRN:   [id number]  Date of service: Sep 3, 2024  Primary Provider: Zurdo Kendrick  Referring Provider: Zurdo Kendrick    Presenting Information:  Zack, a 89 year old male, was seen was seen at the [facility] for evaluation of aortic aneurysm. Zack was accompanied to this visit by his son-in-law. I met with Zack at the request of Dr. Grewal to obtain a family history, to discuss possible genetic contributions to his symptoms, and to obtain informed consent for genetic testing.     Personal History:   Zack has a history of ascending aortic aneurysm which has been stable at 5cm. Refer to Dr. Grewal's note for a more detailed personal history.     Patient Active Problem List   Diagnosis    Hyperlipidemia LDL goal <100    OCD (obsessive compulsive disorder)    GERD (gastroesophageal reflux disease)    AAA (abdominal aortic aneurysm) without rupture (H24)    Hypovitaminosis D    ASCVD (arteriosclerotic cardiovascular disease)    Acquired hypothyroidism    Idiopathic neuropathy    Benign essential hypertension    Cough    Claw toe, acquired    CKD (chronic kidney disease) stage 3, GFR 30-59 ml/min (H)    Sudden hearing loss    Thoracic ascending aortic aneurysm (H24)    Aortic insufficiency    Bradycardia    Cerebrovascular accident (CVA), unspecified mechanism (H)    Lung nodule    Atherosclerosis of native coronary artery of native heart without angina pectoris    Panic disorder    Gait instability    Irregular bowel habits    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    carotid us no stenosis, echo no change and no  clots; ziopatch no afib, svt present    Anemia 2004    Aortic insufficiency 06/2014    1+ on echo    Aortic insufficiency 11/2016    mild to mod    Ascending aorta dilatation (H24) 01/2023    5cm on ct, fu done 2/24 and stable    ASCVD (arteriosclerotic cardiovascular disease) 2010    cabg, post op afib    BPH (benign prostatic hyperplasia) 2003    turp, flomax added by urol 2/17    Bradycardia 2016    stopped toprol    CKD (chronic kidney disease) stage 3, GFR 30-59 ml/min (H)     Constipation 05/2020    colonoscopy nl    Cough 2010    pulm eval, felt due to reflux    Dizzy 2001    mri small vessel dz    GERD (gastroesophageal reflux disease)     HTN (hypertension) 2002    Hx of colonoscopy 2003    tics    Hypothyroid     Hypovitaminosis D     Idiopathic neuropathy     Lung nodule 02/2018    6mm, found during eval of ascending aorta, fu 8/18 no change    OCD (obsessive compulsive disorder)     sees psyche    Panic disorder     Dr. Luna, then someone after he retired    Parkinson's disease (H) 03/2023    per neuro    Spinal headache     Stroke (H) 12/2016    expressive aphasia, hosp, seen by neuro, mri pos, carotids min dz, changed from asa to plavix    Sudden hearing loss 06/2013    Dr. Garcia, mri brain no acoustic neuroma    SVT (supraventricular tachycardia) (H24) 11/2016    seen on ziopatch done for amaurosis, longest 15 beats    Thoracic ascending aortic aneurysm (H24) 06/2014    4.4cm on echo, aortic root 3.9, fu 5/15 4.8cm; fu done 12/15 and slightly enlarged, fu 6/16 no change, fu 11/16 no change; fu 1/18 echo 5.1-5.3, enlarged, then cta done and only 4.8cm, t fu 6 months, lvh, nl ef, mild ai; fu 8/18 slightly larger; fu 2/19 slightly smaller; fu 2/20 and then 1/21 and stable    Ulcerative colitis (H)     not active for years     Previous Genetic Testing  None       Family History:   A three generation pedigree was obtained today. A copy is located in the media tab and full details are available in the  pedigree section of the history tab. The following information was provided:     Children:   Son (62y) has diverticulitis.  Grandson (19y) is well.   Granddaughter (16y) is well.  Daughter (60y) has myopia, pronation, and had retinal detachment in 2018. She had a normal echocardiogram recently due to Zack's history.  Granddaughter (21y) has mental health concerns, ADHD, kyphosis, and myopia. Family suspects that she may have mosaic monosomy X due to history of congenital hip dysplasia and PROM. She had a normal echocardiogram. She was seen by genetics but genetic testing was not done because of aversion to blood draw. We briefly discussed that a cheek swab sample could now be done for this testing if desired. She also had a Perez-Danlos syndrome panel done at [facility] which reportedly had several VUSs.   Grandson (19y) has myopia and striae on abdomen and thighs.  Siblings:  Brother passed away at age 5y from yellow fever.  Brother passed away at age 90 from unknown causes.  Sister passed away at age 73 from lymphoma.  She had 4 children who have no known health concerns.   Parents/Other Relatives:  Mother passed away at age 83 from a heart problem.  Father passed away at age 65 from heart disease.   Paternal great-uncle passed away at age 55 from heart concerns.     The family history is otherwise negative for aortic root dilation, aneurysm, high degree of myopia, ectopia lentis, retinal detachment, pneumothorax, scoliosis, other skeletal concerns, organ rupture, sudden death, and known genetic disorders. Consanguinity is denied.    Pedigree Image      Discussion and Assessment:  Genes are long stretches of DNA that are responsible for how our bodies look and how our bodies work. Our genes are inherited on structures called chromosomes of which we have 23 pairs. One copy of each chromosome in a pair is inherited from the mother and one is inherited from the father. Changes in the DNA sequence of a gene, called  variants, as well as changes within the chromosomes can cause the signs and symptoms of a genetic condition.     Aortic dilation/aneurysm occurs when a portion of the aorta, a large blood vessel in the heart, becomes enlarged due to weakening of the artery wall. Depending on the size of the aneurysm, there may be a risk for the aneurysm to break open and rupture, also known as an aortic dissection. Aortic aneurysms can be caused by disruptions, variants, in a number of genes, which can be inherited within families. Aortic aneurysms can be the only symptom caused by the gene disruption, such as in the genetic condition, familial thoracic aortic aneurysm and/or dissection (TAAD).  Aortic aneurysms can also occur as part of a syndrome that includes other symptoms or physical features. These syndromes are known as connective tissue disorders because the genes involved make connective tissue proteins found in joints, organs, and blood vessels. One of these syndromes is called Marfan syndrome. In addition to aortic aneurysms, other features include tall stature, slender limbs, long arm span, scoliosis, and lens dislocation. Examples of two other connective tissue disorders are Loeys-Angela syndrome and Perez-Danlos syndrome. Aortic aneurysm can be found in association with genetic syndromes, such as Marfan and Loeys-Angela syndrome or as an isolated event that runs in families. 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    Genetic testing is available for thoracic aortic aneurysms. The testing panel includes 29 genes associated with both syndromic and isolated aortic aneurysms. The AEBP1 gene was also added on due to familial concern. Testing currently identifies pathogenic variants in about 20% of individuals with TAAD. Diagnostic yield is higher for syndromic individuals. The potential results were  discussed including a positive, negative, or variant of uncertain significance.     One possibility is a change(s) could be seen in Zack and this change(s) is known to cause similar symptoms to the symptoms Zack has experienced.  This is considered a positive result.  A positive result may provide more information on appropriate clinical management for Zack and may provide information on additional potential health risks associated with Zack's diagnosis.  A positive result can also have implications for the health and reproductive risks of other relatives.  It is also possible that no change(s) that are likely to explain Zack's symptoms are found.  This is considered a negative result.  A negative result would not completely rule out a possible genetic cause for Zack's symptoms.  Not all changes in our genes cause disease.  Sometimes, it can be difficult for the laboratory to determine whether or not a change that is found contributes to the patient's symptoms.  If the meaning of a particular gene change is unknown, the lab classifies the result as a variant of unknown significance (VUS). Follow-up testing of relatives may be beneficial in clarifying the meaning of this result.     It is medically necessary to determine if there is an underlying genetic cause for Zack's symptoms for several reasons. First and foremost this can be important for his own health. It is possible that an underlying cause may also predispose Zack to other health risks. Knowing about these additional health risks can help us stay ahead of Zack's healthcare to more appropriately screen for other complications.  Some diagnoses may also have treatment options. Additionally, discovering an underlying reason may help predict the chance for other family members to have similar healthcare needs. Finally, having a specific underlying diagnosis can sometimes help individuals receive the services they need to help reach their full potential in  school, in work, or in day to day life.     The lab we are using for this test is called [facility]. Insurance and billing procedures were covered with the family. They will be sent a link where they can access [facility]'s billing policy. [employer] will submit a claim to [employer]. If the family owes more than $100 after the claim is processed, they will be contacted by [facility] to discuss billing options including financial assistance.     The family provided informed consent for the testing. I will plan to follow-up with the family by phone when results are returned, approximately 2-3 weeks after the testing is initiated. A follow-up appointment will be scheduled as needed according to Dr. Grewal. Additional questions or concerns were denied at this time.        Plan:  Zack's buccal sample was collected and sent out to [facility] today.     Once the lab receives the sample, Zack's Aortopathy Comprehensive Panel will be initiated.    Results are expected in approximately 2-3 weeks and will be returned by phone. A follow-up appointment will be scheduled as needed at that time.    Contact information was provided should any questions arise in the future.       Agnes Cardenas [facility]  Genetic Counselor  [facility]   Phone: [phone]     Approximate Time Spent in Consultation: 40 min

## 2024-09-04 ENCOUNTER — HOSPITAL ENCOUNTER (OUTPATIENT)
Dept: WOUND CARE | Facility: CLINIC | Age: 89
Discharge: HOME | End: 2024-09-04
Attending: FAMILY MEDICINE | Admitting: FAMILY MEDICINE
Payer: COMMERCIAL

## 2024-09-04 VITALS — TEMPERATURE: 96.6 F | HEART RATE: 50 BPM | DIASTOLIC BLOOD PRESSURE: 52 MMHG | SYSTOLIC BLOOD PRESSURE: 135 MMHG

## 2024-09-04 DIAGNOSIS — L89.159 DECUBITUS ULCER OF COCCYGEAL REGION, UNSPECIFIED ULCER STAGE: ICD-10-CM

## 2024-09-04 DIAGNOSIS — L89.152 PRESSURE INJURY OF COCCYGEAL REGION, STAGE 2 (H): ICD-10-CM

## 2024-09-04 DIAGNOSIS — L89.159: Primary | ICD-10-CM

## 2024-09-04 DIAGNOSIS — L89.152 PRESSURE ULCER OF COCCYGEAL REGION, STAGE II (H): ICD-10-CM

## 2024-09-04 DIAGNOSIS — F42.9 OBSESSIVE-COMPULSIVE DISORDER, UNSPECIFIED TYPE: Primary | ICD-10-CM

## 2024-09-04 PROCEDURE — G0463 HOSPITAL OUTPT CLINIC VISIT: HCPCS | Mod: 25

## 2024-09-04 PROCEDURE — G0463 HOSPITAL OUTPT CLINIC VISIT: HCPCS

## 2024-09-04 PROCEDURE — 99215 OFFICE O/P EST HI 40 MIN: CPT | Performed by: FAMILY MEDICINE

## 2024-09-04 NOTE — PROGRESS NOTES
Wound Clinic Note          Visit date: 09/04/2024       Cheif Complaint:     Zack Santiago is a 89 year old  male had concerns including WOUND CARE.  He has an area of medial buttock superficial skin breakdown.      HISTORY OF PRESENT ILLNESS:    Zack Santiago reports the ulcer has been present since June 2024.  The wound began due to sitting in his recliner more often.  He reports since the wound developed he got a better cushion for his recliner and this has helped the wound to improve quite a bit over the last month.    He has home health nurses coming out 3 times a week who have been bandaging the area with a Telfa pad.  There is been light serous drainage from the area.        The pateint denies fevers or chills.  They report the pain from the wound has been 0/10 and has remained about the same recently.      Today the patient reports maintaining a regular diet without special attention to protein.        The patient denies a history of diabetes, smoking or chronic steroid use.         The patient has not had any symptoms of infection relating to the wound recently and is not currently on antibiotics.       Problem List:   Past Medical History:   Diagnosis Date    A-fib (H) 2010    post op    AAA (abdominal aortic aneurysm) without rupture (H24)     Dr. 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    carotid us no stenosis, echo no change and no clots; ziopatch no afib, svt present    Anemia 2004    Aortic insufficiency 06/2014    1+ on echo    Aortic insufficiency 11/2016    mild to mod    Ascending aorta dilatation (H24) 01/2023    5cm on ct, fu done 2/24 and stable    ASCVD (arteriosclerotic cardiovascular disease) 2010    cabg, post op afib    BPH (benign prostatic hyperplasia) 2003    turp, flomax added by urol 2/17    Bradycardia 2016    stopped toprol    CKD (chronic kidney disease) stage 3, GFR 30-59 ml/min (H)     Constipation 05/2020    colonoscopy nl    Cough  2010    pulm eval, felt due to reflux    Dizzy 2001    mri small vessel dz    GERD (gastroesophageal reflux disease)     HTN (hypertension) 2002    Hx of colonoscopy 2003    tics    Hypothyroid     Hypovitaminosis D     Idiopathic neuropathy     Lung nodule 02/2018    6mm, found during eval of ascending aorta, fu 8/18 no change    OCD (obsessive compulsive disorder)     sees psyche    Panic disorder     Dr. Luna, then someone after he retired    Parkinson's disease (H) 03/2023    per neuro    Spinal headache     Stroke (H) 12/2016    expressive aphasia, hosp, seen by neuro, mri pos, carotids min dz, changed from asa to plavix    Sudden hearing loss 06/2013    Dr. Garcia, mri brain no acoustic neuroma    SVT (supraventricular tachycardia) (H24) 11/2016    seen on ziopatch done for amaurosis, longest 15 beats    Thoracic ascending aortic aneurysm (H24) 06/2014    4.4cm on echo, aortic root 3.9, fu 5/15 4.8cm; fu done 12/15 and slightly enlarged, fu 6/16 no change, fu 11/16 no change; fu 1/18 echo 5.1-5.3, enlarged, then cta done and only 4.8cm, t fu 6 months, lvh, nl ef, mild ai; fu 8/18 slightly larger; fu 2/19 slightly smaller; fu 2/20 and then 1/21 and stable    Ulcerative colitis (H)     not active for years              Family Hx: family history includes Attention Deficit Disorder in his granddaughter; Diverticulitis in his son; Glasses (<7 y/o) in his daughter, granddaughter, and grandson; Heart Disease in his father; Hip dysplasia in his granddaughter; Kyphosis in his granddaughter; Lymphoma in his sister; Mental Illness in his granddaughter; Retinal detachment (age of onset: 50 - 59) in his daughter.       Surgical Hx:   Past Surgical History:   Procedure Laterality Date    BACK SURGERY  1998    BLADDER SURGERY  1985    CATARACT IOL, RT/LT  2011    COLONOSCOPY N/A 05/05/2020    Procedure: COLONOSCOPY;  Surgeon: Kenya Loco MD;  Location:  GI    CORONARY ARTERY BYPASS  2010    ENDOVASCULAR  REPAIR ANEURYSM ABDOMINAL AORTA N/A 05/27/2015    Procedure: ENDOVASCULAR REPAIR ANEURYSM ABDOMINAL AORTA;  Surgeon: Darin Walters MD;  Location: [facility] OR    HERNIA REPAIR  2005    HERNIA REPAIR  1998    HERNIORRHAPHY INGUINAL Left 01/05/2018    Procedure: HERNIORRHAPHY INGUINAL;  Incarcerated Left Inguinal Hernia;  Surgeon: Harsh Walker MD;  Location: [facility] OR    KNEE SURGERY  1997    REPAIR HAMMER TOE  12/28/2012    Procedure: REPAIR HAMMER TOE;  LEFT SECOND AND THIRD CLAW TOE RECONSTRUCTION;  Surgeon: Gray Haynes MD;  Location: [facility] OR    REPAIR HAMMER TOE  04/2018    tria    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    TURP  2003    Z TOTAL KNEE ARTHROPLASTY  2011    left    ZZ TOTAL KNEE ARTHROPLASTY  2009    right          Allergies:    Allergies   Allergen Reactions    No Known Allergies               Medication History:    Current Outpatient Medications   Medication Sig Dispense Refill    acetaminophen (TYLENOL) 500 MG tablet Take 1 or 2 tablets every 8 hours as needed for pain  Do not exceed 3000mg in 24 hours (Patient not taking: Reported on 7/2/2024) 100 tablet 0    augmented betamethasone dipropionate (DIPROLENE-AF) 0.05 % external ointment Apply topically daily as needed (Patient not taking: Reported on 8/30/2024)      carbidopa-levodopa (SINEMET)  MG tablet Take 1.5 tablets by mouth 3 times daily 405 tablet 0    chlorthalidone (HYGROTON) 25 MG tablet 1 TABLET ORALLY EVERY MORNING  (DX: DIURETIC ) 28 tablet 11    clopidogrel (PLAVIX) 75 MG tablet 1 TABLET ORALLY EVERY MORNING  (DX: HEART  ) 30 tablet 11    econazole nitrate 1 % external cream Apply topically as needed.      gabapentin (NEURONTIN) 400 MG capsule Take 1 capsule (400 mg) by mouth 3 times daily for 30 days 90 capsule 0    lactulose (CHRONULAC) 10 GM/15ML solution DRINK 30ML  ORALLY DAILY (DX: CONSTIPATION);DRINK 30ML  ORALLY DAILY AS NEEDED (DX: CONSTIPATION) 946 mL 0    levothyroxine (SYNTHROID/LEVOTHROID) 88 MCG  tablet Take 1 tablet (88 mcg) by mouth daily 90 tablet 3    melatonin 3 MG tablet 1 TABLET ORALLY AT BEDTIME AS NEEDED  (DX: SLEEP ) (Patient not taking: Reported on 7/2/2024) 30 tablet 10    methylcellulose POWD powder Apply topically once.      mirtazapine (REMERON) 45 MG tablet 1 TABLET ORALLY AT BEDTIME (DX: DEPRESSION) 28 tablet 11    OPTIVE 0.5-0.9 % ophthalmic solution INSTILL 1 DROP INTO BOTH EYES 2 TIMES DAILY  (DX: DRY EYES) 15 mL 11    senna-docusate (SENEXON-S) 8.6-50 MG tablet 1 TABLET ORALLY DAILY AS NEEDED (DX: CONSTIPATION) 30 tablet 10    sertraline (ZOLOFT) 25 MG tablet Take 25 mg by mouth daily (Patient not taking: Reported on 8/30/2024)      simvastatin (ZOCOR) 20 MG tablet 1 TABLET ORALLY AT BEDTIME   (DX: CHOLESTEROL) 30 tablet 11    sodium chloride (DEEP SEA NASAL SPRAY) 0.65 % nasal spray INSTILL SPRAY(S) INTO NOSTRIL(S) 3 TIMES DAILY 44 mL 11    SOLUBLE FIBER THERAPY powder TAKE 1 TEASPOONFUL ORALLY DAILY (DX: CONSTIPATION) 454 g 11     No current facility-administered medications for this encounter.         Tobacco History:  reports that he quit smoking about 59 years ago. His smoking use included cigarettes. He started smoking about 64 years ago. He has a 5 pack-year smoking history. He has been exposed to tobacco smoke. He has never used smokeless tobacco.       REVIEW OF SYMPTOMS:   The review of systems was negative except as noted in the HPI.           PHYSICAL EXAMINATION:     /52 (BP Location: Left arm, Patient Position: Sitting)   Pulse 50   Temp (!) 96.6  F (35.9  C) (Temporal)            GENERAL: The patient overall appears well and is no acute distress.   HEAD: normocephalic   EYES: Sclera and conjunctiva clear   NECK: no obvious masses   LUNGS: breathing is unlabored.   EXTREMITIES: No clubbing, cyanosis or edema   SKIN: No rashes or other abnormalities except as noted under the Wound section below.   NEUROLOGICAL: normal motor and sensory function       WOUND/ulcer: The  wound appears healthy with no sign of infection.   Wound bed: granulation tissue  Periwound: healthy intact skin  He has just a few pinpoint open areas on the medial buttock area.  I can see where the wound was quite a bit larger in the past and has healed quite a bit recently.      Also see below for wound details:     Circumferential volume measures:             No data to display                Ulceration(s)/Wound(s):   Please see the media tab under the chart review for pictures of the wounds.  Nursing staff removed dressings and cleansed wound.    Wound (used by OP [facility] only) 09/04/24 1002 coccyx (Active)   Thickness/Stage partial thickness 09/04/24 1000   Base epithelialization 09/04/24 1000   Periwound macerated 09/04/24 1000   Periwound Temperature warm 09/04/24 1000   Periwound Skin Turgor soft 09/04/24 1000   Edges open 09/04/24 1000   Length (cm) 1 09/04/24 1000   Width (cm) 1 09/04/24 1000   Depth (cm) 0.1 09/04/24 1000   Wound (cm^2) 1 cm^2 09/04/24 1000   Wound Volume (cm^3) 0.1 cm^3 09/04/24 1000   Drainage Characteristics/Odor serosanguineous 09/04/24 1000   Drainage Amount scant 09/04/24 1000           Recent Labs   Lab Test 12/23/16  0730   A1C 5.5          Recent Labs   Lab Test 06/21/24  1212 05/07/24  1041 02/16/24  1114   ALBUMIN 4.4 4.4 4.0              No sharp debridement performed today.                  ASSESSMENT:   This is a 89 year old  male with a medial buttock wound.          PLAN:   I recommend that he apply a zinc oxide barrier cream to the area twice a day.  He has staff at his assisted living facility that can apply the cream for him.  I think he will only need to apply the cream for another 2 weeks till the area is healed.  No further bandages or treatment will be required after that.  I did offer to give him information about a different cushion that he could use to decrease the pressure applied to the area.  The patient however prefers to continue to use his current cushion.  I  have encouraged him to get up and walk around once an hour to relieve pressure to the area.  I have explained to the patient the importance of protein intake to wound healing.  I have explained that increasing protein intake will speed wound healing.  We discussed several types of food that are high in protein and the wound care nurse gave the patient a handout that summarizes this information.  In addition to further speed wound healing I have encouraged the patient to take a protein supplement.   The patient will return to the wound clinic on an as-needed basis if further wounds develop.        45 minutes spent on the date of the encounter doing chart review, history and exam, documentation and further activities per the note, this time excludes any procedure time      Scott Schmidt MD  09/04/2024   10:30 AM   [facility] Vascular/Wound  [phone]    This note was electronically signed by Scott Schmidt MD      Further instructions from your care team         09/04/2024   Zack Santiago   5/29/1931    A DME order was not completed because the supplies are ordered by home care or at a care facility    Dressing changes outside of clinic are being performed by Home Care/[facility] Staff  Assisted Living: [facility] [phone] Fax: [phone]  Home Care MWF     Plan 09/04/2024   When you're in the chair, try to shift yourself every 15 minutes to help keep pressure off of your wound  Try to eat 90 grams of protein per day    Wound Dressing Change: Coccyx/gluteus  - Cleanse with mild unscented soap and water (such as Cetaphil, Cerave or Dove)   - Apply a zinc barrier cream (such as Desitin) twice daily and as needed  Okay to just apply the cream and leave uncovered    A diet high in protein is important for wound healing, we recommend getting 90 grams of protein per day. Taking protein shakes or bars are a good way to get extra protein in your diet.     Good  sources of protein:  Pork 26g per 3 oz  Whey protein powder - 24g per scoop (on average)  Greek yogurt - 23g per 8oz   Chicken or Turkey - 23g per 3oz  Fish - 20-25g per 3oz  Beef - 18-23g per 3oz  Tofu - 10g per 1/2 cup  Navy beans - 20g per cup  Cottage cheese - 14g per 1/2 cup   Lentils - 13g per 1/4 cup  Beef jerky 13g per 1oz  2% milk - 8g per cup  Peanut butter - 8g per 2 tablespoons  Eggs - 6g per egg  Mixed nuts - 6g per 2oz       Main Provider: Scott Schmidt M.D. September 4, 2024    Call us at [phone] if you have any questions about your wounds, if you have redness or swelling around your wound, have a fever of 101 degrees Fahrenheit or greater or if you have any other problems or concerns. We answer the phone Monday through Friday 8 am to 4 pm, please leave a message as we check the voicemail frequently throughout the day. If you have a concern over the weekend, please leave a message and we will return your call Monday. If the need is urgent, go to the ER or urgent care.    If you had a positive experience please indicate that on your patient satisfaction survey form that [facility] will be sending you.    It was a pleasure meeting with you today.  Thank you for allowing me and my team the privilege of caring for you today.  YOU are the reason we are here, and I truly hope we provided you with the excellent service you deserve.  Please let us know if there is anything else we can do for you so that we can be sure you are leaving completely satisfied with your care experience.      If you have any billing related questions please call the [facility] Business office at [phone]. The clinic staff does not handle billing related matters.    If you are scheduled to have a follow up appointment, you will receive a reminder call the day before your visit. On the appointment day please arrive 15 minutes prior to your appointment time. If you are unable to keep that appointment, please call  the clinic to cancel or reschedule. If you are more than 10 minutes late or greater for your scheduled appointment time, the clinic policy is that you may be asked to reschedule.         ,

## 2024-09-04 NOTE — DISCHARGE INSTRUCTIONS
09/04/2024   Zack Santiago   5/29/1931    A DME order was not completed because the supplies are ordered by home care or at a care facility    Dressing changes outside of clinic are being performed by Home Care/[facility] Staff  Assisted Living: [facility] [phone] Fax: [phone]  Home Care MWF     Plan 09/04/2024   When you're in the chair, try to shift yourself every 15 minutes to help keep pressure off of your wound  Try to eat 90 grams of protein per day    Wound Dressing Change: Coccyx/gluteus  - Cleanse with mild unscented soap and water (such as Cetaphil, Cerave or Dove)   - Apply a zinc barrier cream (such as Desitin) twice daily and as needed  Okay to just apply the cream and leave uncovered    A diet high in protein is important for wound healing, we recommend getting 90 grams of protein per day. Taking protein shakes or bars are a good way to get extra protein in your diet.     Good sources of protein:  Pork 26g per 3 oz  Whey protein powder - 24g per scoop (on average)  Greek yogurt - 23g per 8oz   Chicken or Turkey - 23g per 3oz  Fish - 20-25g per 3oz  Beef - 18-23g per 3oz  Tofu - 10g per 1/2 cup  Navy beans - 20g per cup  Cottage cheese - 14g per 1/2 cup   Lentils - 13g per 1/4 cup  Beef jerky 13g per 1oz  2% milk - 8g per cup  Peanut butter - 8g per 2 tablespoons  Eggs - 6g per egg  Mixed nuts - 6g per 2oz       Main Provider: Scott Schmidt M.D. September 4, 2024    Call us at [phone] if you have any questions about your wounds, if you have redness or swelling around your wound, have a fever of 101 degrees Fahrenheit or greater or if you have any other problems or concerns. We answer the phone Monday through Friday 8 am to 4 pm, please leave a message as we check the voicemail frequently throughout the day. If you have a concern over the weekend, please leave a message and we will return your call Monday. If the need is urgent, go to the ER or urgent  care.    If you had a positive experience please indicate that on your patient satisfaction survey form that [facility] will be sending you.    It was a pleasure meeting with you today.  Thank you for allowing me and my team the privilege of caring for you today.  YOU are the reason we are here, and I truly hope we provided you with the excellent service you deserve.  Please let us know if there is anything else we can do for you so that we can be sure you are leaving completely satisfied with your care experience.      If you have any billing related questions please call the [facility] Business office at [phone]. The clinic staff does not handle billing related matters.    If you are scheduled to have a follow up appointment, you will receive a reminder call the day before your visit. On the appointment day please arrive 15 minutes prior to your appointment time. If you are unable to keep that appointment, please call the clinic to cancel or reschedule. If you are more than 10 minutes late or greater for your scheduled appointment time, the clinic policy is that you may be asked to reschedule.

## 2024-09-04 NOTE — ADDENDUM NOTE
----- Message from Cintia Renner MD sent at 12/2/2019 11:01 AM CST -----  Morning!    Can you please schedule this patient with me in the next available slot that works for the family?    Thanks!    MA   Encounter addended by: Becky Velasquez RN on: 9/4/2024 2:37 PM   Actions taken: Clinical Note Signed

## 2024-09-05 ENCOUNTER — TELEPHONE (OUTPATIENT)
Dept: FAMILY MEDICINE | Facility: CLINIC | Age: 89
End: 2024-09-05
Payer: COMMERCIAL

## 2024-09-05 RX ORDER — ESCITALOPRAM OXALATE 10 MG/1
10 TABLET ORAL DAILY
Qty: 28 TABLET | Refills: 10 | Status: SHIPPED | OUTPATIENT
Start: 2024-09-05

## 2024-09-05 NOTE — TELEPHONE ENCOUNTER
Home Care is calling regarding an established patient with [facility].      Requesting orders from: Zurdo Kendrick  Provider is following patient: No     Referral placed by team provider at 8/30 OV    Orders Requested    Skilled Nursing  Request for initial certification (first set of orders)   Frequency: 3w8 or until wound is healed      Information was gathered and will be sent to provider for review.  RN will contact Home Care with information after provider review.  Confirmed ok to leave a detailed message with call back.  Contact information confirmed and updated as needed.    Ana Huynh RN

## 2024-09-06 DIAGNOSIS — Z53.9 DIAGNOSIS NOT YET DEFINED: Primary | ICD-10-CM

## 2024-09-06 PROCEDURE — G0180 MD CERTIFICATION HHA PATIENT: HCPCS | Performed by: INTERNAL MEDICINE

## 2024-09-10 DIAGNOSIS — R19.8 ALTERED BOWEL FUNCTION: ICD-10-CM

## 2024-09-10 NOTE — TELEPHONE ENCOUNTER
Home Care is calling regarding an established patient with [facility].       Requesting orders from: Zurdo Kendrick  Provider is following patient: Yes  Is this a 60-day recertification request?  No    Orders Requested    Skilled Nursing  Request for continuation of care with decrease in frequency   Goals have been met/progressing.  Frequency:  1x/wk for 3 wks        Verbal orders given.  Home Care will send orders for provider to sign.  Confirmed ok to leave a detailed message with call back.  Contact information confirmed and updated as needed.    Florentin Duke RN

## 2024-09-11 RX ORDER — LACTULOSE 10 G/15ML
SOLUTION ORAL
Qty: 946 ML | Refills: 0 | Status: SHIPPED | OUTPATIENT
Start: 2024-09-11

## 2024-09-16 ENCOUNTER — OFFICE VISIT (OUTPATIENT)
Dept: FAMILY MEDICINE | Facility: CLINIC | Age: 89
End: 2024-09-16
Payer: COMMERCIAL

## 2024-09-16 ENCOUNTER — TELEPHONE (OUTPATIENT)
Dept: FAMILY MEDICINE | Facility: CLINIC | Age: 89
End: 2024-09-16

## 2024-09-16 VITALS
DIASTOLIC BLOOD PRESSURE: 65 MMHG | HEIGHT: 73 IN | HEART RATE: 57 BPM | SYSTOLIC BLOOD PRESSURE: 127 MMHG | RESPIRATION RATE: 18 BRPM | TEMPERATURE: 98.9 F | BODY MASS INDEX: 24.39 KG/M2 | OXYGEN SATURATION: 94 % | WEIGHT: 184 LBS

## 2024-09-16 DIAGNOSIS — F42.9 OBSESSIVE-COMPULSIVE DISORDER, UNSPECIFIED TYPE: Chronic | ICD-10-CM

## 2024-09-16 DIAGNOSIS — I10 BENIGN ESSENTIAL HYPERTENSION: ICD-10-CM

## 2024-09-16 DIAGNOSIS — G20.A1 PARKINSON'S DISEASE, UNSPECIFIED WHETHER DYSKINESIA PRESENT, UNSPECIFIED WHETHER MANIFESTATIONS FLUCTUATE (H): ICD-10-CM

## 2024-09-16 DIAGNOSIS — I13.10 HYPERTENSIVE HEART AND KIDNEY DISEASE WITHOUT HEART FAILURE AND WITH STAGE 3A CHRONIC KIDNEY DISEASE (H): ICD-10-CM

## 2024-09-16 DIAGNOSIS — I25.10 ATHEROSCLEROSIS OF NATIVE CORONARY ARTERY OF NATIVE HEART WITHOUT ANGINA PECTORIS: ICD-10-CM

## 2024-09-16 DIAGNOSIS — N18.31 HYPERTENSIVE HEART AND KIDNEY DISEASE WITHOUT HEART FAILURE AND WITH STAGE 3A CHRONIC KIDNEY DISEASE (H): ICD-10-CM

## 2024-09-16 DIAGNOSIS — I71.21 ANEURYSM OF ASCENDING AORTA WITHOUT RUPTURE (H): ICD-10-CM

## 2024-09-16 DIAGNOSIS — E78.5 HYPERLIPIDEMIA LDL GOAL <100: ICD-10-CM

## 2024-09-16 DIAGNOSIS — Z00.00 ENCOUNTER FOR MEDICARE ANNUAL WELLNESS EXAM: Primary | ICD-10-CM

## 2024-09-16 DIAGNOSIS — E03.9 ACQUIRED HYPOTHYROIDISM: ICD-10-CM

## 2024-09-16 DIAGNOSIS — I71.40 ABDOMINAL AORTIC ANEURYSM (AAA) WITHOUT RUPTURE, UNSPECIFIED PART (H): ICD-10-CM

## 2024-09-16 DIAGNOSIS — E55.9 HYPOVITAMINOSIS D: ICD-10-CM

## 2024-09-16 PROBLEM — L89.152: Status: RESOLVED | Noted: 2024-09-04 | Resolved: 2024-09-16

## 2024-09-16 PROBLEM — I63.9 CEREBROVASCULAR ACCIDENT (CVA), UNSPECIFIED MECHANISM (H): Status: RESOLVED | Noted: 2017-01-19 | Resolved: 2024-09-16

## 2024-09-16 PROCEDURE — G0439 PPPS, SUBSEQ VISIT: HCPCS | Performed by: INTERNAL MEDICINE

## 2024-09-16 PROCEDURE — 93000 ELECTROCARDIOGRAM COMPLETE: CPT | Performed by: INTERNAL MEDICINE

## 2024-09-16 ASSESSMENT — PAIN SCALES - GENERAL: PAINLEVEL: NO PAIN (1)

## 2024-09-16 NOTE — PROGRESS NOTES
Preventive Care Visit  [facility]  Zurdo Kenrdick MD, Internal Medicine  Sep 16, 2024          Fausto Dixon is a 89 year old, presenting for the following:    Overall he is doing well.  Uses a walker.  He is able to get around.  His biggest issue is his mental health issue.  He is seeing psychiatry tomorrow for that.  He has some depression and OCD.    He lives at the [facility].  They do his medications and help with other care.    He did have a buttock ulcer but this is resolved.  I did examine him there today and I see flaky skin but no ulcerations.    He has a history of aneurysms.  Per the last vascular note he does not need follow-up on his thoracic aneurysm.    In terms of symptoms he is doing quite well and has no complaints.               Past Medical History:      Past Medical History:   Diagnosis Date    A-fib (H) 2010    post op    AAA (abdominal aortic aneurysm) without rupture (H24)     Dr. 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    carotid us no stenosis, echo no change and no clots; ziopatch no afib, svt present    Anemia 2004    Aortic insufficiency 06/2014    1+ on echo    Aortic insufficiency 11/2016    mild to mod    Ascending aorta dilatation (H24) 01/2023    5cm on ct, fu done 2/24 and stable    ASCVD (arteriosclerotic cardiovascular disease) 2010    cabg, post op afib    BPH (benign prostatic hyperplasia) 2003    turp, flomax added by urol 2/17    Bradycardia 2016    stopped toprol    CKD (chronic kidney disease) stage 3, GFR 30-59 ml/min (H)     Constipation 05/2020    colonoscopy nl    Cough 2010    pulm eval, felt due to reflux    Dizzy 2001    mri small vessel dz    GERD (gastroesophageal reflux disease)     HTN (hypertension) 2002    Hx of colonoscopy 2003    tics    Hypothyroid     Hypovitaminosis D     Idiopathic neuropathy     Lung nodule 02/2018    6mm, found during eval of ascending aorta, fu 8/18 no change    OCD  (obsessive compulsive disorder)     sees psyche    Panic disorder     Dr. Luna, then someone after he retired    Parkinson's disease (H) 03/2023    per neuro    Spinal headache     Stroke (H) 12/2016    expressive aphasia, hosp, seen by neuro, mri pos, carotids min dz, changed from asa to plavix    Sudden hearing loss 06/2013    Dr. Garcia, mri brain no acoustic neuroma    SVT (supraventricular tachycardia) (H24) 11/2016    seen on ziopatch done for amaurosis, longest 15 beats    Thoracic ascending aortic aneurysm (H24) 06/2014    4.4cm on echo, aortic root 3.9, fu 5/15 4.8cm; fu done 12/15 and slightly enlarged, fu 6/16 no change, fu 11/16 no change; fu 1/18 echo 5.1-5.3, enlarged, then cta done and only 4.8cm, t fu 6 months, lvh, nl ef, mild ai; fu 8/18 slightly larger; fu 2/19 slightly smaller; fu 2/20 and then 1/21 and stable    Ulcerative colitis (H)     not active for years             Past Surgical History:      Past Surgical History:   Procedure Laterality Date    BACK SURGERY  1998    BLADDER SURGERY  1985    CATARACT IOL, RT/LT  2011    COLONOSCOPY N/A 05/05/2020    Procedure: COLONOSCOPY;  Surgeon: Kenya Loco MD;  Location:  GI    CORONARY ARTERY BYPASS  2010    ENDOVASCULAR REPAIR ANEURYSM ABDOMINAL AORTA N/A 05/27/2015    Procedure: ENDOVASCULAR REPAIR ANEURYSM ABDOMINAL AORTA;  Surgeon: Darin Walters MD;  Location:  OR    HERNIA REPAIR  2005    HERNIA REPAIR  1998    HERNIORRHAPHY INGUINAL Left 01/05/2018    Procedure: HERNIORRHAPHY INGUINAL;  Incarcerated Left Inguinal Hernia;  Surgeon: Harsh Walker MD;  Location:  OR    KNEE SURGERY  1997    REPAIR HAMMER TOE  12/28/2012    Procedure: REPAIR HAMMER TOE;  LEFT SECOND AND THIRD CLAW TOE RECONSTRUCTION;  Surgeon: Gray Haynes MD;  Location:  OR    REPAIR HAMMER TOE  04/2018    tria    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    TURP  2003    [facility] TOTAL KNEE ARTHROPLASTY  2011    left    [facility] TOTAL KNEE  ARTHROPLASTY  2009    right             Social History:     Social History     Socioeconomic History    Marital status:      Spouse name: Not on file    Number of children: 2    Years of education: Not on file    Highest education level: Not on file   Occupational History    Occupation: retail     Employer: RETIRED   Tobacco Use    Smoking status: Former     Current packs/day: 0.00     Average packs/day: 1 pack/day for 5.0 years (5.0 ttl pk-yrs)     Types: Cigarettes     Start date: 1960     Quit date: 1965     Years since quittin.3     Passive exposure: Current    Smokeless tobacco: Never   Vaping Use    Vaping status: Never Used   Substance and Sexual Activity    Alcohol use: Not Currently     Comment: none    Drug use: No    Sexual activity: Not Currently     Partners: Female   Other Topics Concern    Parent/sibling w/ CABG, MI or angioplasty before 65F 55M? Not Asked   Social History Narrative    Not on file     Social Determinants of Health     Financial Resource Strain: Low Risk  (2024)    Financial Resource Strain     Within the past 12 months, have you or your family members you live with been unable to get utilities (heat, electricity) when it was really needed?: No   Food Insecurity: Low Risk  (2024)    Food Insecurity     Within the past 12 months, did you worry that your food would run out before you got money to buy more?: No     Within the past 12 months, did the food you bought just not last and you didn t have money to get more?: No   Transportation Needs: Low Risk  (2024)    Transportation Needs     Within the past 12 months, has lack of transportation kept you from medical appointments, getting your medicines, non-medical meetings or appointments, work, or from getting things that you need?: No   Physical Activity: Not on file   Stress: Not on file   Social Connections: Not on file   Interpersonal Safety: Low Risk  (2024)    Interpersonal Safety     Do you  feel physically and emotionally safe where you currently live?: Yes     Within the past 12 months, have you been hit, slapped, kicked or otherwise physically hurt by someone?: No     Within the past 12 months, have you been humiliated or emotionally abused in other ways by your partner or ex-partner?: No   Housing Stability: Low Risk  (9/16/2024)    Housing Stability     Do you have housing? : Yes     Are you worried about losing your housing?: No             Family History:   reviewed         Allergies:     Allergies   Allergen Reactions    No Known Allergies              Medications:     Current Outpatient Medications   Medication Sig Dispense Refill    carbidopa-levodopa (SINEMET)  MG tablet Take 1.5 tablets by mouth 3 times daily 405 tablet 0    chlorthalidone (HYGROTON) 25 MG tablet 1 TABLET ORALLY EVERY MORNING  (DX: DIURETIC ) 28 tablet 11    clopidogrel (PLAVIX) 75 MG tablet 1 TABLET ORALLY EVERY MORNING  (DX: HEART  ) 30 tablet 11    econazole nitrate 1 % external cream Apply topically as needed.      escitalopram (LEXAPRO) 10 MG tablet 1 TABLET ORALLY DAILY 28 tablet 10    gabapentin (NEURONTIN) 400 MG capsule Take 1 capsule (400 mg) by mouth 3 times daily for 30 days 90 capsule 0    lactulose (CHRONULAC) 10 GM/15ML solution DRINK 30ML  ORALLY DAILY (DX: CONSTIPATION);DRINK 30ML  ORALLY DAILY AS NEEDED (DX: CONSTIPATION) 946 mL 0    levothyroxine (SYNTHROID/LEVOTHROID) 88 MCG tablet Take 1 tablet (88 mcg) by mouth daily 90 tablet 3    methylcellulose POWD powder Apply topically once.      mirtazapine (REMERON) 45 MG tablet 1 TABLET ORALLY AT BEDTIME (DX: DEPRESSION) 28 tablet 11    OPTIVE 0.5-0.9 % ophthalmic solution INSTILL 1 DROP INTO BOTH EYES 2 TIMES DAILY  (DX: DRY EYES) 15 mL 11    senna-docusate (SENEXON-S) 8.6-50 MG tablet 1 TABLET ORALLY DAILY AS NEEDED (DX: CONSTIPATION) 30 tablet 10    simvastatin (ZOCOR) 20 MG tablet 1 TABLET ORALLY AT BEDTIME   (DX: CHOLESTEROL) 30 tablet 11    sodium  "chloride (DEEP SEA NASAL SPRAY) 0.65 % nasal spray INSTILL SPRAY(S) INTO NOSTRIL(S) 3 TIMES DAILY 44 mL 11    SOLUBLE FIBER THERAPY powder TAKE 1 TEASPOONFUL ORALLY DAILY (DX: CONSTIPATION) 454 g 11               Review of Systems:     The 10 point Review of Systems is negative other than noted in the HPI           Physical Exam:   Blood pressure 127/65, pulse 57, temperature 98.9  F (37.2  C), resp. rate 18, height 1.854 m (6' 0.99\"), weight 83.5 kg (184 lb), SpO2 94%.    Exam:  Constitutional: healthy appearing, alert and in no distress  Heent: Normocephalic. Head without obvious masses or lesions. PERRLDC, EOMI. Mouth exam within normal limits: tongue, mucous membranes, posterior pharynx all normal, no lesions or abnormalities seen.  Tm's and canals within normal limits bilaterally. Neck supple, no nuchal rigidity or masses. No supraclavicular, or cervical adenopathy. Thyroid symmetric, no masses.  Cardiovascular: Regular rate and rhythm, no murmer, rub or gallops.  JVP not elevated, no edema.  Carotids within normal limits bilaterally, no bruits.  Respiratory: Normal respiratory effort.  Lungs clear, normal flow, no wheezing or crackles.  Breasts: Normal bilaterally.  No masses or lesions.  Nipples within normal limites.  No axillary lesions or nodes.  Gastrointestinal: Normal active bowel sounds.   Soft, not tender, no masses, guarding or rebound.  No hepatosplenomegaly.   Genitourinary: Rectal not done, examination of the buttock reveals no open sores or ulcerations, just dry skin.  Musculoskeletal: extremities normal, no gross deformities noted.  Skin: no suspicious lesions or rashes but in general his skin is somewhat dry and flaky  Neurologic: Mental status within normal limits.  Speech fluent.  No gross motor abnormalities and gait intact.  Psychiatric: mentation appears normal and affect normal.  Examination of the toes reveal no ulcers.  The right ankle and foot was wrapped due to recent sprain.         " Data:   Labs reviewed from prior; EKG -sinus bradycardia, right bundle branch block, otherwise negative and no change from prior.  QTc normal.        Assessment:   Normal complete physical exam  Parkinsons, follow up neuro  Ocd, depression/anxiety, follow up psyche  Hypertension, controlled  Cad, med mgmt  Thor aneurysm, no follow up needed  Hypothyroidism, tsh fine  Low vit d  Aaa, follow up vascular  Elevated cholesterol, on statin  hcm         Plan:   Vaccine at facility or pharm  Follow up pscyhe  Call if changes      Zurdo Kendrick M.D.        Physical          Health Care Directive  Patient has a Health Care Directive on file      HPI            9/16/2024   General Health   How would you rate your overall physical health? Good            9/16/2024   Nutrition   Diet: Regular (no restrictions)             No data to display                  9/16/2024   Social Factors   Worry food won't last until get money to buy more No   Food not last or not have enough money for food? No   Do you have housing? (Housing is defined as stable permanent housing and does not include staying ouside in a car, in a tent, in an abandoned building, in an overnight shelter, or couch-surfing.) Yes   Are you worried about losing your housing? No   Lack of transportation? No   Unable to get utilities (heat,electricity)? No            9/16/2024   Fall Risk   Fallen 2 or more times in the past year? No   Trouble with walking or balance? Yes              9/16/2024   Activities of Daily Living- Home Safety   Needs help with the following daily activites Transportation    Shopping    Preparing meals    Laundry    Medication administration    Dressing   Safety concerns in the home None of the above       Multiple values from one day are sorted in reverse-chronological order         9/16/2024   Dental   Dentist two times every year? Yes            9/16/2024   Hearing Screening   Hearing concerns? None of the above            9/16/2024   Driving  Risk Screening   Patient/family members have concerns about driving No            2024   General Alertness/Fatigue Screening   Have you been more tired than usual lately? (!) YES            2024   Urinary Incontinence Screening   Bothered by leaking urine in past 6 months No            2024   TB Screening   Were you born outside of the [place]? Decline            Today's PHQ-2 Score:       2024    12:55 PM   PHQ-2 (  [employer])   Q1: Little interest or pleasure in doing things 0   Q2: Feeling down, depressed or hopeless 2   PHQ-2 Score 2   Q1: Little interest or pleasure in doing things Not at all   Q2: Feeling down, depressed or hopeless More than half the days   PHQ-2 Score 2           2024   Substance Use   Alcohol more than 3/day or more than 7/wk No   Do you have a current opioid prescription? No   How severe/bad is pain from 1 to 10? 1/10   Do you use any other substances recreationally? No        Social History     Tobacco Use    Smoking status: Former     Current packs/day: 0.00     Average packs/day: 1 pack/day for 5.0 years (5.0 ttl pk-yrs)     Types: Cigarettes     Start date: 1960     Quit date: 1965     Years since quittin.3     Passive exposure: Current    Smokeless tobacco: Never   Vaping Use    Vaping status: Never Used   Substance Use Topics    Alcohol use: Not Currently     Comment: none    Drug use: No             Reviewed and updated as needed this visit by Provider                      Current providers sharing in care for this patient include:  Patient Care Team:  Zurdo Kendrick MD as PCP - General (Internal Medicine)  Zurdo Kendrick MD as Assigned PCP  Standish, Matthew HAYES MD as Referring Physician (Ophthalmology)  Raphael Barrios MD as MD (Ophthalmology)  Ara Acosta DO as MD (Neurology)  Keshia Bowman as Psychiatrist (Psychiatry & Neurology - Neurology)  Amadeo Madrigal MD as Assigned Neuroscience  "Provider  Harsh Bailey MD as MD (Dermatology)  Josiha Puri MD as Assigned Heart and Vascular Provider  Jenny Obregon, PharmD as Pharmacist (Pharmacist)  Jenny Obregon, PharmD as Assigned [place] Pharmacist    The following health maintenance items are reviewed in Epic and correct as of today:  Health Maintenance   Topic Date Due    MICROALBUMIN  Never done    RSV VACCINE (1 - 1-dose 75+ series) Never done    LIPID  01/27/2021    ANNUAL REVIEW OF HM ORDERS  05/04/2024    INFLUENZA VACCINE (1) 09/01/2024    COVID-19 Vaccine (8 - 2024-25 season) 09/01/2024    BMP  06/21/2025    TSH W/FREE T4 REFLEX  06/21/2025    HEMOGLOBIN  06/21/2025    MEDICARE ANNUAL WELLNESS VISIT  09/16/2025    FALL RISK ASSESSMENT  09/16/2025    ADVANCE CARE PLANNING  06/26/2028    DTAP/TDAP/TD IMMUNIZATION (3 - Td or Tdap) 09/19/2032    PHQ-2 (once per calendar year)  Completed    Pneumococcal Vaccine: 65+ Years  Completed    URINALYSIS  Completed    ZOSTER IMMUNIZATION  Completed    HPV IMMUNIZATION  Aged Out    MENINGITIS IMMUNIZATION  Aged Out    RSV MONOCLONAL ANTIBODY  Aged Out            Objective    Exam  /65 (BP Location: Left arm, Patient Position: Sitting, Cuff Size: Adult Regular)   Pulse 57   Temp 98.9  F (37.2  C)   Resp 18   Ht 1.854 m (6' 0.99\")   Wt 83.5 kg (184 lb)   SpO2 94%   BMI 24.28 kg/m     Estimated body mass index is 24.28 kg/m  as calculated from the following:    Height as of this encounter: 1.854 m (6' 0.99\").    Weight as of this encounter: 83.5 kg (184 lb).    Physical Exam        9/16/2024   Mini Cog   3 Item Recall 3 objects recalled                 Signed Electronically by: Zurdo Kendrick MD    "

## 2024-09-16 NOTE — PATIENT INSTRUCTIONS
Be sure to get both the covid and flu shot at the facility or the pharmacy.    I would also get a rsv vaccine in about 2 months.  Patient Education   Preventive Care Advice   This is general advice given by our system to help you stay healthy. However, your care team may have specific advice just for you. Please talk to your care team about your preventive care needs.  Nutrition  Eat 5 or more servings of fruits and vegetables each day.  Try wheat bread, brown rice and whole grain pasta (instead of white bread, rice, and pasta).  Get enough calcium and vitamin D. Check the label on foods and aim for 100% of the RDA (recommended daily allowance).  Lifestyle  Exercise at least 150 minutes each week  (30 minutes a day, 5 days a week).  Do muscle strengthening activities 2 days a week. These help control your weight and prevent disease.  No smoking.  Wear sunscreen to prevent skin cancer.  Have a dental exam and cleaning every 6 months.  Yearly exams  See your health care team every year to talk about:  Any changes in your health.  Any medicines your care team has prescribed.  Preventive care, family planning, and ways to prevent chronic diseases.  Shots (vaccines)   HPV shots (up to age 26), if you've never had them before.  Hepatitis B shots (up to age 59), if you've never had them before.  COVID-19 shot: Get this shot when it's due.  Flu shot: Get a flu shot every year.  Tetanus shot: Get a tetanus shot every 10 years.  Pneumococcal, hepatitis A, and RSV shots: Ask your care team if you need these based on your risk.  Shingles shot (for age 50 and up)  General health tests  Diabetes screening:  Starting at age 35, Get screened for diabetes at least every 3 years.  If you are younger than age 35, ask your care team if you should be screened for diabetes.  Cholesterol test: At age 39, start having a cholesterol test every 5 years, or more often if advised.  Bone density scan (DEXA): At age 50, ask your care team if you  should have this scan for osteoporosis (brittle bones).  Hepatitis C: Get tested at least once in your life.  STIs (sexually transmitted infections)  Before age 24: Ask your care team if you should be screened for STIs.  After age 24: Get screened for STIs if you're at risk. You are at risk for STIs (including HIV) if:  You are sexually active with more than one person.  You don't use condoms every time.  You or a partner was diagnosed with a sexually transmitted infection.  If you are at risk for HIV, ask about PrEP medicine to prevent HIV.  Get tested for HIV at least once in your life, whether you are at risk for HIV or not.  Cancer screening tests  Cervical cancer screening: If you have a cervix, begin getting regular cervical cancer screening tests starting at age 21.  Breast cancer scan (mammogram): If you've ever had breasts, begin having regular mammograms starting at age 40. This is a scan to check for breast cancer.  Colon cancer screening: It is important to start screening for colon cancer at age 45.  Have a colonoscopy test every 10 years (or more often if you're at risk) Or, ask your provider about stool tests like a FIT test every year or Cologuard test every 3 years.  To learn more about your testing options, visit:   .  For help making a decision, visit:   https://bit.ly/ye31687.  Prostate cancer screening test: If you have a prostate, ask your care team if a prostate cancer screening test (PSA) at age 55 is right for you.  Lung cancer screening: If you are a current or former smoker ages 50 to 80, ask your care team if ongoing lung cancer screenings are right for you.  For informational purposes only. Not to replace the advice of your health care provider. Copyright   2023 [employer]. All rights reserved. Clinically reviewed by the [facility] Transitions Program. [employer] 915219 - REV 01/24.

## 2024-09-16 NOTE — TELEPHONE ENCOUNTER
Forms/Letter Request    Type of form/letter: OTHER: Medication update form        Do we have the form/letter: Yes: black basket up front    Who is the form from? Updated medication patients need physician to fill out new diagnosis and order meds. (if other please explain)    Where did/will the form come from? Patient or family brought in       When is form/letter needed by: as soon as possible    How would you like the form/letter returned: Fax : un sure where to fax but there is a number on paper.     Patient Notified form requests are processed in 5-7 business days:Yes    Could we send this information to you in [employer] or would you prefer to receive a phone call?:   Patient would prefer a phone call   Okay to leave a detailed message?: Yes at Home number on file [phone] (home)     Patient would like a call back regarding this paper and that it was sent.

## 2024-09-26 ENCOUNTER — TELEPHONE (OUTPATIENT)
Dept: CONSULT | Facility: CLINIC | Age: 89
End: 2024-09-26
Payer: COMMERCIAL

## 2024-09-26 NOTE — TELEPHONE ENCOUNTER
Reason for Call  I called Zack to discuss the results of his genetic testing.    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 The variant was found to be possibly mosaic, meaning only a certain portion of Zack's buccal cells were found to have the variant. Zack's other tissues may have higher or lower proportions of the variant. Variant of uncertain significance (VUS) means that a change was identified in the gene, but the lab does not have enough information about this particular change to know if it could actually cause health concerns or if it is just part of normal variation between people.     Disease causing variants in NOTCH1 are associated with Louis's-Nithin syndrome and congenital heart diease. Some individuals with congenital heart disease, specifically bicuspid aortic valve, develop aortic dilation. This does not fit well with Zack's clinical picture of isolated ascending aortic aneurysm. Therefore, we are less suspicious that this variant could be the cause of Zack's concerns. Testing other family members for the NOTCH1 variant is not recommended at this time. In the future, we may gain additional information about this gene and variant that may help us better understand whether or not this change is contributing to Zack's concerns.      No other disease-causing or uncertain changes were identified in the 30 genes analyzed. This result rules out many potential genetic causes for Zack's concerns. However, it is still possible that his concerns are due to a genetic factor not analyzed by this particular test due to limitations in knowledge and technology.     Follow-Up  At this point, Dr. Grewal is not recommending additional  genetic testing or genetics follow-up for Zack. The family is encouraged to reach out to us if new concerns or questions come up in the future. Zack should continue to follow-up with his cardiologist and other providers as indicated. Family members can consider their own cardiac screening. I will send a copy of the report to Zack for his own records.       Agnes Fischer MS, [facility]  Genetic Counselor  [facility]  Phone: [phone]

## 2024-09-26 NOTE — LETTER
TO: Zack Santiago  [address]   [address]  [facility] [address]     September 26, 2024    Dear Zack,    This letter is being provided as a summary of your recent genetic testing results. I have also included a copy of the testing report for your own records.     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 The variant was found to be possibly mosaic, meaning only a certain portion of your buccal cells were found to have the variant. Your other tissues may have higher or lower proportions of the variant. Variant of uncertain significance (VUS) means that a change was identified in the gene, but the lab does not have enough information about this particular change to know if it could actually cause health concerns or if it is just part of normal variation between people.     Disease causing variants in NOTCH1 are associated with Louis's-Nithin syndrome and congenital heart diease. Some individuals with congenital heart disease, specifically bicuspid aortic valve, develop aortic dilation. This does not fit well with your clinical picture of isolated ascending aortic aneurysm. Therefore, we are less suspicious that this variant could be the cause of your concerns. Testing other family members for the NOTCH1 variant is not recommended at this time. In the future, we may gain additional information about this gene and variant that may help us better understand whether or not this change is contributing to your concerns.      No other disease-causing or uncertain changes were identified in the 30 genes analyzed. This result rules out many potential genetic causes for your concerns. However, it is still possible that your concerns are due to a genetic factor not analyzed by this particular  test due to limitations in knowledge and technology.     Follow-Up  At this point, Dr. Grewal is not recommending additional genetic testing or genetics follow-up for you. You are encouraged to reach out to us if new concerns or questions come up in the future. You should continue to follow-up with your cardiologist and other providers as indicated. Family members can consider their own cardiac screening.       Sincerely,    Agnes Fischer MS, [facility]  Genetic Counselor  [facility]  Phone: [phone]

## 2024-10-03 DIAGNOSIS — F42.9 OBSESSIVE-COMPULSIVE DISORDER, UNSPECIFIED TYPE: Primary | ICD-10-CM

## 2024-10-03 RX ORDER — BREXPIPRAZOLE 0.5 MG/1
0.5 TABLET ORAL DAILY
Qty: 30 TABLET | Refills: 11 | Status: SHIPPED | OUTPATIENT
Start: 2024-10-03

## 2024-10-08 DIAGNOSIS — R19.8 ALTERED BOWEL FUNCTION: ICD-10-CM

## 2024-10-09 DIAGNOSIS — R25.1 TREMOR: ICD-10-CM

## 2024-10-09 DIAGNOSIS — F42.9 OBSESSIVE-COMPULSIVE DISORDER, UNSPECIFIED TYPE: Primary | ICD-10-CM

## 2024-10-09 RX ORDER — CARBIDOPA AND LEVODOPA 25; 100 MG/1; MG/1
TABLET ORAL
Qty: 270 TABLET | Refills: 3 | Status: SHIPPED | OUTPATIENT
Start: 2024-10-09

## 2024-10-09 RX ORDER — QUETIAPINE FUMARATE 25 MG/1
25 TABLET, FILM COATED ORAL
Qty: 28 TABLET | Refills: 11 | Status: SHIPPED | OUTPATIENT
Start: 2024-10-09 | End: 2024-11-07

## 2024-10-09 RX ORDER — LACTULOSE 10 G/15ML
SOLUTION ORAL
Qty: 946 ML | Refills: 0 | Status: SHIPPED | OUTPATIENT
Start: 2024-10-09 | End: 2024-11-07

## 2024-10-14 ENCOUNTER — NURSE TRIAGE (OUTPATIENT)
Dept: FAMILY MEDICINE | Facility: CLINIC | Age: 89
End: 2024-10-14
Payer: COMMERCIAL

## 2024-10-14 DIAGNOSIS — L89.159 DECUBITUS ULCER OF COCCYGEAL REGION, UNSPECIFIED ULCER STAGE: Primary | ICD-10-CM

## 2024-10-14 RX ORDER — ZINC OXIDE 20 %
OINTMENT (GRAM) TOPICAL 2 TIMES DAILY PRN
Qty: 85 G | Refills: 1 | Status: SHIPPED | OUTPATIENT
Start: 2024-10-14

## 2024-10-14 NOTE — TELEPHONE ENCOUNTER
Patient wants an order to [facility] for Desitin for the patient. Patient has had an order in the past. Patient insistent it came from the pharmacy.     Patient was seen by [facility] for sores on his butt. States they are getting better but needs desitin to make them feel better.  States this should be ordered.    States the [facility] said this should be applied and patient needs a renewal.     Nurse Triage SBAR    Is this a 2nd Level Triage? NO          Does the patient meet one of the following criteria for ADS visit consideration?    Reason for Disposition   1 or 2 small sores    Additional Information   Negative: Sounds like a life-threatening emergency to the triager   Negative: Followed a burn   Negative: Followed an injury to the skin (such as a cut or scrape)   Negative: Boil (abscess) suspected (painful red lump)   Negative: Widespread rash or redness   Negative: Cold sore suspected (i.e., fever blister sore on the outer lip)   Negative: Insect bite(s) suspected   Negative: Poison ivy, oak, or sumac rash suspected (e.g., itchy rash after contact with poison ivy)   Negative: Sore(s) on female genital area  (e.g., labia, vagina, vulva)   Negative: Sore(s) on male genital area (e.g., penis, scrotum)   Negative: Wound infection suspected (in traumatic or surgical wound)   Negative: Black (necrotic), dark purple, or blisters develop in area of sore   Negative: Patient sounds very sick or weak to the triager   Negative: Looks infected (e.g., spreading redness, pus) and fever   Negative: Looks infected and large red area (> 2 inches or 5 cm) or streak   Negative: Ulcer with black scab that is spreading, painless and cause unknown   Negative: Looks infected (e.g., spreading redness, pus) and no fever   Negative: Unexplained sores and 3 or more   Negative: Large sore (> 1 inch or 2.5 cm across)   Negative: Looks like a boil, deep ulcer or is very painful   Negative: Multiple small blisters grouped  "together in one area of body (i.e., dermatomal distribution or 'band' or 'stripe') and painful   Negative: New or worsening foot sore (e.g., ulcer, wound, blister, red area) and has diabetes   Negative: Sores and crusts are around openings to nose, or anywhere else on face   Negative: Any other family members with similar sores   Negative: New pressure ulcer suspected   Negative: Using antibiotic ointment > 24 hours and new sore occurs   Negative: Using antibiotic ointment > 48 hours and sore increases in size   Negative: Using antibiotic ointment > 1 week and sore not completely healed   Negative: Lower leg sore and venous ulcer suspected (e.g., brownish pigment around sore, leg edema, varicose veins)   Negative: Patient wants to be seen    Answer Assessment - Initial Assessment Questions  1. APPEARANCE of SORES: \"What do the sores look like?\"      They're almost gone, wound care at [facility]. They said he should keep putting cream on buttocks   2. NUMBER: \"How many sores are there?\"      clyde  3. SIZE: \"How big is the largest sore?\"      clyde  4. LOCATION: \"Where are the sores located?\"      butt  5. ONSET: \"When did the sores begin?\"      On for some time   6. TENDER: \"Does it hurt when you touch it?\"  (Scale 1-10; or mild, moderate, severe)       no  7. CAUSE: \"What do you think is causing the sores?\"      no  8. OTHER SYMPTOMS: \"Do you have any other symptoms?\" (e.g., fever, new weakness)      no    Protocols used: Sores-A-OH    "

## 2024-10-21 ENCOUNTER — NURSE TRIAGE (OUTPATIENT)
Dept: FAMILY MEDICINE | Facility: CLINIC | Age: 89
End: 2024-10-21
Payer: COMMERCIAL

## 2024-10-21 NOTE — TELEPHONE ENCOUNTER
"Patient reports having lower extremity swelling for 'quite a while' and is asking at what point he should start to wear compression stockings.   He has not brought this up to PCP    Both legs, extending 3-4\" above ankle  Non-pitting  No redness, fevers, chest pain, difficulty breathing.     Dispo: See in office within 3 days  Patient declines scheduling appointment.   Requesting a message to PCP asking at what point pt should start to wear compression stockings.      Can we leave a detailed message on this number? YES  Phone number patient can be reached at: Home number on file [phone] (home)    Ana Huynh RN  [facility] Triage      Reason for Disposition   MILD swelling of both ankles (i.e., pedal edema) AND new-onset or worsening    Additional Information   Negative: Chest pain   Negative: Followed an insect bite and has localized swelling (e.g., small area of puffy or swollen skin)   Negative: Followed a knee injury   Negative: Ankle or foot injury   Negative: Pregnant with leg swelling or edema   Negative: Difficulty breathing at rest   Negative: Entire foot is cool or blue in comparison to other side   Negative: SEVERE swelling (e.g., swelling extends above knee, entire leg is swollen, weeping fluid)   Negative: Cast on leg or ankle and has increasing pain   Negative: Can't walk or can barely stand (new-onset)   Negative: Fever and red area (or area very tender to touch)   Negative: Patient sounds very sick or weak to the triager   Negative: Swelling of face, arm or hands  (Exception: Slight puffiness of fingers during hot weather.)   Negative: Pregnant 20 or more weeks and sudden weight gain (i.e., > 2 lbs, 1 kg in one week)   Negative: Thigh or calf pain and only 1 side and present > 1 hour   Negative: Thigh, calf, or ankle swelling in only one leg   Negative: Thigh, calf, or ankle swelling in both legs, but one side is definitely more swollen (Exception: Longstanding difference " "between legs.)   Negative: MODERATE swelling of both ankles (e.g., swelling extends up to the knees) AND new-onset or worsening   Negative: Difficulty breathing with exertion AND worsening or new-onset   Negative: Looks like a boil, infected sore, deep ulcer, or other infected rash (spreading redness, pus)   Negative: Patient wants to be seen    Answer Assessment - Initial Assessment Questions  1. ONSET: \"When did the swelling start?\" (e.g., minutes, hours, days)      Months ago  2. LOCATION: \"What part of the leg is swollen?\"  \"Are both legs swollen or just one leg?\"      Both legs, extending 3-4\" above ankle  3. SEVERITY: \"How bad is the swelling?\" (e.g., localized; mild, moderate, severe)    - Localized: Small area of swelling localized to one leg.    - MILD pedal edema: Swelling limited to foot and ankle, pitting edema < 1/4 inch (6 mm) deep, rest and elevation eliminate most or all swelling.    - MODERATE edema: Swelling of lower leg to knee, pitting edema > 1/4 inch (6 mm) deep, rest and elevation only partially reduce swelling.    - SEVERE edema: Swelling extends above knee, facial or hand swelling present.       Moderate - 3-4\" above ankle  4. REDNESS: \"Does the swelling look red or infected?\"      No  5. PAIN: \"Is the swelling painful to touch?\" If Yes, ask: \"How painful is it?\"   (Scale 1-10; mild, moderate or severe)      No  6. FEVER: \"Do you have a fever?\" If Yes, ask: \"What is it, how was it measured, and when did it start?\"       No  7. CAUSE: \"What do you think is causing the leg swelling?\"      Unknown  8. MEDICAL HISTORY: \"Do you have a history of blood clots (e.g., DVT), cancer, heart failure, kidney disease, or liver failure?\"      CKD  9. RECURRENT SYMPTOM: \"Have you had leg swelling before?\" If Yes, ask: \"When was the last time?\" \"What happened that time?\"      Yes  10. OTHER SYMPTOMS: \"Do you have any other symptoms?\" (e.g., chest pain, difficulty breathing)        No  11. PREGNANCY: \"Is there " "any chance you are pregnant?\" \"When was your last menstrual period?\"        No    Protocols used: Leg Swelling and Edema-A-OH    "

## 2024-10-21 NOTE — TELEPHONE ENCOUNTER
If not pitting, no skin lesions and not bothersome do not have to do anything, especially if this has been for years.    Zurdo Kendrick M.D.

## 2024-10-21 NOTE — TELEPHONE ENCOUNTER
Spoke with pt about provider's recommendations. Pt verbalized understanding and has no further questions or concerns.     Leroy Moore RN  [facility]

## 2024-10-30 DIAGNOSIS — F42.9 OBSESSIVE-COMPULSIVE DISORDER, UNSPECIFIED TYPE: Chronic | ICD-10-CM

## 2024-10-30 RX ORDER — GABAPENTIN 400 MG/1
CAPSULE ORAL
Qty: 90 CAPSULE | Refills: 11 | Status: SHIPPED | OUTPATIENT
Start: 2024-10-30

## 2024-11-06 DIAGNOSIS — R19.8 ALTERED BOWEL FUNCTION: ICD-10-CM

## 2024-11-07 DIAGNOSIS — F42.9 OBSESSIVE-COMPULSIVE DISORDER, UNSPECIFIED TYPE: ICD-10-CM

## 2024-11-07 RX ORDER — QUETIAPINE FUMARATE 25 MG/1
25 TABLET, FILM COATED ORAL
Qty: 28 TABLET | Refills: 11 | Status: SHIPPED | OUTPATIENT
Start: 2024-11-07

## 2024-11-07 RX ORDER — LACTULOSE 10 G/15ML
SOLUTION ORAL
Qty: 946 ML | Status: SHIPPED | OUTPATIENT
Start: 2024-11-07

## 2024-11-07 RX ORDER — ESCITALOPRAM OXALATE 10 MG/1
10 TABLET ORAL DAILY
Qty: 28 TABLET | Refills: 11 | Status: SHIPPED | OUTPATIENT
Start: 2024-11-07

## 2024-12-13 ENCOUNTER — MEDICAL CORRESPONDENCE (OUTPATIENT)
Dept: HEALTH INFORMATION MANAGEMENT | Facility: CLINIC | Age: 89
End: 2024-12-13
Payer: COMMERCIAL

## 2024-12-24 DIAGNOSIS — E03.9 HYPOTHYROIDISM, UNSPECIFIED TYPE: ICD-10-CM

## 2024-12-24 RX ORDER — LEVOTHYROXINE SODIUM 88 UG/1
TABLET ORAL
Qty: 90 TABLET | Refills: 0 | Status: SHIPPED | OUTPATIENT
Start: 2024-12-24

## 2025-01-29 DIAGNOSIS — E78.5 HYPERLIPIDEMIA LDL GOAL <100: ICD-10-CM

## 2025-01-29 DIAGNOSIS — I63.9 CEREBROVASCULAR ACCIDENT (CVA), UNSPECIFIED MECHANISM (H): ICD-10-CM

## 2025-01-29 RX ORDER — CLOPIDOGREL BISULFATE 75 MG/1
TABLET ORAL
Qty: 30 TABLET | Refills: 11 | Status: SHIPPED | OUTPATIENT
Start: 2025-01-29

## 2025-01-29 RX ORDER — SIMVASTATIN 20 MG
TABLET ORAL
Qty: 30 TABLET | Refills: 2 | Status: SHIPPED | OUTPATIENT
Start: 2025-01-29

## 2025-02-19 DIAGNOSIS — R21 RASH AND NONSPECIFIC SKIN ERUPTION: Primary | ICD-10-CM

## 2025-02-19 RX ORDER — ZINC OXIDE 13 %
CREAM (GRAM) TOPICAL
Qty: 113 G | Refills: 0 | Status: SHIPPED | OUTPATIENT
Start: 2025-02-19

## 2025-02-21 ENCOUNTER — HOSPITAL ENCOUNTER (OUTPATIENT)
Dept: CT IMAGING | Facility: CLINIC | Age: OVER 89
Discharge: HOME | End: 2025-02-21
Attending: RADIOLOGY | Admitting: RADIOLOGY
Payer: COMMERCIAL

## 2025-02-21 DIAGNOSIS — I71.40 ABDOMINAL AORTIC ANEURYSM (AAA) WITHOUT RUPTURE: ICD-10-CM

## 2025-02-21 PROCEDURE — 71250 CT THORAX DX C-: CPT

## 2025-02-21 PROCEDURE — 74150 CT ABDOMEN W/O CONTRAST: CPT

## 2025-02-25 ENCOUNTER — TELEPHONE (OUTPATIENT)
Dept: OTHER | Facility: CLINIC | Age: OVER 89
End: 2025-02-25
Payer: COMMERCIAL

## 2025-02-25 NOTE — TELEPHONE ENCOUNTER
Reviewed CT results with Dr. Niya Gallego, lung nodule stable and TAA and EVAR are stable.  Recommend CT scan in 1 year.  Contacted patient with the findings and recommendations.  Trini Gonzalez RN  IR nurse clinician  [phone]

## 2025-02-26 ENCOUNTER — NURSE TRIAGE (OUTPATIENT)
Dept: FAMILY MEDICINE | Facility: CLINIC | Age: OVER 89
End: 2025-02-26
Payer: COMMERCIAL

## 2025-02-26 DIAGNOSIS — I71.43 INFRARENAL ABDOMINAL AORTIC ANEURYSM (AAA) WITHOUT RUPTURE: Primary | ICD-10-CM

## 2025-02-26 NOTE — TELEPHONE ENCOUNTER
"Nurse Triage SBAR    Is this a 2nd Level Triage? NO    Situation: Pt calling with a symptom.    Background: Pt takes clopidogrel.     Assessment: Pt had a normal BM this morning. When wiping, there was a couple of spots of blood on the toilet paper x1. Denies constipation, diarrhea, abdominal pain, nausea, dizziness.   1. APPEARANCE of BLOOD: \"What color is it?\" \"Is it passed separately, on the surface of the stool, or mixed in with the stool?\"       Blood on toilet paper after having BM  2. AMOUNT: \"How much blood was passed?\"       A couple of spots of red blood  3. FREQUENCY: \"How many times has blood been passed with the stools?\"       once  4. ONSET: \"When was the blood first seen in the stools?\" (Days or weeks)       today  5. DIARRHEA: \"Is there also some diarrhea?\" If Yes, ask: \"How many diarrhea stools in the past 24 hours?\"       no  6. CONSTIPATION: \"Do you have constipation?\" If Yes, ask: \"How bad is it?\"      no  7. RECURRENT SYMPTOMS: \"Have you had blood in your stools before?\" If Yes, ask: \"When was the last time?\" and \"What happened that time?\"       no  8. BLOOD THINNERS: \"Do you take any blood thinners?\" (e.g., Coumadin/warfarin, Pradaxa/dabigatran, aspirin)      Clopidogrel   9. OTHER SYMPTOMS: \"Do you have any other symptoms?\"  (e.g., abdomen pain, vomiting, dizziness, fever)      none  10. PREGNANCY: \"Is there any chance you are pregnant?\" \"When was your last menstrual period?\"        N/a    Protocol Recommended Disposition:   See in Office Within 3 Days    Recommendation: OV scheduled. Pt will call if questions or symptoms. Pt will call back if unable to locate transportation.          Does the patient meet one of the following criteria for ADS visit consideration? No   Reason for Disposition   MILD rectal bleeding (more than just a few drops or streaks)    Additional Information   Negative: Passed out (e.g., fainted, lost consciousness, blacked out and was not responding)   Negative: Shock " "suspected (e.g., cold/pale/clammy skin, too weak to stand, low BP, rapid pulse)   Negative: Vomiting red blood or black (coffee ground) material   Negative: Sounds like a life-threatening emergency to the triager   Negative: Diarrhea is main symptom   Negative: Rectal symptoms   Negative: SEVERE rectal bleeding (large blood clots; constant or on and off bleeding)   Negative: SEVERE dizziness (e.g., unable to stand, requires support to walk, feels like passing out now)   Negative: MODERATE rectal bleeding (small blood clots, passing blood without stool, or toilet water turns red) more than once a day   Negative: Bloody, black, or tarry bowel movements  (Exception: Chronic-unchanged black-grey bowel movements and is taking iron pills or Pepto-Bismol.)   Negative: High-risk adult (e.g., prior surgery on aorta, abdominal aortic aneurysm)   Negative: Rectal foreign body (inserted or swallowed)   Negative: SEVERE abdominal pain (e.g., excruciating)   Negative: Constant abdominal pain lasting > 2 hours   Negative: Pale skin (pallor) of new-onset or getting worse   Negative: Patient sounds very sick or weak to the triager   Negative: MODERATE rectal bleeding (small blood clots, passing blood without stool, or toilet water turns red)   Negative: Taking Coumadin (warfarin) or other strong blood thinner, or known bleeding disorder (e.g., thrombocytopenia)   Negative: Colonoscopy in past 72 hours   Negative: Known cirrhosis of the liver (or history of liver failure or ascites)   Negative: Patient wants to be seen    Answer Assessment - Initial Assessment Questions  1. APPEARANCE of BLOOD: \"What color is it?\" \"Is it passed separately, on the surface of the stool, or mixed in with the stool?\"       Blood on toilet paper after having BM  2. AMOUNT: \"How much blood was passed?\"       A couple of spots of red blood  3. FREQUENCY: \"How many times has blood been passed with the stools?\"       once  4. ONSET: \"When was the blood first " "seen in the stools?\" (Days or weeks)       today  5. DIARRHEA: \"Is there also some diarrhea?\" If Yes, ask: \"How many diarrhea stools in the past 24 hours?\"       no  6. CONSTIPATION: \"Do you have constipation?\" If Yes, ask: \"How bad is it?\"      no  7. RECURRENT SYMPTOMS: \"Have you had blood in your stools before?\" If Yes, ask: \"When was the last time?\" and \"What happened that time?\"       no  8. BLOOD THINNERS: \"Do you take any blood thinners?\" (e.g., Coumadin/warfarin, Pradaxa/dabigatran, aspirin)      Clopidogrel   9. OTHER SYMPTOMS: \"Do you have any other symptoms?\"  (e.g., abdomen pain, vomiting, dizziness, fever)      none  10. PREGNANCY: \"Is there any chance you are pregnant?\" \"When was your last menstrual period?\"        N/a    Protocols used: Rectal Bleeding-A-OH    "

## 2025-02-27 ENCOUNTER — OFFICE VISIT (OUTPATIENT)
Dept: FAMILY MEDICINE | Facility: CLINIC | Age: OVER 89
End: 2025-02-27
Payer: COMMERCIAL

## 2025-02-27 VITALS
RESPIRATION RATE: 16 BRPM | OXYGEN SATURATION: 96 % | HEART RATE: 56 BPM | SYSTOLIC BLOOD PRESSURE: 160 MMHG | TEMPERATURE: 97.8 F | BODY MASS INDEX: 24.39 KG/M2 | HEIGHT: 73 IN | DIASTOLIC BLOOD PRESSURE: 78 MMHG | WEIGHT: 184 LBS

## 2025-02-27 DIAGNOSIS — E78.5 HYPERLIPIDEMIA LDL GOAL <100: ICD-10-CM

## 2025-02-27 DIAGNOSIS — I13.10 HYPERTENSIVE HEART AND KIDNEY DISEASE WITHOUT HEART FAILURE AND WITH STAGE 3A CHRONIC KIDNEY DISEASE (H): ICD-10-CM

## 2025-02-27 DIAGNOSIS — K64.4 EXTERNAL HEMORRHOIDS: Primary | ICD-10-CM

## 2025-02-27 DIAGNOSIS — G20.C PARKINSONISM, UNSPECIFIED PARKINSONISM TYPE (H): ICD-10-CM

## 2025-02-27 DIAGNOSIS — N18.31 HYPERTENSIVE HEART AND KIDNEY DISEASE WITHOUT HEART FAILURE AND WITH STAGE 3A CHRONIC KIDNEY DISEASE (H): ICD-10-CM

## 2025-02-27 PROCEDURE — 3078F DIAST BP <80 MM HG: CPT | Performed by: INTERNAL MEDICINE

## 2025-02-27 PROCEDURE — 3077F SYST BP >= 140 MM HG: CPT | Performed by: INTERNAL MEDICINE

## 2025-02-27 PROCEDURE — 1126F AMNT PAIN NOTED NONE PRSNT: CPT | Performed by: INTERNAL MEDICINE

## 2025-02-27 PROCEDURE — G2211 COMPLEX E/M VISIT ADD ON: HCPCS | Performed by: INTERNAL MEDICINE

## 2025-02-27 PROCEDURE — 99214 OFFICE O/P EST MOD 30 MIN: CPT | Performed by: INTERNAL MEDICINE

## 2025-02-27 RX ORDER — HYDROCORTISONE 25 MG/G
CREAM TOPICAL 2 TIMES DAILY PRN
Qty: 30 G | Refills: 2 | Status: SHIPPED | OUTPATIENT
Start: 2025-02-27

## 2025-02-27 ASSESSMENT — PAIN SCALES - GENERAL: PAINLEVEL_OUTOF10: NO PAIN (0)

## 2025-02-27 NOTE — PROGRESS NOTES
{PROVIDER CHARTING PREFERENCE:721242}    Fausto Dixon is a 89 year old, presenting for the following health issues:  No chief complaint on file.  {(!) Visit Details have not yet been documented.  Please enter Visit Details and then use this list to pull in documentation. (Optional):219921}  History of Present Illness       Reason for visit:  Blood concern         {MA/LPN/RN Pre-Provider Visit Orders- hCG/UA/Strep (Optional):147512}  {SUPERLIST (Optional):024606}  {additonal problems for provider to add (Optional):725674}    {ROS Picklists (Optional):065957}      Objective    There were no vitals taken for this visit.  There is no height or weight on file to calculate BMI.  Physical Exam   {Exam List (Optional):316083}    {Diagnostic Test Results (Optional):697833}        Signed Electronically by: Lianna Nicole MD  {Email feedback regarding this note to [email or web address]   :209042}   mirtazapine (REMERON) 45 MG tablet 1 TABLET ORALLY AT BEDTIME (DX: DEPRESSION) 28 tablet 11    OPTIVE 0.5-0.9 % ophthalmic solution INSTILL 1 DROP INTO BOTH EYES 2 TIMES DAILY  (DX: DRY EYES) 15 mL 11    QUEtiapine (SEROQUEL) 25 MG tablet 1 TABLET ORALLY AT BEDTIME 28 tablet 11    REXULTI 0.5 MG tablet 1 TABLET ORALLY DAILY 30 tablet 11    senna-docusate (SENEXON-S) 8.6-50 MG tablet 1 TABLET ORALLY DAILY AS NEEDED (DX: CONSTIPATION) 30 tablet 10    simvastatin (ZOCOR) 20 MG tablet 1 TABLET ORALLY AT BEDTIME   (DX: CHOLESTEROL) 30 tablet 2    sodium chloride (DEEP SEA NASAL SPRAY) 0.65 % nasal spray INSTILL SPRAY(S) INTO NOSTRIL(S) 3 TIMES DAILY 44 mL 11    SOLUBLE FIBER THERAPY powder TAKE 1 TEASPOONFUL ORALLY DAILY (DX: CONSTIPATION) 454 g 11    zinc oxide (DESITIN) 20 % external ointment Apply topically 2 times daily as needed for dry skin or irritation. 85 g 1     No current facility-administered medications for this visit.     Past Medical History:   Diagnosis Date    A-fib (H) 2010    post op    AAA (abdominal aortic aneurysm) without rupture     Dr. 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    carotid us no stenosis, echo no change and no clots; ziopatch no afib, svt present    Anemia 2004    Aortic insufficiency 06/2014    1+ on echo    Aortic insufficiency 11/2016    mild to mod    Ascending aorta dilatation 01/2023    5cm on ct, fu done 2/24 and stable    ASCVD (arteriosclerotic cardiovascular disease) 2010    cabg, post op afib    BPH (benign prostatic hyperplasia) 2003    turp, flomax added by urol 2/17    Bradycardia 2016    stopped toprol    CKD (chronic kidney disease) stage 3, GFR 30-59 ml/min (H)     Constipation 05/2020    colonoscopy nl    Cough 2010    pulm eval, felt due to reflux    Dizzy 2001    mri small vessel dz    GERD (gastroesophageal reflux disease)     HTN (hypertension) 2002    Hx of colonoscopy 2003    tics    Hypothyroid     Hypovitaminosis D      Idiopathic neuropathy     Lung nodule 2018    6mm, found during eval of ascending aorta, fu  no change    OCD (obsessive compulsive disorder)     sees psyche    Panic disorder     Dr. Luna, then someone after he retired    Parkinson's disease (H) 2023    per neuro    Spinal headache     Stroke (H) 2016    expressive aphasia, hosp, seen by neuro, mri pos, carotids min dz, changed from asa to plavix    Sudden hearing loss 2013    Dr. Garcia, mri brain no acoustic neuroma    SVT (supraventricular tachycardia) 2016    seen on ziopatch done for amaurosis, longest 15 beats    Thoracic ascending aortic aneurysm 2014    4.4cm on echo, aortic root 3.9, fu 5/15 4.8cm; fu done 12/15 and slightly enlarged, fu  no change, fu  no change; fu  echo 5.1-5.3, enlarged, then cta done and only 4.8cm, t fu 6 months, lvh, nl ef, mild ai; fu  slightly larger; fu  slightly smaller; fu  and then  and stable    Ulcerative colitis (H)     not active for years     Social History     Socioeconomic History    Marital status:      Spouse name: Not on file    Number of children: 2    Years of education: Not on file    Highest education level: Not on file   Occupational History    Occupation: retail     Employer: RETIRED   Tobacco Use    Smoking status: Former     Current packs/day: 0.00     Average packs/day: 1 pack/day for 5.0 years (5.0 ttl pk-yrs)     Types: Cigarettes     Start date: 1960     Quit date: 1965     Years since quittin.7     Passive exposure: Current    Smokeless tobacco: Never   Vaping Use    Vaping status: Never Used   Substance and Sexual Activity    Alcohol use: Not Currently     Comment: none    Drug use: No    Sexual activity: Not Currently     Partners: Female   Other Topics Concern    Parent/sibling w/ CABG, MI or angioplasty before 65F 55M? Not Asked   Social History Narrative    Not on file     Social Drivers of Health     Financial Resource  "Strain: Low Risk  (9/16/2024)    Financial Resource Strain     Within the past 12 months, have you or your family members you live with been unable to get utilities (heat, electricity) when it was really needed?: No   Food Insecurity: Low Risk  (9/16/2024)    Food Insecurity     Within the past 12 months, did you worry that your food would run out before you got money to buy more?: No     Within the past 12 months, did the food you bought just not last and you didn t have money to get more?: No   Transportation Needs: Low Risk  (9/16/2024)    Transportation Needs     Within the past 12 months, has lack of transportation kept you from medical appointments, getting your medicines, non-medical meetings or appointments, work, or from getting things that you need?: No   Physical Activity: Not on file   Stress: Not on file   Social Connections: Not on file   Interpersonal Safety: Low Risk  (9/16/2024)    Interpersonal Safety     Do you feel physically and emotionally safe where you currently live?: Yes     Within the past 12 months, have you been hit, slapped, kicked or otherwise physically hurt by someone?: No     Within the past 12 months, have you been humiliated or emotionally abused in other ways by your partner or ex-partner?: No   Housing Stability: Low Risk  (9/16/2024)    Housing Stability     Do you have housing? : Yes     Are you worried about losing your housing?: No           Review of Systems  Constitutional, HEENT, cardiovascular, pulmonary, GI, , musculoskeletal, neuro, skin, endocrine and psych systems are negative, except as otherwise noted.      Objective    BP (!) 160/78   Pulse 56   Temp 97.8  F (36.6  C) (Temporal)   Resp 16   Ht 1.854 m (6' 0.99\")   Wt 83.5 kg (184 lb)   SpO2 96%   BMI 24.28 kg/m    Body mass index is 24.28 kg/m .  Physical Exam   GENERAL: alert and no distress  EYES: Eyes grossly normal to inspection, conjunctivae and sclerae normal  HENT: normocephalic atraumatic  NECK: " no asymmetry  RESP: lungs clear to auscultation - no rales, rhonchi or wheezes  CV: regular rate and rhythm, normal S1 S2  ABDOMEN: soft, nondistended  MS: no gross musculoskeletal defects noted, no edema  Rectal: external hemorrhoids symptoms present  SKIN: no suspicious lesions or rashes  NEURO:  mentation intact and speech normal  PSYCH: affect normal/bright    Labs reviewed in Epic      The longitudinal plan of care for the diagnosis(es)/condition(s) as documented were addressed during this visit. Due to the added complexity in care, I will continue to support Zack in the subsequent management and with ongoing continuity of care.        Signed Electronically by: Lianna Nicole MD

## 2025-03-03 DIAGNOSIS — F42.9 OBSESSIVE-COMPULSIVE DISORDER, UNSPECIFIED TYPE: ICD-10-CM

## 2025-03-03 RX ORDER — QUETIAPINE FUMARATE 25 MG/1
TABLET, FILM COATED ORAL
Qty: 30 TABLET | Refills: 11 | Status: SHIPPED | OUTPATIENT
Start: 2025-03-03

## 2025-03-14 ENCOUNTER — MEDICAL CORRESPONDENCE (OUTPATIENT)
Dept: HEALTH INFORMATION MANAGEMENT | Facility: CLINIC | Age: OVER 89
End: 2025-03-14
Payer: COMMERCIAL

## 2025-03-17 ENCOUNTER — TELEPHONE (OUTPATIENT)
Dept: FAMILY MEDICINE | Facility: CLINIC | Age: OVER 89
End: 2025-03-17
Payer: COMMERCIAL

## 2025-03-17 NOTE — TELEPHONE ENCOUNTER
Faxed signed Physical Therapy - patient discharge summary to Oscar @ [phone]  Confirmation received

## 2025-03-27 ENCOUNTER — TELEPHONE (OUTPATIENT)
Dept: FAMILY MEDICINE | Facility: CLINIC | Age: OVER 89
End: 2025-03-27
Payer: COMMERCIAL

## 2025-04-01 DIAGNOSIS — R19.8 ALTERED BOWEL FUNCTION: ICD-10-CM

## 2025-04-01 RX ORDER — METHYLCELLULOSE 2 G/19G
POWDER, FOR SOLUTION ORAL
Qty: 454 G | Refills: 11 | Status: SHIPPED | OUTPATIENT
Start: 2025-04-01

## 2025-04-03 ENCOUNTER — OFFICE VISIT (OUTPATIENT)
Dept: FAMILY MEDICINE | Facility: CLINIC | Age: OVER 89
End: 2025-04-03
Payer: COMMERCIAL

## 2025-04-03 ENCOUNTER — NURSE TRIAGE (OUTPATIENT)
Dept: FAMILY MEDICINE | Facility: CLINIC | Age: OVER 89
End: 2025-04-03

## 2025-04-03 VITALS
DIASTOLIC BLOOD PRESSURE: 74 MMHG | BODY MASS INDEX: 24.25 KG/M2 | HEART RATE: 98 BPM | WEIGHT: 183 LBS | RESPIRATION RATE: 16 BRPM | OXYGEN SATURATION: 98 % | HEIGHT: 73 IN | SYSTOLIC BLOOD PRESSURE: 163 MMHG | TEMPERATURE: 97.6 F

## 2025-04-03 DIAGNOSIS — J02.9 SORE THROAT: Primary | ICD-10-CM

## 2025-04-03 LAB
DEPRECATED S PYO AG THROAT QL EIA: NEGATIVE
FLUAV AG SPEC QL IA: NEGATIVE
FLUBV AG SPEC QL IA: NEGATIVE
S PYO DNA THROAT QL NAA+PROBE: NOT DETECTED

## 2025-04-03 ASSESSMENT — PAIN SCALES - GENERAL: PAINLEVEL_OUTOF10: MODERATE PAIN (6)

## 2025-04-03 NOTE — PROGRESS NOTES
"  Assessment & Plan     Sore throat  Tested for viral illnesses given sore throat x 4 days although normal exam makes strep unlikely. Strep and influenza A negative. Covid pending. Allergies could play a role, but likely viral illness that will run its course. Supportive cares and hydration. Follow up as needed if symptoms worsen.  - Streptococcus A Rapid Screen w/Reflex to PCR - Clinic Collect  - COVID-19 Virus (Coronavirus) by PCR Nasopharyngeal  - Influenza A & B Antigen - Clinic Collect  - Group A Streptococcus PCR Throat Swab    Fausto Dixon is a 89 year old, presenting for the following health issues:  Pharyngitis      4/3/2025     3:10 PM   Additional Questions   Roomed by Jannette   Accompanied by Marcelino, son-in-law     HPI      Severe sore throat for 4 days on the right side   Pain with swallowing   Stated just in the room his symptoms are a little better compared to this morning   No fever   No known sick contacts         Review of Systems  Detailed as above         Objective    BP (!) 163/74 (BP Location: Left arm)   Pulse 98   Temp 97.6  F (36.4  C) (Tympanic)   Resp 16   Ht 1.854 m (6' 1\")   Wt 83 kg (183 lb)   SpO2 98%   BMI 24.14 kg/m    Body mass index is 24.14 kg/m .  Physical Exam  Constitutional:       Appearance: Normal appearance.   HENT:      Mouth/Throat:      Mouth: Mucous membranes are moist.      Pharynx: Oropharynx is clear. No oropharyngeal exudate or posterior oropharyngeal erythema.   Pulmonary:      Effort: Pulmonary effort is normal.   Musculoskeletal:      Cervical back: Normal range of motion.   Lymphadenopathy:      Cervical: No cervical adenopathy.   Neurological:      Mental Status: He is alert.   Psychiatric:         Mood and Affect: Mood normal.            Results for orders placed or performed in visit on 04/03/25 (from the past 24 hours)   Streptococcus A Rapid Screen w/Reflex to PCR - Clinic Collect    Specimen: Throat; Swab   Result Value Ref Range    Group A Strep " antigen Negative Negative   Influenza A & B Antigen - Clinic Collect    Specimen: Nose; Swab   Result Value Ref Range    Influenza A antigen Negative Negative    Influenza B antigen Negative Negative    Narrative    Test results must be correlated with clinical data. If necessary, results should be confirmed by a molecular assay or viral culture.     *Note: Due to a large number of results and/or encounters for the requested time period, some results have not been displayed. A complete set of results can be found in Results Review.           Signed Electronically by: DOMENIC Sauceda CNP

## 2025-04-03 NOTE — TELEPHONE ENCOUNTER
Per triage protocol, patient has been scheduled for appointment with available provider today (4/3/25) for worsening throat pain. Patient currently rates pain as 5/10.     Denies fever, chest pain, shortness of breath, cough, congestion, earache, rash, dysphagia  Reason for Disposition   Patient wants to be seen    Additional Information   Negative: SEVERE difficulty breathing (e.g., struggling for each breath, speaks in single words)   Negative: Sounds like a life-threatening emergency to the triager   Negative: Throat culture results, call about   Negative: Productive cough is main symptom   Negative: Runny nose is main symptom   Negative: Drooling or spitting out saliva (because can't swallow)   Negative: Unable to open mouth completely   Negative: Drinking very little and has signs of dehydration (e.g., no urine > 12 hours, very dry mouth, very lightheaded)   Negative: Patient sounds very sick or weak to the triager   Negative: Difficulty breathing (per caller) but not severe   Negative: Fever > 103 F (39.4 C)   Negative: Refuses to drink anything for > 12 hours   Negative: History of rheumatic fever   Negative: Earache also present   Negative: Widespread rash (especially chest and abdomen)   Negative: Diabetes mellitus or weak immune system (e.g., HIV positive, cancer chemo, splenectomy, organ transplant, chronic steroids)   Negative: SEVERE sore throat pain   Negative: Pus on tonsils (back of throat) and swollen neck lymph nodes ('glands')    Protocols used: Sore Throat-A-[place]

## 2025-04-03 NOTE — TELEPHONE ENCOUNTER
Reason for Call:  Appointment Request    Patient requesting this type of appt:  Patient wants to be seen for a sore in his throat that is getting worse.    Requested provider:  PCP or any other provider in the clinic that can see him ASAP.    Reason patient unable to be scheduled: Not within requested timeframe    When does patient want to be seen/preferred time: 1-2 days    Comments: Patient has a sore in his throat that is getting worse.    Could we send this information to you in [facility] or would you prefer to receive a phone call?:   Patient would prefer a phone call   Okay to leave a detailed message?: Yes at Home number on file [phone] (home)    Call taken on 4/3/2025 at 9:24 AM by Ajay Grijalva

## 2025-04-04 ENCOUNTER — MEDICAL CORRESPONDENCE (OUTPATIENT)
Dept: HEALTH INFORMATION MANAGEMENT | Facility: CLINIC | Age: OVER 89
End: 2025-04-04
Payer: COMMERCIAL

## 2025-04-23 DIAGNOSIS — E03.9 HYPOTHYROIDISM, UNSPECIFIED TYPE: ICD-10-CM

## 2025-04-23 RX ORDER — LEVOTHYROXINE SODIUM 88 UG/1
TABLET ORAL
Qty: 90 TABLET | Refills: 0 | Status: SHIPPED | OUTPATIENT
Start: 2025-04-23

## 2025-04-25 ENCOUNTER — MEDICAL CORRESPONDENCE (OUTPATIENT)
Dept: HEALTH INFORMATION MANAGEMENT | Facility: CLINIC | Age: OVER 89
End: 2025-04-25
Payer: COMMERCIAL

## 2025-04-26 ENCOUNTER — HOSPITAL ENCOUNTER (OUTPATIENT)
Facility: CLINIC | Age: OVER 89
Setting detail: OBSERVATION
Discharge: NURSING, REHAB OR LONG-TERM CARE | End: 2025-04-28
Attending: EMERGENCY MEDICINE | Admitting: INTERNAL MEDICINE
Payer: COMMERCIAL

## 2025-04-26 ENCOUNTER — APPOINTMENT (OUTPATIENT)
Dept: CT IMAGING | Facility: CLINIC | Age: OVER 89
End: 2025-04-26
Attending: EMERGENCY MEDICINE
Payer: COMMERCIAL

## 2025-04-26 DIAGNOSIS — R79.89 ELEVATED TROPONIN: ICD-10-CM

## 2025-04-26 DIAGNOSIS — R29.898 RIGHT ARM WEAKNESS: ICD-10-CM

## 2025-04-26 DIAGNOSIS — R26.89 BALANCE DISORDER: Primary | ICD-10-CM

## 2025-04-26 DIAGNOSIS — R29.898 HAND WEAKNESS: ICD-10-CM

## 2025-04-26 DIAGNOSIS — D64.9 ANEMIA, UNSPECIFIED TYPE: ICD-10-CM

## 2025-04-26 DIAGNOSIS — R25.1 TREMOR: ICD-10-CM

## 2025-04-26 DIAGNOSIS — Z86.69 HISTORY OF PARKINSON'S DISEASE: ICD-10-CM

## 2025-04-26 DIAGNOSIS — E87.1 HYPONATREMIA: ICD-10-CM

## 2025-04-26 DIAGNOSIS — I72.5 BASILAR ARTERY ANEURYSM: ICD-10-CM

## 2025-04-26 LAB
APTT PPP: 32 SECONDS (ref 22–38)
BASOPHILS # BLD AUTO: 0 10E3/UL (ref 0–0.2)
BASOPHILS NFR BLD AUTO: 0 %
EOSINOPHIL # BLD AUTO: 0.1 10E3/UL (ref 0–0.7)
EOSINOPHIL NFR BLD AUTO: 2 %
ERYTHROCYTE [DISTWIDTH] IN BLOOD BY AUTOMATED COUNT: 14.3 % (ref 10–15)
HCT VFR BLD AUTO: 27.9 % (ref 40–53)
HGB BLD-MCNC: 9.5 G/DL (ref 13.3–17.7)
IMM GRANULOCYTES # BLD: 0 10E3/UL
IMM GRANULOCYTES NFR BLD: 0 %
INR PPP: 1.27 (ref 0.85–1.15)
LYMPHOCYTES # BLD AUTO: 1.1 10E3/UL (ref 0.8–5.3)
LYMPHOCYTES NFR BLD AUTO: 14 %
MCH RBC QN AUTO: 32.4 PG (ref 26.5–33)
MCHC RBC AUTO-ENTMCNC: 34.1 G/DL (ref 31.5–36.5)
MCV RBC AUTO: 95 FL (ref 78–100)
MONOCYTES # BLD AUTO: 0.7 10E3/UL (ref 0–1.3)
MONOCYTES NFR BLD AUTO: 10 %
NEUTROPHILS # BLD AUTO: 5.4 10E3/UL (ref 1.6–8.3)
NEUTROPHILS NFR BLD AUTO: 74 %
NRBC # BLD AUTO: 0 10E3/UL
NRBC BLD AUTO-RTO: 0 /100
PLATELET # BLD AUTO: 143 10E3/UL (ref 150–450)
RBC # BLD AUTO: 2.93 10E6/UL (ref 4.4–5.9)
WBC # BLD AUTO: 7.3 10E3/UL (ref 4–11)

## 2025-04-26 PROCEDURE — 85610 PROTHROMBIN TIME: CPT | Performed by: EMERGENCY MEDICINE

## 2025-04-26 PROCEDURE — 99291 CRITICAL CARE FIRST HOUR: CPT | Mod: 25

## 2025-04-26 PROCEDURE — 85004 AUTOMATED DIFF WBC COUNT: CPT | Performed by: EMERGENCY MEDICINE

## 2025-04-26 PROCEDURE — 83735 ASSAY OF MAGNESIUM: CPT | Performed by: INTERNAL MEDICINE

## 2025-04-26 PROCEDURE — 36415 COLL VENOUS BLD VENIPUNCTURE: CPT | Performed by: EMERGENCY MEDICINE

## 2025-04-26 PROCEDURE — 80048 BASIC METABOLIC PNL TOTAL CA: CPT | Performed by: EMERGENCY MEDICINE

## 2025-04-26 PROCEDURE — 250N000009 HC RX 250: Performed by: EMERGENCY MEDICINE

## 2025-04-26 PROCEDURE — 70498 CT ANGIOGRAPHY NECK: CPT

## 2025-04-26 PROCEDURE — 99292 CRITICAL CARE ADDL 30 MIN: CPT

## 2025-04-26 PROCEDURE — 84100 ASSAY OF PHOSPHORUS: CPT | Performed by: INTERNAL MEDICINE

## 2025-04-26 PROCEDURE — 85730 THROMBOPLASTIN TIME PARTIAL: CPT | Performed by: EMERGENCY MEDICINE

## 2025-04-26 PROCEDURE — 250N000011 HC RX IP 250 OP 636: Performed by: EMERGENCY MEDICINE

## 2025-04-26 PROCEDURE — 84484 ASSAY OF TROPONIN QUANT: CPT | Performed by: EMERGENCY MEDICINE

## 2025-04-26 PROCEDURE — 70450 CT HEAD/BRAIN W/O DYE: CPT

## 2025-04-26 PROCEDURE — 93005 ELECTROCARDIOGRAM TRACING: CPT

## 2025-04-26 RX ORDER — IOPAMIDOL 755 MG/ML
67 INJECTION, SOLUTION INTRAVASCULAR ONCE
Status: COMPLETED | OUTPATIENT
Start: 2025-04-26 | End: 2025-04-26

## 2025-04-26 RX ADMIN — SODIUM CHLORIDE 100 ML: 9 INJECTION, SOLUTION INTRAVENOUS at 23:23

## 2025-04-26 RX ADMIN — IOPAMIDOL 67 ML: 755 INJECTION, SOLUTION INTRAVENOUS at 23:23

## 2025-04-27 ENCOUNTER — APPOINTMENT (OUTPATIENT)
Dept: MRI IMAGING | Facility: CLINIC | Age: OVER 89
End: 2025-04-27
Attending: INTERNAL MEDICINE
Payer: COMMERCIAL

## 2025-04-27 ENCOUNTER — APPOINTMENT (OUTPATIENT)
Dept: SPEECH THERAPY | Facility: CLINIC | Age: OVER 89
End: 2025-04-27
Attending: INTERNAL MEDICINE
Payer: COMMERCIAL

## 2025-04-27 PROBLEM — D64.9 ANEMIA, UNSPECIFIED TYPE: Status: ACTIVE | Noted: 2025-04-27

## 2025-04-27 PROBLEM — Z86.69 HISTORY OF PARKINSON'S DISEASE: Status: ACTIVE | Noted: 2025-04-27

## 2025-04-27 PROBLEM — R29.898 RIGHT ARM WEAKNESS: Status: ACTIVE | Noted: 2025-04-27

## 2025-04-27 PROBLEM — E87.1 HYPONATREMIA: Status: ACTIVE | Noted: 2025-04-27

## 2025-04-27 PROBLEM — R79.89 ELEVATED TROPONIN: Status: ACTIVE | Noted: 2025-04-27

## 2025-04-27 LAB
ANION GAP SERPL CALCULATED.3IONS-SCNC: 10 MMOL/L (ref 7–15)
ANION GAP SERPL CALCULATED.3IONS-SCNC: 12 MMOL/L (ref 7–15)
ATRIAL RATE - MUSE: 48 BPM
ATRIAL RATE - MUSE: 49 BPM
ATRIAL RATE - MUSE: 57 BPM
BUN SERPL-MCNC: 22 MG/DL (ref 8–23)
BUN SERPL-MCNC: 25.4 MG/DL (ref 8–23)
CALCIUM SERPL-MCNC: 8.3 MG/DL (ref 8.8–10.4)
CALCIUM SERPL-MCNC: 9.3 MG/DL (ref 8.8–10.4)
CHLORIDE SERPL-SCNC: 90 MMOL/L (ref 98–107)
CHLORIDE SERPL-SCNC: 96 MMOL/L (ref 98–107)
CHOLEST SERPL-MCNC: 103 MG/DL
CREAT SERPL-MCNC: 0.9 MG/DL (ref 0.67–1.17)
CREAT SERPL-MCNC: 1.1 MG/DL (ref 0.67–1.17)
DIASTOLIC BLOOD PRESSURE - MUSE: NORMAL MMHG
EGFRCR SERPLBLD CKD-EPI 2021: 63 ML/MIN/1.73M2
EGFRCR SERPLBLD CKD-EPI 2021: 80 ML/MIN/1.73M2
ERYTHROCYTE [DISTWIDTH] IN BLOOD BY AUTOMATED COUNT: 14.3 % (ref 10–15)
FERRITIN SERPL-MCNC: 204 NG/ML (ref 31–409)
FOLATE SERPL-MCNC: 20.7 NG/ML (ref 4.6–34.8)
GLUCOSE SERPL-MCNC: 111 MG/DL (ref 70–99)
GLUCOSE SERPL-MCNC: 94 MG/DL (ref 70–99)
HCO3 SERPL-SCNC: 25 MMOL/L (ref 22–29)
HCO3 SERPL-SCNC: 26 MMOL/L (ref 22–29)
HCT VFR BLD AUTO: 34.1 % (ref 40–53)
HDLC SERPL-MCNC: 50 MG/DL
HGB BLD-MCNC: 11.5 G/DL (ref 13.3–17.7)
INTERPRETATION ECG - MUSE: NORMAL
IRON BINDING CAPACITY (ROCHE): 213 UG/DL (ref 240–430)
IRON SATN MFR SERPL: 20 % (ref 15–46)
IRON SERPL-MCNC: 43 UG/DL (ref 61–157)
LDLC SERPL CALC-MCNC: 35 MG/DL
MAGNESIUM SERPL-MCNC: 1.9 MG/DL (ref 1.7–2.3)
MAGNESIUM SERPL-MCNC: 2 MG/DL (ref 1.7–2.3)
MCH RBC QN AUTO: 32.2 PG (ref 26.5–33)
MCHC RBC AUTO-ENTMCNC: 33.7 G/DL (ref 31.5–36.5)
MCV RBC AUTO: 96 FL (ref 78–100)
NONHDLC SERPL-MCNC: 53 MG/DL
OSMOLALITY SERPL: 287 MMOL/KG (ref 280–301)
P AXIS - MUSE: 49 DEGREES
P AXIS - MUSE: 54 DEGREES
P AXIS - MUSE: 56 DEGREES
PHOSPHATE SERPL-MCNC: 2.6 MG/DL (ref 2.5–4.5)
PHOSPHATE SERPL-MCNC: 2.8 MG/DL (ref 2.5–4.5)
PLATELET # BLD AUTO: 186 10E3/UL (ref 150–450)
POTASSIUM SERPL-SCNC: 3.4 MMOL/L (ref 3.4–5.3)
POTASSIUM SERPL-SCNC: 4.1 MMOL/L (ref 3.4–5.3)
PR INTERVAL - MUSE: 146 MS
PR INTERVAL - MUSE: 150 MS
PR INTERVAL - MUSE: 152 MS
QRS DURATION - MUSE: 156 MS
QRS DURATION - MUSE: 160 MS
QRS DURATION - MUSE: 164 MS
QT - MUSE: 490 MS
QT - MUSE: 506 MS
QT - MUSE: 538 MS
QTC - MUSE: 452 MS
QTC - MUSE: 476 MS
QTC - MUSE: 485 MS
R AXIS - MUSE: -40 DEGREES
R AXIS - MUSE: -42 DEGREES
R AXIS - MUSE: -46 DEGREES
RBC # BLD AUTO: 3.57 10E6/UL (ref 4.4–5.9)
SODIUM SERPL-SCNC: 125 MMOL/L (ref 135–145)
SODIUM SERPL-SCNC: 134 MMOL/L (ref 135–145)
SYSTOLIC BLOOD PRESSURE - MUSE: NORMAL MMHG
T AXIS - MUSE: 251 DEGREES
T AXIS - MUSE: 79 DEGREES
T AXIS - MUSE: 93 DEGREES
T4 FREE SERPL-MCNC: 1.21 NG/DL (ref 0.9–1.7)
TRIGL SERPL-MCNC: 91 MG/DL
TROPONIN T SERPL HS-MCNC: 22 NG/L
TROPONIN T SERPL HS-MCNC: 24 NG/L
TROPONIN T SERPL HS-MCNC: 29 NG/L
TSH SERPL DL<=0.005 MIU/L-ACNC: 4.32 UIU/ML (ref 0.3–4.2)
VENTRICULAR RATE- MUSE: 48 BPM
VENTRICULAR RATE- MUSE: 49 BPM
VENTRICULAR RATE- MUSE: 57 BPM
VIT B12 SERPL-MCNC: 439 PG/ML (ref 232–1245)
WBC # BLD AUTO: 6.7 10E3/UL (ref 4–11)

## 2025-04-27 PROCEDURE — 70553 MRI BRAIN STEM W/O & W/DYE: CPT

## 2025-04-27 PROCEDURE — 80061 LIPID PANEL: CPT | Performed by: INTERNAL MEDICINE

## 2025-04-27 PROCEDURE — 82607 VITAMIN B-12: CPT | Performed by: INTERNAL MEDICINE

## 2025-04-27 PROCEDURE — 99418 PROLNG IP/OBS E/M EA 15 MIN: CPT | Performed by: INTERNAL MEDICINE

## 2025-04-27 PROCEDURE — 99207 PR APP CREDIT; MD BILLING SHARED VISIT: CPT | Performed by: INTERNAL MEDICINE

## 2025-04-27 PROCEDURE — 255N000002 HC RX 255 OP 636: Performed by: INTERNAL MEDICINE

## 2025-04-27 PROCEDURE — 93005 ELECTROCARDIOGRAM TRACING: CPT

## 2025-04-27 PROCEDURE — G0378 HOSPITAL OBSERVATION PER HR: HCPCS

## 2025-04-27 PROCEDURE — 82728 ASSAY OF FERRITIN: CPT | Performed by: INTERNAL MEDICINE

## 2025-04-27 PROCEDURE — 96360 HYDRATION IV INFUSION INIT: CPT

## 2025-04-27 PROCEDURE — 80048 BASIC METABOLIC PNL TOTAL CA: CPT | Performed by: INTERNAL MEDICINE

## 2025-04-27 PROCEDURE — 83550 IRON BINDING TEST: CPT | Performed by: INTERNAL MEDICINE

## 2025-04-27 PROCEDURE — 92610 EVALUATE SWALLOWING FUNCTION: CPT | Mod: GN

## 2025-04-27 PROCEDURE — 82746 ASSAY OF FOLIC ACID SERUM: CPT | Performed by: INTERNAL MEDICINE

## 2025-04-27 PROCEDURE — 258N000003 HC RX IP 258 OP 636: Performed by: EMERGENCY MEDICINE

## 2025-04-27 PROCEDURE — A9585 GADOBUTROL INJECTION: HCPCS | Performed by: INTERNAL MEDICINE

## 2025-04-27 PROCEDURE — 99223 1ST HOSP IP/OBS HIGH 75: CPT | Performed by: INTERNAL MEDICINE

## 2025-04-27 PROCEDURE — 83735 ASSAY OF MAGNESIUM: CPT | Performed by: INTERNAL MEDICINE

## 2025-04-27 PROCEDURE — 84439 ASSAY OF FREE THYROXINE: CPT | Performed by: INTERNAL MEDICINE

## 2025-04-27 PROCEDURE — 83930 ASSAY OF BLOOD OSMOLALITY: CPT | Performed by: INTERNAL MEDICINE

## 2025-04-27 PROCEDURE — 84100 ASSAY OF PHOSPHORUS: CPT | Performed by: INTERNAL MEDICINE

## 2025-04-27 PROCEDURE — 84484 ASSAY OF TROPONIN QUANT: CPT | Performed by: EMERGENCY MEDICINE

## 2025-04-27 PROCEDURE — 84484 ASSAY OF TROPONIN QUANT: CPT | Performed by: INTERNAL MEDICINE

## 2025-04-27 PROCEDURE — 250N000013 HC RX MED GY IP 250 OP 250 PS 637: Performed by: INTERNAL MEDICINE

## 2025-04-27 PROCEDURE — 36415 COLL VENOUS BLD VENIPUNCTURE: CPT | Performed by: INTERNAL MEDICINE

## 2025-04-27 PROCEDURE — 85027 COMPLETE CBC AUTOMATED: CPT | Performed by: INTERNAL MEDICINE

## 2025-04-27 PROCEDURE — 84443 ASSAY THYROID STIM HORMONE: CPT | Performed by: INTERNAL MEDICINE

## 2025-04-27 PROCEDURE — 36415 COLL VENOUS BLD VENIPUNCTURE: CPT | Performed by: EMERGENCY MEDICINE

## 2025-04-27 RX ORDER — ACETAMINOPHEN 325 MG/1
650 TABLET ORAL EVERY 6 HOURS PRN
COMMUNITY

## 2025-04-27 RX ORDER — CARBIDOPA AND LEVODOPA 25; 100 MG/1; MG/1
2 TABLET ORAL 3 TIMES DAILY
Status: DISCONTINUED | OUTPATIENT
Start: 2025-04-27 | End: 2025-04-27 | Stop reason: ALTCHOICE

## 2025-04-27 RX ORDER — HYDRALAZINE HYDROCHLORIDE 20 MG/ML
5 INJECTION INTRAMUSCULAR; INTRAVENOUS EVERY 4 HOURS PRN
Status: DISCONTINUED | OUTPATIENT
Start: 2025-04-27 | End: 2025-04-28 | Stop reason: HOSPADM

## 2025-04-27 RX ORDER — MEMANTINE HYDROCHLORIDE 5 MG/1
5 TABLET ORAL DAILY
COMMUNITY

## 2025-04-27 RX ORDER — HYDRALAZINE HYDROCHLORIDE 10 MG/1
10 TABLET, FILM COATED ORAL EVERY 4 HOURS PRN
Status: DISCONTINUED | OUTPATIENT
Start: 2025-04-27 | End: 2025-04-27

## 2025-04-27 RX ORDER — AMLODIPINE BESYLATE 2.5 MG/1
2.5 TABLET ORAL DAILY
Status: DISCONTINUED | OUTPATIENT
Start: 2025-04-27 | End: 2025-04-28 | Stop reason: HOSPADM

## 2025-04-27 RX ORDER — HYDRALAZINE HCL 10 MG
5 TABLET ORAL EVERY 4 HOURS PRN
Status: DISCONTINUED | OUTPATIENT
Start: 2025-04-27 | End: 2025-04-28 | Stop reason: HOSPADM

## 2025-04-27 RX ORDER — CLOPIDOGREL BISULFATE 75 MG/1
75 TABLET ORAL DAILY
Status: DISCONTINUED | OUTPATIENT
Start: 2025-04-27 | End: 2025-04-28 | Stop reason: HOSPADM

## 2025-04-27 RX ORDER — UREA 10 %
45 LOTION (ML) TOPICAL 2 TIMES DAILY
Status: DISCONTINUED | OUTPATIENT
Start: 2025-04-27 | End: 2025-04-28 | Stop reason: HOSPADM

## 2025-04-27 RX ORDER — GABAPENTIN 400 MG/1
400 CAPSULE ORAL 3 TIMES DAILY
Status: DISCONTINUED | OUTPATIENT
Start: 2025-04-27 | End: 2025-04-28 | Stop reason: HOSPADM

## 2025-04-27 RX ORDER — GADOBUTROL 604.72 MG/ML
9 INJECTION INTRAVENOUS ONCE
Status: COMPLETED | OUTPATIENT
Start: 2025-04-27 | End: 2025-04-27

## 2025-04-27 RX ORDER — ONDANSETRON 2 MG/ML
4 INJECTION INTRAMUSCULAR; INTRAVENOUS EVERY 6 HOURS PRN
Status: DISCONTINUED | OUTPATIENT
Start: 2025-04-27 | End: 2025-04-28 | Stop reason: HOSPADM

## 2025-04-27 RX ORDER — AMOXICILLIN 250 MG
1 CAPSULE ORAL 2 TIMES DAILY PRN
Status: DISCONTINUED | OUTPATIENT
Start: 2025-04-27 | End: 2025-04-28 | Stop reason: HOSPADM

## 2025-04-27 RX ORDER — ACETAMINOPHEN 325 MG/1
650 TABLET ORAL EVERY 4 HOURS PRN
Status: DISCONTINUED | OUTPATIENT
Start: 2025-04-27 | End: 2025-04-28 | Stop reason: HOSPADM

## 2025-04-27 RX ORDER — AMLODIPINE BESYLATE 5 MG/1
5 TABLET ORAL DAILY
Status: DISCONTINUED | OUTPATIENT
Start: 2025-04-27 | End: 2025-04-27

## 2025-04-27 RX ORDER — ONDANSETRON 4 MG/1
4 TABLET, ORALLY DISINTEGRATING ORAL EVERY 6 HOURS PRN
Status: DISCONTINUED | OUTPATIENT
Start: 2025-04-27 | End: 2025-04-28 | Stop reason: HOSPADM

## 2025-04-27 RX ORDER — HYDRALAZINE HYDROCHLORIDE 20 MG/ML
10 INJECTION INTRAMUSCULAR; INTRAVENOUS EVERY 4 HOURS PRN
Status: DISCONTINUED | OUTPATIENT
Start: 2025-04-27 | End: 2025-04-27

## 2025-04-27 RX ORDER — PROCHLORPERAZINE MALEATE 5 MG/1
5 TABLET ORAL EVERY 6 HOURS PRN
Status: DISCONTINUED | OUTPATIENT
Start: 2025-04-27 | End: 2025-04-27

## 2025-04-27 RX ORDER — POLYETHYLENE GLYCOL 3350 17 G/17G
17 POWDER, FOR SOLUTION ORAL 2 TIMES DAILY PRN
Status: DISCONTINUED | OUTPATIENT
Start: 2025-04-27 | End: 2025-04-28 | Stop reason: HOSPADM

## 2025-04-27 RX ORDER — ACETAMINOPHEN 650 MG/1
650 SUPPOSITORY RECTAL EVERY 4 HOURS PRN
Status: DISCONTINUED | OUTPATIENT
Start: 2025-04-27 | End: 2025-04-28 | Stop reason: HOSPADM

## 2025-04-27 RX ORDER — CARBOXYMETHYLCELLULOSE SODIUM 5 MG/ML
1 SOLUTION/ DROPS OPHTHALMIC DAILY PRN
Status: DISCONTINUED | OUTPATIENT
Start: 2025-04-27 | End: 2025-04-28 | Stop reason: HOSPADM

## 2025-04-27 RX ORDER — LEVOTHYROXINE SODIUM 88 UG/1
88 TABLET ORAL DAILY
Status: DISCONTINUED | OUTPATIENT
Start: 2025-04-27 | End: 2025-04-28 | Stop reason: HOSPADM

## 2025-04-27 RX ORDER — MIRTAZAPINE 15 MG/1
45 TABLET, FILM COATED ORAL AT BEDTIME
Status: DISCONTINUED | OUTPATIENT
Start: 2025-04-27 | End: 2025-04-28 | Stop reason: HOSPADM

## 2025-04-27 RX ORDER — QUETIAPINE FUMARATE 25 MG/1
25 TABLET, FILM COATED ORAL AT BEDTIME
Status: DISCONTINUED | OUTPATIENT
Start: 2025-04-27 | End: 2025-04-28 | Stop reason: HOSPADM

## 2025-04-27 RX ORDER — HYDROXYZINE HYDROCHLORIDE 10 MG/1
10 TABLET, FILM COATED ORAL 2 TIMES DAILY PRN
COMMUNITY

## 2025-04-27 RX ORDER — SIMVASTATIN 20 MG
20 TABLET ORAL AT BEDTIME
Status: DISCONTINUED | OUTPATIENT
Start: 2025-04-27 | End: 2025-04-28 | Stop reason: HOSPADM

## 2025-04-27 RX ORDER — AMOXICILLIN 250 MG
2 CAPSULE ORAL 2 TIMES DAILY PRN
Status: DISCONTINUED | OUTPATIENT
Start: 2025-04-27 | End: 2025-04-28 | Stop reason: HOSPADM

## 2025-04-27 RX ADMIN — GABAPENTIN 400 MG: 400 CAPSULE ORAL at 16:17

## 2025-04-27 RX ADMIN — CARBIDOPA AND LEVODOPA 3 HALF-TAB: 25; 100 TABLET ORAL at 09:19

## 2025-04-27 RX ADMIN — LEVOTHYROXINE SODIUM 88 MCG: 88 TABLET ORAL at 09:13

## 2025-04-27 RX ADMIN — SODIUM CHLORIDE 1000 ML: 9 INJECTION, SOLUTION INTRAVENOUS at 00:21

## 2025-04-27 RX ADMIN — GABAPENTIN 400 MG: 400 CAPSULE ORAL at 09:13

## 2025-04-27 RX ADMIN — Medication 45 MG: at 21:38

## 2025-04-27 RX ADMIN — SIMVASTATIN 20 MG: 20 TABLET, FILM COATED ORAL at 21:39

## 2025-04-27 RX ADMIN — AMLODIPINE BESYLATE 2.5 MG: 2.5 TABLET ORAL at 09:14

## 2025-04-27 RX ADMIN — CARBIDOPA AND LEVODOPA 3 HALF-TAB: 25; 100 TABLET ORAL at 16:17

## 2025-04-27 RX ADMIN — QUETIAPINE FUMARATE 25 MG: 25 TABLET ORAL at 21:38

## 2025-04-27 RX ADMIN — GADOBUTROL 9 ML: 604.72 INJECTION INTRAVENOUS at 05:09

## 2025-04-27 RX ADMIN — GABAPENTIN 400 MG: 400 CAPSULE ORAL at 21:39

## 2025-04-27 RX ADMIN — CARBIDOPA AND LEVODOPA 3 HALF-TAB: 25; 100 TABLET ORAL at 13:35

## 2025-04-27 RX ADMIN — MIRTAZAPINE 45 MG: 15 TABLET, FILM COATED ORAL at 21:38

## 2025-04-27 RX ADMIN — FLUOXETINE HYDROCHLORIDE 40 MG: 20 CAPSULE ORAL at 09:13

## 2025-04-27 RX ADMIN — CLOPIDOGREL BISULFATE 75 MG: 75 TABLET, FILM COATED ORAL at 09:14

## 2025-04-27 ASSESSMENT — ACTIVITIES OF DAILY LIVING (ADL)
ADLS_ACUITY_SCORE: 48
ADLS_ACUITY_SCORE: 54
ADLS_ACUITY_SCORE: 52
ADLS_ACUITY_SCORE: 48
ADLS_ACUITY_SCORE: 54
ADLS_ACUITY_SCORE: 48
ADLS_ACUITY_SCORE: 49
ADLS_ACUITY_SCORE: 54
ADLS_ACUITY_SCORE: 48
ADLS_ACUITY_SCORE: 52
ADLS_ACUITY_SCORE: 48
ADLS_ACUITY_SCORE: 52
ADLS_ACUITY_SCORE: 54
ADLS_ACUITY_SCORE: 54
ADLS_ACUITY_SCORE: 52
ADLS_ACUITY_SCORE: 48
ADLS_ACUITY_SCORE: 54
ADLS_ACUITY_SCORE: 54
ADLS_ACUITY_SCORE: 48
ADLS_ACUITY_SCORE: 55
ADLS_ACUITY_SCORE: 49

## 2025-04-27 NOTE — PROVIDER NOTIFICATION
Paged Hospitalist re:elevated bp. New to floor, no scheduled meds. Do you want me to give prn hydralazine for now. Will await further advisement.    MD aware, orders in for prn and scheduled meds this am. EKG done, results communicated with provider. Blood pressure improvement after peeing, blood pressures recorded in flowsheet. Will await further advisement.

## 2025-04-27 NOTE — PROVIDER NOTIFICATION
Paged Neurology [place] re: CT results, abnormal. Will await further advisements.    MD aware, orders in for neuro IR followup, no further stroke workup needed.

## 2025-04-27 NOTE — PHARMACY-ADMISSION MEDICATION HISTORY
Pharmacist Admission Medication History    Admission medication history is complete. The information provided in this note is only as accurate as the sources available at the time of the update.    Information Source(s): Facility (TCU/NH/) medication list/MAR via  [facility]    Pertinent Information: none    Changes made to PTA medication list:  Added: Tylenol, Hydroxyzine  Deleted: None  Changed: Econazole cream    Allergies reviewed with patient and updates made in EHR: unable to assess    Medication History Completed By: Toñito Kendrick [facility] 4/27/2025 8:11 AM    PTA Med List   Medication Sig Note Last Dose/Taking    acetaminophen (TYLENOL) 325 MG tablet Take 650 mg by mouth every 6 hours as needed for mild pain.  Taking As Needed    carbidopa-levodopa (SINEMET)  MG tablet 1 & 1/2 TABLETS ORALLY 3 TIMES DAILY 4/27/2025: 0750, 1200, 1600 Taking    chlorthalidone (HYGROTON) 25 MG tablet 1 TABLET ORALLY EVERY MORNING  (DX: DIURETIC )  Taking    clopidogrel (PLAVIX) 75 MG tablet 1 TABLET ORALLY EVERY MORNING  (DX: HEART  )  Taking    DESITIN DAILY DEFENSE 13 % CREA APPLY TO AFFECTED AREA TOPICALLY 2 TIMES DAILY;APPLY TO AFFECTED AREA TOPICALLY AS NEEDED **OK TO APPLY AND LEAVE UNCOVERED**  Taking    diclofenac (VOLTAREN) 1 % topical gel Apply thin layer twice daily  Taking    econazole nitrate 1 % external cream Apply topically 2 times daily.  Taking    FLUoxetine (PROZAC) 40 MG capsule Take 40 mg by mouth daily.  Taking    gabapentin (NEURONTIN) 400 MG capsule 1 CAPSULE ORALLY 3 TIMES DAILY  Taking    hydrocortisone, Perianal, (HYDROCORTISONE) 2.5 % cream Place rectally 2 times daily as needed for hemorrhoids.  Taking As Needed    hydrOXYzine HCl (ATARAX) 10 MG tablet Take 10 mg by mouth 2 times daily as needed for itching.  Taking As Needed    lactulose (CHRONULAC) 10 GM/15ML solution DRINK 30ML  ORALLY DAILY (DX: CONSTIPATION);DRINK 30ML  ORALLY DAILY AS NEEDED (DX: CONSTIPATION)   Taking    levothyroxine (SYNTHROID/LEVOTHROID) 88 MCG tablet 1 TABLET ORALLY EVERY MORNING ON AN EMPTY STOMACH  Taking    memantine (NAMENDA) 5 MG tablet Take 5 mg by mouth daily.  Taking    mirtazapine (REMERON) 45 MG tablet 1 TABLET ORALLY AT BEDTIME (DX: DEPRESSION)  Taking    OPTIVE 0.5-0.9 % ophthalmic solution INSTILL 1 DROP INTO BOTH EYES 2 TIMES DAILY  (DX: DRY EYES)  Taking    QUEtiapine (SEROQUEL) 25 MG tablet 1 TAB ORALLY EVERY EVENING  Taking    senna-docusate (SENEXON-S) 8.6-50 MG tablet 1 TABLET ORALLY DAILY AS NEEDED (DX: CONSTIPATION)  Taking    simvastatin (ZOCOR) 20 MG tablet 1 TABLET ORALLY AT BEDTIME   (DX: CHOLESTEROL)  Taking    sodium chloride (DEEP SEA NASAL SPRAY) 0.65 % nasal spray INSTILL SPRAY(S) INTO NOSTRIL(S) 3 TIMES DAILY  Taking    SOLUBLE FIBER THERAPY powder TAKE 1 TEASPOONFUL ORALLY DAILY (DX: CONSTIPATION)  Taking

## 2025-04-27 NOTE — ED NOTES
[facility]  ED Nurse Handoff Report    ED Chief complaint: Stroke Symptoms      ED Diagnosis:   Final diagnoses:   Right arm weakness   Hyponatremia   Anemia, unspecified type   History of Parkinson's disease   Elevated troponin       Code Status: to be addressed by admitting doctor    Allergies:   Allergies   Allergen Reactions    No Known Allergies        Patient Story: Pt fell asleep in recliner around 2330, when he awakened he was unable to open and close his R hand. Was sleeping on his R arm.   Focused Assessment:  Pt A&Ox4, speech clear, did have some difficulty when previous RN attempted dysphagia screen. At this time pt moves all extremities equal and can open close and grasp wit his R hand. Of note, patient has irregular bradycardic rhythm 50-to 41 but denies any CP, SOB, dizziness and awake. Dr jung and repeat EKG was completed.    Treatments and/or interventions provided: CT, IV fluids.  Results for orders placed or performed during the hospital encounter of 04/26/25   CT Head w/o Contrast     Status: None    Narrative    EXAM: CT HEAD W/O CONTRAST, CTA HEAD NECK W CONTRAST  LOCATION: [facility]  DATE: 4/26/2025    INDICATION: Code Stroke, rule out hemorrhage and evaluate for potential thrombolysis thrombectomy. PLEASE READ IMMEDIATELY. Right-sided weakness.  COMPARISON: None   CONTRAST: 67mL Isovue 370  TECHNIQUE: Head and neck CT angiogram with IV contrast. Noncontrast head CT followed by axial helical CT images of the head and neck vessels obtained during the arterial phase of intravenous contrast administration. Axial 2D reconstructed images and   multiplanar 3D MIP reconstructed images of the head and neck vessels were performed by the technologist. Dose reduction techniques were used. All stenosis measurements made according to NASCET criteria unless otherwise specified.    FINDINGS:   NONCONTRAST HEAD CT:   INTRACRANIAL CONTENTS: No intracranial  hemorrhage, extraaxial collection, or mass effect.  No CT evidence of acute infarct. Mild to moderate presumed chronic small vessel ischemic changes. Left temporoparietal encephalomalacia  Normal ventricles and   sulci.     VISUALIZED ORBITS/SINUSES/MASTOIDS: Prior bilateral cataract surgery. Visualized portions of the orbits are otherwise unremarkable. No paranasal sinus mucosal disease. No middle ear or mastoid effusion.    BONES/SOFT TISSUES: No acute abnormality.    HEAD CTA:  ANTERIOR CIRCULATION: Calcified atherosclerosis of the carotid siphons. No stenosis/occlusion, aneurysm, or high flow vascular malformation. Standard [place] of [place] anatomy.    POSTERIOR CIRCULATION: No stenosis/occlusion or high flow vascular malformation. Balanced vertebral arteries. 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      DURAL VENOUS SINUSES: Expected enhancement of the major dural venous sinuses.    NECK CTA:  RIGHT CAROTID: No measurable stenosis or dissection.    LEFT CAROTID: No measurable stenosis or dissection.    VERTEBRAL ARTERIES: Moderate stenosis of the right V4 segment secondary to calcified atherosclerotic plaque. No additional stenosis or dissection. Balanced vertebral arteries.    AORTIC ARCH: Classic aortic arch anatomy with no significant stenosis at the origin of the great vessels.    NONVASCULAR STRUCTURES: Unremarkable.      Impression    IMPRESSION:   HEAD CT:  No acute intracranial process.    HEAD CTA:  1.  No large vessel occlusion or hemodynamically significant stenosis.  2.  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    NECK CTA:  No large vessel occlusion or hemodynamically significant stenosis.     CTA Head Neck with Contrast     Status: None    Narrative    EXAM: CT HEAD W/O CONTRAST, CTA HEAD NECK W CONTRAST  LOCATION: [facility]  DATE: 4/26/2025    INDICATION: Code Stroke, rule out hemorrhage and evaluate for potential thrombolysis  thrombectomy. PLEASE READ IMMEDIATELY. Right-sided weakness.  COMPARISON: None   CONTRAST: 67mL Isovue 370  TECHNIQUE: Head and neck CT angiogram with IV contrast. Noncontrast head CT followed by axial helical CT images of the head and neck vessels obtained during the arterial phase of intravenous contrast administration. Axial 2D reconstructed images and   multiplanar 3D MIP reconstructed images of the head and neck vessels were performed by the technologist. Dose reduction techniques were used. All stenosis measurements made according to NASCET criteria unless otherwise specified.    FINDINGS:   NONCONTRAST HEAD CT:   INTRACRANIAL CONTENTS: No intracranial hemorrhage, extraaxial collection, or mass effect.  No CT evidence of acute infarct. Mild to moderate presumed chronic small vessel ischemic changes. Left temporoparietal encephalomalacia  Normal ventricles and   sulci.     VISUALIZED ORBITS/SINUSES/MASTOIDS: Prior bilateral cataract surgery. Visualized portions of the orbits are otherwise unremarkable. No paranasal sinus mucosal disease. No middle ear or mastoid effusion.    BONES/SOFT TISSUES: No acute abnormality.    HEAD CTA:  ANTERIOR CIRCULATION: Calcified atherosclerosis of the carotid siphons. No stenosis/occlusion, aneurysm, or high flow vascular malformation. Standard [place] of [place] anatomy.    POSTERIOR CIRCULATION: No stenosis/occlusion or high flow vascular malformation. Balanced vertebral arteries. 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      DURAL VENOUS SINUSES: Expected enhancement of the major dural venous sinuses.    NECK CTA:  RIGHT CAROTID: No measurable stenosis or dissection.    LEFT CAROTID: No measurable stenosis or dissection.    VERTEBRAL ARTERIES: Moderate stenosis of the right V4 segment secondary to calcified atherosclerotic plaque. No additional stenosis or dissection. Balanced vertebral arteries.    AORTIC ARCH: Classic aortic arch anatomy with no significant  stenosis at the origin of the great vessels.    NONVASCULAR STRUCTURES: Unremarkable.      Impression    IMPRESSION:   HEAD CT:  No acute intracranial process.    HEAD CTA:  1.  No large vessel occlusion or hemodynamically significant stenosis.  2.  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    NECK CTA:  No large vessel occlusion or hemodynamically significant stenosis.     Basic metabolic panel     Status: Abnormal   Result Value Ref Range    Sodium 125 (L) 135 - 145 mmol/L    Potassium 3.4 3.4 - 5.3 mmol/L    Chloride 90 (L) 98 - 107 mmol/L    Carbon Dioxide (CO2) 25 22 - 29 mmol/L    Anion Gap 10 7 - 15 mmol/L    Urea Nitrogen 25.4 (H) 8.0 - 23.0 mg/dL    Creatinine 1.10 0.67 - 1.17 mg/dL    GFR Estimate 63 >60 mL/min/1.73m2    Calcium 8.3 (L) 8.8 - 10.4 mg/dL    Glucose 94 70 - 99 mg/dL   INR     Status: Abnormal   Result Value Ref Range    INR 1.27 (H) 0.85 - 1.15   Partial thromboplastin time     Status: Normal   Result Value Ref Range    aPTT 32 22 - 38 Seconds   Troponin T, High Sensitivity     Status: Abnormal   Result Value Ref Range    Troponin T, High Sensitivity 24 (H) <=22 ng/L   CBC with platelets and differential     Status: Abnormal   Result Value Ref Range    WBC Count 7.3 4.0 - 11.0 10e3/uL    RBC Count 2.93 (L) 4.40 - 5.90 10e6/uL    Hemoglobin 9.5 (L) 13.3 - 17.7 g/dL    Hematocrit 27.9 (L) 40.0 - 53.0 %    MCV 95 78 - 100 fL    MCH 32.4 26.5 - 33.0 pg    MCHC 34.1 31.5 - 36.5 g/dL    RDW 14.3 10.0 - 15.0 %    Platelet Count 143 (L) 150 - 450 10e3/uL    % Neutrophils 74 %    % Lymphocytes 14 %    % Monocytes 10 %    % Eosinophils 2 %    % Basophils 0 %    % Immature Granulocytes 0 %    NRBCs per 100 WBC 0 <1 /100    Absolute Neutrophils 5.4 1.6 - 8.3 10e3/uL    Absolute Lymphocytes 1.1 0.8 - 5.3 10e3/uL    Absolute Monocytes 0.7 0.0 - 1.3 10e3/uL    Absolute Eosinophils 0.1 0.0 - 0.7 10e3/uL    Absolute Basophils 0.0 0.0 - 0.2 10e3/uL    Absolute Immature Granulocytes 0.0 <=0.4  10e3/uL    Absolute NRBCs 0.0 10e3/uL   CBC with Platelets & Differential     Status: Abnormal    Narrative    The following orders were created for panel order CBC with Platelets & Differential.  Procedure                               Abnormality         Status                     ---------                               -----------         ------                     CBC with platelets and ...[[id number]]  Abnormal            Final result                 Please view results for these tests on the individual orders.       Patient's response to treatments and/or interventions: tolerated well  To be done/followed up on inpatient unit:  doctoer orders    Does this patient have any cognitive concerns?:  none    Activity level - Baseline/Home:  Independent walker and wheelchair  Activity Level - Current:   Independent walker and wheelchair    Patient's Preferred language: English   Needed?: No    Isolation: None  Infection: Not Applicable  Patient tested for COVID 19 prior to admission: NO  Bariatric?: No    Vital Signs:   Vitals:    04/27/25 0100 04/27/25 0120 04/27/25 0130 04/27/25 0200   BP: (!) 176/64 (!) 150/59 130/55 (!) 157/53   Pulse: (!) 42 (!) 47 (!) 44 (!) 43   Resp: 11 14 14 18   Temp:       TempSrc:       SpO2: 97%      Weight:       Height:           Cardiac Rhythm:     Was the PSS-3 completed:   Yes  What interventions are required if any?               Family Comments: none present  OBS brochure/video discussed/provided to patient/family: Yes              Name of person given brochure if not patient: Pt              Relationship to patient: Pt    For the majority of the shift this patient's behavior was Green.   Behavioral interventions performed were none.    ED NURSE PHONE NUMBER: *[phone]

## 2025-04-27 NOTE — PLAN OF CARE
Goal Outcome Evaluation:      Plan of Care Reviewed With: patient        Rule out stroke. Arrived to [facility] around 0345 from ED. Neuros - alert & oriented, likes to be informed of care & plan; right hand grasp weaker than left/improving; left dorsi plantar flexion weak/baseline; legs weak bilaterally/awaiting PT & OT evaluations. Blood pressure elevated this am with full bladder/small dose of bp med added - tolerating, asymptomatic. Tele SB with BBB. Regular diet/good appetite, takes meds whole with water. Bedrest with 2 assist to boost & reposition. Continent of bladder via urinal, no bm today/prune juice consumed, last bm prior to admission. Denies pain. Skin - scab on left elbow, discoloration on all extremities, hands purplish at times - patient stating baseline & 100 percent on room air, feet dry/missing digits. Pt scoring green on the Aggression Stop Light Tool. Awaiting labs/PT & OT evaluations, need orthostatics checked. Discharge back to Assisted Living when medically ready.

## 2025-04-27 NOTE — ED NOTES
Bed: ST03  Expected date:   Expected time:   Means of arrival:   Comments:  [id number]  R side weakness

## 2025-04-27 NOTE — ED NOTES
Bed: ED07  Expected date:   Expected time:   Means of arrival:   Comments:  [facility] its ready when you are

## 2025-04-27 NOTE — INTERIM SUMMARY
MRI brain was neg for a cute ischemic stroke     No further stroke work up warranted at this juncture     Follow-up with Neuro IR as outpatient for 2 mm BA aneurysm      Jose Miguel Porter  Stroke Fellow

## 2025-04-27 NOTE — PROGRESS NOTES
04/27/25 0900   Appointment Info   Signing Clinician's Name / Credentials (SLP) Heather Wisemna M.A. CCC-SLP   General Information   Onset of Illness/Injury or Date of Surgery 04/26/25   Referring Physician Delisa Morales DO   Patient/Family Therapy Goal Statement (SLP) Pt is hungry   Pertinent History of Current Problem Zack Santiago is a 89 year-old male with past medical history significant for hx of CVA on clopidogrel, post-CABG atrial fibrillation, chronic bradycardia, chronic anemia, hypertension who presents with right hand weakness and numbness. Admitted on 4/26/2025. Swallow eval completed at 8:45 AM 4/27/25, RN concerned about med administration.   General Observations alert, cooperative   Type of Evaluation   Type of Evaluation Swallow Evaluation   Oral Motor   Oral Musculature generally intact   Structural Abnormalities none present   Mucosal Quality adequate   Dentition (Oral Motor)   Dentition (Oral Motor) natural dentition;adequate dentition   Facial Symmetry (Oral Motor)   Facial Symmetry (Oral Motor) WNL   Lip Function (Oral Motor)   Lip Range of Motion (Oral Motor) WNL   Tongue Function (Oral Motor)   Tongue ROM (Oral Motor) WNL   Jaw Function (Oral Motor)   Jaw Function (Oral Motor) WNL   Cough/Swallow/Gag Reflex (Oral Motor)   Soft Palate/Velum (Oral Motor) WNL   Volitional Throat Clear/Cough (Oral Motor)   (WFL)   Volitional Swallow (Oral Motor)   (WFL)   Vocal Quality/Secretion Management (Oral Motor)   Vocal Quality (Oral Motor) WFL   General Swallowing Observations   Past History of Dysphagia At baseline eats a regular texture diet and thin liquids.   Current Diet/Method of Nutritional Intake (General Swallowing Observations, NIS)   (RN reports keeping the patient NPO until SLP eval)   Swallowing Evaluation Clinical swallow evaluation   Clinical Swallow Evaluation   Feeding Assistance no assistance needed   Clinical Swallow Evaluation Textures Trialed thin  liquids;pureed;solid foods   Clinical Swallow Eval: Thin Liquid Texture Trial   Mode of Presentation, Thin Liquids spoon;cup;straw;fed by clinician   Volume of Liquid or Food Presented 8 oz thin H20   Oral Phase of Swallow WFL   Pharyngeal Phase of Swallow intact  (audible swallow x2)   Diagnostic Statement no overt signs or symptoms of aspiration   Clinical Swallow Evaluation: Puree Solid Texture Trial   Mode of Presentation, Puree spoon;fed by clinician   Volume of Puree Presented 4 oz puree   Oral Phase, Puree WFL   Pharyngeal Phase, Puree intact   Diagnostic Statement no overt signs or symptoms of aspiration   Clinical Swallow Evaluation: Solid Food Texture Trial   Mode of Presentation fed by clinician   Volume Presented tobin crackers   Oral Phase WFL   Pharyngeal Phase intact   Diagnostic Statement no overt signs or symptoms of aspiration   Esophageal Phase of Swallow   Patient reports or presents with symptoms of esophageal dysphagia No   Swallowing Recommendations   Diet Consistency Recommendations thin liquids (level 0);regular diet   Supervision Level for Intake patient independent   Mode of Delivery Recommendations bolus size, small;slow rate of intake   Swallowing Maneuver Recommendations alternate food and liquid intake   Monitoring/Assistance Required (Eating/Swallowing) stop eating activities when fatigue is present   Recommended Feeding/Eating Techniques (Swallow Eval) maintain upright sitting position for eating   Medication Administration Recommendations, Swallowing (SLP) as tolerated   Instrumental Assessment Recommendations instrumental evaluation not recommended at this time   General Therapy Interventions   Planned Therapy Interventions Dysphagia Treatment   Dysphagia treatment Instruction of safe swallow strategies   Clinical Impression   Criteria for Skilled Therapeutic Interventions Met (SLP Eval) Yes, treatment indicated   SLP Diagnosis minimal oropharyngeal dysphagia   Risks & Benefits of  therapy have been explained evaluation/treatment results reviewed;care plan/treatment goals reviewed;current/potential barriers reviewed;participants voiced agreement with care plan;participants included   Clinical Impression Comments Clinical dysphagia eval completed per MD order. The patient presnets with minimal oropharyngeal dysphagia, is functional to continue baseline diet with safety precautions encouraged.   SLP Total Evaluation Time   Eval: oral/pharyngeal swallow function, clinical swallow Minutes (21466) 20   SLP Goals   Therapy Frequency (SLP Eval) 3 times/week   SLP Predicted Duration/Target Date for Goal Attainment 05/03/25   SLP Goals Swallow   SLP: Safely tolerate diet without signs/symptoms of aspiration Regular diet;Thin liquids;With use of swallow precautions;Independently   Interventions   Interventions Quick Adds Swallowing Dysfunction   SLP Discharge Planning   SLP Plan diet tolerance   SLP Discharge Recommendation home   SLP Rationale for DC Rec short course of SLP tx to ensure safe tolerance of baseline diet; do not expect ongoing SLP needs after discharge   SLP Brief overview of current status  Recommend a regular texture diet and thin liquids. Sit upright for PO, take small bites/sips at a slow pace.   SLP Time and Intention   Total Session Time (sum of timed and untimed services) 20                                                                                  [facility]      OUTPATIENT SPEECH LANGUAGE PATHOLOGY EVALUATION  PLAN OF TREATMENT FOR OUTPATIENT REHABILITATION  (COMPLETE FOR INITIAL CLAIMS ONLY)  Patient's Last Name, First Name, M.I.  YOB: 1931  Zack Santiago                        Provider's Name  [facility] Medical Record No.  [id number]                               Onset Date:  04/26/25  Start of Care Date:   4/27/25   Type:     ___PT   ___OT   _X_SLP Medical Diagnosis:         parkinsonism    SLP Diagnosis:  minimal oropharyngeal dysphagia  Visits from SOC:  1   ________________________________________________________________  Plan of Treatment/Functional Goals    Planned Interventions:   Dysphagia Treatment       Instruction of safe swallow strategies        Goals: See Speech Language Pathology Goals on Care Plan in [facility] electronic health record.    Therapy Frequency:3 times/week   Predicted Duration of Therapy Intervention: 05/03/25  ________________________________________________________________________________    I CERTIFY THE NEED FOR THESE SERVICES FURNISHED UNDER        THIS PLAN OF TREATMENT AND WHILE UNDER MY CARE     (Physician attestation of this document indicates review and certification of the therapy plan).                     Referring Physician: Delisa Morales DO           Initial Assessment        See Speech Language Pathology documentation in Epic electronic health record, evaluation dated

## 2025-04-27 NOTE — PROGRESS NOTES
[facility]    Medicine Progress Note - Hospitalist Service    Date of Admission:  4/26/2025    Assessment & Plan   Zack Santiago is a 89 year-old male with past medical history significant for hx of CVA on clopidogrel, post-CABG atrial fibrillation, chronic bradycardia, chronic anemia, hypertension who presents with right hand weakness and numbness. Admitted on 4/26/2025.      Hyponatremia  Hypochloremia    Sodium 125 on admission - awaiting recheck ordered  Per RN there is a delay in labs today  Awaiting repeat sodium level  Sodium 139 last on 6/21/24    Noted to have been given IVF bolus in the ED  Noted that the pt has been voiding larger amounts since arrival to the floor    *PTA on chlorthalidone - will HOLD    Continue to monitor I/O's closely  May need serial sodium levels pending recheck lab  Will also check urine sodium/osm and serum osm    Right hand numbness and weakness, improved  Hx of CVA  *Awoke from sleep in recliner laying on right arm, with numbness in hand and inability to extend fingers   *Head CT with no acute findings  *Head/neck CTA with no large vessel occlusion, 2 mm basilar artery aneurysm  MRI with no acute evidence of CVA    *symptoms suspected to b related to right radial nerve palsy, symptoms continuing to improve but not to baseline.   Stroke neuro has now signed off      *Reports hx of CVA with slurred speech, symptoms resolved. On clopidogrel PTA - will continue    Neuro checks q shift x 24 hours unless there is a clinical change     Hypertension  - Hold prior to admission chlorthalidone as above.     BP very elevated on arrival to the medical floor but improved once he voided and now SBP in the 150's  Will trial small dose of norvasc this am with holding parameters    I have requested orthostatic vital signs - pending sienna with PMH of Parkinson's disease, lucio.  He is at risk for orthostatic BP change with position.    Sinus bradycardia    On chart review  he has had HR recorded in the 50's prior  Free T4 WNL  Awaiting mag and repeat potassium  Continue tele  Unclear if ? symptomatic - will see how he feels when up out of bathroom     EKG requested and is pending    Mild troponin elevation  Coronary artery disease s/p CABG  Hyperlipidemia    Troponin 24->29. Denies any chest pain. EKG non-ischemic.   - Cardiac monitoring - to continue  - Troponin in AM - pending  - Continue prior to admission clopidogrel when dose confirmed by pharmacy  - Hold prior to admission statin while observation status     Parkinson's disease  Lives in [place]. Ambulates with walker.   - Continue prior to admission carbidopa-levodopa when dose confirmed by pharmacy   - SW consulted for discharge planning   PT/OT consulted as RN reports that he was an assist of 2 down in the ED    Hypothyroidism  - Continue prior to admission levothyroxine     Acute on chronic normocytic anemia  Hgb 9.5 g/dl, baseline ~11 g/dl. On clopidogrel PTA. Denies any signs or symptoms of bleeding. Borderline or mildly macrocytic historically.   - Ferritin, iron panel, vitamin B12, folate, TSH  - CBC in AM - still pending     Chronic kidney disease, stage 3  Baseline creatinine 1.1-1.2. Creatinine 1.10 on admission.   - Currently around baseline  - Avoid nephrotoxins  - Monitor BMP as above     Panic disorder  OCD  Depression  - Continue prior to admission fluoxetine, quetiapine, mirtazapine               PPE Used:  Mask, gloves       Observation Goals: -diagnostic tests and consults completed and resulted, -vital signs normal or at patient baseline, -safe disposition plan has been identified, Nurse to notify provider when observation goals have been met and patient is ready for discharge.  Diet: Regular Diet Adult    DVT Prophylaxis: Pneumatic Compression Devices  Puri Catheter: Not present  Lines: None     Cardiac Monitoring: ACTIVE order. Indication: Bradycardias (48 hours)  Code Status: Full Code      Clinically  Significant Risk Factors Present on Admission         # Hyponatremia: Lowest Na = 125 mmol/L in last 2 days, will monitor as appropriate  # Hypochloremia: Lowest Cl = 90 mmol/L in last 2 days, will monitor as appropriate       # Coagulation Defect: INR = 1.27 (Ref range: 0.85 - 1.15) and/or PTT = 32 Seconds (Ref range: 22 - 38 Seconds), will monitor for bleeding  # Drug Induced Platelet Defect: home medication list includes an antiplatelet medication   # Hypertension: Noted on problem list      # Anemia: based on hgb <11       # Overweight: Estimated body mass index is 26.19 kg/m  as calculated from the following:    Height as of this encounter: 1.829 m (6').    Weight as of this encounter: 87.6 kg (193 lb 2 oz).              Disposition Plan     Medically Ready for Discharge: Anticipated Tomorrow             Delisa Morales DO  Hospitalist Service  [facility]  Securely message with [employer] (more info)  Text page via [employer] Paging/Directory   ______________________________________________________________________    Interval History     Denies CP, SOB, F/C or N/V.  His right hand and forearm strength continuing to improve.  Able to lift up arm without difficulty now.  Hand  is still a little weak - he has not tried to feed himself or brush teeth.    Physical Exam   Vital Signs: Temp: 97.4  F (36.3  C) Temp src: Oral BP: (!) 166/65 Pulse: (!) 46   Resp: 18 SpO2: 96 % O2 Device: None (Room air)    Weight: 193 lbs 1.97 oz    GEN:  Alert, elderly male appears comfortable, no overt respiratory distress.  HEENT:  Normocephalic/atraumatic, no scleral icterus, no nasal discharge  CV: somewhat distant heart sounds, lucio  LUNGS:  Clear to auscultation ant/lat bilaterally without rales/rhonchi/wheezing/retractions.  Symmetric chest rise on inhalation noted.  ABD:  Active bowel sounds, soft, non-tender/non-distended.  No guarding/rigidity.  EXT:  trace pretibial edema bilaterally.  No  cyanosis.    SKIN:  Dry to touch, no new exanthems noted in the visualized areas.    Medical Decision Making       50 MINUTES SPENT BY ME on the date of service doing chart review, history, exam, documentation & further activities per the note.      Data   Medications   Current Facility-Administered Medications   Medication Dose Route Frequency Provider Last Rate Last Admin     Current Facility-Administered Medications   Medication Dose Route Frequency Provider Last Rate Last Admin     Labs and Imaging results below reviewed today.  Recent Labs   Lab 04/26/25  2334   WBC 7.3   HGB 9.5*   HCT 27.9*   MCV 95   *     Recent Labs   Lab 04/26/25  2334   *   POTASSIUM 3.4   CHLORIDE 90*   CO2 25   ANIONGAP 10   GLC 94   BUN 25.4*   CR 1.10   GFRESTIMATED 63   YOUNG 8.3*     7-Day Micro Results       No results found for the last 168 hours.          Recent Labs   Lab 04/27/25  0256   TSH 4.32*       Recent Results (from the past 24 hours)   CT Head w/o Contrast    Narrative    EXAM: CT HEAD W/O CONTRAST, CTA HEAD NECK W CONTRAST  LOCATION: [facility]  DATE: 4/26/2025    INDICATION: Code Stroke, rule out hemorrhage and evaluate for potential thrombolysis thrombectomy. PLEASE READ IMMEDIATELY. Right-sided weakness.  COMPARISON: None   CONTRAST: 67mL Isovue 370  TECHNIQUE: Head and neck CT angiogram with IV contrast. Noncontrast head CT followed by axial helical CT images of the head and neck vessels obtained during the arterial phase of intravenous contrast administration. Axial 2D reconstructed images and   multiplanar 3D MIP reconstructed images of the head and neck vessels were performed by the technologist. Dose reduction techniques were used. All stenosis measurements made according to NASCET criteria unless otherwise specified.    FINDINGS:   NONCONTRAST HEAD CT:   INTRACRANIAL CONTENTS: No intracranial hemorrhage, extraaxial collection, or mass effect.  No CT evidence of acute  infarct. Mild to moderate presumed chronic small vessel ischemic changes. Left temporoparietal encephalomalacia  Normal ventricles and   sulci.     VISUALIZED ORBITS/SINUSES/MASTOIDS: Prior bilateral cataract surgery. Visualized portions of the orbits are otherwise unremarkable. No paranasal sinus mucosal disease. No middle ear or mastoid effusion.    BONES/SOFT TISSUES: No acute abnormality.    HEAD CTA:  ANTERIOR CIRCULATION: Calcified atherosclerosis of the carotid siphons. No stenosis/occlusion, aneurysm, or high flow vascular malformation. Standard [place] anatomy.    POSTERIOR CIRCULATION: No stenosis/occlusion or high flow vascular malformation. Balanced vertebral arteries. 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      DURAL VENOUS SINUSES: Expected enhancement of the major dural venous sinuses.    NECK CTA:  RIGHT CAROTID: No measurable stenosis or dissection.    LEFT CAROTID: No measurable stenosis or dissection.    VERTEBRAL ARTERIES: Moderate stenosis of the right V4 segment secondary to calcified atherosclerotic plaque. No additional stenosis or dissection. Balanced vertebral arteries.    AORTIC ARCH: Classic aortic arch anatomy with no significant stenosis at the origin of the great vessels.    NONVASCULAR STRUCTURES: Unremarkable.      Impression    IMPRESSION:   HEAD CT:  No acute intracranial process.    HEAD CTA:  1.  No large vessel occlusion or hemodynamically significant stenosis.  2.  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    NECK CTA:  No large vessel occlusion or hemodynamically significant stenosis.     CTA Head Neck with Contrast    Narrative    EXAM: CT HEAD W/O CONTRAST, CTA HEAD NECK W CONTRAST  LOCATION: [facility]  DATE: 4/26/2025    INDICATION: Code Stroke, rule out hemorrhage and evaluate for potential thrombolysis thrombectomy. PLEASE READ IMMEDIATELY. Right-sided weakness.  COMPARISON: None   CONTRAST: 67mL Isovue  370  TECHNIQUE: Head and neck CT angiogram with IV contrast. Noncontrast head CT followed by axial helical CT images of the head and neck vessels obtained during the arterial phase of intravenous contrast administration. Axial 2D reconstructed images and   multiplanar 3D MIP reconstructed images of the head and neck vessels were performed by the technologist. Dose reduction techniques were used. All stenosis measurements made according to NASCET criteria unless otherwise specified.    FINDINGS:   NONCONTRAST HEAD CT:   INTRACRANIAL CONTENTS: No intracranial hemorrhage, extraaxial collection, or mass effect.  No CT evidence of acute infarct. Mild to moderate presumed chronic small vessel ischemic changes. Left temporoparietal encephalomalacia  Normal ventricles and   sulci.     VISUALIZED ORBITS/SINUSES/MASTOIDS: Prior bilateral cataract surgery. Visualized portions of the orbits are otherwise unremarkable. No paranasal sinus mucosal disease. No middle ear or mastoid effusion.    BONES/SOFT TISSUES: No acute abnormality.    HEAD CTA:  ANTERIOR CIRCULATION: Calcified atherosclerosis of the carotid siphons. No stenosis/occlusion, aneurysm, or high flow vascular malformation. Standard [place] anatomy.    POSTERIOR CIRCULATION: No stenosis/occlusion or high flow vascular malformation. Balanced vertebral arteries. 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      DURAL VENOUS SINUSES: Expected enhancement of the major dural venous sinuses.    NECK CTA:  RIGHT CAROTID: No measurable stenosis or dissection.    LEFT CAROTID: No measurable stenosis or dissection.    VERTEBRAL ARTERIES: Moderate stenosis of the right V4 segment secondary to calcified atherosclerotic plaque. No additional stenosis or dissection. Balanced vertebral arteries.    AORTIC ARCH: Classic aortic arch anatomy with no significant stenosis at the origin of the great vessels.    NONVASCULAR STRUCTURES: Unremarkable.      Impression     IMPRESSION:   HEAD CT:  No acute intracranial process.    HEAD CTA:  1.  No large vessel occlusion or hemodynamically significant stenosis.  2.  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    NECK CTA:  No large vessel occlusion or hemodynamically significant stenosis.     MR Brain w/o & w Contrast    Narrative    EXAM: MR BRAIN WITHOUT AND WITH CONTRAST  LOCATION: [facility]  DATE: 04/27/2025    INDICATION: Right hand weakness, rule out stroke.  COMPARISON: CTA head and neck 04/26/2025. Brain MRI 12/23/2016.  CONTRAST: 9 mL Gadavist.  TECHNIQUE: Routine multiplanar multisequence head MRI without and with intravenous contrast.    FINDINGS:  INTRACRANIAL CONTENTS: No acute or subacute infarct. Small chronic infarction left inferior parietal lobe. Small chronic infarctions within the cerebellar hemispheres. No mass, acute hemorrhage, or extra-axial fluid collections. Scattered nonspecific   T2/FLAIR hyperintensities within the cerebral white matter most consistent with mild chronic microvascular ischemic change. Moderate generalized cerebral atrophy. No hydrocephalus. Normal position of the cerebellar tonsils. No pathologic contrast   enhancement.    SELLA: No abnormality accounting for technique.    OSSEOUS STRUCTURES/SOFT TISSUES: Normal marrow signal. The major intracranial vascular flow-voids are maintained.     ORBITS: No abnormality accounting for technique.     SINUSES/MASTOIDS: Mild mucosal thickening scattered about the paranasal sinuses. No middle ear or mastoid effusion.       Impression    IMPRESSION:  1.  No acute intracranial process.  2.  Small chronic infarctions left inferior parietal lobe and bilateral cerebellar hemispheres. Moderate generalized volume loss and mild chronic microvascular ischemic change.

## 2025-04-27 NOTE — ED TRIAGE NOTES
BIBA for right hand weakness.  Patient is unable to open hand.  Patient fell asleep in recliner around 2329.  Awoke 1 hour later and was unable to open hand.  States was sleeping on his right arm. Code stroke teir 1 called.

## 2025-04-27 NOTE — ED PROVIDER NOTES
Emergency Department Note      History of Present Illness     Chief Complaint:  R arm weakness    HPI   Zack Santiago is a 89 year old male with a history of stroke and A-fib as well as parkinsonism on Plavix presents emergency department by EMS for evaluation of right arm weakness.  Patient apparently went to sleep at 2129 tonight, and woke up at 2229 and noticed that his right arm but especially his right hand was not working well.  He denies any new pain.  No falls.  No seizure activity.  Patient states that he had slept with his right arm behind him and somewhat under his body.  This is never happened before.    Independent Historian: Paramedics who contribute to the history incorporated above, also noting that his blood sugar was 130 and his blood pressure was in the 150s systolic.    Review of External Notes: I personally reviewed prior records including CT of the chest abdomen on February 21 showing a stable nodule in the right upper lobe of the lung as well as stable 5 cm ascending thoracic aortic aneurysm and aortic graft repair.    Past Medical History     Medical History and Problem List   Past Medical History:   Diagnosis Date    A-fib (H) 2010    AAA (abdominal aortic aneurysm) without rupture     Amaurosis fugax of right eye 10/2016    Anemia 2004    Aortic insufficiency 06/2014    Aortic insufficiency 11/2016    Ascending aorta dilatation 01/2023    ASCVD (arteriosclerotic cardiovascular disease) 2010    BPH (benign prostatic hyperplasia) 2003    Bradycardia 2016    CKD (chronic kidney disease) stage 3, GFR 30-59 ml/min (H)     Constipation 05/2020    Cough 2010    Dizzy 2001    GERD (gastroesophageal reflux disease)     HTN (hypertension) 2002    Hx of colonoscopy 2003    Hypothyroid     Hypovitaminosis D     Idiopathic neuropathy     Lung nodule 02/2018    OCD (obsessive compulsive disorder)     Panic disorder     Parkinson's disease (H) 03/2023    Spinal headache     Stroke (H) 12/2016     Sudden hearing loss 06/2013    SVT (supraventricular tachycardia) 11/2016    Thoracic ascending aortic aneurysm 06/2014    Ulcerative colitis (H)        Medications   No current outpatient medications on file.    Surgical History   Past Surgical History:   Procedure Laterality Date    BACK SURGERY  1998    BLADDER SURGERY  1985    CATARACT IOL, RT/LT  2011    COLONOSCOPY N/A 05/05/2020    Procedure: COLONOSCOPY;  Surgeon: Kenya Loco MD;  Location:  GI    CORONARY ARTERY BYPASS  2010    ENDOVASCULAR REPAIR ANEURYSM ABDOMINAL AORTA N/A 05/27/2015    Procedure: ENDOVASCULAR REPAIR ANEURYSM ABDOMINAL AORTA;  Surgeon: Darin Walters MD;  Location:  OR    HERNIA REPAIR  2005    HERNIA REPAIR  1998    HERNIORRHAPHY INGUINAL Left 01/05/2018    Procedure: HERNIORRHAPHY INGUINAL;  Incarcerated Left Inguinal Hernia;  Surgeon: Harsh Walker MD;  Location:  OR    KNEE SURGERY  1997    REPAIR HAMMER TOE  12/28/2012    Procedure: REPAIR HAMMER TOE;  LEFT SECOND AND THIRD CLAW TOE RECONSTRUCTION;  Surgeon: Gray Haynes MD;  Location:  OR    REPAIR HAMMER TOE  04/2018    tria    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    TURP  2003    Z TOTAL KNEE ARTHROPLASTY  2011    left    [facility] TOTAL KNEE ARTHROPLASTY  2009    right     Physical Exam     Patient Vitals for the past 24 hrs:   BP Temp Temp src Pulse Resp SpO2 Height Weight   04/27/25 0729 (!) 193/88 97.5  F (36.4  C) Oral (!) 47 12 96 % -- --   04/27/25 0346 (!) 166/65 97.4  F (36.3  C) Oral (!) 46 18 96 % -- --   04/27/25 0200 (!) 157/53 -- -- (!) 43 18 -- -- --   04/27/25 0130 130/55 -- -- (!) 44 14 -- -- --   04/27/25 0120 (!) 150/59 -- -- (!) 47 14 -- -- --   04/27/25 0100 (!) 176/64 -- -- (!) 42 11 97 % -- --   04/27/25 0045 (!) 158/63 -- -- (!) 48 13 99 % -- --   04/27/25 0031 -- -- -- (!) 44 13 -- -- --   04/27/25 0030 (!) 170/67 -- -- (!) 43 10 -- -- --   04/26/25 2345 (!) 156/60 98.8  F (37.1  C) Oral 51 14 96 % -- --   04/26/25  2338 (!) 157/65 -- -- 55 16 96 % 1.829 m (6') 87.6 kg (193 lb 2 oz)   04/26/25 2334 -- -- -- (!) 49 13 -- -- --   04/26/25 2332 (!) 157/65 -- -- 50 -- -- -- --       Physical Exam  General: Chronically ill but nontoxic male sitting upright in stabilization room 3  HENT: mucous membranes moist  CV: rate as above, slightly irregular rhythm, compartments soft in RUE  Resp: normal effort, speaks in full phrases, no stridor, no cough observed  GI: abdomen soft and nontender, no guarding  MSK: no bony tenderness  Skin: appropriately warm and dry  Neuro: awake, alert, clear speech, fully oriented, face symmetric, slightly decreased right  strength compared with left, no pronator drift, unable to extend right wrist, sensation intact throughout all extremities, no nuchal rigidity, ambulation not initially tested  Psych: cooperative, no apparent hallucinations    Diagnostics   Electrocardiogram  ECG taken at 2346, ECG interpreted at 0001 by STEPHON Dill MD  Sinus rhythm, occasional premature atrial contractions, right bundle branch block pattern appears very similar compared with January 13, 2023, voltage is compatible with LVH, no ST elevation  Rate 57 bpm. SC interval 146. QRS duration 156. QTc 476    Electrocardiogram  ECG taken at 0225, ECG interpreted at 0227 by STEPHON Dill MD  Sinus bradycardia with ectopy present  Rate 49 bpm. SC interval 152. QRS duration 164. QTc 485    Lab Results   Labs Ordered and Resulted from Time of ED Arrival to Time of ED Departure   BASIC METABOLIC PANEL - Abnormal       Result Value    Sodium 125 (*)     Potassium 3.4      Chloride 90 (*)     Carbon Dioxide (CO2) 25      Anion Gap 10      Urea Nitrogen 25.4 (*)     Creatinine 1.10      GFR Estimate 63      Calcium 8.3 (*)     Glucose 94     INR - Abnormal    INR 1.27 (*)    TROPONIN T, HIGH SENSITIVITY - Abnormal    Troponin T, High Sensitivity 24 (*)    CBC WITH PLATELETS AND DIFFERENTIAL - Abnormal    WBC Count 7.3      RBC Count  2.93 (*)     Hemoglobin 9.5 (*)     Hematocrit 27.9 (*)     MCV 95      MCH 32.4      MCHC 34.1      RDW 14.3      Platelet Count 143 (*)     % Neutrophils 74      % Lymphocytes 14      % Monocytes 10      % Eosinophils 2      % Basophils 0      % Immature Granulocytes 0      NRBCs per 100 WBC 0      Absolute Neutrophils 5.4      Absolute Lymphocytes 1.1      Absolute Monocytes 0.7      Absolute Eosinophils 0.1      Absolute Basophils 0.0      Absolute Immature Granulocytes 0.0      Absolute NRBCs 0.0     PARTIAL THROMBOPLASTIN TIME - Normal    aPTT 32     GLUCOSE MONITOR NURSING POCT     Imaging   MR Brain w/o & w Contrast   Final Result   IMPRESSION:   1.  No acute intracranial process.   2.  Small chronic infarctions left inferior parietal lobe and bilateral cerebellar hemispheres. Moderate generalized volume loss and mild chronic microvascular ischemic change.         CTA Head Neck with Contrast   Final Result   IMPRESSION:    HEAD CT:   No acute intracranial process.      HEAD CTA:   1.  No large vessel occlusion or hemodynamically significant stenosis.   2.  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      NECK CTA:   No large vessel occlusion or hemodynamically significant stenosis.         CT Head w/o Contrast   Final Result   IMPRESSION:    HEAD CT:   No acute intracranial process.      HEAD CTA:   1.  No large vessel occlusion or hemodynamically significant stenosis.   2.  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      NECK CTA:   No large vessel occlusion or hemodynamically significant stenosis.           Independent Interpretation:   I personally reviewed CT head images, I see no large ICH.    ED Course      Medications Administered   Medications   hydrALAZINE (APRESOLINE) tablet 10 mg (has no administration in time range)     Or   hydrALAZINE (APRESOLINE) injection 10 mg (has no administration in time range)   acetaminophen (TYLENOL) tablet 650 mg (has no administration in time  range)     Or   acetaminophen (TYLENOL) Suppository 650 mg (has no administration in time range)   melatonin tablet 1 mg (has no administration in time range)   senna-docusate (SENOKOT-S/PERICOLACE) 8.6-50 MG per tablet 1 tablet (has no administration in time range)     Or   senna-docusate (SENOKOT-S/PERICOLACE) 8.6-50 MG per tablet 2 tablet (has no administration in time range)   polyethylene glycol (MIRALAX) Packet 17 g (has no administration in time range)   ondansetron (ZOFRAN ODT) ODT tab 4 mg (has no administration in time range)     Or   ondansetron (ZOFRAN) injection 4 mg (has no administration in time range)   prochlorperazine (COMPAZINE) injection 5 mg (has no administration in time range)     Or   prochlorperazine (COMPAZINE) tablet 5 mg (has no administration in time range)   carbidopa-levodopa (SINEMET)  MG per tablet 2 tablet (has no administration in time range)   clopidogrel (PLAVIX) tablet 75 mg (has no administration in time range)   FLUoxetine (PROzac) capsule 40 mg (has no administration in time range)   gabapentin (NEURONTIN) capsule 400 mg (has no administration in time range)   levothyroxine (SYNTHROID/LEVOTHROID) tablet 88 mcg (has no administration in time range)   mirtazapine (REMERON) tablet 45 mg (has no administration in time range)   carboxymethylcellulose-glycerin (REFRESH OPTIVE) 0.5-0.9 % ophthalmic solution 1 drop (has no administration in time range)   QUEtiapine (SEROquel) tablet 25 mg (has no administration in time range)   simvastatin (ZOCOR) tablet 20 mg (has no administration in time range)   sodium chloride (OCEAN) 0.65 % nasal spray 1 spray (has no administration in time range)   iopamidol (ISOVUE-370) solution 67 mL (67 mLs Intravenous $Given 4/26/25 1884)     And   sodium chloride 0.9 % bag for CT scan flush (100 mLs As instructed $Given 4/26/25 0786)   sodium chloride 0.9% BOLUS 1,000 mL (0 mLs Intravenous Stopped 4/27/25 0116)   gadobutrol (GADAVIST) injection 9 mL  (9 mLs Intravenous $Given 4/27/25 0509)   sodium chloride (PF) 0.9% PF flush 10 mL (10 mLs Intravenous $Given 4/27/25 0509)     ED Course and Discussion of Management   ED Course as of 04/27/25 0746   Sat Apr 26, 2025 2323 I spoke with Dr. Knox, Stroke Neurology.   2329 I was called by Radiology, CT head without acute findings, old L sided CVA present.   2339 I spoke with Radiology again, CTA is negative.   Sun Apr 27, 2025   0008 I rechecked patient, discussed test results.   0025 I spoke with Dr. Brooks, Hospitalist, who accepts care.   0220 RN notified me of asymptomatic bradycardia as low as 39, I requested repeat EKG.     Additional Documentation  None    MIPS       None    Medical Decision Making / Diagnosis   Medical Decision Making:  Patient presents with acute right arm and Vijay specifically right hand weakness, given EMS report of possible facial droop, tier 1 code stroke was activated, though ultimately once I was able to obtain a more detailed history and examination, I think this is most likely to represent an acute radial nerve palsy, patient reports having gone to sleep with his arm underneath him.  Furthermore, his right wrist drop gradually improved during the emergency department course.  I am grateful for the prompt evaluation of the stroke neurology team.  CT and CT angiography did not reveal a large vessel occlusion or other apparent structural cause in the brain such as intracranial hemorrhage or tumor.  Abrupt onset is not suggestive of infection.  I considered MRI of the brain, and while this may ultimately be reasonable to pursue, it is not indicated emergently as he is not a candidate for thrombolytics given his current exam and a more likely alternate etiology that is not an ischemic stroke.  Workup further reveals evidence of significant hyponatremia that I do think contributes to the justification for hospitalization for close monitoring and further care, this may be playing some role  in his symptoms as well, also has acute worsening of his anemia of unclear clinical significance and unclear origin.  These findings and plan of care were explained to the patient who is in agreement, I will arrange for him to be admitted to a monitored bed under the care of the hospitalist service.      Disposition   Admission to [facility]    Diagnosis     ICD-10-CM    1. Right arm weakness  R29.898       2. Hyponatremia  E87.1       3. Anemia, unspecified type  D64.9       4. History of Parkinson's disease  Z86.69       5. Elevated troponin  R79.89            4/26/2025   MD Aniyah Burt Jeffrey Alan, MD  04/27/25 0749

## 2025-04-27 NOTE — PROGRESS NOTES
RECEIVING UNIT ED HANDOFF REVIEW    ED Nurse Handoff Report was reviewed by: Walter Long RN on April 27, 2025 at 2:56 AM

## 2025-04-27 NOTE — PLAN OF CARE
Goal Outcome Evaluation:                      Reason for Admission: R hand numbness and weakness    Cognitive/Mentation: A/Ox 4  Neuros/CMS: R hand weakness, unable to fully extend fingers.  VS: HTN (within parameters) and bradycardia. SBP < 180.   Tele: SB.  /GI: Continent. Last BM 04/26/25.   Pulmonary: LS clear.  Pain: Slight discomfort on L side near ribs.     Drains/Lines: L PIV, SL.   Skin: Scattered bruising. Scab on L elbow. R foot missing 5th toe, L foot missing 2nd tip of toe. Cracked and peeling bilateral feet.   Activity: Assist x 2 with GBW.  Diet: Regular with thin liquids. Takes pills whole with water.     Therapies recs: Pending.  Discharge: Pending.    Aggression Stoplight Tool: Green    End of shift summary: Received patient from ED. MRI completed, results pending.

## 2025-04-27 NOTE — H&P
[facility]    History and Physical - Hospitalist Service       Date of Admission:  4/26/2025    Assessment & Plan   Zack Santiago is a 89 year-old male with past medical history significant for hx of CVA on clopidogrel, post-CABG atrial fibrillation, chronic bradycardia, chronic anemia, hypertension who presents with right hand weakness and numbness. Admitted on 4/26/2025.     Hyponatremia  *Sodium 125. Asymptomatic.  *PTA on chlorthalidone, on chronically with historically normal sodiums. Reports chronically poor fluid intake. Suspect hypovolemic with diuretic contributing.   *1L normal saline given in ED  - BMP in AM   - Hold chlorthalidone    Right hand numbness and weakness  Hx of CVA  *Awoke from sleep in recliner laying on right arm, with numbness in hand and inability to extend fingers   *Head CT with no acute findings  *Head/neck CTA with no large vessel occlusion, 2 mm basilar artery aneurysm  *Suspect right radial nerve palsy, symptoms improving but not to baseline. Case discussed with stroke neurology and recommended MRI for formal CVA r/o, no further work-up if negative   *Reports hx of CVA with slurred speech, symptoms resolved. On clopidogrel PTA.  - MRI brain   - Neuro checks Q4H  - Continue prior to admission clopidogrel when dose confirmed by pharmacy     Acute on chronic normocytic anemia  Hgb 9.5 g/dl, baseline ~11 g/dl. On clopidogrel PTA. Denies any signs or symptoms of bleeding. Borderline or mildly macrocytic historically.   - Ferritin, iron panel, vitamin B12, folate, TSH  - CBC in AM     Mild troponin elevation  Chronic sinus bradycardia  Coronary artery disease s/p CABG  Hyperlipidemia  Troponin 24->29. Denies any chest pain. EKG non-ischemic.   - Cardiac monitoring   - Troponin in AM  - Continue prior to admission clopidogrel when dose confirmed by pharmacy  - Hold prior to admission statin while observation status    Parkinson's disease  Lives in [place]. Ambulates  with walker.   - Continue prior to admission carbidopa-levodopa when dose confirmed by pharmacy   - SW consulted for discharge planning     Hypothyroidism  - Continue prior to admission levothyroxine when dose confirmed by pharmacy    Chronic kidney disease, stage 3  Baseline creatinine 1.1-1.2. Creatinine 1.10 on admission.   - Currently around baseline  - Avoid nephrotoxins  - Monitor BMP as above    Panic disorder  OCD  Depression  - Continue prior to admission fluoxetine, quetiapine, mirtazapine when dose confirmed by pharmacy    Hypertension  - Hold prior to admission chlorthalidone as above.          Diet: Regular Diet Adult    DVT Prophylaxis: Pneumatic Compression Devices  Puri Catheter: Not present  Code Status: Full code     Disposition Plan   [place] to observation status.     Medically Ready for Discharge: Anticipated Today or tomorrow        Entered: Chung Brooks MD 04/27/2025, 3:19 AM     The patient's care was discussed with the ED provider, patient     Medical Decision Making       77 MINUTES SPENT BY ME on the date of service doing chart review, history, exam, documentation & further activities per the note.      Clinically Significant Risk Factors Present on Admission         # Hyponatremia: Lowest Na = 125 mmol/L in last 2 days, will monitor as appropriate  # Hypochloremia: Lowest Cl = 90 mmol/L in last 2 days, will monitor as appropriate       # Coagulation Defect: INR = 1.27 (Ref range: 0.85 - 1.15) and/or PTT = 32 Seconds (Ref range: 22 - 38 Seconds), will monitor for bleeding  # Drug Induced Platelet Defect: home medication list includes an antiplatelet medication   # Hypertension: Noted on problem list      # Anemia: based on hgb <11       # Overweight: Estimated body mass index is 26.19 kg/m  as calculated from the following:    Height as of this encounter: 1.829 m (6').    Weight as of this encounter: 87.6 kg (193 lb 2 oz).                   Chung Brooks MD  [facility]  [facility]    ______________________________________________________________________    Chief Complaint   Right hand numbness and weakness     History of Present Illness   Zack Santiago is a 89 year old male who presents with the above chief complaint.    History is obtained from the patient, discussion with ED provider and review of medical record. The patient reports he fell asleep in his recliner around 2130, awoke an hour later lying on his right arm, with right hand numbness and inability to extend his fingers on his right hand. He denies any associated vision changes, speech changes, lower extremity symptoms.  He reports he has otherwise been in his usual state of health recently.  He reports he does not drink as much fluids as he should chronically. He denies any light-headedness, mental cloudiness, n/v.     In the Emergency Department, the patient was seen by Dr. Dill, with whom I discussed the patient's presenting symptoms and emergency department course.  Initial vital signs were a temperature of 98.8F, HR 50, /65, RR 13, SpO2 96% on room air. Pertinent work-up as noted in A&P. The patient received 1L normal saline and Hospitalists were contacted for admission to the hospital.     Past Medical History    I have reviewed this patient's medical history and updated it with pertinent information if needed.   Past Medical History:   Diagnosis Date    A-fib (H) 2010    post op    AAA (abdominal aortic aneurysm) without rupture     Dr. 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    carotid us no stenosis, echo no change and no clots; ziopatch no afib, svt present    Anemia 2004    Aortic insufficiency 06/2014    1+ on echo    Aortic insufficiency 11/2016    mild to mod    Ascending aorta dilatation 01/2023    5cm on ct, fu done 2/24 and stable    ASCVD (arteriosclerotic cardiovascular disease) 2010    cabg, post op afib    BPH (benign prostatic hyperplasia)  2003    turp, flomax added by urol 2/17    Bradycardia 2016    stopped toprol    CKD (chronic kidney disease) stage 3, GFR 30-59 ml/min (H)     Constipation 05/2020    colonoscopy nl    Cough 2010    pulm eval, felt due to reflux    Dizzy 2001    mri small vessel dz    GERD (gastroesophageal reflux disease)     HTN (hypertension) 2002    Hx of colonoscopy 2003    tics    Hypothyroid     Hypovitaminosis D     Idiopathic neuropathy     Lung nodule 02/2018    6mm, found during eval of ascending aorta, fu 8/18 no change    OCD (obsessive compulsive disorder)     sees psyche    Panic disorder     Dr. Luna, then someone after he retired    Parkinson's disease (H) 03/2023    per neuro    Spinal headache     Stroke (H) 12/2016    expressive aphasia, hosp, seen by neuro, mri pos, carotids min dz, changed from asa to plavix    Sudden hearing loss 06/2013    Dr. Garcia, mri brain no acoustic neuroma    SVT (supraventricular tachycardia) 11/2016    seen on ziopatch done for amaurosis, longest 15 beats    Thoracic ascending aortic aneurysm 06/2014    4.4cm on echo, aortic root 3.9, fu 5/15 4.8cm; fu done 12/15 and slightly enlarged, fu 6/16 no change, fu 11/16 no change; fu 1/18 echo 5.1-5.3, enlarged, then cta done and only 4.8cm, t fu 6 months, lvh, nl ef, mild ai; fu 8/18 slightly larger; fu 2/19 slightly smaller; fu 2/20 and then 1/21 and stable    Ulcerative colitis (H)     not active for years       Past Surgical History   I have reviewed this patient's surgical history and updated it with pertinent information if needed.  Past Surgical History:   Procedure Laterality Date    BACK SURGERY  1998    BLADDER SURGERY  1985    CATARACT IOL, RT/LT  2011    COLONOSCOPY N/A 05/05/2020    Procedure: COLONOSCOPY;  Surgeon: Kenya Loco MD;  Location:  GI    CORONARY ARTERY BYPASS  2010    ENDOVASCULAR REPAIR ANEURYSM ABDOMINAL AORTA N/A 05/27/2015    Procedure: ENDOVASCULAR REPAIR ANEURYSM ABDOMINAL AORTA;  Surgeon:  Darin Walters MD;  Location:  OR    HERNIA REPAIR      HERNIA REPAIR      HERNIORRHAPHY INGUINAL Left 2018    Procedure: HERNIORRHAPHY INGUINAL;  Incarcerated Left Inguinal Hernia;  Surgeon: Harsh Walker MD;  Location:  OR    KNEE SURGERY      REPAIR HAMMER TOE  2012    Procedure: REPAIR HAMMER TOE;  LEFT SECOND AND THIRD CLAW TOE RECONSTRUCTION;  Surgeon: Gray Haynes MD;  Location: [facility] OR    REPAIR HAMMER TOE  2018    tria    toe amputation right foot  2021    TURP      [facility] TOTAL KNEE ARTHROPLASTY  2011    left    [facility] TOTAL KNEE ARTHROPLASTY  2009    right         Social History   I have reviewed this patient's social history and updated it with pertinent information if needed.  Social History     Tobacco Use    Smoking status: Former     Current packs/day: 0.00     Average packs/day: 1 pack/day for 5.0 years (5.0 ttl pk-yrs)     Types: Cigarettes     Start date: 1960     Quit date: 1965     Years since quittin.9     Passive exposure: Current    Smokeless tobacco: Never   Vaping Use    Vaping status: Never Used   Substance Use Topics    Alcohol use: Not Currently     Comment: none    Drug use: No        Family History   I have reviewed this patient's family history and updated it with pertinent information if needed.   Family History   Problem Relation Age of Onset    Heart Disease Father     Glasses (<7 y/o) Daughter     Retinal detachment Daughter 50 - 59    Diverticulitis Son     Hip dysplasia Granddaughter     Glasses (<7 y/o) Granddaughter     Kyphosis Granddaughter     Attention Deficit Disorder Granddaughter     Mental Illness Granddaughter     Glasses (<7 y/o) Grandson     Lymphoma Sister     Glaucoma No family hx of     Macular Degeneration No family hx of     Consanguinity No family hx of      Prior to Admission Medications  - Pending pharmacy reconciliation   Prior to Admission Medications   Prescriptions Last Dose Informant  Patient Reported? Taking?   DESITIN DAILY DEFENSE 13 % CREA   No No   Sig: APPLY TO AFFECTED AREA TOPICALLY 2 TIMES DAILY;APPLY TO AFFECTED AREA TOPICALLY AS NEEDED **OK TO APPLY AND LEAVE UNCOVERED**   FLUoxetine (PROZAC) 40 MG capsule   Yes No   Sig: Take 40 mg by mouth daily.   OPTIVE 0.5-0.9 % ophthalmic solution   No No   Sig: INSTILL 1 DROP INTO BOTH EYES 2 TIMES DAILY  (DX: DRY EYES)   QUEtiapine (SEROQUEL) 25 MG tablet   No No   Si TAB ORALLY EVERY EVENING   SOLUBLE FIBER THERAPY powder   No No   Sig: TAKE 1 TEASPOONFUL ORALLY DAILY (DX: CONSTIPATION)   carbidopa-levodopa (SINEMET)  MG tablet   No No   Si & 1/2 TABLETS ORALLY 3 TIMES DAILY   chlorthalidone (HYGROTON) 25 MG tablet   No No   Si TABLET ORALLY EVERY MORNING  (DX: DIURETIC )   clopidogrel (PLAVIX) 75 MG tablet   No No   Si TABLET ORALLY EVERY MORNING  (DX: HEART  )   econazole nitrate 1 % external cream   Yes No   Sig: Apply topically as needed.   gabapentin (NEURONTIN) 400 MG capsule   No No   Si CAPSULE ORALLY 3 TIMES DAILY   hydrocortisone, Perianal, (HYDROCORTISONE) 2.5 % cream   No No   Sig: Place rectally 2 times daily as needed for hemorrhoids.   lactulose (CHRONULAC) 10 GM/15ML solution   No No   Sig: DRINK 30ML  ORALLY DAILY (DX: CONSTIPATION);DRINK 30ML  ORALLY DAILY AS NEEDED (DX: CONSTIPATION)   levothyroxine (SYNTHROID/LEVOTHROID) 88 MCG tablet   No No   Si TABLET ORALLY EVERY MORNING ON AN EMPTY STOMACH   mirtazapine (REMERON) 45 MG tablet   No No   Si TABLET ORALLY AT BEDTIME (DX: DEPRESSION)   senna-docusate (SENEXON-S) 8.6-50 MG tablet   No No   Si TABLET ORALLY DAILY AS NEEDED (DX: CONSTIPATION)   simvastatin (ZOCOR) 20 MG tablet   No No   Si TABLET ORALLY AT BEDTIME   (DX: CHOLESTEROL)   sodium chloride (DEEP SEA NASAL SPRAY) 0.65 % nasal spray   No No   Sig: INSTILL SPRAY(S) INTO NOSTRIL(S) 3 TIMES DAILY      Facility-Administered Medications: None     Allergies   Allergies   Allergen  Reactions    No Known Allergies        Physical Exam   Vital Signs: Temp: 98.8  F (37.1  C) Temp src: Oral BP: (!) 157/53 Pulse: (!) 43   Resp: 18 SpO2: 97 % O2 Device: None (Room air)    Weight: 193 lbs 1.97 oz    Constitutional: NAD  Eyes: PERRL, EOMI  Respiratory: Clear to auscultation bilaterally, good air movement, normal effort   Cardiovascular: Regular rhythm with bradycardic rate, no m/r/g. Trace lower extremity edema bilaterally.  GI: Soft, non-tender, non-distended. No rebound tenderness or guarding.    Skin: Warm, dry   Neurologic: Alert. Responding to questions appropriately. Following commands. Oriented to person, place and time. Face symmetric. Tongue midline. Unable to fully extend fingers on right hand, worse in 1st-3rd digits, slight weakness of right bicep, 5/5 upper extremity strength on the left, 5/5 lower extremity strength symmetric bilaterally.  Psychiatric: Normal affect, appropriate      Data   Data reviewed today: I reviewed all medications, new labs and imaging results over the last 24 hours. I personally reviewed the EKG tracing showing sinus bradycardia with PVCs, RBBB, no significant changes from prior  .    Recent Labs   Lab 04/26/25  2334   WBC 7.3   HGB 9.5*   MCV 95   *   INR 1.27*   *   POTASSIUM 3.4   CHLORIDE 90*   CO2 25   BUN 25.4*   CR 1.10   ANIONGAP 10   YOUNG 8.3*   GLC 94       Recent Results (from the past 24 hours)   CT Head w/o Contrast    Narrative    EXAM: CT HEAD W/O CONTRAST, CTA HEAD NECK W CONTRAST  LOCATION: [facility]  DATE: 4/26/2025    INDICATION: Code Stroke, rule out hemorrhage and evaluate for potential thrombolysis thrombectomy. PLEASE READ IMMEDIATELY. Right-sided weakness.  COMPARISON: None   CONTRAST: 67mL Isovue 370  TECHNIQUE: Head and neck CT angiogram with IV contrast. Noncontrast head CT followed by axial helical CT images of the head and neck vessels obtained during the arterial phase of intravenous contrast  administration. Axial 2D reconstructed images and   multiplanar 3D MIP reconstructed images of the head and neck vessels were performed by the technologist. Dose reduction techniques were used. All stenosis measurements made according to NASCET criteria unless otherwise specified.    FINDINGS:   NONCONTRAST HEAD CT:   INTRACRANIAL CONTENTS: No intracranial hemorrhage, extraaxial collection, or mass effect.  No CT evidence of acute infarct. Mild to moderate presumed chronic small vessel ischemic changes. Left temporoparietal encephalomalacia  Normal ventricles and   sulci.     VISUALIZED ORBITS/SINUSES/MASTOIDS: Prior bilateral cataract surgery. Visualized portions of the orbits are otherwise unremarkable. No paranasal sinus mucosal disease. No middle ear or mastoid effusion.    BONES/SOFT TISSUES: No acute abnormality.    HEAD CTA:  ANTERIOR CIRCULATION: Calcified atherosclerosis of the carotid siphons. No stenosis/occlusion, aneurysm, or high flow vascular malformation. Standard [place] anatomy.    POSTERIOR CIRCULATION: No stenosis/occlusion or high flow vascular malformation. Balanced vertebral arteries. 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      DURAL VENOUS SINUSES: Expected enhancement of the major dural venous sinuses.    NECK CTA:  RIGHT CAROTID: No measurable stenosis or dissection.    LEFT CAROTID: No measurable stenosis or dissection.    VERTEBRAL ARTERIES: Moderate stenosis of the right V4 segment secondary to calcified atherosclerotic plaque. No additional stenosis or dissection. Balanced vertebral arteries.    AORTIC ARCH: Classic aortic arch anatomy with no significant stenosis at the origin of the great vessels.    NONVASCULAR STRUCTURES: Unremarkable.      Impression    IMPRESSION:   HEAD CT:  No acute intracranial process.    HEAD CTA:  1.  No large vessel occlusion or hemodynamically significant stenosis.  2.  2 mm left laterally projecting basilar artery  aneurysm.    NECK CTA:  No large vessel occlusion or hemodynamically significant stenosis.     CTA Head Neck with Contrast    Narrative    EXAM: CT HEAD W/O CONTRAST, CTA HEAD NECK W CONTRAST  LOCATION: [facility]  DATE: 4/26/2025    INDICATION: Code Stroke, rule out hemorrhage and evaluate for potential thrombolysis thrombectomy. PLEASE READ IMMEDIATELY. Right-sided weakness.  COMPARISON: None   CONTRAST: 67mL Isovue 370  TECHNIQUE: Head and neck CT angiogram with IV contrast. Noncontrast head CT followed by axial helical CT images of the head and neck vessels obtained during the arterial phase of intravenous contrast administration. Axial 2D reconstructed images and   multiplanar 3D MIP reconstructed images of the head and neck vessels were performed by the technologist. Dose reduction techniques were used. All stenosis measurements made according to NASCET criteria unless otherwise specified.    FINDINGS:   NONCONTRAST HEAD CT:   INTRACRANIAL CONTENTS: No intracranial hemorrhage, extraaxial collection, or mass effect.  No CT evidence of acute infarct. Mild to moderate presumed chronic small vessel ischemic changes. Left temporoparietal encephalomalacia  Normal ventricles and   sulci.     VISUALIZED ORBITS/SINUSES/MASTOIDS: Prior bilateral cataract surgery. Visualized portions of the orbits are otherwise unremarkable. No paranasal sinus mucosal disease. No middle ear or mastoid effusion.    BONES/SOFT TISSUES: No acute abnormality.    HEAD CTA:  ANTERIOR CIRCULATION: Calcified atherosclerosis of the carotid siphons. No stenosis/occlusion, aneurysm, or high flow vascular malformation. Standard [place] of [place] anatomy.    POSTERIOR CIRCULATION: No stenosis/occlusion or high flow vascular malformation. Balanced vertebral arteries. 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      DURAL VENOUS SINUSES: Expected enhancement of the major dural venous sinuses.    NECK CTA:  RIGHT  CAROTID: No measurable stenosis or dissection.    LEFT CAROTID: No measurable stenosis or dissection.    VERTEBRAL ARTERIES: Moderate stenosis of the right V4 segment secondary to calcified atherosclerotic plaque. No additional stenosis or dissection. Balanced vertebral arteries.    AORTIC ARCH: Classic aortic arch anatomy with no significant stenosis at the origin of the great vessels.    NONVASCULAR STRUCTURES: Unremarkable.      Impression    IMPRESSION:   HEAD CT:  No acute intracranial process.    HEAD CTA:  1.  No large vessel occlusion or hemodynamically significant stenosis.  2.  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    NECK CTA:  No large vessel occlusion or hemodynamically significant stenosis.

## 2025-04-27 NOTE — CARE PLAN
Observation goals  PRIOR TO DISCHARGE        Comments: -diagnostic tests and consults completed and resulted- not met  -vital signs normal or at patient baseline- met  -safe disposition plan has been identified- not met  Nurse to notify provider when observation goals have been met and patient is ready for discharge.     Neuros improving with right hand grasp stronger. VSS. Needs PT/OT eval.

## 2025-04-27 NOTE — CONSULTS
"[facility]     Stroke Code Note          History of Present Illness     Chief Complaint: Stroke Symptoms      Zack Santiago is a 89 year old with history of Parkinson disease presented because of right great weakness.  He was sleeping around 9:30 PM and woke up around 10:30 PM with inability to open his fingers on the right hand after sleeping on his right arm.  Has Parkinson disease and takes Sinemet.  Also he has atrial fibrillation however he was never offered a anticoagulation, and currently he is on Plavix.         Past Medical History     Stroke risk factors: Atrial fibrillation    Preadmission antithrombotic regimen: Plavix    Modified Snook Score (Pre-morbid)  2-Slight disability; unable to carry out all previous activities, but able to look after own affairs                   Assessment and Plan       1.  Acute onset of right finger extensions, possibly radial nerve palsy from local compression while sleeping versus small stroke     Intravenous Thrombolysis  Not given due to:   - minor/isolated/quickly resolving symptoms     Endovascular Treatment  Not initiated due to absence of proximal vessel occlusion     Plan:  Stroke code de-escalated  MRI of the brain with and without contrast  Once MRI of the brain is negative, no further stroke workup is needed     ___________________________________________________________________    The Stroke Staff is Dr. Alvarez.    Vik Knox MD  Vascular Neurology Fellow    To page me or covering stroke neurology team member, click here: AMCOM  Choose \"On Call\" tab at top, then select \"NEUROLOGY/ALL SITES\" from middle drop-down box, press Enter, then look for \"stroke\" or \"telestroke\" for your site.  ___________________________________________________________________        Imaging/Labs   (personally reviewed)    CT head: No acute finding  CTA head/neck: No large vessel occlusion  CT Perfusion head: Not applicable         Physical " Examination     BP: (!) 157/65   Pulse: 55   Resp: 16           SpO2: 96 %   O2 Device: None (Room air)   Weight: 87.6 kg (193 lb 2 oz)    Wt Readings from Last 2 Encounters:   04/26/25 87.6 kg (193 lb 2 oz)   04/03/25 83 kg (183 lb)       Neuro Exam  Mental Status:  alert, oriented x 3, follows commands, speech clear and fluent, naming and repetition normal  Cranial Nerves:  visual fields intact (tested by nurse), EOMI with normal smooth pursuit, facial sensation intact and symmetric (tested by nurse), left facial droop, hearing not formally tested but intact to conversation, no dysarthria, shoulder shrug equal bilaterally, tongue protrusion midline  Motor:  no abnormal movements, able to move all limbs antigravity spontaneously with no signs of hemiparesis observed, no pronator drift. Limited right fingers extension.  is strong.   Reflexes:  unable to test (telestroke)  Sensory:  light touch sensation intact and symmetric throughout upper and lower extremities (assessed by nurse), no extinction on double simultaneous stimulation (assessed by nurse)  Coordination:  normal finger-to-nose and heel-to-shin bilaterally without dysmetria, rapid alternating movements symmetric  Station/Gait:  unable to test due to telestroke        Stroke Scales       NIHSS  1a. Level of Consciousness 0-->Alert, keenly responsive   1b. LOC Questions 0-->Answers both questions correctly   1c. LOC Commands 0-->Performs both tasks correctly   2.   Best Gaze 0-->Normal   3.   Visual 0-->No visual loss   4.   Facial Palsy 1-->Minor paralysis (flattened nasolabial fold, asymmetry on smiling) (Left face?)   5a. Motor Arm, Left 0-->No drift, limb holds 90 (or 45) degrees for full 10 secs   5b. Motor Arm, Right 0-->No drift, limb holds 90 (or 45) degrees for full 10 secs   6a. Motor Leg, Left 0-->No drift, leg holds 30 degree position for full 5 secs   6b. Motor Leg, right 0-->No drift, leg holds 30 degree position for full 5 secs   7.   Limb  Ataxia 0-->Absent   8.   Sensory 0-->Normal, no sensory loss   9.   Best Language 0-->No aphasia, normal   10. Dysarthria 0-->Normal   11. Extinction and Inattention  0-->No abnormality   Total 1 (04/26/25 2330)            Labs     CBC  Lab Results   Component Value Date    HGB 9.5 (L) 04/26/2025    HCT 27.9 (L) 04/26/2025    WBC 7.3 04/26/2025     (L) 04/26/2025       BMP  Lab Results   Component Value Date     06/21/2024    POTASSIUM 4.4 06/21/2024    CHLORIDE 101 06/21/2024    CO2 28 06/21/2024    BUN 31.0 (H) 06/21/2024    CR 1.18 (H) 06/21/2024    GLC 78 06/21/2024    YOUNG 9.4 06/21/2024       INR  INR   Date Value Ref Range Status   12/22/2016 1.09 0.86 - 1.14 Final   09/22/2011 1.07 0.86 - 1.14 Final   01/14/2010 2.42 (H) 0.86 - 1.14 Final       Data   Stroke Code Data  (for stroke code with tele)  Stroke code activated 04/26/25  2318   First stroke provider response 04/26/25  2321   Video start time 04/26/25 2327   Video end time 04/26/25  2342   Last known normal 04/26/25 2129   Time of discovery (or onset of symptoms)  04/26/25 2229   Head CT read by Stroke Neuro Provider 04/26/25  2333   Was stroke code de-escalated? Yes  04/26/25  2348     Telestroke Service Details  Type of service telemedicine diagnostic assessment of acute neurological changes   Reason telemedicine is appropriate patient requires assessment with a specialist for diagnosis and treatment of neurological symptoms   Mode of transmission secure interactive audio and video communication per [facility]   Originating site (patient location) [facility]    Distant site (provider location) Provider remote site        Clinically Significant Risk Factors Present on Admission                 # Drug Induced Platelet Defect: home medication list includes an antiplatelet medication   # Hypertension: Noted on problem list      # Anemia: based on hgb <11       # Overweight: Estimated body mass index is 26.19 kg/m  as  calculated from the following:    Height as of this encounter: 1.829 m (6').    Weight as of this encounter: 87.6 kg (193 lb 2 oz).           # CKD: Will monitor and treat as appropriate  - last Cr =  N/A  - last GFR = N/A

## 2025-04-27 NOTE — PROVIDER NOTIFICATION
Paged Hospitalist re: labs overdue. Phoned lab, short staffed & unable to get to routine orders at this time, labs need to be switched to timed if needed right away.    MD aware & putting new orders in.

## 2025-04-28 ENCOUNTER — TELEPHONE (OUTPATIENT)
Dept: FAMILY MEDICINE | Facility: CLINIC | Age: OVER 89
End: 2025-04-28
Payer: COMMERCIAL

## 2025-04-28 VITALS
RESPIRATION RATE: 18 BRPM | DIASTOLIC BLOOD PRESSURE: 70 MMHG | HEIGHT: 72 IN | OXYGEN SATURATION: 95 % | HEART RATE: 50 BPM | BODY MASS INDEX: 26.16 KG/M2 | TEMPERATURE: 98.6 F | WEIGHT: 193.12 LBS | SYSTOLIC BLOOD PRESSURE: 174 MMHG

## 2025-04-28 LAB
ANION GAP SERPL CALCULATED.3IONS-SCNC: 11 MMOL/L (ref 7–15)
BUN SERPL-MCNC: 22.4 MG/DL (ref 8–23)
CALCIUM SERPL-MCNC: 9.4 MG/DL (ref 8.8–10.4)
CHLORIDE SERPL-SCNC: 98 MMOL/L (ref 98–107)
CREAT SERPL-MCNC: 0.99 MG/DL (ref 0.67–1.17)
EGFRCR SERPLBLD CKD-EPI 2021: 71 ML/MIN/1.73M2
GLUCOSE SERPL-MCNC: 133 MG/DL (ref 70–99)
HCO3 SERPL-SCNC: 26 MMOL/L (ref 22–29)
POTASSIUM SERPL-SCNC: 4.1 MMOL/L (ref 3.4–5.3)
SODIUM SERPL-SCNC: 135 MMOL/L (ref 135–145)

## 2025-04-28 PROCEDURE — 80048 BASIC METABOLIC PNL TOTAL CA: CPT | Performed by: INTERNAL MEDICINE

## 2025-04-28 PROCEDURE — 97165 OT EVAL LOW COMPLEX 30 MIN: CPT | Mod: GO

## 2025-04-28 PROCEDURE — 999N000147 HC STATISTIC PT IP EVAL DEFER

## 2025-04-28 PROCEDURE — 36415 COLL VENOUS BLD VENIPUNCTURE: CPT | Performed by: INTERNAL MEDICINE

## 2025-04-28 PROCEDURE — G0378 HOSPITAL OBSERVATION PER HR: HCPCS

## 2025-04-28 PROCEDURE — 250N000013 HC RX MED GY IP 250 OP 250 PS 637: Performed by: INTERNAL MEDICINE

## 2025-04-28 PROCEDURE — 97530 THERAPEUTIC ACTIVITIES: CPT | Mod: GO,XU

## 2025-04-28 PROCEDURE — 92526 ORAL FUNCTION THERAPY: CPT | Mod: GN

## 2025-04-28 PROCEDURE — 99239 HOSP IP/OBS DSCHRG MGMT >30: CPT | Performed by: HOSPITALIST

## 2025-04-28 RX ORDER — UREA 10 %
45 LOTION (ML) TOPICAL 2 TIMES DAILY
Qty: 28 TABLET | Refills: 0 | Status: SHIPPED | OUTPATIENT
Start: 2025-04-28 | End: 2025-04-28

## 2025-04-28 RX ORDER — UREA 10 %
45 LOTION (ML) TOPICAL 2 TIMES DAILY
Qty: 28 TABLET | Refills: 0 | Status: SHIPPED | OUTPATIENT
Start: 2025-04-28 | End: 2025-05-01

## 2025-04-28 RX ADMIN — CARBIDOPA AND LEVODOPA 3 HALF-TAB: 25; 100 TABLET ORAL at 07:41

## 2025-04-28 RX ADMIN — GABAPENTIN 400 MG: 400 CAPSULE ORAL at 07:44

## 2025-04-28 RX ADMIN — LEVOTHYROXINE SODIUM 88 MCG: 88 TABLET ORAL at 07:41

## 2025-04-28 RX ADMIN — Medication 45 MG: at 07:41

## 2025-04-28 RX ADMIN — FLUOXETINE HYDROCHLORIDE 40 MG: 20 CAPSULE ORAL at 07:41

## 2025-04-28 RX ADMIN — ACETAMINOPHEN 650 MG: 325 TABLET, FILM COATED ORAL at 07:41

## 2025-04-28 RX ADMIN — CLOPIDOGREL BISULFATE 75 MG: 75 TABLET, FILM COATED ORAL at 07:44

## 2025-04-28 RX ADMIN — AMLODIPINE BESYLATE 2.5 MG: 2.5 TABLET ORAL at 07:41

## 2025-04-28 ASSESSMENT — ACTIVITIES OF DAILY LIVING (ADL)
ADLS_ACUITY_SCORE: 61
ADLS_ACUITY_SCORE: 57
ADLS_ACUITY_SCORE: 61
DEPENDENT_IADLS:: CLEANING;COOKING;LAUNDRY;SHOPPING;MEAL PREPARATION;MEDICATION MANAGEMENT;MONEY MANAGEMENT;TRANSPORTATION
ADLS_ACUITY_SCORE: 61
ADLS_ACUITY_SCORE: 57

## 2025-04-28 NOTE — CONSULTS
Care Management Initial Consult    General Information  Assessment completed with: Patient, Care Team Member, Children, Jayashree (daughter); Jayashree ([facility] nurse)  Type of CM/SW Visit: Initial Assessment    Primary Care Provider verified and updated as needed: Yes   Readmission within the last 30 days: no previous admission in last 30 days      Reason for Consult: discharge planning  Advance Care Planning: Advance Care Planning Reviewed: present on chart (POLST)          Communication Assessment  Patient's communication style: spoken language (English or Bilingual)    Hearing Difficulty or Deaf: no        Cognitive  Cognitive/Neuro/Behavioral: WDL  Level of Consciousness: alert  Arousal Level: opens eyes spontaneously  Orientation: oriented x 4  Mood/Behavior: calm, cooperative  Best Language: 0 - No aphasia  Speech: clear, spontaneous    Living Environment:   People in home: alone     Current living Arrangements: assisted living  Name of Facility: [facility]   Able to return to prior arrangements:         Family/Social Support:  Care provided by: self, homecare agency, other (see comments) (facility staff)  Provides care for: no one, unable/limited ability to care for self  Marital Status:   Support system: Children          Description of Support System: Supportive         Current Resources:   Patient receiving home care services: No        Community Resources: Other (see comment) (private duty aide, OP PT)  Equipment currently used at home: walker, rolling  Supplies currently used at home: None    Employment/Financial:  Employment Status: retired        Financial Concerns: none           Does the patient's insurance plan have a 3 day qualifying hospital stay waiver?  Yes     Which insurance plan 3 day waiver is available? Alternative insurance waiver    Will the waiver be used for post-acute placement? No    Lifestyle & Psychosocial Needs:  Social Drivers of Health     Food  Insecurity: Low Risk  (9/16/2024)    Food Insecurity     Within the past 12 months, did you worry that your food would run out before you got money to buy more?: No     Within the past 12 months, did the food you bought just not last and you didn t have money to get more?: No   Depression: Not at risk (2/27/2025)    PHQ-2     PHQ-2 Score: 0   Housing Stability: Low Risk  (9/16/2024)    Housing Stability     Do you have housing? : Yes     Are you worried about losing your housing?: No   Tobacco Use: Medium Risk (4/3/2025)    Patient History     Smoking Tobacco Use: Former     Smokeless Tobacco Use: Never     Passive Exposure: Current   Financial Resource Strain: Low Risk  (9/16/2024)    Financial Resource Strain     Within the past 12 months, have you or your family members you live with been unable to get utilities (heat, electricity) when it was really needed?: No   Alcohol Use: Not At Risk (1/27/2020)    AUDIT-C     Frequency of Alcohol Consumption: 2-4 times a month     Average Number of Drinks: 1 or 2     Frequency of Binge Drinking: Never   Transportation Needs: Low Risk  (9/16/2024)    Transportation Needs     Within the past 12 months, has lack of transportation kept you from medical appointments, getting your medicines, non-medical meetings or appointments, work, or from getting things that you need?: No   Physical Activity: Not on file   Interpersonal Safety: Low Risk  (9/16/2024)    Interpersonal Safety     Do you feel physically and emotionally safe where you currently live?: Yes     Within the past 12 months, have you been hit, slapped, kicked or otherwise physically hurt by someone?: No     Within the past 12 months, have you been humiliated or emotionally abused in other ways by your partner or ex-partner?: No   Stress: Not on file   Social Connections: Not on file   Health Literacy: Not on file       Functional Status:  Prior to admission patient needed assistance:   Dependent ADLs::  Ambulation-walker, Bathing, Dressing, Grooming, Wheelchair-with assist  Dependent IADLs:: Cleaning, Cooking, Laundry, Shopping, Meal Preparation, Medication Management, Money Management, Transportation       Mental Health Status:  Mental Health Status: No Current Concerns       Chemical Dependency Status:  Chemical Dependency Status: No Current Concerns             Values/Beliefs:  Spiritual, Cultural Beliefs, Buddhism Practices, Values that affect care: no               Discussed  Partnership in Safe Discharge Planning  document with patient/family: No    Additional Information:  CM consult for discharge planning.  Met with patient.  He lives in [facility].  He stated he has an aide he pays for that helps him M-F 8:30am-10:30am.  They help with dressing, grooming, bathing and other tasks.  Facility staff helps with those needs on the weekends.  He stated he gets 2 meals in the dining room per day.      Spoke to nurse Jayashree at the [facility].  She confirmed the above services.  She stated they provide med management 4X/day, escorts to meals and scheduled bathroom assist 4X/day.  She stated he is getting PT through their OP therapy department and they were going to start seeing him in his apartment tomorrow.  Let her know that it is recommended he have 1 assist with all mobility.    Spoke to patient's daughter Jayashree.  Discussed that Hospitalist would like him to have home care nurse initially to check labs and BP.  Explained that PT would be set up through the HHC agency as he cannot get the OP therapy at the same time he is receiving HHC services unless he is paying privately for the OPPT. She stated understanding.  She and her  will be able to provide transportation at discharge.      Home care referral sent for RN and PT.    [facility]  Jayashree (nurse) [phone]  FAX:  [phone]  Pharmacy:  [facility]    Next Steps: Follow up on HHC referral.    Care  Management Discharge Note    Discharge Date: 04/28/2025       Discharge Disposition:      Discharge Services:      Discharge DME:      Discharge Transportation: family or friend will provide    Private pay costs discussed: Not applicable    Education Provided on the Discharge Plan:  yes  Persons Notified of Discharge Plans:   Patient/Family in Agreement with the Plan: yes    Additional Information:  [facility] accepts for home care services.  Orders faxed to [facility] and hermes Blanc ([facility] nurse).      Catherine Valencia RN, BSN, PHN  Inpatient Care Coordination  [facility]  Phone: [phone]

## 2025-04-28 NOTE — PROVIDER NOTIFICATION
Observation goals  PRIOR TO DISCHARGE          Comments:     -diagnostic tests and consults completed and resulted    met      -vital signs normal or at patient baseline    met      -safe disposition plan has been identified     met      *orthostatic blood pressures measured this AM prior to breakfast--pt denies dizziness, transferred from bed to chair with contact guard assist x1 gait belt and walker    Lying Orthostatic BP   Lying Orthostatic /70   Lying Orthostatic Pulse 50 bpm   Sitting Orthostatic BP   Sitting Orthostatic /74   Sitting Orthostatic Pulse 54 bpm   Standing Orthostatic BP   Standing Orthostatic /101   Standing Orthostatic Pulse 57 bpm         Nurse to notified provider observation goals have been met and patient is ready for discharge.  Patient states daughter Jayashree will get clothes from his [facility] and provide transportation ride home; MINDY-RN met with pt to arrange home therapies.

## 2025-04-28 NOTE — DISCHARGE SUMMARY
[facility]  Hospitalist Discharge Summary      Date of Admission:  4/26/2025  Date of Discharge:  4/28/2025  Discharging Provider: Jairon Doran MD  Discharge Service: Hospitalist Service    Discharge Diagnoses   Please see below    Clinically Significant Risk Factors     # Overweight: Estimated body mass index is 26.19 kg/m  as calculated from the following:    Height as of this encounter: 1.829 m (6').    Weight as of this encounter: 87.6 kg (193 lb 2 oz).       Follow-ups Needed After Discharge   Follow-up Appointments       Hospital Follow-up with Existing Primary Care Provider (PCP)          Schedule Primary Care visit within: 7 Days   Recommended labs and Imaging (to be ordered by Primary Care Provider): BMP 5/2 with home health RN draw               Unresulted Labs Ordered in the Past 30 Days of this Admission       No orders found from 3/27/2025 to 4/27/2025.        These results will be followed up by NA    Discharge Disposition   Discharged to assisted living  Condition at discharge: Stable    Hospital Course   Zack Santiago is a 89 year-old male with past medical history significant for hx of CVA on clopidogrel, post-CABG atrial fibrillation, chronic bradycardia, chronic anemia, hypertension who presents with right hand weakness and numbness. Admitted on 4/26/2025.      Hypochloremic Hyponatremia, improved/resolved  *Sodium 125. Cl 90. Asymptomatic.  *PTA on chlorthalidone, on chronically with historically normal sodiums. Reports chronically poor fluid intake. Suspect hypovolemic with diuretic contributing.   *1L normal saline given in ED  *4/27 repeat BMP improved, Na 134, Cl 96  *4/28 BMP Na 135, Cl 98 (normal)  - Held chlorthalidone initially in hospital - discussed with patient (and reviewed with son on phone) - he has been on chorthalidone for many years without issue in the past. We confirmed the plan - he will ensure he is taking in adequate PO nutrition for the next  days and have a follow up BMP with HH RN Friday 5/2 and PCP follow up. If sodium not stable would likely consider switching agents - defer to pcp ultimately.     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 Case discussed with stroke neurology and recommended MRI for formal CVA r/o, no further work-up if negative   *Reports hx of CVA with slurred speech, symptoms resolved. On clopidogrel PTA.  *MRI brain - No acute intracranial process. (Small chronic infarctions left inferior parietal lobe and bilateral cerebellar hemispheres)   - Neuro checks Q4H  - Continue prior to admission clopidogrel   - symptoms suspected to be related to right radial nerve palsy, symptoms continuing to improve but not to baseline 4/27  - Neuro stroke signed off 4/27   - rec follow up with Neuro IR for 2 mm basilar artery aneurysm as outpatient (neuro stroke placed referral in discharge navigator)     Acute on chronic normocytic anemia  Hgb 9.5 g/dl, baseline ~11 g/dl. On clopidogrel PTA. Denies any signs or symptoms of bleeding. Borderline or mildly macrocytic historically.   - Ferritin, iron panel, vitamin B12, folate, TSH - iron on low side, po iron initiated, further plan with PCP     Mild troponin elevation  Chronic sinus bradycardia  Coronary artery disease s/p CABG  Hyperlipidemia  Troponin 24->29 --> 22. Denies any chest pain. EKG non-ischemic.   - Cardiac monitoring   - Troponin in AM  - Continue prior to admission clopidogrel   - Hold prior to admission statin while observation status, resume at discharge     Parkinson's disease  Lives in [place]. Ambulates with walker.   - Continue prior to admission carbidopa-levodopa   - SW consulted for discharge planning    - PT OT evaluated - home care PT OT RN at discharge    Sinus bradycardia (appears somewhat chronic)  *On chart review he has had HR recorded in the 50's prior. EKG repeated RBBB, LAFB. No acute overt ischemic change.  *Free T4 WNL, Mag and K WNL  *Continue tele  - asymptomatic with ambulation/activity  - orthostatics checked and pos for SBP decrease (174 to 150). No symptoms reported during this time - okay to continue with discharge, home therapies, and PCP follow up.  - Could consider repeat EKG/evaluation with PCP pending clinical course     Hypothyroidism  - Continue prior to admission levothyroxine      Chronic kidney disease, stage 3  Baseline creatinine 1.1-1.2. Creatinine 1.10 on admission.   - Currently around baseline. Repeat creat 0.9.  - Avoid nephrotoxins  - Monitor BMP as above     Panic disorder  OCD  Depression  - Continue prior to admission fluoxetine, quetiapine, mirtazapine      Hypertension  - Hold prior to admission chlorthalidone as above initially, given low dose amlodipine 4/27, BP remain variable  - as above, resuming PTA chlorthalidone and repeat BMP with PCP after discharge      Consultations This Hospital Stay   CARE MANAGEMENT / SOCIAL WORK IP CONSULT  SPEECH LANGUAGE PATH ADULT IP CONSULT  PHYSICAL THERAPY ADULT IP CONSULT  OCCUPATIONAL THERAPY ADULT IP CONSULT    Code Status   Full Code    Time Spent on this Encounter   I, Jairon Doran MD, personally saw the patient today and spent greater than 30 minutes discharging this patient.       Jairon Doran MD  [facility] NEUROSCIENCE UNIT  [facility] [place] [address]  Phone: [phone]  ______________________________________________________________________    Physical Exam   Vital Signs: Temp: 98.6  F (37  C) Temp src: Oral BP: (!) 174/70 Pulse: 50   Resp: 18 SpO2: 95 % O2 Device: None (Room air)    Weight: 193 lbs 1.97 oz    Gen: NAD, pleasant  HEENT: EOMI, MMM  Resp: no focal crackles, no wheezes,  no increased work of resp  CV: S1S2 heard, reg rhythm, reg rate  Abdo: soft, nontender, nondistended, bowel sounds present  Ext: calves nontender, well perfused  Neuro: aa, conversant, moving ext, CN grossly intact, no facial asymmetry         Primary Care Physician   Zurdo Kendrick    Discharge Orders      Adult Neurology  Referral      Adult Neurosurgery  Referral      Home Care Referral      Reason for your hospital stay    R hand/arm numbness concerning for a stroke - stroke was ruled out on MRI.     Activity    Your activity upon discharge: activity as tolerated with assistance as needed     Discharge Instructions    As we discussed, continue your medications as below. We will arrange home health physical therapy and occupational therapy. You will also have a home health RN to visit you in the coming days to ensure you are doing well. You will have a blood draw by Friday May 2 with the home health RN - the results will be relayed to your primary doctor. We will check your electrolytes in that blood draw and ensure they are stable; if abnormal, we may need to adjust/switch your blood pressure medications. As we discussed, you should ensure you are taking in enough food to help support normal electrolyte levels. You will have follow up with your regular doctor.  Neurology is coordinating a follow up visit in clinic regarding a slight (mild) widening of the basilar artery which is in the base of your skull/neck - they will contact you for scheduling.     Diet    Follow this diet upon discharge: Current Diet:Orders Placed This Encounter      Regular Diet Adult     Hospital Follow-up with Existing Primary Care Provider (PCP)            Significant Results and Procedures   Most Recent 3 CBC's:  Recent Labs   Lab Test 04/27/25  1626 04/26/25  2334 06/21/24  1212   WBC 6.7 7.3 6.1   HGB 11.5* 9.5* 11.6*   MCV 96 95 101*    143* 171     Most Recent 3 BMP's:  Recent Labs   Lab Test 04/28/25  0859  04/27/25  1626 04/26/25  2334    134* 125*   POTASSIUM 4.1 4.1 3.4   CHLORIDE 98 96* 90*   CO2 26 26 25   BUN 22.4 22.0 25.4*   CR 0.99 0.90 1.10   ANIONGAP 11 12 10   YOUNG 9.4 9.3 8.3*   * 111* 94     Most Recent TSH and T4:  Recent Labs   Lab Test 04/27/25  0256   TSH 4.32*   T4 1.21     Most Recent Urinalysis:  Recent Labs   Lab Test 10/18/23  1510   COLOR Yellow   APPEARANCE Clear   URINEGLC Negative   URINEBILI Negative   URINEKETONE 15*   SG 1.015   UBLD Negative   URINEPH 6.5   PROTEIN Negative   UROBILINOGEN 1.0   NITRITE Negative   LEUKEST Trace*   RBCU None Seen   WBCU None Seen   ,   Results for orders placed or performed during the hospital encounter of 04/26/25   CT Head w/o Contrast    Narrative    EXAM: CT HEAD W/O CONTRAST, CTA HEAD NECK W CONTRAST  LOCATION: [facility]  DATE: 4/26/2025    INDICATION: Code Stroke, rule out hemorrhage and evaluate for potential thrombolysis thrombectomy. PLEASE READ IMMEDIATELY. Right-sided weakness.  COMPARISON: None   CONTRAST: 67mL Isovue 370  TECHNIQUE: Head and neck CT angiogram with IV contrast. Noncontrast head CT followed by axial helical CT images of the head and neck vessels obtained during the arterial phase of intravenous contrast administration. Axial 2D reconstructed images and   multiplanar 3D MIP reconstructed images of the head and neck vessels were performed by the technologist. Dose reduction techniques were used. All stenosis measurements made according to NASCET criteria unless otherwise specified.    FINDINGS:   NONCONTRAST HEAD CT:   INTRACRANIAL CONTENTS: No intracranial hemorrhage, extraaxial collection, or mass effect.  No CT evidence of acute infarct. Mild to moderate presumed chronic small vessel ischemic changes. Left temporoparietal encephalomalacia  Normal ventricles and   sulci.     VISUALIZED ORBITS/SINUSES/MASTOIDS: Prior bilateral cataract surgery. Visualized portions of the orbits are otherwise  unremarkable. No paranasal sinus mucosal disease. No middle ear or mastoid effusion.    BONES/SOFT TISSUES: No acute abnormality.    HEAD CTA:  ANTERIOR CIRCULATION: Calcified atherosclerosis of the carotid siphons. No stenosis/occlusion, aneurysm, or high flow vascular malformation. Standard [place] of [place] anatomy.    POSTERIOR CIRCULATION: No stenosis/occlusion or high flow vascular malformation. Balanced vertebral arteries. 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      DURAL VENOUS SINUSES: Expected enhancement of the major dural venous sinuses.    NECK CTA:  RIGHT CAROTID: No measurable stenosis or dissection.    LEFT CAROTID: No measurable stenosis or dissection.    VERTEBRAL ARTERIES: Moderate stenosis of the right V4 segment secondary to calcified atherosclerotic plaque. No additional stenosis or dissection. Balanced vertebral arteries.    AORTIC ARCH: Classic aortic arch anatomy with no significant stenosis at the origin of the great vessels.    NONVASCULAR STRUCTURES: Unremarkable.      Impression    IMPRESSION:   HEAD CT:  No acute intracranial process.    HEAD CTA:  1.  No large vessel occlusion or hemodynamically significant stenosis.  2.  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    NECK CTA:  No large vessel occlusion or hemodynamically significant stenosis.     CTA Head Neck with Contrast    Narrative    EXAM: CT HEAD W/O CONTRAST, CTA HEAD NECK W CONTRAST  LOCATION: [facility]  DATE: 4/26/2025    INDICATION: Code Stroke, rule out hemorrhage and evaluate for potential thrombolysis thrombectomy. PLEASE READ IMMEDIATELY. Right-sided weakness.  COMPARISON: None   CONTRAST: 67mL Isovue 370  TECHNIQUE: Head and neck CT angiogram with IV contrast. Noncontrast head CT followed by axial helical CT images of the head and neck vessels obtained during the arterial phase of intravenous contrast administration. Axial 2D reconstructed images and   multiplanar  3D MIP reconstructed images of the head and neck vessels were performed by the technologist. Dose reduction techniques were used. All stenosis measurements made according to NASCET criteria unless otherwise specified.    FINDINGS:   NONCONTRAST HEAD CT:   INTRACRANIAL CONTENTS: No intracranial hemorrhage, extraaxial collection, or mass effect.  No CT evidence of acute infarct. Mild to moderate presumed chronic small vessel ischemic changes. Left temporoparietal encephalomalacia  Normal ventricles and   sulci.     VISUALIZED ORBITS/SINUSES/MASTOIDS: Prior bilateral cataract surgery. Visualized portions of the orbits are otherwise unremarkable. No paranasal sinus mucosal disease. No middle ear or mastoid effusion.    BONES/SOFT TISSUES: No acute abnormality.    HEAD CTA:  ANTERIOR CIRCULATION: Calcified atherosclerosis of the carotid siphons. No stenosis/occlusion, aneurysm, or high flow vascular malformation. Standard [place] of [place] anatomy.    POSTERIOR CIRCULATION: No stenosis/occlusion or high flow vascular malformation. Balanced vertebral arteries. 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      DURAL VENOUS SINUSES: Expected enhancement of the major dural venous sinuses.    NECK CTA:  RIGHT CAROTID: No measurable stenosis or dissection.    LEFT CAROTID: No measurable stenosis or dissection.    VERTEBRAL ARTERIES: Moderate stenosis of the right V4 segment secondary to calcified atherosclerotic plaque. No additional stenosis or dissection. Balanced vertebral arteries.    AORTIC ARCH: Classic aortic arch anatomy with no significant stenosis at the origin of the great vessels.    NONVASCULAR STRUCTURES: Unremarkable.      Impression    IMPRESSION:   HEAD CT:  No acute intracranial process.    HEAD CTA:  1.  No large vessel occlusion or hemodynamically significant stenosis.  2.  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    NECK CTA:  No large vessel occlusion or hemodynamically  significant stenosis.     MR Brain w/o & w Contrast    Narrative    EXAM: MR BRAIN WITHOUT AND WITH CONTRAST  LOCATION: [facility]  DATE: 04/27/2025    INDICATION: Right hand weakness, rule out stroke.  COMPARISON: CTA head and neck 04/26/2025. Brain MRI 12/23/2016.  CONTRAST: 9 mL Gadavist.  TECHNIQUE: Routine multiplanar multisequence head MRI without and with intravenous contrast.    FINDINGS:  INTRACRANIAL CONTENTS: No acute or subacute infarct. Small chronic infarction left inferior parietal lobe. Small chronic infarctions within the cerebellar hemispheres. No mass, acute hemorrhage, or extra-axial fluid collections. Scattered nonspecific   T2/FLAIR hyperintensities within the cerebral white matter most consistent with mild chronic microvascular ischemic change. Moderate generalized cerebral atrophy. No hydrocephalus. Normal position of the cerebellar tonsils. No pathologic contrast   enhancement.    SELLA: No abnormality accounting for technique.    OSSEOUS STRUCTURES/SOFT TISSUES: Normal marrow signal. The major intracranial vascular flow-voids are maintained.     ORBITS: No abnormality accounting for technique.     SINUSES/MASTOIDS: Mild mucosal thickening scattered about the paranasal sinuses. No middle ear or mastoid effusion.       Impression    IMPRESSION:  1.  No acute intracranial process.  2.  Small chronic infarctions left inferior parietal lobe and bilateral cerebellar hemispheres. Moderate generalized volume loss and mild chronic microvascular ischemic change.       *Note: Due to a large number of results and/or encounters for the requested time period, some results have not been displayed. A complete set of results can be found in Results Review.       Discharge Medications   Discharge Medication List as of 4/28/2025 11:07 AM        START taking these medications    Details   ferrous sulfate 45 MG TBCR CR tablet Take 1 tablet (45 mg) by mouth 2 times daily for 14 days.,  Disp-28 tablet, R-0, E-Prescribe           CONTINUE these medications which have NOT CHANGED    Details   acetaminophen (TYLENOL) 325 MG tablet Take 650 mg by mouth every 6 hours as needed for mild pain., Historical      carbidopa-levodopa (SINEMET)  MG tablet 1 & 1/2 TABLETS ORALLY 3 TIMES DAILY, Disp-270 tablet, R-3, E-PrescribePLEASE SEND OUT REFILLS FOR PATIENTS MEDICATIONS. THANK YOU.      chlorthalidone (HYGROTON) 25 MG tablet 1 TABLET ORALLY EVERY MORNING  (DX: DIURETIC ), Disp-28 tablet, R-11, E-Prescribe*URGENT REQUEST* Please send refills for cycle fill. Thank you!      clopidogrel (PLAVIX) 75 MG tablet 1 TABLET ORALLY EVERY MORNING  (DX: HEART  ), Disp-30 tablet, R-11, E-PrescribePLEASE SEND REFILLS FOR PATIENTS MEDICATIONS. THANK YOU.      DESITIN DAILY DEFENSE 13 % CREA APPLY TO AFFECTED AREA TOPICALLY 2 TIMES DAILY;APPLY TO AFFECTED AREA TOPICALLY AS NEEDED **OK TO APPLY AND LEAVE UNCOVERED**, Disp-113 g, R-0, E-Prescribe      diclofenac (VOLTAREN) 1 % topical gel Apply thin layer twice daily, Historical      econazole nitrate 1 % external cream Apply topically 2 times daily.Historical      FLUoxetine (PROZAC) 40 MG capsule Take 40 mg by mouth daily., Historical      gabapentin (NEURONTIN) 400 MG capsule 1 CAPSULE ORALLY 3 TIMES DAILY, Disp-90 capsule, R-11, E-PrescribePLEASE SEND REFILLS.PHARMACY PLEASE PROFILE FOR FUTURE USE-MedicalRecords      hydrocortisone, Perianal, (HYDROCORTISONE) 2.5 % cream Place rectally 2 times daily as needed for hemorrhoids.Disp-30 g, [id number]      hydrOXYzine HCl (ATARAX) 10 MG tablet Take 10 mg by mouth 2 times daily as needed for itching., Historical      lactulose (CHRONULAC) 10 GM/15ML solution DRINK 30ML  ORALLY DAILY (DX: CONSTIPATION);DRINK 30ML  ORALLY DAILY AS NEEDED (DX: CONSTIPATION), Disp-946 mL, R-PRN, E-Prescribe      levothyroxine (SYNTHROID/LEVOTHROID) 88 MCG tablet 1 TABLET ORALLY EVERY MORNING ON AN EMPTY STOMACH, Disp-90 tablet, R-0,  E-PrescribePlease advise pt to contact their clinic, as they are due for laboratory/imaging services with imaging/lab before further refills can be approved for this/these medicatio ns.      memantine (NAMENDA) 5 MG tablet Take 5 mg by mouth daily., Historical      mirtazapine (REMERON) 45 MG tablet 1 TABLET ORALLY AT BEDTIME (DX: DEPRESSION), Disp-28 tablet, R-11, E-PrescribePlease send refills for cycle fill. Thank you!      OPTIVE 0.5-0.9 % ophthalmic solution INSTILL 1 DROP INTO BOTH EYES 2 TIMES DAILY  (DX: DRY EYES), Disp-15 mL, R-11, E-PrescribeFACILITY IS REQUESTING MED - PLEASE SEND REFILLS. THANKS      QUEtiapine (SEROQUEL) 25 MG tablet 1 TAB ORALLY EVERY EVENING, Disp-30 tablet, R-11, E-Prescribe*URGENT REQUEST* Please send refills for cycle fill. Thank you!      senna-docusate (SENEXON-S) 8.6-50 MG tablet 1 TABLET ORALLY DAILY AS NEEDED (DX: CONSTIPATION), Disp-30 tablet, R-10, E-PrescribePATIENT RESIDES AT AN ASSISTED LIVING, FACILITY REQUESTING FOR REFILLS. THANK YOU.      simvastatin (ZOCOR) 20 MG tablet 1 TABLET ORALLY AT BEDTIME   (DX: CHOLESTEROL), Disp-30 tablet, R-2, E-PrescribePLEASE SEND REFILLS FOR PATIENTS MEDICATIONS. THANK YOU.      sodium chloride (DEEP SEA NASAL SPRAY) 0.65 % nasal spray INSTILL SPRAY(S) INTO NOSTRIL(S) 3 TIMES DAILY, Disp-44 mL, R-11, E-Prescribe      SOLUBLE FIBER THERAPY powder TAKE 1 TEASPOONFUL ORALLY DAILY (DX: CONSTIPATION), Disp-454 g, R-11, E-PrescribePLEASE SEND REFILLS.PHARMACY PLEASE PROFILE FOR FUTURE USE-MedicalRecords           Allergies   Allergies   Allergen Reactions    No Known Allergies

## 2025-04-28 NOTE — DISCHARGE INSTRUCTIONS
"Jayashree asked about wheelchairs -   + If you plan to pursue getting a wheelchair, please talk to your primary care provider Dr. Kendrick about wheelchair prescription at your appointment Friday--he will need to submit both notes and an order to the \"DME\" (durable medical equipment) company of your choice  + You may also benefit from a \"pressure mapping\" referral from your primary care provider, to obtain the right kind of cushion for a wheelchair prevent further pressure injuries to your bottom        You did not have a stroke, but it is still good to know risk factors (below)  Your risk factors for stroke or TIA (transient ischemic attack):     Your Risk Factors Your Results Goals   [x] High blood pressure BP: (!) 174/70 (25 0741) Less than 120/80   [] Cholesterol          Total 2025: 103 mg/dL   Less than 150    Triglycerides   2025: 91 mg/dL Less than 150    LDL 2025: 35 mg/dL    Less than 70    HDL 2025: 50 mg/dL         Greater than 40 (men)  Greater than 50 (women)   [] Diabetes                A1C No lab value available in past 30 days Less than 5.7   [] Atrial fibrillation No atrial fibrillation noted on cardiac monitor Manage per physician orders   [] Smoking/tobacco use   Tobacco Use      Smoking status: Former        Packs/day: 0.00        Years: 1 pack/day for 5.0 years (5.0 ttl pk-yrs)        Types: Cigarettes        Start date: 1960        Quit date: 1965        Years since quittin.9        Passive exposure: Current      Smokeless tobacco: Never   Quit smoking and tobacco   [] Overweight Body mass index is 26.19 kg/m .  Less than 25     Other risk factors include: carotid (neck) artery disease, other heart diseases, prior stroke or TIA, poor diet, lack of exercise, and excessive alcohol consumption.     Know the warning signs and symptoms of stroke: BE FAST     B = Balance loss   E = Eyesight changes   F = Facial droop or numbness   A = Arm or leg weakness   S = " Speech difficulty, slurred speech   T = Time to call 911 for help

## 2025-04-28 NOTE — PROGRESS NOTES
Pt discharged to [facility] with daughter Jayashree to provide private transportation. Verbalized understanding of d/c plan including new meds (filled Rx sent with pt), monitoring, and followup; questions answered.  Denies pain.  Taking good PO food and fluid intake, passing flatus, and voiding adequately; had BM prior to discharge.  Ambulates with contact guard assist x1 , gait belt., walker.

## 2025-04-28 NOTE — PROGRESS NOTES
Speech Language Therapy Discharge Summary    Reason for therapy discharge:    All goals and outcomes met, no further needs identified.    Progress towards therapy goal(s). See goals on Care Plan in [facility] electronic health record for goal details.  Goals met - tolerating regular/thin diet    Therapy recommendation(s):    No further therapy is recommended.

## 2025-04-28 NOTE — PLAN OF CARE
Goal Outcome Evaluation:           Overall Patient Progress: improvingOverall Patient Progress: improving       Reason for Admission: R hand numbness and weakness, resolved.     Cognitive/Mentation: A/Ox 4  Neuros/CMS: BUE tremors, baseline Parkinson's. Slight weakness to R hand, improved.  VS: VSS on RA ex bradycardia. SBP < 180.   Tele: SB.  /GI: Continent. Last BM 04/26/25 (PTA).   Pulmonary: LS clear.  Pain: Denies.      Drains/Lines: L PIV, SL.   Skin: Scattered bruising. Scab on L elbow. R foot missing 5th toe, L foot missing 2nd tip of toe. Cracked and peeling bilateral feet.   Activity: Assist x 2 with GBW.  Diet: Regular with thin liquids. Takes pills whole with water.      Therapies recs: Pending consults.  Discharge: Pending PT/OT/SLP consult.     Aggression Stoplight Tool: Green    End of shift summary: Patient slept well overnight with no acute issues. Refused overnight VS assessments.

## 2025-04-28 NOTE — PLAN OF CARE
Physical Therapy: Orders received. Chart reviewed and discussed with care team.? Physical Therapy not indicated due to patient mobilizing near his prior level of function during OT assessment.  Per patient, he resides in [place] and has assist for ADLs and mobility.  Patient verbalizing understanding of recommendation to mobilize with staff assistance at discharge to minimize falls risk.? Defer discharge recommendations to OT who recommend return home with assist and home health therapy.? Will complete IP PT orders.

## 2025-04-28 NOTE — TELEPHONE ENCOUNTER
"Faxed \"signed\" PT Evaluation & Plan Of Treatment to [facility] @ [phone] // confirmation received  "

## 2025-04-29 ENCOUNTER — PATIENT OUTREACH (OUTPATIENT)
Dept: FAMILY MEDICINE | Facility: CLINIC | Age: OVER 89
End: 2025-04-29
Payer: COMMERCIAL

## 2025-04-29 NOTE — TELEPHONE ENCOUNTER
Pt scheduled.   Appointments in Next Year      May 02, 2025 2:30 PM  (Arrive by 2:15 PM)  ED/Hospital Follow Up with Zurdo Kendrick MD  [facility] ([facility] ) [phone]   Leroy Moore RN  [facility]

## 2025-05-01 ENCOUNTER — TELEPHONE (OUTPATIENT)
Dept: FAMILY MEDICINE | Facility: CLINIC | Age: OVER 89
End: 2025-05-01
Payer: COMMERCIAL

## 2025-05-01 DIAGNOSIS — D64.9 ANEMIA, UNSPECIFIED TYPE: ICD-10-CM

## 2025-05-01 RX ORDER — ACETAMINOPHEN 500 MG
TABLET ORAL
Qty: 60 TABLET | Refills: 11 | Status: SHIPPED | OUTPATIENT
Start: 2025-05-01

## 2025-05-01 NOTE — TELEPHONE ENCOUNTER
Home Care is calling regarding an established patient with [facility].  Requesting orders from: Zurdo Kendrick RN APPROVED: RN able to provide verbal orders.  Home Care will send orders for signature.    Is this a request for a temporary pause in the home care episode?  No    Orders Requested    Skilled Nursing  Request for initial certification (first set of orders)   Frequency: 1x/week for 4 weeks  RN gave verbal order: Yes          [facility] discharge paper states they wanted a BMP taken Friday 5/2 by home health nurse.   Pt will be in clinic tomorrow 5/2, so they are assuming this will be completed then and will not send a nurse out just for this.         Okay to leave a detailed message?: Yes      Genoveva Skinner, RN

## 2025-05-02 ENCOUNTER — MEDICAL CORRESPONDENCE (OUTPATIENT)
Dept: HEALTH INFORMATION MANAGEMENT | Facility: CLINIC | Age: OVER 89
End: 2025-05-02

## 2025-05-05 ENCOUNTER — TELEPHONE (OUTPATIENT)
Dept: OTHER | Facility: CLINIC | Age: OVER 89
End: 2025-05-05
Payer: COMMERCIAL

## 2025-05-05 NOTE — TELEPHONE ENCOUNTER
[facility]    Who is the name of the provider?:  BRIE VANCE   What is the location you see this provider at/preferred location?: [place]  Person calling / Facility: Zack Santiago  Phone number:  [phone] (home)   Nurse call back needed:  ?     Reason for call:    Spoke with patient.  He received a letter from [facility] to schedule follow up testing and an appointment with Dr. Vance.  He said he does not want to schedule any appointment, as he does not have a problem.  He wants [facility] to know he checked with his PCP's office (Dr. Kendrick) and they told him he does not have any problems with his leg.    There is an active request to schedule him for     BLE arterial US  Fasting labs  In person follow up 2 weeks after tests     I told him I would let Dr. Vance's nurse know that he is not going to schedule.      Patient said you could call him if there are any questions.    Pharmacy location:  [address]  Outside Imaging: n/a   Can we leave a detailed message on this number?  YES     5/5/2025, 1:54 PM

## 2025-05-05 NOTE — TELEPHONE ENCOUNTER
Orders removed.    Tamie BARRIOS, RN    [facility]  [facility]  Office: [phone]  Fax: [phone]

## 2025-05-07 ENCOUNTER — TELEPHONE (OUTPATIENT)
Dept: FAMILY MEDICINE | Facility: CLINIC | Age: OVER 89
End: 2025-05-07
Payer: COMMERCIAL

## 2025-05-07 NOTE — TELEPHONE ENCOUNTER
Home Care is calling regarding an established patient with [facility].  Requesting orders from: Zurdo Kendrick RN APPROVED: RN able to provide verbal orders.  Home Care will send orders for signature.  RN will close encounter.  Is this a request for a temporary pause in the home care episode?  No    Orders Requested    Skilled Nursing  Request for discontinuation of care   Goals have been met/progressing.  Frequency: Patient requesting to be discharged  RN gave verbal order: Yes      Phone number Home Care can be reached at: [phone]  Okay to leave a detailed message?: Yes      Logan Urias, RN

## 2025-05-09 ENCOUNTER — HOSPITAL ENCOUNTER (OUTPATIENT)
Facility: CLINIC | Age: OVER 89
Setting detail: OBSERVATION
Discharge: NURSING, REHAB OR LONG-TERM CARE | End: 2025-05-12
Attending: EMERGENCY MEDICINE | Admitting: INTERNAL MEDICINE
Payer: COMMERCIAL

## 2025-05-09 ENCOUNTER — APPOINTMENT (OUTPATIENT)
Dept: CT IMAGING | Facility: CLINIC | Age: OVER 89
End: 2025-05-09
Attending: EMERGENCY MEDICINE
Payer: COMMERCIAL

## 2025-05-09 DIAGNOSIS — S09.90XA CLOSED HEAD INJURY, INITIAL ENCOUNTER: ICD-10-CM

## 2025-05-09 DIAGNOSIS — R53.1 WEAKNESS: ICD-10-CM

## 2025-05-09 PROBLEM — G20.A1 PARKINSON'S DISEASE (H): Status: ACTIVE | Noted: 2023-05-01

## 2025-05-09 LAB
ALBUMIN SERPL BCG-MCNC: 3.8 G/DL (ref 3.5–5.2)
ALBUMIN UR-MCNC: NEGATIVE MG/DL
ALP SERPL-CCNC: 110 U/L (ref 40–150)
ALT SERPL W P-5'-P-CCNC: 15 U/L (ref 0–70)
AMORPH CRY #/AREA URNS HPF: ABNORMAL /HPF
ANION GAP SERPL CALCULATED.3IONS-SCNC: 13 MMOL/L (ref 7–15)
APPEARANCE UR: CLEAR
AST SERPL W P-5'-P-CCNC: 13 U/L (ref 0–45)
BACTERIA #/AREA URNS HPF: ABNORMAL /HPF
BASOPHILS # BLD AUTO: 0 10E3/UL (ref 0–0.2)
BASOPHILS NFR BLD AUTO: 0 %
BILIRUB SERPL-MCNC: 0.5 MG/DL
BILIRUB UR QL STRIP: NEGATIVE
BUN SERPL-MCNC: 21.4 MG/DL (ref 8–23)
CALCIUM SERPL-MCNC: 8.8 MG/DL (ref 8.8–10.4)
CHLORIDE SERPL-SCNC: 97 MMOL/L (ref 98–107)
COLOR UR AUTO: ABNORMAL
CREAT SERPL-MCNC: 1.01 MG/DL (ref 0.67–1.17)
EGFRCR SERPLBLD CKD-EPI 2021: 69 ML/MIN/1.73M2
EOSINOPHIL # BLD AUTO: 0.2 10E3/UL (ref 0–0.7)
EOSINOPHIL NFR BLD AUTO: 3 %
ERYTHROCYTE [DISTWIDTH] IN BLOOD BY AUTOMATED COUNT: 14.6 % (ref 10–15)
GLUCOSE SERPL-MCNC: 93 MG/DL (ref 70–99)
GLUCOSE UR STRIP-MCNC: NEGATIVE MG/DL
HCO3 SERPL-SCNC: 25 MMOL/L (ref 22–29)
HCT VFR BLD AUTO: 32.1 % (ref 40–53)
HGB BLD-MCNC: 10.8 G/DL (ref 13.3–17.7)
HGB UR QL STRIP: NEGATIVE
HOLD SPECIMEN: NORMAL
IMM GRANULOCYTES # BLD: 0 10E3/UL
IMM GRANULOCYTES NFR BLD: 1 %
KETONES UR STRIP-MCNC: NEGATIVE MG/DL
LACTATE SERPL-SCNC: 1.5 MMOL/L (ref 0.7–2)
LEUKOCYTE ESTERASE UR QL STRIP: ABNORMAL
LYMPHOCYTES # BLD AUTO: 1 10E3/UL (ref 0.8–5.3)
LYMPHOCYTES NFR BLD AUTO: 14 %
MCH RBC QN AUTO: 32.4 PG (ref 26.5–33)
MCHC RBC AUTO-ENTMCNC: 33.6 G/DL (ref 31.5–36.5)
MCV RBC AUTO: 96 FL (ref 78–100)
MONOCYTES # BLD AUTO: 0.6 10E3/UL (ref 0–1.3)
MONOCYTES NFR BLD AUTO: 8 %
NEUTROPHILS # BLD AUTO: 5.2 10E3/UL (ref 1.6–8.3)
NEUTROPHILS NFR BLD AUTO: 75 %
NITRATE UR QL: NEGATIVE
NRBC # BLD AUTO: 0 10E3/UL
NRBC BLD AUTO-RTO: 0 /100
PH UR STRIP: 7 [PH] (ref 5–7)
PLATELET # BLD AUTO: 179 10E3/UL (ref 150–450)
POTASSIUM SERPL-SCNC: 4.3 MMOL/L (ref 3.4–5.3)
PROT SERPL-MCNC: 6.3 G/DL (ref 6.4–8.3)
RBC # BLD AUTO: 3.33 10E6/UL (ref 4.4–5.9)
RBC URINE: 0 /HPF
SODIUM SERPL-SCNC: 135 MMOL/L (ref 135–145)
SP GR UR STRIP: 1.01 (ref 1–1.03)
SQUAMOUS EPITHELIAL: 1 /HPF
UROBILINOGEN UR STRIP-MCNC: NORMAL MG/DL
WBC # BLD AUTO: 6.9 10E3/UL (ref 4–11)
WBC URINE: 3 /HPF

## 2025-05-09 PROCEDURE — 96360 HYDRATION IV INFUSION INIT: CPT

## 2025-05-09 PROCEDURE — 87086 URINE CULTURE/COLONY COUNT: CPT | Performed by: EMERGENCY MEDICINE

## 2025-05-09 PROCEDURE — 250N000013 HC RX MED GY IP 250 OP 250 PS 637: Performed by: INTERNAL MEDICINE

## 2025-05-09 PROCEDURE — 99285 EMERGENCY DEPT VISIT HI MDM: CPT | Mod: 25

## 2025-05-09 PROCEDURE — 83605 ASSAY OF LACTIC ACID: CPT | Performed by: EMERGENCY MEDICINE

## 2025-05-09 PROCEDURE — 96361 HYDRATE IV INFUSION ADD-ON: CPT

## 2025-05-09 PROCEDURE — 85025 COMPLETE CBC W/AUTO DIFF WBC: CPT | Performed by: EMERGENCY MEDICINE

## 2025-05-09 PROCEDURE — 82565 ASSAY OF CREATININE: CPT | Performed by: EMERGENCY MEDICINE

## 2025-05-09 PROCEDURE — G0378 HOSPITAL OBSERVATION PER HR: HCPCS

## 2025-05-09 PROCEDURE — 36415 COLL VENOUS BLD VENIPUNCTURE: CPT | Performed by: EMERGENCY MEDICINE

## 2025-05-09 PROCEDURE — 258N000003 HC RX IP 258 OP 636: Performed by: INTERNAL MEDICINE

## 2025-05-09 PROCEDURE — 70450 CT HEAD/BRAIN W/O DYE: CPT

## 2025-05-09 PROCEDURE — 81001 URINALYSIS AUTO W/SCOPE: CPT | Performed by: EMERGENCY MEDICINE

## 2025-05-09 PROCEDURE — 70486 CT MAXILLOFACIAL W/O DYE: CPT

## 2025-05-09 PROCEDURE — 72125 CT NECK SPINE W/O DYE: CPT

## 2025-05-09 PROCEDURE — 99222 1ST HOSP IP/OBS MODERATE 55: CPT | Performed by: INTERNAL MEDICINE

## 2025-05-09 RX ORDER — HYDRALAZINE HYDROCHLORIDE 20 MG/ML
10 INJECTION INTRAMUSCULAR; INTRAVENOUS EVERY 4 HOURS PRN
Status: DISCONTINUED | OUTPATIENT
Start: 2025-05-09 | End: 2025-05-12 | Stop reason: HOSPADM

## 2025-05-09 RX ORDER — NALOXONE HYDROCHLORIDE 0.4 MG/ML
0.4 INJECTION, SOLUTION INTRAMUSCULAR; INTRAVENOUS; SUBCUTANEOUS
Status: DISCONTINUED | OUTPATIENT
Start: 2025-05-09 | End: 2025-05-12 | Stop reason: HOSPADM

## 2025-05-09 RX ORDER — ACETAMINOPHEN 650 MG/1
650 SUPPOSITORY RECTAL EVERY 4 HOURS PRN
Status: DISCONTINUED | OUTPATIENT
Start: 2025-05-09 | End: 2025-05-12 | Stop reason: HOSPADM

## 2025-05-09 RX ORDER — AMOXICILLIN 250 MG
2 CAPSULE ORAL 2 TIMES DAILY PRN
Status: DISCONTINUED | OUTPATIENT
Start: 2025-05-09 | End: 2025-05-12 | Stop reason: HOSPADM

## 2025-05-09 RX ORDER — MEMANTINE HYDROCHLORIDE 10 MG/1
10 TABLET ORAL DAILY
Status: DISCONTINUED | OUTPATIENT
Start: 2025-05-09 | End: 2025-05-12 | Stop reason: HOSPADM

## 2025-05-09 RX ORDER — ONDANSETRON 4 MG/1
4 TABLET, ORALLY DISINTEGRATING ORAL EVERY 6 HOURS PRN
Status: DISCONTINUED | OUTPATIENT
Start: 2025-05-09 | End: 2025-05-12 | Stop reason: HOSPADM

## 2025-05-09 RX ORDER — LACTULOSE 10 G/15ML
30 SOLUTION ORAL DAILY
Status: DISCONTINUED | OUTPATIENT
Start: 2025-05-09 | End: 2025-05-12 | Stop reason: HOSPADM

## 2025-05-09 RX ORDER — CLOPIDOGREL BISULFATE 75 MG/1
75 TABLET ORAL DAILY
Status: DISCONTINUED | OUTPATIENT
Start: 2025-05-10 | End: 2025-05-12 | Stop reason: HOSPADM

## 2025-05-09 RX ORDER — LACTULOSE 10 G/15ML
20 SOLUTION ORAL DAILY PRN
COMMUNITY
End: 2025-05-13

## 2025-05-09 RX ORDER — CARBIDOPA AND LEVODOPA 25; 100 MG/1; MG/1
1 TABLET ORAL 3 TIMES DAILY
Status: DISCONTINUED | OUTPATIENT
Start: 2025-05-09 | End: 2025-05-09 | Stop reason: ALTCHOICE

## 2025-05-09 RX ORDER — NALOXONE HYDROCHLORIDE 0.4 MG/ML
0.2 INJECTION, SOLUTION INTRAMUSCULAR; INTRAVENOUS; SUBCUTANEOUS
Status: DISCONTINUED | OUTPATIENT
Start: 2025-05-09 | End: 2025-05-12 | Stop reason: HOSPADM

## 2025-05-09 RX ORDER — AMOXICILLIN 250 MG
1 CAPSULE ORAL 2 TIMES DAILY PRN
Status: DISCONTINUED | OUTPATIENT
Start: 2025-05-09 | End: 2025-05-12 | Stop reason: HOSPADM

## 2025-05-09 RX ORDER — SODIUM CHLORIDE 9 MG/ML
INJECTION, SOLUTION INTRAVENOUS CONTINUOUS
Status: ACTIVE | OUTPATIENT
Start: 2025-05-09 | End: 2025-05-10

## 2025-05-09 RX ORDER — CARBOXYMETHYLCELLULOSE SODIUM 5 MG/ML
1 SOLUTION/ DROPS OPHTHALMIC 2 TIMES DAILY
Status: DISCONTINUED | OUTPATIENT
Start: 2025-05-09 | End: 2025-05-12 | Stop reason: HOSPADM

## 2025-05-09 RX ORDER — CHLORTHALIDONE 25 MG/1
25 TABLET ORAL DAILY
COMMUNITY
End: 2025-05-13

## 2025-05-09 RX ORDER — AMOXICILLIN 250 MG
1 CAPSULE ORAL 2 TIMES DAILY
Status: DISCONTINUED | OUTPATIENT
Start: 2025-05-09 | End: 2025-05-12 | Stop reason: HOSPADM

## 2025-05-09 RX ORDER — ACETAMINOPHEN 325 MG/1
650 TABLET ORAL EVERY 4 HOURS PRN
Status: DISCONTINUED | OUTPATIENT
Start: 2025-05-09 | End: 2025-05-12 | Stop reason: HOSPADM

## 2025-05-09 RX ORDER — LEVOTHYROXINE SODIUM 88 UG/1
88 TABLET ORAL
Status: DISCONTINUED | OUTPATIENT
Start: 2025-05-10 | End: 2025-05-12 | Stop reason: HOSPADM

## 2025-05-09 RX ORDER — UREA 10 %
45 LOTION (ML) TOPICAL 2 TIMES DAILY
COMMUNITY
End: 2025-05-09

## 2025-05-09 RX ORDER — OXYCODONE HYDROCHLORIDE 5 MG/1
5 TABLET ORAL EVERY 4 HOURS PRN
Status: DISCONTINUED | OUTPATIENT
Start: 2025-05-09 | End: 2025-05-12 | Stop reason: HOSPADM

## 2025-05-09 RX ORDER — AMOXICILLIN 250 MG
2 CAPSULE ORAL 2 TIMES DAILY
Status: DISCONTINUED | OUTPATIENT
Start: 2025-05-09 | End: 2025-05-12 | Stop reason: HOSPADM

## 2025-05-09 RX ORDER — LOSARTAN POTASSIUM 50 MG/1
50 TABLET ORAL DAILY
Status: DISCONTINUED | OUTPATIENT
Start: 2025-05-09 | End: 2025-05-12 | Stop reason: HOSPADM

## 2025-05-09 RX ORDER — ONDANSETRON 2 MG/ML
4 INJECTION INTRAMUSCULAR; INTRAVENOUS EVERY 6 HOURS PRN
Status: DISCONTINUED | OUTPATIENT
Start: 2025-05-09 | End: 2025-05-12 | Stop reason: HOSPADM

## 2025-05-09 RX ORDER — ACETAMINOPHEN 325 MG/1
650 TABLET ORAL EVERY 6 HOURS PRN
Status: DISCONTINUED | OUTPATIENT
Start: 2025-05-09 | End: 2025-05-09

## 2025-05-09 RX ORDER — LIDOCAINE 40 MG/G
CREAM TOPICAL
Status: DISCONTINUED | OUTPATIENT
Start: 2025-05-09 | End: 2025-05-12 | Stop reason: HOSPADM

## 2025-05-09 RX ORDER — HYDRALAZINE HYDROCHLORIDE 10 MG/1
10 TABLET, FILM COATED ORAL EVERY 4 HOURS PRN
Status: DISCONTINUED | OUTPATIENT
Start: 2025-05-09 | End: 2025-05-12 | Stop reason: HOSPADM

## 2025-05-09 RX ORDER — PROCHLORPERAZINE MALEATE 5 MG/1
5 TABLET ORAL EVERY 6 HOURS PRN
Status: DISCONTINUED | OUTPATIENT
Start: 2025-05-09 | End: 2025-05-09

## 2025-05-09 RX ORDER — QUETIAPINE FUMARATE 25 MG/1
25 TABLET, FILM COATED ORAL AT BEDTIME
Status: DISCONTINUED | OUTPATIENT
Start: 2025-05-09 | End: 2025-05-12 | Stop reason: HOSPADM

## 2025-05-09 RX ORDER — CARBIDOPA AND LEVODOPA 25; 100 MG/1; MG/1
1 TABLET ORAL 3 TIMES DAILY
Status: DISCONTINUED | OUTPATIENT
Start: 2025-05-09 | End: 2025-05-12 | Stop reason: HOSPADM

## 2025-05-09 RX ORDER — MIRTAZAPINE 15 MG/1
45 TABLET, FILM COATED ORAL AT BEDTIME
Status: DISCONTINUED | OUTPATIENT
Start: 2025-05-09 | End: 2025-05-12 | Stop reason: HOSPADM

## 2025-05-09 RX ADMIN — MIRTAZAPINE 45 MG: 15 TABLET, FILM COATED ORAL at 22:19

## 2025-05-09 RX ADMIN — MEMANTINE 10 MG: 10 TABLET ORAL at 17:34

## 2025-05-09 RX ADMIN — CARBIDOPA AND LEVODOPA 1 TABLET: 25; 100 TABLET ORAL at 20:49

## 2025-05-09 RX ADMIN — SODIUM CHLORIDE: 0.9 INJECTION, SOLUTION INTRAVENOUS at 17:45

## 2025-05-09 RX ADMIN — CARBIDOPA AND LEVODOPA 1 HALF-TAB: 25; 100 TABLET ORAL at 20:49

## 2025-05-09 RX ADMIN — QUETIAPINE FUMARATE 25 MG: 25 TABLET ORAL at 22:19

## 2025-05-09 RX ADMIN — Medication 2 SPRAY: at 20:58

## 2025-05-09 RX ADMIN — LOSARTAN POTASSIUM 50 MG: 50 TABLET, FILM COATED ORAL at 17:35

## 2025-05-09 RX ADMIN — CARBOXYMETHYLCELLULOSE SODIUM 1 DROP: 5 SOLUTION/ DROPS OPHTHALMIC at 19:44

## 2025-05-09 RX ADMIN — FLUOXETINE HYDROCHLORIDE 40 MG: 20 CAPSULE ORAL at 17:35

## 2025-05-09 RX ADMIN — GABAPENTIN 400 MG: 300 CAPSULE ORAL at 19:44

## 2025-05-09 ASSESSMENT — ACTIVITIES OF DAILY LIVING (ADL)
ADLS_ACUITY_SCORE: 59
ADLS_ACUITY_SCORE: 67
ADLS_ACUITY_SCORE: 59
ADLS_ACUITY_SCORE: 67
ADLS_ACUITY_SCORE: 67
ADLS_ACUITY_SCORE: 71
ADLS_ACUITY_SCORE: 59
ADLS_ACUITY_SCORE: 67
ADLS_ACUITY_SCORE: 59
ADLS_ACUITY_SCORE: 59
ADLS_ACUITY_SCORE: 67
ADLS_ACUITY_SCORE: 59
ADLS_ACUITY_SCORE: 59

## 2025-05-09 NOTE — PROGRESS NOTES
Admission/Transfer from: ED  2 RN skin assessment completed. Yes  Name of 2nd RN: Kristin LEGER RN   Significant findings include: Laceration to Left forehead, LLE skin tear, bruise to left hip, pressure injury found to sacrum/buttocks- blanchable.  Provider Paged for WOC Nurse Consult Order? Yes- Provider aware of WOC consult need- not available over weekend

## 2025-05-09 NOTE — ED NOTES
[facility]  ED Nurse Handoff Report    ED Chief complaint: Fall      ED Diagnosis:   Final diagnoses:   Closed head injury, initial encounter   Weakness       Code Status: Full Code    Allergies:   Allergies   Allergen Reactions    No Known Allergies        Patient Story: Pt BIBA for a fall this morning when attempting to ambulate to the bathroom with his walker. Pt fell around 0630 on his left side and struck his head on the carpeted floor. Pt takes Plavix. Denies CP and SOB. A&Ox4. Skin tear to left shin. Wound care performed in ED. Pt required assistance with eating and other ADLs.     Focused Assessment:    Labs Ordered and Resulted from Time of ED Arrival to Time of ED Departure   COMPREHENSIVE METABOLIC PANEL - Abnormal       Result Value    Sodium 135      Potassium 4.3      Carbon Dioxide (CO2) 25      Anion Gap 13      Urea Nitrogen 21.4      Creatinine 1.01      GFR Estimate 69      Calcium 8.8      Chloride 97 (*)     Glucose 93      Alkaline Phosphatase 110      AST 13      ALT 15      Protein Total 6.3 (*)     Albumin 3.8      Bilirubin Total 0.5     ROUTINE UA WITH MICROSCOPIC REFLEX TO CULTURE - Abnormal    Color Urine Light Yellow      Appearance Urine Clear      Glucose Urine Negative      Bilirubin Urine Negative      Ketones Urine Negative      Specific Gravity Urine 1.010      Blood Urine Negative      pH Urine 7.0      Protein Albumin Urine Negative      Urobilinogen Urine Normal      Nitrite Urine Negative      Leukocyte Esterase Urine Moderate (*)     Bacteria Urine Few (*)     Amorphous Crystals Urine Few (*)     RBC Urine 0      WBC Urine 3      Squamous Epithelials Urine 1     CBC WITH PLATELETS AND DIFFERENTIAL - Abnormal    WBC Count 6.9      RBC Count 3.33 (*)     Hemoglobin 10.8 (*)     Hematocrit 32.1 (*)     MCV 96      MCH 32.4      MCHC 33.6      RDW 14.6      Platelet Count 179      % Neutrophils 75      % Lymphocytes 14      % Monocytes 8      % Eosinophils 3       % Basophils 0      % Immature Granulocytes 1      NRBCs per 100 WBC 0      Absolute Neutrophils 5.2      Absolute Lymphocytes 1.0      Absolute Monocytes 0.6      Absolute Eosinophils 0.2      Absolute Basophils 0.0      Absolute Immature Granulocytes 0.0      Absolute NRBCs 0.0     LACTIC ACID WHOLE BLOOD WITH 1X REPEAT IN 2 HR WHEN >2 - Normal    Lactic Acid, Initial 1.5     URINE CULTURE     Head CT w/o contrast   Final Result   IMPRESSION:   HEAD CT:   1.  No acute intracranial process.   2.  Left frontal scalp edema/hematoma without underlying bone fracture.This extends inferiorly to involve the preseptal and infraorbital region.       FACIAL BONE CT:   No facial bone or mandibular fracture.      CERVICAL SPINE CT:   1.  No fracture or posttraumatic subluxation.   2.  No high-grade spinal canal stenosis.         CT Cervical Spine w/o Contrast   Final Result   IMPRESSION:   HEAD CT:   1.  No acute intracranial process.   2.  Left frontal scalp edema/hematoma without underlying bone fracture.This extends inferiorly to involve the preseptal and infraorbital region.       FACIAL BONE CT:   No facial bone or mandibular fracture.      CERVICAL SPINE CT:   1.  No fracture or posttraumatic subluxation.   2.  No high-grade spinal canal stenosis.         CT Facial Bones without Contrast   Final Result   IMPRESSION:   HEAD CT:   1.  No acute intracranial process.   2.  Left frontal scalp edema/hematoma without underlying bone fracture.This extends inferiorly to involve the preseptal and infraorbital region.       FACIAL BONE CT:   No facial bone or mandibular fracture.      CERVICAL SPINE CT:   1.  No fracture or posttraumatic subluxation.   2.  No high-grade spinal canal stenosis.           Treatments and/or interventions provided: Wound care.   Patient's response to treatments and/or interventions: Tolerated.     To be done/followed up on inpatient unit:  Per admitting MD.     Does this patient have any cognitive  concerns?: N/A    Activity level - Baseline/Home:  Walker  Activity Level - Current:   Stand with assist x2, Walker, and Wheelchair    Patient's Preferred language: English   Needed?: No    Isolation: None  Infection: Not Applicable  Patient tested for COVID 19 prior to admission: NO  Bariatric?: No    Vital Signs:   Vitals:    05/09/25 0730 05/09/25 0751 05/09/25 1109 05/09/25 1111   BP: 128/63  (!) 165/77    Pulse:   55    Resp:       Temp:   97.4  F (36.3  C)    TempSrc:   Temporal    SpO2: 96% 97%  98%   Weight:       Height:           Cardiac Rhythm:     Was the PSS-3 completed:   Yes  What interventions are required if any?               Family Comments: Family at bedside.   OBS brochure/video discussed/provided to patient/family:               Name of person given brochure if not patient:               Relationship to patient:     For the majority of the shift this patient's behavior was Green.   Behavioral interventions performed were .    ED NURSE PHONE NUMBER: [phone]

## 2025-05-09 NOTE — ED PROVIDER NOTES
"  Emergency Department Note      History of Present Illness     Chief Complaint   Fall      HPI   Zack Santiago is a 89 year old male on Plavix with a history of atrial fibrillation, hypertension, parkinson's disease who presents after a fall. This morning around 0630 the patient was getting to the bathroom with his walker when he lost his balance and fell to his left side. He endorses a head injury on the carpeted floor. He denies any shortness of breath, loss of consciousness.     Independent Historian   None    Review of External Notes   I reviewed the [facility] records, Tdap is up to date.     Past Medical History     Medical History and Problem List   Atrial fibrillation   Abdominal aortic aneurysm without rupture   Anemia   Aortic insufficiency   Arteriosclerotic cardiovascular disease   CKD  GERD  Hypertension   Hypothyroid   OCD  Panic disorder   SVT   Parkinson's disease   Stroke   Ulcerative colitis   Idiopathic neurology   BPH    Medications   Sinemet   Plavix   Prozac   Neurontin   Atarax  Synthroid   Cozaar   Namenda   Remeron   Seroquel   Senexon   Zocor     Surgical History   Back surgery   Bladder surgery   Coronary artery bypass   Hernia repair   Repair hammer toe   TURP   Total knee arthroplasty, bilateral     Physical Exam     Patient Vitals for the past 24 hrs:   BP Temp Temp src Pulse Resp SpO2 Height Weight   05/09/25 1111 -- -- -- -- -- 98 % -- --   05/09/25 1109 (!) 165/77 97.4  F (36.3  C) Temporal 55 -- -- -- --   05/09/25 0751 -- -- -- -- -- 97 % -- --   05/09/25 0730 128/63 -- -- -- -- 96 % -- --   05/09/25 0727 128/63 -- -- 50 22 97 % 1.854 m (6' 1\") 81.6 kg (180 lb)     Physical Exam  General: Alert, No distress. Nontoxic appearance  Head: Abrasion over the left side of his face and forehead.  See picture below  Mouth/Throat: Oropharynx moist.   Eyes: Conjunctivae are normal. Pupils are equal..   Neck: Normal range of motion.    CV: Appears well perfused.  Resp:No respiratory distress. "   MSK: Normal range of motion. No obvious deformity.   Neuro: The patient is alert and interactive. PISANO. Speech normal. GCS 15  Skin: Skin tears and abrasions on left shin.  Large hematoma on left forehead and bruising around left eye. Large abrasion to left forehead that extends down onto his cheek, just lateral to the left eye.   Psych: normal mood and affect. behavior is normal.           Diagnostics     Lab Results   Labs Ordered and Resulted from Time of ED Arrival to Time of ED Departure   COMPREHENSIVE METABOLIC PANEL - Abnormal       Result Value    Sodium 135      Potassium 4.3      Carbon Dioxide (CO2) 25      Anion Gap 13      Urea Nitrogen 21.4      Creatinine 1.01      GFR Estimate 69      Calcium 8.8      Chloride 97 (*)     Glucose 93      Alkaline Phosphatase 110      AST 13      ALT 15      Protein Total 6.3 (*)     Albumin 3.8      Bilirubin Total 0.5     ROUTINE UA WITH MICROSCOPIC REFLEX TO CULTURE - Abnormal    Color Urine Light Yellow      Appearance Urine Clear      Glucose Urine Negative      Bilirubin Urine Negative      Ketones Urine Negative      Specific Gravity Urine 1.010      Blood Urine Negative      pH Urine 7.0      Protein Albumin Urine Negative      Urobilinogen Urine Normal      Nitrite Urine Negative      Leukocyte Esterase Urine Moderate (*)     Bacteria Urine Few (*)     Amorphous Crystals Urine Few (*)     RBC Urine 0      WBC Urine 3      Squamous Epithelials Urine 1     CBC WITH PLATELETS AND DIFFERENTIAL - Abnormal    WBC Count 6.9      RBC Count 3.33 (*)     Hemoglobin 10.8 (*)     Hematocrit 32.1 (*)     MCV 96      MCH 32.4      MCHC 33.6      RDW 14.6      Platelet Count 179      % Neutrophils 75      % Lymphocytes 14      % Monocytes 8      % Eosinophils 3      % Basophils 0      % Immature Granulocytes 1      NRBCs per 100 WBC 0      Absolute Neutrophils 5.2      Absolute Lymphocytes 1.0      Absolute Monocytes 0.6      Absolute Eosinophils 0.2      Absolute  Basophils 0.0      Absolute Immature Granulocytes 0.0      Absolute NRBCs 0.0     LACTIC ACID WHOLE BLOOD WITH 1X REPEAT IN 2 HR WHEN >2 - Normal    Lactic Acid, Initial 1.5     URINE CULTURE       Imaging   Head CT w/o contrast   Final Result   IMPRESSION:   HEAD CT:   1.  No acute intracranial process.   2.  Left frontal scalp edema/hematoma without underlying bone fracture.This extends inferiorly to involve the preseptal and infraorbital region.       FACIAL BONE CT:   No facial bone or mandibular fracture.      CERVICAL SPINE CT:   1.  No fracture or posttraumatic subluxation.   2.  No high-grade spinal canal stenosis.         CT Cervical Spine w/o Contrast   Final Result   IMPRESSION:   HEAD CT:   1.  No acute intracranial process.   2.  Left frontal scalp edema/hematoma without underlying bone fracture.This extends inferiorly to involve the preseptal and infraorbital region.       FACIAL BONE CT:   No facial bone or mandibular fracture.      CERVICAL SPINE CT:   1.  No fracture or posttraumatic subluxation.   2.  No high-grade spinal canal stenosis.         CT Facial Bones without Contrast   Final Result   IMPRESSION:   HEAD CT:   1.  No acute intracranial process.   2.  Left frontal scalp edema/hematoma without underlying bone fracture.This extends inferiorly to involve the preseptal and infraorbital region.       FACIAL BONE CT:   No facial bone or mandibular fracture.      CERVICAL SPINE CT:   1.  No fracture or posttraumatic subluxation.   2.  No high-grade spinal canal stenosis.             EKG   None     Independent Interpretation   CT Head: No intracranial hemorrhage.    ED Course      Medications Administered   Medications - No data to display    Procedures   Procedures     Discussion of Management   See ED course     ED Course   ED Course as of 05/09/25 1246   Fri May 09, 2025   4880 I initially assessed the patient and obtained the above history and physical exam.    1238 I consulted with Dr. Terry,  the hospitalist, about the patient and plan of care     Additional Documentation  None    Medical Decision Making / Diagnosis     CMS Diagnoses: None    MIPS            None    MDM   Zack Santiago is a 89 year old male presents to the ED after mechanical fall.  The patient sustained significant trauma to his head.  See pictures above.  There is no suturable wound.  The wound was cleaned explored and treated with a sterile dressing.  CT evaluation of his head neck and face are negative.  The plan was for the patient to be discharged to home but on evaluation of his ability to ambulate he was very unsteady on his feet.  The patient felt he was not safe to be discharged.  Laboratory studies done were sent and showed no significant abnormality.  The patient will be admitted to the hospital for further evaluation and treatment.  His symptoms are likely secondary to his significant head trauma and possible concussion symptoms.    Disposition   The patient was admitted to the hospital.     Diagnosis     ICD-10-CM    1. Closed head injury, initial encounter  S09.90XA       2. Weakness  R53.1            Discharge Medications   New Prescriptions    No medications on file     Scribe Disclosure:  I, Dougie Love, am serving as a scribe at 7:38 AM on 5/9/2025 to document services personally performed by Laurent Arroyo MD based on my observations and the provider's statements to me.        Laurent Arroyo MD  05/09/25 1699

## 2025-05-09 NOTE — PLAN OF CARE
Goal Outcome Evaluation:    Observation goals  PRIOR TO DISCHARGE        Comments: -diagnostic tests and consults completed and resulted- Not Met- Pending PT eval/ SW to see  -vital signs normal or at patient baseline- Not Met- Hypertensive  Nurse to notify provider when observation goals have been met and patient is ready for discharge.

## 2025-05-09 NOTE — PHARMACY-ADMISSION MEDICATION HISTORY
Pharmacist Admission Medication History    Admission medication history is complete. The information provided in this note is only as accurate as the sources available at the time of the update.    Information Source(s): Facility ([facility]/) medication list/MAR via N/A    Pertinent Information: None    Changes made to PTA medication list:  Added: lactulose prn, chlorthalidone  Deleted: None  Changed: None    Allergies reviewed with patient and updates made in EHR: no    Medication History Completed By: Darek Sibley RPH 5/9/2025 2:00 PM    PTA Med List   Medication Sig Last Dose/Taking    acetaminophen (TYLENOL) 325 MG tablet Take 650 mg by mouth every 6 hours as needed for mild pain. Taking As Needed    carbidopa-levodopa (SINEMET)  MG tablet 1 & 1/2 TABLETS ORALLY 3 TIMES DAILY 5/8/2025    chlorthalidone (HYGROTON) 25 MG tablet Take 25 mg by mouth daily. 5/8/2025    clopidogrel (PLAVIX) 75 MG tablet 1 TABLET ORALLY EVERY MORNING  (DX: HEART  ) 5/8/2025    DESITIN DAILY DEFENSE 13 % CREA APPLY TO AFFECTED AREA TOPICALLY 2 TIMES DAILY;APPLY TO AFFECTED AREA TOPICALLY AS NEEDED **OK TO APPLY AND LEAVE UNCOVERED** Taking    diclofenac (VOLTAREN) 1 % topical gel Apply thin layer twice daily 5/8/2025    econazole nitrate 1 % external cream Apply topically 2 times daily. Taking    FLUoxetine (PROZAC) 40 MG capsule Take 40 mg by mouth daily. Taking    gabapentin (NEURONTIN) 400 MG capsule 1 CAPSULE ORALLY 3 TIMES DAILY Taking    hydrocortisone, Perianal, (HYDROCORTISONE) 2.5 % cream Place rectally 2 times daily as needed for hemorrhoids. Taking As Needed    hydrOXYzine HCl (ATARAX) 10 MG tablet Take 10 mg by mouth 2 times daily as needed for itching. Taking As Needed    lactulose (CHRONULAC) 10 GM/15ML solution Take 20 g by mouth daily as needed for constipation. Taking As Needed    lactulose (CHRONULAC) 10 GM/15ML solution DRINK 30ML  ORALLY DAILY (DX: CONSTIPATION);DRINK 30ML  ORALLY DAILY AS NEEDED (DX:  CONSTIPATION) (Patient taking differently: DRINK 30ML  ORALLY DAILY  and DRINK 30ML  ORALLY once DAILY AS NEEDED (DX: CONSTIPATION)) 5/8/2025    levothyroxine (SYNTHROID/LEVOTHROID) 88 MCG tablet 1 TABLET ORALLY EVERY MORNING ON AN EMPTY STOMACH 5/8/2025    losartan (COZAAR) 50 MG tablet Take 1 tablet (50 mg) by mouth daily. 5/8/2025    memantine (NAMENDA) 10 MG tablet Take 10 mg by mouth daily. 5/8/2025    mirtazapine (REMERON) 45 MG tablet 1 TABLET ORALLY AT BEDTIME (DX: DEPRESSION) Taking    OPTIVE 0.5-0.9 % ophthalmic solution INSTILL 1 DROP INTO BOTH EYES 2 TIMES DAILY  (DX: DRY EYES) Taking    QUEtiapine (SEROQUEL) 25 MG tablet 1 TAB ORALLY EVERY EVENING 5/8/2025    senna-docusate (SENEXON-S) 8.6-50 MG tablet 1 TABLET ORALLY DAILY AS NEEDED (DX: CONSTIPATION) Taking    simvastatin (ZOCOR) 20 MG tablet 1 TABLET ORALLY AT BEDTIME   (DX: CHOLESTEROL) Taking    sodium chloride (DEEP SEA NASAL SPRAY) 0.65 % nasal spray INSTILL SPRAY(S) INTO NOSTRIL(S) 3 TIMES DAILY Taking    SOLUBLE FIBER THERAPY powder TAKE 1 TEASPOONFUL ORALLY DAILY (DX: CONSTIPATION) Taking

## 2025-05-09 NOTE — PROGRESS NOTES
RECEIVING UNIT ED HANDOFF REVIEW    ED Nurse Handoff Report was reviewed by: Myke Altamirano RN on May 9, 2025 at 4:26 PM

## 2025-05-09 NOTE — ED NOTES
Spoke with Jayashree LÓPEZ (Daughter) with an update. Notified her of pt's request for her to take him home. She agreed.

## 2025-05-09 NOTE — ED NOTES
Writer spoke with Jayashree () at [facility]. Jayashree confirms that Pt has had increased weakness and is more unsteady on feet. They have had to use a wheelchair to transport Pt to and from meals and Pt has to have assistance while getting to the restroom.

## 2025-05-09 NOTE — ED NOTES
Writer and tech assisted Pt to stand bedside to use the urinal. Pt was unable to stand up by self even with 2 person assist and his entire body became shaky. Writer and tech assisted Pt back into bed and adjusted Pt for comfort. Pt reports he feels more week than normal and can usually get around with a walker. MD made aware.

## 2025-05-09 NOTE — ED TRIAGE NOTES
Pt BIBA for a fall this morning when attempting to ambulate to the bathroom with his walker. Pt fell around 0630 on his left side and struck his head on the carpeted floor. Pt takes Plavix. Denies CP and SOB. A&Ox4. Skin tear to left shin. B.     Triage Assessment (Adult)       Row Name 25 0799          Triage Assessment    Airway WDL WDL        Respiratory WDL    Respiratory WDL WDL        Skin Circulation/Temperature WDL    Skin Circulation/Temperature WDL WDL        Cardiac WDL    Cardiac WDL WDL        Peripheral/Neurovascular WDL    Peripheral Neurovascular WDL WDL        Cognitive/Neuro/Behavioral WDL    Cognitive/Neuro/Behavioral WDL WDL

## 2025-05-09 NOTE — H&P
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  Patient was admitted for further evaluation and management of generalized weakness    S/P fall:   Left frontal scalp edema/hematoma without underlying bone fracture secondary to above:  Generalized weakness:    On the day of admission around 6:30 in the morning patient was going to the bathroom with his walker when he lost his balance and fell to his left side.  He endorsed head injury on the carpeted floor.  He denied any chest pain, palpitations or loss of consciousness.  Patient was noticed to have a skin tear to left shin.  Patient was evaluated in ED, vitals within normal range along with normal blood work, no signs of infection.  Head CT showed no acute intracranial process.    Left frontal scalp edema/hematoma without underlying bone fracture was noticed which extends inferiorly to involve the preseptal and infraorbital region.    --Admit patient to observation without telemetry.  --Regular diet.  --Activity as tolerated with assist, mobilize 4 times daily with assist.  --Physical therapy consultation requested  --Discussed with the ED for reassessment of scalp wound if patient will need stitches to forehead laceration.  --Wound care consultation has been requested, although would not be available over the weekend.  --Normal saline at 75 mL/h for 24 hours.  --Continue Parkinson medications, as below.    Parkinson disease:   Lives in assisted living facility, ambulates with walker.    --Continue prior to admission carbidopa levodopa  --Physical therapy consultation has been requested, will evaluate  if patient can return to facility with home PT/OT and RN at discharge or needs TCU.  --Social work consultation has been requested for safe disposition planning.    Chronic sinus bradycardia:  Patient baseline heart rate is in 50s.  Previous EKG continues to show sinus bradycardia with right bundle branch block and left anterior fascicular block.  Coronary artery disease s/p CABG  Hyperlipidemia  Essential hypertension    --Will continue to monitor patient on telemetry.  --Patient has asymptomatic known bradycardia.  --Continue current medications.  --Will hold PTA clopidogrel today, will restart tomorrow as patient is currently actively bleeding from the laceration side.  --Hold PTA statin while observation is status, resume at discharge  --Continue PTA chlorthalidone 25 mg daily and losartan 50 mg p.o. daily    Panic disorder  OCD  Depression  -- Patient is requesting all his PTA medications to be continued for his OCD  -- Once pharmacy has reconciled medications will restart PTA fluoxetine, quetiapine and mirtazapine  -- Will also continue patient on PTA Namenda 10 mg p.o. daily    History of CVA:   Patient was recently hospitalized from 04/26/25-04/28/25 due to right hand numbness and weakness  Patient underwent neurological workup with head CT, head and neck CTA and brain MRI.  Brain MRI showed 2 small chronic infarctions in the left inferior parietal lobe and bilateral cerebellar hemispheres.  -- Symptoms were thought to be secondary to right radial nerve palsy  -- Continue PTA Plavix, as above will hold today, start from tomorrow  -- Outpatient follow-up with neuro IR for 2 mm basilar artery aneurysm as recommended by neurostroke during his last hospitalization.    Hypothyroidism  --Continue PTA levothyroxine    Chronic kidney disease stage III  --Baseline creatinine is 1.1-1.2.  Creatinine is 1.01 on admission    Acute on chronic normocytic anemia:  Hemoglobin is 10.8, seems to be around baseline patient is  denying any signs and symptoms of bleeding  --No further anemia workup during this current hospitalization, continue to follow-up with PCP in an outpatient setting.    Diet:  Regular diet   DVT Prophylaxis: Pneumatic Compression Devices and Ambulate every shift  Puri Catheter: Not present  Lines: None     Cardiac Monitoring: None  Code Status:  Full code, discussed with patient on admission will recommend PCP to continue to have further discussions with patient regarding CODE STATUS.    Clinically Significant Risk Factors Present on Admission          # Hypochloremia: Lowest Cl = 97 mmol/L in last 2 days, will monitor as appropriate        # Drug Induced Platelet Defect: home medication list includes an antiplatelet medication   # Hypertension: Noted on problem list      # Anemia: based on hgb <11           # Financial/Environmental Concerns:           Disposition Plan     Medically Ready for Discharge: Anticipated Tomorrow  Physical therapy and social work consultation has been requested for safe disposition planning.     Luanne Terry MD  Hospitalist Service  [facility]  Securely message with [employer] (more info)  Text page via [employer] Paging/Directory     ______________________________________________________________________    Chief Complaint     Generalized weakness, s/p fall, head laceration    History is obtained from the patient    History of Present Illness     Zack Santiago is a 89 year old male with past medical history significant for history of CVA on clopidogrel, post CABG, atrial fibrillation, chronic bradycardia, chronic anemia, essential hypertension and OCD among other chronic medical issues who scented to ED on 05/09/2025 when he had a fall earlier this morning at [facility] home and developed left frontal scalp hematoma with laceration.    According to patient he was in his usual state of health when he tried going to the bathroom using his walker but lost his balance  when he picked up the walker to enter the bathroom.  Patient hit his head pretty hard on carpeted floor resulting into hand and left shin laceration and was brought to the ED for further evaluation and management.    In the ED, patient was found to have temperature of 97.4, pulse of 15, blood pressure of 128/63, respiratory rate of 22 and he was saturating 97% on room air.  His lab showed normal BMP lactic acid of 1.5, normal CBC, UA is not indicated of infection.  CT head without contrast showed no acute intracranial process and showed left frontal scalp edema and hematoma without underlying bone fracture.  This extends inferiorly to involve the preseptal and infraorbital region.  Facial bone CT did not show any facial bone or mandibular fracture.  Cervical spine CT did not show any fracture or posttraumatic subluxation.  No high-grade spinal canal stenosis was noticed.  Unfortunately patient noticed to be significantly weaker as compared to his baseline and required assist of 2 people and was requested to be admitted as an observational status for physical therapy evaluation and safe disposition planning.  On the other hand, patient is feeling well, taking medications regularly and is denying any chest pain, palpitations, shortness of breath or lightheadedness before his fall.    Past Medical History    Past Medical History:   Diagnosis Date    A-fib (H) 2010    post op    AAA (abdominal aortic aneurysm) without rupture     Dr. 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    carotid us no stenosis, echo no change and no clots; ziopatch no afib, svt present    Anemia 2004    Aortic insufficiency 06/2014    1+ on echo    Aortic insufficiency 11/2016    mild to mod    Ascending aorta dilatation 01/2023    5cm on ct, fu done 2/24 and stable    ASCVD (arteriosclerotic cardiovascular disease) 2010    cabg, post op afib    BPH (benign prostatic hyperplasia) 2003    turp, flomax added by  urol 2/17    Bradycardia 2016    stopped toprol    CKD (chronic kidney disease) stage 3, GFR 30-59 ml/min (H)     Constipation 05/2020    colonoscopy nl    Cough 2010    pulm eval, felt due to reflux    Dizzy 2001    mri small vessel dz    GERD (gastroesophageal reflux disease)     HTN (hypertension) 2002    Hx of colonoscopy 2003    tics    Hyponatremia 04/2025    felt due to chlorthalidone    Hypothyroid     Hypovitaminosis D     Idiopathic neuropathy     Lung nodule 02/2018    6mm, found during eval of ascending aorta, fu 8/18 no change    OCD (obsessive compulsive disorder)     sees psyche    Panic disorder     Dr. Luna, then someone after he retired    Parkinson's disease (H) 03/2023    per neuro    Spinal headache     Stroke (H) 12/2016    expressive aphasia, hosp, seen by neuro, mri pos, carotids min dz, changed from asa to plavix    Sudden hearing loss 06/2013    Dr. Garcia, mri brain no acoustic neuroma    SVT (supraventricular tachycardia) 11/2016    seen on ziopatch done for amaurosis, longest 15 beats    Thoracic ascending aortic aneurysm 06/2014    4.4cm on echo, aortic root 3.9, fu 5/15 4.8cm; fu done 12/15 and slightly enlarged, fu 6/16 no change, fu 11/16 no change; fu 1/18 echo 5.1-5.3, enlarged, then cta done and only 4.8cm, t fu 6 months, lvh, nl ef, mild ai; fu 8/18 slightly larger; fu 2/19 slightly smaller; fu 2/20 and then 1/21 and stable    Ulcerative colitis (H)     not active for years       Past Surgical History   Past Surgical History:   Procedure Laterality Date    BACK SURGERY  1998    BLADDER SURGERY  1985    CATARACT IOL, RT/LT  2011    COLONOSCOPY N/A 05/05/2020    Procedure: COLONOSCOPY;  Surgeon: Kenya Loco MD;  Location:  GI    CORONARY ARTERY BYPASS  2010    ENDOVASCULAR REPAIR ANEURYSM ABDOMINAL AORTA N/A 05/27/2015    Procedure: ENDOVASCULAR REPAIR ANEURYSM ABDOMINAL AORTA;  Surgeon: Darin Walters MD;  Location:  OR    HERNIA REPAIR  2005    HERNIA  REPAIR  1998    HERNIORRHAPHY INGUINAL Left 2018    Procedure: HERNIORRHAPHY INGUINAL;  Incarcerated Left Inguinal Hernia;  Surgeon: Harsh Walker MD;  Location: [facility] OR    KNEE SURGERY      REPAIR HAMMER TOE  2012    Procedure: REPAIR HAMMER TOE;  LEFT SECOND AND THIRD CLAW TOE RECONSTRUCTION;  Surgeon: Gray Hayens MD;  Location: [facility] OR    REPAIR HAMMER TOE  2018    tria    toe amputation right foot  2021    TURP      RUST TOTAL KNEE ARTHROPLASTY      left    RUST TOTAL KNEE ARTHROPLASTY  2009    right       Prior to Admission Medications   Prior to Admission Medications   Prescriptions Last Dose Informant Patient Reported? Taking?   DESITIN DAILY DEFENSE 13 % CREA   No No   Sig: APPLY TO AFFECTED AREA TOPICALLY 2 TIMES DAILY;APPLY TO AFFECTED AREA TOPICALLY AS NEEDED **OK TO APPLY AND LEAVE UNCOVERED**   FLUoxetine (PROZAC) 40 MG capsule   Yes No   Sig: Take 40 mg by mouth daily.   OPTIVE 0.5-0.9 % ophthalmic solution   No No   Sig: INSTILL 1 DROP INTO BOTH EYES 2 TIMES DAILY  (DX: DRY EYES)   QUEtiapine (SEROQUEL) 25 MG tablet   No No   Si TAB ORALLY EVERY EVENING   SOLUBLE FIBER THERAPY powder   No No   Sig: TAKE 1 TEASPOONFUL ORALLY DAILY (DX: CONSTIPATION)   acetaminophen (TYLENOL) 325 MG tablet   Yes No   Sig: Take 650 mg by mouth every 6 hours as needed for mild pain.   carbidopa-levodopa (SINEMET)  MG tablet   No No   Si & 1/2 TABLETS ORALLY 3 TIMES DAILY   clopidogrel (PLAVIX) 75 MG tablet   No No   Si TABLET ORALLY EVERY MORNING  (DX: HEART  )   diclofenac (VOLTAREN) 1 % topical gel   Yes No   Sig: Apply thin layer twice daily   econazole nitrate 1 % external cream   Yes No   Sig: Apply topically 2 times daily.   gabapentin (NEURONTIN) 400 MG capsule   No No   Si CAPSULE ORALLY 3 TIMES DAILY   hydrOXYzine HCl (ATARAX) 10 MG tablet   Yes No   Sig: Take 10 mg by mouth 2 times daily as needed for itching.   hydrocortisone, Perianal,  (HYDROCORTISONE) 2.5 % cream   No No   Sig: Place rectally 2 times daily as needed for hemorrhoids.   lactulose (CHRONULAC) 10 GM/15ML solution   No No   Sig: DRINK 30ML  ORALLY DAILY (DX: CONSTIPATION);DRINK 30ML  ORALLY DAILY AS NEEDED (DX: CONSTIPATION)   Patient taking differently: DRINK 30ML  ORALLY DAILY  and DRINK 30ML  ORALLY once DAILY AS NEEDED (DX: CONSTIPATION)   levothyroxine (SYNTHROID/LEVOTHROID) 88 MCG tablet   No No   Si TABLET ORALLY EVERY MORNING ON AN EMPTY STOMACH   losartan (COZAAR) 50 MG tablet   No No   Sig: Take 1 tablet (50 mg) by mouth daily.   memantine (NAMENDA) 10 MG tablet   Yes No   mirtazapine (REMERON) 45 MG tablet   No No   Si TABLET ORALLY AT BEDTIME (DX: DEPRESSION)   senna-docusate (SENEXON-S) 8.6-50 MG tablet   No No   Si TABLET ORALLY DAILY AS NEEDED (DX: CONSTIPATION)   simvastatin (ZOCOR) 20 MG tablet   No No   Si TABLET ORALLY AT BEDTIME   (DX: CHOLESTEROL)   sodium chloride (DEEP SEA NASAL SPRAY) 0.65 % nasal spray   No No   Sig: INSTILL SPRAY(S) INTO NOSTRIL(S) 3 TIMES DAILY      Facility-Administered Medications: None        Review of Systems    The 10 point Review of Systems is negative other than noted in the HPI .    Social History   I have reviewed this patient's social history and updated it with pertinent information if needed.  Social History     Tobacco Use    Smoking status: Former     Current packs/day: 0.00     Average packs/day: 1 pack/day for 5.0 years (5.0 ttl pk-yrs)     Types: Cigarettes     Start date: 1960     Quit date: 1965     Years since quittin.9     Passive exposure: Current    Smokeless tobacco: Never   Vaping Use    Vaping status: Never Used   Substance Use Topics    Alcohol use: Not Currently     Comment: none    Drug use: No         Allergies   Allergies   Allergen Reactions    No Known Allergies         Physical Exam   Vital Signs: Temp: 97.4  F (36.3  C) Temp src: Temporal BP: (!) 165/77 Pulse: 55   Resp: 22  SpO2: 98 %      Weight: 180 lbs 0 oz    Physical Exam  Vitals and nursing note reviewed.   Constitutional:       Appearance: He is well-developed.   HENT:      Head: Normocephalic and atraumatic.   Eyes:      Pupils: Pupils are equal, round, and reactive to light.   Neck:      Thyroid: No thyromegaly.   Cardiovascular:      Rate and Rhythm: Normal rate and regular rhythm.      Heart sounds: Normal heart sounds.   Pulmonary:      Effort: Pulmonary effort is normal. No respiratory distress.      Breath sounds: Normal breath sounds.   Abdominal:      General: Bowel sounds are normal. There is no distension.      Palpations: Abdomen is soft.   Musculoskeletal:         General: No tenderness. Normal range of motion.      Cervical back: Normal range of motion and neck supple.   Skin:     General: Skin is warm and dry.      Comments: Left frontal head laceration, significant periorbital swelling with bruising and ecchymosis.  Left shin laceration.   Neurological:      Mental Status: He is alert and oriented to person, place, and time.   Psychiatric:         Behavior: Behavior normal.       Medical Decision Making       76 MINUTES SPENT BY ME on the date of service doing chart review, history, exam, documentation & further activities per the note.      Data     I have personally reviewed the following data over the past 24 hrs:    6.9  \   10.8 (L)   / 179     135 97 (L) 21.4 /  93   4.3 25 1.01 \     ALT: 15 AST: 13 AP: 110 TBILI: 0.5   ALB: 3.8 TOT PROTEIN: 6.3 (L) LIPASE: N/A     Procal: N/A CRP: N/A Lactic Acid: 1.5         Imaging results reviewed over the past 24 hrs:   Recent Results (from the past 24 hours)   CT Facial Bones without Contrast    Narrative    EXAM: CT HEAD W/O CONTRAST, CT FACIAL BONES WITHOUT CONTRAST, CT CERVICAL SPINE W/O CONTRAST  LOCATION: [facility]  DATE: 5/9/2025    INDICATION: fall  COMPARISON: None.  TECHNIQUE:   1) Routine CT Head without IV contrast. Multiplanar  reformats. Dose reduction techniques were used.  2) Routine CT Facial Bones without IV contrast. Multiplanar reformats. Dose reduction techniques were used.  3) Routine CT Cervical Spine without IV contrast. Multiplanar reformats. Dose reduction techniques were used.    FINDINGS:  HEAD CT:   INTRACRANIAL CONTENTS: No intracranial hemorrhage, extraaxial collection, or mass effect.  No CT evidence of acute infarct. Mild presumed chronic small vessel ischemic changes. Mild generalized cerebral volume loss. Left parietal   encephalomalacia/gliosis. The ventricles and sulci are normal for age.     OSSEOUS STRUCTURES/SOFT TISSUES: Left frontal scalp edema/hematoma without underlying bone fracture. This extends inferiorly to involve the preseptal and infraorbital region.     FACIAL BONE CT:  OSSEOUS STRUCTURES/SOFT TISSUES: No localized soft tissue swelling/inflammation. No facial bone fracture or malalignment. No evidence for dental trauma or periapical abscess.    ORBITAL CONTENTS: No acute abnormality.    SINUSES: Mild mucosal thickening scattered about the paranasal sinuses.    CERVICAL SPINE CT:   VERTEBRA: Normal vertebral body heights and alignment. No fracture or posttraumatic subluxation.     CANAL/FORAMINA: Multilevel degenerative changes described endplate osteophytosis and facet hypertrophy. No high-grade spinal canal stenosis. Multilevel varying degrees of neural foraminal stenosis.    PARASPINAL: No extraspinal abnormality. Visualized lung fields are clear.      Impression    IMPRESSION:  HEAD CT:  1.  No acute intracranial process.  2.  Left frontal scalp edema/hematoma without underlying bone fracture.This extends inferiorly to involve the preseptal and infraorbital region.     FACIAL BONE CT:  No facial bone or mandibular fracture.    CERVICAL SPINE CT:  1.  No fracture or posttraumatic subluxation.  2.  No high-grade spinal canal stenosis.     CT Cervical Spine w/o Contrast    Narrative    EXAM: CT HEAD  W/O CONTRAST, CT FACIAL BONES WITHOUT CONTRAST, CT CERVICAL SPINE W/O CONTRAST  LOCATION: [facility]  DATE: 5/9/2025    INDICATION: fall  COMPARISON: None.  TECHNIQUE:   1) Routine CT Head without IV contrast. Multiplanar reformats. Dose reduction techniques were used.  2) Routine CT Facial Bones without IV contrast. Multiplanar reformats. Dose reduction techniques were used.  3) Routine CT Cervical Spine without IV contrast. Multiplanar reformats. Dose reduction techniques were used.    FINDINGS:  HEAD CT:   INTRACRANIAL CONTENTS: No intracranial hemorrhage, extraaxial collection, or mass effect.  No CT evidence of acute infarct. Mild presumed chronic small vessel ischemic changes. Mild generalized cerebral volume loss. Left parietal   encephalomalacia/gliosis. The ventricles and sulci are normal for age.     OSSEOUS STRUCTURES/SOFT TISSUES: Left frontal scalp edema/hematoma without underlying bone fracture. This extends inferiorly to involve the preseptal and infraorbital region.     FACIAL BONE CT:  OSSEOUS STRUCTURES/SOFT TISSUES: No localized soft tissue swelling/inflammation. No facial bone fracture or malalignment. No evidence for dental trauma or periapical abscess.    ORBITAL CONTENTS: No acute abnormality.    SINUSES: Mild mucosal thickening scattered about the paranasal sinuses.    CERVICAL SPINE CT:   VERTEBRA: Normal vertebral body heights and alignment. No fracture or posttraumatic subluxation.     CANAL/FORAMINA: Multilevel degenerative changes described endplate osteophytosis and facet hypertrophy. No high-grade spinal canal stenosis. Multilevel varying degrees of neural foraminal stenosis.    PARASPINAL: No extraspinal abnormality. Visualized lung fields are clear.      Impression    IMPRESSION:  HEAD CT:  1.  No acute intracranial process.  2.  Left frontal scalp edema/hematoma without underlying bone fracture.This extends inferiorly to involve the preseptal and  infraorbital region.     FACIAL BONE CT:  No facial bone or mandibular fracture.    CERVICAL SPINE CT:  1.  No fracture or posttraumatic subluxation.  2.  No high-grade spinal canal stenosis.     Head CT w/o contrast    Narrative    EXAM: CT HEAD W/O CONTRAST, CT FACIAL BONES WITHOUT CONTRAST, CT CERVICAL SPINE W/O CONTRAST  LOCATION: [facility]  DATE: 5/9/2025    INDICATION: fall  COMPARISON: None.  TECHNIQUE:   1) Routine CT Head without IV contrast. Multiplanar reformats. Dose reduction techniques were used.  2) Routine CT Facial Bones without IV contrast. Multiplanar reformats. Dose reduction techniques were used.  3) Routine CT Cervical Spine without IV contrast. Multiplanar reformats. Dose reduction techniques were used.    FINDINGS:  HEAD CT:   INTRACRANIAL CONTENTS: No intracranial hemorrhage, extraaxial collection, or mass effect.  No CT evidence of acute infarct. Mild presumed chronic small vessel ischemic changes. Mild generalized cerebral volume loss. Left parietal   encephalomalacia/gliosis. The ventricles and sulci are normal for age.     OSSEOUS STRUCTURES/SOFT TISSUES: Left frontal scalp edema/hematoma without underlying bone fracture. This extends inferiorly to involve the preseptal and infraorbital region.     FACIAL BONE CT:  OSSEOUS STRUCTURES/SOFT TISSUES: No localized soft tissue swelling/inflammation. No facial bone fracture or malalignment. No evidence for dental trauma or periapical abscess.    ORBITAL CONTENTS: No acute abnormality.    SINUSES: Mild mucosal thickening scattered about the paranasal sinuses.    CERVICAL SPINE CT:   VERTEBRA: Normal vertebral body heights and alignment. No fracture or posttraumatic subluxation.     CANAL/FORAMINA: Multilevel degenerative changes described endplate osteophytosis and facet hypertrophy. No high-grade spinal canal stenosis. Multilevel varying degrees of neural foraminal stenosis.    PARASPINAL: No extraspinal abnormality.  Visualized lung fields are clear.      Impression    IMPRESSION:  HEAD CT:  1.  No acute intracranial process.  2.  Left frontal scalp edema/hematoma without underlying bone fracture.This extends inferiorly to involve the preseptal and infraorbital region.     FACIAL BONE CT:  No facial bone or mandibular fracture.    CERVICAL SPINE CT:  1.  No fracture or posttraumatic subluxation.  2.  No high-grade spinal canal stenosis.       Recent Labs   Lab 05/09/25  0743 05/02/25  1500   WBC 6.9 6.6   HGB 10.8* 11.0*   MCV 96 98    176    133*   POTASSIUM 4.3 4.1   CHLORIDE 97* 98   CO2 25 24   BUN 21.4 21.4   CR 1.01 0.94   ANIONGAP 13 11   YOUNG 8.8 8.8   GLC 93 105*   ALBUMIN 3.8  --    PROTTOTAL 6.3*  --    BILITOTAL 0.5  --    ALKPHOS 110  --    ALT 15  --    AST 13  --

## 2025-05-09 NOTE — PROVIDER NOTIFICATION
MD Notification    Notified Person: MD    Notified Person Name: Dr. Terry    Notification Date/Time: 5/9/25 8881    Notification Interaction: [employer] Messaging    Purpose of Notification: Pt requesting for nasal saline spray.     Orders Received: Saline spray ordered.    Comments:

## 2025-05-09 NOTE — ED NOTES
Bed: ED20  Expected date:   Expected time:   Means of arrival:   Comments:  Luz 523 89m fall, AMS, on thinners

## 2025-05-09 NOTE — ED NOTES
"Writer spoke with pt about the amount of help he has at home. Pt states he does not have consistent help. \"I push my buzzer and have to wait quite some time for someone to come.\" Pt states he does not feel safe ambulating, and states he would be fearful of it and confident that he would fall again. Pt spoke with daughter and states he does not want her to come to get him because he is confident he will need more help to get into his apartment. Pt states he would feel safer staying over night.   "

## 2025-05-10 ENCOUNTER — APPOINTMENT (OUTPATIENT)
Dept: PHYSICAL THERAPY | Facility: CLINIC | Age: OVER 89
End: 2025-05-10
Attending: INTERNAL MEDICINE
Payer: COMMERCIAL

## 2025-05-10 LAB
ALBUMIN SERPL BCG-MCNC: 3.5 G/DL (ref 3.5–5.2)
ALP SERPL-CCNC: 101 U/L (ref 40–150)
ALT SERPL W P-5'-P-CCNC: 7 U/L (ref 0–70)
ANION GAP SERPL CALCULATED.3IONS-SCNC: 11 MMOL/L (ref 7–15)
AST SERPL W P-5'-P-CCNC: 40 U/L (ref 0–45)
BACTERIA UR CULT: NORMAL
BILIRUB SERPL-MCNC: 0.6 MG/DL
BUN SERPL-MCNC: 19.5 MG/DL (ref 8–23)
CALCIUM SERPL-MCNC: 8.6 MG/DL (ref 8.8–10.4)
CHLORIDE SERPL-SCNC: 101 MMOL/L (ref 98–107)
CREAT SERPL-MCNC: 0.89 MG/DL (ref 0.67–1.17)
EGFRCR SERPLBLD CKD-EPI 2021: 80 ML/MIN/1.73M2
ERYTHROCYTE [DISTWIDTH] IN BLOOD BY AUTOMATED COUNT: 14.8 % (ref 10–15)
GLUCOSE SERPL-MCNC: 114 MG/DL (ref 70–99)
HCO3 SERPL-SCNC: 25 MMOL/L (ref 22–29)
HCT VFR BLD AUTO: 29.7 % (ref 40–53)
HGB BLD-MCNC: 10.1 G/DL (ref 13.3–17.7)
MCH RBC QN AUTO: 32.8 PG (ref 26.5–33)
MCHC RBC AUTO-ENTMCNC: 34 G/DL (ref 31.5–36.5)
MCV RBC AUTO: 96 FL (ref 78–100)
PLATELET # BLD AUTO: 159 10E3/UL (ref 150–450)
POTASSIUM SERPL-SCNC: 4.3 MMOL/L (ref 3.4–5.3)
PROT SERPL-MCNC: 5.8 G/DL (ref 6.4–8.3)
RBC # BLD AUTO: 3.08 10E6/UL (ref 4.4–5.9)
SODIUM SERPL-SCNC: 137 MMOL/L (ref 135–145)
WBC # BLD AUTO: 5.9 10E3/UL (ref 4–11)

## 2025-05-10 PROCEDURE — G0378 HOSPITAL OBSERVATION PER HR: HCPCS

## 2025-05-10 PROCEDURE — 97530 THERAPEUTIC ACTIVITIES: CPT | Mod: GP

## 2025-05-10 PROCEDURE — 97161 PT EVAL LOW COMPLEX 20 MIN: CPT | Mod: GP

## 2025-05-10 PROCEDURE — 96361 HYDRATE IV INFUSION ADD-ON: CPT

## 2025-05-10 PROCEDURE — 82040 ASSAY OF SERUM ALBUMIN: CPT | Performed by: INTERNAL MEDICINE

## 2025-05-10 PROCEDURE — 250N000013 HC RX MED GY IP 250 OP 250 PS 637: Performed by: INTERNAL MEDICINE

## 2025-05-10 PROCEDURE — 36415 COLL VENOUS BLD VENIPUNCTURE: CPT | Performed by: INTERNAL MEDICINE

## 2025-05-10 PROCEDURE — 258N000003 HC RX IP 258 OP 636: Performed by: INTERNAL MEDICINE

## 2025-05-10 PROCEDURE — 85041 AUTOMATED RBC COUNT: CPT | Performed by: INTERNAL MEDICINE

## 2025-05-10 PROCEDURE — 99232 SBSQ HOSP IP/OBS MODERATE 35: CPT | Performed by: INTERNAL MEDICINE

## 2025-05-10 RX ADMIN — Medication 2 SPRAY: at 20:29

## 2025-05-10 RX ADMIN — MEMANTINE 10 MG: 10 TABLET ORAL at 09:31

## 2025-05-10 RX ADMIN — SODIUM CHLORIDE: 0.9 INJECTION, SOLUTION INTRAVENOUS at 04:55

## 2025-05-10 RX ADMIN — CARBIDOPA AND LEVODOPA 1 TABLET: 25; 100 TABLET ORAL at 12:52

## 2025-05-10 RX ADMIN — Medication 2 SPRAY: at 09:36

## 2025-05-10 RX ADMIN — LOSARTAN POTASSIUM 50 MG: 50 TABLET, FILM COATED ORAL at 09:31

## 2025-05-10 RX ADMIN — QUETIAPINE FUMARATE 25 MG: 25 TABLET ORAL at 22:23

## 2025-05-10 RX ADMIN — LEVOTHYROXINE SODIUM 88 MCG: 88 TABLET ORAL at 09:31

## 2025-05-10 RX ADMIN — GABAPENTIN 400 MG: 300 CAPSULE ORAL at 16:17

## 2025-05-10 RX ADMIN — CARBIDOPA AND LEVODOPA 1 HALF-TAB: 25; 100 TABLET ORAL at 12:52

## 2025-05-10 RX ADMIN — MIRTAZAPINE 45 MG: 15 TABLET, FILM COATED ORAL at 22:23

## 2025-05-10 RX ADMIN — CLOPIDOGREL BISULFATE 75 MG: 75 TABLET, FILM COATED ORAL at 09:31

## 2025-05-10 RX ADMIN — CARBIDOPA AND LEVODOPA 1 TABLET: 25; 100 TABLET ORAL at 16:17

## 2025-05-10 RX ADMIN — CARBOXYMETHYLCELLULOSE SODIUM 1 DROP: 5 SOLUTION/ DROPS OPHTHALMIC at 20:29

## 2025-05-10 RX ADMIN — CARBOXYMETHYLCELLULOSE SODIUM 1 DROP: 5 SOLUTION/ DROPS OPHTHALMIC at 09:32

## 2025-05-10 RX ADMIN — CARBIDOPA AND LEVODOPA 1 TABLET: 25; 100 TABLET ORAL at 09:31

## 2025-05-10 RX ADMIN — SENNOSIDES AND DOCUSATE SODIUM 1 TABLET: 50; 8.6 TABLET ORAL at 20:29

## 2025-05-10 RX ADMIN — CARBIDOPA AND LEVODOPA 1 HALF-TAB: 25; 100 TABLET ORAL at 16:17

## 2025-05-10 RX ADMIN — LACTULOSE 30 ML: 20 SOLUTION ORAL at 09:32

## 2025-05-10 RX ADMIN — GABAPENTIN 400 MG: 300 CAPSULE ORAL at 20:28

## 2025-05-10 RX ADMIN — GABAPENTIN 400 MG: 300 CAPSULE ORAL at 09:31

## 2025-05-10 RX ADMIN — FLUOXETINE HYDROCHLORIDE 40 MG: 20 CAPSULE ORAL at 09:31

## 2025-05-10 RX ADMIN — CARBIDOPA AND LEVODOPA 1 HALF-TAB: 25; 100 TABLET ORAL at 09:31

## 2025-05-10 ASSESSMENT — ACTIVITIES OF DAILY LIVING (ADL)
ADLS_ACUITY_SCORE: 59
ADLS_ACUITY_SCORE: 67
ADLS_ACUITY_SCORE: 67
ADLS_ACUITY_SCORE: 62
ADLS_ACUITY_SCORE: 67
ADLS_ACUITY_SCORE: 62
ADLS_ACUITY_SCORE: 59
ADLS_ACUITY_SCORE: 67
ADLS_ACUITY_SCORE: 67
ADLS_ACUITY_SCORE: 62
DEPENDENT_IADLS:: CLEANING;COOKING;LAUNDRY;SHOPPING;MEAL PREPARATION;MEDICATION MANAGEMENT;MONEY MANAGEMENT;TRANSPORTATION
ADLS_ACUITY_SCORE: 62
ADLS_ACUITY_SCORE: 59
ADLS_ACUITY_SCORE: 59
ADLS_ACUITY_SCORE: 67
ADLS_ACUITY_SCORE: 62
ADLS_ACUITY_SCORE: 67
ADLS_ACUITY_SCORE: 59
ADLS_ACUITY_SCORE: 59
ADLS_ACUITY_SCORE: 67

## 2025-05-10 NOTE — PLAN OF CARE
Goal Outcome Evaluation:         [id number]     PRIMARY Concern: Fall  SAFETY RISK Concerns (fall risk, behaviors, etc.): Fall Risk      Aggression Tool Color: Green  Isolation/Type: n/a  Tests/Procedures for NEXT shift: n/a  Consults? (Pending/following, signed-off?) PT consult, SW consult  Where is patient from? (Home, TCU, etc.): [facility]  Other Important info for NEXT shift:   Anticipated DC date & active delays: TBD      Skin Concerns: Pressure injury to sacrum/coccyx, laceration to Left forehead, LLE skin tear, bruise to L hip, Bruising to Left hand-  Very agitated with cares, refuse to be request not to reposition or have cares done to him, staff will ontinue monitor at this time

## 2025-05-10 NOTE — PROGRESS NOTES
Observation goals  PRIOR TO DISCHARGE        Comments:   -diagnostic tests and consults completed and resulted: not met, PT consult pending   -vital signs normal or at patient baseline: not met  Nurse to notify provider when observation goals have been met and patient is ready for discharge.

## 2025-05-10 NOTE — PLAN OF CARE
Goal Outcome Evaluation:      Plan of Care Reviewed With: patient    Overall Patient Progress: improvingOverall Patient Progress: improving

## 2025-05-10 NOTE — PROGRESS NOTES
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  Patient was admitted for further evaluation and management of generalized weakness    Mechanical fall  Left frontal scalp edema/hematoma without underlying bone fracture secondary to above.  Generalized weakness  -On the day of admission around 6:30 in the morning patient was going to the bathroom with his walker when he lost his balance and fell to his left side.  He endorsed head injury on the carpeted floor.  He denied any chest pain, palpitations or loss of consciousness.  -Patient was noticed to have a skin tear to left shin.  -Head CT, cervical spine CT and facial bone CT was done in the emergency room.  CT head showed left frontal scalp edema/hematoma without underlying bone fracture was noticed which extends inferiorly to involve the preseptal and infraorbital region.  -Registered to observation  -Regular diet  -Activity as tolerated, PT consulted  -Patient has significant left periorbital bruising and edema, vision appears to be intact on the left eye although impeded by surrounding swelling.  Monitor closely.    Parkinson disease   -Lives in assisted living facility, ambulates with walker.  -continue prior to admission carbidopa levodopa  -Physical therapy consultation has been requested, will evaluate if patient can return to facility with home PT/OT and RN at discharge or needs TCU.  -Social work consultation has been requested for safe disposition planning.    Chronic sinus bradycardia  Coronary artery disease s/p CABG  Hyperlipidemia  Essential  hypertension  -Patient baseline heart rate is in 50s.  Previous EKG continues to show sinus bradycardia with right bundle branch block and left anterior fascicular block.  He is asymptomatic from the bradycardia  -Will continue to monitor patient on telemetry.  -Patient has asymptomatic known bradycardia.  -Resume prior to admission Plavix, chlorthalidone and losartan  -Hold PTA statin while observation is status, resume at discharge    Panic disorder  OCD  Depression  -Continue PTA fluoxetine, quetiapine and mirtazapine and Namenda      History of CVA  Patient was recently hospitalized from 04/26/25-04/28/25 with 2 small chronic infarctions in the left inferior parietal lobe and bilateral cerebellar hemispheres.  -Continue PTA Plavix  -Outpatient follow-up with neuro IR for 2 mm basilar artery aneurysm as recommended by neurostroke during his last hospitalization.    Hypothyroidism  -Continue PTA levothyroxine    Chronic kidney disease stage III  -Baseline creatinine is 1.1-1.2.  Creatinine is 1.01 on admission    Acute on chronic normocytic anemia:  Hemoglobin is 10.8, seems to be around baseline patient is denying any signs and symptoms of bleeding  -No further anemia workup during this current hospitalization, continue to follow-up with PCP in an outpatient setting.    Diet: Regular Diet Adult     DVT Prophylaxis: Pneumatic Compression Devices   Puri Catheter: Not present  Code Status: Full Code     Disposition Plan      Expected Discharge Date: 05/10/2025              Entered: Johan Soto MD 05/10/2025, 9:14 AM        Medically Ready for Discharge: Anticipated Tomorrow pending physical therapy evaluation and safe disposition plan       Clinically Significant Risk Factors Present on Admission          # Hypochloremia: Lowest Cl = 97 mmol/L in last 2 days, will monitor as appropriate        # Drug Induced Platelet Defect: home medication list includes an antiplatelet medication   # Hypertension: Noted on  "problem list      # Anemia: based on hgb <11           # Financial/Environmental Concerns:              The patient's care was discussed with the Bedside Nurse and Patient.    Medical Decision Making       **CLEAR ALL SELECTIONS**      Labs/Imaging Reviewed:  See Information above and Data section below  Time SPENT BY ME on the date of service doing chart review, history, exam, documentation & further activities per the note:  35 MINUTES    Chart documentation was completed, in part, with [employer] voice-recognition software. Even though reviewed, some grammatical, spelling, and word errors may remain.    Johan Soto MD  Hospitalist Service  [facility]  Text Page 7AM-6PM  Securely message with the Vocera Web Console (learn more here)  Text page via [employer] Paging/Directory    ______________________________________________________________________    Interval History   Patient denies significant pain.  No headache.  He has significant left periorbital bruise, vision is intact on that side but impeded by significant surrounding swelling.  No nausea or vomiting.  He is asking for his morning meds.  Discussed with him plans for PT evaluation to assess his mobility and safe discharge plans.    Data reviewed today: I reviewed all medications, new labs and imaging results over the last 24 hours. I personally reviewed no images or EKG's today.    Physical Exam   Vital signs:  Temp: 98.1  F (36.7  C) Temp src: Oral BP: 108/55 Pulse: 57   Resp: 18 SpO2: 94 % O2 Device: None (Room air)   Height: 185.4 cm (6' 1\") Weight: 83.6 kg (184 lb 4.9 oz)  Estimated body mass index is 24.32 kg/m  as calculated from the following:    Height as of this encounter: 1.854 m (6' 1\").    Weight as of this encounter: 83.6 kg (184 lb 4.9 oz).      Wt Readings from Last 2 Encounters:   05/10/25 83.6 kg (184 lb 4.9 oz)   05/02/25 87.5 kg (193 lb)       Gen: AAOX3, NAD, comfortable  HEENT: Significant left periorbital " bruise, he is able to pry open and see through although vision is somewhat as impeded by significant swelling, hematoma also extends to the left side of his anterior neck.  resp: CTA B/L, normal WOB  CVS: RRR, no murmur  Abd/GI: Soft, non-tender. BS- normoactive.    Skin: Warm, dry no rashes  MSK: Left shin tear-bandaged  Neuro- CN- intact. No focal deficits.      Data   Recent Labs   Lab 05/09/25  0743   WBC 6.9   HGB 10.8*   MCV 96         POTASSIUM 4.3   CHLORIDE 97*   CO2 25   BUN 21.4   CR 1.01   ANIONGAP 13   YOUNG 8.8   GLC 93   ALBUMIN 3.8   PROTTOTAL 6.3*   BILITOTAL 0.5   ALKPHOS 110   ALT 15   AST 13       No results found for this or any previous visit (from the past 24 hours).  Medications   Current Facility-Administered Medications   Medication Dose Route Frequency Provider Last Rate Last Admin    sodium chloride 0.9 % infusion   Intravenous Continuous uLanne Terry MD 75 mL/hr at 05/10/25 0455 New Bag at 05/10/25 0455     Current Facility-Administered Medications   Medication Dose Route Frequency Provider Last Rate Last Admin    carbidopa-levodopa (SINEMET)  MG per tablet 1 tablet  1 tablet Oral TID Luanne Terry MD   1 tablet at 05/09/25 2049    And    carbidopa-levodopa half-tab 12.5-50 mg  1 half-tab Oral TID Luanne Terry MD   1 half-tab at 05/09/25 2049    carboxymethylcellulose PF (REFRESH PLUS) 0.5 % ophthalmic solution 1 drop  1 drop Both Eyes BID Luanne Terry MD   1 drop at 05/09/25 1944    chlorthalidone (HYGROTON) half-tab 25 mg  25 mg Oral Daily Luanne Terry MD        clopidogrel (PLAVIX) tablet 75 mg  75 mg Oral Daily Luanne Terry MD        FLUoxetine (PROzac) capsule 40 mg  40 mg Oral Daily Luanne Terry MD   40 mg at 05/09/25 1735    gabapentin (NEURONTIN) capsule 400 mg  400 mg Oral TID Luanne Terry MD   400 mg at 05/09/25 1944    lactulose (CHRONULAC) solution 30 mL  30 mL Oral Daily Luanne Terry MD        levothyroxine (SYNTHROID/LEVOTHROID) tablet 88 mcg  88  mcg Oral QAM AC Luanne Terry MD        losartan (COZAAR) tablet 50 mg  50 mg Oral Daily Luanne Terry MD   50 mg at 05/09/25 1735    memantine (NAMENDA) tablet 10 mg  10 mg Oral Daily Luanne Terry MD   10 mg at 05/09/25 1734    mirtazapine (REMERON) tablet 45 mg  45 mg Oral At Bedtime Luanne Terry MD   45 mg at 05/09/25 2219    QUEtiapine (SEROquel) tablet 25 mg  25 mg Oral At Bedtime Luanne Terry MD   25 mg at 05/09/25 2219    senna-docusate (SENOKOT-S/PERICOLACE) 8.6-50 MG per tablet 1 tablet  1 tablet Oral BID Luanne Terry MD        Or    senna-docusate (SENOKOT-S/PERICOLACE) 8.6-50 MG per tablet 2 tablet  2 tablet Oral BID Luanne Terry MD        sodium chloride (OCEAN) 0.65 % nasal spray 2 spray  2 spray Both Nostrils TID Luanne Terry MD   2 spray at 05/09/25 2058    sodium chloride (PF) 0.9% PF flush 3 mL  3 mL Intracatheter Q8H Luanne Terry MD   3 mL at 05/09/25 9974

## 2025-05-10 NOTE — CONSULTS
Care Management Initial Consult    General Information  Assessment completed with: Patient, Care Team Member,    Type of CM/SW Visit: Initial Assessment    Primary Care Provider verified and updated as needed: Yes   Readmission within the last 30 days: no previous admission in last 30 days      Reason for Consult: discharge planning  Advance Care Planning: Advance Care Planning Reviewed: present on chart          Communication Assessment  Patient's communication style: spoken language (English or Bilingual)    Hearing Difficulty or Deaf: no   Wear Glasses or Blind: yes    Cognitive  Cognitive/Neuro/Behavioral: .WDL except, mood/behavior           Mood/Behavior: anxious, cooperative          Living Environment:   People in home: facility resident     Current living Arrangements: assisted living  Name of Facility: [facility]   Able to return to prior arrangements: other (see comments)       Family/Social Support:  Care provided by: self, homecare agency, other (see comments) ([facility])  Provides care for: no one, unable/limited ability to care for self  Marital Status:   Support system: Children, Facility resident(s)/Staff          Description of Support System: Supportive, Involved    Support Assessment: Adequate family and caregiver support, Adequate social supports    Current Resources:   Patient receiving home care services: Yes  Skilled Home Care Services: Home Health Aid     Community Resources: Housekeeping/Chore Agency, Home Care (private pay HHA thru [facility])  Equipment currently used at home: walker, rolling, grab bar, toilet, grab bar, tub/shower, raised toilet seat, shower chair  Supplies currently used at home:      Employment/Financial:  Employment Status: retired        Financial Concerns:     Referral to Financial Worker: No       Does the patient's insurance plan have a 3 day qualifying hospital stay waiver?  Yes     Which insurance plan 3 day waiver is available?  Alternative insurance waiver    Will the waiver be used for post-acute placement? Undetermined at this time    Lifestyle & Psychosocial Needs:  Social Drivers of Health     Food Insecurity: Low Risk  (5/9/2025)    Food Insecurity     Within the past 12 months, did you worry that your food would run out before you got money to buy more?: No     Within the past 12 months, did the food you bought just not last and you didn t have money to get more?: No   Depression: Not at risk (2/27/2025)    PHQ-2     PHQ-2 Score: 0   Housing Stability: Low Risk  (5/9/2025)    Housing Stability     Do you have housing? : Yes     Are you worried about losing your housing?: No   Tobacco Use: Medium Risk (5/2/2025)    Patient History     Smoking Tobacco Use: Former     Smokeless Tobacco Use: Never     Passive Exposure: Current   Financial Resource Strain: Low Risk  (5/9/2025)    Financial Resource Strain     Within the past 12 months, have you or your family members you live with been unable to get utilities (heat, electricity) when it was really needed?: No   Alcohol Use: Not At Risk (1/27/2020)    AUDIT-C     Frequency of Alcohol Consumption: 2-4 times a month     Average Number of Drinks: 1 or 2     Frequency of Binge Drinking: Never   Transportation Needs: Low Risk  (5/9/2025)    Transportation Needs     Within the past 12 months, has lack of transportation kept you from medical appointments, getting your medicines, non-medical meetings or appointments, work, or from getting things that you need?: No   Physical Activity: Not on file   Interpersonal Safety: Low Risk  (5/2/2025)    Interpersonal Safety     Do you feel physically and emotionally safe where you currently live?: Yes     Within the past 12 months, have you been hit, slapped, kicked or otherwise physically hurt by someone?: No     Within the past 12 months, have you been humiliated or emotionally abused in other ways by your partner or ex-partner?: No   Stress: Not on file  "  Social Connections: Not on file   Health Literacy: Not on file       Functional Status:  Prior to admission patient needed assistance:   Dependent ADLs:: Ambulation-walker, Bathing, Dressing, Grooming  Dependent IADLs:: Cleaning, Cooking, Laundry, Shopping, Meal Preparation, Medication Management, Money Management, Transportation  Assesssment of Functional Status: Not at baseline with ADL Functioning, Not at baseline with mobility    Mental Health Status:  Mental Health Status: Other (see comment)  Mental Health Management: Psychiatrist, Individual Therapy    Chemical Dependency Status:  Chemical Dependency Status: No Current Concerns             Values/Beliefs:  Spiritual, Cultural Beliefs, Pent[employer]alism Practices, Values that affect care: no               Discussed  Partnership in Safe Discharge Planning  document with patient/family: No    Additional Information:       Per notes patient \"needs consult for discharge planning & elevated risk score.\"       Writer introduced self and role to patient.  See consult information above. Pt lives in [facility] in [place].  is [facility] Coord @ [phone]; Fax [phone]  Pt support system includes son and daughter.  At baseline pt has help with bathing, dressing meal management, meals, showers, money management.  He has a HHA from [employer] [facility] 5 days a week to assist with breakfast, showering, housekeeping 8:30-10:30 am 5 days a week M-F. For DME pt uses a walker, shower chair, elevated toilet seat, His son/dtr transport him when needed.    Pt has no financial concerns.         Pt's PCP is Zurdo Kendrick   [phone]    and has specialists for his OCD psychiatric/psychology team [facility], [place], [phone].       Plan for family to assist with transportation upon discharge.      Next Steps: Await medical readiness    Rhonad Cano RN BSN  Care Coordination  [facility]      "

## 2025-05-10 NOTE — PLAN OF CARE
Goal Outcome Evaluation:             Goal Outcome Evaluation:     Observation goals  PRIOR TO DISCHARGE        Comments: -diagnostic tests and consults completed and resulted- Not Met- Pending PT eval/ SW to see  -vital signs normal or at patient baseline- Not Met  Nurse to notify provider when observation goals have been met and patient is ready for discharge.

## 2025-05-10 NOTE — PLAN OF CARE
PRIMARY Concern: after fall, gen weakness  Tests/Procedures for NEXT shift: none   Aggression Tool Color: green  Consults? (Pending/following, signed-off?): PT following  Safety Risk CONCERNS (fall risk, behaviors, etc.): fall risk      Where is patient from? (Home, TCU, etc.): [facility]   Isolation/Type: none   Other Important info for next shift: T/R q2h  Anticipated DC date & active delays: pending   SUMMARY NOTE:  Orientation/Cognitive: A/Ox4, forgetful   Observation Goals (Met/ Not Met): not met   Mobility Level/Assist Equipment: A1-2 GB/walker, up to chair for meals   Antibiotics & Plan (IV/po, length of tx left): none   Pain Management: cold pack applied to swelling to face   Tele/VS/O2: VSS on room air  ABNL Lab/BG: Hgb 10.1  Diet: regular, needs assistance with feeding   Bowel/Bladder: cont of B/b, uses urinal   Skin Concerns: Pressure injury to sacrum/coccyx ( barrier cream and mepilex applied), laceration to Left forehead, LLE skin tear, bruise to L hip, Bruising to Left hand, wound care and dressing changed   Drains/Devices: IV SL  Patient Stated Goal for Today: feel better

## 2025-05-10 NOTE — PROGRESS NOTES
05/10/25 1410   Appointment Info   Signing Clinician's Name / Credentials (PT) Marcelle Stack, PT, DPT   Quick Adds   Quick Adds [facility] Auth & Certification   Living Environment   People in Home facility resident   Current Living Arrangements assisted living   Home Accessibility no concerns   Transportation Anticipated family or friend will provide   Self-Care   Usual Activity Tolerance moderate   Regular Exercise Yes   Equipment Currently Used at Home walker, rolling;grab bar, toilet;grab bar, tub/shower;raised toilet seat;shower chair   Fall history within last six months yes   Number of times patient has fallen within last six months 2   Activity/Exercise/Self-Care Comment Patient reports that since his recent falls, the facility staff brings him to meals in his wheelchair. He uses a 4WW to ambulate in his apartment. Gets assist with bathing, dressing, meals, and money management. Has a home health aide that comes 2 hours every morning. He does bed exercises daily when his aide is there. Has HHPT 1x/week.   General Information   Onset of Illness/Injury or Date of Surgery 05/09/25   Referring Physician Luanne Terry MD   Patient/Family Therapy Goals Statement (PT) to get stronger   Pertinent History of Current Problem (include personal factors and/or comorbidities that impact the POC) Patient is 89 YO M who presented to hospital after a fall, admitted with Left frontal scalp edema/hematoma without underlying bone fracture. PMH includes  CVA, post CABG, atrial fibrillation, chronic bradycardia, OCD, panic disorder, chronic anemia, essential hypertension and Parkinson's disease.   Existing Precautions/Restrictions fall   General Observations Patient sitting up in bed, agreeable to PT.   Cognition   Affect/Mental Status (Cognition) WFL   Orientation Status (Cognition) oriented x 3   Follows Commands (Cognition) WFL   Pain Assessment   Patient Currently in Pain No   Integumentary/Edema   Integumentary/Edema Comments  Extensive facial bruising and swelling, worse around L eye; bandaging on L frontal scalp laceration; scattered small scraps on L shin, bandage on lower left shin; no edema   Posture    Posture Forward head position;Protracted shoulders   Range of Motion (ROM)   Range of Motion ROM is WFL   Strength (Manual Muscle Testing)   Strength (Manual Muscle Testing) Deficits observed during functional mobility;Able to perform R SLR;Able to perform L SLR   Strength Comments Significant B LE weakness noted during ambulation, gradual knee flexion as he fatigued   Bed Mobility   Comment, (Bed Mobility) Supine to sit SBA, heavy use of bed rails   Transfers   Comment, (Transfers) Sit to stand from EOB with FWW and Min A for balance, patient pulling up on walker   Gait/Stairs (Locomotion)   [place] Level (Gait) minimum assist (75% patient effort)   Assistive Device (Gait) walker, front-wheeled   Distance in Feet (Gait) 10' during eval   Pattern (Gait) step-to   Comment, (Gait/Stairs) Ptaient ambulated with forward rounded posture, narrow base of support with B hips in ER, B knee flexion that worsened as he fatigued. Shakiness throughout.   Balance   Balance Comments SBA for sitting balance on EOB; Need UE support on walker for standing balance; Unsteady with turning   Sensory Examination   Sensory Perception patient reports no sensory changes   Coordination   Coordination no deficits were identified   Clinical Impression   Criteria for Skilled Therapeutic Intervention Yes, treatment indicated   PT Diagnosis (PT) Impaired mobility   Influenced by the following impairments Weakness, impaired balance, decreased activity tolerance   Functional limitations due to impairments Increased difficulty with bed mobility, transfers and ambulation   Clinical Presentation (PT Evaluation Complexity) stable   Clinical Presentation Rationale Medical status, clinical judgement   Clinical Decision Making (Complexity) low complexity   Planned  Therapy Interventions (PT) balance training;bed mobility training;cryotherapy;gait training;home exercise program;neuromuscular re-education;patient/family education;postural re-education;strengthening;transfer training;progressive activity/exercise;home program guidelines;risk factor education   Risk & Benefits of therapy have been explained evaluation/treatment results reviewed;care plan/treatment goals reviewed;risks/benefits reviewed;current/potential barriers reviewed;participants voiced agreement with care plan;participants included;patient   PT Total Evaluation Time   PT Eval, Low Complexity Minutes (84501) 9   Therapy Certification   Start of care date 05/10/25   Certification date from 05/10/25   Certification date to 05/20/25   Medical Diagnosis Closed head injury, initial encounter   [facility] Authorization Information   Condition type Initial onset (within last 3 months)   Cause of current episode Traumatic   Nature of treatment Rehabilitative   Functional ability Moderate functional limitations   Documented POC (choose all that apply) Measurable short and long term/discharge treatment goals related to physical and functional deficits.;Frequency of treatment visits and treatment activities to address deficit areas.;Patient agrees to program participation including home program   Briefly describe symptoms Weakness   How did the symptoms start After a fall   Last 24H: avg pain/symptom intensity  no pain   Past wk: avg pain/symptom intensity no pain   Frequency of Symptoms Constantly (% of the time)   Symptom impact on ADLs Moderately   Condition change since eval N/A (first visit)   General health reported by patient Good   Physical Therapy Goals   PT Frequency 5x/week   PT Predicted Duration/Target Date for Goal Attainment 05/20/25   PT Goals Bed Mobility;Transfers;Gait   PT: Bed Mobility Independent;Supine to/from sit   PT: Transfers Modified independent;Sit to/from stand;Bed to/from chair;Assistive  device  (4WW)   PT: Gait Modified independent;Rolling walker;100 feet   Interventions   Interventions Quick Adds Therapeutic Activity;Gait Training   Therapeutic Activity   Therapeutic Activities: dynamic activities to improve functional performance Minutes (73793) 11   Symptoms Noted During/After Treatment Fatigue   Treatment Detail/Skilled Intervention Following evaluation, repeated sit <> stand transfers from EOB x 6 reps with cues for safe hand placements, use of FWW and CGA. Patient continued to pull up on walker despite cues. Sit <> stand from w/c x 3 reps with FWW and CGA to Min A due to lower surface. Following gait training, patient transferred from w/c to recliner chair with FWW and Min A for balance when turning, cues for FWW safety and hand placements. Patient in recliner chair at end of session, alarm activated. Updated RN Dennise.   Gait Training   Gait Training Minutes (04937) 10   Symptoms Noted During/After Treatment (Gait Training) fatigue   Treatment Detail/Skilled Intervention Gait training with FWW and gait belt donned, close w/c follow with seated rest breaks between each bout due to fatigue. Cues for awareness of foot positioning as he was ambulating with very narrow base of support initially, improved following cues. B LEs fatigued quickly with knees slowly bending into flexion. Slight postural improvements with verbal cues but unable to maintain. Min to Mod Ax1 for balance.   Distance in Feet 25', 20', 10'   Physical Assistance Level (Gait Training) 1 person + 1 person to manage equipment   PT Discharge Planning   PT Plan Progress ambulation with w/c follow - 4WW vs FWW?, sit <> stand transfers, LE strengthening   PT Discharge Recommendation (DC Rec) Transitional Care Facility   PT Rationale for DC Rec Patient lives in an long term but is typically Mod I with a 4WW for mobility. Currently, patient requiring up to Mod A for short distance ambulation and fatigues very quickly. TCU recommended to  progress strength and mobility prior to returning to his [facility] apartment.   PT Brief overview of current status Ax1 with FWW for transfers; Goals of therapy will be to address safe mobility and make recs for d/c to next level of care. Pt and RN will continue to follow all falls risk precautions as documented by RN staff while hospitalized.   PT Total Distance Amb During Session (feet) 65   Physical Therapy Time and Intention   Timed Code Treatment Minutes 21   Total Session Time (sum of timed and untimed services) 30     [facility]                                                                                   OUTPATIENT PHYSICAL THERAPY    PLAN OF TREATMENT FOR OUTPATIENT REHABILITATION   Patient's Last Name, First Name, JERMAINEMiraWILDAMira  Zack Santiago YOB: 1931   Provider's Name   [facility]   Medical Record No.  [id number]     Onset Date: 05/09/25 Start of Care Date: 05/10/25     Medical Diagnosis:  Closed head injury, initial encounter               PT Diagnosis:  Impaired mobility Certification Dates:  From: 05/10/25  To: 05/20/25       See note for plan of treatment, functional goals, and certification details.    I CERTIFY THE NEED FOR THESE SERVICES FURNISHED UNDER        THIS PLAN OF TREATMENT AND WHILE UNDER MY CARE (Physician co-signature of this document indicates review and certification of the therapy plan).

## 2025-05-10 NOTE — PROGRESS NOTES
[id number]    PRIMARY Concern: Fall  SAFETY RISK Concerns (fall risk, behaviors, etc.): Fall Risk      Aggression Tool Color: Green  Isolation/Type: n/a  Tests/Procedures for NEXT shift: n/a  Consults? (Pending/following, signed-off?) PT consult, SW consult  Where is patient from? (Home, TCU, etc.): [facility]  Other Important info for NEXT shift:   Anticipated DC date & active delays: TBD  _____________________________________________________________________________  SUMMARY NOTE:   Orientation/Cognitive: A&Ox4  Observation Goals (Met/ Not Met): Not Met  Mobility Level/Assist Equipment: A-2 T&R- Not able to tolerate pivoting  Antibiotics & Plan (IV/po, length of tx left): n/a  Pain Management: Denies  Complete Pain Reassessment: Y/N N Due next: Next shift  Tele/VS/O2: VSS on RA except hypertensive to 150's  ABNL Lab/BG: Hgb- 10.8  Diet: Reg  Bowel/Bladder: Cont. B&B-  Skin Concerns: Pressure injury to sacrum/coccyx, laceration to Left forehead, LLE skin tear, bruise to L hip, Bruising to Left hand-  Drains/Devices: NS @ 75ml/hr  Patient Stated Goal for Today: n/a

## 2025-05-10 NOTE — PROGRESS NOTES
Observation goals  PRIOR TO DISCHARGE        Comments:   -diagnostic tests and consults completed and resulted: not met, TCU placement pending   -vital signs normal or at patient baseline: not met  Nurse to notify provider when observation goals have been met and patient is ready for discharge.

## 2025-05-11 PROCEDURE — G0378 HOSPITAL OBSERVATION PER HR: HCPCS

## 2025-05-11 PROCEDURE — 250N000013 HC RX MED GY IP 250 OP 250 PS 637: Performed by: INTERNAL MEDICINE

## 2025-05-11 PROCEDURE — 99232 SBSQ HOSP IP/OBS MODERATE 35: CPT | Performed by: INTERNAL MEDICINE

## 2025-05-11 RX ADMIN — CARBOXYMETHYLCELLULOSE SODIUM 1 DROP: 5 SOLUTION/ DROPS OPHTHALMIC at 21:23

## 2025-05-11 RX ADMIN — CARBIDOPA AND LEVODOPA 1 TABLET: 25; 100 TABLET ORAL at 08:21

## 2025-05-11 RX ADMIN — MIRTAZAPINE 45 MG: 15 TABLET, FILM COATED ORAL at 21:22

## 2025-05-11 RX ADMIN — CARBIDOPA AND LEVODOPA 1 HALF-TAB: 25; 100 TABLET ORAL at 08:22

## 2025-05-11 RX ADMIN — CARBOXYMETHYLCELLULOSE SODIUM 1 DROP: 5 SOLUTION/ DROPS OPHTHALMIC at 08:27

## 2025-05-11 RX ADMIN — CARBIDOPA AND LEVODOPA 1 HALF-TAB: 25; 100 TABLET ORAL at 12:24

## 2025-05-11 RX ADMIN — LEVOTHYROXINE SODIUM 88 MCG: 88 TABLET ORAL at 08:21

## 2025-05-11 RX ADMIN — GABAPENTIN 400 MG: 300 CAPSULE ORAL at 21:22

## 2025-05-11 RX ADMIN — CLOPIDOGREL BISULFATE 75 MG: 75 TABLET, FILM COATED ORAL at 08:22

## 2025-05-11 RX ADMIN — QUETIAPINE FUMARATE 25 MG: 25 TABLET ORAL at 22:41

## 2025-05-11 RX ADMIN — CARBIDOPA AND LEVODOPA 1 HALF-TAB: 25; 100 TABLET ORAL at 16:01

## 2025-05-11 RX ADMIN — SENNOSIDES AND DOCUSATE SODIUM 1 TABLET: 50; 8.6 TABLET ORAL at 21:21

## 2025-05-11 RX ADMIN — FLUOXETINE HYDROCHLORIDE 40 MG: 20 CAPSULE ORAL at 08:21

## 2025-05-11 RX ADMIN — MEMANTINE 10 MG: 10 TABLET ORAL at 08:23

## 2025-05-11 RX ADMIN — Medication 2 SPRAY: at 21:27

## 2025-05-11 RX ADMIN — LACTULOSE 30 ML: 20 SOLUTION ORAL at 08:21

## 2025-05-11 RX ADMIN — CARBIDOPA AND LEVODOPA 1 TABLET: 25; 100 TABLET ORAL at 16:01

## 2025-05-11 RX ADMIN — GABAPENTIN 400 MG: 300 CAPSULE ORAL at 08:21

## 2025-05-11 RX ADMIN — GABAPENTIN 400 MG: 300 CAPSULE ORAL at 15:09

## 2025-05-11 RX ADMIN — CARBIDOPA AND LEVODOPA 1 TABLET: 25; 100 TABLET ORAL at 12:24

## 2025-05-11 RX ADMIN — LOSARTAN POTASSIUM 50 MG: 50 TABLET, FILM COATED ORAL at 08:22

## 2025-05-11 RX ADMIN — Medication 2 SPRAY: at 08:35

## 2025-05-11 ASSESSMENT — ACTIVITIES OF DAILY LIVING (ADL)
ADLS_ACUITY_SCORE: 62

## 2025-05-11 NOTE — PROGRESS NOTES
Observation goals  PRIOR TO DISCHARGE        Comments: -diagnostic tests and consults completed and resulted-Met.   -vital signs normal or at patient baseline-Partially met.   Nurse to notify provider when observation goals have been met and patient is ready for discharge.

## 2025-05-11 NOTE — PLAN OF CARE
Shift [id number]  Goal Outcome Evaluation:    PRIMARY Concern: Fall  SAFETY RISK Concerns (fall risk, behaviors, etc.): Fall Risk      Aggression Tool Color: Green  Isolation/Type: n/a  Tests/Procedures for NEXT shift: n/a  Consults? (Pending/following, signed-off?) PT/SW consult  Where is patient from? (Home, TCU, etc.): [facility]  Other Important info for NEXT shift:   Anticipated DC date & active delays: TBD  _____________________________________________________________________________  SUMMARY NOTE:   Orientation/Cognitive: A&Ox4  Observation Goals (Met/ Not Met): Not Met  Mobility Level/Assist Equipment: 2A GB+W  Antibiotics & Plan (IV/po, length of tx left): n/a  Pain Management: Denies  Tele/VS/O2: VSS on RA, except bradycardia, asymptomatic.   ABNL Lab/BG: See chart  Diet: Reg  Bowel/Bladder: Cont. B&B-  Skin Concerns: Pressure injury to sacrum/coccyx, laceration to Left forehead, LLE skin tear, bruise to L hip, Bruising to Left hand-  Drains/Devices: PIV SL  Patient Stated Goal for Today: n/a

## 2025-05-11 NOTE — PROGRESS NOTES
[facility]    Medicine Progress Note - Hospitalist Service        Date of Admission:  5/9/2025  7:21 AM    Assessment & Plan:      Zack Santiago is a 89 year old male with past medical history significant for history of CVA on clopidogrel, post CABG, atrial fibrillation, chronic bradycardia, chronic anemia, essential hypertension and Parkinson's disease who presented to ED from [facility] on 5/9/2025 after a fall.  Patient was admitted for further evaluation and management of generalized weakness    Mechanical fall  Left frontal scalp edema/hematoma without underlying bone fracture secondary to above.  Generalized weakness  -On the day of admission around 6:30 in the morning patient was going to the bathroom with his walker when he lost his balance and fell to his left side.  He endorsed head injury on the carpeted floor.  He denied any chest pain, palpitations or loss of consciousness.  -Patient was noticed to have a skin tear to left shin.  -Head CT, cervical spine CT and facial bone CT was done in the emergency room.  CT head showed left frontal scalp edema/hematoma without underlying bone fracture was noticed which extends inferiorly to involve the preseptal and infraorbital region.  -Registered to observation  -Regular diet  -Evaluated by PT, they are recommending TCU.  Patient is [facility] on the assisted living side  -Still has significant periorbital swelling although he does appear to have vision intact on that side  -Social work consulted for assistance with placement    Parkinson disease   -Lives in assisted living facility, ambulates with walker.  -continue prior to admission carbidopa levodopa    Chronic sinus bradycardia  Coronary artery disease s/p CABG  Hyperlipidemia  Essential hypertension  -Patient baseline heart rate is in 50s.  Previous EKG continues to show sinus bradycardia with right bundle branch block and left  anterior fascicular block.  He is asymptomatic from the bradycardia  -Will continue to monitor patient on telemetry.  -Patient has asymptomatic known bradycardia.  -Continue prior to admission Plavix, chlorthalidone and losartan  -Hold PTA statin while observation is status, resume at discharge    Panic disorder  OCD  Depression  -Continue PTA fluoxetine, quetiapine and mirtazapine and Namenda    History of CVA  Patient was recently hospitalized from 04/26/25-04/28/25 with 2 small chronic infarctions in the left inferior parietal lobe and bilateral cerebellar hemispheres.  -Continue PTA Plavix  -Outpatient follow-up with neuro IR for 2 mm basilar artery aneurysm as recommended by neurostroke during his last hospitalization.    Hypothyroidism  -Continue PTA levothyroxine    Chronic kidney disease stage III  -Baseline creatinine is 1.1-1.2.  Creatinine is 1.01 on admission    Acute on chronic normocytic anemia:  Hemoglobin is 10.8, seems to be around baseline patient is denying any signs and symptoms of bleeding  -No further anemia workup during this current hospitalization, continue to follow-up with PCP in an outpatient setting.    Diet: Regular Diet Adult     DVT Prophylaxis: Pneumatic Compression Devices   Puri Catheter: Not present  Code Status: Full Code     Disposition Plan       Expected Discharge Date: 05/12/2025        [facility]   PT rec TCU  SW following      Entered: Johan Soto MD 05/11/2025, 10:18 AM        Medically Ready for Discharge: Anticipated Tomorrow pending placement        Clinically Significant Risk Factors Present on Admission           # Hypocalcemia: Lowest Ca = 8.6 mg/dL in last 2 days, will monitor and replace as appropriate       # Drug Induced Platelet Defect: home medication list includes an antiplatelet medication   # Hypertension: Noted on problem list        # Anemia: based on hgb <11           # Financial/Environmental Concerns:           "    The patient's care was discussed with the Bedside Nurse and Patient.    Medical Decision Making       **CLEAR ALL SELECTIONS**      Labs/Imaging Reviewed:  See Information above and Data section below  Time SPENT BY ME on the date of service doing chart review, history, exam, documentation & further activities per the note:  35 MINUTES    Chart documentation was completed, in part, with [employer] voice-recognition software. Even though reviewed, some grammatical, spelling, and word errors may remain.    Johan Soto MD  Hospitalist Service  [facility]  Text Page 7AM-6PM  Securely message with the Vocera Web Console (learn more here)  Text page via [employer] Paging/Directory    ______________________________________________________________________    Interval History   Patient denies significant pain.  No headache.  He has significant left periorbital bruise, vision is intact on that side but impeded by significant surrounding swelling.  No nausea or vomiting.  He is asking for his morning meds.  Discussed with him plans for PT evaluation to assess his mobility and safe discharge plans.    Data reviewed today: I reviewed all medications, new labs and imaging results over the last 24 hours. I personally reviewed no images or EKG's today.    Physical Exam   Vital signs:  Temp: 97.4  F (36.3  C) Temp src: Oral BP: (!) 117/100 Pulse: 52   Resp: 16 SpO2: 97 % O2 Device: None (Room air)   Height: 185.4 cm (6' 1\") Weight: 84.9 kg (187 lb 2.7 oz)  Estimated body mass index is 24.69 kg/m  as calculated from the following:    Height as of this encounter: 1.854 m (6' 1\").    Weight as of this encounter: 84.9 kg (187 lb 2.7 oz).      Wt Readings from Last 2 Encounters:   05/11/25 84.9 kg (187 lb 2.7 oz)   05/02/25 87.5 kg (193 lb)       Gen: 2-, NAD  HEENT: Significant left periorbital bruise, he is able to pry open and see through   resp: CTA B/L, normal WOB  CVS: RRR, no murmur  Abd/GI: Soft, " non-tender. BS- normoactive.    Skin: Warm, dry no rashes  MSK: Left shin tear-bandaged  Neuro- CN- intact. Globally weak, UE tremor+      Data   Recent Labs   Lab 05/10/25  1323 05/09/25  0743   WBC 5.9 6.9   HGB 10.1* 10.8*   MCV 96 96    179    135   POTASSIUM 4.3 4.3   CHLORIDE 101 97*   CO2 25 25   BUN 19.5 21.4   CR 0.89 1.01   ANIONGAP 11 13   YOUNG 8.6* 8.8   * 93   ALBUMIN 3.5 3.8   PROTTOTAL 5.8* 6.3*   BILITOTAL 0.6 0.5   ALKPHOS 101 110   ALT 7 15   AST 40 13       No results found for this or any previous visit (from the past 24 hours).  Medications   Current Facility-Administered Medications   Medication Dose Route Frequency Provider Last Rate Last Admin     Current Facility-Administered Medications   Medication Dose Route Frequency Provider Last Rate Last Admin    carbidopa-levodopa (SINEMET)  MG per tablet 1 tablet  1 tablet Oral TID Luanne Terry MD   1 tablet at 05/11/25 0821    And    carbidopa-levodopa half-tab 12.5-50 mg  1 half-tab Oral TID Luanne Terry MD   1 half-tab at 05/11/25 0822    carboxymethylcellulose PF (REFRESH PLUS) 0.5 % ophthalmic solution 1 drop  1 drop Both Eyes BID Luanne Terry MD   1 drop at 05/11/25 0827    chlorthalidone (HYGROTON) half-tab 25 mg  25 mg Oral Daily Luanne Terry MD        clopidogrel (PLAVIX) tablet 75 mg  75 mg Oral Daily Luanne Terry MD   75 mg at 05/11/25 0822    FLUoxetine (PROzac) capsule 40 mg  40 mg Oral Daily Luanne Terry MD   40 mg at 05/11/25 0821    gabapentin (NEURONTIN) capsule 400 mg  400 mg Oral TID Luanne Terry MD   400 mg at 05/11/25 0821    lactulose (CHRONULAC) solution 30 mL  30 mL Oral Daily Luanne Terry MD   30 mL at 05/11/25 0821    levothyroxine (SYNTHROID/LEVOTHROID) tablet 88 mcg  88 mcg Oral QAM AC Luanne Terry MD   88 mcg at 05/11/25 0821    losartan (COZAAR) tablet 50 mg  50 mg Oral Daily Luanne Terry MD   50 mg at 05/11/25 0822    memantine (NAMENDA) tablet 10 mg  10 mg Oral Daily Luanne Terry MD    10 mg at 05/11/25 0823    mirtazapine (REMERON) tablet 45 mg  45 mg Oral At Bedtime Luanne Terry MD   45 mg at 05/10/25 2223    QUEtiapine (SEROquel) tablet 25 mg  25 mg Oral At Bedtime Luanne Terry MD   25 mg at 05/10/25 2223    senna-docusate (SENOKOT-S/PERICOLACE) 8.6-50 MG per tablet 1 tablet  1 tablet Oral BID Luanne Terry MD   1 tablet at 05/10/25 2029    Or    senna-docusate (SENOKOT-S/PERICOLACE) 8.6-50 MG per tablet 2 tablet  2 tablet Oral BID Luanne Terry MD        sodium chloride (OCEAN) 0.65 % nasal spray 2 spray  2 spray Both Nostrils TID Luanne Terry MD   2 spray at 05/11/25 0808    sodium chloride (PF) 0.9% PF flush 3 mL  3 mL Intracatheter Q8H Luanne Terry MD   3 mL at 05/10/25 8380

## 2025-05-11 NOTE — PLAN OF CARE
Goal Outcome Evaluation:    1900-2330    PRIMARY Concern: Fall  SAFETY RISK Concerns (fall risk, behaviors, etc.): Fall Risk      Aggression Tool Color: Green  Isolation/Type: n/a  Tests/Procedures for NEXT shift: n/a  Consults? (Pending/following, signed-off?) PT consult, SW consult  Where is patient from? (Home, TCU, etc.): [facility]  Other Important info for NEXT shift:   Anticipated DC date & active delays: TBD  _____________________________________________________________________________  SUMMARY NOTE:   Orientation/Cognitive: A&Ox4  Observation Goals (Met/ Not Met): Not Met  Mobility Level/Assist Equipment:2A GB+W  Antibiotics & Plan (IV/po, length of tx left): n/a  Pain Management: Denies  Tele/VS/O2: VSS on RA   ABNL Lab/BG: See chart  Diet: Reg  Bowel/Bladder: Cont. B&B-  Skin Concerns: Pressure injury to sacrum/coccyx, laceration to Left forehead, LLE skin tear, bruise to L hip, Bruising to Left hand-  Drains/Devices: PIV SL  Patient Stated Goal for Today: n/a

## 2025-05-11 NOTE — PROGRESS NOTES
Care Management Follow Up    Length of Stay (days): 0    Expected Discharge Date: 05/12/2025     Concerns to be Addressed: discharge planning     Patient plan of care discussed at interdisciplinary rounds: Yes    Anticipated Discharge Disposition: Skilled Nursing Facility     Anticipated Discharge Services:    Anticipated Discharge DME:      Patient/family educated on Medicare website which has current facility and service quality ratings:    Education Provided on the Discharge Plan: Yes  Patient/Family in Agreement with the Plan: yes    Referrals Placed by CM/SW:    Private pay costs discussed: Not applicable    Discussed  Partnership in Safe Discharge Planning  document with patient/family: Yes:     Handoff Completed: No, handoff not indicated or clinically appropriate    Additional Information:  Writer sent a referral to [facility]CU based on patients custodial, they will have admission information Monday     Next Steps: Secure placement, PAS, transportation     ADD - patient wants [facility] and only [facility] and would like transport arranged via wheelchair is aware of costs     EDMUNDO Pierce

## 2025-05-11 NOTE — PROGRESS NOTES
Observation goals  PRIOR TO DISCHARGE        Comments:   -Diagnostic tests and consults completed and resulted: Partially met, TCU placement is pending.    -Vital signs normal or at patient baseline: Partially met  Nurse to notify provider when observation goals have been met and patient is ready for discharge.

## 2025-05-11 NOTE — PROGRESS NOTES
PRIMARY Concern: Fall, weakness.  SAFETY RISK Concerns (fall risk, behaviors, etc.): Fall Risk      Aggression Tool Color: Green  Isolation/Type: n/a  Tests/Procedures for NEXT shift: n/a  Consults? (Pending/following, signed-off?) PT/SW following, SW following for discharge.   Where is patient from? (Home, TCU, etc.): [facility]  Other Important info for NEXT shift: pills whole with water but one at a time.   Anticipated DC date & active delays: TBD  _____________________________________________________________________________  SUMMARY NOTE:  Patient very unsteady, shakiness due to parkinson's disease, required two staff to transfer to chair. Multiple, scattered bruises but denies pain.   Also has swelling to to face and eyes. Eating, drinking, and voiding in adequate amounts.    Orientation/Cognitive: A&Ox4  Observation Goals (Met/ Not Met): Not Met  Mobility Level/Assist Equipment: 2A GB+W  Antibiotics & Plan (IV/po, length of tx left): n/a  Pain Management: Denies  Tele/VS/O2: VSS on RA, except bradycardia, asymptomatic.   ABNL Lab/BG: See chart  Diet: Reg  Bowel/Bladder: Cont. B&B-  Skin Concerns: Pressure injury to sacrum/coccyx, laceration to Left forehead, LLE skin tear, bruise to face extending under chin to neck,  L hip bruise. Also has bruising to Left hand  Drains/Devices: PIV SL  Patient Stated Goal for Today: n/a

## 2025-05-12 ENCOUNTER — DOCUMENTATION ONLY (OUTPATIENT)
Dept: GERIATRICS | Facility: CLINIC | Age: OVER 89
End: 2025-05-12
Payer: COMMERCIAL

## 2025-05-12 VITALS
OXYGEN SATURATION: 97 % | RESPIRATION RATE: 18 BRPM | SYSTOLIC BLOOD PRESSURE: 152 MMHG | HEART RATE: 57 BPM | WEIGHT: 188.05 LBS | BODY MASS INDEX: 24.92 KG/M2 | TEMPERATURE: 98.2 F | DIASTOLIC BLOOD PRESSURE: 62 MMHG | HEIGHT: 73 IN

## 2025-05-12 PROCEDURE — G0378 HOSPITAL OBSERVATION PER HR: HCPCS

## 2025-05-12 PROCEDURE — 250N000013 HC RX MED GY IP 250 OP 250 PS 637: Performed by: INTERNAL MEDICINE

## 2025-05-12 PROCEDURE — 99238 HOSP IP/OBS DSCHRG MGMT 30/<: CPT | Performed by: INTERNAL MEDICINE

## 2025-05-12 RX ADMIN — CARBIDOPA AND LEVODOPA 1 HALF-TAB: 25; 100 TABLET ORAL at 09:35

## 2025-05-12 RX ADMIN — FLUOXETINE HYDROCHLORIDE 40 MG: 20 CAPSULE ORAL at 09:34

## 2025-05-12 RX ADMIN — MEMANTINE 10 MG: 10 TABLET ORAL at 09:37

## 2025-05-12 RX ADMIN — CARBIDOPA AND LEVODOPA 1 HALF-TAB: 25; 100 TABLET ORAL at 12:11

## 2025-05-12 RX ADMIN — CLOPIDOGREL BISULFATE 75 MG: 75 TABLET, FILM COATED ORAL at 09:36

## 2025-05-12 RX ADMIN — CARBIDOPA AND LEVODOPA 1 TABLET: 25; 100 TABLET ORAL at 09:36

## 2025-05-12 RX ADMIN — CARBIDOPA AND LEVODOPA 1 TABLET: 25; 100 TABLET ORAL at 12:11

## 2025-05-12 RX ADMIN — SENNOSIDES AND DOCUSATE SODIUM 1 TABLET: 50; 8.6 TABLET ORAL at 09:36

## 2025-05-12 RX ADMIN — GABAPENTIN 400 MG: 300 CAPSULE ORAL at 09:35

## 2025-05-12 RX ADMIN — LACTULOSE 30 ML: 20 SOLUTION ORAL at 09:36

## 2025-05-12 RX ADMIN — LEVOTHYROXINE SODIUM 88 MCG: 88 TABLET ORAL at 06:54

## 2025-05-12 RX ADMIN — LOSARTAN POTASSIUM 50 MG: 50 TABLET, FILM COATED ORAL at 09:36

## 2025-05-12 RX ADMIN — CARBOXYMETHYLCELLULOSE SODIUM 1 DROP: 5 SOLUTION/ DROPS OPHTHALMIC at 09:37

## 2025-05-12 ASSESSMENT — ACTIVITIES OF DAILY LIVING (ADL)
ADLS_ACUITY_SCORE: 62
ADLS_ACUITY_SCORE: 66
ADLS_ACUITY_SCORE: 66
ADLS_ACUITY_SCORE: 62
ADLS_ACUITY_SCORE: 62

## 2025-05-12 NOTE — PLAN OF CARE
Physical Therapy Discharge Summary    Reason for therapy discharge:    Discharged to transitional care facility.    Progress towards therapy goal(s). See goals on Care Plan in [facility] electronic health record for goal details.  Goals partially met.  Barriers to achieving goals:   discharge from facility.    Therapy recommendation(s):    Continued therapy is recommended.  Rationale/Recommendations: Patient lives in an [place] but is typically Mod I with a 4WW for mobility. Currently, patient requiring up to Mod A for short distance ambulation and fatigues very quickly. TCU recommended to progress strength and mobility prior to returning to his [place] apartment.  PT Brief overview of current status: Ax1 with FWW for transfers; Goals of therapy will be to address safe mobility and make recs for d/c to next level of care. Pt and RN will continue to follow all falls risk precautions as documented by RN staff while hospitalized.  PT Total Distance Amb During Session (feet): 65      Recommendation above provided by last treating therapist.

## 2025-05-12 NOTE — PLAN OF CARE
Goal Outcome Evaluation:    Observation goals  PRIOR TO DISCHARGE        Comments: -diagnostic tests and consults completed and resulted met  -vital signs normal or at patient baseline met  Nurse to notify provider when observation goals have been met and patient is ready for discharge.

## 2025-05-12 NOTE — PROGRESS NOTES
[facility]  PRIMARY CARE PROVIDER AND CLINIC:  Zurdo Kendrick MD, [address] / [place] [address]  Chief Complaint   Patient presents with    Hospital F/U     [place] Medical Record Number:  [id number]  Place of Service where encounter took place:  [facility] [[id number]]    Zack Santiago  is a 89 year old  (5/29/1931), admitted to the above facility from  [facility]. Hospital stay 5/9/25 through 5/12/25..  Admitted to this facility for  rehab, medical management, and nursing care.     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   Brief Summary of Hospital Course: pt was admitted with a fall at home. He has hx of Parkinson's, CVA, balance issues and the CV issues listed below.  No fractures on eval--though has an abrasion left forehead and a laceration left shin. He had significant bruising noted on face. He has chronic bradycardia--no acute issues occurred. Had a CVA in April  and on Plavix. Sent for rehab.    Updates on Status Since Skilled nursing Admission: says he dose not have Parkinson's  "disease  \"just some parkinson symptoms\" such as tremor. He current has no pain, does not want to restart the [place]--\" Dr Paige stopped that med in the past\".  Feels voiding ok, less BM but has hx constipation helped with lactulose and current regimen. Has not been eating a lot past few days.  Feels moods are fine    CODE STATUS/ADVANCE DIRECTIVES DISCUSSION:   CPR/Full code   Patient's living condition: lives in an assisted living facility  ALLERGIES: No known allergies  PAST MEDICAL HISTORY:  has a past medical history of A-fib (H) (2010), AAA (abdominal aortic aneurysm) without rupture, Amaurosis fugax of right eye (10/2016), Anemia (2004), Aortic insufficiency (06/2014), Aortic insufficiency (11/2016), Ascending aorta dilatation (01/2023), ASCVD (arteriosclerotic cardiovascular disease) (2010), BPH (benign prostatic hyperplasia) (2003), Bradycardia (2016), CKD (chronic kidney disease) stage 3, GFR 30-59 ml/min (H), Constipation (05/2020), Cough (2010), Dizzy (2001), GERD (gastroesophageal reflux disease), HTN (hypertension) (2002), colonoscopy (2003), Hyponatremia (04/2025), Hypothyroid, Hypovitaminosis D, Idiopathic neuropathy, Lung nodule (02/2018), OCD (obsessive compulsive disorder), Panic disorder, Parkinson's disease (H) (03/2023), Spinal headache, Stroke (H) (12/2016), Sudden hearing loss (06/2013), SVT (supraventricular tachycardia) (11/2016), Thoracic ascending aortic aneurysm (06/2014), and Ulcerative colitis (H).    He has no past medical history of PONV (postoperative nausea and vomiting).  PAST SURGICAL HISTORY:   has a past surgical history that includes TOTAL KNEE ARTHROPLASTY (2011); TOTAL KNEE ARTHROPLASTY (2009); coronary artery bypass (2010); hernia repair (2005); turp (2003); knee surgery (1997); back surgery (1998); Bladder surgery (1985); hernia repair (1998); Repair hammer toe (12/28/2012); Endovascular repair aneurysm abdominal aorta (N/A, 05/27/2015); Herniorrhaphy inguinal (Left, " 01/05/2018); Repair hammer toe (04/2018); Colonoscopy (N/A, 05/05/2020); toe amputation right foot (02/2021); and cataract iol, rt/lt (2011).  FAMILY HISTORY: family history includes Attention Deficit Disorder in his granddaughter; Diverticulitis in his son; Glasses (<9 y/o) in his daughter, granddaughter, and grandson; Heart Disease in his father; Hip dysplasia in his granddaughter; Kyphosis in his granddaughter; Lymphoma in his sister; Mental Illness in his granddaughter; Retinal detachment (age of onset: 50 - 59) in his daughter.  SOCIAL HISTORY:   reports that he quit smoking about 59 years ago. His smoking use included cigarettes. He started smoking about 64 years ago. He has a 5 pack-year smoking history. He has been exposed to tobacco smoke. He has never used smokeless tobacco. He reports that he does not currently use alcohol. He reports that he does not use drugs.    Post Discharge Medication Reconciliation Status: discharge medications reconciled and changed, per note/orders     Current Outpatient Medications   Medication Sig Dispense Refill    acetaminophen (TYLENOL) 325 MG tablet Take 650 mg by mouth every 6 hours as needed for mild pain.      carbidopa-levodopa (SINEMET)  MG tablet 1 & 1/2 TABLETS ORALLY 3 TIMES DAILY 270 tablet 3    clopidogrel (PLAVIX) 75 MG tablet 1 TABLET ORALLY EVERY MORNING  (DX: HEART  ) 30 tablet 11    DESITIN DAILY DEFENSE 13 % CREA APPLY TO AFFECTED AREA TOPICALLY 2 TIMES DAILY;APPLY TO AFFECTED AREA TOPICALLY AS NEEDED **OK TO APPLY AND LEAVE UNCOVERED** 113 g 0    diclofenac (VOLTAREN) 1 % topical gel 2 g 2 times daily. Apply thin layer twice daily      econazole nitrate 1 % external cream Apply topically 2 times daily.      FLUoxetine (PROZAC) 40 MG capsule Take 40 mg by mouth daily.      gabapentin (NEURONTIN) 400 MG capsule 1 CAPSULE ORALLY 3 TIMES DAILY 90 capsule 11    hydrocortisone, Perianal, (HYDROCORTISONE) 2.5 % cream Place rectally 2 times daily as needed  "for hemorrhoids. 30 g 2    hydrOXYzine HCl (ATARAX) 10 MG tablet Take 10 mg by mouth 2 times daily as needed for itching.      lactulose (CHRONULAC) 10 GM/15ML solution DRINK 30ML  ORALLY DAILY (DX: CONSTIPATION);DRINK 30ML  ORALLY DAILY AS NEEDED (DX: CONSTIPATION) (Patient taking differently: DRINK 30ML  ORALLY DAILY  and DRINK 30ML  ORALLY once DAILY AS NEEDED (DX: CONSTIPATION)) 946 mL PRN    levothyroxine (SYNTHROID/LEVOTHROID) 88 MCG tablet 1 TABLET ORALLY EVERY MORNING ON AN EMPTY STOMACH 90 tablet 0    losartan (COZAAR) 50 MG tablet Take 1 tablet (50 mg) by mouth daily. 90 tablet 3    memantine (NAMENDA) 10 MG tablet Take 10 mg by mouth daily.      mirtazapine (REMERON) 45 MG tablet 1 TABLET ORALLY AT BEDTIME (DX: DEPRESSION) 28 tablet 11    OPTIVE 0.5-0.9 % ophthalmic solution INSTILL 1 DROP INTO BOTH EYES 2 TIMES DAILY  (DX: DRY EYES) 15 mL 11    QUEtiapine (SEROQUEL) 25 MG tablet 1 TAB ORALLY EVERY EVENING 30 tablet 11    senna-docusate (SENEXON-S) 8.6-50 MG tablet 1 TABLET ORALLY DAILY AS NEEDED (DX: CONSTIPATION) 30 tablet 10    simvastatin (ZOCOR) 20 MG tablet 1 TABLET ORALLY AT BEDTIME   (DX: CHOLESTEROL) 30 tablet 2    sodium chloride (DEEP SEA NASAL SPRAY) 0.65 % nasal spray INSTILL SPRAY(S) INTO NOSTRIL(S) 3 TIMES DAILY 44 mL 11    SOLUBLE FIBER THERAPY powder TAKE 1 TEASPOONFUL ORALLY DAILY (DX: CONSTIPATION) 454 g 11           ROS:  10 point ROS of systems including Constitutional, Eyes, Respiratory, Cardiovascular, Gastroenterology, Genitourinary, Integumentary, Musculoskeletal, Psychiatric were all negative except for pertinent positives noted in my HPI.    Vitals:  BP (!) 168/58   Pulse 53   Temp 97.8  F (36.6  C)   Resp 18   Ht 1.854 m (6' 1\")   Wt 88.3 kg (194 lb 9.6 oz)   SpO2 97%   BMI 25.67 kg/m    Exam:  GENERAL APPEARANCE:  Alert, in no distress, oriented, anxious, cooperative  ENT:  Mouth and posterior oropharynx normal, moist mucous membranes, normal hearing acuity, most of " face with bruising.   EYES:  EOM, conjunctivae, lids, pupils and irises normal  RESP:  respiratory effort and palpation of chest normal, lungs clear to auscultation , no respiratory distress  CV:  Palpation and auscultation of heart done , IRRR, no murmur, rub, or gallop.  Has bilat feet to above ankles Lt > Rt. edema, + pedal pulses  ABDOMEN:  normal bowel sounds, soft, nontender, no hepatosplenomegaly or other masses  M/S:   PISANO equally, has fine bilat hand resting tremors  SKIN:  facial and neck bruising, left upper forehead abrasion, left shin laceration with scant bleeding, left hand finger nails appear to have fungal(?)  NEURO:   Cranial nerves 2-12 are normal tested and grossly at patient's baseline  PSYCH:  oriented X 3, affect and mood normal    Lab/Diagnostic data:  Recent Labs   Lab Test 05/10/25  1323 05/09/25  0743    135   POTASSIUM 4.3 4.3   CHLORIDE 101 97*   CO2 25 25   ANIONGAP 11 13   * 93   BUN 19.5 21.4   CR 0.89 1.01   YOUNG 8.6* 8.8     CBC RESULTS:   Recent Labs   Lab Test 05/10/25  1323   WBC 5.9   RBC 3.08*   HGB 10.1*   HCT 29.7*   MCV 96   MCH 32.8   MCHC 34.0   RDW 14.8          ASSESSMENT / PLAN:  (S00.03XD) Contusion of scalp, subsequent encounter  (primary encounter diagnosis)  (S81.812D) Laceration of left lower extremity, subsequent encounter  (Z91.81) Personal history of fall  (M62.81) Generalized muscle weakness  (R53.81) Physical deconditioning  Comment: PT OT, fall risk,  forehead wound to open air. Drsgs per protocol left shin, monitor.    (G20.A1) Parkinson's disease without dyskinesia, unspecified whether manifestations fluctuate (H)  Comment: cont sinemet, mild tremors in hands--hx balance issues.     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             Total time spent with patient visit at the skilled nursing facility was 55 min including patient visit, review of past records, phone call to patient contact, and left msg on son Logan's cell. Greater than 50% of total time spent with counseling and coordinating care.    Electronically signed by:  DOMENIC Gudino CNP

## 2025-05-12 NOTE — PLAN OF CARE
Goal Outcome Evaluation:      Plan of Care Reviewed With: patient      Zack Santiago is a 89 year old male with PMH of CVA on clopidogrel, post CABG, Afib, chronic bradycardia, chronic anemia, essential HTN and Parkinson's disease who presented to ED from [facility] on 5/9/2025 after a fall.    PRIMARY Concern: Fall, generalized weakness.  SAFETY RISK Concerns (fall risk, behaviors, etc.): Fall Risk      Aggression Tool Color: Green  Isolation/Type: n/a  Tests/Procedures for NEXT shift: n/a  Consults? (Pending/following, signed-off?) PT/SW following.   Where is patient from? (Home, TCU, etc.): [facility]  Other Important info for NEXT shift: pills one at a time with water.   Anticipated DC date & active delays: TBD  _____________________________________________________________________________  SUMMARY NOTE:  Patient very unsteady, shakiness due to parkinson's disease, required two staff to transfer to chair. Multiple, scattered bruises but denies pain.   Also has swelling to to face and eyes. Eating, drinking, and voiding in adequate amounts.   Orientation/Cognitive: A&Ox4  Observation Goals (Met/ Not Met): Not Met  Mobility Level/Assist Equipment: 2A GB+W  Antibiotics & Plan (IV/po, length of tx left): n/a  Pain Management: Denies  Tele/VS/O2: VSS on RA, except bradycardia, asymptomatic.   ABNL Lab/BG: See chart  Diet: Reg  Bowel/Bladder: Cont. B&B-  Skin Concerns: Pressure injury to sacrum/coccyx, laceration to Left forehead, LLE skin tear, bruise to face extending under chin to neck,  L hip bruise. Also has bruising to Left hand  Drains/Devices: PIV SL  Patient Stated Goal for Today: n/a

## 2025-05-12 NOTE — DISCHARGE SUMMARY
Discharge Summary  Hospitalist    Date of Admission:  5/9/2025  Date of Discharge:  5/12/2025  Discharging Provider: Johan Soto MD    Discharge Diagnoses     Mechanical fall  Left frontal scalp edema/hematoma without underlying bone fracture secondary to above.  Generalized weakness  Parkinson disease   Chronic sinus bradycardia  Coronary artery disease s/p CABG  Hyperlipidemia  Essential hypertension  Panic disorder  OCD  Depression  History of CVA  Hypothyroidism  Chronic kidney disease stage III  Chronic normocytic anemia    Hospital Course:    Zack Santiago is a 89 year old male with past medical history significant for history of CVA on clopidogrel, post CABG, atrial fibrillation, chronic bradycardia, chronic anemia, essential hypertension and Parkinson's disease who presented to ED from [facility] on 5/9/2025 after a fall.  Patient was admitted for further evaluation and management of generalized weakness     Mechanical fall  Left frontal scalp edema/hematoma without underlying bone fracture secondary to above.  Generalized weakness  -On the day of admission around 6:30 in the morning patient was going to the bathroom with his walker when he lost his balance and fell to his left side.  He endorsed head injury on the carpeted floor.  He denied any chest pain, palpitations or loss of consciousness.  -Patient was noticed to have a skin tear to left shin.  -Head CT, cervical spine CT and facial bone CT was done in the emergency room.  CT head showed left frontal scalp edema/hematoma without underlying bone fracture was noticed which extends inferiorly to involve the preseptal and infraorbital region.  No other injuries noted.  -Registered to observation  -Regular diet  -Evaluated by PT, they are recommending TCU.    -periorbital swelling is much better     Parkinson disease   -Lives in assisted living facility, ambulates with walker.  -continue prior to  admission carbidopa levodopa     Chronic sinus bradycardia  Coronary artery disease s/p CABG  Hyperlipidemia  Essential hypertension  -Patient baseline heart rate is in 50s.  Previous EKG continues to show sinus bradycardia with right bundle branch block and left anterior fascicular block.  He is asymptomatic from the bradycardia  -Will continue to monitor patient on telemetry.  -Patient has asymptomatic known bradycardia.  -Continue prior to admission Plavix, chlorthalidone and losartan     Panic disorder  OCD  Depression  -Continue PTA fluoxetine, quetiapine and mirtazapine and Namenda     History of CVA  Patient was recently hospitalized from 04/26/25-04/28/25 with 2 small chronic infarctions in the left inferior parietal lobe and bilateral cerebellar hemispheres.  -Continue PTA Plavix  -Outpatient follow-up with neuro IR for 2 mm basilar artery aneurysm as recommended by neurostroke during his last hospitalization.     Hypothyroidism  -Continue PTA levothyroxine     Chronic kidney disease stage III  -Baseline creatinine is 1.1-1.2.  Creatinine is 1.01 on admission     Chronic normocytic anemia:  Hemoglobin is 10.8, seems to be around baseline patient is denying any signs and symptoms of bleeding  -No further anemia workup during this current hospitalization, continue to follow-up with PCP in an outpatient setting.       Johan Soto MD    Significant Results and Procedures   See below    Pending Results     Unresulted Labs Ordered in the Past 30 Days of this Admission       No orders found from 4/9/2025 to 5/10/2025.            Code Status   Full Code       Primary Care Physician   Zurdo Kendrick    Physical Exam   Temp: 98.2  F (36.8  C) Temp src: Axillary BP: (!) 152/62 Pulse: 57   Resp: 18 SpO2: 97 % O2 Device: None (Room air)      Constitutional: AAOX3, NAD, left periorbital swelling is much better, still has significant bruise on the left side of the face and neck area  Respiratory: CTA B/L, Normal  WOB  Cardiovascular: RRR, No murmur  GI: Soft, Non- tender, BS- normoactive  Neuro: CN- grossly intact, Moving all 4 extremities.     Discharge Disposition   Discharged to short-term care facility  Condition at discharge: Stable    Consultations This Hospital Stay   CARE MANAGEMENT / SOCIAL WORK IP CONSULT  PHYSICAL THERAPY ADULT IP CONSULT  CARE MANAGEMENT / SOCIAL WORK IP CONSULT  PHYSICAL THERAPY ADULT IP CONSULT  OCCUPATIONAL THERAPY ADULT IP CONSULT    Time Spent on this Encounter   IJohan MD, personally saw the patient today and spent less than or equal to 30 minutes discharging this patient.    Discharge Orders      General info for SNF    Length of Stay Estimate: Short Term Care: Estimated # of Days <30  Condition at Discharge: Improving  Level of care:skilled   Rehabilitation Potential: Excellent  Admission H&P remains valid and up-to-date: Yes  Recent Chemotherapy: N/A  Use Nursing Home Standing Orders: Yes     Mantoux instructions    Give two-step Mantoux (PPD) Per Facility Policy Yes     Follow Up and recommended labs and tests    Follow up with Nursing home physician.     Reason for your hospital stay    Mechanical fall     Intake and output    Every shift     Daily weights    Call Provider for weight gain of more than 2 pounds per day or 5 pounds per week.     Activity - Up with nursing assistance     Full Code     Physical Therapy Adult Consult    Evaluate and treat as clinically indicated.    Reason:  Deconditioning     Occupational Therapy Adult Consult    Evaluate and treat as clinically indicated.    Reason:  Deconditioning     Fall precautions     Diet    Follow this diet upon discharge: Current Diet:Orders Placed This Encounter      Regular Diet Adult     Discharge Medications   Current Discharge Medication List        CONTINUE these medications which have NOT CHANGED    Details   acetaminophen (TYLENOL) 325 MG tablet Take 650 mg by mouth every 6 hours as needed for mild pain.       carbidopa-levodopa (SINEMET)  MG tablet 1 & 1/2 TABLETS ORALLY 3 TIMES DAILY  Qty: 270 tablet, Refills: 3    Comments: PLEASE SEND OUT REFILLS FOR PATIENTS MEDICATIONS. THANK YOU.  Associated Diagnoses: Tremor      chlorthalidone (HYGROTON) 25 MG tablet Take 25 mg by mouth daily.      clopidogrel (PLAVIX) 75 MG tablet 1 TABLET ORALLY EVERY MORNING  (DX: HEART  )  Qty: 30 tablet, Refills: 11    Comments: PLEASE SEND REFILLS FOR PATIENTS MEDICATIONS. THANK YOU.  Associated Diagnoses: Cerebrovascular accident (CVA), unspecified mechanism (H)      DESITIN DAILY DEFENSE 13 % CREA APPLY TO AFFECTED AREA TOPICALLY 2 TIMES DAILY;APPLY TO AFFECTED AREA TOPICALLY AS NEEDED **OK TO APPLY AND LEAVE UNCOVERED**  Qty: 113 g, Refills: 0    Associated Diagnoses: Rash and nonspecific skin eruption      diclofenac (VOLTAREN) 1 % topical gel Apply thin layer twice daily      econazole nitrate 1 % external cream Apply topically 2 times daily.      FLUoxetine (PROZAC) 40 MG capsule Take 40 mg by mouth daily.      gabapentin (NEURONTIN) 400 MG capsule 1 CAPSULE ORALLY 3 TIMES DAILY  Qty: 90 capsule, Refills: 11    Comments: PLEASE SEND REFILLS.PHARMACY PLEASE PROFILE FOR FUTURE USE-MedicalRecords  Associated Diagnoses: Obsessive-compulsive disorder, unspecified type      hydrocortisone, Perianal, (HYDROCORTISONE) 2.5 % cream Place rectally 2 times daily as needed for hemorrhoids.  Qty: 30 g, Refills: 2    Associated Diagnoses: External hemorrhoids      hydrOXYzine HCl (ATARAX) 10 MG tablet Take 10 mg by mouth 2 times daily as needed for itching.      !! lactulose (CHRONULAC) 10 GM/15ML solution Take 20 g by mouth daily as needed for constipation.      !! lactulose (CHRONULAC) 10 GM/15ML solution DRINK 30ML  ORALLY DAILY (DX: CONSTIPATION);DRINK 30ML  ORALLY DAILY AS NEEDED (DX: CONSTIPATION)  Qty: 946 mL, Refills: PRN    Associated Diagnoses: Altered bowel function      levothyroxine (SYNTHROID/LEVOTHROID) 88 MCG tablet 1 TABLET  ORALLY EVERY MORNING ON AN EMPTY STOMACH  Qty: 90 tablet, Refills: 0    Comments: Please advise pt to contact their clinic, as they are due for laboratory/imaging services with imaging/lab before further refills can be approved for this/these medications.  Associated Diagnoses: Hypothyroidism, unspecified type      losartan (COZAAR) 50 MG tablet Take 1 tablet (50 mg) by mouth daily.  Qty: 90 tablet, Refills: 3    Associated Diagnoses: Benign essential hypertension      memantine (NAMENDA) 10 MG tablet Take 10 mg by mouth daily.      mirtazapine (REMERON) 45 MG tablet 1 TABLET ORALLY AT BEDTIME (DX: DEPRESSION)  Qty: 28 tablet, Refills: 11    Comments: Please send refills for cycle fill. Thank you!  Associated Diagnoses: Obsessive-compulsive disorder, unspecified type      OPTIVE 0.5-0.9 % ophthalmic solution INSTILL 1 DROP INTO BOTH EYES 2 TIMES DAILY  (DX: DRY EYES)  Qty: 15 mL, Refills: 11    Comments: FACILITY IS REQUESTING MED - PLEASE SEND REFILLS. THANKS  Associated Diagnoses: Dry eyes      QUEtiapine (SEROQUEL) 25 MG tablet 1 TAB ORALLY EVERY EVENING  Qty: 30 tablet, Refills: 11    Comments: *URGENT REQUEST* Please send refills for cycle fill. Thank you!  Associated Diagnoses: Obsessive-compulsive disorder, unspecified type      senna-docusate (SENEXON-S) 8.6-50 MG tablet 1 TABLET ORALLY DAILY AS NEEDED (DX: CONSTIPATION)  Qty: 30 tablet, Refills: 10    Comments: PATIENT RESIDES AT AN ASSISTED LIVING, FACILITY REQUESTING FOR REFILLS. THANK YOU.  Associated Diagnoses: Altered bowel function      simvastatin (ZOCOR) 20 MG tablet 1 TABLET ORALLY AT BEDTIME   (DX: CHOLESTEROL)  Qty: 30 tablet, Refills: 2    Comments: PLEASE SEND REFILLS FOR PATIENTS MEDICATIONS. THANK YOU.  Associated Diagnoses: Hyperlipidemia LDL goal <100      sodium chloride (DEEP SEA NASAL SPRAY) 0.65 % nasal spray INSTILL SPRAY(S) INTO NOSTRIL(S) 3 TIMES DAILY  Qty: 44 mL, Refills: 11    Associated Diagnoses: Nasal congestion      SOLUBLE  FIBER THERAPY powder TAKE 1 TEASPOONFUL ORALLY DAILY (DX: CONSTIPATION)  Qty: 454 g, Refills: 11    Comments: PLEASE SEND REFILLS.PHARMACY PLEASE PROFILE FOR FUTURE USE-MedicalRecords  Associated Diagnoses: Altered bowel function       !! - Potential duplicate medications found. Please discuss with provider.        Allergies   Allergies   Allergen Reactions    No Known Allergies      Data   Most Recent 3 CBC's:  Recent Labs   Lab Test 05/10/25  1323 05/09/25  0743 05/02/25  1500   WBC 5.9 6.9 6.6   HGB 10.1* 10.8* 11.0*   MCV 96 96 98    179 176      Most Recent 3 BMP's:  Recent Labs   Lab Test 05/10/25  1323 05/09/25  0743 05/02/25  1500    135 133*   POTASSIUM 4.3 4.3 4.1   CHLORIDE 101 97* 98   CO2 25 25 24   BUN 19.5 21.4 21.4   CR 0.89 1.01 0.94   ANIONGAP 11 13 11   YOUNG 8.6* 8.8 8.8   * 93 105*     Most Recent 2 LFT's:  Recent Labs   Lab Test 05/10/25  1323 05/09/25  0743   AST 40 13   ALT 7 15   ALKPHOS 101 110   BILITOTAL 0.6 0.5     Most Recent INR's and Anticoagulation Dosing History:  Anticoagulation Dose History  More data exists         Latest Ref Rng & Units 1/11/2010 1/12/2010 1/13/2010 1/14/2010 9/22/2011 12/22/2016 4/26/2025   Recent Dosing and Labs   INR 0.85 - 1.15 1.43  1.33  1.50  2.42  1.07  1.09  1.27      Most Recent 3 Troponin's:  Recent Labs   Lab Test 04/08/21  0220 04/08/21  0013   TROPI <0.015 <0.015     Most Recent Cholesterol Panel:  Recent Labs   Lab Test 04/27/25  0256   CHOL 103   LDL 35   HDL 50   TRIG 91     Most Recent 6 Bacteria Isolates From Any Culture (See EPIC Reports for Culture Details):  Recent Labs   Lab Test 02/14/20  1418   CULT 50,000 to 100,000 colonies/mL  mixed urogenital harsha  Susceptibility testing not routinely done       Most Recent TSH, T4 and A1c Labs:  Recent Labs   Lab Test 04/27/25  0256   TSH 4.32*   T4 1.21       Results for orders placed or performed during the hospital encounter of 05/09/25   Head CT w/o contrast    Narrative     EXAM: CT HEAD W/O CONTRAST, CT FACIAL BONES WITHOUT CONTRAST, CT CERVICAL SPINE W/O CONTRAST  LOCATION: [facility]  DATE: 5/9/2025    INDICATION: fall  COMPARISON: None.  TECHNIQUE:   1) Routine CT Head without IV contrast. Multiplanar reformats. Dose reduction techniques were used.  2) Routine CT Facial Bones without IV contrast. Multiplanar reformats. Dose reduction techniques were used.  3) Routine CT Cervical Spine without IV contrast. Multiplanar reformats. Dose reduction techniques were used.    FINDINGS:  HEAD CT:   INTRACRANIAL CONTENTS: No intracranial hemorrhage, extraaxial collection, or mass effect.  No CT evidence of acute infarct. Mild presumed chronic small vessel ischemic changes. Mild generalized cerebral volume loss. Left parietal   encephalomalacia/gliosis. The ventricles and sulci are normal for age.     OSSEOUS STRUCTURES/SOFT TISSUES: Left frontal scalp edema/hematoma without underlying bone fracture. This extends inferiorly to involve the preseptal and infraorbital region.     FACIAL BONE CT:  OSSEOUS STRUCTURES/SOFT TISSUES: No localized soft tissue swelling/inflammation. No facial bone fracture or malalignment. No evidence for dental trauma or periapical abscess.    ORBITAL CONTENTS: No acute abnormality.    SINUSES: Mild mucosal thickening scattered about the paranasal sinuses.    CERVICAL SPINE CT:   VERTEBRA: Normal vertebral body heights and alignment. No fracture or posttraumatic subluxation.     CANAL/FORAMINA: Multilevel degenerative changes described endplate osteophytosis and facet hypertrophy. No high-grade spinal canal stenosis. Multilevel varying degrees of neural foraminal stenosis.    PARASPINAL: No extraspinal abnormality. Visualized lung fields are clear.      Impression    IMPRESSION:  HEAD CT:  1.  No acute intracranial process.  2.  Left frontal scalp edema/hematoma without underlying bone fracture.This extends inferiorly to involve the preseptal  and infraorbital region.     FACIAL BONE CT:  No facial bone or mandibular fracture.    CERVICAL SPINE CT:  1.  No fracture or posttraumatic subluxation.  2.  No high-grade spinal canal stenosis.     CT Facial Bones without Contrast    Narrative    EXAM: CT HEAD W/O CONTRAST, CT FACIAL BONES WITHOUT CONTRAST, CT CERVICAL SPINE W/O CONTRAST  LOCATION: [facility]  DATE: 5/9/2025    INDICATION: fall  COMPARISON: None.  TECHNIQUE:   1) Routine CT Head without IV contrast. Multiplanar reformats. Dose reduction techniques were used.  2) Routine CT Facial Bones without IV contrast. Multiplanar reformats. Dose reduction techniques were used.  3) Routine CT Cervical Spine without IV contrast. Multiplanar reformats. Dose reduction techniques were used.    FINDINGS:  HEAD CT:   INTRACRANIAL CONTENTS: No intracranial hemorrhage, extraaxial collection, or mass effect.  No CT evidence of acute infarct. Mild presumed chronic small vessel ischemic changes. Mild generalized cerebral volume loss. Left parietal   encephalomalacia/gliosis. The ventricles and sulci are normal for age.     OSSEOUS STRUCTURES/SOFT TISSUES: Left frontal scalp edema/hematoma without underlying bone fracture. This extends inferiorly to involve the preseptal and infraorbital region.     FACIAL BONE CT:  OSSEOUS STRUCTURES/SOFT TISSUES: No localized soft tissue swelling/inflammation. No facial bone fracture or malalignment. No evidence for dental trauma or periapical abscess.    ORBITAL CONTENTS: No acute abnormality.    SINUSES: Mild mucosal thickening scattered about the paranasal sinuses.    CERVICAL SPINE CT:   VERTEBRA: Normal vertebral body heights and alignment. No fracture or posttraumatic subluxation.     CANAL/FORAMINA: Multilevel degenerative changes described endplate osteophytosis and facet hypertrophy. No high-grade spinal canal stenosis. Multilevel varying degrees of neural foraminal stenosis.    PARASPINAL: No  extraspinal abnormality. Visualized lung fields are clear.      Impression    IMPRESSION:  HEAD CT:  1.  No acute intracranial process.  2.  Left frontal scalp edema/hematoma without underlying bone fracture.This extends inferiorly to involve the preseptal and infraorbital region.     FACIAL BONE CT:  No facial bone or mandibular fracture.    CERVICAL SPINE CT:  1.  No fracture or posttraumatic subluxation.  2.  No high-grade spinal canal stenosis.     CT Cervical Spine w/o Contrast    Narrative    EXAM: CT HEAD W/O CONTRAST, CT FACIAL BONES WITHOUT CONTRAST, CT CERVICAL SPINE W/O CONTRAST  LOCATION: [facility]  DATE: 5/9/2025    INDICATION: fall  COMPARISON: None.  TECHNIQUE:   1) Routine CT Head without IV contrast. Multiplanar reformats. Dose reduction techniques were used.  2) Routine CT Facial Bones without IV contrast. Multiplanar reformats. Dose reduction techniques were used.  3) Routine CT Cervical Spine without IV contrast. Multiplanar reformats. Dose reduction techniques were used.    FINDINGS:  HEAD CT:   INTRACRANIAL CONTENTS: No intracranial hemorrhage, extraaxial collection, or mass effect.  No CT evidence of acute infarct. Mild presumed chronic small vessel ischemic changes. Mild generalized cerebral volume loss. Left parietal   encephalomalacia/gliosis. The ventricles and sulci are normal for age.     OSSEOUS STRUCTURES/SOFT TISSUES: Left frontal scalp edema/hematoma without underlying bone fracture. This extends inferiorly to involve the preseptal and infraorbital region.     FACIAL BONE CT:  OSSEOUS STRUCTURES/SOFT TISSUES: No localized soft tissue swelling/inflammation. No facial bone fracture or malalignment. No evidence for dental trauma or periapical abscess.    ORBITAL CONTENTS: No acute abnormality.    SINUSES: Mild mucosal thickening scattered about the paranasal sinuses.    CERVICAL SPINE CT:   VERTEBRA: Normal vertebral body heights and alignment. No fracture or  posttraumatic subluxation.     CANAL/FORAMINA: Multilevel degenerative changes described endplate osteophytosis and facet hypertrophy. No high-grade spinal canal stenosis. Multilevel varying degrees of neural foraminal stenosis.    PARASPINAL: No extraspinal abnormality. Visualized lung fields are clear.      Impression    IMPRESSION:  HEAD CT:  1.  No acute intracranial process.  2.  Left frontal scalp edema/hematoma without underlying bone fracture.This extends inferiorly to involve the preseptal and infraorbital region.     FACIAL BONE CT:  No facial bone or mandibular fracture.    CERVICAL SPINE CT:  1.  No fracture or posttraumatic subluxation.  2.  No high-grade spinal canal stenosis.       *Note: Due to a large number of results and/or encounters for the requested time period, some results have not been displayed. A complete set of results can be found in Results Review.

## 2025-05-12 NOTE — PROGRESS NOTES
Care Management Discharge Note    Discharge Date: 05/12/2025       Discharge Disposition: TCU    Discharge Services:  Transport    Discharge DME:      Discharge Transportation: [facility] wheelchair [id number]    Private pay costs discussed: transportation costs    Does the patient's insurance plan have a 3 day qualifying hospital stay waiver?  Yes     Which insurance plan 3 day waiver is available? Alternative insurance waiver    Will the waiver be used for post-acute placement? Yes    PAS Confirmation Code: [id number]  Patient/family educated on Medicare website which has current facility and service quality ratings:      Education Provided on the Discharge Plan: Yes  Persons Notified of Discharge Plans: Hospitalist, HUC, Charge RN, bedside RN, pt   Patient/Family in Agreement with the Plan: yes    Handoff Referral Completed: No, handoff not indicated or clinically appropriate    Additional Information:  Patient has been accepted to [facility] TCU for today. [facility] wheelchair transport arranged for [id number], per previous chart notes, pt agreeable to cost and requested wheelchair ride. PAS completed. Discharge orders faxed to TCU. Updated patient, who is in agreement with plans.    PAS-RR    D: Per DHS regulation, SW completed and submitted PAS-RR to [place] [employer] Direct Connect via the Senior LinkAge Line.  PAS-RR confirmation # is : [id number]    P: Further questions may be directed to Senior LinkAge Line at #[phone], option #4 for PAS-RR staff.      EDMUNDO Gregg  Social Work  [facility]

## 2025-05-13 ENCOUNTER — TRANSITIONAL CARE UNIT VISIT (OUTPATIENT)
Dept: GERIATRICS | Facility: CLINIC | Age: OVER 89
End: 2025-05-13
Payer: COMMERCIAL

## 2025-05-13 ENCOUNTER — TELEPHONE (OUTPATIENT)
Dept: FAMILY MEDICINE | Facility: CLINIC | Age: OVER 89
End: 2025-05-13

## 2025-05-13 ENCOUNTER — PATIENT OUTREACH (OUTPATIENT)
Dept: CARE COORDINATION | Facility: CLINIC | Age: OVER 89
End: 2025-05-13

## 2025-05-13 VITALS
OXYGEN SATURATION: 97 % | DIASTOLIC BLOOD PRESSURE: 58 MMHG | RESPIRATION RATE: 18 BRPM | SYSTOLIC BLOOD PRESSURE: 168 MMHG | WEIGHT: 194.6 LBS | BODY MASS INDEX: 25.79 KG/M2 | HEART RATE: 53 BPM | TEMPERATURE: 97.8 F | HEIGHT: 73 IN

## 2025-05-13 DIAGNOSIS — Z86.73 HISTORY OF CVA (CEREBROVASCULAR ACCIDENT): ICD-10-CM

## 2025-05-13 DIAGNOSIS — G20.A1 PARKINSON'S DISEASE WITHOUT DYSKINESIA, UNSPECIFIED WHETHER MANIFESTATIONS FLUCTUATE (H): ICD-10-CM

## 2025-05-13 DIAGNOSIS — Z91.81 PERSONAL HISTORY OF FALL: ICD-10-CM

## 2025-05-13 DIAGNOSIS — I25.10 ATHEROSCLEROSIS OF NATIVE CORONARY ARTERY OF NATIVE HEART WITHOUT ANGINA PECTORIS: ICD-10-CM

## 2025-05-13 DIAGNOSIS — M62.81 GENERALIZED MUSCLE WEAKNESS: ICD-10-CM

## 2025-05-13 DIAGNOSIS — E03.9 HYPOTHYROIDISM, UNSPECIFIED TYPE: ICD-10-CM

## 2025-05-13 DIAGNOSIS — N18.31 HYPERTENSIVE HEART AND KIDNEY DISEASE WITHOUT HEART FAILURE AND WITH STAGE 3A CHRONIC KIDNEY DISEASE (H): ICD-10-CM

## 2025-05-13 DIAGNOSIS — E78.5 HYPERLIPIDEMIA, UNSPECIFIED HYPERLIPIDEMIA TYPE: ICD-10-CM

## 2025-05-13 DIAGNOSIS — R41.3 MEMORY LOSS: ICD-10-CM

## 2025-05-13 DIAGNOSIS — I13.10 HYPERTENSIVE HEART AND KIDNEY DISEASE WITHOUT HEART FAILURE AND WITH STAGE 3A CHRONIC KIDNEY DISEASE (H): ICD-10-CM

## 2025-05-13 DIAGNOSIS — S00.03XD CONTUSION OF SCALP, SUBSEQUENT ENCOUNTER: Primary | ICD-10-CM

## 2025-05-13 DIAGNOSIS — Z95.1 PRESENCE OF AORTOCORONARY BYPASS GRAFT: ICD-10-CM

## 2025-05-13 DIAGNOSIS — F42.9 OBSESSIVE-COMPULSIVE DISORDER, UNSPECIFIED TYPE: Chronic | ICD-10-CM

## 2025-05-13 DIAGNOSIS — S81.812D LACERATION OF LEFT LOWER EXTREMITY, SUBSEQUENT ENCOUNTER: ICD-10-CM

## 2025-05-13 DIAGNOSIS — F41.0 PANIC DISORDER (EPISODIC PAROXYSMAL ANXIETY): ICD-10-CM

## 2025-05-13 DIAGNOSIS — R53.81 PHYSICAL DECONDITIONING: ICD-10-CM

## 2025-05-13 PROCEDURE — 99310 SBSQ NF CARE HIGH MDM 45: CPT | Performed by: NURSE PRACTITIONER

## 2025-05-13 RX ORDER — ECONAZOLE NITRATE 10 MG/G
CREAM TOPICAL 2 TIMES DAILY
COMMUNITY
Start: 2025-05-13

## 2025-05-13 NOTE — LETTER
[facility]   To:     Please give to facility    From:   Mira Green MA  Care Transitions Assistant    Ambulatory Care Management team  [facility]   Phone: [phone]  Email: [email or web address]  Patient Name:  Zack Santiago YOB: 1931   Admit date: 5/12/2025      *Information Needed:  Please contact me when the patient will discharge (or if they will move to long term care)- include the discharge date, disposition, and main diagnosis   If the patient is discharged with home care services, please provide the name of the agency.       Thank you!           Electronically signed

## 2025-05-13 NOTE — PROGRESS NOTES
[facility]  TCU Monitoring    Clinical Data: Patient was identified from [facility] payor report and has transitioned to TCU/ARU for short term rehabilitation:    Facility Name: [facility]   Transition Communication:  Notified facility of Ambulatory Care Management TCU monitoring process via Epic fax.    Assessment/Plan: Patient will be monitored by Ambulatory Care Management to identify patient's discharge plans/need. Care  will review chart and outreach to facility staff every 3-4 weeks and as needed.     Rosemary Green  Care Transitions Assistant  [facility]

## 2025-05-13 NOTE — TELEPHONE ENCOUNTER
"Faxed \"signed\" resident fall nursing note to [facility] @ [phone] // confirmation received  "

## 2025-05-15 ENCOUNTER — TRANSITIONAL CARE UNIT VISIT (OUTPATIENT)
Dept: GERIATRICS | Facility: CLINIC | Age: OVER 89
End: 2025-05-15
Payer: COMMERCIAL

## 2025-05-15 ENCOUNTER — LAB REQUISITION (OUTPATIENT)
Dept: LAB | Facility: CLINIC | Age: OVER 89
End: 2025-05-15
Payer: COMMERCIAL

## 2025-05-15 VITALS
DIASTOLIC BLOOD PRESSURE: 77 MMHG | OXYGEN SATURATION: 97 % | TEMPERATURE: 97.8 F | RESPIRATION RATE: 18 BRPM | WEIGHT: 185.9 LBS | BODY MASS INDEX: 24.64 KG/M2 | SYSTOLIC BLOOD PRESSURE: 178 MMHG | HEIGHT: 73 IN | HEART RATE: 55 BPM

## 2025-05-15 DIAGNOSIS — F41.0 PANIC DISORDER (EPISODIC PAROXYSMAL ANXIETY): ICD-10-CM

## 2025-05-15 DIAGNOSIS — S00.03XD CONTUSION OF SCALP, SUBSEQUENT ENCOUNTER: Primary | ICD-10-CM

## 2025-05-15 DIAGNOSIS — F42.9 OBSESSIVE-COMPULSIVE DISORDER, UNSPECIFIED TYPE: ICD-10-CM

## 2025-05-15 DIAGNOSIS — I67.9 CEREBRAL VASCULAR DISEASE: ICD-10-CM

## 2025-05-15 DIAGNOSIS — E03.9 HYPOTHYROIDISM, UNSPECIFIED TYPE: ICD-10-CM

## 2025-05-15 DIAGNOSIS — Z91.81 PERSONAL HISTORY OF FALL: ICD-10-CM

## 2025-05-15 DIAGNOSIS — E87.1 HYPO-OSMOLALITY AND HYPONATREMIA: ICD-10-CM

## 2025-05-15 DIAGNOSIS — R26.81 GAIT INSTABILITY: ICD-10-CM

## 2025-05-15 DIAGNOSIS — R41.89 COGNITIVE IMPAIRMENT: ICD-10-CM

## 2025-05-15 DIAGNOSIS — G20.A1 PARKINSON'S DISEASE WITHOUT DYSKINESIA, UNSPECIFIED WHETHER MANIFESTATIONS FLUCTUATE (H): ICD-10-CM

## 2025-05-15 DIAGNOSIS — M62.81 GENERALIZED MUSCLE WEAKNESS: ICD-10-CM

## 2025-05-15 NOTE — LETTER
5/15/2025      Zack Santiago  [address]   [address]  [facility] [address]        Zack Santiago is a 89 year old male seen May 15, 2025 at [facility] where he was admitted after [facility] hospitalization 5/9-12 following a fall in which he suffered a left frontal scalp hematoma and left shin skin tear.    No fracture or other significant injury on imaging   Known to have bradycardia, h/o cerebral vascular disease by imaging, and CAD s/p CABG in 2010     All stable and pt discharged to TCU for ongoing recovery and rehab.   Pt is seen in his room up to chair.  Denies pain in his face or head.   Continues to feel weak and off balance, does not think he can return to his AL apartment, but is working with therapies.    Eating/ sleeping okay   A bit anxious.        By chart review, pt has been followed by [facility] for Parkinsonism with gait instability and RUE tremor, last seen in August 2023.   Aripiprazole discontinued    Has been on and off Sinemet at varying doses     Pt had a January 2023 hospitalization for acute hypoxic respiratory failure secondary to CAP vs bronchitis.   Pt has HTN, CAD, atrial fib, AAA s/p repair in 2015 with persistent type II endoleak.   He presented with cough, dyspnea and generalized weakness   T 101.9 and needing 3-4 L/min oxygen supplementation.   Viral panel all negative.   Chest CT unremarkable.   He was treated with ceftriaxone and azithromycin, and improved.   Discharged to TCU before return to his AL   Pt had an April 2025 [facility] hospitalization for right hand weakness and numbness thought to be a radial nerve palsy, and symptoms resolved.   Found to have a Na 125 and his chlorthalidone was stopped, but re-instated after risk/benefit discussion with his son.  Na normalized on follow up with Dr Paige 5/2      Past Medical History:   Diagnosis Date     A-fib (H) 2010    post op     AAA (abdominal aortic aneurysm) without  rupture     Dr. 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    carotid us no stenosis, echo no change and no clots; ziopatch no afib, svt present     Anemia 2004     Aortic insufficiency 06/2014    1+ on echo     Aortic insufficiency 11/2016    mild to mod     Ascending aorta dilatation 01/2023    5cm on ct, fu done 2/24 and stable     ASCVD (arteriosclerotic cardiovascular disease) 2010    cabg, post op afib     BPH (benign prostatic hyperplasia) 2003    turp, flomax added by urol 2/17     Bradycardia 2016    stopped toprol     CKD (chronic kidney disease) stage 3, GFR 30-59 ml/min (H)      Constipation 05/2020    colonoscopy nl     Cough 2010    pulm eval, felt due to reflux     Dizzy 2001    mri small vessel dz     GERD (gastroesophageal reflux disease)      HTN (hypertension) 2002     Hx of colonoscopy 2003    tics     Hyponatremia 04/2025    felt due to chlorthalidone     Hypothyroid      Hypovitaminosis D      Idiopathic neuropathy      Lung nodule 02/2018    6mm, found during eval of ascending aorta, fu 8/18 no change     OCD (obsessive compulsive disorder)     sees psyche     Panic disorder     Dr. Luna, then someone after he retired     Parkinson's disease (H) 03/2023    per neuro     Spinal headache      Stroke (H) 12/2016    expressive aphasia, hosp, seen by neuro, mri pos, carotids min dz, changed from asa to plavix     Sudden hearing loss 06/2013    Dr. Garcia, mri brain no acoustic neuroma     SVT (supraventricular tachycardia) 11/2016    seen on ziopatch done for amaurosis, longest 15 beats     Thoracic ascending aortic aneurysm 06/2014    4.4cm on echo, aortic root 3.9, fu 5/15 4.8cm; fu done 12/15 and slightly enlarged, fu 6/16 no change, fu 11/16 no change; fu 1/18 echo 5.1-5.3, enlarged, then cta done and only 4.8cm, t fu 6 months, lvh, nl ef, mild ai; fu 8/18 slightly larger; fu 2/19 slightly smaller; fu 2/20 and then 1/21 and stable     Ulcerative colitis (H)      not active for years       Past Surgical History:   Procedure Laterality Date     BACK SURGERY       BLADDER SURGERY       CATARACT IOL, RT/LT       COLONOSCOPY N/A 2020    Procedure: COLONOSCOPY;  Surgeon: Kenya Loco MD;  Location:  GI     CORONARY ARTERY BYPASS       ENDOVASCULAR REPAIR ANEURYSM ABDOMINAL AORTA N/A 2015    Procedure: ENDOVASCULAR REPAIR ANEURYSM ABDOMINAL AORTA;  Surgeon: Darin Walters MD;  Location:  OR     HERNIA REPAIR       HERNIA REPAIR       HERNIORRHAPHY INGUINAL Left 2018    Procedure: HERNIORRHAPHY INGUINAL;  Incarcerated Left Inguinal Hernia;  Surgeon: Harsh Walker MD;  Location:  OR     KNEE SURGERY       REPAIR HAMMER TOE  2012    Procedure: REPAIR HAMMER TOE;  LEFT SECOND AND THIRD CLAW TOE RECONSTRUCTION;  Surgeon: Gray Haynes MD;  Location:  OR     REPAIR HAMMER TOE  2018    tria     toe amputation right foot  2021     TURP       ZZC TOTAL KNEE ARTHROPLASTY      left     ZZC TOTAL KNEE ARTHROPLASTY  2009    right     Family History   Problem Relation Age of Onset     Heart Disease Father      Glasses (<9 y/o) Daughter      Retinal detachment Daughter 50 - 59     Diverticulitis Son      Hip dysplasia Granddaughter      Glasses (<9 y/o) Granddaughter      Kyphosis Granddaughter      Attention Deficit Disorder Granddaughter      Mental Illness Granddaughter      Glasses (<9 y/o) Grandson      Lymphoma Sister      Glaucoma No family hx of      Macular Degeneration No family hx of      Consanguinity No family hx of        Social History     Tobacco Use     Smoking status: Former     Current packs/day: 0.00     Average packs/day: 1 pack/day for 5.0 years (5.0 ttl pk-yrs)     Types: Cigarettes     Start date: 1960     Quit date: 1965     Years since quittin.9     Passive exposure: Current     Smokeless tobacco: Never   Substance Use Topics     Alcohol use: Not  "Currently     Comment: none      SH: , lives at [facility], was in [facility] before that.  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     ROS:   Review Of Systems  Skin: purpura, easy bruisability   Eyes: impaired vision, glasses     Ears/Nose/Throat: hearing loss  Respiratory: as above     Cardiovascular: negative  Gastrointestinal: ventral hernia, seen by Surgery Dr Story  Irregular bowel habits, on lactulose bid  Genitourinary: nocturia x1   Musculoskeletal: mod-max assist for dressing, ambulates 90' with 4WW and min-mod assist     Neurologic: poor balance, some cognitive deficits, gait instability    Failed trial of Sinemet     SLUMS 14/30 in 2023     EMS 8/20     Psychiatric: OCD, panic disorder, depression      Hematologic/Lymphatic/Immunologic: NCNC anemia   Endocrine: hypothyroidism      GENERAL APPEARANCE: frail, alert and no distress  BP (!) 178/77   Pulse 55   Temp 97.8  F (36.6  C)   Resp 18   Ht 1.854 m (6' 1\")   Wt 84.3 kg (185 lb 14.4 oz)   SpO2 97%   BMI 24.53 kg/m     HEENT: bruising of forehead, both eyes and cheeks, and down into his neck      NECK: no adenopathy, no asymmetry, masses, or scars and thyroid normal to palpation, +kyphosis  RESP: lungs with decreased BS, scattered crackles  CV: regular rate and rhythm, normal S1 S2  ABDOMEN:  soft, nontender, no HSM or masses and bowel sounds normal  MS: extremities with 1+ edema, wearing compression stockings  NEURO: normal speech, bradykinesia and needs assist setting up his lunch tray    PSYCH: affect okay, a little guarded    LYMPHATICS: No cervical,  or supraclavicular nodes    Lab Results   Component Value Date     05/10/2025    POTASSIUM 4.3 05/10/2025    CHLORIDE 101 05/10/2025    CO2 25 05/10/2025    ANIONGAP 11 05/10/2025     (H) 05/10/2025    BUN 19.5 05/10/2025    CR 0.89 " 05/10/2025    GFRESTIMATED 80 05/10/2025    YOUNG 8.6 (L) 05/10/2025     Lab Results   Component Value Date    AST 40 05/10/2025      ALBUMIN 3.5 05/10/2025      ALKPHOS 101 05/10/2025     Lab Results   Component Value Date    WBC 5.9 05/10/2025      HGB 10.1 05/10/2025      MCV 96 05/10/2025       05/10/2025       CT HEAD W/O CONTRAST, CT FACIAL BONES WITHOUT CONTRAST, CT CERVICAL SPINE W/O CONTRAST 5/9/2025  HEAD CT:   INTRACRANIAL CONTENTS: No intracranial hemorrhage, extraaxial collection, or mass effect.  No CT evidence of acute infarct. Mild presumed chronic small vessel ischemic changes. Mild generalized cerebral volume loss. Left parietal encephalomalacia/gliosis. The ventricles and sulci are normal for age.   OSSEOUS STRUCTURES/SOFT TISSUES: Left frontal scalp edema/hematoma without underlying bone fracture. This extends inferiorly to involve the preseptal and infraorbital region.   FACIAL BONE CT:  OSSEOUS STRUCTURES/SOFT TISSUES: No localized soft tissue swelling/inflammation. No facial bone fracture or malalignment. No evidence for dental trauma or periapical abscess.  CERVICAL SPINE CT:   VERTEBRA: Normal vertebral body heights and alignment. No fracture or posttraumatic subluxation.   CANAL/FORAMINA: Multilevel degenerative changes described endplate osteophytosis and facet hypertrophy. No high-grade spinal canal stenosis. Multilevel varying degrees of neural foraminal stenosis.  PARASPINAL: No extraspinal abnormality. Visualized lung fields are clear.  IMPRESSION:  HEAD CT:  1.  No acute intracranial process.  2.  Left frontal scalp edema/hematoma without underlying bone fracture.This extends inferiorly to involve the preseptal and infraorbital region.   FACIAL BONE CT:  No facial bone or mandibular fracture.  CERVICAL SPINE CT:  1.  No fracture or posttraumatic subluxation.  2.  No high-grade spinal canal stenosis.    MR BRAIN WITHOUT AND WITH CONTRAST   04/27/2025  INTRACRANIAL CONTENTS: No  acute or subacute infarct. Small chronic infarction left inferior parietal lobe. Small chronic infarctions within the cerebellar hemispheres. No mass, acute hemorrhage, or extra-axial fluid collections. Scattered nonspecific T2/FLAIR hyperintensities within the cerebral white matter most consistent with mild chronic microvascular ischemic change. Moderate generalized cerebral atrophy. No hydrocephalus. Normal position of the cerebellar tonsils. No pathologic contrast enhancement.                                                           IMPRESSION:  1.  No acute intracranial process.  2.  Small chronic infarctions left inferior parietal lobe and bilateral cerebellar hemispheres. Moderate generalized volume loss and mild chronic microvascular ischemic change.      IMP/PLAN:   (S00.03XD) Contusion of scalp, subsequent encounter   (Z91.81) Personal history of fall  (M62.81) Generalized muscle weakness  Comment: facial bruising but no current pain     Plan: acetaminophen PRN   PHYSICAL THERAPY /OCCUPATIONAL THERAPY for strengthening, balance, transfers, gait and ADLs.   Pt's discharge goal is return to his AL apartment with services         (I67.9) Cerebral vascular disease  (R41.89) Cognitive impairment  Comment: chronic infarctions on imaging, low cog scores     Plan: formal cog testing per Occupational Therapy to help determine safe discharge plan     Remains on memantine 10 mg/day      (G20.A1) Parkinson's disease without dyskinesia, unspecified whether manifestations fluctuate (H)  (R26.81) Gait instability  (R53.81) Physical deconditioning  Comment:   Plan:  Sinemet 25/100 one and a half tabs tid    Follow up with Neurology as scheduled     (I71.21) Aneurysm of ascending aorta without rupture  (I25.10) Atherosclerosis of native coronary artery of native heart without angina pectoris  Comment: h/o CABG and EVAR  Plan: simvastatin 20 mg/day and clopidogrel 75 mg/day for secondary prevention     (I13.10,  N18.31)  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 (H)  Comment:   BP Readings from Last 3 Encounters:   05/15/25 (!) 178/77   05/13/25 (!) 168/58   05/12/25 (!) 152/62      Plan:   losartan 50 mg/day  Follow bps and BMP    (E03.9) Hypothyroidism, unspecified type  Comment:   TSH   Date Value Ref Range Status   04/27/2025 4.32 (H) 0.30 - 4.20 uIU/mL Final   03/18/2022 0.44 0.40 - 4.00 mU/L Final   07/21/2020 0.46 0.40 - 4.00 mU/L Final      Plan: levothyroxine 88 mcg/day       (F42.9) Obsessive-compulsive disorder, unspecified type  (F41.0) Panic disorder and OCD    Comment: follows with [facility]   Was hospitalized at [facility] in 1996, on long term tx      Plan: mirtazapine 45 m/day, gabapentin 400 mg tid, quetiapine 25 mg/ HS and fluoxetine 40 mg/day     Sammi Dumas MD       Sincerely,        Sammi Dumas MD    Electronically signed

## 2025-05-15 NOTE — PROGRESS NOTES
Zack Santiago is a 89 year old male seen May 15, 2025 at [facility] where he was admitted after [facility] hospitalization 5/9-12 following a fall in which he suffered a left frontal scalp hematoma and left shin skin tear.    No fracture or other significant injury on imaging   Known to have bradycardia, h/o cerebral vascular disease by imaging, and CAD s/p CABG in 2010     All stable and pt discharged to TCU for ongoing recovery and rehab.   Pt is seen in his room up to chair.  Denies pain in his face or head.   Continues to feel weak and off balance, does not think he can return to his AL apartment, but is working with therapies.    Eating/ sleeping okay   A bit anxious.        By chart review, pt has been followed by [facility] for Parkinsonism with gait instability and RUE tremor, last seen in August 2023.   Aripiprazole discontinued    Has been on and off Sinemet at varying doses     Pt had a January 2023 hospitalization for acute hypoxic respiratory failure secondary to CAP vs bronchitis.   Pt has HTN, CAD, atrial fib, AAA s/p repair in 2015 with persistent type II endoleak.   He presented with cough, dyspnea and generalized weakness   T 101.9 and needing 3-4 L/min oxygen supplementation.   Viral panel all negative.   Chest CT unremarkable.   He was treated with ceftriaxone and azithromycin, and improved.   Discharged to TCU before return to his AL   Pt had an April 2025 [facility] hospitalization for right hand weakness and numbness thought to be a radial nerve palsy, and symptoms resolved.   Found to have a Na 125 and his chlorthalidone was stopped, but re-instated after risk/benefit discussion with his son.  Na normalized on follow up with Dr Paige 5/2      Past Medical History:   Diagnosis Date    A-fib (H) 2010    post op    AAA (abdominal aortic aneurysm) without rupture     Dr. 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     carotid us no stenosis, echo no change and no clots; ziopatch no afib, svt present    Anemia 2004    Aortic insufficiency 06/2014    1+ on echo    Aortic insufficiency 11/2016    mild to mod    Ascending aorta dilatation 01/2023    5cm on ct, fu done 2/24 and stable    ASCVD (arteriosclerotic cardiovascular disease) 2010    cabg, post op afib    BPH (benign prostatic hyperplasia) 2003    turp, flomax added by urol 2/17    Bradycardia 2016    stopped toprol    CKD (chronic kidney disease) stage 3, GFR 30-59 ml/min (H)     Constipation 05/2020    colonoscopy nl    Cough 2010    pulm eval, felt due to reflux    Dizzy 2001    mri small vessel dz    GERD (gastroesophageal reflux disease)     HTN (hypertension) 2002    Hx of colonoscopy 2003    tics    Hyponatremia 04/2025    felt due to chlorthalidone    Hypothyroid     Hypovitaminosis D     Idiopathic neuropathy     Lung nodule 02/2018    6mm, found during eval of ascending aorta, fu 8/18 no change    OCD (obsessive compulsive disorder)     sees psyche    Panic disorder     Dr. Luna, then someone after he retired    Parkinson's disease (H) 03/2023    per neuro    Spinal headache     Stroke (H) 12/2016    expressive aphasia, hosp, seen by neuro, mri pos, carotids min dz, changed from asa to plavix    Sudden hearing loss 06/2013    Dr. Garcia, mri brain no acoustic neuroma    SVT (supraventricular tachycardia) 11/2016    seen on ziopatch done for amaurosis, longest 15 beats    Thoracic ascending aortic aneurysm 06/2014    4.4cm on echo, aortic root 3.9, fu 5/15 4.8cm; fu done 12/15 and slightly enlarged, fu 6/16 no change, fu 11/16 no change; fu 1/18 echo 5.1-5.3, enlarged, then cta done and only 4.8cm, t fu 6 months, lvh, nl ef, mild ai; fu 8/18 slightly larger; fu 2/19 slightly smaller; fu 2/20 and then 1/21 and stable    Ulcerative colitis (H)     not active for years       Past Surgical History:   Procedure Laterality Date    BACK SURGERY  1998    BLADDER SURGERY   1985    CATARACT IOL, RT/LT      COLONOSCOPY N/A 2020    Procedure: COLONOSCOPY;  Surgeon: Kenya Loco MD;  Location:  GI    CORONARY ARTERY BYPASS      ENDOVASCULAR REPAIR ANEURYSM ABDOMINAL AORTA N/A 2015    Procedure: ENDOVASCULAR REPAIR ANEURYSM ABDOMINAL AORTA;  Surgeon: Darin Walters MD;  Location:  OR    HERNIA REPAIR  2005    HERNIA REPAIR  1998    HERNIORRHAPHY INGUINAL Left 2018    Procedure: HERNIORRHAPHY INGUINAL;  Incarcerated Left Inguinal Hernia;  Surgeon: Harsh Walker MD;  Location:  OR    KNEE SURGERY      REPAIR HAMMER TOE  2012    Procedure: REPAIR HAMMER TOE;  LEFT SECOND AND THIRD CLAW TOE RECONSTRUCTION;  Surgeon: Gray Haynes MD;  Location:  OR    REPAIR HAMMER TOE  2018    tria    toe amputation right foot  2021    TURP      ZZC TOTAL KNEE ARTHROPLASTY      left    ZZC TOTAL KNEE ARTHROPLASTY  2009    right     Family History   Problem Relation Age of Onset    Heart Disease Father     Glasses (<9 y/o) Daughter     Retinal detachment Daughter 50 - 59    Diverticulitis Son     Hip dysplasia Granddaughter     Glasses (<9 y/o) Granddaughter     Kyphosis Granddaughter     Attention Deficit Disorder Granddaughter     Mental Illness Granddaughter     Glasses (<9 y/o) Grandson     Lymphoma Sister     Glaucoma No family hx of     Macular Degeneration No family hx of     Consanguinity No family hx of        Social History     Tobacco Use    Smoking status: Former     Current packs/day: 0.00     Average packs/day: 1 pack/day for 5.0 years (5.0 ttl pk-yrs)     Types: Cigarettes     Start date: 1960     Quit date: 1965     Years since quittin.9     Passive exposure: Current    Smokeless tobacco: Never   Substance Use Topics    Alcohol use: Not Currently     Comment: none      SH: , lives at [facility], was in [facility] before that.  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     ROS:   Review Of Systems  Skin: purpura, easy bruisability   Eyes: impaired vision, glasses     Ears/Nose/Throat: hearing loss  Respiratory: as above     Cardiovascular: negative  Gastrointestinal: ventral hernia, seen by Surgery Dr Story  Irregular bowel habits, on lactulose bid  Genitourinary: nocturia x1   Musculoskeletal: mod-max assist for dressing, ambulates 90' with 4WW and min-mod assist     Neurologic: poor balance, some cognitive deficits, gait instability    Failed trial of Sinemet     SLUMS 14/30 in 2023     EMS 8/20     Psychiatric: OCD, panic disorder, depression      Hematologic/Lymphatic/Immunologic: NCNC anemia   Endocrine: hypothyroidism      GENERAL APPEARANCE: frail, alert and no distress  BP (!) 178/77   Pulse 55   Temp 97.8  F (36.6  C)   Resp 18   Ht 1.854 m (6' 1\")   Wt 84.3 kg (185 lb 14.4 oz)   SpO2 97%   BMI 24.53 kg/m     HEENT: bruising of forehead, both eyes and cheeks, and down into his neck      NECK: no adenopathy, no asymmetry, masses, or scars and thyroid normal to palpation, +kyphosis  RESP: lungs with decreased BS, scattered crackles  CV: regular rate and rhythm, normal S1 S2  ABDOMEN:  soft, nontender, no HSM or masses and bowel sounds normal  MS: extremities with 1+ edema, wearing compression stockings  NEURO: normal speech, bradykinesia and needs assist setting up his lunch tray    PSYCH: affect okay, a little guarded    LYMPHATICS: No cervical,  or supraclavicular nodes    Lab Results   Component Value Date     05/10/2025    POTASSIUM 4.3 05/10/2025    CHLORIDE 101 05/10/2025    CO2 25 05/10/2025    ANIONGAP 11 05/10/2025     (H) 05/10/2025    BUN 19.5 05/10/2025    CR 0.89 05/10/2025    GFRESTIMATED 80 05/10/2025    YOUNG 8.6 (L) 05/10/2025     Lab Results   Component Value Date    AST 40 05/10/2025      ALBUMIN 3.5 05/10/2025 "      ALKPHOS 101 05/10/2025     Lab Results   Component Value Date    WBC 5.9 05/10/2025      HGB 10.1 05/10/2025      MCV 96 05/10/2025       05/10/2025       CT HEAD W/O CONTRAST, CT FACIAL BONES WITHOUT CONTRAST, CT CERVICAL SPINE W/O CONTRAST 5/9/2025  HEAD CT:   INTRACRANIAL CONTENTS: No intracranial hemorrhage, extraaxial collection, or mass effect.  No CT evidence of acute infarct. Mild presumed chronic small vessel ischemic changes. Mild generalized cerebral volume loss. Left parietal encephalomalacia/gliosis. The ventricles and sulci are normal for age.   OSSEOUS STRUCTURES/SOFT TISSUES: Left frontal scalp edema/hematoma without underlying bone fracture. This extends inferiorly to involve the preseptal and infraorbital region.   FACIAL BONE CT:  OSSEOUS STRUCTURES/SOFT TISSUES: No localized soft tissue swelling/inflammation. No facial bone fracture or malalignment. No evidence for dental trauma or periapical abscess.  CERVICAL SPINE CT:   VERTEBRA: Normal vertebral body heights and alignment. No fracture or posttraumatic subluxation.   CANAL/FORAMINA: Multilevel degenerative changes described endplate osteophytosis and facet hypertrophy. No high-grade spinal canal stenosis. Multilevel varying degrees of neural foraminal stenosis.  PARASPINAL: No extraspinal abnormality. Visualized lung fields are clear.  IMPRESSION:  HEAD CT:  1.  No acute intracranial process.  2.  Left frontal scalp edema/hematoma without underlying bone fracture.This extends inferiorly to involve the preseptal and infraorbital region.   FACIAL BONE CT:  No facial bone or mandibular fracture.  CERVICAL SPINE CT:  1.  No fracture or posttraumatic subluxation.  2.  No high-grade spinal canal stenosis.    MR BRAIN WITHOUT AND WITH CONTRAST   04/27/2025  INTRACRANIAL CONTENTS: No acute or subacute infarct. Small chronic infarction left inferior parietal lobe. Small chronic infarctions within the cerebellar hemispheres. No mass, acute  hemorrhage, or extra-axial fluid collections. Scattered nonspecific T2/FLAIR hyperintensities within the cerebral white matter most consistent with mild chronic microvascular ischemic change. Moderate generalized cerebral atrophy. No hydrocephalus. Normal position of the cerebellar tonsils. No pathologic contrast enhancement.                                                           IMPRESSION:  1.  No acute intracranial process.  2.  Small chronic infarctions left inferior parietal lobe and bilateral cerebellar hemispheres. Moderate generalized volume loss and mild chronic microvascular ischemic change.      IMP/PLAN:   (S00.03XD) Contusion of scalp, subsequent encounter   (Z91.81) Personal history of fall  (M62.81) Generalized muscle weakness  Comment: facial bruising but no current pain     Plan: acetaminophen PRN   PHYSICAL THERAPY /OCCUPATIONAL THERAPY for strengthening, balance, transfers, gait and ADLs.   Pt's discharge goal is return to his AL apartment with services         (I67.9) Cerebral vascular disease  (R41.89) Cognitive impairment  Comment: chronic infarctions on imaging, low cog scores     Plan: formal cog testing per Occupational Therapy to help determine safe discharge plan     Remains on memantine 10 mg/day      (G20.A1) Parkinson's disease without dyskinesia, unspecified whether manifestations fluctuate (H)  (R26.81) Gait instability  (R53.81) Physical deconditioning  Comment:   Plan:  Sinemet 25/100 one and a half tabs tid    Follow up with Neurology as scheduled     (I71.21) Aneurysm of ascending aorta without rupture  (I25.10) Atherosclerosis of native coronary artery of native heart without angina pectoris  Comment: h/o CABG and EVAR  Plan: simvastatin 20 mg/day and clopidogrel 75 mg/day for secondary prevention     (I13.10,  N18.31) 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 (H)  Comment:   BP Readings from Last 3 Encounters:    05/15/25 (!) 178/77   05/13/25 (!) 168/58   05/12/25 (!) 152/62      Plan:   losartan 50 mg/day  Follow bps and BMP    (E03.9) Hypothyroidism, unspecified type  Comment:   TSH   Date Value Ref Range Status   04/27/2025 4.32 (H) 0.30 - 4.20 uIU/mL Final   03/18/2022 0.44 0.40 - 4.00 mU/L Final   07/21/2020 0.46 0.40 - 4.00 mU/L Final      Plan: levothyroxine 88 mcg/day       (F42.9) Obsessive-compulsive disorder, unspecified type  (F41.0) Panic disorder and OCD    Comment: follows with [facility]   Was hospitalized at [facility] in 1996, on long term tx      Plan: mirtazapine 45 m/day, gabapentin 400 mg tid, quetiapine 25 mg/ HS and fluoxetine 40 mg/day     Sammi Dumas MD

## 2025-05-18 ENCOUNTER — NURSE TRIAGE (OUTPATIENT)
Dept: NURSING | Facility: CLINIC | Age: OVER 89
End: 2025-05-18
Payer: COMMERCIAL

## 2025-05-19 LAB
ANION GAP SERPL CALCULATED.3IONS-SCNC: 12 MMOL/L (ref 7–15)
BUN SERPL-MCNC: 33.4 MG/DL (ref 8–23)
CALCIUM SERPL-MCNC: 9.3 MG/DL (ref 8.8–10.4)
CHLORIDE SERPL-SCNC: 105 MMOL/L (ref 98–107)
CREAT SERPL-MCNC: 1.14 MG/DL (ref 0.67–1.17)
EGFRCR SERPLBLD CKD-EPI 2021: 60 ML/MIN/1.73M2
GLUCOSE SERPL-MCNC: 91 MG/DL (ref 70–99)
HCO3 SERPL-SCNC: 22 MMOL/L (ref 22–29)
POTASSIUM SERPL-SCNC: 4.9 MMOL/L (ref 3.4–5.3)
SODIUM SERPL-SCNC: 139 MMOL/L (ref 135–145)

## 2025-05-19 PROCEDURE — 36415 COLL VENOUS BLD VENIPUNCTURE: CPT | Mod: ORL | Performed by: NURSE PRACTITIONER

## 2025-05-19 PROCEDURE — P9604 ONE-WAY ALLOW PRORATED TRIP: HCPCS | Mod: ORL | Performed by: NURSE PRACTITIONER

## 2025-05-19 PROCEDURE — 80048 BASIC METABOLIC PNL TOTAL CA: CPT | Mod: ORL | Performed by: NURSE PRACTITIONER

## 2025-05-19 NOTE — TELEPHONE ENCOUNTER
"Nurse Triage SBAR    Is this a 2nd Level Triage? NO    Situation: Constipation    Background: Pt reports he is at [facility] TCU. Pt reports \"not able to have a stool\". Had a suppository earlier and \"little hard result\". Pt reports \"concerned about my life, staff told me not in danger\". Pt states he is \"very nervous and don't know if I can trust them or not\" about TCU staff. Pt reports very slight amount of rectal bleeding after straining \"a few minutes ago\". Pt reports last normal bowel movement \"a week ago\". Pt denies abdominal or rectal pain, vomiting or fever.     Assessment:  Constipation    Protocol Recommended Disposition:   See PCP Within 24 Hours    Recommendation:  Advised pt to contact his doctor in TCU tomorrow. Reassured pt symptoms do not sound life threatening. Call back if needing further assistance.     Pt verbalizes understanding and agrees to plan, states he feels reassured \"just wanted my doctors office opinion\" about what they said.     Does the patient meet one of the following criteria for ADS visit consideration? 16+ years old, with an [facility] PCP     TIP  Providers, please consider if this condition is appropriate for management at one of our Acute and Diagnostic Services sites.     If patient is a good candidate, please use dotphrase <dot>triageresponse and select Refer to ADS to document.    Reason for Disposition   Last bowel movement (BM) > 4 days ago    Additional Information   Negative: [1] Abdomen pain is main symptom AND [2] male   Negative: [1] Abdomen pain is main symptom AND [2] adult female   Negative: Rectal bleeding or blood in stool is main symptom   Negative: Rectal pain or itching is main symptom   Negative: Constipation in a cancer patient who is currently (or recently) receiving chemotherapy or radiation therapy, or cancer patient who has metastatic or end-stage cancer and is receiving palliative care   Negative: [1] Vomiting AND [2] contains bile (green color)   " Negative: Patient sounds very sick or weak to the triager   Negative: [1] Vomiting AND [2] abdomen looks much more swollen than usual   Negative: [1] Constant abdominal pain AND [2] present > 2 hours   Negative: [1] Rectal pain or fullness from fecal impaction (rectum full of stool) AND [2] NOT better after SITZ bath, suppository or enema   Negative: [1] Intermittent mild abdominal pain AND [2] fever   Negative: Abdomen is more swollen than usual    Protocols used: Constipation-A-AH

## 2025-05-22 DIAGNOSIS — Z53.9 DIAGNOSIS NOT YET DEFINED: Primary | ICD-10-CM

## 2025-05-24 ENCOUNTER — NURSE TRIAGE (OUTPATIENT)
Dept: NURSING | Facility: CLINIC | Age: OVER 89
End: 2025-05-24
Payer: COMMERCIAL

## 2025-05-24 NOTE — TELEPHONE ENCOUNTER
"Nurse Triage SBAR    Is this a 2nd Level Triage? NO    Situation:  Constipation    Background: Pt reports he has not had a regular bowel movement in two weeks \"using lactulose and only give me one dosage a day, just had a tiny bowel movement in bathroom, had to force it, they say I am not blocked\". Pt is a resident of [facility] Pt reports he used to take more lactulose in Assisted Living and states \"for some strange reason they won't give it to me\" referring to extra doses of lactulose.     Assessment:  Anxiety related to perceived constipation     Protocol Recommended Disposition:   Home Care - Information or Advice Only Call (overriding See PCP Within 24 Hours)    Recommendation:  Reassured pt symptoms do no sound like an emergency. Encouraged pt to contact nurses in the facility about his concerns.      Pt verbalizes understanding and agrees to plan.     Does the patient meet one of the following criteria for ADS visit consideration? 16+ years old, with an [facility] PCP     TIP  Providers, please consider if this condition is appropriate for management at one of our Acute and Diagnostic Services sites.     If patient is a good candidate, please use dotphrase <dot>triageresponse and select Refer to ADS to document.    Reason for Disposition   Last bowel movement (BM) > 4 days ago    Additional Information   Negative: [1] Abdomen pain is main symptom AND [2] male   Negative: [1] Abdomen pain is main symptom AND [2] adult female   Negative: Rectal bleeding or blood in stool is main symptom   Negative: Rectal pain or itching is main symptom   Negative: Constipation in a cancer patient who is currently (or recently) receiving chemotherapy or radiation therapy, or cancer patient who has metastatic or end-stage cancer and is receiving palliative care   Negative: [1] Vomiting AND [2] contains bile (green color)   Negative: Patient sounds very sick or weak to the triager   Negative: [1] Vomiting AND [2] abdomen " looks much more swollen than usual   Negative: [1] Constant abdominal pain AND [2] present > 2 hours   Negative: [1] Rectal pain or fullness from fecal impaction (rectum full of stool) AND [2] NOT better after SITZ bath, suppository or enema   Negative: [1] Intermittent mild abdominal pain AND [2] fever   Negative: Abdomen is more swollen than usual    Protocols used: Constipation-A-AH

## 2025-05-27 ENCOUNTER — LAB REQUISITION (OUTPATIENT)
Dept: LAB | Facility: CLINIC | Age: OVER 89
End: 2025-05-27
Payer: COMMERCIAL

## 2025-05-27 DIAGNOSIS — E87.1 HYPO-OSMOLALITY AND HYPONATREMIA: ICD-10-CM

## 2025-05-27 DIAGNOSIS — I10 ESSENTIAL (PRIMARY) HYPERTENSION: ICD-10-CM

## 2025-05-27 PROBLEM — I73.9 PVD (PERIPHERAL VASCULAR DISEASE): Status: ACTIVE | Noted: 2025-05-27

## 2025-05-27 PROBLEM — L89.892: Status: ACTIVE | Noted: 2025-05-27

## 2025-05-27 PROBLEM — R60.0 BILATERAL EDEMA OF LOWER EXTREMITY: Status: ACTIVE | Noted: 2025-05-27

## 2025-05-29 ENCOUNTER — DISCHARGE SUMMARY NURSING HOME (OUTPATIENT)
Dept: GERIATRICS | Facility: CLINIC | Age: OVER 89
End: 2025-05-29
Payer: COMMERCIAL

## 2025-05-29 VITALS
WEIGHT: 187.6 LBS | DIASTOLIC BLOOD PRESSURE: 56 MMHG | BODY MASS INDEX: 24.86 KG/M2 | TEMPERATURE: 97.7 F | RESPIRATION RATE: 20 BRPM | HEIGHT: 73 IN | HEART RATE: 52 BPM | SYSTOLIC BLOOD PRESSURE: 124 MMHG | OXYGEN SATURATION: 97 %

## 2025-05-29 DIAGNOSIS — Z95.1 PRESENCE OF AORTOCORONARY BYPASS GRAFT: ICD-10-CM

## 2025-05-29 DIAGNOSIS — F41.0 PANIC DISORDER (EPISODIC PAROXYSMAL ANXIETY): ICD-10-CM

## 2025-05-29 DIAGNOSIS — E78.5 HYPERLIPIDEMIA, UNSPECIFIED HYPERLIPIDEMIA TYPE: ICD-10-CM

## 2025-05-29 DIAGNOSIS — R53.81 PHYSICAL DECONDITIONING: ICD-10-CM

## 2025-05-29 DIAGNOSIS — Z86.73 HISTORY OF CVA (CEREBROVASCULAR ACCIDENT): ICD-10-CM

## 2025-05-29 DIAGNOSIS — Z91.81 PERSONAL HISTORY OF FALL: ICD-10-CM

## 2025-05-29 DIAGNOSIS — S00.03XD CONTUSION OF SCALP, SUBSEQUENT ENCOUNTER: Primary | ICD-10-CM

## 2025-05-29 DIAGNOSIS — M62.81 GENERALIZED MUSCLE WEAKNESS: ICD-10-CM

## 2025-05-29 DIAGNOSIS — I25.10 ATHEROSCLEROSIS OF NATIVE CORONARY ARTERY OF NATIVE HEART WITHOUT ANGINA PECTORIS: ICD-10-CM

## 2025-05-29 DIAGNOSIS — E03.9 HYPOTHYROIDISM, UNSPECIFIED TYPE: ICD-10-CM

## 2025-05-29 DIAGNOSIS — F42.9 OBSESSIVE-COMPULSIVE DISORDER, UNSPECIFIED TYPE: ICD-10-CM

## 2025-05-29 DIAGNOSIS — G20.A1 PARKINSON'S DISEASE WITHOUT DYSKINESIA, UNSPECIFIED WHETHER MANIFESTATIONS FLUCTUATE (H): ICD-10-CM

## 2025-05-29 DIAGNOSIS — I13.10 HYPERTENSIVE HEART AND KIDNEY DISEASE WITHOUT HEART FAILURE AND WITH STAGE 3A CHRONIC KIDNEY DISEASE (H): ICD-10-CM

## 2025-05-29 DIAGNOSIS — S81.812D LACERATION OF LEFT LOWER EXTREMITY, SUBSEQUENT ENCOUNTER: ICD-10-CM

## 2025-05-29 DIAGNOSIS — N18.31 HYPERTENSIVE HEART AND KIDNEY DISEASE WITHOUT HEART FAILURE AND WITH STAGE 3A CHRONIC KIDNEY DISEASE (H): ICD-10-CM

## 2025-05-29 LAB
ANION GAP SERPL CALCULATED.3IONS-SCNC: 10 MMOL/L (ref 7–15)
BUN SERPL-MCNC: 39.2 MG/DL (ref 8–23)
CALCIUM SERPL-MCNC: 8.9 MG/DL (ref 8.8–10.4)
CHLORIDE SERPL-SCNC: 109 MMOL/L (ref 98–107)
CREAT SERPL-MCNC: 1.35 MG/DL (ref 0.67–1.17)
EGFRCR SERPLBLD CKD-EPI 2021: 49 ML/MIN/1.73M2
GLUCOSE SERPL-MCNC: 82 MG/DL (ref 70–99)
HCO3 SERPL-SCNC: 22 MMOL/L (ref 22–29)
POTASSIUM SERPL-SCNC: 4.6 MMOL/L (ref 3.4–5.3)
SODIUM SERPL-SCNC: 141 MMOL/L (ref 135–145)

## 2025-05-29 PROCEDURE — 36415 COLL VENOUS BLD VENIPUNCTURE: CPT | Mod: ORL | Performed by: NURSE PRACTITIONER

## 2025-05-29 PROCEDURE — P9604 ONE-WAY ALLOW PRORATED TRIP: HCPCS | Mod: ORL | Performed by: NURSE PRACTITIONER

## 2025-05-29 PROCEDURE — 80048 BASIC METABOLIC PNL TOTAL CA: CPT | Mod: ORL | Performed by: NURSE PRACTITIONER

## 2025-05-29 RX ORDER — CEPHALEXIN 500 MG/1
500 CAPSULE ORAL 3 TIMES DAILY
COMMUNITY
Start: 2025-05-26 | End: 2025-06-02

## 2025-05-29 NOTE — PROGRESS NOTES
[facility] DISCHARGE SUMMARY    PATIENT'S NAME: Zack Santiago  YOB: 1931    PRIMARY CARE PROVIDER AND CLINIC RESPONSIBLE AFTER TRANSFER: Zurdo Kendrick     CODE STATUS: Full Code     TRANSFERRING PROVIDERS: DOMENIC Gudino CNP, Dr. Sammi Dumas MD      DATE OF SNF ADMISSION:  May / 12 / 2025    DATE OF SNF DISCHARGE (including anticipating DC): June / 02 / 2025    DISCHARGE DISPOSITION: [facility] Provider    Nursing Facility: [facility] stay 5/9/25 to 5/12/25.     Condition on Discharge:  Improving.    Function:  Ambulates: ft w/4ww, Transfers: cga   Cognitive Scores: SLUMS 17/30,  and CPT 4.2/5.6    Physical Function: EMS-8/20  Equipment: walker  DME: Walker    DISCHARGE DIAGNOSIS:      Contusion of scalp, subsequent encounter  Laceration of left lower extremity, subsequent encounter  Personal history of fall  Generalized muscle weakness  Physical deconditioning  Parkinson's disease without dyskinesia, unspecified whether manifestations fluctuate (H)  Atherosclerosis of native coronary artery of native heart without angina pectoris  Presence of aortocoronary bypass graft  History of CVA (cerebrovascular accident)  Hyperlipidemia, unspecified hyperlipidemia type  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 He has hx of Parkinson's, CVA, balance issues and the CV issues listed below.  No fractures on eval--though has an abrasion left forehead and a laceration left shin. He had significant bruising noted on face. He has chronic bradycardia--no acute issues occurred. Had a CVA in April  and on Plavix. Sent for rehab.    Updates on Status Since Skilled  "nursing Admission: says he dose not have Parkinson's disease  \"just some parkinson symptoms\" such as tremor. He current has no pain, does not want to restart the [place]--\" Dr Paige stopped that med in the past\".  Feels voiding ok, less BM but has hx constipation helped with lactulose and current regimen. Has not been eating a lot past few days.  Feels moods are fin    TCU/SNF COURSE: pt has progressed with PT and OT--healed head hematoma/wound.  Left shin wound improved and smaller though staff felt it was pink/?warm on 5/26 so started on Abx--it has cont 'd to improve so Abx will be shortened from 10-7 days. He will cont with drsg changes with home RN.   I d/w daughter earlier this week that he needs new shoes as they rub on his feet, sienna left  metatarsal area with open area.  Noted today on exam, a mid right food calluses/sore also. He may need a podiatrist to assist with choosing proper shoes.  He had brief wt gain--more edema left leg--give  total of 4 doses of Lasix 20 mg , wt down and steady at 187.9#. he is encouraged and reminded to wear compression stockings and elevate legs when up in chair--needs reminder on these issues.  Today's  bun/cr up a bit--this has happened in the past as noted below.  He is anxious at times but ready to go home.       PAST MEDICAL HISTORY:  Past Medical History:   Diagnosis Date    A-fib (H) 2010    post op    AAA (abdominal aortic aneurysm) without rupture     Dr. 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    carotid us no stenosis, echo no change and no clots; ziopatch no afib, svt present    Anemia 2004    Aortic insufficiency 06/2014    1+ on echo    Aortic insufficiency 11/2016    mild to mod    Ascending aorta dilatation 01/2023    5cm on ct, fu done 2/24 and stable    ASCVD (arteriosclerotic cardiovascular disease) 2010    cabg, post op afib    BPH (benign prostatic hyperplasia) 2003    turp, flomax added by urol 2/17    Bradycardia " 2016    stopped toprol    CKD (chronic kidney disease) stage 3, GFR 30-59 ml/min (H)     Constipation 05/2020    colonoscopy nl    Cough 2010    pulm eval, felt due to reflux    Dizzy 2001    mri small vessel dz    GERD (gastroesophageal reflux disease)     HTN (hypertension) 2002    Hx of colonoscopy 2003    tics    Hyponatremia 04/2025    felt due to chlorthalidone    Hypothyroid     Hypovitaminosis D     Idiopathic neuropathy     Lung nodule 02/2018    6mm, found during eval of ascending aorta, fu 8/18 no change    OCD (obsessive compulsive disorder)     sees psyche    Panic disorder     Dr. Luna, then someone after he retired    Parkinson's disease (H) 03/2023    per neuro    Spinal headache     Stroke (H) 12/2016    expressive aphasia, hosp, seen by neuro, mri pos, carotids min dz, changed from asa to plavix    Sudden hearing loss 06/2013    Dr. Garcia, mri brain no acoustic neuroma    SVT (supraventricular tachycardia) 11/2016    seen on ziopatch done for amaurosis, longest 15 beats    Thoracic ascending aortic aneurysm 06/2014    4.4cm on echo, aortic root 3.9, fu 5/15 4.8cm; fu done 12/15 and slightly enlarged, fu 6/16 no change, fu 11/16 no change; fu 1/18 echo 5.1-5.3, enlarged, then cta done and only 4.8cm, t fu 6 months, lvh, nl ef, mild ai; fu 8/18 slightly larger; fu 2/19 slightly smaller; fu 2/20 and then 1/21 and stable    Ulcerative colitis (H)     not active for years       DISCHARGE MEDICATIONS:  Current Outpatient Medications   Medication Sig Dispense Refill    acetaminophen (TYLENOL) 325 MG tablet Take 650 mg by mouth every 6 hours as needed for mild pain.      carbidopa-levodopa (SINEMET)  MG tablet 1 & 1/2 TABLETS ORALLY 3 TIMES DAILY 270 tablet 3    cephALEXin (KEFLEX) 500 MG capsule Take 1 capsule (500 mg) by mouth 3 times daily. 10 days      clopidogrel (PLAVIX) 75 MG tablet 1 TABLET ORALLY EVERY MORNING  (DX: HEART  ) 30 tablet 11    DESITIN DAILY DEFENSE 13 % CREA APPLY TO  "AFFECTED AREA TOPICALLY 2 TIMES DAILY;APPLY TO AFFECTED AREA TOPICALLY AS NEEDED **OK TO APPLY AND LEAVE UNCOVERED** 113 g 0    diclofenac (VOLTAREN) 1 % topical gel 2 g 2 times daily. Apply thin layer twice daily      econazole nitrate 1 % external cream Apply topically 2 times daily.      FLUoxetine (PROZAC) 40 MG capsule Take 40 mg by mouth daily.      gabapentin (NEURONTIN) 400 MG capsule 1 CAPSULE ORALLY 3 TIMES DAILY 90 capsule 11    hydrocortisone, Perianal, (HYDROCORTISONE) 2.5 % cream Place rectally 2 times daily as needed for hemorrhoids. 30 g 2    hydrOXYzine HCl (ATARAX) 10 MG tablet Take 10 mg by mouth 2 times daily as needed for itching.      lactulose (CHRONULAC) 10 GM/15ML solution DRINK 30ML  ORALLY DAILY (DX: CONSTIPATION);DRINK 30ML  ORALLY DAILY AS NEEDED (DX: CONSTIPATION) 946 mL PRN    levothyroxine (SYNTHROID/LEVOTHROID) 88 MCG tablet 1 TABLET ORALLY EVERY MORNING ON AN EMPTY STOMACH 90 tablet 0    losartan (COZAAR) 50 MG tablet Take 1 tablet (50 mg) by mouth daily. 90 tablet 3    memantine (NAMENDA) 10 MG tablet Take 10 mg by mouth daily.      mirtazapine (REMERON) 45 MG tablet 1 TABLET ORALLY AT BEDTIME (DX: DEPRESSION) 28 tablet 11    OPTIVE 0.5-0.9 % ophthalmic solution INSTILL 1 DROP INTO BOTH EYES 2 TIMES DAILY  (DX: DRY EYES) 15 mL 11    QUEtiapine (SEROQUEL) 25 MG tablet 1 TAB ORALLY EVERY EVENING 30 tablet 11    senna-docusate (SENEXON-S) 8.6-50 MG tablet 1 TABLET ORALLY DAILY AS NEEDED (DX: CONSTIPATION) 30 tablet 10    simvastatin (ZOCOR) 20 MG tablet 1 TABLET ORALLY AT BEDTIME   (DX: CHOLESTEROL) 30 tablet 2    sodium chloride (DEEP SEA NASAL SPRAY) 0.65 % nasal spray INSTILL SPRAY(S) INTO NOSTRIL(S) 3 TIMES DAILY 44 mL 11    SOLUBLE FIBER THERAPY powder TAKE 1 TEASPOONFUL ORALLY DAILY (DX: CONSTIPATION) 454 g 11       MEDICATION CHANGES/RATIONALE:   Cephalexin changed to 7 day course.  /56   Pulse 52   Temp 97.7  F (36.5  C)   Resp 20   Ht 1.854 m (6' 1\")   Wt 85.1 kg " (187 lb 9.6 oz)   SpO2 97%   BMI 24.75 kg/m      TODAY DURING EXAM/ROS:  No CP, SOB, Cough, dizziness, fevers, chills, HA, N/V, dysuria or Bowel Abnormalities. Appetite is good.  No pain except soreness of wounds but better.    PHYSICAL EXAM Today:  A & O x 3, NAD. Lungs CTA with a few coarse scattered crackles, non labored. RRR, S1/S2 w/o murmur,gallop or rub.  No edema right and +2 left LE edema.  Abdomen soft, nontender, +BT'S. No focal neurological deficits. PISANO up with walker.   Wounds as described--see PCC also for NH/TCU notes.       SNF LABS  Recent Labs   Lab Test 25  0530 25  0815    139   POTASSIUM 4.6 4.9   CHLORIDE 109* 105   CO2 22 22   ANIONGAP 10 12   GLC 82 91   BUN 39.2* 33.4*   CR 1.35* 1.14   YOUNG 8.9 9.3      Recent Labs   Lab Test 05/10/25  1323   WBC 5.9   RBC 3.08*   HGB 10.1*   HCT 29.7*   MCV 96   MCH 32.8   MCHC 34.0   RDW 14.8        Hemoglobin   Date Value Ref Range Status   05/10/2025 10.1 (L) 13.3 - 17.7 g/dL Final   2025 10.8 (L) 13.3 - 17.7 g/dL Final   2021 11.8 (L) 13.3 - 17.7 g/dL Final   2021 12.0 (L) 13.3 - 17.7 g/dL Final             DISCHARGE PLAN:  Occupational Therapy, Physical Therapy, and From:  [facility] Follow-up with PCP in 7 days: 7 DAYS.    Current [place] or other scheduled appointments:  Future Appointments   Date Time Provider Department Center   9/15/2025  2:30 PM Zurdo Kendrick MD [facility]        MTM referral needed and placed by this provider: none    Pending or Future Labs: BMP in 1-2 weeks at PCP appt     Discharge Treatments:drsg changes left shin, metatarsal area and mid right foot/arch area. Also needs shoes that DO NOT rub on the calluses areas on feet           Face to Face and Medical Necessity Statement for DME Provider visit    Demographic Information on Zack Santiago:  Gender: male  : 1931  [id number]     [facility] [id number] [phone] (home)     Medical Record:  [id number]  Social Security Number: [id number]  Primary Care Provider: Zurdo Kendrick  Insurance: Payor: [employer] / Plan: [employer] / Product Type: HMO /     HPI:   Zack Santiago is a 89 year old  (5/29/1931), who is being seen today for a face to face provider visit at [facility] Home TCU; medical necessity statement for DME included. This patient requires the following:    DME Ordered and Medical Necessity Statement     Wheelchair Documentation  Size: 18 x 18  Corresponding cushion: Yes:    Standard foot rests: Yes  Elevating leg rests:  NO  Arm rests: Yes:   Lap tray: No  NEEDS REAR ANTI TIPPERS FOR DOCUMENTED TIMES OF WC TIPPING BACKWARDS WITH TRANSFERS  Dose the patient use oxygen? No   Is the patient able to propel wheelchair? Yes If no why not?  And is there someone who can?   1. The patient has mobility limitations that impairs their ability to participate in one or more mobility related activities: Toileting, Feeding, Grooming, and Bathing.  The wheelchair is suitable and necessary for use in the patient's home.  2. The patient's mobility limitations cannot be safely resolved by using a cane/walker:No    Reason why a cane or walker will not meet the patient's needs. (ie: balance, tolerance, level of assistance) FALLS, PARKINSON'S, MOBILITY ISSUES  3. The patients home has adequate access to use a manual wheelchair:Yes  4. The use of a manual wheelchair on a regular basis will improve the patients ability to participate in mobility related ADL's at home:Yes  5. The patient is willing to use a manual wheelchair at home:Yes  6. The patient has adequate upper body strength and the mental capability to safely use a manual wheelchair and/or has a caregiver that is able to assist: Yes  7. Does the patient have a lower extremity injury or edema?Yes LEFT LEG WOUND BUT HEALING WELL  Reason for Type of Wheelchair  Patient weight: 187.6# lbs 0 oz        Pt needing above DME with  expected length of need of 99    months  due to medical necessity associated with following diagnosis:     Contusion of scalp, subsequent encounter  Laceration of left lower extremity, subsequent encounter  Personal history of fall  Generalized muscle weakness  Physical deconditioning  Parkinson's disease without dyskinesia, unspecified whether manifestations fluctuate (H)  Atherosclerosis of native coronary artery of native heart without angina pectoris  Presence of aortocoronary bypass graft  History of CVA (cerebrovascular accident)  Hyperlipidemia, unspecified hyperlipidemia type  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 (H)  Hypothyroidism, unspecified type  Panic disorder (episodic paroxysmal anxiety)  Obsessive-compulsive disorder, unspecified type      PMH   has a past medical history of A-fib (H) (2010), AAA (abdominal aortic aneurysm) without rupture, Amaurosis fugax of right eye (10/2016), Anemia (2004), Aortic insufficiency (06/2014), Aortic insufficiency (11/2016), Ascending aorta dilatation (01/2023), ASCVD (arteriosclerotic cardiovascular disease) (2010), BPH (benign prostatic hyperplasia) (2003), Bradycardia (2016), CKD (chronic kidney disease) stage 3, GFR 30-59 ml/min (H), Constipation (05/2020), Cough (2010), Dizzy (2001), GERD (gastroesophageal reflux disease), HTN (hypertension) (2002), colonoscopy (2003), Hyponatremia (04/2025), Hypothyroid, Hypovitaminosis D, Idiopathic neuropathy, Lung nodule (02/2018), OCD (obsessive compulsive disorder), Panic disorder, Parkinson's disease (H) (03/2023), Spinal headache, Stroke (H) (12/2016), Sudden hearing loss (06/2013), SVT (supraventricular tachycardia) (11/2016), Thoracic ascending aortic aneurysm (06/2014), and Ulcerative colitis (H).    He has no past medical history of PONV (postoperative nausea and vomiting).    ROS:SEE ABOVE    EXAM  Vitals: /56   Pulse 52   Temp 97.7  F (36.5  C)  "  Resp 20   Ht 1.854 m (6' 1\")   Wt 85.1 kg (187 lb 9.6 oz)   SpO2 97%   BMI 24.75 kg/m  ;BMI= Body mass index is 24.75 kg/m .   EXAM--SEE ABOVE     ASSESSMENT/PLAN:  1. Contusion of scalp, subsequent encounter    2. Laceration of left lower extremity, subsequent encounter    3. Personal history of fall: 2025    4. Generalized muscle weakness    5. Physical deconditioning    6. Parkinson's disease without dyskinesia, unspecified whether manifestations fluctuate (H)    7. Atherosclerosis of native coronary artery of native heart without angina pectoris    8. Presence of aortocoronary bypass graft    9. History of CVA (cerebrovascular accident)    10. Hyperlipidemia, unspecified hyperlipidemia type    11. 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 Hypothyroidism, unspecified type    13. Panic disorder (episodic paroxysmal anxiety)    14. Obsessive-compulsive disorder, unspecified type        Orders:  1. Facility staff/TC to contact DME company to get their order form for provider to fill out     ELECTRONICALLY SIGNED BY RAMONE CERTIFIED PROVIDER:  DOMENIC Gudino CNP   NPI: [id number]  [facility]  [address] [place] [address]        .........................................................................................................................................  [facility] Discharge Orders    Name: Zack Santiago  : 1931    DISCHARGE MEDICATIONS:  The patient s pharmacy is authorized to dispense a 30-day supply of medications. Refill requests should be directed to the primary provider, Zurdo Kendrick .   No narcotics are prescribed at time of discharge.   Current Outpatient Medications   Medication Sig Dispense Refill    acetaminophen (TYLENOL) 325 MG tablet Take 650 mg by mouth every 6 hours as needed for mild pain.      carbidopa-levodopa (SINEMET) "  MG tablet 1 & 1/2 TABLETS ORALLY 3 TIMES DAILY 270 tablet 3    cephALEXin (KEFLEX) 500 MG capsule Take 1 capsule (500 mg) by mouth 3 times daily. 10 days      clopidogrel (PLAVIX) 75 MG tablet 1 TABLET ORALLY EVERY MORNING  (DX: HEART  ) 30 tablet 11    DESITIN DAILY DEFENSE 13 % CREA APPLY TO AFFECTED AREA TOPICALLY 2 TIMES DAILY;APPLY TO AFFECTED AREA TOPICALLY AS NEEDED **OK TO APPLY AND LEAVE UNCOVERED** 113 g 0    diclofenac (VOLTAREN) 1 % topical gel 2 g 2 times daily. Apply thin layer twice daily      econazole nitrate 1 % external cream Apply topically 2 times daily.      FLUoxetine (PROZAC) 40 MG capsule Take 40 mg by mouth daily.      gabapentin (NEURONTIN) 400 MG capsule 1 CAPSULE ORALLY 3 TIMES DAILY 90 capsule 11    hydrocortisone, Perianal, (HYDROCORTISONE) 2.5 % cream Place rectally 2 times daily as needed for hemorrhoids. 30 g 2    hydrOXYzine HCl (ATARAX) 10 MG tablet Take 10 mg by mouth 2 times daily as needed for itching.      lactulose (CHRONULAC) 10 GM/15ML solution DRINK 30ML  ORALLY DAILY (DX: CONSTIPATION);DRINK 30ML  ORALLY DAILY AS NEEDED (DX: CONSTIPATION) 946 mL PRN    levothyroxine (SYNTHROID/LEVOTHROID) 88 MCG tablet 1 TABLET ORALLY EVERY MORNING ON AN EMPTY STOMACH 90 tablet 0    losartan (COZAAR) 50 MG tablet Take 1 tablet (50 mg) by mouth daily. 90 tablet 3    memantine (NAMENDA) 10 MG tablet Take 10 mg by mouth daily.      mirtazapine (REMERON) 45 MG tablet 1 TABLET ORALLY AT BEDTIME (DX: DEPRESSION) 28 tablet 11    OPTIVE 0.5-0.9 % ophthalmic solution INSTILL 1 DROP INTO BOTH EYES 2 TIMES DAILY  (DX: DRY EYES) 15 mL 11    QUEtiapine (SEROQUEL) 25 MG tablet 1 TAB ORALLY EVERY EVENING 30 tablet 11    senna-docusate (SENEXON-S) 8.6-50 MG tablet 1 TABLET ORALLY DAILY AS NEEDED (DX: CONSTIPATION) 30 tablet 10    simvastatin (ZOCOR) 20 MG tablet 1 TABLET ORALLY AT BEDTIME   (DX: CHOLESTEROL) 30 tablet 2    sodium chloride (DEEP SEA NASAL SPRAY) 0.65 % nasal spray INSTILL SPRAY(S)  INTO NOSTRIL(S) 3 TIMES DAILY 44 mL 11    SOLUBLE FIBER THERAPY powder TAKE 1 TEASPOONFUL ORALLY DAILY (DX: CONSTIPATION) 454 g 11         This document was electronically signed on May 29, 2025         TOTAL DISCHARGE TIME:   Greater than 30 minutes    DOMENIC Gudino [facility]   ...........................................................................................................................................      Documentation of Face-to-Face and Certification for Home Health Services     Patient: Zack Santiago   YOB: 1931  MR Number: [id number]  Today's Date: 5/29/2025    I certify that patient: Zack Santiago is under my care and that I, or a nurse practitioner or physician's assistant working with me, had a face-to-face encounter that meets the physician face-to-face encounter requirements with this patient on: 5/29/2025.    This encounter with the patient was in whole, or in part, for the following medical condition, which is the primary reason for home health care:      Contusion of scalp, subsequent encounter  Laceration of left lower extremity, subsequent encounter  Personal history of fall  Generalized muscle weakness  Physical deconditioning  Parkinson's disease without dyskinesia, unspecified whether manifestations fluctuate (H)  Atherosclerosis of native coronary artery of native heart without angina pectoris  Presence of aortocoronary bypass graft  History of CVA (cerebrovascular accident)  Hyperlipidemia, unspecified hyperlipidemia type  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    I certify that, based on my findings, the following services are medically necessary home health services: Nursing, Occupational Therapy, Physical Therapy, and hha.    My  clinical findings support the need for the above services because: Nurse is needed: WOUND CARE of left shin, left metatarsal area and right mid foot area-lateral area., Occupational Therapy Services are needed to assess and treat cognitive ability and address ADL safety due to impairment in cognition issues and fall risk., and Physical Therapy Services are needed to assess and treat the following functional impairments: parkinson's, gait imbalances, deconditioning.    Further, I certify that my clinical findings support that this patient is homebound (i.e. absences from home require considerable and taxing effort and are for medical reasons or Yazidi services or infrequently or of short duration when for other reasons) because: Requires assistance of another person or specialized equipment to access medical services because patient: Is unable to walk greater than  feet without rest...    Based on the above findings. I certify that this patient is confined to the home and needs intermittent skilled nursing care, physical therapy and/or speech therapy.  The patient is under my care, and I have initiated the establishment of the plan of care.  This patient will be followed by a physician who will periodically review the plan of care.  Physician/Provider to provide follow up care: Zurdo Kendrick    Responsible Medicare certified PECOS Physician: Shelby Hudson RN,  CNP  Physician Signature: See electronic signature associated with these discharge orders.  Date: 5/29/2025

## 2025-06-03 ENCOUNTER — PATIENT OUTREACH (OUTPATIENT)
Dept: CARE COORDINATION | Facility: CLINIC | Age: OVER 89
End: 2025-06-03
Payer: COMMERCIAL

## 2025-06-03 NOTE — PROGRESS NOTES
[facility]  TCU Discharge    Clinical Data: Per chart review, patient has discharged from TCU to home/community setting.    Assessment/Plan: Transitional Care Management (TCM) program initiated to prompt post-discharge outreach call by [facility] team member.     Rosemary Green  Care Transitions Assistant  [facility]

## 2025-06-04 NOTE — TELEPHONE ENCOUNTER
Order/Referral Request    Who is requesting: Patient    Orders being requested: Psychiatrist     Reason service is needed/diagnosis: NA    When are orders needed by: ASAP    Has this been discussed with Provider: Yes Pt states he is waiting for an order to see a psychiatrist and would also like any recommendations on a good one    Does patient have a preference on a Group/Provider/Facility? [facility]    Does patient have an appointment scheduled?: No    Where to send orders: Place orders within Epic    Could we send this information to you in [facility] or would you prefer to receive a phone call?:   Patient would prefer a phone call   Okay to leave a detailed message?: Yes at Home number on file [phone] (home)    DISPLAY PLAN FREE TEXT

## 2025-06-09 ENCOUNTER — TELEPHONE (OUTPATIENT)
Dept: FAMILY MEDICINE | Facility: CLINIC | Age: OVER 89
End: 2025-06-09
Payer: COMMERCIAL

## 2025-06-09 DIAGNOSIS — Z09 HOSPITAL DISCHARGE FOLLOW-UP: ICD-10-CM

## 2025-06-09 NOTE — TELEPHONE ENCOUNTER
Home Care is calling regarding an established patient with [facility].  Requesting orders from: Zurdo Kendrick RN APPROVED: RN able to provide verbal orders for SN and OT.  Home Care will send orders for signature.    OTHER ACTION: Provider approval is needed for wound care orders.  Is this a request for a temporary pause in the home care episode?  No    Orders Requested    Skilled Nursing  Request for initial certification (first set of orders)   Frequency:1 x a wk for 4 wks and 2 PRN visits  RN gave verbal order: Yes      Physical Therapy  Request for initial evaluation and treatment (one time)   RN gave verbal order: Yes    Home care nurse needs approval for wound care orders.  Patient has a new wound on the left lower leg due to recent fall.   Cleanse wound with vashe wound cleanser. Air dry, apply no sting barrier around wound.Cover with border dressing and change every other day as needed.      Phone number Home Care can be reached at: [phone]  Okay to leave a detailed message?: Yes    Contacts       Contact Date/Time Type Contact Phone/Fax    06/09/2025 03:02 PM CDT Phone (Incoming) [facility] [phone]          Vijay Gates RN

## 2025-06-10 ENCOUNTER — APPOINTMENT (OUTPATIENT)
Dept: GENERAL RADIOLOGY | Facility: CLINIC | Age: OVER 89
End: 2025-06-10
Attending: EMERGENCY MEDICINE
Payer: COMMERCIAL

## 2025-06-10 ENCOUNTER — HOSPITAL ENCOUNTER (EMERGENCY)
Facility: CLINIC | Age: OVER 89
Discharge: HOME | End: 2025-06-11
Attending: EMERGENCY MEDICINE | Admitting: EMERGENCY MEDICINE
Payer: COMMERCIAL

## 2025-06-10 ENCOUNTER — TELEPHONE (OUTPATIENT)
Dept: FAMILY MEDICINE | Facility: CLINIC | Age: OVER 89
End: 2025-06-10
Payer: COMMERCIAL

## 2025-06-10 ENCOUNTER — PATIENT OUTREACH (OUTPATIENT)
Dept: CARE COORDINATION | Facility: CLINIC | Age: OVER 89
End: 2025-06-10
Payer: COMMERCIAL

## 2025-06-10 DIAGNOSIS — J69.0 ASPIRATION PNEUMONITIS (H): ICD-10-CM

## 2025-06-10 PROCEDURE — 71046 X-RAY EXAM CHEST 2 VIEWS: CPT

## 2025-06-10 PROCEDURE — 99283 EMERGENCY DEPT VISIT LOW MDM: CPT | Mod: 25

## 2025-06-10 ASSESSMENT — COLUMBIA-SUICIDE SEVERITY RATING SCALE - C-SSRS
6. HAVE YOU EVER DONE ANYTHING, STARTED TO DO ANYTHING, OR PREPARED TO DO ANYTHING TO END YOUR LIFE?: NO
1. IN THE PAST MONTH, HAVE YOU WISHED YOU WERE DEAD OR WISHED YOU COULD GO TO SLEEP AND NOT WAKE UP?: NO
2. HAVE YOU ACTUALLY HAD ANY THOUGHTS OF KILLING YOURSELF IN THE PAST MONTH?: NO

## 2025-06-10 ASSESSMENT — ACTIVITIES OF DAILY LIVING (ADL)
ADLS_ACUITY_SCORE: 61
ADLS_ACUITY_SCORE: 61

## 2025-06-10 NOTE — PROGRESS NOTES
Clinic Care Coordination Contact  Transitions of Care Outreach    Chief Complaint   Patient presents with    Clinic Care Coordination - Homecare/TCU       Most Recent Admission Date: 05/12/2025  Most Recent Admission Diagnosis: Mechanical fall, Left frontal scalp edema/hematoma without underlying bone fracture secondary to above, Generalized weakness, Parkinson disease, Chronic sinus bradycardia,Coronary artery disease s/p CABG, Hyperlipidemia, Essential hypertension, Panic disorder, OCD, Depression, History of CVA, Hypothyroidism, Chronic kidney disease stage III & Chronic normocytic anemia    Most Recent Discharge Date: 06/06/2025  Most Recent Discharge Diagnosis: Mechanical fall, Left frontal scalp edema/hematoma without underlying bone fracture secondary to above, Generalized weakness, Parkinson disease, Chronic sinus bradycardia,Coronary artery disease s/p CABG, Hyperlipidemia, Essential hypertension, Panic disorder, OCD, Depression, History of CVA, Hypothyroidism, Chronic kidney disease stage III & Chronic normocytic anemia    Transitions of Care Assessment    Discharge Assessment  How are you doing now that you are home?: He is currently with physical therapy at the time of the call.  How are your symptoms? (Red Flag symptoms escalate to triage hotline per guidelines): Improved  Do you know how to contact your clinic care team if you have future questions or changes to your health status? : Yes  Does the patient have their discharge instructions? : Unknown  Does the patient have questions regarding their discharge instructions? : No  Were you started on any new medications or were there changes to any of your previous medications? : Yes  Does the patient have all of their medications?: Yes  Do you have questions regarding any of your medications? : No  Do you have all of your needed medical supplies or equipment (DME)?  (i.e. oxygen tank, CPAP, cane, etc.): Yes              [facility] Explained and offered Care  Coordination support to eligible patients: No    Patient accepted? N/A, patient was currently with physical therapy. Did not get a chance to offer.     Follow up Plan     Discharge Follow-Up  Discharge follow up appointment scheduled in alignment with recommended follow up timeframe or Transitions of Risk Category? (Low = within 30 days; Moderate= within 14 days; High= within 7 days): No  Patient's follow up appointment not scheduled: Patient declined scheduling support. Education on the importance of transitions of care follow up. Provided scheduling phone number. (09/15/2025 2:30 PM Zurdo Kendrick MD [facility])    Future Appointments   Date Time Provider Department Center   9/15/2025  2:30 PM Zurdo Kendrick MD [facility]       Outpatient Plan as outlined on AVS reviewed with patient.      For any urgent concerns, please contact our 24 hour nurse triage line: [phone]       Marcelle Christianson  Ambulatory Care  - [facility]

## 2025-06-10 NOTE — TELEPHONE ENCOUNTER
MTM referral from: Transitions of Care (recent hospital discharge, TCU discharge, or ED visit)    MTM referral outreach attempt #1 on [place] 10, 2025 at 11:19 AM      Outcome: Spoke with patient declined    Use [facility] med adv for the carrier/Plan on the flowsheet      Cristina Reilly CMA  MTM

## 2025-06-10 NOTE — LETTER
I am a team member within the [facility] [place] with [facility]. I hope you are doing well following a recent hospitalization or TCU stay. I would like to take this chance to introduce Clinic Care Coordination    Below is a description of Clinic Care Coordination and how this team can further assist you:       The Clinic Care Coordination team is made up of a Registered Nurse, , Financial Resource Worker, and a Community Health Worker who understand and can help navigate the health care system. The goal of clinic care coordination is to help you manage your health, improve access to care, and achieve optimal health outcomes. They work alongside your provider to assist you in determining your health and social needs, obtain health care and community resources, and provide you with necessary information and education. Clinic Care Coordination can work with you through any barriers and develop a care plan that helps coordinate and strengthen the relationship between you and your care team.    If you wish to connect with the Clinic Care Coordination Team, please let your [facility] Primary Care Provider or Clinic Care Team know and they can place a referral. The Clinic Care Coordination team will then reach out by phone to further support you.    We are focused on providing you with the highest-quality healthcare experience possible.    Sincerely,   Your care team with [facility]'s [address] [place] [address] ([phone]).

## 2025-06-11 ENCOUNTER — TELEPHONE (OUTPATIENT)
Dept: FAMILY MEDICINE | Facility: CLINIC | Age: OVER 89
End: 2025-06-11
Payer: COMMERCIAL

## 2025-06-11 VITALS
RESPIRATION RATE: 16 BRPM | HEART RATE: 55 BPM | SYSTOLIC BLOOD PRESSURE: 139 MMHG | DIASTOLIC BLOOD PRESSURE: 98 MMHG | TEMPERATURE: 97 F | OXYGEN SATURATION: 100 %

## 2025-06-11 LAB
ATRIAL RATE - MUSE: 48 BPM
DIASTOLIC BLOOD PRESSURE - MUSE: NORMAL MMHG
INTERPRETATION ECG - MUSE: NORMAL
P AXIS - MUSE: 22 DEGREES
PR INTERVAL - MUSE: 130 MS
QRS DURATION - MUSE: 148 MS
QT - MUSE: 532 MS
QTC - MUSE: 475 MS
R AXIS - MUSE: -32 DEGREES
SYSTOLIC BLOOD PRESSURE - MUSE: NORMAL MMHG
T AXIS - MUSE: -81 DEGREES
VENTRICULAR RATE- MUSE: 48 BPM

## 2025-06-11 PROCEDURE — 250N000013 HC RX MED GY IP 250 OP 250 PS 637: Performed by: EMERGENCY MEDICINE

## 2025-06-11 RX ADMIN — AMOXICILLIN AND CLAVULANATE POTASSIUM 1 TABLET: 875; 125 TABLET, FILM COATED ORAL at 00:24

## 2025-06-11 ASSESSMENT — ACTIVITIES OF DAILY LIVING (ADL)
ADLS_ACUITY_SCORE: 61
ADLS_ACUITY_SCORE: 61

## 2025-06-11 NOTE — ED PROVIDER NOTES
Emergency Department Note      History of Present Illness     Chief Complaint   Choking      HPI   Zack Santiago is a 89 year old male on Plavix with a history of atrial fibrillation, ASCVD, CKD stage 3, GERD, HTN, aortic insufficiency, PVD, Parkinson's, and stroke presenting to the ED for choking. He was eating his dinner, which was cut up In smaller pieces, when he had a sudden choking episode. He states that he could breathe during it, but was coughing for around 20 minutes. He was able to drink a little bit of water, and notes that he was mainly coughing up water, not emesis. Following this, he felt a tightening in his stomach area, prompting the ED visit. Denies any other symptoms. He was able to drink some water since the incident.     Independent Historian   None    Review of External Notes   None    Past Medical History     Medical History and Problem List   A-fib  AAA (abdominal aortic aneurysm) without rupture  Amaurosis fugax of right eye  Anemia  Aortic insufficiency  Ascending aorta dilatation  ASCVD (arteriosclerotic cardiovascular disease)  BPH (benign prostatic hyperplasia)  Bradycardia  CKD (chronic kidney disease) stage 3  GERD  HTN (hypertension)  Hyponatremia  Hypothyroid  Frequent falls   Lower extremity edema, lower   PVD   Tremors   Hypovitaminosis D  Idiopathic neuropathy  Lung nodule  OCD (obsessive compulsive disorder)  Panic disorder  Parkinson's disease   Stroke  Sudden hearing loss  SVT   Thoracic ascending aortic aneurysm  Ulcerative colitis    Medications   Plavix  Prozac  Neurontin  Atarax  Synthroid   Cozaar  Namenda  Remeron  Seroquel  Zocor     Surgical History   Back surgery  Bladder surgery  Cataract IOL  Colonoscopy   Coronary artery bypass   Endovascular repair aneurysm abdominal aorta   Hernia repair 2x   Herniorrhaphy inguinal, left  Knee surgery  Repair hammer toe 2x   Toe amputation, right  TURP  TKA 2x     Physical Exam     Patient Vitals for the past 24 hrs:   BP Temp  Temp src Pulse Resp SpO2   06/11/25 0205 (!) 139/98 -- -- 55 16 100 %   06/10/25 1942 (!) 142/117 97  F (36.1  C) Temporal (!) 48 18 99 %     Physical Exam  Nursing note and vitals reviewed.  HENT:   Mouth/Throat: Moist mucous membranes.   Eyes: EOMI, nonicteric sclera  Cardiovascular: Normal rate, regular rhythm, no murmurs, rubs, or gallops  Pulmonary/Chest: Effort normal and breath sounds normal. No respiratory distress. No wheezes. No rales.   Abdominal: Soft. Nontender, nondistended, no guarding or rigidity.   Musculoskeletal: Normal range of motion.   Neurological: Alert. Moves all extremities spontaneously.   Skin: Skin is warm and dry. No rash noted.         Diagnostics     Lab Results   Labs Ordered and Resulted from Time of ED Arrival to Time of ED Departure - No data to display    Imaging   Chest XR,  PA & LAT   Final Result   IMPRESSION: Airspace infiltrate right upper lobe consistent with developing pneumonia. Left lung clear. Heart size normal. Sternal wires.            Independent Interpretation   I independently reviewed the CXR.  Right upper lobe opacity consistent with aspiration noted.      ED Course      Medications Administered   Medications   amoxicillin-clavulanate (AUGMENTIN) 875-125 MG per tablet 1 tablet (1 tablet Oral $Given 6/11/25 0024)       Procedures   Procedures     Discussion of Management   None    ED Course   ED Course as of 06/11/25 0401   Tue Chao 10, 2025   2235 I obtained history and examined the patient as noted above.     2351 I rechecked the patient.        Additional Documentation  None    Medical Decision Making / Diagnosis     CMS Diagnoses: None    MIPS   None          [facility]   Zack Santiago is a 89 year old male who presents after aspiration episode at home.  He reports very frequent coughing for 20 minutes, but now states he feels much better.  He also stated he was spitting up water after trying to drink it which suggested possible esophageal food bolus.  However,  patient is able to drink water here.  Chest x-ray obtained which is notable for a right upper lobe opacity which is likely aspiration.  This likely represents aspiration pneumonitis after his aspiration event tonight.  Classically, would take more of a wait-and-see approach with aspiration pneumonitis as only a small percentage go on to develop aspiration pneumonia.  However, given patient's age and possibility of ongoing aspiration, I will plan on treating with 5 days of Augmentin, though I did discuss with patient that if he felt better in a few days he would not necessarily need to complete the course.  Given obvious aspiration, I do not believe any atypical coverage is indicated and Augmentin will provide adequate coverage.  First dose given in the ED. He is in stable condition at the time of discharge, red flags that should merit ED return were discussed as well as recommended follow-up instructions. All questions were answered and he is in agreement with the plan.      Disposition   The patient was discharged.     Diagnosis     ICD-10-CM    1. Aspiration pneumonitis (H)  J69.0            Discharge Medications   Discharge Medication List as of 6/11/2025  1:57 AM        START taking these medications    Details   amoxicillin-clavulanate (AUGMENTIN) 875-125 MG tablet Take 1 tablet by mouth 2 times daily for 5 days., Disp-10 tablet, R-0, E-Prescribe               Scribe Disclosure:  I, Akbar Jalloh, am serving as a scribe at 10:45 PM on 6/10/2025 to document services personally performed by Jeff Yi MD based on my observations and the provider's statements to me.        Jeff Yi MD  06/11/25 [id number]

## 2025-06-11 NOTE — TELEPHONE ENCOUNTER
Home Care is calling regarding an established patient with [facility].  Requesting orders from: Zurdo Kendrick RN APPROVED: RN able to provide verbal orders.  Home Care will send orders for signature.  RN will close encounter.  Is this a request for a temporary pause in the home care episode?  No    Orders Requested    Physical Therapy  Request for initial certification (first set of orders)   Frequency: 1x/wk x 3 wks for strength, balance, and fall prevention.   RN gave verbal order: Yes    Phone number Home Care can be reached at: [phone]   Okay to leave a detailed message?: Yes    Contacts       Contact Date/Time Type Contact Phone/Fax    06/11/2025 09:06 AM CDT Phone (Incoming) [place] [phone]          Edu eMza RN

## 2025-06-11 NOTE — ED TRIAGE NOTES
Pt arrived via EMS, he states earlier today he choked on either a piece of meat or a potato. Was able to cough it out. He states he feels okay now but does feels some tightness in his epigastric area. Breathing even and unlabored. He states his heart rate is normally 45-50s.

## 2025-06-11 NOTE — DISCHARGE INSTRUCTIONS
Your x-ray showed some evidence of aspiration in your lung. While this isn't pneumonia yet, it can turn into pneumonia. I recommend starting antibiotics since you're at high risk of developing pneumonia.     Return to emergency department for shortness of breath, fever > 100.4, chest pain, inability to swallow, or for any other concerns.     Follow-up with your doctor in 2-3 days for a recheck of your symptoms.

## 2025-06-12 ENCOUNTER — PATIENT OUTREACH (OUTPATIENT)
Dept: CARE COORDINATION | Facility: CLINIC | Age: OVER 89
End: 2025-06-12
Payer: COMMERCIAL

## 2025-06-12 NOTE — PROGRESS NOTES
[facility] Care [facility]    Background: Primary Care-Care Coordination initial outreach identified per system criteria and reviewed by [facility] Resource [place] team.    Assessment: Upon chart review, [facility] Team member will not proceed with patient outreach related to this episode of Primary Care-Care Coordination program due to reason below:    Patient has discharged to a Memory Care, Long-term Care, Assisted Living or Group Home where patient is receiving on-site support with their daily cares, including support with ED follow up plan.    Plan: Primary Care-Care Coordination episode addressed appropriately per reason noted above.      NABIL Rhoades  [facility], [facility]    *Connected Care Resource Team does NOT follow patient ongoing. Referrals are identified based on internal discharge reports and the outreach is to ensure patient has an understanding of their discharge instructions.

## (undated) DEVICE — DRAPE LAP W/ARMBOARD 29410

## (undated) DEVICE — SU ETHIBOND 0 MO-7 CR 8X18" CX41D

## (undated) DEVICE — GLOVE PROTEXIS BLUE W/NEU-THERA 7.5  2D73EB75

## (undated) DEVICE — SU VICRYL 4-0 PS-2 18" UND J496H

## (undated) DEVICE — DRAIN PENROSE 0.50"X18" LATEX FREE GR203

## (undated) DEVICE — BLADE CLIPPER 4406

## (undated) DEVICE — ESU GROUND PAD UNIVERSAL W/O CORD

## (undated) DEVICE — SYR 10ML LL W/O NDL

## (undated) DEVICE — SU VICRYL 2-0 SH 27" J317H

## (undated) DEVICE — PREP CHLORAPREP 26ML TINTED ORANGE  260815

## (undated) DEVICE — SU VICRYL 3-0 TIE 12X18" J904T

## (undated) DEVICE — GLOVE PROTEXIS W/NEU-THERA 7.5  2D73TE75

## (undated) DEVICE — LINEN TOWEL PACK X5 5464

## (undated) DEVICE — PACK MINOR SBA15MIFSE

## (undated) DEVICE — SU VICRYL 3-0 SH 27" J316H

## (undated) RX ORDER — BUPIVACAINE HYDROCHLORIDE AND EPINEPHRINE 5; 5 MG/ML; UG/ML
INJECTION, SOLUTION EPIDURAL; INTRACAUDAL; PERINEURAL
Status: DISPENSED
Start: 2018-01-05

## (undated) RX ORDER — PROPOFOL 10 MG/ML
INJECTION, EMULSION INTRAVENOUS
Status: DISPENSED
Start: 2018-01-05

## (undated) RX ORDER — LIDOCAINE HYDROCHLORIDE 20 MG/ML
INJECTION, SOLUTION EPIDURAL; INFILTRATION; INTRACAUDAL; PERINEURAL
Status: DISPENSED
Start: 2018-01-05

## (undated) RX ORDER — ONDANSETRON 2 MG/ML
INJECTION INTRAMUSCULAR; INTRAVENOUS
Status: DISPENSED
Start: 2018-01-05

## (undated) RX ORDER — FENTANYL CITRATE 50 UG/ML
INJECTION, SOLUTION INTRAMUSCULAR; INTRAVENOUS
Status: DISPENSED
Start: 2020-05-05

## (undated) RX ORDER — FENTANYL CITRATE 50 UG/ML
INJECTION, SOLUTION INTRAMUSCULAR; INTRAVENOUS
Status: DISPENSED
Start: 2018-01-05

## (undated) RX ORDER — ONDANSETRON 2 MG/ML
INJECTION INTRAMUSCULAR; INTRAVENOUS
Status: DISPENSED
Start: 2018-01-06